# Patient Record
Sex: FEMALE | Race: WHITE | Employment: OTHER | URBAN - METROPOLITAN AREA
[De-identification: names, ages, dates, MRNs, and addresses within clinical notes are randomized per-mention and may not be internally consistent; named-entity substitution may affect disease eponyms.]

---

## 2019-01-04 ENCOUNTER — TELEPHONE ANTICOAG (OUTPATIENT)
Dept: INTERNAL MEDICINE CLINIC | Age: 79
End: 2019-01-04

## 2019-01-04 ENCOUNTER — OFFICE VISIT (OUTPATIENT)
Dept: INTERNAL MEDICINE CLINIC | Age: 79
End: 2019-01-04

## 2019-01-04 VITALS
RESPIRATION RATE: 18 BRPM | HEIGHT: 61 IN | WEIGHT: 145 LBS | SYSTOLIC BLOOD PRESSURE: 99 MMHG | HEART RATE: 88 BPM | TEMPERATURE: 99.2 F | BODY MASS INDEX: 27.38 KG/M2 | DIASTOLIC BLOOD PRESSURE: 65 MMHG | OXYGEN SATURATION: 94 %

## 2019-01-04 DIAGNOSIS — N32.81 OAB (OVERACTIVE BLADDER): ICD-10-CM

## 2019-01-04 DIAGNOSIS — M81.0 AGE-RELATED OSTEOPOROSIS WITHOUT CURRENT PATHOLOGICAL FRACTURE: ICD-10-CM

## 2019-01-04 DIAGNOSIS — J41.0 SIMPLE CHRONIC BRONCHITIS (HCC): ICD-10-CM

## 2019-01-04 DIAGNOSIS — I25.10 CORONARY ARTERY DISEASE INVOLVING NATIVE HEART WITHOUT ANGINA PECTORIS, UNSPECIFIED VESSEL OR LESION TYPE: ICD-10-CM

## 2019-01-04 DIAGNOSIS — Z00.00 ROUTINE ADULT HEALTH MAINTENANCE: Primary | ICD-10-CM

## 2019-01-04 DIAGNOSIS — Z13.5 SCREENING FOR GLAUCOMA: ICD-10-CM

## 2019-01-04 DIAGNOSIS — Z86.718 HISTORY OF DVT (DEEP VEIN THROMBOSIS): ICD-10-CM

## 2019-01-04 DIAGNOSIS — K58.0 IRRITABLE BOWEL SYNDROME WITH DIARRHEA: ICD-10-CM

## 2019-01-04 PROBLEM — I82.409 DVT (DEEP VENOUS THROMBOSIS) (HCC): Status: ACTIVE | Noted: 2019-01-04

## 2019-01-04 LAB
INR BLD: 1.3
PT POC: 15.9 SECONDS
VALID INTERNAL CONTROL?: YES

## 2019-01-04 RX ORDER — WARFARIN 2 MG/1
2 TABLET ORAL DAILY
COMMUNITY
End: 2019-01-17

## 2019-01-04 NOTE — PROGRESS NOTES
Sally Leiva is a [de-identified] y.o. female who presents for evaluation of new pt visit, cpe. Recently moved to [facility] from [place] to be closer to a son, as her  has worsening dementia. Overall she is doing quite well. ROS:  Constitutional: negative for fevers, chills, anorexia and weight loss  Eyes:   negative for visual disturbance and irritation  ENT:   negative for tinnitus,sore throat,nasal congestion,ear pain,hoarseness  Respiratory:  negative for cough, hemoptysis, dyspnea,wheezing  CV:   negative for chest pain, palpitations, lower extremity edema  GI:   negative for nausea, vomiting, diarrhea, abdominal pain,melena  Genitourinary: negative for frequency, dysuria and hematuria  Musculoskel: negative for myalgias, arthralgias, back pain, muscle weakness, joint pain  Neurological:  negative for headaches, dizziness, focal weakness, numbness  Psychiatric:     Negative for depression or anxiety      Past Medical History:   Diagnosis Date    Arthritis     History of recurrent UTIs     Irritable bowel syndrome (IBS)        Past Surgical History:   Procedure Laterality Date    HX HIP FRACTURE TX      HX OTHER SURGICAL      stint in heart       Family History   Problem Relation Age of Onset    Diabetes Mother     Stroke Mother        Social History     Socioeconomic History    Marital status:      Spouse name: Not on file    Number of children: Not on file    Years of education: Not on file    Highest education level: Not on file   Social Needs    Financial resource strain: Not on file    Food insecurity - worry: Not on file    Food insecurity - inability: Not on file   [employer] needs - medical: Not on file   [employer] needs - non-medical: Not on file   Occupational History    Not on file   Tobacco Use    Smoking status: Former Smoker    Smokeless tobacco: Never Used   Substance and Sexual Activity    Alcohol use: No     Frequency: Never    Drug use:  No  Sexual activity: No   Other Topics Concern    Not on file   Social History Narrative    Not on file            Visit Vitals  BP 99/65 (BP 1 Location: Right arm, BP Patient Position: Sitting)   Pulse 88   Temp 99.2 °F (37.3 °C) (Oral)   Resp 18   Ht 5' 1\" (1.549 m)   Wt 145 lb (65.8 kg)   SpO2 94%   BMI 27.40 kg/m²       Physical Examination:   General - Well appearing female  HEENT - PERRL, TM no erythema/opacification, normal nasal turbinates, no oropharyngeal erythema or exudate, MMM  Neck - supple, no bruits, no thyroidomegaly, no lymphadenopathy  Pulm - clear to auscultation bilaterally  Cardio - RRR, normal S1 S2, no murmur  Abd - soft, nontender, no masses, no HSM  Extrem - no edema, +2 distal pulses  Neuro-  No focal deficits, CN intact     Assessment/Plan:    1. Routine adult health maintenance--check cbc, cmp, flp, tsh, a1c  2. Copd--continue advair 250/50  3.  oab--on detrol  4. Hypothyroid--check tsh, on synthroid  5. Hx dvt--on coumadin, check INR. Takes 2 mg daily x 1 mg on wed  6. Hx cad with stent--on coumadin  7. Osteoporosis--on once yearly infusion    Get records from dr [employer] in [place].   Pt does not know her meds, has info on a disk that we can not access        Virtual [employer]-D [employer] for Smart[employer], DO

## 2019-01-04 NOTE — PROGRESS NOTES
Reviewed record in preparation for visit and have obtained necessary documentation. Identified pt with two pt identifiers(name and ). Chief Complaint   Patient presents with   [facility] Maintenance Due   Topic Date Due    DTaP/Tdap/Td series (1 - Tdap) 1959    Shingrix Vaccine Age 50> (1 of 2) 1988    GLAUCOMA SCREENING Q2Y  2003    Bone Densitometry (Dexa) Screening  2003    Pneumococcal 65+ Low/Medium Risk (1 of 2 - PCV13) 2003    Influenza Age 9 to Adult  2018       Ms. Tuyet Cortes has a reminder for a \"due or due soon\" health maintenance. I have asked that she discuss health maintenance topic(s) due with Her  primary care provider. Coordination of Care Questionnaire:  :     1) Have you been to an emergency room, urgent care clinic since your last visit? no   Hospitalized since your last visit? no             2) Have you seen or consulted any other health care providers outside of [address] [place] [address] since your last visit? no  (Include any pap smears or colon screenings in this section.)    3) Do you have an Advance Directive on file? no    4) Are you interested in receiving information on Advance Directives? NO    Patient is accompanied by self I have received verbal consent from [facility] to discuss any/all medical information while they are present in the room.

## 2019-01-04 NOTE — PATIENT INSTRUCTIONS

## 2019-01-04 NOTE — PROGRESS NOTES
Ms. Elvie Andrea is here today for anticoagulation monitoring for the diagnosis of DVT. Her INR goal is 2.0-3.0 and her current Coumadin dose is 14TWD. Today's findings include an INR of 1.3 (normal INR range 0.8-1.2) and 15.9. Considering Ms. Dunlap's past history, todays findings, and per the coumadin policy/protocol, Ms. Be Newton was instructed to take Coumadin as follows,    Description    RBVO per Dr. Kanu No go to 2mg daily and recheck in 1 week      . She was also instructed to schedule an appointment in 1 weeks prior to leaving for an INR check. A full discussion of the nature of anticoagulants has been carried out. A full discussion of the need for frequent and regular monitoring, precise dosage adjustment and compliance was stressed. Side effects of potential bleeding were discussed and Ms. Be Newton was instructed to call [phone] if there are any signs of abnormal bleeding. Ms. Be Newton was instructed to avoid any OTC items containing aspirin or ibuprofen and prior to starting any new OTC products to consult with her physician or pharmacist to ensure no drug interactions are present. Ms. Be Newton was instructed to avoid any major changes in her general diet and to avoid alcohol consumption. .    Ms. Be Newton was provided a literature booklet, \"Treatment with Warfarin (Coumadin)\", that includes topics on understanding coumadin therapy, drug interaction considerations, vitamin K and coumadin use, interactions with foods and supplements containing vitamin K, and the use of herbal products. Ms. Be Newton verbalized her understanding of all instructions and will call the office with any questions, concerns, or signs of abnormal bleeding or blood clot.

## 2019-01-07 ENCOUNTER — TELEPHONE (OUTPATIENT)
Dept: INTERNAL MEDICINE CLINIC | Age: 79
End: 2019-01-07

## 2019-01-07 NOTE — TELEPHONE ENCOUNTER
Pharmacy Progress Note - Telephone Encounter    S/O: Ms. Corey Sandhoff was contacted via an outbound telephone call to schedule an INR follow up visit today. Verified patients identifiers (name & ) per HIPAA policy. - Referred by Dr. Michael Denson for patient to be seen in the coumadin clinic and medication review. - Pt has all of her medication/medical information on a disc.    - Gets all of her medications filled at [facility] on [address]. Suggests we reached out to them for her medication names.  - Still has to get labs ordered from last PCP's visit collected. A/P:  - Pt's appointment will be on Thursday,  @ 2PM.    - Recommend patient bring in all home medications to visit. - She can get her labs drawn on the same day following INR visit. - Patient endorses understanding to the provided information. All questions were answered.      Thank you,  Verna Barba, PharmD

## 2019-01-09 ENCOUNTER — TELEPHONE (OUTPATIENT)
Dept: INTERNAL MEDICINE CLINIC | Age: 79
End: 2019-01-09

## 2019-01-09 NOTE — TELEPHONE ENCOUNTER
Patient states she needs a call back in reference to finding out why more labs needs to be done when she had INR done. Patient also wants to get her INR appt that she cancelled for 1/10/19 re-scheduled so she & her  can come together. Patient was offered Monday, 1/14/19 & she is concerned with snow that is forcasted for the weekend. Please call.  Thank you

## 2019-01-09 NOTE — TELEPHONE ENCOUNTER
I know early afternoon appointments are better for her. Can she come in next Thursday (01/17/19)? Appointment would be for INR check and medication review.      Thank you,  Zulema Jenkins, ShonD

## 2019-01-09 NOTE — TELEPHONE ENCOUNTER
Pharmacy Progress Note - Telephone Encounter    S/O: Ms. Cheri Prado was contacted via an outbound telephone call to follow up on her request to reschedule INR visit today. Verified patients identifiers (name & ) per HIPAA policy. Pt is new to the practice and area. - Pt was confused about lab work ordered at last PCP visit. She did not know to have blood work collected after visit. Her  also has pending labs. A/P:  - Clarified w/ patient she can get lab work done at [facility] in the same office. Pt and  can get this done after INR appointment.    - Rescheduled INR appt will be next Thursday,  @ 1:15 PM.   - Patient endorses understanding to the provided information. All questions were answered.      Thank you,  Blair Smith, ShonD

## 2019-01-16 ENCOUNTER — TELEPHONE (OUTPATIENT)
Dept: INTERNAL MEDICINE CLINIC | Age: 79
End: 2019-01-16

## 2019-01-16 NOTE — TELEPHONE ENCOUNTER
Unable to reach patient LVM to return call.  Want to discuss that labs that show not being drawn yet

## 2019-01-16 NOTE — TELEPHONE ENCOUNTER
#[phone] pt states she is upset no one has called her in 3 wks with lab results. Pt will try to call you later as she will be in a doctor's office now or she will be bringing  in tomorrow for labs and she could talk with you then.

## 2019-01-17 ENCOUNTER — OFFICE VISIT (OUTPATIENT)
Dept: INTERNAL MEDICINE CLINIC | Age: 79
End: 2019-01-17

## 2019-01-17 ENCOUNTER — APPOINTMENT (OUTPATIENT)
Dept: INTERNAL MEDICINE CLINIC | Age: 79
End: 2019-01-17

## 2019-01-17 VITALS — DIASTOLIC BLOOD PRESSURE: 68 MMHG | OXYGEN SATURATION: 95 % | HEART RATE: 78 BPM | SYSTOLIC BLOOD PRESSURE: 104 MMHG

## 2019-01-17 DIAGNOSIS — I82.5Y2 CHRONIC DEEP VEIN THROMBOSIS (DVT) OF PROXIMAL VEIN OF LEFT LOWER EXTREMITY (HCC): ICD-10-CM

## 2019-01-17 LAB
INR BLD: 2.8 (ref 1–1.5)
PT POC: 34.1 SECONDS (ref 9.1–12)
VALID INTERNAL CONTROL?: YES

## 2019-01-17 RX ORDER — CELECOXIB 100 MG/1
100 CAPSULE ORAL DAILY
COMMUNITY
End: 2021-06-25 | Stop reason: ALTCHOICE

## 2019-01-17 RX ORDER — NYSTATIN 100000 [USP'U]/ML
1 SUSPENSION ORAL 4 TIMES DAILY
COMMUNITY
End: 2019-11-27 | Stop reason: SDUPTHER

## 2019-01-17 RX ORDER — AMITRIPTYLINE HYDROCHLORIDE 10 MG/1
10 TABLET, FILM COATED ORAL
COMMUNITY
End: 2019-02-23 | Stop reason: SDUPTHER

## 2019-01-17 RX ORDER — GLUCOSAMINE/CHONDR SU A SOD 750-600 MG
1 TABLET ORAL DAILY
COMMUNITY
End: 2021-12-03

## 2019-01-17 RX ORDER — MONTELUKAST SODIUM 4 MG/1
1 TABLET, CHEWABLE ORAL DAILY
COMMUNITY
End: 2021-06-25 | Stop reason: ALTCHOICE

## 2019-01-17 RX ORDER — FLUTICASONE PROPIONATE AND SALMETEROL 250; 50 UG/1; UG/1
1 POWDER RESPIRATORY (INHALATION) 2 TIMES DAILY
COMMUNITY
End: 2019-10-22 | Stop reason: SDUPTHER

## 2019-01-17 RX ORDER — CLORAZEPATE DIPOTASSIUM 3.75 MG/1
3.75 TABLET ORAL
COMMUNITY
End: 2019-06-05 | Stop reason: SDUPTHER

## 2019-01-17 RX ORDER — LEVETIRACETAM 500 MG/1
TABLET ORAL SEE ADMIN INSTRUCTIONS
COMMUNITY
End: 2019-02-09 | Stop reason: SDUPTHER

## 2019-01-17 RX ORDER — MELATONIN
1000 DAILY
COMMUNITY
End: 2020-08-24

## 2019-01-17 RX ORDER — ESCITALOPRAM OXALATE 20 MG/1
20 TABLET ORAL DAILY
COMMUNITY
End: 2019-04-16 | Stop reason: SDUPTHER

## 2019-01-17 RX ORDER — DIPHENOXYLATE HYDROCHLORIDE AND ATROPINE SULFATE 2.5; .025 MG/1; MG/1
1 TABLET ORAL
COMMUNITY
End: 2019-05-24 | Stop reason: SDUPTHER

## 2019-01-17 RX ORDER — WARFARIN 2 MG/1
2 TABLET ORAL SEE ADMIN INSTRUCTIONS
Qty: 30 TAB | Refills: 0
Start: 2019-01-17 | End: 2019-03-20 | Stop reason: SDUPTHER

## 2019-01-17 RX ORDER — ATORVASTATIN CALCIUM 20 MG/1
20 TABLET, FILM COATED ORAL DAILY
COMMUNITY
End: 2019-11-27 | Stop reason: SDUPTHER

## 2019-01-17 RX ORDER — OXYBUTYNIN CHLORIDE 10 MG/1
10 TABLET, EXTENDED RELEASE ORAL DAILY
COMMUNITY
End: 2019-03-13 | Stop reason: SDUPTHER

## 2019-01-17 RX ORDER — WARFARIN 1 MG/1
1 TABLET ORAL SEE ADMIN INSTRUCTIONS
Qty: 30 TAB | Refills: 0
Start: 2019-01-17 | End: 2020-01-20 | Stop reason: SDUPTHER

## 2019-01-17 RX ORDER — FLUTICASONE PROPIONATE 50 MCG
2 SPRAY, SUSPENSION (ML) NASAL
COMMUNITY
End: 2020-02-10 | Stop reason: SDUPTHER

## 2019-01-17 RX ORDER — WARFARIN 1 MG/1
1 TABLET ORAL SEE ADMIN INSTRUCTIONS
COMMUNITY
End: 2019-01-17

## 2019-01-17 RX ORDER — EZETIMIBE 10 MG/1
10 TABLET ORAL DAILY
COMMUNITY
End: 2019-03-08 | Stop reason: SDUPTHER

## 2019-01-17 RX ORDER — CALCIUM CARBONATE 200(500)MG
1 TABLET,CHEWABLE ORAL
COMMUNITY
End: 2021-12-03

## 2019-01-17 RX ORDER — WARFARIN 2 MG/1
2 TABLET ORAL SEE ADMIN INSTRUCTIONS
COMMUNITY
End: 2019-01-17

## 2019-01-17 RX ORDER — TRAZODONE HYDROCHLORIDE 50 MG/1
50-100 TABLET ORAL
COMMUNITY
End: 2019-01-28 | Stop reason: SDUPTHER

## 2019-01-17 RX ORDER — METOPROLOL SUCCINATE 25 MG/1
12.5 TABLET, EXTENDED RELEASE ORAL DAILY
COMMUNITY
End: 2019-11-27 | Stop reason: SDUPTHER

## 2019-01-17 NOTE — PROGRESS NOTES
Pharmacy Progress Note:  Medication Review    S/O:    Tanvir Storm is a [de-identified] y.o. female , with a PMH of OAB, IBS, LE DVT, anxiety, insomonia, Renee's esophagus, Arthritis, Migraines, hyperlipidemia, who was seen today for a medication review and anticoagulation management for LLE DVT. Patient was accompanied by her  to today's visit. Labs ordered from last PCP visit were collected immediately prior to this visit. Pt ambulates w/ a cane. Medication Adherence/Access:  - Pt did not bring in home medications to visit. - Pt reports compliance to med regimen.   - Pt uses pill box/organizer at home: no   - Pt denies barriers to accessing/affording medications  - Pt does have prescription coverage/insurance - [address] [place] [address] [phone]    [address] [place] [address] to assist with patient's medication list.  Pharmacy confirmed patient recently filled the following medications on 11/7/18 for a 90 day supply. Patient confirmed that she is taking all of the reported medications.      SHx  Retired  as of 2015  Recently relocated from [facility] after living their for 64 yrs to be closer to her son  Primary caretaker for her      Anticoagulation:  - LLE DVT in 2008 - denies extensive travel or injury - started on warfarin ; INR goal: 2-3  - No family hx of blood clots   - Mother has cardiovascular hx of stroke and DM ; does not know father's med hx  - Has 3 children all alive    - Has warfarin 1 mg and 2 mg dosage strength at home ; could not recall what her previous warfarin regimen was  -  endorses that she primarily use 1 mg strength whenever her \"readings are low;\" denies any issues w/ anticoagulant   - Warfarin dose adjusted to 2 mg daily on 01/4/19 for INR = 1.3   - Avoid greens at a baseline   - Denies falls, s/sx bleeding, bruising, SOB, CP, or missed doses   - BP: 104/68 ; HR: 78 today   - no procedures coming up   - does not take OTC NSAIDs or APAP; uses celebrex 100 mg daily with food for arthritic pain; Understands increased bleeding risk w/ concomitant therapy    Results for orders placed or performed in visit on 01/17/19   AMB POC PT/INR   Result Value Ref Range    VALID INTERNAL CONTROL POC Yes     Prothrombin time (POC) 34.1 (A) 9.1 - 12 seconds    INR POC 2.8 (A) 1 - 1.5     Hyperlipidemia  Atorvastatin 20 mg daily and Zetia 10 mg daily     OAB/IBS  - on colestipol 1 gm daily; lomotil PRN; Align OTC daily PRN - states she responds better to align than lomotil  - oxybutynin ER 10 mg daily    Migraine:  - Keppra 500 mg PO - 1 tablet AM and 1.5 tab in the PM     Anxiety/Insomnia  - lexapro 20 mg daily  - clorazepate 3.75 gm Q6H PRN anxiety - only takes when her  is not doing well   - trazodone  mg at HS PRN insomnia     Renee's esophagus:  - Hx of protonix use - no longer takes   - uses Tums PRN for heartburn     Dry mouth/thrush:   - Nystatin suspension QID PRN for mouth pain   - also on anticholinergic medications  - Advair 250/50 - may worsen thrush; states she rinses mouth after use    Other:  - Biotin 2.5 mg PO daily for alopecia  - Vitamin D3 1000 units daily   - Flonase nasal spray PRN allergy      /68 (BP 1 Location: Right arm, BP Patient Position: Sitting)   Pulse 78   SpO2 95%   Wt Readings from Last 3 Encounters:   01/04/19 145 lb (65.8 kg)     BP Readings from Last 3 Encounters:   01/17/19 104/68   01/04/19 99/65     Pulse Readings from Last 3 Encounters:   01/17/19 78   01/04/19 88     Past Medical History:   Diagnosis Date    Arthritis     History of DVT (deep vein thrombosis)     History of recurrent UTIs     Irritable bowel syndrome (IBS)        Allergies   Allergen Reactions    Pcn [Penicillins] Shortness of Breath    Sulfa (Sulfonamide Antibiotics) Shortness of Breath       A/P:     Medication Adherence:  - Medication reconciliation was completed during the visit.   - Pt is compliant to current medication regimen. Addition(s):    Current Outpatient Medications   Medication Sig    amitriptyline (ELAVIL) 10 mg tablet Take 10 mg by mouth nightly.  ezetimibe (ZETIA) 10 mg tablet Take 10 mg by mouth daily.  traZODone (DESYREL) 50 mg tablet Take  mg by mouth nightly as needed for Sleep.  fluticasone-salmeterol (ADVAIR DISKUS) 250-50 mcg/dose diskus inhaler Take 1 Puff by inhalation two (2) times a day.  oxybutynin chloride XL (DITROPAN XL) 10 mg CR tablet Take 10 mg by mouth daily.  metoprolol succinate (TOPROL-XL) 25 mg XL tablet Take 12.5 mg by mouth daily.  levETIRAcetam (KEPPRA) 500 mg tablet Take  by mouth See Admin Instructions. Take 1 tablet in the morning and 1.5 tablets in the evening    atorvastatin (LIPITOR) 20 mg tablet Take 10 mg by mouth daily.  escitalopram oxalate (LEXAPRO) 20 mg tablet Take 20 mg by mouth daily.  colestipol (COLESTID) 1 gram tablet Take 1 g by mouth daily.  fluticasone (FLONASE) 50 mcg/actuation nasal spray 2 Sprays by Both Nostrils route daily as needed for Rhinitis.  cholecalciferol (VITAMIN D3) 1,000 unit tablet Take 1,000 Units by mouth daily.  nystatin (MYCOSTATIN) 100,000 unit/mL suspension Take 1 tsp by mouth four (4) times daily. swish and spit to help with mouth pain    Biotin 2,500 mcg cap Take 1 Cap by mouth daily.  clorazepate (TRANXENE) 3.75 mg tablet Take 3.75 mg by mouth every six (6) hours as needed for Anxiety.  celecoxib (CELEBREX) 100 mg capsule Take 100 mg by mouth daily. Take with food for arthritic pain    calcium carbonate (TUMS) 200 mg calcium (500 mg) chew Take 1 Tab by mouth daily as needed.  warfarin (COUMADIN) 1 mg tablet Take 1 Tab by mouth See Admin Instructions. Take 2 mg daily. Has both 1 mg and 2 mg dosage strength    warfarin (COUMADIN) 2 mg tablet Take 1 Tab by mouth See Admin Instructions. Take 2 mg daily.      Has both 1 mg and 2 mg dosage strength    diphenoxylate-atropine (LOMOTIL) 2.5-0.025 mg per tablet Take 1 Tab by mouth four (4) times daily as needed for Diarrhea.  Bifidobacterium Infantis (ALIGN) 4 mg cap Take 1 Cap by mouth daily as needed for Diarrhea. No current facility-administered medications for this visit. Deletion(s):  Medications Discontinued During This Encounter   Medication Reason    amitriptyline HCl (AMITRIPTYLINE PO) Other    OXYBUTYNIN CHLORIDE PO Other    warfarin (COUMADIN) 2 mg tablet Other       Anticoagulation:    1. INR (2.8) today is therapeutic for her goal of 2-3.  2. Continue warfarin 2 mg daily. 3. Pt will return in 2 weeks for POC INR check. Patient verbalized understanding of the information presented and all of the patients questions were answered. AVS was handed to the patient. Patient advised to call the office with any additional questions or concerns. Follow-up: Notifications of recommendations will be sent to Dr. Jacqueline Singleton DO for review.     Thank you for the consult,  Shon DixonD

## 2019-01-18 ENCOUNTER — PATIENT OUTREACH (OUTPATIENT)
Dept: INTERNAL MEDICINE CLINIC | Age: 79
End: 2019-01-18

## 2019-01-18 LAB
ALBUMIN SERPL-MCNC: 4.2 G/DL (ref 3.5–4.7)
ALBUMIN/GLOB SERPL: 1.4 {RATIO} (ref 1.2–2.2)
ALP SERPL-CCNC: 82 IU/L (ref 39–117)
ALT SERPL-CCNC: 9 IU/L (ref 0–32)
AST SERPL-CCNC: 15 IU/L (ref 0–40)
BASOPHILS # BLD AUTO: 0.1 X10E3/UL (ref 0–0.2)
BASOPHILS NFR BLD AUTO: 1 %
BILIRUB SERPL-MCNC: 0.3 MG/DL (ref 0–1.2)
BUN SERPL-MCNC: 12 MG/DL (ref 8–27)
BUN/CREAT SERPL: 16 (ref 12–28)
CALCIUM SERPL-MCNC: 9.3 MG/DL (ref 8.7–10.3)
CHLORIDE SERPL-SCNC: 102 MMOL/L (ref 96–106)
CHOLEST SERPL-MCNC: 158 MG/DL (ref 100–199)
CO2 SERPL-SCNC: 24 MMOL/L (ref 20–29)
CREAT SERPL-MCNC: 0.75 MG/DL (ref 0.57–1)
EOSINOPHIL # BLD AUTO: 0.5 X10E3/UL (ref 0–0.4)
EOSINOPHIL NFR BLD AUTO: 6 %
ERYTHROCYTE [DISTWIDTH] IN BLOOD BY AUTOMATED COUNT: 18.1 % (ref 12.3–15.4)
EST. AVERAGE GLUCOSE BLD GHB EST-MCNC: 157 MG/DL
GLOBULIN SER CALC-MCNC: 2.9 G/DL (ref 1.5–4.5)
GLUCOSE SERPL-MCNC: 105 MG/DL (ref 65–99)
HBA1C MFR BLD: 7.1 % (ref 4.8–5.6)
HCT VFR BLD AUTO: 38 % (ref 34–46.6)
HDLC SERPL-MCNC: 50 MG/DL
HGB BLD-MCNC: 11 G/DL (ref 11.1–15.9)
IMM GRANULOCYTES # BLD AUTO: 0 X10E3/UL (ref 0–0.1)
IMM GRANULOCYTES NFR BLD AUTO: 0 %
LDLC SERPL CALC-MCNC: 76 MG/DL (ref 0–99)
LYMPHOCYTES # BLD AUTO: 2.2 X10E3/UL (ref 0.7–3.1)
LYMPHOCYTES NFR BLD AUTO: 26 %
MCH RBC QN AUTO: 21.6 PG (ref 26.6–33)
MCHC RBC AUTO-ENTMCNC: 28.9 G/DL (ref 31.5–35.7)
MCV RBC AUTO: 75 FL (ref 79–97)
MONOCYTES # BLD AUTO: 0.7 X10E3/UL (ref 0.1–0.9)
MONOCYTES NFR BLD AUTO: 8 %
NEUTROPHILS # BLD AUTO: 5.1 X10E3/UL (ref 1.4–7)
NEUTROPHILS NFR BLD AUTO: 59 %
PLATELET # BLD AUTO: 268 X10E3/UL (ref 150–379)
POTASSIUM SERPL-SCNC: 4.1 MMOL/L (ref 3.5–5.2)
PROT SERPL-MCNC: 7.1 G/DL (ref 6–8.5)
RBC # BLD AUTO: 5.1 X10E6/UL (ref 3.77–5.28)
SODIUM SERPL-SCNC: 141 MMOL/L (ref 134–144)
TRIGL SERPL-MCNC: 161 MG/DL (ref 0–149)
TSH SERPL DL<=0.005 MIU/L-ACNC: 2.47 UIU/ML (ref 0.45–4.5)
VLDLC SERPL CALC-MCNC: 32 MG/DL (ref 5–40)
WBC # BLD AUTO: 8.6 X10E3/UL (ref 3.4–10.8)

## 2019-01-18 RX ORDER — METFORMIN HYDROCHLORIDE 500 MG/1
500 TABLET, EXTENDED RELEASE ORAL
Qty: 30 TAB | Refills: 11 | Status: SHIPPED | OUTPATIENT
Start: 2019-01-18 | End: 2019-04-30 | Stop reason: SDUPTHER

## 2019-01-18 NOTE — TELEPHONE ENCOUNTER
Spoke with patient after 2 patient identifiers being note and advised that she in actuality had forgotten to get labs drawn at her last visit and she got them drawn on yesterday. I advised per Dr. Jeff Morris that Labs overall look pretty good, but she does have diabetes.  Would benefit from meeting with chacorta for education.  Would also start Glucophage with dinner.  Rx sent in. Patient expressed understanding and has no further questions at this time.

## 2019-01-18 NOTE — PROGRESS NOTES
1/21/19-pt declined diabetes education at this time as she is dealing with a lot due to her 's dementia and she does not have time for an appt. Gave pt my contact information for her to call with any questions. She has started the metformin with dinner. Has nausea. Informed pt that zofran was sent to her pharmacy today per her request.     Was asked by Dr. Marsha Borja to see pt for diabetes education. See note below. Notes recorded by Carlos Gillespie DO on 1/18/2019 at 7:50 AM EST  Labs overall look pretty good, but she does have diabetes.  Would benefit from meeting with chacorta for education.  Would also start glucophage with dinner.  rx sent in. Left message at [phone] to call Ynes Posadas back at [phone].

## 2019-01-18 NOTE — PROGRESS NOTES
Labs overall look pretty good, but she does have diabetes. Would benefit from meeting with chacorta for education. Would also start glucophage with dinner. rx sent in.

## 2019-01-21 RX ORDER — ONDANSETRON 4 MG/1
4 TABLET, FILM COATED ORAL
Qty: 30 TAB | Refills: 0 | Status: SHIPPED | OUTPATIENT
Start: 2019-01-21 | End: 2019-02-04 | Stop reason: SDUPTHER

## 2019-01-28 ENCOUNTER — ANTI-COAG VISIT (OUTPATIENT)
Dept: INTERNAL MEDICINE CLINIC | Age: 79
End: 2019-01-28

## 2019-01-28 ENCOUNTER — TELEPHONE (OUTPATIENT)
Dept: INTERNAL MEDICINE CLINIC | Age: 79
End: 2019-01-28

## 2019-01-28 DIAGNOSIS — I82.5Y2 CHRONIC DEEP VEIN THROMBOSIS (DVT) OF PROXIMAL VEIN OF LEFT LOWER EXTREMITY (HCC): ICD-10-CM

## 2019-01-28 LAB
INR BLD: 2 (ref 1–1.5)
PT POC: 24.2 SECONDS (ref 9.1–12)
VALID INTERNAL CONTROL?: YES

## 2019-01-28 RX ORDER — DICYCLOMINE HYDROCHLORIDE 20 MG/1
20 TABLET ORAL 2 TIMES DAILY
COMMUNITY
End: 2019-02-05 | Stop reason: SDUPTHER

## 2019-01-28 RX ORDER — TRAMADOL HYDROCHLORIDE 50 MG/1
50-100 TABLET ORAL
COMMUNITY
End: 2019-05-14 | Stop reason: SDUPTHER

## 2019-01-28 RX ORDER — OXYCODONE HYDROCHLORIDE 10 MG/1
10 TABLET ORAL
COMMUNITY
End: 2019-02-27 | Stop reason: SDUPTHER

## 2019-01-28 RX ORDER — TRAZODONE HYDROCHLORIDE 50 MG/1
50 TABLET ORAL
Qty: 90 TAB | Refills: 3 | Status: SHIPPED | OUTPATIENT
Start: 2019-01-28 | End: 2020-02-10 | Stop reason: SDUPTHER

## 2019-01-28 NOTE — TELEPHONE ENCOUNTER
Refilled trazodone 50 mg daily PRN sleep - #90 with 3 refills per [facility] from Dr. Aspen Mancia, DO today.      Zulema Delcid, ShonD

## 2019-01-28 NOTE — PROGRESS NOTES
Pharmacy Progress Note  - Anticoagulation Management    S/O:  Ms. Evan Veras  is a [de-identified] y.o. female seen today for anticoagulation management for the diagnosis of Deep Vein Thrombosis. HPI:   - LLE DVT in 2008- denied extensive travel or injury at the time of DVT- started on warfarin  - No family hx of blood clots  - [address]: Mother has cardiovascular hx of stroke and DM; unsure of father's medical hx   - Has 3 children, all alive    · Warfarin start date: Around 2008   · INR Goal:  2.0-3.0    · Current warfarin regimen: 2 mg daily                      · Warfarin tablet strength:   1 mg, 2 mg   · Duration of therapy: Indefinite    Today's Patient Findings:  Radha Harley recently- reports fever of 80 F one day last week after last appt which resolved with Tylenol and rest. Reports nausea with new medication metformin, but denies vomiting/diarrhea. Received a prescription for ondansetron to help with symptoms. Pertinent positives includes:  Medication change: metformin, ondansetron    INR (POC) today (normal INR range 0.8-1.2) :    Recent Results (from the past 12 hour(s))   AMB POC PT/INR    Collection Time: 01/28/19  3:36 PM   Result Value Ref Range    VALID INTERNAL CONTROL POC Yes     Prothrombin time (POC) 24.2 (A) 9.1 - 12 seconds    INR POC 2.0 (A) 1 - 1.5       · Adherence:   · Able to recall regimen? YES  · Miss/extra dose? NO  · Need refill? NO    Upcoming procedure(s):  NO    Past Medical History:   Diagnosis Date    Arthritis     History of DVT (deep vein thrombosis)     History of recurrent UTIs     Irritable bowel syndrome (IBS)        Current Outpatient Medications   Medication Sig    oxyCODONE IR (ROXICODONE) 10 mg tab immediate release tablet Take 10 mg by mouth every six (6) hours as needed for Pain.  traMADol (ULTRAM) 50 mg tablet Take  mg by mouth every six (6) hours as needed for Pain.  dicyclomine (BENTYL) 20 mg tablet Take 20 mg by mouth two (2) times a day.     ondansetron hcl (ZOFRAN) 4 mg tablet Take 1 Tab by mouth every eight (8) hours as needed for Nausea.  metFORMIN ER (GLUCOPHAGE XR) 500 mg tablet Take 1 Tab by mouth daily (with dinner).  amitriptyline (ELAVIL) 10 mg tablet Take 10 mg by mouth nightly.  ezetimibe (ZETIA) 10 mg tablet Take 10 mg by mouth daily.  traZODone (DESYREL) 50 mg tablet Take  mg by mouth nightly as needed for Sleep.  fluticasone-salmeterol (ADVAIR DISKUS) 250-50 mcg/dose diskus inhaler Take 1 Puff by inhalation two (2) times a day.  oxybutynin chloride XL (DITROPAN XL) 10 mg CR tablet Take 10 mg by mouth daily.  metoprolol succinate (TOPROL-XL) 25 mg XL tablet Take 12.5 mg by mouth daily.  levETIRAcetam (KEPPRA) 500 mg tablet Take  by mouth See Admin Instructions. Take 1 tablet in the morning and 1.5 tablets in the evening    atorvastatin (LIPITOR) 20 mg tablet Take 10 mg by mouth daily.  escitalopram oxalate (LEXAPRO) 20 mg tablet Take 20 mg by mouth daily.  fluticasone (FLONASE) 50 mcg/actuation nasal spray 2 Sprays by Both Nostrils route daily as needed for Rhinitis.  cholecalciferol (VITAMIN D3) 1,000 unit tablet Take 1,000 Units by mouth daily.  nystatin (MYCOSTATIN) 100,000 unit/mL suspension Take 1 tsp by mouth four (4) times daily. swish and spit to help with mouth pain    Bifidobacterium Infantis (ALIGN) 4 mg cap Take 1 Cap by mouth daily as needed for Diarrhea.  Biotin 2,500 mcg cap Take 1 Cap by mouth daily.  clorazepate (TRANXENE) 3.75 mg tablet Take 3.75 mg by mouth every six (6) hours as needed for Anxiety.  celecoxib (CELEBREX) 100 mg capsule Take 100 mg by mouth daily as needed. Take with food for arthritic pain     calcium carbonate (TUMS) 200 mg calcium (500 mg) chew Take 1 Tab by mouth daily as needed.  warfarin (COUMADIN) 1 mg tablet Take 1 Tab by mouth See Admin Instructions. Take 2 mg daily.      Has both 1 mg and 2 mg dosage strength    warfarin (COUMADIN) 2 mg tablet Take 1 Tab by mouth See Admin Instructions. Take 2 mg daily. Has both 1 mg and 2 mg dosage strength    colestipol (COLESTID) 1 gram tablet Take 1 g by mouth daily.  diphenoxylate-atropine (LOMOTIL) 2.5-0.025 mg per tablet Take 1 Tab by mouth four (4) times daily as needed for Diarrhea. No current facility-administered medications for this visit. Wt Readings from Last 3 Encounters:   01/04/19 65.8 kg (145 lb)       BP Readings from Last 3 Encounters:   01/17/19 104/68   01/04/19 99/65       CBC:    Lab Results   Component Value Date/Time    WBC 8.6 01/17/2019 12:55 PM    HGB 11.0 (L) 01/17/2019 12:55 PM    HCT 38.0 01/17/2019 12:55 PM    PLATELET 614 10/16/0690 12:55 PM    MCV 75 (L) 01/17/2019 12:55 PM       Lab Results   Component Value Date/Time    Protein, total 7.1 01/17/2019 12:55 PM    Albumin 4.2 01/17/2019 12:55 PM         INR History:   (normal INR range 0.8-1.2)     Date   INR   PT   Dose/Comments  1/28/19 2.0  24.2 2 mg daily    1/17/19 2.8  34.1 2 mg daily      A/P:       Anticoagulation:  Considering Ms. Dunlap's past history, todays findings, and per Anticoagulation Collaborative Practice Agreement/Protocol:    1. POC INR (2.0) is Therapeutic for INR goal today. 2.  Continue warfarin 2 mg daily. Patient was instructed to schedule an appointment in 4 week(s) prior to leaving the clinic. Medication reconciliation was completed during the visit. There are no discontinued medications. A full discussion of the nature of anticoagulants has been carried out. A full discussion of the need for frequent and regular monitoring, precise dosage adjustment and compliance was stressed. Side effects of potential bleeding were discussed and Ms. Yana Arauz was instructed to call [phone] if there are any signs of abnormal bleeding.   Ms. Yana Arauz was instructed to avoid any OTC items containing aspirin or ibuprofen and prior to starting any new OTC products to consult with her physician or pharmacist to ensure no drug interactions are present. Ms. Estella Rey was instructed to avoid any major changes in her general diet and to avoid alcohol consumption. Ms. Estella Rey was provided information in the AVS that includes topics on understanding coumadin therapy, drug interaction considerations, vitamin K and coumadin use, interactions with foods and supplements containing vitamin K, and the use of herbal products. Ms. Estelal Rey verbalized her understanding of all instructions and will call the office with any questions, concerns, or signs of abnormal bleeding or blood clot.     Notifications of recommendations will be sent to Dr. Mina Bedolla DO for review    Thank you for the consult,  Sanya Richardson, [employer]

## 2019-01-28 NOTE — PATIENT INSTRUCTIONS
Today your INR was 2.0. Your goal INR is  2-3 . You have a   2 mg tablet of Coumadin (warfarin). Take Coumadin as follows: Take 2 mg tablet once daily. Come back in 4 week(s) for your next finger stick/INR blood test.        Avoid any over the counter items containing aspirin or ibuprofen, and avoid great swings in general diet. Avoid alcohol consumption. Please notify the INR nurse if you are started on any new medication including over the counter or herbal supplements. Also, please notify your INR nurse if any of your other prescription or over the counter medications have been discontinued. Call [facility] at [phone] if you have any signs of abnormal bleeding/blood clot.  ------------------------------------------------------------------------------------------------------------------  Taking Warfarin Safely: Care Instructions    Your Care Instructions  Warfarin is a medicine that you take to prevent blood clots. It is often called a blood thinner. Doctors give warfarin (such as Coumadin) to reduce the risk of blood clots. You may be at risk for blood clots if you have atrial fibrillation or deep vein thrombosis. Some other health problems may also put you at risk. Warfarin slows the amount of time it takes for your blood to clot. It can cause bleeding problems. Even if you've been taking warfarin for a while, it's important to know how to take it safely. Foods and other medicines can affect the way warfarin works. Some can make warfarin work too well. This can cause bleeding problems. And some can make it work poorly, so that it does not prevent blood clots very well. You will need regular blood tests to check how long it takes for your blood to form a clot. This test is called a PT or prothrombin time test. The result of the test is called an INR level. Depending on the test results, your doctor or anticoagulation clinic may adjust your dose of warfarin.     Follow-up care is a key part of your treatment and safety. Be sure to make and go to all appointments, and call your doctor if you are having problems. It's also a good idea to know your test results and keep a list of the medicines you take. How can you care for yourself at home? Take warfarin safely  · Take your warfarin at the same time each day. · If you miss a dose of warfarin, don't take an extra dose to make up for it. Your doctor can tell you exactly what to do so you don't take too much or too little. · Wear medical alert jewelry that lets others know that you take warfarin. You can buy this at most drugstores. · Don't take warfarin if you are pregnant or planning to get pregnant. Talk to your doctor about how you can prevent getting pregnant while you are taking it. · Don't change your dose or stop taking warfarin unless your doctor tells you to. Effects of medicines and food on warfarin  · Don't start or stop taking any medicines, vitamins, or natural remedies unless you first talk to your doctor. Many medicines can affect how warfarin works. These include aspirin and other pain relievers, over-the-counter medicines, multivitamins, dietary supplements, and herbal products. · Tell all of your doctors and pharmacists that you take warfarin. Some prescription medicines can affect how warfarin works. · Keep the amount of vitamin K in your diet about the same from day to day. Do not suddenly eat a lot more or a lot less food that is rich in vitamin K than you usually do. Vitamin K affects how warfarin works and how your blood clots. Talk with your doctor before making big changes in your diet. Vitamin K is in many foods, such as:  ¨ Leafy greens, such as kale, cabbage, spinach, Swiss chard, and lettuce. ¨ Canola and soybean oils. ¨ Green vegetables, such as asparagus, broccoli, and [place]ropic sprouts. ¨ Vegetable drinks, green tea leaves, and some dietary supplement drinks.   · Avoid cranberry juice and other cranberry products. They can increase the effects of warfarin. · Limit your use of alcohol. Avoid bleeding by preventing falls and injuries  · Wear slippers or shoes with nonskid soles. · Remove throw rugs and clutter. · Rearrange furniture and electrical cords to keep them out of walking paths. · Keep stairways, porches, and outside walkways well lit. Use night-lights in hallways and bathrooms. · Be extra careful when you work with sharp tools or knives. When should you call for help? Call 911 anytime you think you may need emergency care. For example, call if:  · You have a sudden, severe headache that is different from past headaches. Call your doctor now or seek immediate medical care if:  · You have any abnormal bleeding, such as:  ¨ Nosebleeds. ¨ Vaginal bleeding that is different (heavier, more frequent, at a different time of the month) than what you are used to. ¨ Bloody or black stools, or rectal bleeding. ¨ Bloody or pink urine. Watch closely for changes in your health, and be sure to contact your doctor if you have any problems. Where can you learn more? Go to http://lynn-xu.info/. Enter Z008 in the search box to learn more about \"Taking Warfarin Safely: Care Instructions. \"  Current as of: January 27, 2016  Content Version: 11.1  © 5000-5103 [employer]. Care instructions adapted under license by [employer] (which disclaims liability or warranty for this information). If you have questions about a medical condition or this instruction, always ask your healthcare professional. [place] [address] any warranty or liability for your use of this information.

## 2019-02-04 RX ORDER — ONDANSETRON 4 MG/1
TABLET, FILM COATED ORAL
Qty: 30 TAB | Refills: 0 | Status: SHIPPED | OUTPATIENT
Start: 2019-02-04 | End: 2020-01-20

## 2019-02-05 ENCOUNTER — TELEPHONE (OUTPATIENT)
Dept: INTERNAL MEDICINE CLINIC | Age: 79
End: 2019-02-05

## 2019-02-05 RX ORDER — DICYCLOMINE HYDROCHLORIDE 20 MG/1
20 TABLET ORAL 2 TIMES DAILY
Qty: 60 TAB | Refills: 1 | Status: SHIPPED | OUTPATIENT
Start: 2019-02-05 | End: 2019-03-13 | Stop reason: SDUPTHER

## 2019-02-05 NOTE — TELEPHONE ENCOUNTER
#[phone] pt needs a referral from Dr. Soham Schwartz to see a urologist at [facility].   Please call

## 2019-02-06 NOTE — TELEPHONE ENCOUNTER
Pt called in. Two pt identifiers confirmed. Pt states she use to see a urologist for frequent UTIs. Pt states she's having frequent urges to go the bathroom and wanted to see a urologist but not sure of one here. Gave information for [facility] (Dr. Milli Simpson) to pt. Pt verbalized understanding of information discussed w/ no further questions at this time.

## 2019-02-10 RX ORDER — LEVETIRACETAM 500 MG/1
TABLET ORAL
Qty: 225 TAB | Refills: 3 | Status: SHIPPED | OUTPATIENT
Start: 2019-02-10 | End: 2020-03-17 | Stop reason: SDUPTHER

## 2019-02-11 RX ORDER — LEVETIRACETAM 500 MG/1
TABLET ORAL
Qty: 225 TAB | Refills: 3 | Status: CANCELLED | OUTPATIENT
Start: 2019-02-11

## 2019-02-11 NOTE — TELEPHONE ENCOUNTER
PCP: Eren Kimball,     Last appt: 1/17/2019  Future Appointments   Date Time Provider Toro Valderrama   2/25/2019  1:30 PM Anna Hawley, [address] [facility] CARLA MITCHELL       Requested Prescriptions     Pending Prescriptions Disp Refills    levETIRAcetam (KEPPRA) 500 mg tablet 225 Tab 3

## 2019-02-26 ENCOUNTER — OFFICE VISIT (OUTPATIENT)
Dept: INTERNAL MEDICINE CLINIC | Age: 79
End: 2019-02-26

## 2019-02-26 DIAGNOSIS — I82.5Y2 CHRONIC DEEP VEIN THROMBOSIS (DVT) OF PROXIMAL VEIN OF LEFT LOWER EXTREMITY (HCC): Primary | ICD-10-CM

## 2019-02-26 LAB
INR BLD: 2.6 (ref 1–1.5)
PT POC: 31.5 SECONDS (ref 9.1–12)
VALID INTERNAL CONTROL?: YES

## 2019-02-26 RX ORDER — LEVOTHYROXINE SODIUM 25 UG/1
50 TABLET ORAL
COMMUNITY
End: 2019-02-26 | Stop reason: SDUPTHER

## 2019-02-26 RX ORDER — LEVOTHYROXINE SODIUM 25 UG/1
50 TABLET ORAL
Qty: 90 TAB | Refills: 1 | Status: SHIPPED | OUTPATIENT
Start: 2019-02-26 | End: 2019-11-07 | Stop reason: SDUPTHER

## 2019-02-26 NOTE — PATIENT INSTRUCTIONS
Today your INR was 2.6. Your goal INR is  2.0-3.0 . You have a  1 mg and 2 mg tablet of Coumadin (warfarin). Take Coumadin as follows:    Continue with warfarin 2 mg daily. Come back in 5 week(s) for your next finger stick/INR blood test.        Avoid any over the counter items containing aspirin or ibuprofen, and avoid great swings in general diet. Avoid alcohol consumption. Please notify the INR nurse if you are started on any new medication including over the counter or herbal supplements. Also, please notify your INR nurse if any of your other prescription or over the counter medications have been discontinued. Call [facility] at [phone] if you have any signs of abnormal bleeding/blood clot.  ------------------------------------------------------------------------------------------------------------------  Taking Warfarin Safely: Care Instructions    Your Care Instructions  Warfarin is a medicine that you take to prevent blood clots. It is often called a blood thinner. Doctors give warfarin (such as Coumadin) to reduce the risk of blood clots. You may be at risk for blood clots if you have atrial fibrillation or deep vein thrombosis. Some other health problems may also put you at risk. Warfarin slows the amount of time it takes for your blood to clot. It can cause bleeding problems. Even if you've been taking warfarin for a while, it's important to know how to take it safely. Foods and other medicines can affect the way warfarin works. Some can make warfarin work too well. This can cause bleeding problems. And some can make it work poorly, so that it does not prevent blood clots very well. You will need regular blood tests to check how long it takes for your blood to form a clot. This test is called a PT or prothrombin time test. The result of the test is called an INR level.  Depending on the test results, your doctor or anticoagulation clinic may adjust your dose of warfarin. Follow-up care is a key part of your treatment and safety. Be sure to make and go to all appointments, and call your doctor if you are having problems. It's also a good idea to know your test results and keep a list of the medicines you take. How can you care for yourself at home? Take warfarin safely  · Take your warfarin at the same time each day. · If you miss a dose of warfarin, don't take an extra dose to make up for it. Your doctor can tell you exactly what to do so you don't take too much or too little. · Wear medical alert jewelry that lets others know that you take warfarin. You can buy this at most drugstores. · Don't take warfarin if you are pregnant or planning to get pregnant. Talk to your doctor about how you can prevent getting pregnant while you are taking it. · Don't change your dose or stop taking warfarin unless your doctor tells you to. Effects of medicines and food on warfarin  · Don't start or stop taking any medicines, vitamins, or natural remedies unless you first talk to your doctor. Many medicines can affect how warfarin works. These include aspirin and other pain relievers, over-the-counter medicines, multivitamins, dietary supplements, and herbal products. · Tell all of your doctors and pharmacists that you take warfarin. Some prescription medicines can affect how warfarin works. · Keep the amount of vitamin K in your diet about the same from day to day. Do not suddenly eat a lot more or a lot less food that is rich in vitamin K than you usually do. Vitamin K affects how warfarin works and how your blood clots. Talk with your doctor before making big changes in your diet. Vitamin K is in many foods, such as:  ¨ Leafy greens, such as kale, cabbage, spinach, Swiss chard, and lettuce. ¨ Canola and soybean oils. ¨ Green vegetables, such as asparagus, broccoli, and [place] sprouts. ¨ Vegetable drinks, green tea leaves, and some dietary supplement drinks.   · Avoid cranberry juice and other cranberry products. They can increase the effects of warfarin. · Limit your use of alcohol. Avoid bleeding by preventing falls and injuries  · Wear slippers or shoes with nonskid soles. · Remove throw rugs and clutter. · Rearrange furniture and electrical cords to keep them out of walking paths. · Keep stairways, porches, and outside walkways well lit. Use night-lights in hallways and bathrooms. · Be extra careful when you work with sharp tools or knives. When should you call for help? Call 911 anytime you think you may need emergency care. For example, call if:  · You have a sudden, severe headache that is different from past headaches. Call your doctor now or seek immediate medical care if:  · You have any abnormal bleeding, such as:  ¨ Nosebleeds. ¨ Vaginal bleeding that is different (heavier, more frequent, at a different time of the month) than what you are used to. ¨ Bloody or black stools, or rectal bleeding. ¨ Bloody or pink urine. Watch closely for changes in your health, and be sure to contact your doctor if you have any problems. Where can you learn more? Go to http://lynn-xu.info/. Enter A809 in the search box to learn more about \"Taking Warfarin Safely: Care Instructions. \"  Current as of: January 27, 2016  Content Version: 11.1  © 6592-2112 [employer]. Care instructions adapted under license by [employer] (which disclaims liability or warranty for this information). If you have questions about a medical condition or this instruction, always ask your healthcare professional. [place] [address] any warranty or liability for your use of this information.

## 2019-02-26 NOTE — PROGRESS NOTES
Pharmacy Progress Note  - Anticoagulation Management    S/O: Ms. Kandy Lopez is a [de-identified] y.o. female , with a PMH of OAB, IBS, LE DVT, anxiety, insomonia, Renee's esophagus, Arthritis, Migraines, hyperlipidemia, who was seen today for anticoagulation management for the diagnosis of LLE Deep Vein Thrombosis. Pt ambulates w/ a cane. · Warfarin start date: Around 2008   · INR Goal:  2.0-3.0    · Current warfarin regimen: 2 mg daily                      · Warfarin tablet strength:   1 mg, 2 mg   · Duration of therapy: Indefinite    Today's Patient Findings:    Pertinent positives includes:  No significant changes since last visit     - Home pharmacy now changed to [facility]   - Requests refills on Levothyroxine      INR (POC) today (normal INR range 0.8-1.2) :    Recent Results (from the past 12 hour(s))   AMB POC PT/INR    Collection Time: 02/26/19 12:15 PM   Result Value Ref Range    VALID INTERNAL CONTROL POC Yes     Prothrombin time (POC) 31.5 (A) 9.1 - 12 seconds    INR POC 2.6 (A) 1 - 1.5       · Adherence:   · Able to recall regimen? YES  · Miss/extra dose? NO  · Need refill? NO    Upcoming procedure(s):  NO    Past Medical History:   Diagnosis Date    Arthritis     History of DVT (deep vein thrombosis)     History of recurrent UTIs     Irritable bowel syndrome (IBS)        Current Outpatient Medications   Medication Sig    levothyroxine (SYNTHROID) 25 mcg tablet Take 50 mcg by mouth Daily (before breakfast).  amitriptyline (ELAVIL) 10 mg tablet TAKE 1 TABLET BY MOUTH EVERY NIGHT AT BEDTIME    levETIRAcetam (KEPPRA) 500 mg tablet take 1 tablet every morning and 1.5 tablet every evening for migraines    dicyclomine (BENTYL) 20 mg tablet Take 1 Tab by mouth two (2) times a day.     ondansetron hcl (ZOFRAN) 4 mg tablet take 1 tablet by mouth every 8 hours if needed for nausea    oxyCODONE IR (ROXICODONE) 10 mg tab immediate release tablet Take 10 mg by mouth every six (6) hours as needed for Pain.  traMADol (ULTRAM) 50 mg tablet Take  mg by mouth every six (6) hours as needed for Pain.  traZODone (DESYREL) 50 mg tablet Take 1 Tab by mouth nightly as needed for Sleep.  metFORMIN ER (GLUCOPHAGE XR) 500 mg tablet Take 1 Tab by mouth daily (with dinner).  ezetimibe (ZETIA) 10 mg tablet Take 10 mg by mouth daily.  fluticasone-salmeterol (ADVAIR DISKUS) 250-50 mcg/dose diskus inhaler Take 1 Puff by inhalation two (2) times a day.  oxybutynin chloride XL (DITROPAN XL) 10 mg CR tablet Take 10 mg by mouth daily.  metoprolol succinate (TOPROL-XL) 25 mg XL tablet Take 12.5 mg by mouth daily.  atorvastatin (LIPITOR) 20 mg tablet Take 10 mg by mouth daily.  escitalopram oxalate (LEXAPRO) 20 mg tablet Take 20 mg by mouth daily.  colestipol (COLESTID) 1 gram tablet Take 1 g by mouth daily.  fluticasone (FLONASE) 50 mcg/actuation nasal spray 2 Sprays by Both Nostrils route daily as needed for Rhinitis.  cholecalciferol (VITAMIN D3) 1,000 unit tablet Take 1,000 Units by mouth daily.  nystatin (MYCOSTATIN) 100,000 unit/mL suspension Take 1 tsp by mouth four (4) times daily. swish and spit to help with mouth pain    diphenoxylate-atropine (LOMOTIL) 2.5-0.025 mg per tablet Take 1 Tab by mouth four (4) times daily as needed for Diarrhea.  Bifidobacterium Infantis (ALIGN) 4 mg cap Take 1 Cap by mouth daily as needed for Diarrhea.  Biotin 2,500 mcg cap Take 1 Cap by mouth daily.  clorazepate (TRANXENE) 3.75 mg tablet Take 3.75 mg by mouth every six (6) hours as needed for Anxiety.  celecoxib (CELEBREX) 100 mg capsule Take 100 mg by mouth daily as needed. Take with food for arthritic pain     calcium carbonate (TUMS) 200 mg calcium (500 mg) chew Take 1 Tab by mouth daily as needed.  warfarin (COUMADIN) 1 mg tablet Take 1 Tab by mouth See Admin Instructions. Take 2 mg daily.      Has both 1 mg and 2 mg dosage strength    warfarin (COUMADIN) 2 mg tablet Take 1 Tab by mouth See Admin Instructions. Take 2 mg daily. Has both 1 mg and 2 mg dosage strength     No current facility-administered medications for this visit. Wt Readings from Last 3 Encounters:   01/04/19 145 lb (65.8 kg)       BP Readings from Last 3 Encounters:   01/17/19 104/68   01/04/19 99/65       CBC:    Lab Results   Component Value Date/Time    WBC 8.6 01/17/2019 12:55 PM    HGB 11.0 (L) 01/17/2019 12:55 PM    HCT 38.0 01/17/2019 12:55 PM    PLATELET 583 75/60/3454 12:55 PM    MCV 75 (L) 01/17/2019 12:55 PM       Lab Results   Component Value Date/Time    Protein, total 7.1 01/17/2019 12:55 PM    Albumin 4.2 01/17/2019 12:55 PM         INR History:   (normal INR range 0.8-1.2)     Date   INR   PT   Dose/Comments  02/26/19 2.6  31.5 2 mg daily  1/28/19            2.0                   24.2     2 mg daily                    1/17/19            2.8                   34.1     2 mg daily    A/P:       Anticoagulation:  Considering Ms. Dunlap's past history, todays findings, and per Anticoagulation Collaborative Practice Agreement/Protocol:    1. POC INR (2.6) remains therapeutic for INR goal today. 2.  Continue warfarin  2 mg daily. 3. Will send in refills for levothyroxine per CRISTINE from Dr. Janeth Yap       Patient was instructed to schedule an appointment in 5 week(s) prior to leaving the clinic. Medication reconciliation was completed during the visit. There are no discontinued medications. A full discussion of the nature of anticoagulants has been carried out. A full discussion of the need for frequent and regular monitoring, precise dosage adjustment and compliance was stressed. Side effects of potential bleeding were discussed and Ms. Ele Denton was instructed to call [phone] if there are any signs of abnormal bleeding.   Ms. Ele Denton was instructed to avoid any OTC items containing aspirin or ibuprofen and prior to starting any new OTC products to consult with her physician or pharmacist to ensure no drug interactions are present. Ms. Brooks Garcia was instructed to avoid any major changes in her general diet and to avoid alcohol consumption. Ms. Brooks Garcia was provided information in the AVS that includes topics on understanding coumadin therapy, drug interaction considerations, vitamin K and coumadin use, interactions with foods and supplements containing vitamin K, and the use of herbal products. Ms. Brooks Garcia verbalized her understanding of all instructions and will call the office with any questions, concerns, or signs of abnormal bleeding or blood clot.     Notifications of recommendations will be sent to Dr. Rannie Boeck, DO for review    Thank you for the consult,  Zulema Parisi, ShonD

## 2019-02-27 DIAGNOSIS — G89.29 OTHER CHRONIC PAIN: Primary | ICD-10-CM

## 2019-02-27 RX ORDER — LEVOTHYROXINE SODIUM 25 UG/1
50 TABLET ORAL
Qty: 90 TAB | Refills: 1 | Status: CANCELLED | OUTPATIENT
Start: 2019-02-27

## 2019-02-27 RX ORDER — AMITRIPTYLINE HYDROCHLORIDE 10 MG/1
TABLET, FILM COATED ORAL
Qty: 90 TAB | Refills: 3 | Status: CANCELLED | OUTPATIENT
Start: 2019-02-27

## 2019-02-27 NOTE — TELEPHONE ENCOUNTER
Per patient, needs oxycodone for arthritis pain prn and takes tramadol for the pain but it is not effective at times.

## 2019-02-27 NOTE — TELEPHONE ENCOUNTER
Pt is asking if she can pick this up tomorrow, 2-28-19 as they will be downstairs at [address] [facility]? Can this be faxed in? Please call pt to let her know if she can get these tomorrow. Thanks.

## 2019-02-28 RX ORDER — OXYCODONE HYDROCHLORIDE 10 MG/1
10 TABLET ORAL
Qty: 30 TAB | Refills: 0 | Status: SHIPPED | OUTPATIENT
Start: 2019-02-28 | End: 2019-03-27 | Stop reason: ALTCHOICE

## 2019-02-28 NOTE — TELEPHONE ENCOUNTER
If this is 'as needed', it should last much longer than 30 days. I expect this rx to last at least 3 months.

## 2019-03-05 ENCOUNTER — TELEPHONE (OUTPATIENT)
Dept: INTERNAL MEDICINE CLINIC | Age: 79
End: 2019-03-05

## 2019-03-05 NOTE — TELEPHONE ENCOUNTER
#[phone] pt states she is in pain 24/7 all over. Please call to schedule an appt with Dr. Estefany Rand. Thanks.

## 2019-03-06 NOTE — TELEPHONE ENCOUNTER
Called, spoke to pt. Two identifiers confirmed. Notified pt soonest appointment is Friday. Pt upset that she can never get into to see her doctor, states she needs her pain medicine. Notified pt we would be happy to refer her to a pain specialist.   Pt declined. Appointment scheduled to see Dr. Caridad Alston Friday. Made pt aware she may not get any pain medications at her appointment. Pt verbalized understanding of information discussed w/ no further questions at this time.

## 2019-03-08 ENCOUNTER — OFFICE VISIT (OUTPATIENT)
Dept: INTERNAL MEDICINE CLINIC | Age: 79
End: 2019-03-08

## 2019-03-08 VITALS
OXYGEN SATURATION: 96 % | HEART RATE: 86 BPM | SYSTOLIC BLOOD PRESSURE: 100 MMHG | HEIGHT: 61 IN | BODY MASS INDEX: 26.43 KG/M2 | RESPIRATION RATE: 16 BRPM | TEMPERATURE: 98.1 F | WEIGHT: 140 LBS | DIASTOLIC BLOOD PRESSURE: 55 MMHG

## 2019-03-08 DIAGNOSIS — G89.4 CHRONIC PAIN SYNDROME: Primary | ICD-10-CM

## 2019-03-08 DIAGNOSIS — M17.0 PRIMARY OSTEOARTHRITIS OF BOTH KNEES: ICD-10-CM

## 2019-03-08 DIAGNOSIS — E11.9 TYPE 2 DIABETES MELLITUS WITHOUT COMPLICATION, WITHOUT LONG-TERM CURRENT USE OF INSULIN (HCC): ICD-10-CM

## 2019-03-08 RX ORDER — EZETIMIBE 10 MG/1
10 TABLET ORAL DAILY
Qty: 90 TAB | Refills: 0 | Status: SHIPPED | OUTPATIENT
Start: 2019-03-08 | End: 2020-05-07

## 2019-03-08 RX ORDER — GABAPENTIN 100 MG/1
100 CAPSULE ORAL
Qty: 30 CAP | Refills: 0 | Status: SHIPPED | OUTPATIENT
Start: 2019-03-08 | End: 2020-01-20 | Stop reason: SDUPTHER

## 2019-03-08 RX ORDER — LANCETS
EACH MISCELLANEOUS
Qty: 1 PACKAGE | Refills: 11 | Status: SHIPPED | OUTPATIENT
Start: 2019-03-08 | End: 2021-06-25 | Stop reason: ALTCHOICE

## 2019-03-08 NOTE — PROGRESS NOTES
HISTORY OF PRESENT ILLNESS  Maryanne Santillan is a [de-identified] y.o. female. HPI  Pt normally follows with Dr. Ventura Hanley (PCP). Pt is here for acute care. Pt states she has had pain 24/7 for the past 3 years  She has gone to pain management in the past for this and gets oxycodone 10mg  Pt does not want to go to pain management any further, and had difficulty getting a sooner appointment with Dr Ventura Hanley  Pt states that she has arthritis, a hip replacement, other orthopedic surgeries following a broken arm  Discussed that she would need to get her medicine through a pain clinic or through a regular PCP  She was given oxycodone last in 2/19 by Dr Ventura Hanley    Pt is new to this area  Will give gabapentin  Provided referral for pain clinic    Pt was dx'd with diabetes in 1/19  Reviewed labs 1/19: a1c 7.1  She states she is prediabetic  Discussed this with her  She was started on metformin one daily which she is taking but states gives her GI upset  Pt does not monitor her BS at home - ordered glucometer and supplies    Patient Active Problem List    Diagnosis Date Noted    OAB (overactive bladder) 01/04/2019    DVT (deep venous thrombosis) ([facility] [address] 01/04/2019     Current Outpatient Medications   Medication Sig Dispense Refill    oxyCODONE IR (ROXICODONE) 10 mg tab immediate release tablet Take 1 Tab by mouth every six (6) hours as needed for Pain for up to 30 days. Max Daily Amount: 40 mg. 30 Tab 0    levothyroxine (SYNTHROID) 25 mcg tablet Take 2 Tabs by mouth Daily (before breakfast). 90 Tab 1    amitriptyline (ELAVIL) 10 mg tablet TAKE 1 TABLET BY MOUTH EVERY NIGHT AT BEDTIME 90 Tab 3    levETIRAcetam (KEPPRA) 500 mg tablet take 1 tablet every morning and 1.5 tablet every evening for migraines 225 Tab 3    dicyclomine (BENTYL) 20 mg tablet Take 1 Tab by mouth two (2) times a day.  60 Tab 1    ondansetron hcl (ZOFRAN) 4 mg tablet take 1 tablet by mouth every 8 hours if needed for nausea 30 Tab 0    traMADol (ULTRAM) 50 mg tablet Take  mg by mouth every six (6) hours as needed for Pain.  traZODone (DESYREL) 50 mg tablet Take 1 Tab by mouth nightly as needed for Sleep. 90 Tab 3    metFORMIN ER (GLUCOPHAGE XR) 500 mg tablet Take 1 Tab by mouth daily (with dinner). 30 Tab 11    ezetimibe (ZETIA) 10 mg tablet Take 10 mg by mouth daily.  fluticasone-salmeterol (ADVAIR DISKUS) 250-50 mcg/dose diskus inhaler Take 1 Puff by inhalation two (2) times a day.  oxybutynin chloride XL (DITROPAN XL) 10 mg CR tablet Take 10 mg by mouth daily.  metoprolol succinate (TOPROL-XL) 25 mg XL tablet Take 12.5 mg by mouth daily.  atorvastatin (LIPITOR) 20 mg tablet Take 10 mg by mouth daily.  escitalopram oxalate (LEXAPRO) 20 mg tablet Take 20 mg by mouth daily.  colestipol (COLESTID) 1 gram tablet Take 1 g by mouth daily.  fluticasone (FLONASE) 50 mcg/actuation nasal spray 2 Sprays by Both Nostrils route daily as needed for Rhinitis.  cholecalciferol (VITAMIN D3) 1,000 unit tablet Take 1,000 Units by mouth daily.  nystatin (MYCOSTATIN) 100,000 unit/mL suspension Take 1 tsp by mouth four (4) times daily. swish and spit to help with mouth pain      diphenoxylate-atropine (LOMOTIL) 2.5-0.025 mg per tablet Take 1 Tab by mouth four (4) times daily as needed for Diarrhea.  Bifidobacterium Infantis (ALIGN) 4 mg cap Take 1 Cap by mouth daily as needed for Diarrhea.  Biotin 2,500 mcg cap Take 1 Cap by mouth daily.  clorazepate (TRANXENE) 3.75 mg tablet Take 3.75 mg by mouth every six (6) hours as needed for Anxiety.  celecoxib (CELEBREX) 100 mg capsule Take 100 mg by mouth daily as needed. Take with food for arthritic pain       calcium carbonate (TUMS) 200 mg calcium (500 mg) chew Take 1 Tab by mouth daily as needed.  warfarin (COUMADIN) 1 mg tablet Take 1 Tab by mouth See Admin Instructions. Take 2 mg daily.      Has both 1 mg and 2 mg dosage strength 30 Tab 0    warfarin (COUMADIN) 2 mg tablet Take 1 Tab by mouth See Admin Instructions. Take 2 mg daily. Has both 1 mg and 2 mg dosage strength 30 Tab 0     Past Surgical History:   Procedure Laterality Date    HX HIP FRACTURE TX      HX OTHER SURGICAL      stint in heart      Lab Results   Component Value Date/Time    WBC 8.6 01/17/2019 12:55 PM    HGB 11.0 (L) 01/17/2019 12:55 PM    HCT 38.0 01/17/2019 12:55 PM    PLATELET 069 60/70/7268 12:55 PM    MCV 75 (L) 01/17/2019 12:55 PM     Lab Results   Component Value Date/Time    Cholesterol, total 158 01/17/2019 12:55 PM    HDL Cholesterol 50 01/17/2019 12:55 PM    LDL, calculated 76 01/17/2019 12:55 PM    Triglyceride 161 (H) 01/17/2019 12:55 PM     Lab Results   Component Value Date/Time    GFR est non-AA 76 01/17/2019 12:55 PM    GFR est AA 87 01/17/2019 12:55 PM    Creatinine 0.75 01/17/2019 12:55 PM    BUN 12 01/17/2019 12:55 PM    Sodium 141 01/17/2019 12:55 PM    Potassium 4.1 01/17/2019 12:55 PM    Chloride 102 01/17/2019 12:55 PM    CO2 24 01/17/2019 12:55 PM        Review of Systems   Respiratory: Negative for shortness of breath. Cardiovascular: Negative for chest pain. Physical Exam   Constitutional: She is oriented to person, place, and time. She appears well-developed and well-nourished. No distress. HENT:   Head: Normocephalic and atraumatic. Mouth/Throat: Oropharynx is clear and moist. No oropharyngeal exudate. Eyes: Conjunctivae and EOM are normal. Right eye exhibits no discharge. Left eye exhibits no discharge. Neck: Normal range of motion. Neck supple. Cardiovascular: Normal rate, regular rhythm and normal heart sounds. Exam reveals no gallop and no friction rub. No murmur heard. Pulmonary/Chest: Effort normal and breath sounds normal. No respiratory distress. She has no wheezes. She has no rales. She exhibits no tenderness. Musculoskeletal: Normal range of motion. She exhibits no edema, tenderness or deformity.    Lymphadenopathy:     She has no cervical adenopathy. Neurological: She is alert and oriented to person, place, and time. Coordination normal.   Skin: Skin is warm and dry. No rash noted. She is not diaphoretic. No erythema. No pallor. Psychiatric: She has a normal mood and affect. Her behavior is normal.       ASSESSMENT and PLAN    ICD-10-CM ICD-9-CM    1. Chronic pain syndrome    Discussed that this was not a visit to establish chronic pain management, but to address any acute sx, there were no acute changes today, will not be able to prescribe narcotics today but have referred her to pain clinic, will give gabapentin 300mg qhs to see if this improves her sx, pt normally takes high dose narcotics   G89.4 338.4    2. Primary osteoarthritis of both knees    See above   M17.0 715.16    3. Type 2 diabetes mellitus without complication, without long-term current use of insulin ([facility]    This is a new dx, reviewed this with her, discussed she does have diabetes and a1c in uncontrolled range, she is now on metformin once daily, should start monitoring blood glucose, provided glucometer   E11.9 250.00         Scribed by Sandrine Ramos of [address] [place] [address] [address], as dictated by Dr. Katie Cuadra. Current diagnosis and concerns discussed with pt at length. Pt understands risks and benefits or current treatment plan and medications, and accepts the treatment and medication with any possible risks. Pt asks appropriate questions, which were answered. Pt was instructed to call with any concerns or problems. I have reviewed the note documented by the scribe. The services provided are my own.   The documentation is accurate

## 2019-03-13 RX ORDER — DICYCLOMINE HYDROCHLORIDE 20 MG/1
20 TABLET ORAL 2 TIMES DAILY
Qty: 180 TAB | Refills: 0 | Status: SHIPPED | OUTPATIENT
Start: 2019-03-13 | End: 2021-09-08 | Stop reason: SDUPTHER

## 2019-03-13 RX ORDER — OXYBUTYNIN CHLORIDE 10 MG/1
10 TABLET, EXTENDED RELEASE ORAL DAILY
Qty: 90 TAB | Refills: 0 | Status: SHIPPED | OUTPATIENT
Start: 2019-03-13 | End: 2020-07-07

## 2019-03-20 RX ORDER — WARFARIN 2 MG/1
2 TABLET ORAL SEE ADMIN INSTRUCTIONS
Qty: 30 TAB | Refills: 0 | Status: SHIPPED | OUTPATIENT
Start: 2019-03-20 | End: 2019-04-24 | Stop reason: SDUPTHER

## 2019-03-20 RX ORDER — HYDROGEN PEROXIDE 3 %
20 SOLUTION, NON-ORAL MISCELLANEOUS DAILY
Qty: 90 CAP | Refills: 0 | Status: SHIPPED | OUTPATIENT
Start: 2019-03-20 | End: 2019-04-27 | Stop reason: SDUPTHER

## 2019-03-20 NOTE — TELEPHONE ENCOUNTER
Pt is requesting an order for sever heart burn at [facility] on file) as well as her Rx on file for blood clots for 2mg. Pt is planning to go to pharmacy this afternoon. Best contact is [phone].        Message received & copied from [facility]

## 2019-03-25 ENCOUNTER — TELEPHONE (OUTPATIENT)
Dept: INTERNAL MEDICINE CLINIC | Age: 79
End: 2019-03-25

## 2019-03-25 NOTE — TELEPHONE ENCOUNTER
----- Message from [facility] sent at 3/25/2019  3:52 PM EDT -----  Regarding: Dr. Aguilera Born: [phone]  Pt stated the Rx for heartburn only last a half a day and wanted to know if she could take 2 per day?

## 2019-03-27 ENCOUNTER — OFFICE VISIT (OUTPATIENT)
Dept: INTERNAL MEDICINE CLINIC | Age: 79
End: 2019-03-27

## 2019-03-27 VITALS
HEIGHT: 61 IN | WEIGHT: 138 LBS | SYSTOLIC BLOOD PRESSURE: 106 MMHG | HEART RATE: 84 BPM | DIASTOLIC BLOOD PRESSURE: 65 MMHG | OXYGEN SATURATION: 94 % | BODY MASS INDEX: 26.06 KG/M2

## 2019-03-27 DIAGNOSIS — Z71.89 ENCOUNTER FOR MEDICATION REVIEW AND COUNSELING: Primary | ICD-10-CM

## 2019-03-27 NOTE — PROGRESS NOTES
Pharmacy Progress Note - Medication Management    S/O: Ms. Luciana Ng is a [de-identified] y.o. female , referred by Bandar Riddle DO , with a PMH of OAB, IBS, LE DVT, anxiety, insomonia, Silva's esophagus, Arthritis, Migraines, hyperlipidemia, was seen today for a medication management. Pt reports during her her 's neurologist appt yesterday, almost fainted, reports BP check in office was 90/60 mmHg. Note, pt's  was dx with Parkinson's on same visit. Pt was advised to follow up w/ PCP office for evaluation. Medication Adherence/Access:  - Pt did not bring in home medications to visit. - Pt reports fair compliance to med regimen.    - has yet to start gabapentin   - Pt uses pill box/organizer at home: no  - Pt denies barriers to accessing/affording medications  - Pt does have prescription coverage/insurance ; Uses [facility]    Hypotension:  - Held Toprol 12.5 mg today   - Had consumed caffeine w/in the hour   - BP check during visit (seated, rested for at least 15 mins):  · 120/78 - HR: 88  · 106/65 - HR: 84    - Pt endorses trouble remembering to eat/has trouble preparing meals  - Does stay hydrated w/ her hx of dry mouth     Diabetes:  - Saw Dr. Elizabeth Rainey in January 2019; A1c - 7.1%.  Was dx w/ T2DM that day   - Was prescribed Metformin 500 mg ER daily w/ dinner ; concerns about hypoglycemia  - Has not been checking SMBGs     Other:  - W/ hx of silva's and anxiety, reflux have been worst for her recently  - now on Nexium 20 mg BID - reports to taking this w/ or after eating  - Takes levothyroxine together w/ all other meds in the AM     Wt Readings from Last 3 Encounters:   03/27/19 138 lb (62.6 kg)   03/08/19 140 lb (63.5 kg)   01/04/19 145 lb (65.8 kg)     BP Readings from Last 3 Encounters:   03/27/19 106/65   03/08/19 100/55   01/17/19 104/68     Pulse Readings from Last 3 Encounters:   03/27/19 84   03/08/19 86   01/17/19 78       Past Medical History:   Diagnosis Date    Arthritis     History of DVT (deep vein thrombosis)     History of recurrent UTIs     Irritable bowel syndrome (IBS)        Allergies   Allergen Reactions    Pcn [Penicillins] Shortness of Breath    Sulfa (Sulfonamide Antibiotics) Shortness of Breath       Current Outpatient Medications   Medication Sig    warfarin (COUMADIN) 2 mg tablet Take 1 Tab by mouth See Admin Instructions. Take 2 mg daily. Has both 1 mg and 2 mg dosage strength    esomeprazole (NEXIUM) 20 mg capsule Take 1 Cap by mouth daily.  dicyclomine (BENTYL) 20 mg tablet Take 1 Tab by mouth two (2) times a day.  oxybutynin chloride XL (DITROPAN XL) 10 mg CR tablet Take 1 Tab by mouth daily.  ezetimibe (ZETIA) 10 mg tablet Take 1 Tab by mouth daily.  gabapentin (NEURONTIN) 100 mg capsule Take 1 Cap by mouth nightly.  glucose blood VI test strips (ONETOUCH VERIO) strip Use to check blood sugar twice weekly    lancets (ONETOUCH ULTRASOFT LANCETS) misc Use to check blood sugar twice weekly    oxyCODONE IR (ROXICODONE) 10 mg tab immediate release tablet Take 1 Tab by mouth every six (6) hours as needed for Pain for up to 30 days. Max Daily Amount: 40 mg.    levothyroxine (SYNTHROID) 25 mcg tablet Take 2 Tabs by mouth Daily (before breakfast).  amitriptyline (ELAVIL) 10 mg tablet TAKE 1 TABLET BY MOUTH EVERY NIGHT AT BEDTIME    levETIRAcetam (KEPPRA) 500 mg tablet take 1 tablet every morning and 1.5 tablet every evening for migraines    ondansetron hcl (ZOFRAN) 4 mg tablet take 1 tablet by mouth every 8 hours if needed for nausea    traMADol (ULTRAM) 50 mg tablet Take  mg by mouth every six (6) hours as needed for Pain.  traZODone (DESYREL) 50 mg tablet Take 1 Tab by mouth nightly as needed for Sleep.  metFORMIN ER (GLUCOPHAGE XR) 500 mg tablet Take 1 Tab by mouth daily (with dinner).  fluticasone-salmeterol (ADVAIR DISKUS) 250-50 mcg/dose diskus inhaler Take 1 Puff by inhalation two (2) times a day.     metoprolol succinate (TOPROL-XL) 25 mg XL tablet Take 12.5 mg by mouth daily.  atorvastatin (LIPITOR) 20 mg tablet Take 10 mg by mouth daily.  escitalopram oxalate (LEXAPRO) 20 mg tablet Take 20 mg by mouth daily.  colestipol (COLESTID) 1 gram tablet Take 1 g by mouth daily.  fluticasone (FLONASE) 50 mcg/actuation nasal spray 2 Sprays by Both Nostrils route daily as needed for Rhinitis.  cholecalciferol (VITAMIN D3) 1,000 unit tablet Take 1,000 Units by mouth daily.  nystatin (MYCOSTATIN) 100,000 unit/mL suspension Take 1 tsp by mouth four (4) times daily. swish and spit to help with mouth pain    diphenoxylate-atropine (LOMOTIL) 2.5-0.025 mg per tablet Take 1 Tab by mouth four (4) times daily as needed for Diarrhea.  Bifidobacterium Infantis (ALIGN) 4 mg cap Take 1 Cap by mouth daily as needed for Diarrhea.  Biotin 2,500 mcg cap Take 1 Cap by mouth daily.  clorazepate (TRANXENE) 3.75 mg tablet Take 3.75 mg by mouth every six (6) hours as needed for Anxiety.  celecoxib (CELEBREX) 100 mg capsule Take 100 mg by mouth daily as needed. Take with food for arthritic pain     calcium carbonate (TUMS) 200 mg calcium (500 mg) chew Take 1 Tab by mouth daily as needed.  warfarin (COUMADIN) 1 mg tablet Take 1 Tab by mouth See Admin Instructions. Take 2 mg daily. Has both 1 mg and 2 mg dosage strength     No current facility-administered medications for this visit.         Lab Results   Component Value Date/Time    Sodium 141 01/17/2019 12:55 PM    Potassium 4.1 01/17/2019 12:55 PM    Chloride 102 01/17/2019 12:55 PM    CO2 24 01/17/2019 12:55 PM    Glucose 105 (H) 01/17/2019 12:55 PM    BUN 12 01/17/2019 12:55 PM    Creatinine 0.75 01/17/2019 12:55 PM    BUN/Creatinine ratio 16 01/17/2019 12:55 PM    GFR est AA 87 01/17/2019 12:55 PM    GFR est non-AA 76 01/17/2019 12:55 PM    Calcium 9.3 01/17/2019 12:55 PM       Lab Results   Component Value Date/Time    WBC 8.6 01/17/2019 12:55 PM    HGB 11.0 (L) 01/17/2019 12:55 PM    HCT 38.0 01/17/2019 12:55 PM    PLATELET 613 73/77/2050 12:55 PM    MCV 75 (L) 01/17/2019 12:55 PM       Lab Results   Component Value Date/Time    Hemoglobin A1c 7.1 (H) 01/17/2019 12:55 PM       Estimated Creatinine Clearance: 50.7 mL/min (based on SCr of 0.75 mg/dL). A/P:     Medication Adherence/Access:  - Medication reconciliation was completed during the visit. - Pt is not compliant to current medication regimen. - Discussed extensively about taking her medications as directed     Hypotension:  - BP remains low despite holding Toprol. Questionable meal consistency. Recognize pt is also on psychotropics that may cause hypotension and dizziness   - Pt is under high stress right now as she is the primary caregiver for her . R/o Anxiety vs. Hypotension   - Hold Toprol for now. Check and document BP AM + PM.  If SBP > 150 mg, may take 1 dose of Toprol 12.5 mg.   - BP log provided   - Has follow up visit w/ me for INR check on 4/8/19. Bring in BP log to review    Diabetes:  - Pt very resistant to taking metformin for new dx of diabetes (A1c = 7.1%) in January.  - Discussed metformin's role in therapy. Emphasize that it does not cause hypoglycemia   - Recommend pt take w/ meal with alleviate GI SE potential   - Recommend Pt check SMBGs - fasting, pre-meal, and pre-bedtime     Other:  - Taking nexium after or with food.  that pt needs to take med at least 30 mins before med   - Provided application for meals on wheels to help w/ food access       Deletion(s):  Medications Discontinued During This Encounter   Medication Reason    oxyCODONE IR (ROXICODONE) 10 mg tab immediate release tablet Therapy Completed       Patient verbalized understanding of the information presented and all of the patients questions were answered. AVS was handed to the patient. Patient advised to call the office with any additional questions or concerns.     Follow-up: Notifications of recommendations will be sent to Dr. Isauro Perdomo DO for review.     Thank you for the consult,  Shon ManzoD alert

## 2019-03-27 NOTE — PATIENT INSTRUCTIONS
· You need to take Nexium at least 30 minutes before you eat. This is for your acid reflux. · Take levothyroxine first thing in the morning with plenty of fluid    · Continue to hold your Metoprolol (Toprol) for now. Check and document your blood pressure daily. If your top blood pressure reading is above 150, take 1 dose of your metoprolol. Bring log to next appointment.

## 2019-04-08 ENCOUNTER — OFFICE VISIT (OUTPATIENT)
Dept: INTERNAL MEDICINE CLINIC | Age: 79
End: 2019-04-08

## 2019-04-11 ENCOUNTER — OFFICE VISIT (OUTPATIENT)
Dept: INTERNAL MEDICINE CLINIC | Age: 79
End: 2019-04-11

## 2019-04-11 VITALS — HEART RATE: 83 BPM | DIASTOLIC BLOOD PRESSURE: 69 MMHG | SYSTOLIC BLOOD PRESSURE: 105 MMHG

## 2019-04-11 DIAGNOSIS — I82.421 DEEP VEIN THROMBOSIS (DVT) OF ILIAC VEIN OF RIGHT LOWER EXTREMITY, UNSPECIFIED CHRONICITY (HCC): Primary | ICD-10-CM

## 2019-04-11 LAB
INR BLD: 3.6 (ref 1–1.5)
PT POC: 42.8 SECONDS (ref 9.1–12)
VALID INTERNAL CONTROL?: YES

## 2019-04-11 NOTE — PROGRESS NOTES
Pharmacy Progress Note  - Anticoagulation Management    S/O: Ms. Steffanie Jhaveri a [de-identified] y.o. female , with a PMH of OAB, IBS, LE DVT, anxiety, insomonia, Renee's esophagus, Arthritis, Migraines, hyperlipidemia, who was seen today for anticoagulation management for the diagnosis of LLE Deep Vein Thrombosis. Pt ambulates w/ a cane. Interim History: Reports urology visit yesterday w/ Dr. Abril Feliz. Completed a bladder scan. Results pending. · Warfarin start date: Around 2008   · INR Goal:  2.0-3.0    · Current warfarin regimen: 2 mg daily                      · Warfarin tablet strength:   1 mg, 2 mg   · Duration of therapy: Indefinite    Today's pertinent positives includes:  Bruises    - Bruises on right arm  - Toprol remains on hold. Checking BP at home - reports range: 109 - 181/74-06  -  BP: 105/69 today in office. Results for orders placed or performed in visit on 04/11/19   AMB POC PT/INR   Result Value Ref Range    VALID INTERNAL CONTROL POC Yes     Prothrombin time (POC) 42.8 (A) 9.1 - 12 seconds    INR POC 3.6 (A) 1 - 1.5       · Adherence:   · Able to recall regimen? YES  · Miss/extra dose? NO  · Need refill? NO    Upcoming procedure(s):  NO      Past Medical History:   Diagnosis Date    Arthritis     History of DVT (deep vein thrombosis)     History of recurrent UTIs     Irritable bowel syndrome (IBS)        Allergies   Allergen Reactions    Pcn [Penicillins] Shortness of Breath    Sulfa (Sulfonamide Antibiotics) Shortness of Breath       Current Outpatient Medications   Medication Sig    warfarin (COUMADIN) 2 mg tablet Take 1 Tab by mouth See Admin Instructions. Take 2 mg daily. Has both 1 mg and 2 mg dosage strength    esomeprazole (NEXIUM) 20 mg capsule Take 1 Cap by mouth daily. (Patient taking differently: Take 20 mg by mouth two (2) times a day.)    dicyclomine (BENTYL) 20 mg tablet Take 1 Tab by mouth two (2) times a day.     oxybutynin chloride XL (DITROPAN XL) 10 mg CR tablet Take 1 Tab by mouth daily.  ezetimibe (ZETIA) 10 mg tablet Take 1 Tab by mouth daily.  gabapentin (NEURONTIN) 100 mg capsule Take 1 Cap by mouth nightly.  glucose blood VI test strips (ONETOUCH VERIO) strip Use to check blood sugar twice weekly    lancets (ONETOUCH ULTRASOFT LANCETS) misc Use to check blood sugar twice weekly    levothyroxine (SYNTHROID) 25 mcg tablet Take 2 Tabs by mouth Daily (before breakfast).  amitriptyline (ELAVIL) 10 mg tablet TAKE 1 TABLET BY MOUTH EVERY NIGHT AT BEDTIME    levETIRAcetam (KEPPRA) 500 mg tablet take 1 tablet every morning and 1.5 tablet every evening for migraines    ondansetron hcl (ZOFRAN) 4 mg tablet take 1 tablet by mouth every 8 hours if needed for nausea    traMADol (ULTRAM) 50 mg tablet Take  mg by mouth every six (6) hours as needed for Pain.  traZODone (DESYREL) 50 mg tablet Take 1 Tab by mouth nightly as needed for Sleep.  metFORMIN ER (GLUCOPHAGE XR) 500 mg tablet Take 1 Tab by mouth daily (with dinner).  fluticasone-salmeterol (ADVAIR DISKUS) 250-50 mcg/dose diskus inhaler Take 1 Puff by inhalation two (2) times a day.  metoprolol succinate (TOPROL-XL) 25 mg XL tablet Take 12.5 mg by mouth daily.  atorvastatin (LIPITOR) 20 mg tablet Take 10 mg by mouth daily.  escitalopram oxalate (LEXAPRO) 20 mg tablet Take 20 mg by mouth daily.  colestipol (COLESTID) 1 gram tablet Take 1 g by mouth daily.  fluticasone (FLONASE) 50 mcg/actuation nasal spray 2 Sprays by Both Nostrils route daily as needed for Rhinitis.  cholecalciferol (VITAMIN D3) 1,000 unit tablet Take 1,000 Units by mouth daily.  nystatin (MYCOSTATIN) 100,000 unit/mL suspension Take 1 tsp by mouth four (4) times daily. swish and spit to help with mouth pain    diphenoxylate-atropine (LOMOTIL) 2.5-0.025 mg per tablet Take 1 Tab by mouth four (4) times daily as needed for Diarrhea.     Bifidobacterium Infantis (ALIGN) 4 mg cap Take 1 Cap by mouth daily as needed for Diarrhea.  Biotin 2,500 mcg cap Take 1 Cap by mouth daily.  clorazepate (TRANXENE) 3.75 mg tablet Take 3.75 mg by mouth every six (6) hours as needed for Anxiety.  celecoxib (CELEBREX) 100 mg capsule Take 100 mg by mouth daily as needed. Take with food for arthritic pain     calcium carbonate (TUMS) 200 mg calcium (500 mg) chew Take 1 Tab by mouth daily as needed.  warfarin (COUMADIN) 1 mg tablet Take 1 Tab by mouth See Admin Instructions. Take 2 mg daily. Has both 1 mg and 2 mg dosage strength     No current facility-administered medications for this visit. Wt Readings from Last 3 Encounters:   03/27/19 138 lb (62.6 kg)   03/08/19 140 lb (63.5 kg)   01/04/19 145 lb (65.8 kg)       BP Readings from Last 3 Encounters:   04/11/19 105/69   03/27/19 106/65   03/08/19 100/55       Lab Results   Component Value Date/Time    WBC 8.6 01/17/2019 12:55 PM    HGB 11.0 (L) 01/17/2019 12:55 PM    HCT 38.0 01/17/2019 12:55 PM    PLATELET 566 84/77/4973 12:55 PM    MCV 75 (L) 01/17/2019 12:55 PM       Lab Results   Component Value Date/Time    Protein, total 7.1 01/17/2019 12:55 PM    Albumin 4.2 01/17/2019 12:55 PM       INR History:   (normal INR range 0.8-1.2)     Date   INR   PT   Dose/Comments  04/11/19 3.6  42.8 2 mg daily   02/26/19          2.6                   31.5     2 mg daily  1/28/19            2.0                   24.2     2 mg daily                    1/17/19            2.8                   34.1     2 mg daily      A/P:       Anticoagulation:  Considering Ms. Dunlap's past history, todays findings, and per Anticoagulation Collaborative Practice Agreement/Protocol:    1. POC INR (3.6) is Supratherapeutic for INR goal today. She has been very stable at current regimen. 2. Hold warfarin tonight. Continue warfarin 2 mg daily. Patient was instructed to schedule an appointment in 2 week(s) prior to leaving the clinic.     Medication reconciliation was completed during the visit.    There are no discontinued medications. A full discussion of the nature of anticoagulants has been carried out. A full discussion of the need for frequent and regular monitoring, precise dosage adjustment and compliance was stressed. Side effects of potential bleeding were discussed and Ms. Jorge Camejo was instructed to call [phone] if there are any signs of abnormal bleeding. Ms. Jorge Camejo was instructed to avoid any OTC items containing aspirin or ibuprofen and prior to starting any new OTC products to consult with her physician or pharmacist to ensure no drug interactions are present. Ms. Jorge Camejo was instructed to avoid any major changes in her general diet and to avoid alcohol consumption. Ms. Jorge Camejo was provided information in the AVS that includes topics on understanding coumadin therapy, drug interaction considerations, vitamin K and coumadin use, interactions with foods and supplements containing vitamin K, and the use of herbal products. Ms. Jorge Camejo verbalized her understanding of all instructions and will call the office with any questions, concerns, or signs of abnormal bleeding or blood clot. Notifications of recommendations will be sent to Dr. Maxi Baird DO for review.     Thank you,  Zulema Bermudez, PharmD

## 2019-04-11 NOTE — PATIENT INSTRUCTIONS
Today your INR was 3.6 . Your goal INR is  2.0-3.0 . You have a  2 mg and 1 mg tablets of Coumadin (warfarin). Take Coumadin as follows:    Hold warfarin tonight. Continue with warfarin 2 mg daily. Come back in  2   week(s) for your next finger stick/INR blood test.        Avoid any over the counter items containing aspirin or ibuprofen, and avoid great swings in general diet. Avoid alcohol consumption. Please notify the INR nurse if you are started on any new medication including over the counter or herbal supplements. Also, please notify your INR nurse if any of your other prescription or over the counter medications have been discontinued. Call [facility] at [phone] if you have any signs of abnormal bleeding/blood clot.  ------------------------------------------------------------------------------------------------------------------  Taking Warfarin Safely: Care Instructions    Your Care Instructions  Warfarin is a medicine that you take to prevent blood clots. It is often called a blood thinner. Doctors give warfarin (such as Coumadin) to reduce the risk of blood clots. You may be at risk for blood clots if you have atrial fibrillation or deep vein thrombosis. Some other health problems may also put you at risk. Warfarin slows the amount of time it takes for your blood to clot. It can cause bleeding problems. Even if you've been taking warfarin for a while, it's important to know how to take it safely. Foods and other medicines can affect the way warfarin works. Some can make warfarin work too well. This can cause bleeding problems. And some can make it work poorly, so that it does not prevent blood clots very well. You will need regular blood tests to check how long it takes for your blood to form a clot. This test is called a PT or prothrombin time test. The result of the test is called an INR level.  Depending on the test results, your doctor or anticoagulation clinic may adjust your dose of warfarin. Follow-up care is a key part of your treatment and safety. Be sure to make and go to all appointments, and call your doctor if you are having problems. It's also a good idea to know your test results and keep a list of the medicines you take. How can you care for yourself at home? Take warfarin safely  · Take your warfarin at the same time each day. · If you miss a dose of warfarin, don't take an extra dose to make up for it. Your doctor can tell you exactly what to do so you don't take too much or too little. · Wear medical alert jewelry that lets others know that you take warfarin. You can buy this at most drugstores. · Don't take warfarin if you are pregnant or planning to get pregnant. Talk to your doctor about how you can prevent getting pregnant while you are taking it. · Don't change your dose or stop taking warfarin unless your doctor tells you to. Effects of medicines and food on warfarin  · Don't start or stop taking any medicines, vitamins, or natural remedies unless you first talk to your doctor. Many medicines can affect how warfarin works. These include aspirin and other pain relievers, over-the-counter medicines, multivitamins, dietary supplements, and herbal products. · Tell all of your doctors and pharmacists that you take warfarin. Some prescription medicines can affect how warfarin works. · Keep the amount of vitamin K in your diet about the same from day to day. Do not suddenly eat a lot more or a lot less food that is rich in vitamin K than you usually do. Vitamin K affects how warfarin works and how your blood clots. Talk with your doctor before making big changes in your diet. Vitamin K is in many foods, such as:  ¨ Leafy greens, such as kale, cabbage, spinach, Swiss chard, and lettuce. ¨ Canola and soybean oils. ¨ Green vegetables, such as asparagus, broccoli, and [place] sprouts.   ¨ Vegetable drinks, green tea leaves, and some dietary supplement drinks. · Avoid cranberry juice and other cranberry products. They can increase the effects of warfarin. · Limit your use of alcohol. Avoid bleeding by preventing falls and injuries  · Wear slippers or shoes with nonskid soles. · Remove throw rugs and clutter. · Rearrange furniture and electrical cords to keep them out of walking paths. · Keep stairways, porches, and outside walkways well lit. Use night-lights in hallways and bathrooms. · Be extra careful when you work with sharp tools or knives. When should you call for help? Call 911 anytime you think you may need emergency care. For example, call if:  · You have a sudden, severe headache that is different from past headaches. Call your doctor now or seek immediate medical care if:  · You have any abnormal bleeding, such as:  ¨ Nosebleeds. ¨ Vaginal bleeding that is different (heavier, more frequent, at a different time of the month) than what you are used to. ¨ Bloody or black stools, or rectal bleeding. ¨ Bloody or pink urine. Watch closely for changes in your health, and be sure to contact your doctor if you have any problems. Where can you learn more? Go to http://lynn-xu.info/. Enter L271 in the search box to learn more about \"Taking Warfarin Safely: Care Instructions. \"  Current as of: January 27, 2016  Content Version: 11.1  © 3588-4836 [employer]. Care instructions adapted under license by [employer] (which disclaims liability or warranty for this information). If you have questions about a medical condition or this instruction, always ask your healthcare professional. [place] [address] any warranty or liability for your use of this information.

## 2019-04-16 ENCOUNTER — TELEPHONE (OUTPATIENT)
Dept: INTERNAL MEDICINE CLINIC | Age: 79
End: 2019-04-16

## 2019-04-16 RX ORDER — ESCITALOPRAM OXALATE 20 MG/1
20 TABLET ORAL DAILY
Qty: 90 TAB | Refills: 3 | Status: SHIPPED | OUTPATIENT
Start: 2019-04-16 | End: 2020-04-28

## 2019-04-16 NOTE — TELEPHONE ENCOUNTER
----- Message from Keith Kong sent at 4/16/2019  4:13 PM EDT -----  Regarding: Pharmacist Trant/Telephone  Pt requesting a call back in regards to rescheduling an appt. Best contact [phone].       Copy/paste envera

## 2019-04-16 NOTE — TELEPHONE ENCOUNTER
Pharmacy Progress Note - Telephone Encounter    S/O: Ms. Pamela Lott [de-identified] y.o. female ,was contacted via an outbound telephone call to discuss  Patient's request to r/s INR appointment on 19 today. Verified patients identifiers (name & ) per HIPAA policy. A/P:  - INR appointment will be rescheduled to 19 @ 2:30 PM.   - Patient endorses understanding to the provided information. All questions were answered at this time.        Thank you,  Zulema Jacob, PharmD

## 2019-04-16 NOTE — TELEPHONE ENCOUNTER
----- Message from Pedro Atkins sent at 4/16/2019  4:10 PM EDT -----  Regarding: Dr. Iman Peres requesting new refill for Rx \"Lexapro 20 mg\" sent to [address] at [facility] and [address]. Best contact [phone].       Copy/paste envera

## 2019-04-25 ENCOUNTER — OFFICE VISIT (OUTPATIENT)
Dept: INTERNAL MEDICINE CLINIC | Age: 79
End: 2019-04-25

## 2019-04-25 DIAGNOSIS — I82.A29 CHRONIC DEEP VEIN THROMBOSIS (DVT) OF AXILLARY VEIN, UNSPECIFIED LATERALITY (HCC): Primary | ICD-10-CM

## 2019-04-25 LAB
INR BLD: 2.2 (ref 1–1.5)
PT POC: 26.6 SECONDS (ref 9.1–12)
VALID INTERNAL CONTROL?: YES

## 2019-04-25 NOTE — PATIENT INSTRUCTIONS
Today your INR was 2.2 . Your goal INR is  2.0-3.0 . You have a  1 mg and 2 mg tablet of Coumadin (warfarin). Take Coumadin as follows:    Continue warfarin 2 mg daily. Come back in 2  week(s) for your next finger stick/INR blood test.      Avoid any over the counter items containing aspirin or ibuprofen, and avoid great swings in general diet. Avoid alcohol consumption. Please notify the INR nurse if you are started on any new medication including over the counter or herbal supplements. Also, please notify your INR nurse if any of your other prescription or over the counter medications have been discontinued. Call [facility] at [phone] if you have any signs of abnormal bleeding/blood clot.  ------------------------------------------------------------------------------------------------------------------  Taking Warfarin Safely: Care Instructions    Your Care Instructions  Warfarin is a medicine that you take to prevent blood clots. It is often called a blood thinner. Doctors give warfarin (such as Coumadin) to reduce the risk of blood clots. You may be at risk for blood clots if you have atrial fibrillation or deep vein thrombosis. Some other health problems may also put you at risk. Warfarin slows the amount of time it takes for your blood to clot. It can cause bleeding problems. Even if you've been taking warfarin for a while, it's important to know how to take it safely. Foods and other medicines can affect the way warfarin works. Some can make warfarin work too well. This can cause bleeding problems. And some can make it work poorly, so that it does not prevent blood clots very well. You will need regular blood tests to check how long it takes for your blood to form a clot. This test is called a PT or prothrombin time test. The result of the test is called an INR level.  Depending on the test results, your doctor or anticoagulation clinic may adjust your dose of warfarin. Follow-up care is a key part of your treatment and safety. Be sure to make and go to all appointments, and call your doctor if you are having problems. It's also a good idea to know your test results and keep a list of the medicines you take. How can you care for yourself at home? Take warfarin safely  · Take your warfarin at the same time each day. · If you miss a dose of warfarin, don't take an extra dose to make up for it. Your doctor can tell you exactly what to do so you don't take too much or too little. · Wear medical alert jewelry that lets others know that you take warfarin. You can buy this at most drugstores. · Don't take warfarin if you are pregnant or planning to get pregnant. Talk to your doctor about how you can prevent getting pregnant while you are taking it. · Don't change your dose or stop taking warfarin unless your doctor tells you to. Effects of medicines and food on warfarin  · Don't start or stop taking any medicines, vitamins, or natural remedies unless you first talk to your doctor. Many medicines can affect how warfarin works. These include aspirin and other pain relievers, over-the-counter medicines, multivitamins, dietary supplements, and herbal products. · Tell all of your doctors and pharmacists that you take warfarin. Some prescription medicines can affect how warfarin works. · Keep the amount of vitamin K in your diet about the same from day to day. Do not suddenly eat a lot more or a lot less food that is rich in vitamin K than you usually do. Vitamin K affects how warfarin works and how your blood clots. Talk with your doctor before making big changes in your diet. Vitamin K is in many foods, such as:  ¨ Leafy greens, such as kale, cabbage, spinach, Swiss chard, and lettuce. ¨ Canola and soybean oils. ¨ Green vegetables, such as asparagus, broccoli, and [place] sprouts. ¨ Vegetable drinks, green tea leaves, and some dietary supplement drinks.   · Avoid cranberry juice and other cranberry products. They can increase the effects of warfarin. · Limit your use of alcohol. Avoid bleeding by preventing falls and injuries  · Wear slippers or shoes with nonskid soles. · Remove throw rugs and clutter. · Rearrange furniture and electrical cords to keep them out of walking paths. · Keep stairways, porches, and outside walkways well lit. Use night-lights in hallways and bathrooms. · Be extra careful when you work with sharp tools or knives. When should you call for help? Call 911 anytime you think you may need emergency care. For example, call if:  · You have a sudden, severe headache that is different from past headaches. Call your doctor now or seek immediate medical care if:  · You have any abnormal bleeding, such as:  ¨ Nosebleeds. ¨ Vaginal bleeding that is different (heavier, more frequent, at a different time of the month) than what you are used to. ¨ Bloody or black stools, or rectal bleeding. ¨ Bloody or pink urine. Watch closely for changes in your health, and be sure to contact your doctor if you have any problems. Where can you learn more? Go to http://lynn-xu.info/. Enter U031 in the search box to learn more about \"Taking Warfarin Safely: Care Instructions. \"  Current as of: January 27, 2016  Content Version: 11.1  © 2596-4986 [employer]. Care instructions adapted under license by [employer] (which disclaims liability or warranty for this information). If you have questions about a medical condition or this instruction, always ask your healthcare professional. [place] [address] any warranty or liability for your use of this information.

## 2019-04-25 NOTE — PROGRESS NOTES
Pharmacy Progress Note  - Anticoagulation Management    S/O: Ms. Brenda Dunlap is a [de-identified] y.o. female , with a PMH of OAB, IBS, LE DVT, anxiety, insomonia, Renee's esophagus, Arthritis, Migraines, hyperlipidemia, who was seen today for anticoagulation management for the diagnosis of LLE Deep Vein Thrombosis.  Pt ambulates w/ a cane. · Warfarin start date: Around 2008   · INR Goal:  2.0-3.0    · Current warfarin regimen:  Hold x 1, then 2 mg daily                      · Warfarin tablet strength:   1 mg, 2 mg   · Duration of therapy: Indefinite    Today's pertinent positives includes:  Bruises    - notices new bruises on arms ; no other signs of bleeding     Results for orders placed or performed in visit on 04/25/19   AMB POC PT/INR   Result Value Ref Range    VALID INTERNAL CONTROL POC Yes     Prothrombin time (POC) 26.6 (A) 9.1 - 12 seconds    INR POC 2.2 (A) 1 - 1.5       · Adherence:   · Able to recall regimen? YES  · Miss/extra dose? NO  · Need refill? NO    Upcoming procedure(s):  NO      Past Medical History:   Diagnosis Date    Arthritis     History of DVT (deep vein thrombosis)     History of recurrent UTIs     Irritable bowel syndrome (IBS)        Allergies   Allergen Reactions    Pcn [Penicillins] Shortness of Breath    Sulfa (Sulfonamide Antibiotics) Shortness of Breath       Current Outpatient Medications   Medication Sig    warfarin (COUMADIN) 2 mg tablet TAKE 1 TABLET BY MOUTH DAILY    escitalopram oxalate (LEXAPRO) 20 mg tablet Take 1 Tab by mouth daily.  esomeprazole (NEXIUM) 20 mg capsule Take 1 Cap by mouth daily. (Patient taking differently: Take 20 mg by mouth two (2) times a day.)    dicyclomine (BENTYL) 20 mg tablet Take 1 Tab by mouth two (2) times a day.  oxybutynin chloride XL (DITROPAN XL) 10 mg CR tablet Take 1 Tab by mouth daily.  ezetimibe (ZETIA) 10 mg tablet Take 1 Tab by mouth daily.  gabapentin (NEURONTIN) 100 mg capsule Take 1 Cap by mouth nightly.     glucose blood VI test strips (ONETOUCH VERIO) strip Use to check blood sugar twice weekly    lancets (ONETOUCH ULTRASOFT LANCETS) misc Use to check blood sugar twice weekly    levothyroxine (SYNTHROID) 25 mcg tablet Take 2 Tabs by mouth Daily (before breakfast).  amitriptyline (ELAVIL) 10 mg tablet TAKE 1 TABLET BY MOUTH EVERY NIGHT AT BEDTIME    levETIRAcetam (KEPPRA) 500 mg tablet take 1 tablet every morning and 1.5 tablet every evening for migraines    ondansetron hcl (ZOFRAN) 4 mg tablet take 1 tablet by mouth every 8 hours if needed for nausea    traMADol (ULTRAM) 50 mg tablet Take  mg by mouth every six (6) hours as needed for Pain.  traZODone (DESYREL) 50 mg tablet Take 1 Tab by mouth nightly as needed for Sleep.  metFORMIN ER (GLUCOPHAGE XR) 500 mg tablet Take 1 Tab by mouth daily (with dinner).  fluticasone-salmeterol (ADVAIR DISKUS) 250-50 mcg/dose diskus inhaler Take 1 Puff by inhalation two (2) times a day.  metoprolol succinate (TOPROL-XL) 25 mg XL tablet Take 12.5 mg by mouth daily.  atorvastatin (LIPITOR) 20 mg tablet Take 10 mg by mouth daily.  colestipol (COLESTID) 1 gram tablet Take 1 g by mouth daily.  fluticasone (FLONASE) 50 mcg/actuation nasal spray 2 Sprays by Both Nostrils route daily as needed for Rhinitis.  cholecalciferol (VITAMIN D3) 1,000 unit tablet Take 1,000 Units by mouth daily.  nystatin (MYCOSTATIN) 100,000 unit/mL suspension Take 1 tsp by mouth four (4) times daily. swish and spit to help with mouth pain    diphenoxylate-atropine (LOMOTIL) 2.5-0.025 mg per tablet Take 1 Tab by mouth four (4) times daily as needed for Diarrhea.  Bifidobacterium Infantis (ALIGN) 4 mg cap Take 1 Cap by mouth daily as needed for Diarrhea.  Biotin 2,500 mcg cap Take 1 Cap by mouth daily.  clorazepate (TRANXENE) 3.75 mg tablet Take 3.75 mg by mouth every six (6) hours as needed for Anxiety.     celecoxib (CELEBREX) 100 mg capsule Take 100 mg by mouth daily as needed. Take with food for arthritic pain     calcium carbonate (TUMS) 200 mg calcium (500 mg) chew Take 1 Tab by mouth daily as needed.  warfarin (COUMADIN) 1 mg tablet Take 1 Tab by mouth See Admin Instructions. Take 2 mg daily. Has both 1 mg and 2 mg dosage strength     No current facility-administered medications for this visit. Wt Readings from Last 3 Encounters:   03/27/19 138 lb (62.6 kg)   03/08/19 140 lb (63.5 kg)   01/04/19 145 lb (65.8 kg)       BP Readings from Last 3 Encounters:   04/11/19 105/69   03/27/19 106/65   03/08/19 100/55       Lab Results   Component Value Date/Time    WBC 8.6 01/17/2019 12:55 PM    HGB 11.0 (L) 01/17/2019 12:55 PM    HCT 38.0 01/17/2019 12:55 PM    PLATELET 496 70/70/4466 12:55 PM    MCV 75 (L) 01/17/2019 12:55 PM       Lab Results   Component Value Date/Time    Protein, total 7.1 01/17/2019 12:55 PM    Albumin 4.2 01/17/2019 12:55 PM       INR History:   (normal INR range 0.8-1.2)     Date   INR   PT   Dose/Comments  04/25/19 2.2  26.6 Hold x1, then 2 mg daily  04/11/19          3.6                   42.8     2 mg daily   02/26/19          2.6                   31.5     2 mg daily  1/28/19            2.0                   24.2     2 mg daily                    1/17/19            2.8                   34.1     2 mg daily    A/P:       Anticoagulation:  Considering Ms. Dunlap's past history, todays findings, and per Anticoagulation Collaborative Practice Agreement/Protocol:    1. POC INR (2.2) is Therapeutic for INR goal today. 2.  Continue warfarin 2 mg daily. Patient was instructed to schedule an appointment in 2 week(s) prior to leaving the clinic. Medication reconciliation was completed during the visit. There are no discontinued medications. A full discussion of the nature of anticoagulants has been carried out.   A full discussion of the need for frequent and regular monitoring, precise dosage adjustment and compliance was stressed. Side effects of potential bleeding were discussed and Ms. Brian Lewis was instructed to call [phone] if there are any signs of abnormal bleeding. Ms. Brian Lewis was instructed to avoid any OTC items containing aspirin or ibuprofen and prior to starting any new OTC products to consult with her physician or pharmacist to ensure no drug interactions are present. Ms. Brian Lewis was instructed to avoid any major changes in her general diet and to avoid alcohol consumption. Ms. Brian Lewis was provided information in the AVS that includes topics on understanding coumadin therapy, drug interaction considerations, vitamin K and coumadin use, interactions with foods and supplements containing vitamin K, and the use of herbal products. Ms. Brian Lewis verbalized her understanding of all instructions and will call the office with any questions, concerns, or signs of abnormal bleeding or blood clot. Notifications of recommendations will be sent to Dr. Anamaria Wolfe DO for review.     Thank you,  Zulema Dumont, PharmD, CDE

## 2019-04-30 RX ORDER — METFORMIN HYDROCHLORIDE 500 MG/1
500 TABLET, EXTENDED RELEASE ORAL
Qty: 30 TAB | Refills: 11 | Status: SHIPPED | OUTPATIENT
Start: 2019-04-30 | End: 2019-04-30 | Stop reason: SDUPTHER

## 2019-04-30 RX ORDER — HYDROGEN PEROXIDE 3 %
SOLUTION, NON-ORAL MISCELLANEOUS
Qty: 90 CAP | Refills: 3 | Status: SHIPPED | OUTPATIENT
Start: 2019-04-30 | End: 2021-09-24

## 2019-04-30 NOTE — TELEPHONE ENCOUNTER
Pt is also requesting Hydrocodone Chorphen for her cough.   #[phone] when this one is ready for  or called in (she didn't know which one)

## 2019-05-07 NOTE — TELEPHONE ENCOUNTER
Pt requesting a call in regards to the medication \"Myrbetriq\" for urine urgency and its pricing.  Best contact [phone]    Copy/paste from [facility]

## 2019-05-08 ENCOUNTER — TELEPHONE (OUTPATIENT)
Dept: INTERNAL MEDICINE CLINIC | Age: 79
End: 2019-05-08

## 2019-05-08 NOTE — TELEPHONE ENCOUNTER
----- Message from [facility] sent at 5/7/2019  4:44 PM EDT -----  Regarding: Dr. Najma Emery, with Dr. Nessa Champion office, is requesting a medication list faxed to [phone]. [facility] best contact number [phone].

## 2019-05-09 ENCOUNTER — TELEPHONE (OUTPATIENT)
Dept: INTERNAL MEDICINE CLINIC | Age: 79
End: 2019-05-09

## 2019-05-09 ENCOUNTER — OFFICE VISIT (OUTPATIENT)
Dept: INTERNAL MEDICINE CLINIC | Age: 79
End: 2019-05-09

## 2019-05-09 DIAGNOSIS — I82.5Y2 CHRONIC DEEP VEIN THROMBOSIS (DVT) OF PROXIMAL VEIN OF LEFT LOWER EXTREMITY (HCC): Primary | ICD-10-CM

## 2019-05-09 LAB
INR BLD: 2.1 (ref 1–1.5)
PT POC: 25.5 SECONDS (ref 9.1–12)
VALID INTERNAL CONTROL?: YES

## 2019-05-09 NOTE — TELEPHONE ENCOUNTER
Called, spoke to pt. Two pt identifiers confirmed. Pt wanted Dr. Sarbjit Weinstein to know that she had a really bad experience with the optometrist she went to. Pt states that she has info for [facility] and will f/u with that office. Noted, will inform PCP. Pt verbalized understanding of information discussed w/ no further questions at this time.

## 2019-05-09 NOTE — PROGRESS NOTES
Pharmacy Progress Note  - Anticoagulation Management    S/O: Ms. Brenda Dunlap is a [de-identified] y.o. female , with a PMH of OAB, IBS, LE DVT, anxiety, insomonia, Renee's esophagus, Arthritis, Migraines, hyperlipidemia, who was seen today for anticoagulation management for the diagnosis of LLE Deep Vein Thrombosis.  Pt ambulates w/ a cane.     · Warfarin start date: Around 2008   · INR Goal:  2.0-3.0    · Current warfarin regimen:  2 mg daily                      · Warfarin tablet strength:   1 mg, 2 mg   · Duration of therapy: Indefinite    Today's pertinent positives includes:  No significant changes since last visit    Results for orders placed or performed in visit on 05/09/19   AMB POC PT/INR   Result Value Ref Range    VALID INTERNAL CONTROL POC Yes     Prothrombin time (POC) 25.5 (A) 9.1 - 12 seconds    INR POC 2.1 (A) 1 - 1.5     · Adherence:   · Able to recall regimen? YES  · Miss/extra dose? NO  · Need refill? NO    Upcoming procedure(s):  NO      Past Medical History:   Diagnosis Date    Arthritis     History of DVT (deep vein thrombosis)     History of recurrent UTIs     Irritable bowel syndrome (IBS)        Allergies   Allergen Reactions    Pcn [Penicillins] Shortness of Breath    Sulfa (Sulfonamide Antibiotics) Shortness of Breath       Current Outpatient Medications   Medication Sig    esomeprazole (NEXIUM) 20 mg capsule TAKE 1 CAPSULE BY MOUTH DAILY    Bifidobacterium Infantis (ALIGN) 4 mg cap Take 1 Cap by mouth daily as needed for Diarrhea. Indications: ALIGN OTC    metFORMIN ER (GLUCOPHAGE XR) 500 mg tablet TAKE 1 TABLET BY MOUTH DAILY WITH DINNER    warfarin (COUMADIN) 2 mg tablet TAKE 1 TABLET BY MOUTH DAILY    escitalopram oxalate (LEXAPRO) 20 mg tablet Take 1 Tab by mouth daily.  dicyclomine (BENTYL) 20 mg tablet Take 1 Tab by mouth two (2) times a day.  oxybutynin chloride XL (DITROPAN XL) 10 mg CR tablet Take 1 Tab by mouth daily.     ezetimibe (ZETIA) 10 mg tablet Take 1 Tab by mouth daily.  gabapentin (NEURONTIN) 100 mg capsule Take 1 Cap by mouth nightly.  glucose blood VI test strips (ONETOUCH VERIO) strip Use to check blood sugar twice weekly    lancets (ONETOUCH ULTRASOFT LANCETS) misc Use to check blood sugar twice weekly    levothyroxine (SYNTHROID) 25 mcg tablet Take 2 Tabs by mouth Daily (before breakfast).  amitriptyline (ELAVIL) 10 mg tablet TAKE 1 TABLET BY MOUTH EVERY NIGHT AT BEDTIME    levETIRAcetam (KEPPRA) 500 mg tablet take 1 tablet every morning and 1.5 tablet every evening for migraines    ondansetron hcl (ZOFRAN) 4 mg tablet take 1 tablet by mouth every 8 hours if needed for nausea    traMADol (ULTRAM) 50 mg tablet Take  mg by mouth every six (6) hours as needed for Pain.  traZODone (DESYREL) 50 mg tablet Take 1 Tab by mouth nightly as needed for Sleep.  fluticasone-salmeterol (ADVAIR DISKUS) 250-50 mcg/dose diskus inhaler Take 1 Puff by inhalation two (2) times a day.  metoprolol succinate (TOPROL-XL) 25 mg XL tablet Take 12.5 mg by mouth daily.  atorvastatin (LIPITOR) 20 mg tablet Take 10 mg by mouth daily.  colestipol (COLESTID) 1 gram tablet Take 1 g by mouth daily.  fluticasone (FLONASE) 50 mcg/actuation nasal spray 2 Sprays by Both Nostrils route daily as needed for Rhinitis.  cholecalciferol (VITAMIN D3) 1,000 unit tablet Take 1,000 Units by mouth daily.  nystatin (MYCOSTATIN) 100,000 unit/mL suspension Take 1 tsp by mouth four (4) times daily. swish and spit to help with mouth pain    diphenoxylate-atropine (LOMOTIL) 2.5-0.025 mg per tablet Take 1 Tab by mouth four (4) times daily as needed for Diarrhea.  Biotin 2,500 mcg cap Take 1 Cap by mouth daily.  clorazepate (TRANXENE) 3.75 mg tablet Take 3.75 mg by mouth every six (6) hours as needed for Anxiety.  celecoxib (CELEBREX) 100 mg capsule Take 100 mg by mouth daily as needed.  Take with food for arthritic pain     calcium carbonate (TUMS) 200 mg calcium (500 mg) chew Take 1 Tab by mouth daily as needed.  warfarin (COUMADIN) 1 mg tablet Take 1 Tab by mouth See Admin Instructions. Take 2 mg daily. Has both 1 mg and 2 mg dosage strength     No current facility-administered medications for this visit. Wt Readings from Last 3 Encounters:   03/27/19 138 lb (62.6 kg)   03/08/19 140 lb (63.5 kg)   01/04/19 145 lb (65.8 kg)       BP Readings from Last 3 Encounters:   04/11/19 105/69   03/27/19 106/65   03/08/19 100/55       Lab Results   Component Value Date/Time    WBC 8.6 01/17/2019 12:55 PM    HGB 11.0 (L) 01/17/2019 12:55 PM    HCT 38.0 01/17/2019 12:55 PM    PLATELET 491 00/89/7891 12:55 PM    MCV 75 (L) 01/17/2019 12:55 PM       Lab Results   Component Value Date/Time    Protein, total 7.1 01/17/2019 12:55 PM    Albumin 4.2 01/17/2019 12:55 PM       INR History:   (normal INR range 0.8-1.2)     Date   INR   PT   Dose/Comments  05/09/19 2.1  25.5 2 mg daily  04/25/19          2.2                   26.6     Hold x1, then 2 mg daily  04/11/19          3.6                   42. 8     2 mg daily   02/26/19          2.6                   31.5     2 mg daily  1/28/19            2.0                   24.2     2 mg daily                    1/17/19            2.8                   34.1     2 mg daily      A/P:       Anticoagulation:  Considering Ms. Dunlap's past history, todays findings, and per Anticoagulation Collaborative Practice Agreement/Protocol:    1. POC INR (2.1) remains therapeutic for INR goal today. 2.  Continue warfarin 2 mg daily. Patient was instructed to schedule an appointment in 5 week(s) prior to leaving the clinic. Medication reconciliation was completed during the visit. There are no discontinued medications. A full discussion of the nature of anticoagulants has been carried out. A full discussion of the need for frequent and regular monitoring, precise dosage adjustment and compliance was stressed.   Side effects of potential bleeding were discussed and Ms. Nirmala Orta was instructed to call [phone] if there are any signs of abnormal bleeding. Ms. Nirmala Orta was instructed to avoid any OTC items containing aspirin or ibuprofen and prior to starting any new OTC products to consult with her physician or pharmacist to ensure no drug interactions are present. Ms. Nirmala Orta was instructed to avoid any major changes in her general diet and to avoid alcohol consumption. Ms. Nirmala Orta was provided information in the AVS that includes topics on understanding coumadin therapy, drug interaction considerations, vitamin K and coumadin use, interactions with foods and supplements containing vitamin K, and the use of herbal products. Ms. Nirmala Orta verbalized her understanding of all instructions and will call the office with any questions, concerns, or signs of abnormal bleeding or blood clot. Notifications of recommendations will be sent to Dr. Agata Cohen DO for review.     Thank you,  Thu Thuy T Larene Kanner

## 2019-05-09 NOTE — TELEPHONE ENCOUNTER
Pt states that she josh like to speak to a nurse in regards to eye pain that she is having and states that she does not want to be referred to the optometrist that she was referred to before.

## 2019-05-10 NOTE — TELEPHONE ENCOUNTER
Pt (p) [phone], she is trying to get the medication Myrbetriq called into her [facility] [phone], (on file)  They need thr rx from Dr Marlyn Pillai so they can see how much the rx will cost her       Message received & copied from [facility] after closing on 5/9/19

## 2019-05-10 NOTE — TELEPHONE ENCOUNTER
Pt requesting call back from nurse. In regards to receiving a medication \"myrbetriq\". Pharmacy is [facility] ([place] on file. Call was disconnected in waiting for pt to return with [facility] contact. Pt stated she is also calling about her  in regards to receiving a new medication and would like a call back to discuss. PT stated she spoke to a nurse earlier.        Message received & copied from [facility] after closing on 5/9/19

## 2019-05-10 NOTE — TELEPHONE ENCOUNTER
Called, spoke to pt. Two identifiers confirmed. Pt requesting Dr. Josesito Pleitez fill her urinary frequency medication. Pt stated she went and saw the urologist Dr. Josesito Pleitez referred her to but did not like her. Notified pt I would send request to Dr. Josesito Pleitez. Pt verbalized understanding of information discussed w/ no further questions at this time.

## 2019-05-14 DIAGNOSIS — G89.4 CHRONIC PAIN SYNDROME: Primary | ICD-10-CM

## 2019-05-14 RX ORDER — TRAMADOL HYDROCHLORIDE 50 MG/1
50 TABLET ORAL
Qty: 30 TAB | Refills: 0 | Status: SHIPPED | OUTPATIENT
Start: 2019-05-14 | End: 2019-06-13

## 2019-05-14 RX ORDER — VERAPAMIL HYDROCHLORIDE 40 MG/1
40 TABLET ORAL 2 TIMES DAILY
Qty: 180 TAB | Refills: 3 | Status: SHIPPED | OUTPATIENT
Start: 2019-05-14 | End: 2020-06-12

## 2019-05-15 ENCOUNTER — TELEPHONE (OUTPATIENT)
Dept: INTERNAL MEDICINE CLINIC | Age: 79
End: 2019-05-15

## 2019-05-15 NOTE — TELEPHONE ENCOUNTER
Medication for Urine Urgency was sent to wrong pharmacy. [facility] will charge her less. [facility] has sent multiple requests to this office. Please call [facility] at [phone] to authorize the prescription to be filled with them.

## 2019-05-24 DIAGNOSIS — R19.7 DIARRHEA, UNSPECIFIED TYPE: Primary | ICD-10-CM

## 2019-05-24 RX ORDER — DIPHENOXYLATE HYDROCHLORIDE AND ATROPINE SULFATE 2.5; .025 MG/1; MG/1
1 TABLET ORAL
Qty: 120 TAB | Refills: 0 | Status: SHIPPED | OUTPATIENT
Start: 2019-05-24 | End: 2020-02-10

## 2019-05-24 NOTE — TELEPHONE ENCOUNTER
----- Message from [facility] sent at 5/23/2019  3:44 PM EDT -----  Regarding: DR Bridgette Bellamy / Johana Grier  Pt is requesting that \"Lomotil\" be called into the [facility]  Pharmacy on file.   Best contact: [phone]    Copy/paste amie

## 2019-06-05 DIAGNOSIS — F41.9 ANXIETY: Primary | ICD-10-CM

## 2019-06-05 RX ORDER — CLORAZEPATE DIPOTASSIUM 3.75 MG/1
3.75 TABLET ORAL
Qty: 90 TAB | Refills: 3 | Status: SHIPPED | OUTPATIENT
Start: 2019-06-05 | End: 2020-05-04

## 2019-06-13 ENCOUNTER — TELEPHONE (OUTPATIENT)
Dept: INTERNAL MEDICINE CLINIC | Age: 79
End: 2019-06-13

## 2019-06-13 NOTE — TELEPHONE ENCOUNTER
----- Message from [facility] sent at 6/13/2019  1:07 PM EDT -----  Regarding: Dr. Ambar Salmon  Pt stating medical record release was signed however her current doctor, Dr. Kimberly Park has not received any information. Pt currently waiting in doctor's office and would like information faxed to [phone]. Pt's best contact number [phone].       Message copied/pasted from [facility]

## 2019-10-08 ENCOUNTER — TELEPHONE (OUTPATIENT)
Dept: INTERNAL MEDICINE CLINIC | Age: 79
End: 2019-10-08

## 2019-10-08 NOTE — TELEPHONE ENCOUNTER
Patient states she needs a call back to discuss getting an appt same day as , 10/15/19 & getting an appt before her husbands appt at 2 pm or after his appt so they can come the same day. Please call to advise.  Thank you

## 2019-10-09 NOTE — TELEPHONE ENCOUNTER
Called, spoke to pt. Two identifiers confirmed. Appointment scheduled for 10/17 @ 130 with Dr. King York.   Pt verbalized understanding of information discussed w/ no further questions at this time.

## 2019-10-17 ENCOUNTER — OFFICE VISIT (OUTPATIENT)
Dept: INTERNAL MEDICINE CLINIC | Age: 79
End: 2019-10-17

## 2019-10-17 VITALS
RESPIRATION RATE: 18 BRPM | HEIGHT: 61 IN | DIASTOLIC BLOOD PRESSURE: 72 MMHG | BODY MASS INDEX: 27.68 KG/M2 | OXYGEN SATURATION: 93 % | TEMPERATURE: 98.2 F | SYSTOLIC BLOOD PRESSURE: 123 MMHG | HEART RATE: 99 BPM | WEIGHT: 146.6 LBS

## 2019-10-17 DIAGNOSIS — F41.9 ANXIETY: ICD-10-CM

## 2019-10-17 DIAGNOSIS — E11.9 TYPE 2 DIABETES MELLITUS WITHOUT COMPLICATION, WITHOUT LONG-TERM CURRENT USE OF INSULIN (HCC): Primary | ICD-10-CM

## 2019-10-17 DIAGNOSIS — E11.69 HYPERLIPIDEMIA ASSOCIATED WITH TYPE 2 DIABETES MELLITUS (HCC): ICD-10-CM

## 2019-10-17 DIAGNOSIS — M81.0 AGE-RELATED OSTEOPOROSIS WITHOUT CURRENT PATHOLOGICAL FRACTURE: ICD-10-CM

## 2019-10-17 DIAGNOSIS — I25.10 CORONARY ARTERY DISEASE INVOLVING NATIVE CORONARY ARTERY OF NATIVE HEART WITHOUT ANGINA PECTORIS: ICD-10-CM

## 2019-10-17 DIAGNOSIS — N32.81 OAB (OVERACTIVE BLADDER): ICD-10-CM

## 2019-10-17 DIAGNOSIS — E78.5 HYPERLIPIDEMIA ASSOCIATED WITH TYPE 2 DIABETES MELLITUS (HCC): ICD-10-CM

## 2019-10-17 DIAGNOSIS — Z23 ENCOUNTER FOR IMMUNIZATION: ICD-10-CM

## 2019-10-17 DIAGNOSIS — E03.9 ACQUIRED HYPOTHYROIDISM: ICD-10-CM

## 2019-10-17 DIAGNOSIS — Z86.718 HISTORY OF DVT OF LOWER EXTREMITY: ICD-10-CM

## 2019-10-17 PROBLEM — I82.409 DVT (DEEP VENOUS THROMBOSIS) (HCC): Status: RESOLVED | Noted: 2019-01-04 | Resolved: 2019-10-17

## 2019-10-17 LAB
HBA1C MFR BLD HPLC: 6.8 %
INR BLD: 2.7
PT POC: 32.4 SECONDS
VALID INTERNAL CONTROL?: YES

## 2019-10-17 NOTE — PATIENT INSTRUCTIONS
Diabetes Foot Health: Care Instructions  Your Care Instructions    When you have diabetes, your feet need extra care and attention. Diabetes can damage the nerve endings and blood vessels in your feet, making you less likely to notice when your feet are injured. Diabetes also limits your body's ability to fight infection and get blood to areas that need it. If you get a minor foot injury, it could become an ulcer or a serious infection. With good foot care, you can prevent most of these problems. Caring for your feet can be quick and easy. Most of the care can be done when you are bathing or getting ready for bed. Follow-up care is a key part of your treatment and safety. Be sure to make and go to all appointments, and call your doctor if you are having problems. It's also a good idea to know your test results and keep a list of the medicines you take. How can you care for yourself at home? · Keep your blood sugar close to normal by watching what and how much you eat, monitoring blood sugar, taking medicines if prescribed, and getting regular exercise. · Do not smoke. Smoking affects blood flow and can make foot problems worse. If you need help quitting, talk to your doctor about stop-smoking programs and medicines. These can increase your chances of quitting for good. · Eat a diet that is low in fats. High fat intake can cause fat to build up in your blood vessels and decrease blood flow. · Inspect your feet daily for blisters, cuts, cracks, or sores. If you cannot see well, use a mirror or have someone help you. · Take care of your feet:  ? Wash your feet every day. Use warm (not hot) water. Check the water temperature with your wrists or other part of your body, not your feet. ? Dry your feet well. Pat them dry. Do not rub the skin on your feet too hard. Dry well between your toes. If the skin on your feet stays moist, bacteria or a fungus can grow, which can lead to infection. ?  Keep your skin soft. Use moisturizing skin cream to keep the skin on your feet soft and prevent calluses and cracks. But do not put the cream between your toes, and stop using any cream that causes a rash. ? Clean underneath your toenails carefully. Do not use a sharp object to clean underneath your toenails. Use the blunt end of a nail file or other rounded tool. ? Trim and file your toenails straight across to prevent ingrown toenails. Use a nail clipper, not scissors. Use an emery board to smooth the edges. · Change socks daily. Socks without seams are best, because seams often rub the feet. You can find socks for people with diabetes from specialty catalogs. · Look inside your shoes every day for things like gravel or torn linings, which could cause blisters or sores. · Buy shoes that fit well:  ? Look for shoes that have plenty of space around the toes. This helps prevent bunions and blisters. ? Try on shoes while wearing the kind of socks you will usually wear with the shoes. ? Avoid plastic shoes. They may rub your feet and cause blisters. Good shoes should be made of materials that are flexible and breathable, such as leather or cloth. ? Break in new shoes slowly by wearing them for no more than an hour a day for several days. Take extra time to check your feet for red areas, blisters, or other problems after you wear new shoes. · Do not go barefoot. Do not wear sandals, and do not wear shoes with very thin soles. Thin soles are easy to puncture. They also do not protect your feet from hot pavement or cold weather. · Have your doctor check your feet during each visit. If you have a foot problem, see your doctor. Do not try to treat an early foot problem at home. Home remedies or treatments that you can buy without a prescription (such as corn removers) can be harmful. · Always get early treatment for foot problems. A minor irritation can lead to a major problem if not properly cared for early.   When should you call for help? Call your doctor now or seek immediate medical care if:    · You have a foot sore, an ulcer or break in the skin that is not healing after 4 days, bleeding corns or calluses, or an ingrown toenail.     · You have blue or black areas, which can mean bruising or blood flow problems.     · You have peeling skin or tiny blisters between your toes or cracking or oozing of the skin.     · You have a fever for more than 24 hours and a foot sore.     · You have new numbness or tingling in your feet that does not go away after you move your feet or change positions.     · You have unexplained or unusual swelling of the foot or ankle.    Watch closely for changes in your health, and be sure to contact your doctor if:    · You cannot do proper foot care. Where can you learn more? Go to http://lynn-xu.info/. Enter A739 in the search box to learn more about \"Diabetes Foot Health: Care Instructions. \"  Current as of: April 16, 2019  Content Version: 12.2  © 6668-5560 [employer]. Care instructions adapted under license by [employer] (which disclaims liability or warranty for this information). If you have questions about a medical condition or this instruction, always ask your healthcare professional. [address] any warranty or liability for your use of this information.

## 2019-10-17 NOTE — PROGRESS NOTES
Reviewed record in preparation for visit and have obtained necessary documentation. Identified pt with two pt identifiers(name and ). Chief Complaint   Patient presents with    Follow-up    Knee Pain     bilateral; due arthritis       Health Maintenance Due   Topic Date Due    FOOT EXAM Q1  1948    MICROALBUMIN Q1  1948    EYE EXAM RETINAL OR DILATED  1948    DTaP/Tdap/Td series (1 - Tdap) 1959    GLAUCOMA SCREENING Q2Y  2003    Bone Densitometry (Dexa) Screening  2003    Pneumococcal 65+ years (1 of 2 - PCV13) 2003    HEMOGLOBIN A1C Q6M  2019    Influenza Age 5 to Adult  2019       Ms. Ollie Ervin has a reminder for a \"due or due soon\" health maintenance. I have asked that she discuss health maintenance topic(s) due with Her  primary care provider. Coordination of Care Questionnaire:  :     1) Have you been to an emergency room, urgent care clinic since your last visit? no   Hospitalized since your last visit? no             2) Have you seen or consulted any other health care providers outside of [address] [place] [address] since your last visit? no  (Include any pap smears or colon screenings in this section.)    3) Do you have an Advance Directive on file? no    4) Are you interested in receiving information on Advance Directives? NO    Patient is accompanied by self I have received verbal consent from Susanne to discuss any/all medical information while they are present in the room.

## 2019-10-17 NOTE — PROGRESS NOTES
Octavio Amanda is a 78 y.o. female who presents for evaluation of routine follow up. Last seen by me jan 4, 2019 in [facility]. She and her  left the group briefly, were going to [facility], but found that practice less hospitable then ours, so they returned here. They had been in a  practice for 50 years while they lived in [place]. She is become more frustrated and anxious as her 's dementia continues to worsen. ROS:  Constitutional: negative for fevers, chills, anorexia and weight loss  Eyes:   negative for visual disturbance and irritation  ENT:   negative for tinnitus,sore throat,nasal congestion,ear pain,hoarseness  Respiratory:  negative for cough, hemoptysis, dyspnea,wheezing  CV:   negative for chest pain, palpitations, lower extremity edema  GI:   negative for nausea, vomiting, diarrhea, abdominal pain,melena  Genitourinary: negative for frequency, dysuria and hematuria  Musculoskel: negative for myalgias, arthralgias, back pain, muscle weakness. ++diffuse joint pains  Neurological:  negative for headaches, dizziness, focal weakness, numbness  Psychiatric:     Negative for depression.   ++ for anxiety      Past Medical History:   Diagnosis Date    Arthritis     History of DVT (deep vein thrombosis)     History of recurrent UTIs     Irritable bowel syndrome (IBS)        Past Surgical History:   Procedure Laterality Date    HX HIP FRACTURE [place]      HX OTHER SURGICAL      stint in heart       Family History   Problem Relation Age of Onset    Diabetes Mother     Stroke Mother        Social History     Socioeconomic History    Marital status:      Spouse name: Not on file    Number of children: Not on file    Years of education: Not on file    Highest education level: Not on file   Occupational History    Not on file   Social Needs    Financial resource strain: Not on file    Food insecurity:     Worry: Not on file     Inability: Not on file  Transportation needs:     Medical: Not on file     Non-medical: Not on file   Tobacco Use    Smoking status: Former Smoker    Smokeless tobacco: Never Used   Substance and Sexual Activity    Alcohol use: No     Frequency: Never    Drug use: No    Sexual activity: Not Currently   Lifestyle    Physical activity:     Days per week: Not on file     Minutes per session: Not on file    Stress: Not on file   Relationships    Social connections:     Talks on phone: Not on file     Gets together: Not on file     Attends Pentecostalism service: Not on file     Active member of club or organization: Not on file     Attends meetings of clubs or organizations: Not on file     Relationship status: Not on file    Intimate partner violence:     Fear of current or ex partner: Not on file     Emotionally abused: Not on file     Physically abused: Not on file     Forced sexual activity: Not on file   Other Topics Concern    Not on file   Social History Narrative    Not on file            Visit Vitals  /72 (BP 1 Location: Right arm, BP Patient Position: Sitting)   Pulse 99   Temp 98.2 °F (36.8 °C) (Oral)   Resp 18   Ht 5' 1\" (1.549 m)   Wt 146 lb 9.6 oz (66.5 kg)   SpO2 93%   BMI 27.70 kg/m²       Physical Examination:   General - Well appearing female  HEENT - PERRL, TM no erythema/opacification, normal nasal turbinates, no oropharyngeal erythema or exudate, MMM  Neck - supple, no bruits, no thyroidomegaly, no lymphadenopathy  Pulm - clear to auscultation bilaterally  Cardio - RRR, normal S1 S2, 3/6 murmur  Abd - soft, nontender, no masses, no HSM  Extrem - no edema, +2 distal pulses  Neuro-  No focal deficits, CN intact         Diabetic foot exam performed by Sunita Correa III, DO     Measurement  Response Nurse Comment Physician Comment   Monofilament  R - normal sensation with micro filament  L - normal sensation with micro filament     Pulse DP R - present  L - present     Pulse TP R - present  L - present Structural deformity R - None  L - None     Skin Integrity / Deformity R - None  L - None        Reviewed by:              Assessment/Plan:    1.  Dm, type 2--on glucophage. Check a1c, urine micro  2. Hx cad with stents--on asa  3. Osteoporosis--check DEXA scan, she had been on reclast in the past  4.  oab--on ditropan and myrbetriq  5. Hypothyroid--on synthroid  6. Hx dvt--on coumadin, check INR  7. Anxiety and depression--continue lexapro, trazodone, elavil, tranxene  8.  ibs-d--continue align, bentyl. 9.  Migraines--on keppra  10.  hyperlipids--on zetia, lipitor  11. Diffuse osteoarthritis--on celebrex, neurontin  12.   Copd--continue advair    rtc 3 months        Calleen Been III, DO

## 2019-10-17 NOTE — PROGRESS NOTES
Patient's INR completed today. 2.7 and 32.4 seconds. Called patient and left message on voicemail with results. Also, advised patient to schedule an appointment with Luisa x4 weeks.

## 2019-10-22 RX ORDER — FLUTICASONE PROPIONATE AND SALMETEROL 250; 50 UG/1; UG/1
1 POWDER RESPIRATORY (INHALATION) 2 TIMES DAILY
Qty: 3 INHALER | Refills: 3 | Status: SHIPPED | OUTPATIENT
Start: 2019-10-22 | End: 2020-06-10 | Stop reason: SDUPTHER

## 2019-10-22 NOTE — TELEPHONE ENCOUNTER
Saman, [address] [facility]   Phone Number: [phone]             Pt calling in because her Rx Inhaler was sent to the wrong pharmacy. Please resend to [address] [place] [address] at [phone]. Please save this for all future prescriptions.        Message received & copied from [facility] after closing on 10/21/19

## 2019-10-22 NOTE — TELEPHONE ENCOUNTER
----- Message from [facility] Sandeep sent at 10/21/2019  4:57 PM EDT -----  Regarding: Dr. Arlen Navarro (if not patient): Patricia Herman      Relationship of caller (if not patient): pt      Best contact number(s):[phone]      Name of medication and dosage if known: fluticasone-salmeterol (ADVAIR DISKUS) 250-50 mcg/dose       Is patient out of this medication (yes/no): yes      Pharmacy name: [facility] listed in chart? (yes/no): yes  Pharmacy phone number:      Details to clarify the request: need medication asap      [facility] Sandeep

## 2019-10-22 NOTE — TELEPHONE ENCOUNTER
Prescription approved by Dr. Rosalva Greenwood 10/22/19 for Advair to go to [facility]. Authorizing Provider: Evangelista Luna DO REGINA #:  [id number] NPI:  [id number]    Ordering User:  Evangelista Luna DO               Original Order:  fluticasone-salmeterol (ADVAIR DISKUS) 250-50 mcg/dose diskus inhaler [[id number]]      Pharmacy:  [facility] [address], [address] [place] [address] AT [address] [place] [address] REGINA #:  [id number]     Pharmacy Comments:  --          Fill quantity remaining:  -- Fill quantity used:  -- Next fill due: --       Outpatient Medication Detail      Disp Refills Start End    fluticasone propion-salmeterol (ADVAIR DISKUS) 250-50 mcg/dose diskus inhaler 3 Inhaler 3 10/22/2019     Sig - Route: Take 1 Puff by inhalation two (2) times a day.  - Inhalation    Sent to pharmacy as: fluticasone propion-salmeterol (ADVAIR DISKUS) 250-50 mcg/dose diskus inhaler    E-Prescribing Status: Receipt confirmed by pharmacy (10/22/2019 12:53 PM EDT)

## 2019-10-22 NOTE — TELEPHONE ENCOUNTER
PCP: Bonnie Wilson,     Last appt: 10/17/2019  Future Appointments   Date Time Provider Toro Jannie   1/20/2020  2:15 PM Brenton Wills       Requested Prescriptions     Pending Prescriptions Disp Refills    fluticasone propion-salmeterol (ADVAIR DISKUS) 250-50 mcg/dose diskus inhaler       Sig: Take 1 Puff by inhalation two (2) times a day.

## 2019-10-24 ENCOUNTER — TELEPHONE (OUTPATIENT)
Dept: INTERNAL MEDICINE CLINIC | Age: 79
End: 2019-10-24

## 2019-10-24 DIAGNOSIS — G43.009 MIGRAINE WITHOUT AURA AND WITHOUT STATUS MIGRAINOSUS, NOT INTRACTABLE: Primary | ICD-10-CM

## 2019-10-24 RX ORDER — BENZONATATE 200 MG/1
200 CAPSULE ORAL
Qty: 30 CAP | Refills: 0 | Status: SHIPPED | OUTPATIENT
Start: 2019-10-24 | End: 2019-11-03

## 2019-10-24 NOTE — TELEPHONE ENCOUNTER
Called, spoke to pt. Two identifiers confirmed. Contact information provided to pt for scheduling. Pt verbalized understanding of information discussed w/ no further questions at this time.

## 2019-10-24 NOTE — TELEPHONE ENCOUNTER
Lg, [address] [facility]             General Message/Vendor Calls     Caller's first and last name: Qasim Alex       Reason for call: set up a bone density test       Callback required yes/no and why: yes       Best contact number(s):[phone]       Details to clarify the request: Patient wants to know how to get a bone density test       Malina Lama     Message received & copied from [facility]

## 2019-10-24 NOTE — TELEPHONE ENCOUNTER
----- Message from Malina Lama sent at 10/24/2019  1:25 PM EDT -----  Regarding: Dr. Katia Aguero (if not patient):      Relationship of caller (if not patient):      Best contact number(s):[phone]      Name of medication and dosage if known: Prescription for coughing      Is patient out of this medication (yes/no): yes      Pharmacy name: [address] [place] [address] listed in chart? (yes/no): yes [phone]  Pharmacy phone number:      Details to clarify the request: Prescription for coughing       Malina Lama

## 2019-10-24 NOTE — TELEPHONE ENCOUNTER
----- Message from Thomes Halsted sent at 10/24/2019  1:27 PM EDT -----  Regarding: Dr. Allan Arm Message/Vendor Calls    Caller's first and last name: Gisell Snyder      Reason for call: set up a bone density test      Callback required yes/no and why: yes      Best contact number(s):[phone]      Details to clarify the request: Patient wants to know how to get a bone density test      Thomes Halsted

## 2019-10-29 ENCOUNTER — TELEPHONE (OUTPATIENT)
Dept: INTERNAL MEDICINE CLINIC | Age: 79
End: 2019-10-29

## 2019-10-29 NOTE — TELEPHONE ENCOUNTER
Patient states she needs a call this morning asa to get an Acute appt today about 2 pm when her spouse has Physical Therapy downstairs. Patient reports she has had a Fever all weekend of about 102.0 & also reports she's been sick ever since she got her Flu shot. Patient reports she is very sick & needs to be seen as Medication prescribed is not working. Patient was offered appt this morning for an Acute Visit with Dr. Alejandro Jurado declined as she states \"she does not like Dr. Saumya Castañeda does not want to be seen by her\". Patient also states the [facility] is not an option either     Patient states she can't leave her spouse due to he is Bed ridden & is Limited when she can come. Please nidhi to discuss.  Thank you

## 2019-10-29 NOTE — TELEPHONE ENCOUNTER
Called, spoke to pt. Two identifiers confirmed. Appointment scheduled for 10/31 @ 21 [phone] with Dr. Severiano Ness.   Pt verbalized understanding of information discussed w/ no further questions at this time.

## 2019-10-31 ENCOUNTER — OFFICE VISIT (OUTPATIENT)
Dept: INTERNAL MEDICINE CLINIC | Age: 79
End: 2019-10-31

## 2019-10-31 VITALS
TEMPERATURE: 98.1 F | OXYGEN SATURATION: 94 % | HEIGHT: 61 IN | BODY MASS INDEX: 27.68 KG/M2 | DIASTOLIC BLOOD PRESSURE: 56 MMHG | WEIGHT: 146.6 LBS | HEART RATE: 91 BPM | RESPIRATION RATE: 14 BRPM | SYSTOLIC BLOOD PRESSURE: 104 MMHG

## 2019-10-31 DIAGNOSIS — R35.0 URINE FREQUENCY: Primary | ICD-10-CM

## 2019-10-31 DIAGNOSIS — J01.10 ACUTE NON-RECURRENT FRONTAL SINUSITIS: ICD-10-CM

## 2019-10-31 DIAGNOSIS — R09.82 PND (POST-NASAL DRIP): ICD-10-CM

## 2019-10-31 DIAGNOSIS — R05.9 COUGH: ICD-10-CM

## 2019-10-31 DIAGNOSIS — K14.0 TONGUE ULCER: ICD-10-CM

## 2019-10-31 LAB
BILIRUB UR QL STRIP: NEGATIVE
GLUCOSE UR-MCNC: NEGATIVE MG/DL
KETONES P FAST UR STRIP-MCNC: NEGATIVE MG/DL
PH UR STRIP: 7 [PH] (ref 4.6–8)
PROT UR QL STRIP: NEGATIVE
SP GR UR STRIP: 1.01 (ref 1–1.03)
UA UROBILINOGEN AMB POC: NORMAL (ref 0.2–1)
URINALYSIS CLARITY POC: CLEAR
URINALYSIS COLOR POC: YELLOW
URINE BLOOD POC: NORMAL
URINE LEUKOCYTES POC: NORMAL
URINE NITRITES POC: NEGATIVE

## 2019-10-31 RX ORDER — CODEINE PHOSPHATE AND GUAIFENESIN 10; 100 MG/5ML; MG/5ML
5 SOLUTION ORAL
Qty: 75 ML | Refills: 0 | Status: SHIPPED | OUTPATIENT
Start: 2019-10-31 | End: 2019-11-10

## 2019-10-31 RX ORDER — CEFDINIR 300 MG/1
300 CAPSULE ORAL 2 TIMES DAILY
Qty: 14 CAP | Refills: 0 | Status: SHIPPED | OUTPATIENT
Start: 2019-10-31 | End: 2019-11-07

## 2019-10-31 NOTE — PATIENT INSTRUCTIONS
Office Policies    Phone calls/patient messages:            Please allow up to 24 hours for someone in the office to contact you about your call or message. Be mindful your provider may be out of the office or your message may require further review. We encourage you to use [employer] for your messages as this is a faster, more efficient way to communicate with our office                         Medication Refills:            Prescription medications require 48-72 business hours to process. We encourage you to use [employer] for your refills. For controlled medications: Please allow 72 business hours to process. Certain medications may require you to  a written prescription at our office. NO narcotic/controlled medications will be prescribed after 4pm Monday through Friday or on weekends              Form/Paperwork Completion:            Please note a $25 fee may incur for all paperwork for completed by our providers. We ask that you allow 7-10 business days. Pre-payment is due prior to picking up/faxing the completed form. You may also download your forms to [employer] to have your doctor print off. Sinusitis: Care Instructions  Your Care Instructions    Sinusitis is an infection of the lining of the sinus cavities in your head. Sinusitis often follows a cold. It causes pain and pressure in your head and face. In most cases, sinusitis gets better on its own in 1 to 2 weeks. But some mild symptoms may last for several weeks. Sometimes antibiotics are needed. Follow-up care is a key part of your treatment and safety. Be sure to make and go to all appointments, and call your doctor if you are having problems. It's also a good idea to know your test results and keep a list of the medicines you take. How can you care for yourself at home? · Take an over-the-counter pain medicine, such as acetaminophen (Tylenol), ibuprofen (Advil, Motrin), or naproxen (Aleve).  Read and follow all instructions on the label. · If the doctor prescribed antibiotics, take them as directed. Do not stop taking them just because you feel better. You need to take the full course of antibiotics. · Be careful when taking over-the-counter cold or flu medicines and Tylenol at the same time. Many of these medicines have acetaminophen, which is Tylenol. Read the labels to make sure that you are not taking more than the recommended dose. Too much acetaminophen (Tylenol) can be harmful. · Breathe warm, moist air from a steamy shower, a hot bath, or a sink filled with hot water. Avoid cold, dry air. Using a humidifier in your home may help. Follow the directions for cleaning the machine. · Use saline (saltwater) nasal washes to help keep your nasal passages open and wash out mucus and bacteria. You can buy saline nose drops at a grocery store or drugstore. Or you can make your own at home by adding 1 teaspoon of salt and 1 teaspoon of baking soda to 2 cups of distilled water. If you make your own, fill a bulb syringe with the solution, insert the tip into your nostril, and squeeze gently. Aleksandr Jordan your nose. · Put a hot, wet towel or a warm gel pack on your face 3 or 4 times a day for 5 to 10 minutes each time. · Try a decongestant nasal spray like oxymetazoline (Afrin). Do not use it for more than 3 days in a row. Using it for more than 3 days can make your congestion worse. When should you call for help? Call your doctor now or seek immediate medical care if:    · You have new or worse swelling or redness in your face or around your eyes.     · You have a new or higher fever.    Watch closely for changes in your health, and be sure to contact your doctor if:    · You have new or worse facial pain.     · The mucus from your nose becomes thicker (like pus) or has new blood in it.     · You are not getting better as expected. Where can you learn more? Go to http://lynn-xu.info/.   Enter U874 in the search box to learn more about \"Sinusitis: Care Instructions. \"  Current as of: October 21, 2018  Content Version: 12.2  © 4985-5264 [employer]. Care instructions adapted under license by [employer] (which disclaims liability or warranty for this information). If you have questions about a medical condition or this instruction, always ask your healthcare professional. [address] any warranty or liability for your use of this information. Continue with flonase.

## 2019-10-31 NOTE — PROGRESS NOTES
Reviewed record in preparation for visit and have obtained necessary documentation. Identified pt with two pt identifiers(name and ). No chief complaint on file. Health Maintenance Due   Topic Date Due    MICROALBUMIN Q1  1948    EYE EXAM RETINAL OR DILATED  1948    DTaP/Tdap/Td series (1 - Tdap) 1959    GLAUCOMA SCREENING Q2Y  2003    Bone Densitometry (Dexa) Screening  2003    Pneumococcal 65+ years (1 of 2 - PCV13) 2003       Ms. Honorio Chao has a reminder for a \"due or due soon\" health maintenance. I have asked that she discuss health maintenance topic(s) due with Her  primary care provider. Coordination of Care Questionnaire:  :     1) Have you been to an emergency room, urgent care clinic since your last visit? no   Hospitalized since your last visit? no             2) Have you seen or consulted any other health care providers outside of [address] [place] [address] since your last visit? no  (Include any pap smears or colon screenings in this section.)    3) Do you have an Advance Directive on file? no    4) Are you interested in receiving information on Advance Directives? NO    Patient is accompanied by self I have received verbal consent from [place] to discuss any/all medical information while they are present in the room.

## 2019-10-31 NOTE — PROGRESS NOTES
Bethany Rush is a 78 y.o. female who presents for evaluation of sick visit. Last seen by me oct 17, 2019. Got flu shot then. Been struggling with cough, pnd, sinusitis and new tongue ulcer. Had temps to 102, but that has resolved. Struggles with lots of allergies also. Excited Nats won last night. ROS:  Constitutional: negative for fevers, chills, anorexia and weight loss  Eyes:   negative for visual disturbance and irritation  ENT:   negative for tinnitus,ear pain,hoarseness. ++sore throat and nasal congestion.   Respiratory:  negative for  hemoptysis, dyspnea,wheezing.  ++cough from pnd  CV:   negative for chest pain, palpitations, lower extremity edema  GI:   negative for nausea, vomiting, diarrhea, abdominal pain,melena  Genitourinary: negative for frequency, dysuria and hematuria  Musculoskel: negative for myalgias, arthralgias, back pain, muscle weakness, joint pain  Neurological:  negative for headaches, dizziness, focal weakness, numbness  Psychiatric:     Negative for depression or anxiety      Past Medical History:   Diagnosis Date    Arthritis     Diabetes ([facility] [address]     History of DVT (deep vein thrombosis)     History of recurrent UTIs     Hypercholesterolemia     Irritable bowel syndrome (IBS)        Past Surgical History:   Procedure Laterality Date    HX HIP FRACTURE TX      HX OTHER SURGICAL      stint in heart       Family History   Problem Relation Age of Onset    Diabetes Mother     Stroke Mother        Social History     Socioeconomic History    Marital status:      Spouse name: Not on file    Number of children: Not on file    Years of education: Not on file    Highest education level: Not on file   Occupational History    Not on file   Social Needs    Financial resource strain: Not on file    Food insecurity:     Worry: Not on file     Inability: Not on file    Transportation needs:     Medical: Not on file     Non-medical: Not on file   Tobacco Use    Smoking status: Former Smoker    Smokeless tobacco: Never Used   Substance and Sexual Activity    Alcohol use: No     Frequency: Never    Drug use: No    Sexual activity: Not Currently   Lifestyle    Physical activity:     Days per week: Not on file     Minutes per session: Not on file    Stress: Not on file   Relationships    Social connections:     Talks on phone: Not on file     Gets together: Not on file     Attends Confucianist service: Not on file     Active member of club or organization: Not on file     Attends meetings of clubs or organizations: Not on file     Relationship status: Not on file    Intimate partner violence:     Fear of current or ex partner: Not on file     Emotionally abused: Not on file     Physically abused: Not on file     Forced sexual activity: Not on file   Other Topics Concern    Not on file   Social History Narrative    Not on file            Visit Vitals  /56 (BP 1 Location: Right arm, BP Patient Position: Sitting)   Pulse 91   Temp 98.1 °F (36.7 °C) (Oral)   Resp 14   Ht 5' 1\" (1.549 m)   Wt 146 lb 9.6 oz (66.5 kg)   SpO2 94%   BMI 27.70 kg/m²       Physical Examination:   General - Well appearing female  HEENT - PERRL, TM no erythema/opacification, normal nasal turbinates, no oropharyngeal erythema or exudate, MMM  Neck - supple, no bruits, no thyroidomegaly, no lymphadenopathy  Pulm - clear to auscultation bilaterally--good air flow  Cardio - RRR, normal S1 S2, no murmur  Abd - soft, nontender, no masses, no HSM  Extrem - no edema, +2 distal pulses  Neuro-  No focal deficits, CN intact     Assessment/Plan:    1. Sinusitis--rx for cefdinir, which she states she has tolerated before. Continue flonase  2. Cough from pnd--rx for eric a/c  3.   Tongue lesion/ulcer--rx for magic mouthwash    rtc for regular visit        Bernie Kaur III, DO

## 2019-11-01 NOTE — TELEPHONE ENCOUNTER
Pt states this is the name of the medication for the migraine headaches. Dilshad Novak Ichloralt Acetamine  She repeated this to me twice. Pt would like you to call this in to [facility] on file as soon as possible.

## 2019-11-04 ENCOUNTER — TELEPHONE (OUTPATIENT)
Dept: INTERNAL MEDICINE CLINIC | Age: 79
End: 2019-11-04

## 2019-11-04 NOTE — TELEPHONE ENCOUNTER
----- Message from Franklin Almonte sent at 11/1/2019  4:42 PM EDT -----  Regarding: Dr. Sofi Osborne with [id number], is stating that Rx \"Midrin\" is no longer being manufactured. Best contact number [phone].

## 2019-11-15 ENCOUNTER — TELEPHONE (OUTPATIENT)
Dept: INTERNAL MEDICINE CLINIC | Age: 79
End: 2019-11-15

## 2019-11-15 NOTE — TELEPHONE ENCOUNTER
Called, spoke to pt. Two identifiers confirmed. Pt requesting a refill on tylenol with codeine cough syrup. Notified pt after speaking with Dr. Hay Montez that he will not refill cough syrup. Pt verbalized understanding of information discussed w/ no further questions at this time.

## 2019-11-15 NOTE — TELEPHONE ENCOUNTER
Patient states she needs a call back today in reference to getting a refill on compounded cough medication she got on 10/31/19 & needs a refill. Please call to discuss.  Thank you

## 2019-11-21 ENCOUNTER — TELEPHONE (OUTPATIENT)
Dept: INTERNAL MEDICINE CLINIC | Age: 79
End: 2019-11-21

## 2019-11-21 NOTE — TELEPHONE ENCOUNTER
Called, spoke to pt. Two identifiers confirmed. Contact information provided to pt for Dr. Brown Masters. Pt verbalized understanding of information discussed w/ no further questions at this time.

## 2019-11-21 NOTE — TELEPHONE ENCOUNTER
Patient states she needs a call back to get an Acute appt asap for Painful Hemorrhoids that she is having some bleeding from & also her Sciatica pain that she needs to get a Cortisone shot for. Please call to discuss.  Thank you

## 2019-11-21 NOTE — TELEPHONE ENCOUNTER
Patient states she needs a nidhi back to get the name of another Orthopedic doctor as her previous referral has retired. Patient states a detailed message can be left on her voice mail & asked about [facility] [employer] having an Orthopedic doctor. Please call.  Thank you

## 2019-11-21 NOTE — TELEPHONE ENCOUNTER
Called, spoke to pt. Two identifiers confirmed. Contact information provided to pt for CRS. Pt verbalized understanding of information discussed w/ no further questions at this time.

## 2019-11-27 RX ORDER — ATORVASTATIN CALCIUM 20 MG/1
20 TABLET, FILM COATED ORAL DAILY
Qty: 90 TAB | Refills: 3 | Status: SHIPPED | OUTPATIENT
Start: 2019-11-27 | End: 2021-07-13

## 2019-11-27 RX ORDER — NYSTATIN 100000 [USP'U]/ML
SUSPENSION ORAL
Qty: 60 ML | Refills: 0 | Status: SHIPPED | OUTPATIENT
Start: 2019-11-27 | End: 2020-01-20

## 2019-11-27 RX ORDER — METOPROLOL SUCCINATE 25 MG/1
TABLET, EXTENDED RELEASE ORAL
Qty: 45 TAB | Refills: 3 | Status: SHIPPED | OUTPATIENT
Start: 2019-11-27 | End: 2020-01-02 | Stop reason: SDUPTHER

## 2019-11-27 NOTE — TELEPHONE ENCOUNTER
----- Message from Justin Ovalle sent at 11/27/2019 11:49 AM EST -----  Regarding: Souleymane/ refill  Contact: [phone]  Caller (if not patient): pt  Relationship of caller (if not patient): self  Best contact number(s): [phone]  Name of medication and dosage if known: Lipitor 20mg  Is patient out of this medication (yes/no): Yes  Pharmacy name: [facility] listed in chart? (yes/no):  yes  Pharmacy phone [phone]  Date of last visit: 10/31/2019  Details to clarify the request: Pt noticed she was out of her medicine when she went to take it.  Pt has other prescriptions being refilled and would like to pick all prescriptions up at the same time

## 2019-12-02 ENCOUNTER — OFFICE VISIT (OUTPATIENT)
Dept: INTERNAL MEDICINE CLINIC | Age: 79
End: 2019-12-02

## 2019-12-02 DIAGNOSIS — Z86.718 HISTORY OF DVT OF LOWER EXTREMITY: Primary | ICD-10-CM

## 2019-12-02 LAB
INR BLD: 2 (ref 1–1.5)
PT POC: 24.4 SECONDS (ref 9.1–12)
VALID INTERNAL CONTROL?: YES

## 2019-12-02 NOTE — PATIENT INSTRUCTIONS
Today your INR was 2.0. Your goal INR is  2.0-3.0 . You have a 1 mg and 2 mg tablet of Coumadin (warfarin). Take Coumadin (warfarin) as follows:    Continue with warfarin 2 mg daily. Come back in 6 week(s) for your next finger stick/INR blood test.      Avoid any over the counter items containing aspirin or ibuprofen, and avoid great swings in general diet. Avoid alcohol consumption. Please notify the INR nurse if you are started on any new medication including over the counter or herbal supplements. Also, please notify your INR nurse if any of your other prescription or over the counter medications have been discontinued. Call [facility] at [phone] if you have any signs of abnormal bleeding/blood clot.  ------------------------------------------------------------------------------------------------------------------  Taking Warfarin Safely: Care Instructions    Your Care Instructions  Warfarin is a medicine that you take to prevent blood clots. It is often called a blood thinner. Doctors give warfarin (such as Coumadin) to reduce the risk of blood clots. You may be at risk for blood clots if you have atrial fibrillation or deep vein thrombosis. Some other health problems may also put you at risk. Warfarin slows the amount of time it takes for your blood to clot. It can cause bleeding problems. Even if you've been taking warfarin for a while, it's important to know how to take it safely. Foods and other medicines can affect the way warfarin works. Some can make warfarin work too well. This can cause bleeding problems. And some can make it work poorly, so that it does not prevent blood clots very well. You will need regular blood tests to check how long it takes for your blood to form a clot. This test is called a PT or prothrombin time test. The result of the test is called an INR level.  Depending on the test results, your doctor or anticoagulation clinic may adjust your dose of warfarin. Follow-up care is a key part of your treatment and safety. Be sure to make and go to all appointments, and call your doctor if you are having problems. It's also a good idea to know your test results and keep a list of the medicines you take. How can you care for yourself at home? Take warfarin safely  · Take your warfarin at the same time each day. · If you miss a dose of warfarin, don't take an extra dose to make up for it. Your doctor can tell you exactly what to do so you don't take too much or too little. · Wear medical alert jewelry that lets others know that you take warfarin. You can buy this at most drugstores. · Don't take warfarin if you are pregnant or planning to get pregnant. Talk to your doctor about how you can prevent getting pregnant while you are taking it. · Don't change your dose or stop taking warfarin unless your doctor tells you to. Effects of medicines and food on warfarin  · Don't start or stop taking any medicines, vitamins, or natural remedies unless you first talk to your doctor. Many medicines can affect how warfarin works. These include aspirin and other pain relievers, over-the-counter medicines, multivitamins, dietary supplements, and herbal products. · Tell all of your doctors and pharmacists that you take warfarin. Some prescription medicines can affect how warfarin works. · Keep the amount of vitamin K in your diet about the same from day to day. Do not suddenly eat a lot more or a lot less food that is rich in vitamin K than you usually do. Vitamin K affects how warfarin works and how your blood clots. Talk with your doctor before making big changes in your diet. Vitamin K is in many foods, such as:  ¨ Leafy greens, such as kale, cabbage, spinach, Swiss chard, and lettuce. ¨ Canola and soybean oils. ¨ Green vegetables, such as asparagus, broccoli, and [place] sprouts. ¨ Vegetable drinks, green tea leaves, and some dietary supplement drinks.   · Avoid cranberry juice and other cranberry products. They can increase the effects of warfarin. · Limit your use of alcohol. Avoid bleeding by preventing falls and injuries  · Wear slippers or shoes with nonskid soles. · Remove throw rugs and clutter. · Rearrange furniture and electrical cords to keep them out of walking paths. · Keep stairways, porches, and outside walkways well lit. Use night-lights in hallways and bathrooms. · Be extra careful when you work with sharp tools or knives. When should you call for help? Call 911 anytime you think you may need emergency care. For example, call if:  · You have a sudden, severe headache that is different from past headaches. Call your doctor now or seek immediate medical care if:  · You have any abnormal bleeding, such as:  ¨ Nosebleeds. ¨ Vaginal bleeding that is different (heavier, more frequent, at a different time of the month) than what you are used to. ¨ Bloody or black stools, or rectal bleeding. ¨ Bloody or pink urine. Watch closely for changes in your health, and be sure to contact your doctor if you have any problems. Where can you learn more? Go to http://lynn-xu.info/. Enter F847 in the search box to learn more about \"Taking Warfarin Safely: Care Instructions. \"  Current as of: January 27, 2016  Content Version: 11.1  © 0486-2310 [employer]. Care instructions adapted under license by [employer] (which disclaims liability or warranty for this information). If you have questions about a medical condition or this instruction, always ask your healthcare professional. [place] [address] any warranty or liability for your use of this information.

## 2019-12-02 NOTE — PROGRESS NOTES
Pharmacy Progress Note  - Anticoagulation Management    S/O: Ms. Brenda Dunlap is a 78 y.o. female , with a PMH of OAB, IBS, LE DVT, anxiety, insomonia, Renee's esophagus, Arthritis, Migraines, hyperlipidemia, who was seen today for anticoagulation management for the diagnosis of LLE Deep Vein Thrombosis.  Pt ambulates w/ a cane. Last seen by me in May 2019.       · Warfarin start date: Around 2008   · INR Goal:  2.0-3.0    · Current warfarin regimen:  2 mg daily                      · Warfarin tablet strength:   1 mg, 2 mg   · Duration of therapy: Indefinite    Today's pertinent positives includes:  No significant changes since last visit    Results for orders placed or performed in visit on 12/02/19   AMB POC PT/INR   Result Value Ref Range    VALID INTERNAL CONTROL POC Yes     Prothrombin time (POC) 24.4 (A) 9.1 - 12 seconds    INR POC 2.0 (A) 1 - 1.5     · Adherence:   · Able to recall regimen? YES  · Miss/extra dose? NO  · Need refill? NO    Upcoming procedure(s):  NO      Past Medical History:   Diagnosis Date    Arthritis     Diabetes ([facility] [address]     History of DVT (deep vein thrombosis)     History of recurrent UTIs     Hypercholesterolemia     Irritable bowel syndrome (IBS)        Allergies   Allergen Reactions    Iodinated Contrast Media Other (comments)     Passes out    Pcn [Penicillins] Shortness of Breath    Sulfa (Sulfonamide Antibiotics) Shortness of Breath       Current Outpatient Medications   Medication Sig    nystatin (MYCOSTATIN) 100,000 unit/mL suspension SWISH AND SWALLOW 5 ML BY MOUTH FOR 30 SECONDS 4 TIMES A DAY AS NEEDED FOR MOUTH PAIN    metoprolol succinate (TOPROL-XL) 25 mg XL tablet TAKE 1/2 TABLET BY MOUTH ONCE DAILY    atorvastatin (LIPITOR) 20 mg tablet Take 1 Tab by mouth daily.  levothyroxine (SYNTHROID) 25 mcg tablet Take 2 Tabs by mouth Daily (before breakfast).     akizthl-pyoijodtb-yglxsvrmcqac (MIDRIN) -325 mg capsule Take 1 Cap by mouth four (4) times daily as needed for Migraine. Max Daily Amount: 4 Caps.  aluminum-magnesium hydroxide 200-200 mg/5 mL 60 mL, diphenhydrAMINE 12.5 mg/5 mL 25 mg, lidocaine 2 % 15 mL solution 5 ml swish and spit 4x daily.  fluticasone propion-salmeterol (ADVAIR DISKUS) 250-50 mcg/dose diskus inhaler Take 1 Puff by inhalation two (2) times a day.  clorazepate (TRANXENE) 3.75 mg tablet Take 1 Tab by mouth every six (6) hours as needed for Anxiety. Max Daily Amount: 15 mg.    diphenoxylate-atropine (LOMOTIL) 2.5-0.025 mg per tablet Take 1 Tab by mouth four (4) times daily as needed for Diarrhea. Max Daily Amount: 4 Tabs. Indications: diarrhea, IBS    verapamil (CALAN) 40 mg tablet Take 1 Tab by mouth two (2) times a day.  mirabegron ER (MYRBETRIQ) 25 mg ER tablet Take 1 Tab by mouth daily.  esomeprazole (NEXIUM) 20 mg capsule TAKE 1 CAPSULE BY MOUTH DAILY    Bifidobacterium Infantis (ALIGN) 4 mg cap Take 1 Cap by mouth daily as needed for Diarrhea. Indications: ALIGN OTC    metFORMIN ER (GLUCOPHAGE XR) 500 mg tablet TAKE 1 TABLET BY MOUTH DAILY WITH DINNER    warfarin (COUMADIN) 2 mg tablet TAKE 1 TABLET BY MOUTH DAILY    escitalopram oxalate (LEXAPRO) 20 mg tablet Take 1 Tab by mouth daily.  dicyclomine (BENTYL) 20 mg tablet Take 1 Tab by mouth two (2) times a day.  oxybutynin chloride XL (DITROPAN XL) 10 mg CR tablet Take 1 Tab by mouth daily.  ezetimibe (ZETIA) 10 mg tablet Take 1 Tab by mouth daily.  gabapentin (NEURONTIN) 100 mg capsule Take 1 Cap by mouth nightly.     glucose blood VI test strips (ONETOUCH VERIO) strip Use to check blood sugar twice weekly    lancets (ONETOUCH ULTRASOFT LANCETS) misc Use to check blood sugar twice weekly    amitriptyline (ELAVIL) 10 mg tablet TAKE 1 TABLET BY MOUTH EVERY NIGHT AT BEDTIME    levETIRAcetam (KEPPRA) 500 mg tablet take 1 tablet every morning and 1.5 tablet every evening for migraines    ondansetron hcl (ZOFRAN) 4 mg tablet take 1 tablet by mouth every 8 hours if needed for nausea    traZODone (DESYREL) 50 mg tablet Take 1 Tab by mouth nightly as needed for Sleep.  colestipol (COLESTID) 1 gram tablet Take 1 g by mouth daily.  fluticasone (FLONASE) 50 mcg/actuation nasal spray 2 Sprays by Both Nostrils route daily as needed for Rhinitis.  cholecalciferol (VITAMIN D3) 1,000 unit tablet Take 1,000 Units by mouth daily.  Biotin 2,500 mcg cap Take 1 Cap by mouth daily.  celecoxib (CELEBREX) 100 mg capsule Take 100 mg by mouth daily as needed. Take with food for arthritic pain     calcium carbonate (TUMS) 200 mg calcium (500 mg) chew Take 1 Tab by mouth daily as needed.  warfarin (COUMADIN) 1 mg tablet Take 1 Tab by mouth See Admin Instructions. Take 2 mg daily. Has both 1 mg and 2 mg dosage strength     No current facility-administered medications for this visit.         Wt Readings from Last 3 Encounters:   10/31/19 146 lb 9.6 oz (66.5 kg)   10/17/19 146 lb 9.6 oz (66.5 kg)   03/27/19 138 lb (62.6 kg)       BP Readings from Last 3 Encounters:   10/31/19 104/56   10/17/19 123/72   04/11/19 105/69       Pulse Readings from Last 3 Encounters:   10/31/19 91   10/17/19 99   04/11/19 83       Lab Results   Component Value Date/Time    Sodium 141 01/17/2019 12:55 PM    Potassium 4.1 01/17/2019 12:55 PM    Chloride 102 01/17/2019 12:55 PM    CO2 24 01/17/2019 12:55 PM    Glucose 105 (H) 01/17/2019 12:55 PM    BUN 12 01/17/2019 12:55 PM    Creatinine 0.75 01/17/2019 12:55 PM    BUN/Creatinine ratio 16 01/17/2019 12:55 PM    GFR est AA 87 01/17/2019 12:55 PM    GFR est non-AA 76 01/17/2019 12:55 PM    Calcium 9.3 01/17/2019 12:55 PM       Lab Results   Component Value Date/Time    WBC 8.6 01/17/2019 12:55 PM    HGB 11.0 (L) 01/17/2019 12:55 PM    HCT 38.0 01/17/2019 12:55 PM    PLATELET 273 09/70/3169 12:55 PM    MCV 75 (L) 01/17/2019 12:55 PM       Lab Results   Component Value Date/Time    Protein, total 7.1 01/17/2019 12:55 PM    Albumin 4.2 01/17/2019 12:55 PM       CrCl cannot be calculated (Patient's most recent lab result is older than the maximum 180 days allowed.). INR History:   (normal INR range 0.8-1.2)     Date   INR   PT   Dose/Comments  12/2/19 2.0  24.4 2 mg daily  10/17/19 2.7  32.4 2 mg daily - reestablished with this practice    05/09/19          2.1                   25.5     2 mg daily  04/25/19          2.2                   26.6     Hold x1, then 2 mg daily  04/11/19          3.6                   42. 8     2 mg daily   02/26/19          2.6                   31.5     2 mg daily  1/28/19            2.0                   24.2     2 mg daily                    1/17/19            2.8                   34.1     2 mg daily     A/P:       Anticoagulation:  Considering Ms. Dunlap's past history, todays findings, and per Anticoagulation Collaborative Practice Agreement/Protocol:    1. POC INR (2.0) is therapeutic for INR goal today. 2.  Continue warfarin 2 mg daily. Patient was instructed to schedule an appointment in 6 week(s) prior to leaving the clinic. Medication reconciliation was completed during the visit. There are no discontinued medications. A full discussion of the nature of anticoagulants has been carried out. A full discussion of the need for frequent and regular monitoring, precise dosage adjustment and compliance was stressed. Side effects of potential bleeding were discussed and Ms. Bryant Capellan was instructed to call [phone] if there are any signs of abnormal bleeding. Ms. Bryant Capellan was instructed to avoid any OTC items containing aspirin or ibuprofen and prior to starting any new OTC products to consult with her physician or pharmacist to ensure no drug interactions are present. Ms. Bryant Capellan was instructed to avoid any major changes in her general diet and to avoid alcohol consumption.     Ms. Bryant Capellan was provided information in the AVS that includes topics on understanding coumadin therapy, drug interaction considerations, vitamin K and coumadin use, interactions with foods and supplements containing vitamin K, and the use of herbal products. Ms. Yris Parikh verbalized her understanding of all instructions and will call the office with any questions, concerns, or signs of abnormal bleeding or blood clot. Notifications of recommendations will be sent to Dr. Juan Antonio Rodriguez DO for review.     Thank you,  Zulema Segundo, PharmD, BCACP, CDE

## 2019-12-04 ENCOUNTER — TELEPHONE (OUTPATIENT)
Dept: INTERNAL MEDICINE CLINIC | Age: 79
End: 2019-12-04

## 2019-12-04 NOTE — TELEPHONE ENCOUNTER
Caller's first and last name:     Kalyn Shock     Reason for call: Pt is returning a call from the practice; reason unknown.        Callback required yes/no and why: Yes       Best contact number(s):[phone]       Details to clarify the request: N/A   [place]

## 2019-12-11 NOTE — TELEPHONE ENCOUNTER
[facility]ita [facility] [employer] Office Pool   Phone Number: [phone]             Caller (if not patient): n/a   Relationship of caller (if not patient): n/a   Best contact number(s): [phone]   Name of medication and dosage if known: \"ondansetron\" 4mg   Is patient out of this medication (yes/no): yes   Pharmacy name: [address] [place] [address] listed in chart? (yes/no): yes   Pharmacy phone number: n/a   Date of last visit:   Details to clarify the request: Patient has a cough but thinks prescription \"atorvastatin\"  20 mg she started back taking medication and it may be  causing patient to throw up so she would like a nausea prescription, patient says she was given cough medication previously and would like the same medication.      Copy/Paste  ENVERA

## 2019-12-12 RX ORDER — BENZONATATE 100 MG/1
100 CAPSULE ORAL
Qty: 21 CAP | Refills: 0 | Status: SHIPPED | OUTPATIENT
Start: 2019-12-12 | End: 2019-12-19

## 2019-12-12 RX ORDER — ONDANSETRON 4 MG/1
4 TABLET, ORALLY DISINTEGRATING ORAL
Qty: 30 TAB | Refills: 2 | Status: SHIPPED | OUTPATIENT
Start: 2019-12-12 | End: 2020-01-16 | Stop reason: SDUPTHER

## 2019-12-12 NOTE — TELEPHONE ENCOUNTER
----- Message from Yolie Grimes sent at 12/12/2019 11:08 AM EST -----  Regarding: Dr. Beto Mendez Refill  Contact: [phone]  Pt calling to request a refill on Rx Benzonatate 200 mg for cough and Rx Ondansetron 4 mg for nausea. Pt states when she cough its causing her to have the nausea and phlegm. Pt is requesting this to be sent [facility] at phone [phone].

## 2019-12-18 ENCOUNTER — TELEPHONE (OUTPATIENT)
Dept: INTERNAL MEDICINE CLINIC | Age: 79
End: 2019-12-18

## 2019-12-18 NOTE — TELEPHONE ENCOUNTER
[facility], [address] [facility] Office [place]             General Message/Vendor Calls     Caller's first and last name:       Reason for call: Physician demographics verification       Callback required yes/no and why: Yes       Best contact number(s): [phone]       Details to clarify the request:       Catalino Dos Santos     Copy/Paste  ENVERA

## 2019-12-18 NOTE — TELEPHONE ENCOUNTER
Pt states that she was referred to GI by pcp, pt was given Dr. Slime Dominguez contact info. Pt also states that she would like an order to have labs done to see if she is anemic. Pt asked for a call when this is done.

## 2019-12-20 ENCOUNTER — TELEPHONE (OUTPATIENT)
Dept: INTERNAL MEDICINE CLINIC | Age: 79
End: 2019-12-20

## 2019-12-20 NOTE — TELEPHONE ENCOUNTER
[facility], [address] [facility] Office [place]             General Message/Vendor Calls     Caller's first and last name: [employer]       Reason for call: Medication not available/midrin       Callback required yes/no and why: Yes       Best contact number(s): [phone]       Details to clarify the request:       Erlinda Bonilla

## 2019-12-20 NOTE — TELEPHONE ENCOUNTER
Spoke with patient. Per patient, need labs done. Notified patient DO will most likely do labs next month. Patient has an appointment next month. Patient also requesting medication  (midrin) for h/a. Writer notified patient, Kely Delgado was sent to pharmacy last month which patient reports never picking up. Patient to call pharmacy to see if medication is available. Patient to call back with any questions or concerns.

## 2019-12-30 RX ORDER — BUTALBITAL, ACETAMINOPHEN AND CAFFEINE 50; 325; 40 MG/1; MG/1; MG/1
1 TABLET ORAL
Qty: 30 TAB | Refills: 1 | Status: SHIPPED | OUTPATIENT
Start: 2019-12-30 | End: 2021-12-03

## 2019-12-30 NOTE — TELEPHONE ENCOUNTER
[facility] states the midrin has been discontinued and there needs to be something else called in for pt.

## 2020-01-02 NOTE — TELEPHONE ENCOUNTER
[facility] [address] Office Pool             Caller (if not patient):   Relationship of caller (if not patient):   Best contact number(s): [phone]   Name of medication and dosage if known: metoporol 25 mg 1x day   Is patient out of this medication (yes/no): yes   Pharmacy name: [address] [place] [address] listed in chart? (yes/no): yes   Pharmacy phone number: [phone]   Date of last visit: 12/2/19   Details to clarify the request: does not have any refills left.      Copy/Paste  ENVERA

## 2020-01-03 RX ORDER — METOPROLOL SUCCINATE 25 MG/1
TABLET, EXTENDED RELEASE ORAL
Qty: 45 TAB | Refills: 3 | Status: SHIPPED | OUTPATIENT
Start: 2020-01-03 | End: 2021-07-30

## 2020-01-15 NOTE — PATIENT INSTRUCTIONS
Today your INR was 2.5. Your goal INR is  2.0-3.0 . You have a  2 mg and 1 mg tablet of Coumadin (warfarin). Take Coumadin (warfarin) as follows:    Continue with warfarin 2 mg daily. Come back in  5 week(s) for your next finger stick/INR blood test.        Avoid any over the counter items containing aspirin or ibuprofen, and avoid great swings in general diet. Avoid alcohol consumption. Please notify the INR nurse if you are started on any new medication including over the counter or herbal supplements. Also, please notify your INR nurse if any of your other prescription or over the counter medications have been discontinued. Call [facility] at [phone] if you have any signs of abnormal bleeding/blood clot.  ------------------------------------------------------------------------------------------------------------------  Taking Warfarin Safely: Care Instructions    Your Care Instructions  Warfarin is a medicine that you take to prevent blood clots. It is often called a blood thinner. Doctors give warfarin (such as Coumadin) to reduce the risk of blood clots. You may be at risk for blood clots if you have atrial fibrillation or deep vein thrombosis. Some other health problems may also put you at risk. Warfarin slows the amount of time it takes for your blood to clot. It can cause bleeding problems. Even if you've been taking warfarin for a while, it's important to know how to take it safely. Foods and other medicines can affect the way warfarin works. Some can make warfarin work too well. This can cause bleeding problems. And some can make it work poorly, so that it does not prevent blood clots very well. You will need regular blood tests to check how long it takes for your blood to form a clot. This test is called a PT or prothrombin time test. The result of the test is called an INR level.  Depending on the test results, your doctor or anticoagulation clinic may adjust your dose of warfarin. Follow-up care is a key part of your treatment and safety. Be sure to make and go to all appointments, and call your doctor if you are having problems. It's also a good idea to know your test results and keep a list of the medicines you take. How can you care for yourself at home? Take warfarin safely  · Take your warfarin at the same time each day. · If you miss a dose of warfarin, don't take an extra dose to make up for it. Your doctor can tell you exactly what to do so you don't take too much or too little. · Wear medical alert jewelry that lets others know that you take warfarin. You can buy this at most drugstores. · Don't take warfarin if you are pregnant or planning to get pregnant. Talk to your doctor about how you can prevent getting pregnant while you are taking it. · Don't change your dose or stop taking warfarin unless your doctor tells you to. Effects of medicines and food on warfarin  · Don't start or stop taking any medicines, vitamins, or natural remedies unless you first talk to your doctor. Many medicines can affect how warfarin works. These include aspirin and other pain relievers, over-the-counter medicines, multivitamins, dietary supplements, and herbal products. · Tell all of your doctors and pharmacists that you take warfarin. Some prescription medicines can affect how warfarin works. · Keep the amount of vitamin K in your diet about the same from day to day. Do not suddenly eat a lot more or a lot less food that is rich in vitamin K than you usually do. Vitamin K affects how warfarin works and how your blood clots. Talk with your doctor before making big changes in your diet. Vitamin K is in many foods, such as:  ¨ Leafy greens, such as kale, cabbage, spinach, Swiss chard, and lettuce. ¨ Canola and soybean oils. ¨ Green vegetables, such as asparagus, broccoli, and [place] sprouts.   ¨ Vegetable drinks, green tea leaves, and some dietary supplement drinks. · Avoid cranberry juice and other cranberry products. They can increase the effects of warfarin. · Limit your use of alcohol. Avoid bleeding by preventing falls and injuries  · Wear slippers or shoes with nonskid soles. · Remove throw rugs and clutter. · Rearrange furniture and electrical cords to keep them out of walking paths. · Keep stairways, porches, and outside walkways well lit. Use night-lights in hallways and bathrooms. · Be extra careful when you work with sharp tools or knives. When should you call for help? Call 911 anytime you think you may need emergency care. For example, call if:  · You have a sudden, severe headache that is different from past headaches. Call your doctor now or seek immediate medical care if:  · You have any abnormal bleeding, such as:  ¨ Nosebleeds. ¨ Vaginal bleeding that is different (heavier, more frequent, at a different time of the month) than what you are used to. ¨ Bloody or black stools, or rectal bleeding. ¨ Bloody or pink urine. Watch closely for changes in your health, and be sure to contact your doctor if you have any problems. Where can you learn more? Go to http://lynn-xu.info/. Enter X497 in the search box to learn more about \"Taking Warfarin Safely: Care Instructions. \"  Current as of: January 27, 2016  Content Version: 11.1  © 1919-5131 [employer]. Care instructions adapted under license by [employer] (which disclaims liability or warranty for this information). If you have questions about a medical condition or this instruction, always ask your healthcare professional. [place] [address] any warranty or liability for your use of this information.

## 2020-01-15 NOTE — PROGRESS NOTES
Pharmacy Progress Note  - Anticoagulation Management    S/O: Ms. Brenda Dunlap is a 78 y.o. female , with a PMH of OAB, IBS, LE DVT, anxiety, insomonia, Renee's esophagus, Arthritis, Migraines, hyperlipidemia, who was seen today for anticoagulation management for the diagnosis of LLE Deep Vein Thrombosis.     Pt ambulates w/ a cane.       · Warfarin start date:  Around 2008   · INR Goal:  2.0-3.0    · Current warfarin regimen:  2 mg daily                      · Warfarin tablet strength:   1 mg, 2 mg   · Duration of therapy: Indefinite      Today's pertinent positives includes:  Denies bleeding/bruising/falls/changes to nutrition  Requests for zofran ODT 4 mg formulation to be changed to regular PO version - \"ODT gets stuck in my dentures\" - does not help w/ nausea. Results for orders placed or performed in visit on 01/16/20   AMB POC PT/INR   Result Value Ref Range    VALID INTERNAL CONTROL POC Yes     Prothrombin time (POC) 29.9 (A) 9.1 - 12 seconds    INR POC 2.5 (A) 1 - 1.5       · Adherence:   · Able to recall regimen? YES  · Miss/extra dose? NO  · Need refill? NO    Upcoming procedure(s):  NO      Past Medical History:   Diagnosis Date    Arthritis     Diabetes ([address]     History of DVT (deep vein thrombosis)     History of recurrent UTIs     Hypercholesterolemia     Irritable bowel syndrome (IBS)        Allergies   Allergen Reactions    Iodinated Contrast Media Other (comments)     Passes out    Pcn [Penicillins] Shortness of Breath    Sulfa (Sulfonamide Antibiotics) Shortness of Breath       Current Outpatient Medications   Medication Sig    metoprolol succinate (TOPROL-XL) 25 mg XL tablet TAKE 1/2 TABLET BY MOUTH ONCE DAILY    butalbital-acetaminophen-caffeine (FIORICET, ESGIC) -40 mg per tablet Take 1 Tab by mouth every twelve (12) hours as needed for Headache.  ondansetron (ZOFRAN ODT) 4 mg disintegrating tablet Take 1 Tab by mouth every eight (8) hours as needed for Nausea.     nystatin (MYCOSTATIN) 100,000 unit/mL suspension SWISH AND SWALLOW 5 ML BY MOUTH FOR 30 SECONDS 4 TIMES A DAY AS NEEDED FOR MOUTH PAIN    atorvastatin (LIPITOR) 20 mg tablet Take 1 Tab by mouth daily.  levothyroxine (SYNTHROID) 25 mcg tablet Take 2 Tabs by mouth Daily (before breakfast).  hrgoaje-tkzahnqqw-ypvajmffstmu (MIDRIN) -325 mg capsule Take 1 Cap by mouth four (4) times daily as needed for Migraine. Max Daily Amount: 4 Caps.  aluminum-magnesium hydroxide 200-200 mg/5 mL 60 mL, diphenhydrAMINE 12.5 mg/5 mL 25 mg, lidocaine 2 % 15 mL solution 5 ml swish and spit 4x daily.  fluticasone propion-salmeterol (ADVAIR DISKUS) 250-50 mcg/dose diskus inhaler Take 1 Puff by inhalation two (2) times a day.  clorazepate (TRANXENE) 3.75 mg tablet Take 1 Tab by mouth every six (6) hours as needed for Anxiety. Max Daily Amount: 15 mg.    diphenoxylate-atropine (LOMOTIL) 2.5-0.025 mg per tablet Take 1 Tab by mouth four (4) times daily as needed for Diarrhea. Max Daily Amount: 4 Tabs. Indications: diarrhea, IBS    verapamil (CALAN) 40 mg tablet Take 1 Tab by mouth two (2) times a day.  mirabegron ER (MYRBETRIQ) 25 mg ER tablet Take 1 Tab by mouth daily.  esomeprazole (NEXIUM) 20 mg capsule TAKE 1 CAPSULE BY MOUTH DAILY    Bifidobacterium Infantis (ALIGN) 4 mg cap Take 1 Cap by mouth daily as needed for Diarrhea. Indications: ALIGN OTC    metFORMIN ER (GLUCOPHAGE XR) 500 mg tablet TAKE 1 TABLET BY MOUTH DAILY WITH DINNER    warfarin (COUMADIN) 2 mg tablet TAKE 1 TABLET BY MOUTH DAILY    escitalopram oxalate (LEXAPRO) 20 mg tablet Take 1 Tab by mouth daily.  dicyclomine (BENTYL) 20 mg tablet Take 1 Tab by mouth two (2) times a day.  oxybutynin chloride XL (DITROPAN XL) 10 mg CR tablet Take 1 Tab by mouth daily.  ezetimibe (ZETIA) 10 mg tablet Take 1 Tab by mouth daily.  gabapentin (NEURONTIN) 100 mg capsule Take 1 Cap by mouth nightly.     glucose blood VI test strips [facility] VERIO) strip Use to check blood sugar twice weekly    lancets (ONETOUCH ULTRASOFT LANCETS) misc Use to check blood sugar twice weekly    amitriptyline (ELAVIL) 10 mg tablet TAKE 1 TABLET BY MOUTH EVERY NIGHT AT BEDTIME    levETIRAcetam (KEPPRA) 500 mg tablet take 1 tablet every morning and 1.5 tablet every evening for migraines    ondansetron hcl (ZOFRAN) 4 mg tablet take 1 tablet by mouth every 8 hours if needed for nausea    traZODone (DESYREL) 50 mg tablet Take 1 Tab by mouth nightly as needed for Sleep.  colestipol (COLESTID) 1 gram tablet Take 1 g by mouth daily.  fluticasone (FLONASE) 50 mcg/actuation nasal spray 2 Sprays by Both Nostrils route daily as needed for Rhinitis.  cholecalciferol (VITAMIN D3) 1,000 unit tablet Take 1,000 Units by mouth daily.  Biotin 2,500 mcg cap Take 1 Cap by mouth daily.  celecoxib (CELEBREX) 100 mg capsule Take 100 mg by mouth daily as needed. Take with food for arthritic pain     calcium carbonate (TUMS) 200 mg calcium (500 mg) chew Take 1 Tab by mouth daily as needed.  warfarin (COUMADIN) 1 mg tablet Take 1 Tab by mouth See Admin Instructions. Take 2 mg daily. Has both 1 mg and 2 mg dosage strength     No current facility-administered medications for this visit.         Wt Readings from Last 3 Encounters:   01/16/20 150 lb (68 kg)   10/31/19 146 lb 9.6 oz (66.5 kg)   10/17/19 146 lb 9.6 oz (66.5 kg)       BP Readings from Last 3 Encounters:   01/16/20 118/74   10/31/19 104/56   10/17/19 123/72       Pulse Readings from Last 3 Encounters:   01/16/20 85   10/31/19 91   10/17/19 99       Lab Results   Component Value Date/Time    Sodium 141 01/17/2019 12:55 PM    Potassium 4.1 01/17/2019 12:55 PM    Chloride 102 01/17/2019 12:55 PM    CO2 24 01/17/2019 12:55 PM    Glucose 105 (H) 01/17/2019 12:55 PM    BUN 12 01/17/2019 12:55 PM    Creatinine 0.75 01/17/2019 12:55 PM    BUN/Creatinine ratio 16 01/17/2019 12:55 PM    GFR est AA 87 01/17/2019 12:55 PM GFR est non-AA 76 01/17/2019 12:55 PM    Calcium 9.3 01/17/2019 12:55 PM       Lab Results   Component Value Date/Time    WBC 8.6 01/17/2019 12:55 PM    HGB 11.0 (L) 01/17/2019 12:55 PM    HCT 38.0 01/17/2019 12:55 PM    PLATELET 482 85/17/9983 12:55 PM    MCV 75 (L) 01/17/2019 12:55 PM       Lab Results   Component Value Date/Time    Protein, total 7.1 01/17/2019 12:55 PM    Albumin 4.2 01/17/2019 12:55 PM       INR History:   (normal INR range 0.8-1.2)     Date   INR   PT   Dose/Comments  01/15/20 2.5  29.9 2 mg daily  12/02/19          2.0                   24.4     2 mg daily  10/17/19          2.7                   32.4     2 mg daily - reestablished with this practice                 05/09/19          2.1                   25.5     2 mg daily  04/25/19          2.2                   26.6     Hold x1, then 2 mg daily  04/11/19          3.6                   42. 8     2 mg daily   02/26/19          2.6                   31.5     2 mg daily  01/28/19          2.0                   24.2     2 mg daily                    01/17/19          2.8                   34.1     2 mg daily       A/P:       Anticoagulation:  Considering Ms. Dunlap's past history, todays findings, and per Anticoagulation Collaborative Practice Agreement/Protocol:    1. POC INR (2.5) remains therapeutic for INR goal today. 2.  Continue warfarin 2 mg daily. Other:  3. Will send in zofran 4 mg Q8H PRN nausea rx to pharmacy [facility] by PCP. Patient was instructed to schedule an appointment in 5 week(s) prior to leaving the clinic. Medication reconciliation was completed during the visit. Medications Discontinued During This Encounter   Medication Reason    ondansetron (ZOFRAN ODT) 4 mg disintegrating tablet Reorder       A full discussion of the nature of anticoagulants has been carried out. A full discussion of the need for frequent and regular monitoring, precise dosage adjustment and compliance was stressed.   Side effects of potential bleeding were discussed and Ms. Singleton was instructed to call [phone] if there are any signs of abnormal bleeding. Ms. Singleton was instructed to avoid any OTC items containing aspirin or ibuprofen and prior to starting any new OTC products to consult with her physician or pharmacist to ensure no drug interactions are present. Ms. Singleton was instructed to avoid any major changes in her general diet and to avoid alcohol consumption. Ms. Singleton was provided information in the AVS that includes topics on understanding coumadin therapy, drug interaction considerations, vitamin K and coumadin use, interactions with foods and supplements containing vitamin K, and the use of herbal products. Ms. Singleton verbalized her understanding of all instructions and will call the office with any questions, concerns, or signs of abnormal bleeding or blood clot. Notifications of recommendations will be sent to Dr. Alis Solano DO for review.     Thank you,  Zulema Schneider, PharmD, BCACP, CDE

## 2020-01-16 ENCOUNTER — ANTI-COAG VISIT (OUTPATIENT)
Dept: INTERNAL MEDICINE CLINIC | Age: 80
End: 2020-01-16

## 2020-01-16 VITALS
WEIGHT: 150 LBS | TEMPERATURE: 98.1 F | DIASTOLIC BLOOD PRESSURE: 74 MMHG | SYSTOLIC BLOOD PRESSURE: 118 MMHG | HEART RATE: 85 BPM | HEIGHT: 61 IN | OXYGEN SATURATION: 95 % | BODY MASS INDEX: 28.32 KG/M2

## 2020-01-16 DIAGNOSIS — Z86.718 HISTORY OF DVT OF LOWER EXTREMITY: Primary | ICD-10-CM

## 2020-01-16 LAB
INR BLD: 2.5 (ref 1–1.5)
PT POC: 29.9 SECONDS (ref 9.1–12)
VALID INTERNAL CONTROL?: YES

## 2020-01-16 RX ORDER — ONDANSETRON 4 MG/1
4 TABLET, FILM COATED ORAL
Qty: 30 TAB | Refills: 2 | Status: SHIPPED | OUTPATIENT
Start: 2020-01-16 | End: 2020-03-19 | Stop reason: SDUPTHER

## 2020-01-20 ENCOUNTER — OFFICE VISIT (OUTPATIENT)
Dept: INTERNAL MEDICINE CLINIC | Age: 80
End: 2020-01-20

## 2020-01-20 VITALS
HEART RATE: 81 BPM | SYSTOLIC BLOOD PRESSURE: 110 MMHG | DIASTOLIC BLOOD PRESSURE: 68 MMHG | TEMPERATURE: 98.3 F | BODY MASS INDEX: 27.98 KG/M2 | RESPIRATION RATE: 16 BRPM | WEIGHT: 148.2 LBS | HEIGHT: 61 IN | OXYGEN SATURATION: 93 %

## 2020-01-20 DIAGNOSIS — G89.29 CHRONIC BACK PAIN, UNSPECIFIED BACK LOCATION, UNSPECIFIED BACK PAIN LATERALITY: ICD-10-CM

## 2020-01-20 DIAGNOSIS — R05.9 COUGH: ICD-10-CM

## 2020-01-20 DIAGNOSIS — M54.9 CHRONIC BACK PAIN, UNSPECIFIED BACK LOCATION, UNSPECIFIED BACK PAIN LATERALITY: ICD-10-CM

## 2020-01-20 DIAGNOSIS — E11.69 HYPERLIPIDEMIA ASSOCIATED WITH TYPE 2 DIABETES MELLITUS (HCC): ICD-10-CM

## 2020-01-20 DIAGNOSIS — I25.10 CORONARY ARTERY DISEASE INVOLVING NATIVE CORONARY ARTERY OF NATIVE HEART WITHOUT ANGINA PECTORIS: Primary | ICD-10-CM

## 2020-01-20 DIAGNOSIS — E11.9 TYPE 2 DIABETES MELLITUS WITHOUT COMPLICATION, WITHOUT LONG-TERM CURRENT USE OF INSULIN (HCC): ICD-10-CM

## 2020-01-20 DIAGNOSIS — Z86.718 HISTORY OF DVT OF LOWER EXTREMITY: ICD-10-CM

## 2020-01-20 DIAGNOSIS — E78.5 HYPERLIPIDEMIA ASSOCIATED WITH TYPE 2 DIABETES MELLITUS (HCC): ICD-10-CM

## 2020-01-20 DIAGNOSIS — E03.9 ACQUIRED HYPOTHYROIDISM: ICD-10-CM

## 2020-01-20 DIAGNOSIS — M81.0 AGE-RELATED OSTEOPOROSIS WITHOUT CURRENT PATHOLOGICAL FRACTURE: ICD-10-CM

## 2020-01-20 DIAGNOSIS — N32.81 OAB (OVERACTIVE BLADDER): ICD-10-CM

## 2020-01-20 RX ORDER — DICYCLOMINE HYDROCHLORIDE 10 MG/1
CAPSULE ORAL
Refills: 6 | COMMUNITY
Start: 2019-12-04 | End: 2020-02-10 | Stop reason: SDUPTHER

## 2020-01-20 RX ORDER — TRAMADOL HYDROCHLORIDE 50 MG/1
50 TABLET ORAL
COMMUNITY
Start: 2020-01-11 | End: 2020-03-11 | Stop reason: SDUPTHER

## 2020-01-20 RX ORDER — BENZONATATE 100 MG/1
100 CAPSULE ORAL
Qty: 30 CAP | Refills: 0 | Status: SHIPPED | OUTPATIENT
Start: 2020-01-20 | End: 2020-01-30

## 2020-01-20 NOTE — PROGRESS NOTES
Reviewed record in preparation for visit and have obtained necessary documentation. Identified pt with two pt identifiers(name and ). Chief Complaint   Patient presents with    Diabetes       Health Maintenance Due   Topic Date Due    MICROALBUMIN Q1  1948    EYE EXAM RETINAL OR DILATED  1948    DTaP/Tdap/Td series (1 - Tdap) 1949    GLAUCOMA SCREENING Q2Y  2003    Pneumococcal 65+ years (1 of 1 - PPSV23) 2003    LIPID PANEL Q1  2020       Ms. Debbie Kenney has a reminder for a \"due or due soon\" health maintenance. I have asked that she discuss health maintenance topic(s) due with Her  primary care provider. Coordination of Care Questionnaire:  :     1) Have you been to an emergency room, urgent care clinic since your last visit? no   Hospitalized since your last visit? no             2) Have you seen or consulted any other health care providers outside of [address] [place] [address] since your last visit? no  (Include any pap smears or colon screenings in this section.)    3) Do you have an Advance Directive on file? no    4) Are you interested in receiving information on Advance Directives? NO    Patient is accompanied by self I have received verbal consent from [facility] to discuss any/all medical information while they are present in the room.

## 2020-01-20 NOTE — PATIENT INSTRUCTIONS
Cough: Care Instructions  Your Care Instructions    A cough is your body's response to something that bothers your throat or airways. Many things can cause a cough. You might cough because of a cold or the flu, bronchitis, or asthma. Smoking, postnasal drip, allergies, and stomach acid that backs up into your throat also can cause coughs. A cough is a symptom, not a disease. Most coughs stop when the cause, such as a cold, goes away. You can take a few steps at home to cough less and feel better. Follow-up care is a key part of your treatment and safety. Be sure to make and go to all appointments, and call your doctor if you are having problems. It's also a good idea to know your test results and keep a list of the medicines you take. How can you care for yourself at home? · Drink lots of water and other fluids. This helps thin the mucus and soothes a dry or sore throat. Honey or lemon juice in hot water or tea may ease a dry cough. · Take cough medicine as directed by your doctor. · Prop up your head on pillows to help you breathe and ease a dry cough. · Try cough drops to soothe a dry or sore throat. Cough drops don't stop a cough. Medicine-flavored cough drops are no better than candy-flavored drops or hard candy. · Do not smoke. Avoid secondhand smoke. If you need help quitting, talk to your doctor about stop-smoking programs and medicines. These can increase your chances of quitting for good. When should you call for help? Call 911 anytime you think you may need emergency care.  For example, call if:    · You have severe trouble breathing.    Call your doctor now or seek immediate medical care if:    · You cough up blood.     · You have new or worse trouble breathing.     · You have a new or higher fever.     · You have a new rash.    Watch closely for changes in your health, and be sure to contact your doctor if:    · You cough more deeply or more often, especially if you notice more mucus or a change in the color of your mucus.     · You have new symptoms, such as a sore throat, an earache, or sinus pain.     · You do not get better as expected. Where can you learn more? Go to http://lynn-xu.info/. Enter D279 in the search box to learn more about \"Cough: Care Instructions. \"  Current as of: June 9, 2019  Content Version: 12.2  © 3023-6929 [employer]. Care instructions adapted under license by [employer] (which disclaims liability or warranty for this information). If you have questions about a medical condition or this instruction, always ask your healthcare professional. [address] any warranty or liability for your use of this information.

## 2020-01-20 NOTE — PROGRESS NOTES
Marin Hayes is a 78 y.o. female who presents for evaluation of cpe. Last seen by me oct 31, 2019 in sick visit, sinusitis. Still complains of some cough, but no other symptoms. Overall doing ok, though she worries about her , as his dementia is worsening. She is looking for help at home, trying to get meals on wheels.       ROS:  Constitutional: negative for fevers, chills, anorexia and weight loss  Eyes:   negative for visual disturbance and irritation  ENT:   negative for tinnitus,sore throat,nasal congestion,ear pain,hoarseness  Respiratory:  negative for hemoptysis, dyspnea,wheezing.  ++cough  CV:   negative for chest pain, palpitations, lower extremity edema  GI:   negative for nausea, vomiting, diarrhea, abdominal pain,melena  Genitourinary: negative for frequency, dysuria and hematuria  Musculoskel: negative for myalgias, arthralgias, back pain, muscle weakness, joint pain  Neurological:  negative for headaches, dizziness, focal weakness, numbness  Psychiatric:     ++ for depression or anxiety      Past Medical History:   Diagnosis Date    Arthritis     Diabetes ([facility] [address]     History of DVT (deep vein thrombosis)     History of recurrent UTIs     Hypercholesterolemia     Irritable bowel syndrome (IBS)        Past Surgical History:   Procedure Laterality Date    HX HIP FRACTURE TX      HX OTHER SURGICAL      stint in heart       Family History   Problem Relation Age of Onset    Diabetes Mother     Stroke Mother        Social History     Socioeconomic History    Marital status:      Spouse name: Not on file    Number of children: Not on file    Years of education: Not on file    Highest education level: Not on file   Occupational History    Not on file   Social Needs    Financial resource strain: Not on file    Food insecurity:     Worry: Not on file     Inability: Not on file    Transportation needs:     Medical: Not on file     Non-medical: Not on file   Tobacco Use    Smoking status: Former Smoker    Smokeless tobacco: Never Used   Substance and Sexual Activity    Alcohol use: No     Frequency: Never    Drug use: No    Sexual activity: Not Currently   Lifestyle    Physical activity:     Days per week: Not on file     Minutes per session: Not on file    Stress: Not on file   Relationships    Social connections:     Talks on phone: Not on file     Gets together: Not on file     Attends Muslim service: Not on file     Active member of club or organization: Not on file     Attends meetings of clubs or organizations: Not on file     Relationship status: Not on file    Intimate partner violence:     Fear of current or ex partner: Not on file     Emotionally abused: Not on file     Physically abused: Not on file     Forced sexual activity: Not on file   Other Topics Concern    Not on file   Social History Narrative    Not on file            Visit Vitals  /68 (BP 1 Location: Left arm, BP Patient Position: Sitting)   Pulse 81   Temp 98.3 °F (36.8 °C) (Oral)   Resp 16   Ht 5' 1\" (1.549 m)   Wt 148 lb 3.2 oz (67.2 kg)   SpO2 93%   BMI 28.00 kg/m²       Physical Examination:   General - Well appearing female  HEENT - PERRL, TM no erythema/opacification, normal nasal turbinates, no oropharyngeal erythema or exudate, MMM  Neck - supple, no bruits, no thyroidomegaly, no lymphadenopathy  Pulm - clear to auscultation bilaterally--good air flow  Cardio - RRR, normal S1 S2, no murmur  Abd - soft, nontender, no masses, no HSM  Extrem - no edema, +2 distal pulses  Neuro-  No focal deficits, CN intact     Assessment/Plan:    1. Routine adult health maintenance--check cbc, cmp, flp, tsh, a1c  2. Dm, type 2--stopped glucophage due to gi distress. No longer on any meds. Check a1c  3. Cad with hx cabg--on coumadin  4. Hx dvt--on coumadin  5. Anxiety and depression--continue lexapro, elavil, tranxene  6. Hypothyroid--on synthroid, check tsh  7.  oab--on myrbetriq  8. Copd--continue advair  9. Cough--rx for tessalon pearles. Would also recommend flonase or nasocort  10.  hyperlipids--on lipitor and zetia, check flp  11. Seizure disorder--on keppra  12.   Chronic back pain--referral to dr Kenn Arthur    rtc 6 months        Pedrito Taylor III, DO

## 2020-01-21 LAB
ALBUMIN SERPL-MCNC: 3.7 G/DL (ref 3.6–4.6)
ALBUMIN/GLOB SERPL: 1.2 {RATIO} (ref 1.2–2.2)
ALP SERPL-CCNC: 75 IU/L (ref 39–117)
ALT SERPL-CCNC: 9 IU/L (ref 0–32)
AST SERPL-CCNC: 12 IU/L (ref 0–40)
BASOPHILS # BLD AUTO: 0.1 X10E3/UL (ref 0–0.2)
BASOPHILS NFR BLD AUTO: 1 %
BILIRUB SERPL-MCNC: 0.3 MG/DL (ref 0–1.2)
BUN SERPL-MCNC: 13 MG/DL (ref 8–27)
BUN/CREAT SERPL: 18 (ref 12–28)
CALCIUM SERPL-MCNC: 9.3 MG/DL (ref 8.7–10.3)
CHLORIDE SERPL-SCNC: 105 MMOL/L (ref 96–106)
CHOLEST SERPL-MCNC: 100 MG/DL (ref 100–199)
CO2 SERPL-SCNC: 21 MMOL/L (ref 20–29)
CREAT SERPL-MCNC: 0.73 MG/DL (ref 0.57–1)
EOSINOPHIL # BLD AUTO: 0.3 X10E3/UL (ref 0–0.4)
EOSINOPHIL NFR BLD AUTO: 3 %
ERYTHROCYTE [DISTWIDTH] IN BLOOD BY AUTOMATED COUNT: 16.7 % (ref 11.7–15.4)
EST. AVERAGE GLUCOSE BLD GHB EST-MCNC: 166 MG/DL
GLOBULIN SER CALC-MCNC: 3 G/DL (ref 1.5–4.5)
GLUCOSE SERPL-MCNC: 114 MG/DL (ref 65–99)
HBA1C MFR BLD: 7.4 % (ref 4.8–5.6)
HCT VFR BLD AUTO: 35.6 % (ref 34–46.6)
HDLC SERPL-MCNC: 51 MG/DL
HGB BLD-MCNC: 10.6 G/DL (ref 11.1–15.9)
IMM GRANULOCYTES # BLD AUTO: 0.1 X10E3/UL (ref 0–0.1)
IMM GRANULOCYTES NFR BLD AUTO: 1 %
LDLC SERPL CALC-MCNC: 28 MG/DL (ref 0–99)
LYMPHOCYTES # BLD AUTO: 1.2 X10E3/UL (ref 0.7–3.1)
LYMPHOCYTES NFR BLD AUTO: 13 %
MCH RBC QN AUTO: 21 PG (ref 26.6–33)
MCHC RBC AUTO-ENTMCNC: 29.8 G/DL (ref 31.5–35.7)
MCV RBC AUTO: 71 FL (ref 79–97)
MONOCYTES # BLD AUTO: 0.9 X10E3/UL (ref 0.1–0.9)
MONOCYTES NFR BLD AUTO: 10 %
NEUTROPHILS # BLD AUTO: 6.9 X10E3/UL (ref 1.4–7)
NEUTROPHILS NFR BLD AUTO: 72 %
PLATELET # BLD AUTO: 268 X10E3/UL (ref 150–450)
POTASSIUM SERPL-SCNC: 3.8 MMOL/L (ref 3.5–5.2)
PROT SERPL-MCNC: 6.7 G/DL (ref 6–8.5)
RBC # BLD AUTO: 5.04 X10E6/UL (ref 3.77–5.28)
SODIUM SERPL-SCNC: 142 MMOL/L (ref 134–144)
TRIGL SERPL-MCNC: 104 MG/DL (ref 0–149)
TSH SERPL DL<=0.005 MIU/L-ACNC: 1.15 UIU/ML (ref 0.45–4.5)
VLDLC SERPL CALC-MCNC: 21 MG/DL (ref 5–40)
WBC # BLD AUTO: 9.5 X10E3/UL (ref 3.4–10.8)

## 2020-01-21 NOTE — PROGRESS NOTES
Overall labs look pretty stable and good. Just slightly anemic, but not enough to need treatment. Diabetes is bit worse, a1c up to 7.3 for average sugar of 166. I think it would be a good idea to start Gearline Ani, once daily. rx sent in. Continue other meds as before.

## 2020-01-21 NOTE — PROGRESS NOTES
Called, spoke to pt. Two identifiers confirmed. Pt notified of results/recommendations per Dr. Johan Cordero. Pt verbalized understanding of information discussed w/ no further questions at this time.

## 2020-01-23 ENCOUNTER — TELEPHONE (OUTPATIENT)
Dept: INTERNAL MEDICINE CLINIC | Age: 80
End: 2020-01-23

## 2020-01-23 NOTE — TELEPHONE ENCOUNTER
----- Message from [facility] sent at 1/22/2020  4:35 PM EST -----  Regarding: Dr. Carolee Owens: [phone]  Caller's first and last name: n/a   Reason for call: Natacha Mccartney needs information  for the pt to get a wheel chair. Please call [phone].   Callback required yes/no and why: yes   Best contact number(s): [phone]  Details to clarify the request: n/a

## 2020-01-30 RX ORDER — AMITRIPTYLINE HYDROCHLORIDE 10 MG/1
TABLET, FILM COATED ORAL
Qty: 90 TAB | Refills: 3 | Status: SHIPPED | OUTPATIENT
Start: 2020-01-30 | End: 2021-09-12

## 2020-02-06 DIAGNOSIS — M54.9 CHRONIC BACK PAIN, UNSPECIFIED BACK LOCATION, UNSPECIFIED BACK PAIN LATERALITY: Primary | ICD-10-CM

## 2020-02-06 DIAGNOSIS — G89.29 CHRONIC BACK PAIN, UNSPECIFIED BACK LOCATION, UNSPECIFIED BACK PAIN LATERALITY: Primary | ICD-10-CM

## 2020-02-06 RX ORDER — TRAMADOL HYDROCHLORIDE 50 MG/1
50 TABLET ORAL
Qty: 30 TAB | Refills: 0 | Status: SHIPPED | OUTPATIENT
Start: 2020-02-06 | End: 2020-03-07

## 2020-02-06 NOTE — TELEPHONE ENCOUNTER
Patient states she needs refill for her Sciatica pain for her Tramadol. Please call if any questions. Pharmacy is [facility] on file.  Thank you

## 2020-02-07 ENCOUNTER — TELEPHONE (OUTPATIENT)
Dept: INTERNAL MEDICINE CLINIC | Age: 80
End: 2020-02-07

## 2020-02-07 NOTE — TELEPHONE ENCOUNTER
Called, spoke to pt. Two identifiers confirmed. Appointment scheduled for 2/10 @ 2 with Dr. Sabino Sandoval.   Pt verbalized understanding of information discussed w/ no further questions at this time.

## 2020-02-10 ENCOUNTER — OFFICE VISIT (OUTPATIENT)
Dept: INTERNAL MEDICINE CLINIC | Age: 80
End: 2020-02-10

## 2020-02-10 VITALS
OXYGEN SATURATION: 95 % | SYSTOLIC BLOOD PRESSURE: 95 MMHG | HEART RATE: 103 BPM | RESPIRATION RATE: 18 BRPM | BODY MASS INDEX: 28 KG/M2 | HEIGHT: 61 IN | TEMPERATURE: 98.2 F | DIASTOLIC BLOOD PRESSURE: 62 MMHG

## 2020-02-10 DIAGNOSIS — E03.9 ACQUIRED HYPOTHYROIDISM: ICD-10-CM

## 2020-02-10 DIAGNOSIS — I25.10 CORONARY ARTERY DISEASE INVOLVING NATIVE CORONARY ARTERY OF NATIVE HEART WITHOUT ANGINA PECTORIS: ICD-10-CM

## 2020-02-10 DIAGNOSIS — E11.9 TYPE 2 DIABETES MELLITUS WITHOUT COMPLICATION, WITHOUT LONG-TERM CURRENT USE OF INSULIN (HCC): ICD-10-CM

## 2020-02-10 DIAGNOSIS — K58.0 IRRITABLE BOWEL SYNDROME WITH DIARRHEA: Primary | ICD-10-CM

## 2020-02-10 DIAGNOSIS — E78.5 HYPERLIPIDEMIA ASSOCIATED WITH TYPE 2 DIABETES MELLITUS (HCC): ICD-10-CM

## 2020-02-10 DIAGNOSIS — M81.0 AGE-RELATED OSTEOPOROSIS WITHOUT CURRENT PATHOLOGICAL FRACTURE: ICD-10-CM

## 2020-02-10 DIAGNOSIS — E11.69 HYPERLIPIDEMIA ASSOCIATED WITH TYPE 2 DIABETES MELLITUS (HCC): ICD-10-CM

## 2020-02-10 RX ORDER — TRAZODONE HYDROCHLORIDE 50 MG/1
50 TABLET ORAL
Qty: 90 TAB | Refills: 3 | Status: SHIPPED | OUTPATIENT
Start: 2020-02-10 | End: 2020-04-08 | Stop reason: SDUPTHER

## 2020-02-10 RX ORDER — LOPERAMIDE HYDROCHLORIDE 2 MG/1
2 CAPSULE ORAL
Qty: 30 CAP | Refills: 1 | Status: SHIPPED | OUTPATIENT
Start: 2020-02-10 | End: 2020-02-26 | Stop reason: SDUPTHER

## 2020-02-10 RX ORDER — FLUTICASONE PROPIONATE 50 MCG
2 SPRAY, SUSPENSION (ML) NASAL
Qty: 3 BOTTLE | Refills: 3 | Status: SHIPPED | OUTPATIENT
Start: 2020-02-10 | End: 2021-06-25 | Stop reason: ALTCHOICE

## 2020-02-10 NOTE — PROGRESS NOTES
Reviewed record in preparation for visit and have obtained necessary documentation. Identified pt with two pt identifiers(name and ). Chief Complaint   Patient presents with    Nausea       Health Maintenance Due   Topic Date Due    MICROALBUMIN Q1  1948    Eye Exam Retinal or Dilated  1948    DTaP/Tdap/Td series (1 - Tdap) 1949    GLAUCOMA SCREENING Q2Y  2003    Pneumococcal 65+ years (1 of 1 - PPSV23) 2003       Ms. Luis Alberto Johnson has a reminder for a \"due or due soon\" health maintenance. I have asked that she discuss health maintenance topic(s) due with Her  primary care provider. Coordination of Care Questionnaire:  :     1) Have you been to an emergency room, urgent care clinic since your last visit? no   Hospitalized since your last visit? no             2) Have you seen or consulted any other health care providers outside of [address] [place] [address] since your last visit? no  (Include any pap smears or colon screenings in this section.)    3) Do you have an Advance Directive on file? no    4) Are you interested in receiving information on Advance Directives? NO    Patient is accompanied by self I have received verbal consent from [facility] to discuss any/all medical information while they are present in the room.

## 2020-02-10 NOTE — PROGRESS NOTES
Olivia Hernandez is a 78 y.o. female who presents for evaluation of nausea and loose stools. Last seen by me jan 20, 2020 in [facility]. Has struggled with loose stools and gi issues for years. No bleeding, melena, or mucus. Finally got meals on wheels, but she does not like their food.       ROS:  Constitutional: negative for fevers, chills, anorexia and weight loss  Eyes:   negative for visual disturbance and irritation  ENT:   negative for tinnitus,sore throat,nasal congestion,ear pain,hoarseness  Respiratory:  negative for cough, hemoptysis, dyspnea,wheezing  CV:   negative for chest pain, palpitations, lower extremity edema  GI:   negative for nausea, vomiting, diarrhea, abdominal pain,melena  Genitourinary: negative for frequency, dysuria and hematuria  Musculoskel: negative for myalgias, arthralgias, back pain, muscle weakness, joint pain  Neurological:  negative for headaches, dizziness, focal weakness, numbness  Psychiatric:     Negative for depression or anxiety      Past Medical History:   Diagnosis Date    Arthritis     Diabetes ([facility] [address]     History of DVT (deep vein thrombosis)     History of recurrent UTIs     Hypercholesterolemia     Irritable bowel syndrome (IBS)        Past Surgical History:   Procedure Laterality Date    HX HIP FRACTURE TX      HX OTHER SURGICAL      stint in heart       Family History   Problem Relation Age of Onset    Diabetes Mother     Stroke Mother        Social History     Socioeconomic History    Marital status:      Spouse name: Not on file    Number of children: Not on file    Years of education: Not on file    Highest education level: Not on file   Occupational History    Not on file   Social Needs    Financial resource strain: Not on file    Food insecurity:     Worry: Not on file     Inability: Not on file    Transportation needs:     Medical: Not on file     Non-medical: Not on file   Tobacco Use    Smoking status: Former Smoker    Smokeless tobacco: Never Used   Substance and Sexual Activity    Alcohol use: No     Frequency: Never    Drug use: No    Sexual activity: Not Currently   Lifestyle    Physical activity:     Days per week: Not on file     Minutes per session: Not on file    Stress: Not on file   Relationships    Social connections:     Talks on phone: Not on file     Gets together: Not on file     Attends Christianity service: Not on file     Active member of club or organization: Not on file     Attends meetings of clubs or organizations: Not on file     Relationship status: Not on file    Intimate partner violence:     Fear of current or ex partner: Not on file     Emotionally abused: Not on file     Physically abused: Not on file     Forced sexual activity: Not on file   Other Topics Concern    Not on file   Social History Narrative    Not on file            Visit Vitals  BP 95/62 (BP 1 Location: Right arm, BP Patient Position: Sitting)   Pulse (!) 103   Temp 98.2 °F (36.8 °C) (Oral)   Resp 18   Ht 5' 1\" (1.549 m)   SpO2 95%   BMI 28.00 kg/m²       Physical Examination:   General - Well appearing female  HEENT - PERRL, TM no erythema/opacification, normal nasal turbinates, no oropharyngeal erythema or exudate, MMM  Neck - supple, no bruits, no thyroidomegaly, no lymphadenopathy  Pulm - clear to auscultation bilaterally  Cardio - RRR, normal S1 S2, no murmur  Abd - soft, nontender, no masses, no HSM. Benign exam.  Extrem - no edema, +2 distal pulses  Neuro-  No focal deficits, CN intact     Assessment/Plan:    1.  ibs-diarrhea--stop lomotil. Try imodium. Unable to use vyberzi, as she is without her gallbladder. If still having symptoms, then she needs to return to GI. 2.  Dm, type 2--on [place] [facility] [place]. No longer on metformin  3.   Anxiety and depression--    rtc for regular visit        Tyson Dukes III, DO

## 2020-02-10 NOTE — PATIENT INSTRUCTIONS
Office Policies    Phone calls/patient messages:            Please allow up to 24 hours for someone in the office to contact you about your call or message. Be mindful your provider may be out of the office or your message may require further review. We encourage you to use [facility] for your messages as this is a faster, more efficient way to communicate with our office                         Medication Refills:            Prescription medications require 48-72 business hours to process. We encourage you to use [facility] for your refills. For controlled medications: Please allow 72 business hours to process. Certain medications may require you to  a written prescription at our office. NO narcotic/controlled medications will be prescribed after 4pm Monday through Friday or on weekends              Form/Paperwork Completion:            Please note a $25 fee may incur for all paperwork for completed by our providers. We ask that you allow 7-10 business days. Pre-payment is due prior to picking up/faxing the completed form. You may also download your forms to [facility] to have your doctor print off. Diet for Irritable Bowel Syndrome: Care Instructions  Your Care Instructions    Irritable bowel syndrome, or IBS, is a problem with the intestines. IBS can cause belly pain, bloating, gas, constipation, and diarrhea. Most people can control their symptoms by changing their diet and easing stress. No specific foods cause everyone with IBS to have symptoms. Many people find that they feel better by limiting or eliminating foods that may bring on symptoms. Make sure you don't stop eating all foods from any one food group without talking with a dietitian. You need to make sure you are still getting all the nutrients you need. Follow-up care is a key part of your treatment and safety. Be sure to make and go to all appointments, and call your doctor if you are having problems.  It's also a good idea to know your test results and keep a list of the medicines you take. How can you care for yourself at home? To reduce constipation  · Include fruits, vegetables, beans, and whole grains in your diet each day. These foods are high in fiber. Slowly increase the amount of fiber you eat. This helps you avoid a lot of gas. · Drink plenty of fluids. If you have kidney, heart, or liver disease and have to limit fluids, talk with your doctor before you increase the amount of fluids you drink. · Get some exercise every day. Build up slowly to 30 to 60 minutes a day on 5 or more days of the week. · Take a fiber supplement, such as Citrucel or Metamucil, every day if needed. Read and follow all instructions on the label. · Schedule time each day for a bowel movement. Having a daily routine may help. Take your time and do not strain when having a bowel movement. · Check with your doctor before you increase the amount of fiber in your diet. For some people who have IBS, eating more fiber may make some symptoms worse. This includes bloating. To reduce diarrhea  You may try giving up foods or drinks one at a time to see whether symptoms improve. Limit or avoid the following:  · Alcohol  · Caffeine, which is found in coffee, tea, cola drinks, and chocolate  · Nicotine, from smoking or chewing tobacco  · Gas-producing foods, such as beans, broccoli, cabbage, and apples  · Dairy products that contain lactose (milk sugar), such as ice cream and milk. · Foods and drinks high in sugar, especially fruit juice, soda, candy, and other packaged sweets (such as cookies)  · Foods high in fat, including velez, sausage, butter, oils, and anything deep-fried  · Sorbitol and xylitol, artificial sweeteners found in some sugarless candies and chewing gum  Keep track of foods  · Some people with IBS use a daily food diary to keep track of what they eat and whether they have any symptoms after eating certain foods.  The diary also can be a good way to record what is going on in your life. · Stress plays a role in IBS. So if you are aware that certain stresses bring on symptoms, you can try to reduce those stresses. Keep mealtimes pleasant  · Try to maintain a pleasant environment when you eat. This may reduce stress that can make symptoms likely to occur. · Give yourself plenty of time to eat, rather than eating on the go. Chew your food slowly. Try not to swallow air, which can cause bloating. Where can you learn more? Go to http://lynn-xu.info/. Enter S246 in the search box to learn more about \"Diet for Irritable Bowel Syndrome: Care Instructions. \"  Current as of: November 7, 2018  Content Version: 12.2  © 8344-4573 [employer]. Care instructions adapted under license by [employer] (which disclaims liability or warranty for this information). If you have questions about a medical condition or this instruction, always ask your healthcare professional. [place] [address] any warranty or liability for your use of this information. Stop lomotil. Try imodium instead. If symptoms not better in a week, then call GI to make an appt to see them.

## 2020-02-19 ENCOUNTER — TELEPHONE (OUTPATIENT)
Dept: INTERNAL MEDICINE CLINIC | Age: 80
End: 2020-02-19

## 2020-02-19 NOTE — TELEPHONE ENCOUNTER
----- Message from Low oSlis sent at 2/18/2020  6:56 PM EST -----  Regarding: Dr. Asia Ramon states that she would like to discuss another option for a medications \"Ondansutron\" 4mg. She states that the she does the not lie the way she is reacting to it. Best contact number is [phone].     Message copied/pasted from Amisha Odom

## 2020-02-19 NOTE — TELEPHONE ENCOUNTER
Called, spoke to pt. Two identifiers confirmed. Pt requesting an alternative medication to zofran for nausea. Pt stated the taste of the tablets make her sick. Notified pt I would send request to Dr. Shiva Miramontes.   Pt verbalized understanding of information discussed w/ no further questions at this time.

## 2020-02-20 RX ORDER — PROMETHAZINE HYDROCHLORIDE 12.5 MG/1
12.5 TABLET ORAL
Qty: 30 TAB | Refills: 1 | Status: SHIPPED | OUTPATIENT
Start: 2020-02-20 | End: 2021-06-25 | Stop reason: ALTCHOICE

## 2020-02-20 NOTE — TELEPHONE ENCOUNTER
Bill Wills III, DO  You 27 minutes ago (7:49 AM)     Try gingerale and chelle root.  Rx also sent in for phenergan.     Routing comment

## 2020-02-24 ENCOUNTER — TELEPHONE (OUTPATIENT)
Dept: INTERNAL MEDICINE CLINIC | Age: 80
End: 2020-02-24

## 2020-02-24 NOTE — TELEPHONE ENCOUNTER
----- Message from Derryl Goldberg sent at 2/24/2020  4:24 PM EST -----  Regarding: /Telephone  General Message/Vendor Calls    Caller's first and last name:      Reason for call:  States she does not need a pain specialist     Callback required yes/no and why:  Yes, to discuss the Pain doctor.      Best contact number(s):  [phone]    Details to clarify the request:      Ofelia Alvarez      Copy/paste envera

## 2020-02-25 NOTE — TELEPHONE ENCOUNTER
Caller (if not patient): N/A   Relationship of caller (if not patient): N/A   Best contact number(s): [phone]   Name of medication and dosage if known: \"Poperamide\" 2mg   Is patient out of this medication (yes/no):  No   Pharmacy name: [facility] listed in chart? (yes/no): Yes   Pharmacy phone number: N/A   Date of last visit: February 10, 2020   Details to clarify the request: N/A

## 2020-02-26 RX ORDER — LOPERAMIDE HYDROCHLORIDE 2 MG/1
2 CAPSULE ORAL
Qty: 30 CAP | Refills: 1 | Status: SHIPPED | OUTPATIENT
Start: 2020-02-26 | End: 2020-03-20

## 2020-03-04 ENCOUNTER — TELEPHONE (OUTPATIENT)
Dept: INTERNAL MEDICINE CLINIC | Age: 80
End: 2020-03-04

## 2020-03-04 NOTE — TELEPHONE ENCOUNTER
Reason for call: Pt is requesting a stronger medication for allergy and itching be sent to the [place] [address] on file       Callback required yes/no and why:yes       Best contact number(s): [phone]       Details to clarify the request:       Lauro Giraldo

## 2020-03-05 NOTE — TELEPHONE ENCOUNTER
Issac Witt, DO  You 58 minutes ago (9:00 AM)     There is only otc meds.  She can try claritin, zyrtec, allegra or xyzal.    Routing comment

## 2020-03-11 DIAGNOSIS — R52 PAIN: Primary | ICD-10-CM

## 2020-03-11 NOTE — TELEPHONE ENCOUNTER
Kim, [address] [facility] Office Pool             Medication Refill     Caller (if not patient):       Relationship of caller (if not patient):       Best contact number(s):   [phone]     Name of medication and dosage if known:   \"Tremedol\" 50mg     Is patient out of this medication (yes/no):    No     Pharmacy name: [address] [place] [address] listed in chart? (yes/no): Yes   Pharmacy phone number: [phone]       Details to clarify the request:       Melanie Leonard

## 2020-03-11 NOTE — TELEPHONE ENCOUNTER
PCP: Servando Dexter,     Last appt: 2/20/2020  Future Appointments   Date Time Provider Toro Valderrama   7/20/2020  2:00 PM Johann, Brenton Calhoun       Requested Prescriptions     Pending Prescriptions Disp Refills    traMADoL (ULTRAM) 50 mg tablet       Sig: Take 1 Tab by mouth every twelve (12) hours as needed. Max Daily Amount: 100 mg.

## 2020-03-12 RX ORDER — TRAMADOL HYDROCHLORIDE 50 MG/1
50 TABLET ORAL
Qty: 30 TAB | Refills: 1 | Status: SHIPPED | OUTPATIENT
Start: 2020-03-12 | End: 2020-04-12

## 2020-03-17 ENCOUNTER — TELEPHONE (OUTPATIENT)
Dept: INTERNAL MEDICINE CLINIC | Age: 80
End: 2020-03-17

## 2020-03-17 DIAGNOSIS — G43.009 MIGRAINE WITHOUT AURA AND WITHOUT STATUS MIGRAINOSUS, NOT INTRACTABLE: Primary | ICD-10-CM

## 2020-03-17 RX ORDER — LEVETIRACETAM 500 MG/1
TABLET ORAL
Qty: 225 TAB | Refills: 3 | Status: SHIPPED | OUTPATIENT
Start: 2020-03-17 | End: 2021-08-16

## 2020-03-17 NOTE — TELEPHONE ENCOUNTER
----- Message from Ava Garcia sent at 3/17/2020  3:51 PM EDT -----  Regarding: Dr. Tonya Rosales telephone  Patient return call    Caller's first and last name and relationship (if not the patient):      Best contact number(s): [phone]      Whose call is being returned:Murray Allan, LPN      Details to clarify the request:      Ava Garcia

## 2020-03-17 NOTE — TELEPHONE ENCOUNTER
Patient states she needs a call back in reference to her Sore Throat that patient reports she is having difficulty swallowing & needs to discuss getting something called in or advised what to take. Patient also states she needs to know when she should come in to see Harlan Corley as she missed her Feb. 20th appt & needs to come in. Please call to advise what to do with symptoms.  Thank you

## 2020-03-17 NOTE — TELEPHONE ENCOUNTER
Called, spoke to pt. Two identifiers confirmed. Recommended pt use OTC cough syrup and cough drops. Notified pt to call the office when she is feeling better for INR check. Pt verbalized understanding of information discussed w/ no further questions at this time.

## 2020-03-18 ENCOUNTER — TELEPHONE (OUTPATIENT)
Dept: INTERNAL MEDICINE CLINIC | Age: 80
End: 2020-03-18

## 2020-03-18 NOTE — TELEPHONE ENCOUNTER
Called, spoke to pt. Two identifiers confirmed. Pt stated she is 'ok'. Notified pt to call the office back if anything changes. Pt verbalized understanding of information discussed w/ no further questions at this time.

## 2020-03-18 NOTE — TELEPHONE ENCOUNTER
Patient is having a reaction to migraine medication prescribed. She has SOB and is sweating. Please call her as soon as possible.

## 2020-03-19 ENCOUNTER — TELEPHONE (OUTPATIENT)
Dept: INTERNAL MEDICINE CLINIC | Age: 80
End: 2020-03-19

## 2020-03-19 RX ORDER — ONDANSETRON 4 MG/1
4 TABLET, FILM COATED ORAL
Qty: 30 TAB | Refills: 2 | Status: SHIPPED | OUTPATIENT
Start: 2020-03-19 | End: 2021-06-25 | Stop reason: ALTCHOICE

## 2020-03-19 NOTE — TELEPHONE ENCOUNTER
----- Message from Anita Rincon sent at 3/18/2020  6:58 PM EDT -----  Regarding: Dr Jud Vergara first and last name:      Reason for call: Pt is following up on conversation with the doctor today regarding returning to her previous medication \"Zofran/Ondansetron 4 mg Tablet\" Pt is requesting this Rx to be called in urgently to [address] required yes/no and why:      Best contact number(s):[phone]      Details to clarify the request:      Anita Rincon

## 2020-03-20 RX ORDER — LOPERAMIDE HYDROCHLORIDE 2 MG/1
CAPSULE ORAL
Qty: 30 CAP | Refills: 1 | Status: SHIPPED | OUTPATIENT
Start: 2020-03-20 | End: 2021-06-25 | Stop reason: ALTCHOICE

## 2020-03-26 NOTE — TELEPHONE ENCOUNTER
----- Message from Vinh Hinton sent at 3/25/2020  6:46 PM EDT -----  Regarding: Dr. Brandon Rogers  Appointment not available    Caller's first and last name and relationship to patient (if not the patient):  Melanie Woody, Self      Best contact number:  [phone]      Preferred date and time:      Scheduled appointment date and time:      Reason for appointment:      Details to clarify the request:  Pt states she have left a message for the pharmacist Brett Briggs to return her call for an appointment to get her coumadin checked, but for some reason her called haven't been returned.     Thanks,  Vinh Hinton

## 2020-04-07 ENCOUNTER — TELEPHONE (OUTPATIENT)
Dept: INTERNAL MEDICINE CLINIC | Age: 80
End: 2020-04-07

## 2020-04-07 DIAGNOSIS — F51.01 PRIMARY INSOMNIA: Primary | ICD-10-CM

## 2020-04-07 NOTE — TELEPHONE ENCOUNTER
Patient is requesting that she gets a new RX to adjust the dosage to two tablets a day for the Trazodone. Please advise. Thanks.

## 2020-04-08 ENCOUNTER — TELEPHONE (OUTPATIENT)
Dept: INTERNAL MEDICINE CLINIC | Age: 80
End: 2020-04-08

## 2020-04-08 RX ORDER — TRAZODONE HYDROCHLORIDE 100 MG/1
100 TABLET ORAL
Qty: 90 TAB | Refills: 3 | Status: SHIPPED | OUTPATIENT
Start: 2020-04-08 | End: 2021-07-19 | Stop reason: SDUPTHER

## 2020-04-08 NOTE — TELEPHONE ENCOUNTER
Bill Wills III, DO  You 16 hours ago (4:54 PM)     She can increase dose of trazodone to 100 mg at bedtime. Routing comment      Pt notified.

## 2020-04-08 NOTE — TELEPHONE ENCOUNTER
Pharmacy Progress Note - Telephone Call    Ms. Jessica White 78 y.o. was contacted via an outbound telephone call regarding her INR management follow up today. A voicemail was left for patient to return my call.        Thank you,  Zulema Humphrey, PharmD, BCACP, CDE

## 2020-04-10 ENCOUNTER — TELEPHONE (OUTPATIENT)
Dept: INTERNAL MEDICINE CLINIC | Age: 80
End: 2020-04-10

## 2020-04-10 DIAGNOSIS — R52 PAIN: ICD-10-CM

## 2020-04-10 DIAGNOSIS — Z86.718 HISTORY OF DVT OF LOWER EXTREMITY: Primary | ICD-10-CM

## 2020-04-10 NOTE — TELEPHONE ENCOUNTER
Pharmacy Progress Note - Telephone Encounter    S/O: Ms. Jp Carey 78 y.o. female contacted office/me via an inbound telephone call to discuss her INR lab today. Verified patients identifiers (name & ) per HIPAA policy. A/P:  - Discuss INR will be checked at [facility]). Provide patient with office information. Will order standing lab order. Pt will check on Monday. - Will wait for lab result to determine and communicate plan. - Patient endorses understanding to the provided information. All questions answered at this time.      Thank you,  Zulema Sanon, PharmD, BCACP, CDE

## 2020-04-12 RX ORDER — TRAMADOL HYDROCHLORIDE 50 MG/1
TABLET ORAL
Qty: 30 TAB | Refills: 0 | Status: SHIPPED | OUTPATIENT
Start: 2020-04-12 | End: 2020-05-15

## 2020-04-13 ENCOUNTER — TELEPHONE (OUTPATIENT)
Dept: INTERNAL MEDICINE CLINIC | Age: 80
End: 2020-04-13

## 2020-04-13 DIAGNOSIS — R52 PAIN: ICD-10-CM

## 2020-04-13 NOTE — TELEPHONE ENCOUNTER
----- Message from Coni Friend sent at 4/13/2020  3:38 PM EDT -----  Regarding: /Telephone  General Message/Vendor Calls    Caller's first and last name:      Reason for call:  \"Tramadol\" medication     Callback required yes/no and why:  Yes, to let her know when it has been sent to [facility]     Best contact number(s):  [phone]    Details to clarify the request:      Coni Friend      Copy/paste envera

## 2020-04-14 ENCOUNTER — NURSE TRIAGE (OUTPATIENT)
Dept: OTHER | Facility: CLINIC | Age: 80
End: 2020-04-14

## 2020-04-14 NOTE — TELEPHONE ENCOUNTER
She has not left her home in months, her  is bedridden, has not had any known exposure. Her temperature is 100 and was elevated just today. She has had some nausea related to coughing and phlegm. She feels like she has sinusitis. She has had a sore throat for several days. Reason for Disposition   [1] Fever AND [2] no signs of serious infection or localizing symptoms (all other triage questions negative)    Answer Assessment - Initial Assessment Questions  1. TEMPERATURE: \"What is the most recent temperature? \"  \"How was it measured? \"       100, took it today orally  2. ONSET: \"When did the fever start? \"       today  3. SYMPTOMS: \"Do you have any other symptoms besides the fever? \"  (e.g., colds, headache, sore throat, earache, cough, rash, diarrhea, vomiting, abdominal pain)      Cough with phlegm  4. CAUSE: If there are no symptoms, ask: \"What do you think is causing the fever? \"       She says she has sinusitis because of the phlegm  5. CONTACTS: \"Does anyone else in the family have an infection? \"      Not that she knows of  6. TREATMENT: \"What have you done so far to treat this fever? \" (e.g., medications)      nothing  7. IMMUNOCOMPROMISE: \"Do you have of the following: diabetes, HIV positive, splenectomy, cancer chemotherapy, chronic steroid treatment, transplant patient, etc.\"      Elderly; sciatic nerve problem, supposed to have surgery but it has been rescheduled  8. PREGNANCY: \"Is there any chance you are pregnant? \" \"When was your last menstrual period? \"      no  9. TRAVEL: \"Have you traveled out of the country in the last month? \" (e.g., travel history, exposures)      no    Protocols used:  FEVER-ADULT-AH

## 2020-04-20 ENCOUNTER — TELEPHONE (OUTPATIENT)
Dept: INTERNAL MEDICINE CLINIC | Age: 80
End: 2020-04-20

## 2020-04-20 NOTE — TELEPHONE ENCOUNTER
Called, spoke to pt. Two identifiers confirmed. Clarified [facility] with pt. Pt verbalized understanding of information discussed w/ no further questions at this time.

## 2020-04-20 NOTE — TELEPHONE ENCOUNTER
Nicolasa, [address] [facility] Office Pool             Pt states she received a letter to have her INR checked and does not want to come into the facility to have it done she would like to know if someone would come to her vehicle to draw her labs if she came to the campus.      Copy/Paste  ENVERA

## 2020-04-28 RX ORDER — ESCITALOPRAM OXALATE 20 MG/1
TABLET ORAL
Qty: 90 TAB | Refills: 3 | Status: SHIPPED | OUTPATIENT
Start: 2020-04-28 | End: 2022-08-02 | Stop reason: SDUPTHER

## 2020-05-07 ENCOUNTER — TELEPHONE (OUTPATIENT)
Dept: INTERNAL MEDICINE CLINIC | Age: 80
End: 2020-05-07

## 2020-05-07 NOTE — TELEPHONE ENCOUNTER
Patient wants to make sure lab order has been faxed to [facility]. Please call patient to confirm.

## 2020-05-14 ENCOUNTER — TELEPHONE (OUTPATIENT)
Dept: INTERNAL MEDICINE CLINIC | Age: 80
End: 2020-05-14

## 2020-05-14 DIAGNOSIS — J41.0 SIMPLE CHRONIC BRONCHITIS (HCC): Primary | ICD-10-CM

## 2020-05-14 RX ORDER — FLUTICASONE PROPIONATE AND SALMETEROL 250; 50 UG/1; UG/1
1 POWDER RESPIRATORY (INHALATION) EVERY 12 HOURS
Qty: 1 INHALER | Refills: 3 | Status: SHIPPED | OUTPATIENT
Start: 2020-05-14 | End: 2020-07-29 | Stop reason: SDUPTHER

## 2020-05-14 NOTE — TELEPHONE ENCOUNTER
#[phone]  Pt states she is having SOB and has COPD. Pt states the inhaler she has is not working the best for her. She would like you to call in a script for Advair discus     Pt states the Trazodone is not helping her get to sleep and she would like to try something different.       Pt states she is getting her coumadin checked on 5-18-20

## 2020-05-15 NOTE — TELEPHONE ENCOUNTER
Called, spoke to pt  Received two pt identifiers  Pt informed per Dr. Meghan Jaime to try melatonin 5mg but no more than 10mg. Pt verbalizes understanding of the instructions and has no further questions at this time.

## 2020-05-15 NOTE — TELEPHONE ENCOUNTER
Patient states she needs a call back to discuss getting something else prescribed as her Trazadone is no longer working & has not slept in 2 days. Please call.  Thank you

## 2020-05-20 ENCOUNTER — TELEPHONE (OUTPATIENT)
Dept: INTERNAL MEDICINE CLINIC | Age: 80
End: 2020-05-20

## 2020-05-20 NOTE — TELEPHONE ENCOUNTER
Patient states she needs a call back in reference how she can get her Labs/Coumadin checked as she wants to know if [facility] can come out to her car because her  with Dementia can't be left alone & she needs to be advised how this can be done. Please call to advise if someone could come to house for labs or what to do.  Thank you

## 2020-05-21 ENCOUNTER — TELEPHONE (OUTPATIENT)
Dept: INTERNAL MEDICINE CLINIC | Age: 80
End: 2020-05-21

## 2020-05-21 NOTE — TELEPHONE ENCOUNTER
----- Message from Garrett Awad sent at 5/21/2020  1:04 PM EDT -----  Regarding: Dr. Jovany Montalvo Patient return call    Caller's first and last name and relationship (if not the patient):      Best contact number(s): [phone]      Whose call is being returned: Returning a missed call from the practice.        Details to clarify the request:      Garrett Awad      Copy/paste envera

## 2020-05-21 NOTE — TELEPHONE ENCOUNTER
----- Message from Matt Tamayo sent at 2020  8:36 AM EDT -----  Regarding: Dr. Aye Pedroza / Refill  Medication Refill    To: [facility]  Subject: Dr. Aye Pedroza / Bennie Silva  Patient's first and last name: Yamel Merchant  : 1940  ID numbers: #[id number]      Caller (if not patient): N/A  Relationship of caller (if not patient): N/A  Best contact number(s): [phone]  Name of medication and dosage if known: Tramadol 50mg  Is patient out of this medication (yes/no): No  Pharmacy name: [address] [place] [address] listed in chart? (yes/no):Yes  Pharmacy phone number: N/A  Date of last visit: 2/10/2020  Details to clarify the request: pt requesting a call asap.     Message copied/pasted from Carina Mensah

## 2020-05-21 NOTE — TELEPHONE ENCOUNTER
General Message/Vendor Calls     Caller's first and last name:       Reason for call:   New prescription for \"tramadol\" needs dosage increased.      Callback required yes/no and why:   Yes, to let her know when it has been done     Best contact number(s):   [phone]     Details to clarify the request:       Dulce Collazo

## 2020-05-21 NOTE — TELEPHONE ENCOUNTER
Called, spoke to pt  Received two pt identifiers  Pt informed still has refills on tramadol. Pt offered and accepted appt for 06/09/2020 @ 1315 w/ Pharm. Luisa. Pt verbalizes understanding of the instructions and has no further questions at this time.

## 2020-05-24 NOTE — TELEPHONE ENCOUNTER
[facility]Eika [facility] Office Pool             Patient's first and last name:Brenda VILLA   : [birth year]   ID numbers: #[id number]   Caller's first and last name:n/a   Reason for call:Pt stated that there was a misunderstanding with the medication \" Tramadol\"Pt would Bashirmaximo Mcintyre to keep medication as is. Pt would like a call from .Pt states\" that the person that called her this morning was very rude towards her\" . Callback required yes/no and why:yes   Best contact number(s): [phone]   Details to clarify the request:pt would like a call back as soon as possible.      Copy/Paste  ENVERA after closing on 20

## 2020-05-28 ENCOUNTER — NURSE TRIAGE (OUTPATIENT)
Dept: OTHER | Facility: CLINIC | Age: 80
End: 2020-05-28

## 2020-05-28 NOTE — TELEPHONE ENCOUNTER
Reason for Disposition   HIGH RISK patient (e.g., age > 59 years, diabetes, heart or lung disease, weak immune system)    Answer Assessment - Initial Assessment Questions  1. COVID-19 DIAGNOSIS: \"Who made your Coronavirus (COVID-19) diagnosis? \" \"Was it confirmed by a positive lab test?\" If not diagnosed by a HCP, ask \"Are there lots of cases (community spread) where you live? \" (See public health department website, if unsure)    * MAJOR community spread: high number of cases; numbers of cases are increasing; many people hospitalized. * MINOR community spread: low number of cases; not increasing; few or no people hospitalized      n/a  2. ONSET: \"When did the COVID-19 symptoms start? \"       2 weeks ago  3. WORST SYMPTOM: \"What is your worst symptom? \" (e.g., cough, fever, shortness of breath, muscle aches)      cough  4. COUGH: \"How bad is the cough? \"        Cough is constant and so bad she is choking and sometimes causes patient to throw up; keeps her awake at night  5. FEVER: \"Do you have a fever? \" If so, ask: \"What is your temperature, how was it measured, and when did it start? \"      no  6. RESPIRATORY STATUS: \"Describe your breathing? \" (e.g., shortness of breath, wheezing, unable to speak)       Mild shortness of air (no wheezing) patient believes this is related to no sleep at all and taking cr of   7. BETTER-SAME-WORSE: [place] you getting better, staying the same or getting worse compared to yesterday? \"  If getting worse, ask, \"In what way? \"      worsening  8. HIGH RISK DISEASE: \"Do you have any chronic medical problems? \" (e.g., asthma, heart or lung disease, weak immune system, etc.)      COPD  9. PREGNANCY: \"Is there any chance you are pregnant? \" \"When was your last menstrual period? \"      no  10. OTHER SYMPTOMS: \"Do you have any other symptoms? \"  (e.g., runny nose, headache, sore throat, loss of smell)        Fatigue (exhaustion), cough, sore throat    Protocols used: CORONAVIRUS (COVID-19) DIAGNOSED OR SUSPECTED-ADULT-OH      Patient c/o of sore throat and constant cough for the past two weeks. Cough is interfering with sleep and patient is sole caretaker of a spouse with profound dementia (completely dependent). As a result of her illness and her effort to take care of her  she is feeling \"completely exhausted\". Recommended patient talk with PCP now to see what is appropriate going forward. Car advice provided. Warm transfer to service center for initiation of provider contact.

## 2020-05-29 ENCOUNTER — VIRTUAL VISIT (OUTPATIENT)
Dept: INTERNAL MEDICINE CLINIC | Age: 80
End: 2020-05-29

## 2020-05-29 ENCOUNTER — TELEPHONE (OUTPATIENT)
Dept: INTERNAL MEDICINE CLINIC | Age: 80
End: 2020-05-29

## 2020-05-29 DIAGNOSIS — K58.0 IRRITABLE BOWEL SYNDROME WITH DIARRHEA: ICD-10-CM

## 2020-05-29 DIAGNOSIS — J01.10 ACUTE NON-RECURRENT FRONTAL SINUSITIS: Primary | ICD-10-CM

## 2020-05-29 DIAGNOSIS — F51.01 PRIMARY INSOMNIA: ICD-10-CM

## 2020-05-29 DIAGNOSIS — J20.9 ACUTE BRONCHITIS, UNSPECIFIED ORGANISM: ICD-10-CM

## 2020-05-29 DIAGNOSIS — M81.0 AGE-RELATED OSTEOPOROSIS WITHOUT CURRENT PATHOLOGICAL FRACTURE: ICD-10-CM

## 2020-05-29 RX ORDER — CODEINE PHOSPHATE AND GUAIFENESIN 10; 100 MG/5ML; MG/5ML
5 SOLUTION ORAL
Qty: 75 ML | Refills: 0 | Status: SHIPPED | OUTPATIENT
Start: 2020-05-29 | End: 2020-08-12 | Stop reason: SDUPTHER

## 2020-05-29 RX ORDER — CEFDINIR 300 MG/1
300 CAPSULE ORAL 2 TIMES DAILY
Qty: 14 CAP | Refills: 0 | Status: SHIPPED | OUTPATIENT
Start: 2020-05-29 | End: 2020-07-29 | Stop reason: SDUPTHER

## 2020-05-29 NOTE — PROGRESS NOTES
Sarah Sharif is a 78 y.o. female who was seen by synchronous (real-time) audio-video technology on 5/29/2020. Consent: Sarah Sharif, who was seen by synchronous (real-time) audio-video technology, and/or her healthcare decision maker, is aware that this patient-initiated, Telehealth encounter on 5/29/2020 is a billable service, with coverage as determined by her insurance carrier. She is aware that she may receive a bill and has provided verbal consent to proceed: Yes. Assessment & Plan:   Diagnoses and all orders for this visit:    1. Acute non-recurrent frontal sinusitis    2. Acute bronchitis, unspecified organism  -     guaiFENesin-codeine (ROBITUSSIN AC) 100-10 mg/5 mL solution; Take 5 mL by mouth every twelve (12) hours as needed for Cough for up to 7 days. Max Daily Amount: 10 mL. 3. Irritable bowel syndrome with diarrhea    4. Primary insomnia    5. Age-related osteoporosis without current pathological fracture    Other orders  -     cefdinir (OMNICEF) 300 mg capsule; Take 1 Cap by mouth two (2) times a day for 7 days. I spent at least 25 minutes on this visit with this established patient. 712  Subjective:   Sarah Sharif is a 78 y.o. female who was seen for Cough (mostly at night) and Sore Throat    Last seen by me feb 10, 2020. Complains of worsening sinus congestion, pnd with drainage, productive cough, no energy. In addition, her 's dementia is progressing, and he is unable to dress himself or feed himself, so she is always attending to him. Denies f/c. Taking otc robitussin with minimal relief. This is a virtual visit due to covid 19. Prior to Admission medications    Medication Sig Start Date End Date Taking? Authorizing Provider   guaiFENesin-codeine (ROBITUSSIN AC) 100-10 mg/5 mL solution Take 5 mL by mouth every twelve (12) hours as needed for Cough for up to 7 days.  Max Daily Amount: 10 mL. 5/29/20 6/5/20 Yes Bill Wills III, DO   cefdinir (OMNICEF) 300 mg capsule Take 1 Cap by mouth two (2) times a day for 7 days. 5/29/20 6/5/20 Yes Bill Wills III, DO   traMADoL (ULTRAM) 50 mg tablet TAKE 1 TABLET BY MOUTH EVERY 12 HOURS AS NEEDED FOR PAIN 5/15/20 6/14/20 Yes Bill Wills III, DO   ezetimibe (ZETIA) 10 mg tablet TAKE 1 TABLET BY MOUTH DAILY 5/7/20  Yes Bill Wills III, DO   clorazepate (TRANXENE) 3.75 mg tablet TAKE 1 TABLET BY MOUTH EVERY 6 HOURS AS NEEDED FOR ANXIETY. MAX DAILY AMOUNT 4 TABLETS 5/4/20  Yes Bill Wills III, DO   escitalopram oxalate (LEXAPRO) 20 mg tablet TAKE 1 TABLET BY MOUTH DAILY 4/28/20  Yes Bill Wills III, DO   traZODone (DESYREL) 100 mg tablet Take 1 Tab by mouth nightly as needed for Sleep. 4/8/20  Yes Bill Wills III, DO   loperamide (IMODIUM) 2 mg capsule TAKE 1 CAPSULE BY MOUTH FOUR TIMES DAILY AS NEEDED FOR DIARRHEA 3/20/20  Yes Bill Wills III, DO   ondansetron hcl (ZOFRAN) 4 mg tablet Take 1 Tab by mouth every eight (8) hours as needed for Nausea or Vomiting. 3/19/20  Yes Walter Wills III, DO   levETIRAcetam (KEPPRA) 500 mg tablet take 1 tablet every morning and 1.5 tablet every evening for migraines 3/17/20  Yes Bill Wills III, DO   nystatin (MYCOSTATIN) 100,000 unit/mL suspension SWISH AND SWALLOW 5 ML BY MOUTH FOR 30 SECONDS 4 TIMES A DAY AS NEEDED FOR MOUTH PAIN 3/2/20  Yes Bill Wills III, DO   promethazine (PHENERGAN) 12.5 mg tablet Take 1 Tab by mouth every six (6) hours as needed for Nausea. 2/20/20  Yes Bill Wills III, DO   fluticasone propionate (FLONASE) 50 mcg/actuation nasal spray 2 Sprays by Both Nostrils route daily as needed for Rhinitis. 2/10/20  Yes Bill Wills III, DO   amitriptyline (ELAVIL) 10 mg tablet TAKE 1 TABLET BY MOUTH EVERY NIGHT AT BEDTIME 1/30/20  Yes Bill Wills III, DO   SITagliptin (JANUVIA) 100 mg tablet Take 1 Tab by mouth daily.  1/21/20  Yes Yuko Conner, Bill ARMENDARIZ III, DO   metoprolol succinate (TOPROL-XL) 25 mg XL tablet TAKE 1/2 TABLET BY MOUTH ONCE DAILY 1/3/20  Yes Bill Wills III, DO   butalbital-acetaminophen-caffeine (FIORICET, ESGIC) -40 mg per tablet Take 1 Tab by mouth every twelve (12) hours as needed for Headache. 12/30/19  Yes Bill Wills III, DO   atorvastatin (LIPITOR) 20 mg tablet Take 1 Tab by mouth daily. 11/27/19  Yes Monserrat Wills III, DO   levothyroxine (SYNTHROID) 25 mcg tablet Take 2 Tabs by mouth Daily (before breakfast). 11/7/19  Yes Bill Wills III, DO   fluticasone propion-salmeterol (ADVAIR DISKUS) 250-50 mcg/dose diskus inhaler Take 1 Puff by inhalation two (2) times a day. 10/22/19  Yes Bill Wills III, DO   verapamil (CALAN) 40 mg tablet Take 1 Tab by mouth two (2) times a day. 5/14/19  Yes Bill Wills III, DO   mirabegron ER (MYRBETRIQ) 25 mg ER tablet Take 1 Tab by mouth daily. 5/10/19  Yes Bill Wills III, DO   esomeprazole (NEXIUM) 20 mg capsule TAKE 1 CAPSULE BY MOUTH DAILY 4/30/19  Yes Bill Wills III, DO   Bifidobacterium Infantis (ALIGN) 4 mg cap Take 1 Cap by mouth daily as needed for Diarrhea. Indications: ALIGN OTC 4/30/19  Yes Bill Wills III, DO   warfarin (COUMADIN) 2 mg tablet TAKE 1 TABLET BY MOUTH DAILY 4/24/19  Yes Bill Wills III, DO   dicyclomine (BENTYL) 20 mg tablet Take 1 Tab by mouth two (2) times a day. 3/13/19  Yes Melchor Burgess MD   oxybutynin chloride XL (DITROPAN XL) 10 mg CR tablet Take 1 Tab by mouth daily. 3/13/19  Yes Melchor Burgess MD   glucose blood VI test strips [facility] VERIO) strip Use to check blood sugar twice weekly 3/8/19  Yes Rosette Ocampo MD   lancets [facility] ULTRASOFT LANCETS) misc Use to check blood sugar twice weekly 3/8/19  Yes Rosette Ocampo MD   colestipol (COLESTID) 1 gram tablet Take 1 g by mouth daily.    Yes Provider, Historical   cholecalciferol (VITAMIN D3) 1,000 unit tablet Take 1,000 Units by mouth daily. Yes Provider, Historical   Biotin 2,500 mcg cap Take 1 Cap by mouth daily. Yes Provider, Historical   celecoxib (CELEBREX) 100 mg capsule Take 100 mg by mouth daily. Take with food for arthritic pain   Indications: rheumatoid arthritis   Yes Provider, Historical   calcium carbonate (TUMS) 200 mg calcium (500 mg) chew Take 1 Tab by mouth daily as needed. Yes Provider, Historical   fluticasone propion-salmeteroL (Advair Diskus) 250-50 mcg/dose diskus inhaler Take 1 Puff by inhalation every twelve (12) hours. 5/14/20   Jeannette  B III, DO     Allergies   Allergen Reactions    Iodinated Contrast Media Other (comments)     Passes out    Pcn [Penicillins] Shortness of Breath    Sulfa (Sulfonamide Antibiotics) Shortness of Breath           ROS      Objective: There were no vitals taken for this visit. General: alert, cooperative, no distress   Mental  status: normal mood, behavior, speech, dress, motor activity, and thought processes, able to follow commands   HENT: NCAT   Neck: no visualized mass   Resp: no respiratory distress   Neuro: no gross deficits   Skin: no discoloration or lesions of concern on visible areas   Psychiatric: normal affect, consistent with stated mood, no evidence of hallucinations     Additional exam findings:     1. Acute sinusitis and acute bronchitis--continue flonase. Add omnicef and eric a/c.  2.  Copd--continue advair  3. Hypothyroid--on synthroid  4. Cad with hx cabg--  5. Hx dvt--on coumadin, INR to be checked next week. 6.  Anxiety and depression--continue lexapro, elavil, tranxene    rtc for regular visit. Hospice eval ordered for her . We discussed the expected course, resolution and complications of the diagnosis(es) in detail. Medication risks, benefits, costs, interactions, and alternatives were discussed as indicated.   I advised her to contact the office if her condition worsens, changes or fails to improve as anticipated. She expressed understanding with the diagnosis(es) and plan. Kenia Ivory is a 78 y.o. female who was evaluated by a video visit encounter for concerns as above. Patient identification was verified prior to start of the visit. A caregiver was present when appropriate. Due to this being a TeleHealth encounter (During LIPRZ-50 public health emergency), evaluation of the following organ systems was limited: Vitals/Constitutional/EENT/Resp/CV/GI//MS/Neuro/Skin/Heme-Lymph-Imm. Pursuant to the emergency declaration under the [address] [place] [address], [facility] waiver authority and the [employer] Act, this Virtual  Visit was conducted, with patient's (and/or legal guardian's) consent, to reduce the patient's risk of exposure to COVID-19 and provide necessary medical care. Services were provided through a video synchronous discussion virtually to substitute for in-person clinic visit. Patient and provider were located at their individual homes.       Anel Ching III, DO

## 2020-05-29 NOTE — TELEPHONE ENCOUNTER
Pharmacy Progress Note - Telephone Encounter    S/O: Ms. Asael Neville 78 y.o. female, referred by Dr. Aixa Ugalde, was contacted via an outbound telephone call to discuss her upcoming INR check today. Verified patients identifiers (name & ) per HIPAA policy. Reports to taking warfarin 2 mg daily. Denies any bleeding/bruising. Increased stress level -  enrolled into hospice care today. A/P:  - Pt is overdue for INR check. Clarified format of upcoming INR f/u. - Patient endorses understanding to the provided information. All questions answered at this time.      Thank you,  Zulema Siddiqui, PharmD, BCACP, CDE           CLINICAL PHARMACY CONSULT: MED RECONCILIATION/REVIEW ADDENDUM    For Pharmacy Admin Tracking Only    PHSO: PHSO Patient?: No    Time Spent (min): 10

## 2020-06-08 ENCOUNTER — TELEPHONE (OUTPATIENT)
Dept: INTERNAL MEDICINE CLINIC | Age: 80
End: 2020-06-08

## 2020-06-08 NOTE — TELEPHONE ENCOUNTER
Reason for call:   Cannot go to the office tomorrow but can do any day next week. Callback required yes/no and why:   Yes, to schedule a day to go in next week.      Best contact number(s):   [phone]     Details to clarify the request:       Wing Sanchez

## 2020-06-09 ENCOUNTER — TELEPHONE (OUTPATIENT)
Dept: INTERNAL MEDICINE CLINIC | Age: 80
End: 2020-06-09

## 2020-06-09 NOTE — TELEPHONE ENCOUNTER
Bill Wlils III, DO  You 6 minutes ago (10:41 AM)     Resume nexium    Routing comment      Pt addressed.

## 2020-06-09 NOTE — TELEPHONE ENCOUNTER
Patient states she needs a call back to discuss getting something called in for her Heartburn that has kept her up all night. Please call to discuss.  Thank you

## 2020-06-09 NOTE — TELEPHONE ENCOUNTER
Patient states she needs a call back to discuss getting her appt today, 6/9/20 switched to tomorrow, 6/10/20 at 2 pm. Please call to advise.  Thank you

## 2020-06-10 ENCOUNTER — ANTI-COAG VISIT (OUTPATIENT)
Dept: INTERNAL MEDICINE CLINIC | Age: 80
End: 2020-06-10

## 2020-06-10 VITALS
OXYGEN SATURATION: 94 % | HEART RATE: 84 BPM | BODY MASS INDEX: 27.75 KG/M2 | TEMPERATURE: 98.2 F | DIASTOLIC BLOOD PRESSURE: 71 MMHG | WEIGHT: 147 LBS | HEIGHT: 61 IN | SYSTOLIC BLOOD PRESSURE: 116 MMHG

## 2020-06-10 DIAGNOSIS — I82.5Y2 CHRONIC DEEP VEIN THROMBOSIS (DVT) OF PROXIMAL VEIN OF LEFT LOWER EXTREMITY (HCC): ICD-10-CM

## 2020-06-10 DIAGNOSIS — Z86.718 HISTORY OF DVT OF LOWER EXTREMITY: Primary | ICD-10-CM

## 2020-06-10 LAB
INR BLD: 1.9 (ref 1–1.5)
PT POC: 23.2 SECONDS (ref 9.1–12)
VALID INTERNAL CONTROL?: YES

## 2020-06-10 NOTE — PROGRESS NOTES
Pharmacy Progress Note - Anticoagulation Management    S/O: Ms. Brenda Dunlap is a 79 y. o. female , with a PMH of OAB, IBS, LE DVT, anxiety, insomonia, Renee's esophagus, Arthritis, Migraines, hyperlipidemia, who was seen today for anticoagulation management for the diagnosis of LLE Deep Vein Thrombosis.     Pt ambulates w/ a cane.      · Warfarin start date:  Around 2008   · INR Goal:  2.0-3.0    · Current warfarin regimen:  2 mg daily                      · Warfarin tablet strength:   1 mg, 2 mg   · Duration of therapy: Indefinite    Today's pertinent positives includes:  No significant changes since last visit     Her  is currently in hospice care. Results for orders placed or performed in visit on 06/10/20   AMB POC PT/INR   Result Value Ref Range    VALID INTERNAL CONTROL POC Yes     Prothrombin time (POC) 23.2 (A) 9.1 - 12 seconds    INR POC 1.9 (A) 1 - 1.5     · Adherence:   · Able to recall regimen? YES  · Miss/extra dose? NO  · Need refill? NO    Upcoming procedure(s):  NO      Past Medical History:   Diagnosis Date    Arthritis     Diabetes ([facility] [address]     History of DVT (deep vein thrombosis)     History of recurrent UTIs     Hypercholesterolemia     Irritable bowel syndrome (IBS)      Allergies   Allergen Reactions    Iodinated Contrast Media Other (comments)     Passes out    Pcn [Penicillins] Shortness of Breath    Sulfa (Sulfonamide Antibiotics) Shortness of Breath     Current Outpatient Medications   Medication Sig    traMADoL (ULTRAM) 50 mg tablet TAKE 1 TABLET BY MOUTH EVERY 12 HOURS AS NEEDED FOR PAIN    fluticasone propion-salmeteroL (Advair Diskus) 250-50 mcg/dose diskus inhaler Take 1 Puff by inhalation every twelve (12) hours.  ezetimibe (ZETIA) 10 mg tablet TAKE 1 TABLET BY MOUTH DAILY    clorazepate (TRANXENE) 3.75 mg tablet TAKE 1 TABLET BY MOUTH EVERY 6 HOURS AS NEEDED FOR ANXIETY.  MAX DAILY AMOUNT 4 TABLETS    escitalopram oxalate (LEXAPRO) 20 mg tablet TAKE 1 TABLET BY MOUTH DAILY    traZODone (DESYREL) 100 mg tablet Take 1 Tab by mouth nightly as needed for Sleep.  loperamide (IMODIUM) 2 mg capsule TAKE 1 CAPSULE BY MOUTH FOUR TIMES DAILY AS NEEDED FOR DIARRHEA    ondansetron hcl (ZOFRAN) 4 mg tablet Take 1 Tab by mouth every eight (8) hours as needed for Nausea or Vomiting.  levETIRAcetam (KEPPRA) 500 mg tablet take 1 tablet every morning and 1.5 tablet every evening for migraines    nystatin (MYCOSTATIN) 100,000 unit/mL suspension SWISH AND SWALLOW 5 ML BY MOUTH FOR 30 SECONDS 4 TIMES A DAY AS NEEDED FOR MOUTH PAIN    promethazine (PHENERGAN) 12.5 mg tablet Take 1 Tab by mouth every six (6) hours as needed for Nausea.  fluticasone propionate (FLONASE) 50 mcg/actuation nasal spray 2 Sprays by Both Nostrils route daily as needed for Rhinitis.  amitriptyline (ELAVIL) 10 mg tablet TAKE 1 TABLET BY MOUTH EVERY NIGHT AT BEDTIME    SITagliptin (JANUVIA) 100 mg tablet Take 1 Tab by mouth daily.  metoprolol succinate (TOPROL-XL) 25 mg XL tablet TAKE 1/2 TABLET BY MOUTH ONCE DAILY    butalbital-acetaminophen-caffeine (FIORICET, ESGIC) -40 mg per tablet Take 1 Tab by mouth every twelve (12) hours as needed for Headache.  atorvastatin (LIPITOR) 20 mg tablet Take 1 Tab by mouth daily.  levothyroxine (SYNTHROID) 25 mcg tablet Take 2 Tabs by mouth Daily (before breakfast).  fluticasone propion-salmeterol (ADVAIR DISKUS) 250-50 mcg/dose diskus inhaler Take 1 Puff by inhalation two (2) times a day.  verapamil (CALAN) 40 mg tablet Take 1 Tab by mouth two (2) times a day.  mirabegron ER (MYRBETRIQ) 25 mg ER tablet Take 1 Tab by mouth daily.  esomeprazole (NEXIUM) 20 mg capsule TAKE 1 CAPSULE BY MOUTH DAILY    Bifidobacterium Infantis (ALIGN) 4 mg cap Take 1 Cap by mouth daily as needed for Diarrhea.  Indications: ALIGN OTC    warfarin (COUMADIN) 2 mg tablet TAKE 1 TABLET BY MOUTH DAILY    dicyclomine (BENTYL) 20 mg tablet Take 1 Tab by mouth two (2) times a day.  oxybutynin chloride XL (DITROPAN XL) 10 mg CR tablet Take 1 Tab by mouth daily.  glucose blood VI test strips (ONETOUCH VERIO) strip Use to check blood sugar twice weekly    lancets (ONETOUCH ULTRASOFT LANCETS) misc Use to check blood sugar twice weekly    colestipol (COLESTID) 1 gram tablet Take 1 g by mouth daily.  cholecalciferol (VITAMIN D3) 1,000 unit tablet Take 1,000 Units by mouth daily.  Biotin 2,500 mcg cap Take 1 Cap by mouth daily.  celecoxib (CELEBREX) 100 mg capsule Take 100 mg by mouth daily. Take with food for arthritic pain   Indications: rheumatoid arthritis    calcium carbonate (TUMS) 200 mg calcium (500 mg) chew Take 1 Tab by mouth daily as needed. No current facility-administered medications for this visit. Wt Readings from Last 3 Encounters:   06/10/20 147 lb (66.7 kg)   01/20/20 148 lb 3.2 oz (67.2 kg)   01/16/20 150 lb (68 kg)     BP Readings from Last 3 Encounters:   06/10/20 116/71   02/10/20 95/62   01/20/20 110/68     Pulse Readings from Last 3 Encounters:   06/10/20 84   02/10/20 (!) 103   01/20/20 81     Lab Results   Component Value Date/Time    WBC 9.5 01/20/2020 03:38 PM    HGB 10.6 (L) 01/20/2020 03:38 PM    HCT 35.6 01/20/2020 03:38 PM    PLATELET 545 45/19/7277 03:38 PM    MCV 71 (L) 01/20/2020 03:38 PM     Lab Results   Component Value Date/Time    Sodium 142 01/20/2020 03:38 PM    Potassium 3.8 01/20/2020 03:38 PM    Chloride 105 01/20/2020 03:38 PM    CO2 21 01/20/2020 03:38 PM    Glucose 114 (H) 01/20/2020 03:38 PM    BUN 13 01/20/2020 03:38 PM    Creatinine 0.73 01/20/2020 03:38 PM    BUN/Creatinine ratio 18 01/20/2020 03:38 PM    GFR est AA 89 01/20/2020 03:38 PM    GFR est non-AA 77 01/20/2020 03:38 PM    Calcium 9.3 01/20/2020 03:38 PM    Bilirubin, total 0.3 01/20/2020 03:38 PM    Alk.  phosphatase 75 01/20/2020 03:38 PM    Protein, total 6.7 01/20/2020 03:38 PM    Albumin 3.7 01/20/2020 03:38 PM    A-G Ratio 1.2 01/20/2020 03:38 PM    ALT (SGPT) 9 01/20/2020 03:38 PM     Estimated Creatinine Clearance: 52.9 mL/min (by C-G formula based on SCr of 0.73 mg/dL). INR History:   (normal INR range 0.8-1.2)     Date   INR   PT   Dose/Comments  06/10/20 1.9  23.2 2 mg daily  Lapse in monitoring d/t COVID 19  01/15/20          2.5                   29.9     2 mg daily  12/02/19          2.0                   24.4     2 mg daily  10/17/19          2.7                   32.4     2 mg daily - reestablished with this practice                 05/09/19          2.1                   25.5     2 mg daily  04/25/19          2.2                   26.6     Hold x1, then 2 mg daily  04/11/19          3.6                   42. 8     2 mg daily   02/26/19          2.6                   31.5     2 mg daily  01/28/19          2.0                   24.2     2 mg daily                    01/17/19          2.8                   34.1     2 mg daily    A/P:       Anticoagulation:  Considering Ms. Dunlap's past history, todays findings, and per Anticoagulation Collaborative Practice Agreement/Protocol:    1. POC INR (1.9) is subtherapeutic for INR goal today. 2. Take 4 mg tonight. Then Continue warfarin 2 mg daily. Patient was instructed to schedule an appointment in 2 week(s) prior to leaving the clinic. Medication reconciliation was completed during the visit. Medications Discontinued During This Encounter   Medication Reason    fluticasone propion-salmeterol (ADVAIR DISKUS) 250-50 mcg/dose diskus inhaler Duplicate Order       A full discussion of the nature of anticoagulants has been carried out. A full discussion of the need for frequent and regular monitoring, precise dosage adjustment and compliance was stressed. Side effects of potential bleeding were discussed and Ms. José Miguel Llanes was instructed to call [phone] if there are any signs of abnormal bleeding.   Ms. José Miguel Llanes was instructed to avoid any OTC items containing aspirin or ibuprofen and prior to starting any new OTC products to consult with her physician or pharmacist to ensure no drug interactions are present. Ms. Ho Becker was instructed to avoid any major changes in her general diet and to avoid alcohol consumption. Ms. Ho Becker was provided information in the AVS that includes topics on understanding coumadin therapy, drug interaction considerations, vitamin K and coumadin use, interactions with foods and supplements containing vitamin K, and the use of herbal products. Ms. Ho Becker verbalized her understanding of all instructions and will call the office with any questions, concerns, or signs of abnormal bleeding or blood clot. Notifications of recommendations will be sent to Dr. Jessica Miranda DO for review.     Thank you,  Zulema Carreno, PharmD, BCACP, CDE                                       CLINICAL PHARMACY CONSULT: MED RECONCILIATION/REVIEW ADDENDUM    For Pharmacy Admin Tracking Only    PHSO: PHSO Patient?: No  Total # of Interventions Recommended: Count: 3  - Increased Dose #: 1  - Discontinued Medication #: 1 Discontinue Reason(s): Duplicate  - Maintenance Safety Lab Monitoring #: 1    Time Spent (min): 20

## 2020-06-10 NOTE — PATIENT INSTRUCTIONS
Today your INR was 1.9. Your goal INR is  2.0-3.0 . You have a 2mg tablet of Coumadin (warfarin). Take Coumadin (warfarin) as follows: Take 4 mg tonight. Then continue with warfarin 2 mg daily. Come back in  2 week(s) for your next finger stick/INR blood test.   
 
 
Avoid any over the counter items containing aspirin or ibuprofen, and avoid great swings in general diet. Avoid alcohol consumption. Please notify the INR nurse if you are started on any new medication including over the counter or herbal supplements. Also, please notify your INR nurse if any of your other prescription or over the counter medications have been discontinued. Call [facility] at [phone] if you have any signs of abnormal bleeding/blood clot. 
------------------------------------------------------------------------------------------------------------------ Taking Warfarin Safely: Care Instructions Your Care Instructions Warfarin is a medicine that you take to prevent blood clots. It is often called a blood thinner. Doctors give warfarin (such as Coumadin) to reduce the risk of blood clots. You may be at risk for blood clots if you have atrial fibrillation or deep vein thrombosis. Some other health problems may also put you at risk. Warfarin slows the amount of time it takes for your blood to clot. It can cause bleeding problems. Even if you've been taking warfarin for a while, it's important to know how to take it safely. Foods and other medicines can affect the way warfarin works. Some can make warfarin work too well. This can cause bleeding problems. And some can make it work poorly, so that it does not prevent blood clots very well. You will need regular blood tests to check how long it takes for your blood to form a clot. This test is called a PT or prothrombin time test. The result of the test is called an INR level.  Depending on the test results, your doctor or anticoagulation clinic may adjust your dose of warfarin. Follow-up care is a key part of your treatment and safety. Be sure to make and go to all appointments, and call your doctor if you are having problems. It's also a good idea to know your test results and keep a list of the medicines you take. How can you care for yourself at home? Take warfarin safely · Take your warfarin at the same time each day. · If you miss a dose of warfarin, don't take an extra dose to make up for it. Your doctor can tell you exactly what to do so you don't take too much or too little. · Wear medical alert jewelry that lets others know that you take warfarin. You can buy this at most drugstores. · Don't take warfarin if you are pregnant or planning to get pregnant. Talk to your doctor about how you can prevent getting pregnant while you are taking it. · Don't change your dose or stop taking warfarin unless your doctor tells you to. Effects of medicines and food on warfarin · Don't start or stop taking any medicines, vitamins, or natural remedies unless you first talk to your doctor. Many medicines can affect how warfarin works. These include aspirin and other pain relievers, over-the-counter medicines, multivitamins, dietary supplements, and herbal products. · Tell all of your doctors and pharmacists that you take warfarin. Some prescription medicines can affect how warfarin works. · Keep the amount of vitamin K in your diet about the same from day to day. Do not suddenly eat a lot more or a lot less food that is rich in vitamin K than you usually do. Vitamin K affects how warfarin works and how your blood clots. Talk with your doctor before making big changes in your diet. Vitamin K is in many foods, such as: 
¨ Leafy greens, such as kale, cabbage, spinach, Swiss chard, and lettuce. ¨ Canola and soybean oils. ¨ Green vegetables, such as asparagus, broccoli, and [place] sprouts. ¨ Vegetable drinks, green tea leaves, and some dietary supplement drinks. · Avoid cranberry juice and other cranberry products. They can increase the effects of warfarin. · Limit your use of alcohol. Avoid bleeding by preventing falls and injuries · Wear slippers or shoes with nonskid soles. · Remove throw rugs and clutter. · Rearrange furniture and electrical cords to keep them out of walking paths. · Keep stairways, porches, and outside walkways well lit. Use night-lights in hallways and bathrooms. · Be extra careful when you work with sharp tools or knives. When should you call for help? Call 911 anytime you think you may need emergency care. For example, call if: 
· You have a sudden, severe headache that is different from past headaches. Call your doctor now or seek immediate medical care if: 
· You have any abnormal bleeding, such as: 
¨ Nosebleeds. ¨ Vaginal bleeding that is different (heavier, more frequent, at a different time of the month) than what you are used to. ¨ Bloody or black stools, or rectal bleeding. ¨ Bloody or pink urine. Watch closely for changes in your health, and be sure to contact your doctor if you have any problems. Where can you learn more? Go to http://lynn-xu.info/. Enter G334 in the search box to learn more about \"Taking Warfarin Safely: Care Instructions. \" Current as of: January 27, 2016 Content Version: 11.1 © 3971-6813 [employer]. Care instructions adapted under license by [employer] (which disclaims liability or warranty for this information). If you have questions about a medical condition or this instruction, always ask your healthcare professional. [place] [address] any warranty or liability for your use of this information.

## 2020-06-11 ENCOUNTER — TELEPHONE (OUTPATIENT)
Dept: INTERNAL MEDICINE CLINIC | Age: 80
End: 2020-06-11

## 2020-06-11 NOTE — TELEPHONE ENCOUNTER
Caller's first and last name: N/A   Reason for call: Pt stated she remembered the name of the drug she was trying to remember earlier today. Pt stated the drug is Lipitor.    Callback required yes/no and why: No   Best contact number(s): [phone]   Details to clarify the request: N/A

## 2020-06-12 RX ORDER — VERAPAMIL HYDROCHLORIDE 40 MG/1
TABLET ORAL
Qty: 180 TAB | Refills: 3 | Status: SHIPPED | OUTPATIENT
Start: 2020-06-12 | End: 2021-06-25 | Stop reason: ALTCHOICE

## 2020-06-19 RX ORDER — WARFARIN 2 MG/1
TABLET ORAL
Qty: 90 TAB | Refills: 3 | Status: SHIPPED | OUTPATIENT
Start: 2020-06-19 | End: 2021-07-20 | Stop reason: SDUPTHER

## 2020-06-21 RX ORDER — MIRABEGRON 25 MG/1
TABLET, FILM COATED, EXTENDED RELEASE ORAL
Qty: 90 TAB | Refills: 3 | Status: SHIPPED | OUTPATIENT
Start: 2020-06-21 | End: 2021-06-25 | Stop reason: ALTCHOICE

## 2020-07-05 ENCOUNTER — DOCUMENTATION ONLY (OUTPATIENT)
Dept: INTERNAL MEDICINE CLINIC | Age: 80
End: 2020-07-05

## 2020-07-10 ENCOUNTER — TELEPHONE (OUTPATIENT)
Dept: INTERNAL MEDICINE CLINIC | Age: 80
End: 2020-07-10

## 2020-07-10 NOTE — TELEPHONE ENCOUNTER
Jane Laureano               Patient return call     Caller's first and last name and relationship (if not the patient):   Dunia Poole       Best contact number(s):[phone]       Whose call is being returned:         Details to clarify the request: need to schedule appt for Dr. Tabitha Gonzalez on Mon or Wed of next week (only days have available.        Jane Laureano       Copy/Paste  [email or web address]

## 2020-07-14 NOTE — TELEPHONE ENCOUNTER
Called, spoke to pt. Two identifiers confirmed. Appointment scheduled for 7/23 @ 330 with Dr. Kassi Ferguson.   Pt verbalized understanding of information discussed w/ no further questions at this time.

## 2020-07-14 NOTE — TELEPHONE ENCOUNTER
#[phone]  Pt states she called last week without a call back at all. Why she ask? Pt has a sore throat and cough that brings up a lot of mucus. She states this seems like a sinus problem    Pt states her  went to the hospital to pass tonight or tomorrow. Pt would like to be seen by Dr. Melisa Kaiser at that time as she will be right here. Please call yet today.

## 2020-07-23 ENCOUNTER — OFFICE VISIT (OUTPATIENT)
Dept: INTERNAL MEDICINE CLINIC | Age: 80
End: 2020-07-23

## 2020-07-23 ENCOUNTER — TELEPHONE (OUTPATIENT)
Dept: INTERNAL MEDICINE CLINIC | Age: 80
End: 2020-07-23

## 2020-07-23 NOTE — PROGRESS NOTES
Pt apparently showed up for her 15:30 appt at 13:30. I told her I was unable to see her until her scheduled time. Apparently she left and never came back.

## 2020-07-29 ENCOUNTER — OFFICE VISIT (OUTPATIENT)
Dept: INTERNAL MEDICINE CLINIC | Age: 80
End: 2020-07-29

## 2020-07-29 VITALS
TEMPERATURE: 98.1 F | OXYGEN SATURATION: 98 % | BODY MASS INDEX: 28.4 KG/M2 | RESPIRATION RATE: 20 BRPM | WEIGHT: 150.4 LBS | DIASTOLIC BLOOD PRESSURE: 83 MMHG | SYSTOLIC BLOOD PRESSURE: 123 MMHG | HEIGHT: 61 IN | HEART RATE: 87 BPM

## 2020-07-29 DIAGNOSIS — F32.A ANXIETY AND DEPRESSION: ICD-10-CM

## 2020-07-29 DIAGNOSIS — J41.0 SIMPLE CHRONIC BRONCHITIS (HCC): ICD-10-CM

## 2020-07-29 DIAGNOSIS — E03.9 ACQUIRED HYPOTHYROIDISM: ICD-10-CM

## 2020-07-29 DIAGNOSIS — G89.29 CHRONIC BACK PAIN, UNSPECIFIED BACK LOCATION, UNSPECIFIED BACK PAIN LATERALITY: ICD-10-CM

## 2020-07-29 DIAGNOSIS — R05.9 COUGH: ICD-10-CM

## 2020-07-29 DIAGNOSIS — M54.9 CHRONIC BACK PAIN, UNSPECIFIED BACK LOCATION, UNSPECIFIED BACK PAIN LATERALITY: ICD-10-CM

## 2020-07-29 DIAGNOSIS — F51.01 PRIMARY INSOMNIA: ICD-10-CM

## 2020-07-29 DIAGNOSIS — M81.0 AGE-RELATED OSTEOPOROSIS WITHOUT CURRENT PATHOLOGICAL FRACTURE: ICD-10-CM

## 2020-07-29 DIAGNOSIS — E11.69 HYPERLIPIDEMIA ASSOCIATED WITH TYPE 2 DIABETES MELLITUS (HCC): ICD-10-CM

## 2020-07-29 DIAGNOSIS — E78.5 HYPERLIPIDEMIA ASSOCIATED WITH TYPE 2 DIABETES MELLITUS (HCC): ICD-10-CM

## 2020-07-29 DIAGNOSIS — K58.0 IRRITABLE BOWEL SYNDROME WITH DIARRHEA: ICD-10-CM

## 2020-07-29 DIAGNOSIS — J01.10 ACUTE NON-RECURRENT FRONTAL SINUSITIS: Primary | ICD-10-CM

## 2020-07-29 DIAGNOSIS — E11.9 TYPE 2 DIABETES MELLITUS WITHOUT COMPLICATION, WITHOUT LONG-TERM CURRENT USE OF INSULIN (HCC): ICD-10-CM

## 2020-07-29 DIAGNOSIS — F41.9 ANXIETY AND DEPRESSION: ICD-10-CM

## 2020-07-29 DIAGNOSIS — I25.10 CORONARY ARTERY DISEASE INVOLVING NATIVE CORONARY ARTERY OF NATIVE HEART WITHOUT ANGINA PECTORIS: ICD-10-CM

## 2020-07-29 DIAGNOSIS — I82.5Y2 CHRONIC DEEP VEIN THROMBOSIS (DVT) OF PROXIMAL VEIN OF LEFT LOWER EXTREMITY (HCC): ICD-10-CM

## 2020-07-29 LAB
INR BLD: 2
PT POC: 23.4 SECONDS
VALID INTERNAL CONTROL?: YES

## 2020-07-29 RX ORDER — CEFDINIR 300 MG/1
300 CAPSULE ORAL 2 TIMES DAILY
Qty: 14 CAP | Refills: 0 | Status: SHIPPED | OUTPATIENT
Start: 2020-07-29 | End: 2020-08-05

## 2020-07-29 RX ORDER — FLUTICASONE PROPIONATE AND SALMETEROL 250; 50 UG/1; UG/1
1 POWDER RESPIRATORY (INHALATION) EVERY 12 HOURS
Qty: 1 INHALER | Refills: 3 | Status: SHIPPED | OUTPATIENT
Start: 2020-07-29 | End: 2021-07-22 | Stop reason: SDUPTHER

## 2020-07-29 RX ORDER — ALBUTEROL SULFATE 90 UG/1
2 AEROSOL, METERED RESPIRATORY (INHALATION)
Qty: 1 INHALER | Refills: 3 | Status: SHIPPED | OUTPATIENT
Start: 2020-07-29 | End: 2022-01-12 | Stop reason: SDUPTHER

## 2020-07-29 RX ORDER — DICYCLOMINE HYDROCHLORIDE 10 MG/1
CAPSULE ORAL
COMMUNITY
Start: 2020-07-13 | End: 2020-08-24

## 2020-07-29 NOTE — PATIENT INSTRUCTIONS
Today your INR was 2.0. Your goal INR is  2.0-3.0 . You have a   2 mg tablet of Coumadin (warfarin). Take Coumadin (warfarin) as follows:    Continue warfarin 2 mg daily. Come back in 4 week(s) for your next finger stick/INR blood test.        Avoid any over the counter items containing aspirin or ibuprofen, and avoid great swings in general diet. Avoid alcohol consumption. Please notify the INR nurse if you are started on any new medication including over the counter or herbal supplements. Also, please notify your INR nurse if any of your other prescription or over the counter medications have been discontinued. Call [facility] at [phone] if you have any signs of abnormal bleeding/blood clot.  ------------------------------------------------------------------------------------------------------------------  Taking Warfarin Safely: Care Instructions    Your Care Instructions  Warfarin is a medicine that you take to prevent blood clots. It is often called a blood thinner. Doctors give warfarin (such as Coumadin) to reduce the risk of blood clots. You may be at risk for blood clots if you have atrial fibrillation or deep vein thrombosis. Some other health problems may also put you at risk. Warfarin slows the amount of time it takes for your blood to clot. It can cause bleeding problems. Even if you've been taking warfarin for a while, it's important to know how to take it safely. Foods and other medicines can affect the way warfarin works. Some can make warfarin work too well. This can cause bleeding problems. And some can make it work poorly, so that it does not prevent blood clots very well. You will need regular blood tests to check how long it takes for your blood to form a clot. This test is called a PT or prothrombin time test. The result of the test is called an INR level.  Depending on the test results, your doctor or anticoagulation clinic may adjust your dose of warfarin. Follow-up care is a key part of your treatment and safety. Be sure to make and go to all appointments, and call your doctor if you are having problems. It's also a good idea to know your test results and keep a list of the medicines you take. How can you care for yourself at home? Take warfarin safely  · Take your warfarin at the same time each day. · If you miss a dose of warfarin, don't take an extra dose to make up for it. Your doctor can tell you exactly what to do so you don't take too much or too little. · Wear medical alert jewelry that lets others know that you take warfarin. You can buy this at most drugstores. · Don't take warfarin if you are pregnant or planning to get pregnant. Talk to your doctor about how you can prevent getting pregnant while you are taking it. · Don't change your dose or stop taking warfarin unless your doctor tells you to. Effects of medicines and food on warfarin  · Don't start or stop taking any medicines, vitamins, or natural remedies unless you first talk to your doctor. Many medicines can affect how warfarin works. These include aspirin and other pain relievers, over-the-counter medicines, multivitamins, dietary supplements, and herbal products. · Tell all of your doctors and pharmacists that you take warfarin. Some prescription medicines can affect how warfarin works. · Keep the amount of vitamin K in your diet about the same from day to day. Do not suddenly eat a lot more or a lot less food that is rich in vitamin K than you usually do. Vitamin K affects how warfarin works and how your blood clots. Talk with your doctor before making big changes in your diet. Vitamin K is in many foods, such as:  ¨ Leafy greens, such as kale, cabbage, spinach, Swiss chard, and lettuce. ¨ Canola and soybean oils. ¨ Green vegetables, such as asparagus, broccoli, and [place] sprouts. ¨ Vegetable drinks, green tea leaves, and some dietary supplement drinks.   · Avoid cranberry juice and other cranberry products. They can increase the effects of warfarin. · Limit your use of alcohol. Avoid bleeding by preventing falls and injuries  · Wear slippers or shoes with nonskid soles. · Remove throw rugs and clutter. · Rearrange furniture and electrical cords to keep them out of walking paths. · Keep stairways, porches, and outside walkways well lit. Use night-lights in hallways and bathrooms. · Be extra careful when you work with sharp tools or knives. When should you call for help? Call 911 anytime you think you may need emergency care. For example, call if:  · You have a sudden, severe headache that is different from past headaches. Call your doctor now or seek immediate medical care if:  · You have any abnormal bleeding, such as:  ¨ Nosebleeds. ¨ Vaginal bleeding that is different (heavier, more frequent, at a different time of the month) than what you are used to. ¨ Bloody or black stools, or rectal bleeding. ¨ Bloody or pink urine. Watch closely for changes in your health, and be sure to contact your doctor if you have any problems. Where can you learn more? Go to http://lynn-xu.info/. Enter W673 in the search box to learn more about \"Taking Warfarin Safely: Care Instructions. \"  Current as of: January 27, 2016  Content Version: 11.1  © 9710-8869 [employer]. Care instructions adapted under license by [employer] (which disclaims liability or warranty for this information). If you have questions about a medical condition or this instruction, always ask your healthcare professional. [place] [address] any warranty or liability for your use of this information.

## 2020-07-29 NOTE — PROGRESS NOTES
Qasim Alex is a 78 y.o. female who presents for evaluation of cough. Last seen by me may 29, 2020 for similar. Got better with week's worth of omnicef. Her  passed away about 2 weeks ago from dementia. He had steady decline over past 6 months. She was his primary care giver, though hospice did help at the end. She is most grateful for their help. Her cough has been keeping her awake most nights of late. Denies f/c or n/v. Appetite ok. She has plans to move back to [facility] next month, and is excited about that. She never felt comfortable here in [place].       ROS:  Constitutional: negative for fevers, chills, anorexia and weight loss  Eyes:   negative for visual disturbance and irritation  ENT:   negative for tinnitus,sore throat,nasal congestion,ear pain,hoarseness  Respiratory:  negative for  hemoptysis, dyspnea,wheezing.  ++cough  CV:   negative for chest pain, palpitations, lower extremity edema  GI:   negative for nausea, vomiting, diarrhea, abdominal pain,melena  Genitourinary: negative for frequency, dysuria and hematuria  Musculoskel: negative for myalgias, arthralgias, back pain, muscle weakness, joint pain  Neurological:  negative for headaches, dizziness, focal weakness, numbness  Psychiatric:     ++ for depression or anxiety      Past Medical History:   Diagnosis Date    Arthritis     Diabetes ([facility] [address]     History of DVT (deep vein thrombosis)     History of recurrent UTIs     Hypercholesterolemia     Irritable bowel syndrome (IBS)        Past Surgical History:   Procedure Laterality Date    HX HIP FRACTURE TX      HX OTHER SURGICAL      stint in heart       Family History   Problem Relation Age of Onset    Diabetes Mother     Stroke Mother        Social History     Socioeconomic History    Marital status:      Spouse name: Not on file    Number of children: Not on file    Years of education: Not on file    Highest education level: Not on file   Occupational History    Not on file   Social Needs    Financial resource strain: Not on file    Food insecurity     Worry: Not on file     Inability: Not on file    Transportation needs     Medical: Not on file     Non-medical: Not on file   Tobacco Use    Smoking status: Former Smoker    Smokeless tobacco: Never Used   Substance and Sexual Activity    Alcohol use: No     Frequency: Never    Drug use: No    Sexual activity: Not Currently   Lifestyle    Physical activity     Days per week: Not on file     Minutes per session: Not on file    Stress: Not on file   Relationships    Social connections     Talks on phone: Not on file     Gets together: Not on file     Attends Jehovah's witness service: Not on file     Active member of club or organization: Not on file     Attends meetings of clubs or organizations: Not on file     Relationship status: Not on file    Intimate partner violence     Fear of current or ex partner: Not on file     Emotionally abused: Not on file     Physically abused: Not on file     Forced sexual activity: Not on file   Other Topics Concern    Not on file   Social History Narrative    Not on file            Visit Vitals  /83 (BP 1 Location: Right arm, BP Patient Position: Sitting)   Pulse 87   Temp 98.1 °F (36.7 °C) (Temporal)   Resp 20   Ht 5' 1\" (1.549 m)   Wt 150 lb 6.4 oz (68.2 kg)   SpO2 98%   BMI 28.42 kg/m²       Physical Examination:   General - Well appearing female  HEENT - PERRL, TM no erythema/opacification, normal nasal turbinates, no oropharyngeal erythema or exudate, MMM  Neck - supple, no bruits, no thyroidomegaly, no lymphadenopathy  Pulm - clear to auscultation bilaterally--good air flow  Cardio - RRR, normal S1 S2, no murmur  Abd - soft, nontender, no masses, no HSM  Extrem - no edema, +2 distal pulses  Neuro-  No focal deficits, CN intact     Assessment/Plan:    1. Acute but recurrent sinusitis--rx for omnicef  2.   Chronic bronchitis--refills given for advair and albuterol  3. Hx dvt--on coumadin. INR 2.0 today  4. Anxiety and depression, with some bereavement--overall doing ok, continue with elavil and lexapro, and prn tranxene. I think the move back to [place] will be good for her. 5.  Cad with hx cabg--on toprol xl, coumadin  6.  oab--on myrbetriq  7.  ibs-diarrhea--prn bentyl    rtc prn.         Apollo Smiley III, DO

## 2020-08-08 DIAGNOSIS — J20.9 ACUTE BRONCHITIS, UNSPECIFIED ORGANISM: ICD-10-CM

## 2020-08-09 RX ORDER — TIZANIDINE 4 MG/1
TABLET ORAL
Qty: 75 ML | OUTPATIENT
Start: 2020-08-09

## 2020-08-10 NOTE — TELEPHONE ENCOUNTER
#[phone]  Pt states that she needs a call as the pharmacy states that a medication sent does not pertain to the pt. She does not know what this means.      Please call pt

## 2020-08-12 ENCOUNTER — TELEPHONE (OUTPATIENT)
Dept: INTERNAL MEDICINE CLINIC | Age: 80
End: 2020-08-12

## 2020-08-12 DIAGNOSIS — J20.9 ACUTE BRONCHITIS, UNSPECIFIED ORGANISM: ICD-10-CM

## 2020-08-12 RX ORDER — CODEINE PHOSPHATE AND GUAIFENESIN 10; 100 MG/5ML; MG/5ML
5 SOLUTION ORAL
Qty: 75 ML | Refills: 0 | Status: SHIPPED | OUTPATIENT
Start: 2020-08-12 | End: 2020-08-19

## 2020-08-12 NOTE — TELEPHONE ENCOUNTER
Patient states she needs a call back right away Please to discuss persistent symptoms from her office visit on 7/29/20. Patient states she is up all night coughing & feels horrible & needs to get medication prescribed for her cough that was prescribed back the end of May this years because that medication worked. Patient reports she is supposed to move from the [place] the End of August & needs to get better before her move. Please call to discuss.  Thank you

## 2020-08-18 NOTE — PERIOP NOTES
Pt called back, she is requesting to speak with a doctor regarding this procedure before continuing. She is understanding this to be a surgical procedure. I attempted to explain x 3 this is a steroid injection. Pt continued to state she is in the middle of moving and cannot have surgery right now. I told her I would reach out to Dr. Marv Tafoya office and have them reach out to her and if she wanted to proceed, she could call us. Pt verbalized understanding.

## 2020-08-24 NOTE — PERIOP NOTES
[facility]  Ambulatory Surgery Unit  Pre-operative Instructions    Procedure Date  8/25            Tentative Arrival Time 3:00pm      1. On the day of your procedure, please report to the Ambulatory Surgery Unit Registration Desk and sign in at your designated time. The Ambulatory Surgery Unit is located in [facility] on the [facility] side of the [facility] across from the [address] [place] [address]. Please have all of your health insurance cards and a photo ID. 2. You must have someone with you to drive you home as directed by your surgeon. 3. You may have a light breakfast and take normal morning medications. 4. We recommend you do not drink any alcoholic beverages for 24 hours before and after your procedure. 5. Contact your surgeons office for instructions on the following medications: non-steroidal anti-inflammatory drugs (i.e. Advil, Aleve), vitamins, and supplements. (Some surgeons will want you to stop these medications prior to surgery and others may allow you to take them)   **If you are currently taking Plavix, Coumadin, Aspirin and/or other blood-thinning agents, contact your surgeon for instructions. ** Your surgeon will partner with the physician prescribing these medications to determine if it is safe to stop or if you need to continue taking. Please do not stop taking these medications without instructions from your surgeon. 6. In an effort to help prevent surgical site infection, we ask that you shower with an anti-bacterial soap (i.e. Dial or Safeguard) on the morning of your procedure. Do not apply any lotions, powders, or deodorants after showering. 7. Wear comfortable clothes. Wear glasses instead of contacts. Do not bring any jewelry or money (other than copays or fees as instructed). Do not wear make-up, particularly mascara, the morning of your procedure. Wear your hair loose or down, no ponytails, buns, jose pins or clips.  All body piercings must be removed. 8. You should understand that if you do not follow these instructions your procedure may be cancelled. If your physical condition changes (i.e. fever, cold or flu) please contact your surgeon as soon as possible. 9. It is important that you be on time. If a situation occurs where you may be late, or if you have any questions or problems, please call [phone].    10. Your procedure time may be subject to change. You will receive a phone call the day prior to confirm your arrival time. I understand a pre-operative phone call will be made to verify my procedure time. In the event that I am not available, I give permission for a message to be left on my answering service and/or with another person?       yes    Reviewed by phone with pt, verbalized understanding.     ___________________      ___________________      ___________________  (Signature of Patient)          (Witness)                   (Date and Time)

## 2020-08-25 ENCOUNTER — APPOINTMENT (OUTPATIENT)
Dept: GENERAL RADIOLOGY | Age: 80
End: 2020-08-25
Attending: PHYSICAL MEDICINE & REHABILITATION
Payer: COMMERCIAL

## 2020-08-25 ENCOUNTER — HOSPITAL ENCOUNTER (OUTPATIENT)
Age: 80
Setting detail: OUTPATIENT SURGERY
Discharge: HOME | End: 2020-08-25
Attending: PHYSICAL MEDICINE & REHABILITATION | Admitting: PHYSICAL MEDICINE & REHABILITATION
Payer: COMMERCIAL

## 2020-08-25 VITALS
WEIGHT: 152 LBS | RESPIRATION RATE: 18 BRPM | DIASTOLIC BLOOD PRESSURE: 66 MMHG | HEIGHT: 61 IN | HEART RATE: 77 BPM | SYSTOLIC BLOOD PRESSURE: 125 MMHG | OXYGEN SATURATION: 91 % | BODY MASS INDEX: 28.7 KG/M2 | TEMPERATURE: 97.6 F

## 2020-08-25 LAB
GLUCOSE BLD STRIP.AUTO-MCNC: 136 MG/DL (ref 65–100)
INR BLD: 2 (ref 0.9–1.2)
SERVICE CMNT-IMP: ABNORMAL

## 2020-08-25 PROCEDURE — 72100 X-RAY EXAM L-S SPINE 2/3 VWS: CPT

## 2020-08-25 PROCEDURE — 82962 GLUCOSE BLOOD TEST: CPT

## 2020-08-25 PROCEDURE — 76210000046 HC AMBSU PH II REC FIRST 0.5 HR: Performed by: PHYSICAL MEDICINE & REHABILITATION

## 2020-08-25 PROCEDURE — 74011250636 HC RX REV CODE- 250/636: Performed by: PHYSICAL MEDICINE & REHABILITATION

## 2020-08-25 PROCEDURE — 74011000250 HC RX REV CODE- 250: Performed by: PHYSICAL MEDICINE & REHABILITATION

## 2020-08-25 PROCEDURE — 76000 FLUOROSCOPY <1 HR PHYS/QHP: CPT

## 2020-08-25 PROCEDURE — 85610 PROTHROMBIN TIME: CPT

## 2020-08-25 PROCEDURE — A9577 INJ MULTIHANCE: HCPCS | Performed by: PHYSICAL MEDICINE & REHABILITATION

## 2020-08-25 PROCEDURE — 76030000002 HC AMB SURG OR TIME FIRST 0.: Performed by: PHYSICAL MEDICINE & REHABILITATION

## 2020-08-25 PROCEDURE — 77030003665 HC NDL SPN BBMI -A: Performed by: PHYSICAL MEDICINE & REHABILITATION

## 2020-08-25 RX ORDER — METHYLPREDNISOLONE ACETATE 40 MG/ML
40 INJECTION, SUSPENSION INTRA-ARTICULAR; INTRALESIONAL; INTRAMUSCULAR; SOFT TISSUE ONCE
Status: DISCONTINUED | OUTPATIENT
Start: 2020-08-25 | End: 2020-08-25 | Stop reason: HOSPADM

## 2020-08-25 RX ORDER — LIDOCAINE HYDROCHLORIDE 20 MG/ML
INJECTION, SOLUTION INFILTRATION; PERINEURAL AS NEEDED
Status: DISCONTINUED | OUTPATIENT
Start: 2020-08-25 | End: 2020-08-25 | Stop reason: HOSPADM

## 2020-08-25 RX ORDER — METHYLPREDNISOLONE ACETATE 40 MG/ML
INJECTION, SUSPENSION INTRA-ARTICULAR; INTRALESIONAL; INTRAMUSCULAR; SOFT TISSUE AS NEEDED
Status: DISCONTINUED | OUTPATIENT
Start: 2020-08-25 | End: 2020-08-25 | Stop reason: HOSPADM

## 2020-08-25 RX ORDER — LIDOCAINE HYDROCHLORIDE 20 MG/ML
7 INJECTION, SOLUTION INFILTRATION; PERINEURAL ONCE
Status: DISCONTINUED | OUTPATIENT
Start: 2020-08-25 | End: 2020-08-25 | Stop reason: HOSPADM

## 2020-08-25 RX ORDER — BUPIVACAINE HYDROCHLORIDE 5 MG/ML
5 INJECTION, SOLUTION EPIDURAL; INTRACAUDAL ONCE
Status: DISCONTINUED | OUTPATIENT
Start: 2020-08-25 | End: 2020-08-25 | Stop reason: HOSPADM

## 2020-08-25 RX ORDER — BUPIVACAINE HYDROCHLORIDE 5 MG/ML
INJECTION, SOLUTION EPIDURAL; INTRACAUDAL AS NEEDED
Status: DISCONTINUED | OUTPATIENT
Start: 2020-08-25 | End: 2020-08-25 | Stop reason: HOSPADM

## 2020-08-25 NOTE — OP NOTES
Epidural Steroid Injection Operative Report    Indications: This is a 78 y.o. female who presents with low back pain. She was positive for LS DDD. The patient was admitted for surgery as conservative measures have failed. Date of Surgery: 8/25/2020    Preoperative Diagnosis: LS DDD    Postoperative Diagnosis: LS DDD    Surgeon(s) and Role:     * Inessa Trujillo MD - Primary     Procedure:  Bilateral Lumbar 4-5 Transforaminal Epidural Steroid injection    Procedure in Detail:  After appropriate informed consent was obtained, the patient was taken to the operating suite and placed in the prone position on the operating table on appropriate padding. The LS region was prepped and draped in the usual sterile fashion. Intraoperative fluoroscopy was used to localize the LS spine. The skin was infiltrated with 2% lidocaine. An 22-g needle was advanced into the Cy L4 neuroforamen under fluoroscopic guidance. A small amount of contrast was injected into the epidural space, confirming appropriate needle placement on fluoroscopy. Next, 2ml of 2% lidocaine and 80mg of Depo-Medrol were injected. The needle was removed from the patient. The patient was then turned back into the supine position on the stretcher and was taken to the Recovery Room in stable condition.     Estimated Blood Loss:  none     Specimens: None       Drains: None          Complications:  None    Signed By: Reynaldo Kincaid MD                        August 25, 2020

## 2020-08-25 NOTE — PERIOP NOTES
Mihaela Arthur  1940  [id number]    Situation:  Verbal report given from: MERLY Quintero RN  Procedure: Procedure(s):  BILATERAL L3-4, L4-5 TRANSFORAMINAL EPIDURAL STEROID INJECTION    Background:    Preoperative diagnosis: DEGENERATIVE DISC DISEASE), lumbar [M51.36]    Postoperative diagnosis: DEGENERATIVE DISC DISEASE), lumbar [M51.36]    :  Dr. Shital Borrero:  Intra-procedure medications, procedure, and allergies reviewed        Recommendation:    Discharge patient home after discharge instructions reviewed with patient. Rest until local has worn off.

## 2020-08-25 NOTE — PERIOP NOTES
Skin assessment:   WNL     Neuro:  Push/Pull assessment:     LUE Response: equal  LLE Response: equal    RUE Response: equal   RLE Response: equal

## 2020-08-25 NOTE — PERIOP NOTES
Pt has weak and unequal plantar and dorsi flexion. Pt is weaker on pulling toes to her nose    Pt able to stand and hold weight. No swelling or bleeding at injection sites. 1707-Transported via w/c to awaiting transportation.   No complaints noted at this time

## 2020-08-25 NOTE — H&P
Procedural Case Note    8/25/2020    (4:09 PM)    Millie Emerson    1940   (78 y.o.)    [id number]    CC:  pain    ROS:   Complete ROS obtained, no CP, no SOB, no N or V    PMH:     Past Medical History:   Diagnosis Date    Anxiety     Arthritis     Asthma     CAD (coronary artery disease)     stent    Diabetes ([facility] [address]     GERD (gastroesophageal reflux disease)     History of DVT (deep vein thrombosis)     History of recurrent UTIs     Hx of seasonal allergies     Hypercholesterolemia     Irritable bowel syndrome (IBS)     Migraine        ALLERGIES:     Allergies   Allergen Reactions    Iodinated Contrast Media Other (comments)     Passes out    Pcn [Penicillins] Shortness of Breath    Sulfa (Sulfonamide Antibiotics) Shortness of Breath       MEDS:     No current facility-administered medications for this encounter. Visit Vitals  /68 (BP 1 Location: Right arm, BP Patient Position: At rest)   Pulse 75   Temp 98.7 °F (37.1 °C)   Resp 18   Ht 5' 1\" (1.549 m)   Wt 68.9 kg (152 lb)   SpO2 94%   BMI 28.72 kg/m²     PE:  Gen: NAD  Head: normocephalic  Heart: RRR  Lungs: CTA junito  Abd: NT, ND, soft  Neuro: awake and alert  Skin: intact    IMPRESSION:   LS DDD    Note:  The clinical status was discussed in detail with the patient. The procedure was again discussed and described in detail. All understand and accept the planned procedure and risks; reject other forms of treatment. All questions are answered.     Vasu Goldstein MD

## 2020-08-25 NOTE — DISCHARGE INSTRUCTIONS
Dr. Jesus Alberto Gutierrez Discharge Instructions  Transforaminal Epidural Steroid Injection/ Selective Nerve Block    You had a transforaminal epidural steroid injection/ selective nerve block today. You will probably have some numbness, and possibly weakness, in your leg for the next 6 to 8 hours. The steroids will slowly become effective, reducing your pain, over the next 2 weeks. You should begin feeling better after a few days, but it may take up to 2 weeks to notice the difference. The benefit you get from your injection will last a variable amount of time, depending on the severity of your lumbar spine problem.  Pain: Most people do not have any increase in pain after this injection. However, you might experience some soreness in your low back. If this happens, putting an ice pack over the sore area will help.  Bandage: You will have a small bandage covering the site of the injection. You may remove it once you get home.  Restrictions: Someone should drive you home after the injection. After that, you have no restrictions. You need to be careful while walking, as you may still have some numbness or weakness in your leg. You may resume your normal level of activity. You may take a shower or bath, and you may eat normally. You should continue your current exercises and/or therapy routine.   Medications: Continue your current medications as prescribed. If your pain decreases, you may reduce the amount of your pain medicines. If you stopped taking anticoagulants or blood-thinners before the injection, start them tomorrow. If you have diabetes, your blood sugar may be elevated for a few days. Call your primary doctor with any questions.   Call Dr. Jesus Alberto Gutierrez at [phone] if you experience:   Fever (101 degrees Fahrenheit or greater)   Nausea or vomiting   Headache unrelieved by your normal pain medicine   Redness or swelling at the injection site that lasts more than 1 day   New numbness, tingling, weakness, or pain that you didnt have before the injection    Follow-up appointment:   If still having pain in 1-2 weeks, call office at [phone] for a follow up appointment. DISCHARGE SUMMARY from Nurse    The following personal items collected during your admission are returned to you:   Dental Appliance:    Vision:    Hearing Aid:    Jewelry:    Clothing:    Other Valuables:    Valuables sent to safe: If you were given prescriptions, please review the written information on prescribed medications. · You will receive a Post Operative Call from one of the Recovery Room Nurses on the day after your surgery to check on you. It is very important for us to know how you are recovering after your surgery. · You may receive an e-mail or letter in the mail from [place] regarding your experience with us in the Ambulatory Surgery Unit. Your feedback is valuable to us and we appreciate your participation in the survey. If you have not had your influenza or pneumococcal vaccines, please follow up with your primary care physician. The discharge information has been reviewed with the patient. The patient verbalized understanding.

## 2020-10-01 RX ORDER — NYSTATIN 100000 [USP'U]/ML
SUSPENSION ORAL
Qty: 60 ML | Refills: 2 | Status: SHIPPED | OUTPATIENT
Start: 2020-10-01 | End: 2021-11-03 | Stop reason: SDUPTHER

## 2021-01-12 DIAGNOSIS — F51.01 PRIMARY INSOMNIA: ICD-10-CM

## 2021-01-12 RX ORDER — TRAZODONE HYDROCHLORIDE 100 MG/1
100 TABLET ORAL
Qty: 90 TAB | Refills: 3 | OUTPATIENT
Start: 2021-01-12

## 2021-01-12 NOTE — TELEPHONE ENCOUNTER
Future Appointments:  No future appointments.      Last Appointment With Me:  7/29/2020     Requested Prescriptions      No prescriptions requested or ordered in this encounter

## 2021-02-04 RX ORDER — LEVOTHYROXINE SODIUM 25 UG/1
50 TABLET ORAL
Qty: 180 TAB | Refills: 3 | OUTPATIENT
Start: 2021-02-04

## 2021-02-04 NOTE — TELEPHONE ENCOUNTER
PCP: Ondina Hart DO    Last appt: Visit date not found  No future appointments. Requested Prescriptions     Pending Prescriptions Disp Refills    levothyroxine (SYNTHROID) 25 mcg tablet 180 Tab 3     Sig: Take 2 Tabs by mouth Daily (before breakfast).        Prior labs and Blood pressures:  BP Readings from Last 3 Encounters:   08/25/20 125/66   07/29/20 123/83   06/10/20 116/71     Lab Results   Component Value Date/Time    Sodium 142 01/20/2020 03:38 PM    Potassium 3.8 01/20/2020 03:38 PM    Chloride 105 01/20/2020 03:38 PM    CO2 21 01/20/2020 03:38 PM    Glucose 114 (H) 01/20/2020 03:38 PM    BUN 13 01/20/2020 03:38 PM    Creatinine 0.73 01/20/2020 03:38 PM    BUN/Creatinine ratio 18 01/20/2020 03:38 PM    GFR est AA 89 01/20/2020 03:38 PM    GFR est non-AA 77 01/20/2020 03:38 PM    Calcium 9.3 01/20/2020 03:38 PM     Lab Results   Component Value Date/Time    Hemoglobin A1c 7.4 (H) 01/20/2020 03:38 PM    Hemoglobin A1c (POC) 6.8 10/17/2019 03:51 PM     Lab Results   Component Value Date/Time    Cholesterol, total 100 01/20/2020 03:38 PM    HDL Cholesterol 51 01/20/2020 03:38 PM    LDL, calculated 28 01/20/2020 03:38 PM    VLDL, calculated 21 01/20/2020 03:38 PM    Triglyceride 104 01/20/2020 03:38 PM     No results found for: ELISSA Tenorio    Lab Results   Component Value Date/Time    TSH 1.150 01/20/2020 03:38 PM

## 2021-02-24 NOTE — TELEPHONE ENCOUNTER
Future Appointments:  No future appointments. Last Appointment With Me:  Visit date not found     Requested Prescriptions     Pending Prescriptions Disp Refills    dicyclomine (BENTYL) 20 mg tablet 180 Tab 0     Sig: Take 1 Tab by mouth two (2) times a day.

## 2021-02-25 RX ORDER — DICYCLOMINE HYDROCHLORIDE 20 MG/1
20 TABLET ORAL 2 TIMES DAILY
Qty: 180 TAB | Refills: 0 | OUTPATIENT
Start: 2021-02-25

## 2021-05-10 RX ORDER — AMITRIPTYLINE HYDROCHLORIDE 10 MG/1
TABLET, FILM COATED ORAL
Qty: 90 TAB | Refills: 3 | OUTPATIENT
Start: 2021-05-10

## 2021-05-10 NOTE — TELEPHONE ENCOUNTER
Future Appointments:  No future appointments.      Last Appointment With Me:  Visit date not found     Requested Prescriptions     Pending Prescriptions Disp Refills    amitriptyline (ELAVIL) 10 mg tablet 90 Tab 3     Sig: TAKE 1 TABLET BY MOUTH EVERY NIGHT AT BEDTIME

## 2021-05-17 NOTE — TELEPHONE ENCOUNTER
[phone]    Pt states that's she moved temp to [place] (to do with burying her  there) and is moving back here to [place] in June. States she still needs her medications,brayden for the urinary control. States she does not have another primary care and a [facility] doctor in [place] would not refill any as he states she was never on them. Pt states she still considers herself a patient. Pt does not want to leave practice. She asks for a call back to advise and requests this time that the meds be refilled to(as she wont be back in town until June):    [address] - [place], [facility] [address]      [phone]

## 2021-05-19 RX ORDER — OXYBUTYNIN CHLORIDE 10 MG/1
TABLET, EXTENDED RELEASE ORAL
Qty: 90 TABLET | Refills: 3 | OUTPATIENT
Start: 2021-05-19

## 2021-05-19 NOTE — TELEPHONE ENCOUNTER
Patient states that she will be returning to [place] on June 20th and would like to resume care with provider at this time. Patient wanted to tell provider \"Hello, I can't wait to see you again\". Patient states that she is almost out of medications at this time and is requesting a refill for medications. At this time, patient could not remember all the medications she needs a refill for but states she will call office back once she figures it out. One medication she did list was Ditropan. Will pend medication to provider at this time.

## 2021-05-24 NOTE — TELEPHONE ENCOUNTER
Informed patient that appt was required for medication refill at this time. Patient states that she understands the information provided to her at this time.

## 2021-06-25 ENCOUNTER — OFFICE VISIT (OUTPATIENT)
Dept: INTERNAL MEDICINE CLINIC | Age: 81
End: 2021-06-25
Payer: COMMERCIAL

## 2021-06-25 ENCOUNTER — TELEPHONE (OUTPATIENT)
Dept: INTERNAL MEDICINE CLINIC | Age: 81
End: 2021-06-25

## 2021-06-25 VITALS
SYSTOLIC BLOOD PRESSURE: 105 MMHG | OXYGEN SATURATION: 93 % | DIASTOLIC BLOOD PRESSURE: 66 MMHG | BODY MASS INDEX: 28.51 KG/M2 | RESPIRATION RATE: 16 BRPM | HEIGHT: 61 IN | WEIGHT: 151 LBS | HEART RATE: 80 BPM

## 2021-06-25 DIAGNOSIS — R01.1 HEART MURMUR: ICD-10-CM

## 2021-06-25 DIAGNOSIS — R05.9 COUGH: ICD-10-CM

## 2021-06-25 DIAGNOSIS — J41.0 SIMPLE CHRONIC BRONCHITIS (HCC): ICD-10-CM

## 2021-06-25 DIAGNOSIS — E11.9 TYPE 2 DIABETES MELLITUS WITHOUT COMPLICATION, WITHOUT LONG-TERM CURRENT USE OF INSULIN (HCC): ICD-10-CM

## 2021-06-25 DIAGNOSIS — F32.A ANXIETY AND DEPRESSION: ICD-10-CM

## 2021-06-25 DIAGNOSIS — E03.9 ACQUIRED HYPOTHYROIDISM: ICD-10-CM

## 2021-06-25 DIAGNOSIS — K58.0 IRRITABLE BOWEL SYNDROME WITH DIARRHEA: ICD-10-CM

## 2021-06-25 DIAGNOSIS — I82.5Y2 CHRONIC DEEP VEIN THROMBOSIS (DVT) OF PROXIMAL VEIN OF LEFT LOWER EXTREMITY (HCC): ICD-10-CM

## 2021-06-25 DIAGNOSIS — F41.9 ANXIETY AND DEPRESSION: ICD-10-CM

## 2021-06-25 DIAGNOSIS — E78.5 HYPERLIPIDEMIA ASSOCIATED WITH TYPE 2 DIABETES MELLITUS (HCC): ICD-10-CM

## 2021-06-25 DIAGNOSIS — E11.69 HYPERLIPIDEMIA ASSOCIATED WITH TYPE 2 DIABETES MELLITUS (HCC): ICD-10-CM

## 2021-06-25 DIAGNOSIS — L98.9 SKIN LESIONS: ICD-10-CM

## 2021-06-25 DIAGNOSIS — Z00.00 ANNUAL PHYSICAL EXAM: Primary | ICD-10-CM

## 2021-06-25 DIAGNOSIS — M81.0 AGE-RELATED OSTEOPOROSIS WITHOUT CURRENT PATHOLOGICAL FRACTURE: ICD-10-CM

## 2021-06-25 PROCEDURE — 99397 PER PM REEVAL EST PAT 65+ YR: CPT | Performed by: INTERNAL MEDICINE

## 2021-06-25 PROCEDURE — 1090F PRES/ABSN URINE INCON ASSESS: CPT | Performed by: INTERNAL MEDICINE

## 2021-06-25 PROCEDURE — G8510 SCR DEP NEG, NO PLAN REQD: HCPCS | Performed by: INTERNAL MEDICINE

## 2021-06-25 PROCEDURE — 1101F PT FALLS ASSESS-DOCD LE1/YR: CPT | Performed by: INTERNAL MEDICINE

## 2021-06-25 PROCEDURE — G8419 CALC BMI OUT NRM PARAM NOF/U: HCPCS | Performed by: INTERNAL MEDICINE

## 2021-06-25 RX ORDER — AZELASTINE 1 MG/ML
1 SPRAY, METERED NASAL 2 TIMES DAILY
Qty: 1 BOTTLE | Refills: 2 | Status: SHIPPED | OUTPATIENT
Start: 2021-06-25 | End: 2021-12-03

## 2021-06-25 RX ORDER — DICLOFENAC SODIUM 10 MG/G
GEL TOPICAL
Qty: 100 G | Refills: 2 | Status: SHIPPED | OUTPATIENT
Start: 2021-06-25

## 2021-06-25 NOTE — TELEPHONE ENCOUNTER
Pt called and stated that she received a referral for cardiology and Dermatology    States that \"all the information in the referral is wrong\"    States she gave all information to the  to update when she came in today, including insurance cards. States that the address should be:  [place] [address] [place] [address]    Verified that that was the correct address on the chart. Patient states insurance was wrong. Pt states her  is  and should not be listed, she is the only subscriber. At time of call, I told her we had showing :  VA Medicare A  And blue cross    Pt confirmed va medicare. Pt states that primary is [facility] and secondary is [facility]. Patient states the dermatologist address is wrong as it should be a [place] address too. Pt requests the problems be fixed please and correct referrals provided to the doctors.

## 2021-06-25 NOTE — PATIENT INSTRUCTIONS
Cough: Care Instructions  Your Care Instructions     A cough is your body's response to something that bothers your throat or airways. Many things can cause a cough. You might cough because of a cold or the flu, bronchitis, or asthma. Smoking, postnasal drip, allergies, and stomach acid that backs up into your throat also can cause coughs. A cough is a symptom, not a disease. Most coughs stop when the cause, such as a cold, goes away. You can take a few steps at home to cough less and feel better. Follow-up care is a key part of your treatment and safety. Be sure to make and go to all appointments, and call your doctor if you are having problems. It's also a good idea to know your test results and keep a list of the medicines you take. How can you care for yourself at home? · Drink lots of water and other fluids. This helps thin the mucus and soothes a dry or sore throat. Honey or lemon juice in hot water or tea may ease a dry cough. · Take cough medicine as directed by your doctor. · Prop up your head on pillows to help you breathe and ease a dry cough. · Try cough drops to soothe a dry or sore throat. Cough drops don't stop a cough. Medicine-flavored cough drops are no better than candy-flavored drops or hard candy. · Do not smoke. Avoid secondhand smoke. If you need help quitting, talk to your doctor about stop-smoking programs and medicines. These can increase your chances of quitting for good. When should you call for help? Call 911 anytime you think you may need emergency care. For example, call if:    · You have severe trouble breathing. Call your doctor now or seek immediate medical care if:    · You cough up blood.     · You have new or worse trouble breathing.     · You have a new or higher fever.     · You have a new rash.    Watch closely for changes in your health, and be sure to contact your doctor if:    · You cough more deeply or more often, especially if you notice more mucus or a change in the color of your mucus.     · You have new symptoms, such as a sore throat, an earache, or sinus pain.     · You do not get better as expected. Where can you learn more? Go to http://www.gray.com/  Enter D279 in the search box to learn more about \"Cough: Care Instructions. \"  Current as of: October 26, 2020               Content Version: 12.8  © 2006-2021 [employer]. Care instructions adapted under license by [employer] (which disclaims liability or warranty for this information). If you have questions about a medical condition or this instruction, always ask your healthcare professional. [address] any warranty or liability for your use of this information.

## 2021-06-25 NOTE — PROGRESS NOTES
Zak Perez is a 78 y.o. female who presents for evaluation of annual cpe. Last seen by me 2020 for cough. Shortly after that appt, she moved to [place] and followed with doctors there. She has since moved back to [place], just last week. She wanted to reestablish with me. Overall doing ok, though misses her . Recall he was also my patient, but  last year with dementia. Biggest complaint now is osteoarthritis--had steroid injection to back recently. celebrex is no longer helping.       ROS:  Constitutional: negative for fevers, chills, anorexia and weight loss  Eyes:   negative for visual disturbance and irritation  ENT:   negative for tinnitus,sore throat,nasal congestion,ear pain,hoarseness  Respiratory:  negative for cough, hemoptysis, dyspnea,wheezing  CV:   negative for chest pain, palpitations, lower extremity edema  GI:   negative for nausea, vomiting, diarrhea, abdominal pain,melena  Genitourinary: negative for frequency, dysuria and hematuria  Musculoskel: negative for myalgias, arthralgias, back pain, muscle weakness, joint pain  Neurological:  negative for headaches, dizziness, focal weakness, numbness  Psychiatric:     Negative for depression or anxiety      Past Medical History:   Diagnosis Date    Anxiety     Arthritis     Asthma     CAD (coronary artery disease)     stent    Diabetes ([facility] [address]     GERD (gastroesophageal reflux disease)     History of DVT (deep vein thrombosis)     History of recurrent UTIs     Hx of seasonal allergies     Hypercholesterolemia     Irritable bowel syndrome (IBS)     Migraine        Past Surgical History:   Procedure Laterality Date    HX HEART CATHETERIZATION      stent    HX HIP FRACTURE TX Right     HX OPEN REDUCTION INTERNAL FIXATION Left     humerus        Family History   Problem Relation Age of Onset    Diabetes Mother     Stroke Mother        Social History     Socioeconomic History    Marital status:  Spouse name: Not on file    Number of children: Not on file    Years of education: Not on file    Highest education level: Not on file   Occupational History    Not on file   Tobacco Use    Smoking status: Former Smoker    Smokeless tobacco: Never Used   Substance and Sexual Activity    Alcohol use: No    Drug use: No    Sexual activity: Not Currently   Other Topics Concern    Not on file   Social History Narrative    Not on file     Social Determinants of Health     Financial Resource Strain:     Difficulty of Paying Living Expenses:    Food Insecurity:     Worried About Running Out of Food in the Last Year:     [address] in the Last Year:    Transportation Needs:     Lack of Transportation (Medical):  Lack of Transportation (Non-Medical):    Physical Activity:     Days of Exercise per Week:     Minutes of Exercise per Session:    Stress:     Feeling of Stress :    Social Connections:     Frequency of Communication with Friends and Family:     Frequency of Social Gatherings with Friends and Family:     Attends Orthodox Services:     Active Member of Clubs or Organizations:     Attends Club or Organization Meetings:     Marital Status:    Intimate Partner Violence:     Fear of Current or Ex-Partner:     Emotionally Abused:     Physically Abused:     Sexually Abused: There were no vitals taken for this visit. 105/66, weight 151, 80, 93% RA    Physical Examination:   General - Well appearing female  HEENT - PERRL, TM no erythema/opacification, normal nasal turbinates, no oropharyngeal erythema or exudate, MMM  Neck - supple, no bruits, no thyroidomegaly, no lymphadenopathy  Pulm - clear to auscultation bilaterally  Cardio - RRR, normal S1 S2, 3/6 murmur  Abd - soft, nontender, no masses, no HSM  Extrem - no edema, +2 distal pulses  Neuro-  No focal deficits, CN intact     Assessment/Plan:    1. Annual cpe--check cbc, cmp, flp, tsh,   2.   Diffuse osteoarthritis--rx for voltaren gel  3. Skin lesions--referral to derm, dr irving  4.  ibs-diarrhea--continue prn bentyl  5. Heart murmur--she used to follow with dr Ivory Hinson, and would like to see him again  6. Copd--uses advair  7. Chronic dvt--check INR. On coumadin  8. Cad with hx cabg--on toprol xl, coumadin  9.  anx and depression--on elavil, trazodone  10. Hypothyroid--check tsh, on synthroid  11.  hyperlipids--on lipitor, check flp  12.   Nasal sinus congestion--rx to try astelin nasal spray    rtc 6 months        Joel Corrigan III, DO

## 2021-06-26 LAB
ALBUMIN SERPL-MCNC: 3.6 G/DL (ref 3.5–5)
ALBUMIN/GLOB SERPL: 1.1 {RATIO} (ref 1.1–2.2)
ALP SERPL-CCNC: 103 U/L (ref 45–117)
ALT SERPL-CCNC: 21 U/L (ref 12–78)
ANION GAP SERPL CALC-SCNC: 8 MMOL/L (ref 5–15)
AST SERPL-CCNC: 16 U/L (ref 15–37)
BASOPHILS # BLD: 0 K/UL (ref 0–0.1)
BASOPHILS NFR BLD: 1 % (ref 0–1)
BILIRUB SERPL-MCNC: 0.3 MG/DL (ref 0.2–1)
BUN SERPL-MCNC: 17 MG/DL (ref 6–20)
BUN/CREAT SERPL: 23 (ref 12–20)
CALCIUM SERPL-MCNC: 8.8 MG/DL (ref 8.5–10.1)
CHLORIDE SERPL-SCNC: 111 MMOL/L (ref 97–108)
CHOLEST SERPL-MCNC: 107 MG/DL
CO2 SERPL-SCNC: 23 MMOL/L (ref 21–32)
CREAT SERPL-MCNC: 0.73 MG/DL (ref 0.55–1.02)
CREAT UR-MCNC: 74.3 MG/DL
DIFFERENTIAL METHOD BLD: ABNORMAL
EOSINOPHIL # BLD: 0.5 K/UL (ref 0–0.4)
EOSINOPHIL NFR BLD: 7 % (ref 0–7)
ERYTHROCYTE [DISTWIDTH] IN BLOOD BY AUTOMATED COUNT: 16.6 % (ref 11.5–14.5)
EST. AVERAGE GLUCOSE BLD GHB EST-MCNC: 163 MG/DL
GLOBULIN SER CALC-MCNC: 3.3 G/DL (ref 2–4)
GLUCOSE SERPL-MCNC: 128 MG/DL (ref 65–100)
HBA1C MFR BLD: 7.3 % (ref 4–5.6)
HCT VFR BLD AUTO: 41.3 % (ref 35–47)
HDLC SERPL-MCNC: 52 MG/DL
HDLC SERPL: 2.1 {RATIO} (ref 0–5)
HGB BLD-MCNC: 12.9 G/DL (ref 11.5–16)
IMM GRANULOCYTES # BLD AUTO: 0 K/UL (ref 0–0.04)
IMM GRANULOCYTES NFR BLD AUTO: 1 % (ref 0–0.5)
INR PPP: 3.1 (ref 0.9–1.1)
LDLC SERPL CALC-MCNC: 39 MG/DL (ref 0–100)
LYMPHOCYTES # BLD: 1.2 K/UL (ref 0.8–3.5)
LYMPHOCYTES NFR BLD: 18 % (ref 12–49)
MCH RBC QN AUTO: 27 PG (ref 26–34)
MCHC RBC AUTO-ENTMCNC: 31.2 G/DL (ref 30–36.5)
MCV RBC AUTO: 86.4 FL (ref 80–99)
MICROALBUMIN UR-MCNC: 1.61 MG/DL
MICROALBUMIN/CREAT UR-RTO: 22 MG/G (ref 0–30)
MONOCYTES # BLD: 0.6 K/UL (ref 0–1)
MONOCYTES NFR BLD: 9 % (ref 5–13)
NEUTS SEG # BLD: 4.3 K/UL (ref 1.8–8)
NEUTS SEG NFR BLD: 64 % (ref 32–75)
NRBC # BLD: 0 K/UL (ref 0–0.01)
NRBC BLD-RTO: 0 PER 100 WBC
PLATELET # BLD AUTO: 189 K/UL (ref 150–400)
PMV BLD AUTO: 11.6 FL (ref 8.9–12.9)
POTASSIUM SERPL-SCNC: 4 MMOL/L (ref 3.5–5.1)
PROT SERPL-MCNC: 6.9 G/DL (ref 6.4–8.2)
PROTHROMBIN TIME: 30.8 SEC (ref 9–11.1)
RBC # BLD AUTO: 4.78 M/UL (ref 3.8–5.2)
SODIUM SERPL-SCNC: 142 MMOL/L (ref 136–145)
TRIGL SERPL-MCNC: 80 MG/DL (ref ?–150)
TSH SERPL DL<=0.05 MIU/L-ACNC: 3.1 UIU/ML (ref 0.36–3.74)
VLDLC SERPL CALC-MCNC: 16 MG/DL
WBC # BLD AUTO: 6.6 K/UL (ref 3.6–11)

## 2021-06-27 NOTE — PROGRESS NOTES
Labs all pretty good and stable. She would like you to manager her coumadin. INR at goal now, continue same dosing schedule. Recheck in 4 weeks.

## 2021-06-28 ENCOUNTER — TELEPHONE (OUTPATIENT)
Dept: INTERNAL MEDICINE CLINIC | Age: 81
End: 2021-06-28

## 2021-06-28 NOTE — TELEPHONE ENCOUNTER
----- Message from Mariana Jessica sent at 6/28/2021 11:42 AM EDT -----  Regarding: Dr. Reyna Augustin Message/Vendor Calls    Caller's first and last name: pt       Reason for call: Referred her to an orthopedic doctor about a year ago and she needs to get the information again so she can follow up with this physician. Callback required yes/no and why: yes, to discuss       Best contact number(s): [phone]      Details to clarify the request: She thinks the orthopedic doctor is Dr. Celestina Nunez

## 2021-06-30 ENCOUNTER — DOCUMENTATION ONLY (OUTPATIENT)
Dept: INTERNAL MEDICINE CLINIC | Age: 81
End: 2021-06-30

## 2021-06-30 NOTE — PROGRESS NOTES
Patient signed a release of medical record form while in the office for her appt on 6/25/21.     Release of record form was faxed to Dr. Omid Huber ([phone] / [phone]) previous PCP and Dr. China Mckeon ([facility]- [phone] / [place]- [phone]) Cardiologist.

## 2021-07-05 DIAGNOSIS — R52 PAIN: ICD-10-CM

## 2021-07-06 RX ORDER — TRAMADOL HYDROCHLORIDE 50 MG/1
TABLET ORAL
Qty: 30 TABLET | Refills: 4 | Status: SHIPPED | OUTPATIENT
Start: 2021-07-06 | End: 2021-08-05

## 2021-07-13 RX ORDER — OXYBUTYNIN CHLORIDE 10 MG/1
TABLET, EXTENDED RELEASE ORAL
Qty: 90 TABLET | Refills: 3 | Status: SHIPPED | OUTPATIENT
Start: 2021-07-13 | End: 2021-07-19

## 2021-07-13 RX ORDER — ATORVASTATIN CALCIUM 20 MG/1
TABLET, FILM COATED ORAL
Qty: 90 TABLET | Refills: 3 | Status: SHIPPED | OUTPATIENT
Start: 2021-07-13 | End: 2021-07-19 | Stop reason: SDUPTHER

## 2021-07-19 DIAGNOSIS — F51.01 PRIMARY INSOMNIA: ICD-10-CM

## 2021-07-19 RX ORDER — OXYBUTYNIN CHLORIDE 10 MG/1
TABLET, EXTENDED RELEASE ORAL
Qty: 90 TABLET | Refills: 3 | Status: SHIPPED | OUTPATIENT
Start: 2021-07-19 | End: 2021-07-20 | Stop reason: SDUPTHER

## 2021-07-19 RX ORDER — ATORVASTATIN CALCIUM 20 MG/1
TABLET, FILM COATED ORAL
Qty: 90 TABLET | Refills: 3 | Status: SHIPPED | OUTPATIENT
Start: 2021-07-19 | End: 2022-07-25

## 2021-07-19 RX ORDER — LOPERAMIDE HYDROCHLORIDE 2 MG/1
CAPSULE ORAL
Qty: 30 CAPSULE | Refills: 1 | Status: SHIPPED | OUTPATIENT
Start: 2021-07-19 | End: 2021-08-06 | Stop reason: SDUPTHER

## 2021-07-19 RX ORDER — OXYBUTYNIN CHLORIDE 10 MG/1
TABLET, EXTENDED RELEASE ORAL
Qty: 90 TABLET | Refills: 3 | Status: SHIPPED | OUTPATIENT
Start: 2021-07-19 | End: 2022-02-09 | Stop reason: ALTCHOICE

## 2021-07-19 RX ORDER — TRAZODONE HYDROCHLORIDE 100 MG/1
100 TABLET ORAL
Qty: 90 TABLET | Refills: 3 | Status: SHIPPED | OUTPATIENT
Start: 2021-07-19 | End: 2022-02-09 | Stop reason: DRUGHIGH

## 2021-07-19 RX ORDER — LOPERAMIDE HYDROCHLORIDE 2 MG/1
CAPSULE ORAL
Qty: 30 CAPSULE | Refills: 1 | Status: SHIPPED | OUTPATIENT
Start: 2021-07-19 | End: 2021-07-20 | Stop reason: SDUPTHER

## 2021-07-19 RX ORDER — TRAZODONE HYDROCHLORIDE 50 MG/1
TABLET ORAL
Qty: 90 TABLET | Refills: 3 | Status: SHIPPED | OUTPATIENT
Start: 2021-07-19 | End: 2021-09-08 | Stop reason: DRUGHIGH

## 2021-07-19 NOTE — TELEPHONE ENCOUNTER
Patient states she No Longer deals with [facility] Patient now uses [facility] [address] [place] [address] Tnpk. Thank you    Please see New refill request for Correct Pharmacy.

## 2021-07-19 NOTE — TELEPHONE ENCOUNTER
Patient states she needs to get refills done thru [facility]. Patient No Longer deals with [facility] Please call if any questions.  Thank you

## 2021-07-19 NOTE — TELEPHONE ENCOUNTER
----- Message from [facility] sent at 7/16/2021  7:16 PM EDT -----  Regarding: Dr. Ifeanyi Prescott / Dania Franz first and last name: Nicko Magallon      Reason for call: Change in Pharmacy       Callback required yes/no and why: Y       Best contact number(s): [phone]      Details to clarify the request: Pt would like to let the practice know that her pharmacy has changed to [facility] on [address] [place] [address]     copy/paste [facility]

## 2021-07-19 NOTE — TELEPHONE ENCOUNTER
PCP: Vidal Melo,     Last appt: 6/25/2021  Future Appointments   Date Time Provider Toro Valderrama   7/20/2021  3:00 PM Camryn Feliz [facility] BS AMB   1/7/2022  2:00 PM Alejandro Wills,  [facility] BS AMB       Requested Prescriptions     Pending Prescriptions Disp Refills    oxybutynin chloride XL (DITROPAN XL) 10 mg CR tablet 90 Tablet 3    atorvastatin (LIPITOR) 20 mg tablet 90 Tablet 3    traZODone (DESYREL) 100 mg tablet 90 Tablet 3     Sig: Take 1 Tablet by mouth nightly as needed for Sleep.     loperamide (IMODIUM) 2 mg capsule 30 Capsule 1       Prior labs and Blood pressures:  BP Readings from Last 3 Encounters:   06/25/21 105/66   08/25/20 125/66   07/29/20 123/83     Lab Results   Component Value Date/Time    Sodium 142 06/25/2021 11:52 AM    Potassium 4.0 06/25/2021 11:52 AM    Chloride 111 (H) 06/25/2021 11:52 AM    CO2 23 06/25/2021 11:52 AM    Anion gap 8 06/25/2021 11:52 AM    Glucose 128 (H) 06/25/2021 11:52 AM    BUN 17 06/25/2021 11:52 AM    Creatinine 0.73 06/25/2021 11:52 AM    BUN/Creatinine ratio 23 (H) 06/25/2021 11:52 AM    GFR est AA >60 06/25/2021 11:52 AM    GFR est non-AA >60 06/25/2021 11:52 AM    Calcium 8.8 06/25/2021 11:52 AM     Lab Results   Component Value Date/Time    Hemoglobin A1c 7.3 (H) 06/25/2021 11:52 AM    Hemoglobin A1c (POC) 6.8 10/17/2019 03:51 PM     Lab Results   Component Value Date/Time    Cholesterol, total 107 06/25/2021 11:52 AM    HDL Cholesterol 52 06/25/2021 11:52 AM    LDL, calculated 39 06/25/2021 11:52 AM    VLDL, calculated 16 06/25/2021 11:52 AM    Triglyceride 80 06/25/2021 11:52 AM    CHOL/HDL Ratio 2.1 06/25/2021 11:52 AM        Lab Results   Component Value Date/Time    TSH 3.10 06/25/2021 11:52 AM

## 2021-07-19 NOTE — PROGRESS NOTES
Pharmacy Progress Note - Anticoagulation Management    S/O: Ms. Brenda Dunlap is a 80 y. o. female , with a PMH of OAB, IBS, LE DVT, anxiety, insomonia, Renee's esophagus, Arthritis, Migraines, hyperlipidemia, who was seen today for anticoagulation management for the diagnosis of LLE Deep Vein Thrombosis.  Pt ambulates w/ a cane.      Pt was last seen by me in June 2020. Recently reestablished care with our practice. Previous INRs were managed at [facility]    · Warfarin start date:  Around 2008   · INR Goal:  2.0-3.0    · Current warfarin regimen:  2 mg daily                      · Warfarin tablet strength: 2 mg   · Duration of therapy: Indefinite      Today's pertinent positives includes:  No significant changes since last visit    Has appt Dr Lizabeth [place] (cardio) in August   Endorses dry mouth with Astelin nasal spray. Has flonase. Results for orders placed or performed in visit on 07/20/21   AMB POC PT/INR   Result Value Ref Range    VALID INTERNAL CONTROL POC Yes     Prothrombin time (POC) 34.2 (A) 9.1 - 12.0 seconds    INR POC 2.9 (A) 1.0 - 1.5     · Adherence:   · Able to recall regimen? YES  · Miss/extra dose? NO  · Need refill?  YES    Upcoming procedure(s):  NO      Past Medical History:   Diagnosis Date    Anxiety     Arthritis     Asthma     CAD (coronary artery disease)     stent    Diabetes (HCC)     GERD (gastroesophageal reflux disease)     History of DVT (deep vein thrombosis)     History of recurrent UTIs     Hx of seasonal allergies     Hypercholesterolemia     Irritable bowel syndrome (IBS)     Migraine      Allergies   Allergen Reactions    Iodinated Contrast Media Other (comments)     Passes out    Pcn [Penicillins] Shortness of Breath    Sulfa (Sulfonamide Antibiotics) Shortness of Breath     Current Outpatient Medications   Medication Sig    atorvastatin (LIPITOR) 20 mg tablet TAKE 1 TABLET BY MOUTH EVERY DAY    oxybutynin chloride XL (DITROPAN XL) 10 mg CR tablet TAKE 1 TABLET BY MOUTH EVERY DAY    traMADoL (ULTRAM) 50 mg tablet TAKE 1 TABLET BY MOUTH EVERY 12 HOURS AS NEEDED FOR PAIN    diclofenac (VOLTAREN) 1 % gel Apply  to affected area every twelve (12) hours as needed for Pain.  azelastine (ASTELIN) 137 mcg (0.1 %) nasal spray 1 [place] by Both Nostrils route two (2) times a day. Use in each nostril as directed    nystatin (MYCOSTATIN) 100,000 unit/mL suspension SWISH AND SWALLOW 5 ML BY MOUTH FOR 30 SECONDS 4 TIMES A DAY AS NEEDED FOR MOUTH PAIN    fluticasone propion-salmeteroL (Advair Diskus) 250-50 mcg/dose diskus inhaler Take 1 Puff by inhalation every twelve (12) hours.  albuterol (PROVENTIL HFA, VENTOLIN HFA, PROAIR HFA) 90 mcg/actuation inhaler Take 2 Puffs by inhalation every six (6) hours as needed for Wheezing.  warfarin (COUMADIN) 2 mg tablet TAKE 1 TABLET BY MOUTH DAILY    clorazepate (TRANXENE) 3.75 mg tablet TAKE 1 TABLET BY MOUTH EVERY 6 HOURS AS NEEDED FOR ANXIETY. MAX DAILY AMOUNT 4 TABLETS    escitalopram oxalate (LEXAPRO) 20 mg tablet TAKE 1 TABLET BY MOUTH DAILY    traZODone (DESYREL) 100 mg tablet Take 1 Tab by mouth nightly as needed for Sleep.  levETIRAcetam (KEPPRA) 500 mg tablet take 1 tablet every morning and 1.5 tablet every evening for migraines    amitriptyline (ELAVIL) 10 mg tablet TAKE 1 TABLET BY MOUTH EVERY NIGHT AT BEDTIME    SITagliptin (JANUVIA) 100 mg tablet Take 1 Tab by mouth daily.  metoprolol succinate (TOPROL-XL) 25 mg XL tablet TAKE 1/2 TABLET BY MOUTH ONCE DAILY    butalbital-acetaminophen-caffeine (FIORICET, ESGIC) -40 mg per tablet Take 1 Tab by mouth every twelve (12) hours as needed for Headache.  levothyroxine (SYNTHROID) 25 mcg tablet Take 2 Tabs by mouth Daily (before breakfast).  esomeprazole (NEXIUM) 20 mg capsule TAKE 1 CAPSULE BY MOUTH DAILY    Bifidobacterium Infantis (ALIGN) 4 mg cap Take 1 Cap by mouth daily as needed for Diarrhea.  Indications: ALIGN OTC    dicyclomine (BENTYL) 20 mg tablet Take 1 Tab by mouth two (2) times a day.  Biotin 2,500 mcg cap Take 1 Cap by mouth daily.  calcium carbonate (TUMS) 200 mg calcium (500 mg) chew Take 1 Tab by mouth daily as needed. No current facility-administered medications for this visit. Wt Readings from Last 3 Encounters:   06/25/21 151 lb (68.5 kg)   08/25/20 152 lb (68.9 kg)   07/29/20 150 lb 6.4 oz (68.2 kg)     BP Readings from Last 3 Encounters:   07/20/21 (!) 104/56   06/25/21 105/66   08/25/20 125/66     Pulse Readings from Last 3 Encounters:   07/20/21 78   06/25/21 80   08/25/20 77     Lab Results   Component Value Date/Time    WBC 6.6 06/25/2021 11:52 AM    HGB 12.9 06/25/2021 11:52 AM    HCT 41.3 06/25/2021 11:52 AM    PLATELET 009 68/15/1804 11:52 AM    MCV 86.4 06/25/2021 11:52 AM     Lab Results   Component Value Date/Time    Sodium 142 06/25/2021 11:52 AM    Potassium 4.0 06/25/2021 11:52 AM    Chloride 111 (H) 06/25/2021 11:52 AM    CO2 23 06/25/2021 11:52 AM    Anion gap 8 06/25/2021 11:52 AM    Glucose 128 (H) 06/25/2021 11:52 AM    BUN 17 06/25/2021 11:52 AM    Creatinine 0.73 06/25/2021 11:52 AM    BUN/Creatinine ratio 23 (H) 06/25/2021 11:52 AM    GFR est AA >60 06/25/2021 11:52 AM    GFR est non-AA >60 06/25/2021 11:52 AM    Calcium 8.8 06/25/2021 11:52 AM    Bilirubin, total 0.3 06/25/2021 11:52 AM    Alk. phosphatase 103 06/25/2021 11:52 AM    Protein, total 6.9 06/25/2021 11:52 AM    Albumin 3.6 06/25/2021 11:52 AM    Globulin 3.3 06/25/2021 11:52 AM    A-G Ratio 1.1 06/25/2021 11:52 AM    ALT (SGPT) 21 06/25/2021 11:52 AM     CrCl cannot be calculated (Unknown ideal weight.).       INR History:   (normal INR range 0.8-1.2)     Date   INR   PT   Dose/Comments  07/20/21 2.9  34.2 2 mg daily  Lapse in INR clinic  06/25/21 3.1  30.8 2 mg daily  06/10/20          1.9                   23.2     2 mg daily  Lapse in monitoring d/t COVID 19  01/15/20          2.5                   29.9     2 mg daily  12/02/19          2.0                   24.4     2 mg daily  10/17/19          2.7                   32.4     2 mg daily - reestablished with this practice                 05/09/19          2.1                   25.5     2 mg daily  04/25/19          2.2                   26.6     Hold x1, then 2 mg daily  04/11/19          3.6                   42. 8     2 mg daily   02/26/19          2.6                   31.5     2 mg daily  01/28/19          2.0                   24.2     2 mg daily                    01/17/19          2.8                   34.1     2 mg daily    A/P:       Anticoagulation:  Considering Ms. Dunlap's past history, todays findings, and per Anticoagulation Collaborative Practice Agreement/Protocol:    1. Fingerstick POC INR (2.9) is therapeutic for INR goal today. 2.  Continue warfarin 2 mg daily    Other:  - Pt to confirm trazodone dose - is it 50 mg or 100 mg.    - Consider trial off of astelin. Resume flonase nasal spray to see if dry mouth sx improves. Patient was instructed to schedule an appointment in 4 week(s) prior to leaving the clinic. Medication reconciliation was completed during the visit. Medications Discontinued During This Encounter   Medication Reason    warfarin (COUMADIN) 2 mg tablet REORDER    loperamide (IMODIUM) 2 mg capsule DUPLICATE ORDER    oxybutynin chloride XL (DITROPAN XL) 10 mg CR tablet DUPLICATE ORDER     Orders Placed This Encounter    COLLECTION CAPILLARY BLOOD SPECIMEN    AMB POC PT/INR    warfarin (COUMADIN) 2 mg tablet     Sig: Take 1 Tablet by mouth daily. Dispense:  90 Tablet     Refill:  1     A full discussion of the nature of anticoagulants has been carried out. A full discussion of the need for frequent and regular monitoring, precise dosage adjustment and compliance was stressed. Side effects of potential bleeding were discussed and Ms. Demetri Ferreira was instructed to call [phone] if there are any signs of abnormal bleeding. Ms. Felicity Severance was instructed to avoid any OTC items containing aspirin or ibuprofen and prior to starting any new OTC products to consult with her physician or pharmacist to ensure no drug interactions are present. Ms. Felicity Severance was instructed to avoid any major changes in her general diet and to avoid alcohol consumption. Ms. Felicity Severance was provided information in the AVS that includes topics on understanding coumadin therapy, drug interaction considerations, vitamin K and coumadin use, interactions with foods and supplements containing vitamin K, and the use of herbal products. Ms. Felicity Severance verbalized her understanding of all instructions and will call the office with any questions, concerns, or signs of abnormal bleeding or blood clot. Notifications of recommendations will be sent to Dr. Clarence Root DO for review. Thank you,  Zulema Guzman, PharmD, BCACP, [facility] [address] in place:  Yes   Recommendation Provided To: Patient/Caregiver: 1 via In person   Intervention Detail: Adherence Monitorin, Discontinued Rx: 3, reason: Duplicate Therapy and Therapy Complete, Lab(s) Ordered and Refill(s) Provided   Time Spent (min): 25

## 2021-07-19 NOTE — TELEPHONE ENCOUNTER
Patient states she No Longer Deals with [facility]. Patient states she now goes to [facility]. Please see New Refill encounter.  Thank you

## 2021-07-20 ENCOUNTER — ANTI-COAG VISIT (OUTPATIENT)
Dept: INTERNAL MEDICINE CLINIC | Age: 81
End: 2021-07-20
Payer: COMMERCIAL

## 2021-07-20 VITALS — DIASTOLIC BLOOD PRESSURE: 56 MMHG | OXYGEN SATURATION: 96 % | SYSTOLIC BLOOD PRESSURE: 104 MMHG | HEART RATE: 78 BPM

## 2021-07-20 DIAGNOSIS — Z86.718 HISTORY OF DVT OF LOWER EXTREMITY: Primary | ICD-10-CM

## 2021-07-20 LAB
INR BLD: 2.9 (ref 1–1.5)
PT POC: 34.2 SECONDS (ref 9.1–12)
VALID INTERNAL CONTROL?: YES

## 2021-07-20 PROCEDURE — 99212 OFFICE O/P EST SF 10 MIN: CPT | Performed by: PHARMACIST

## 2021-07-20 PROCEDURE — 85610 PROTHROMBIN TIME: CPT | Performed by: PHARMACIST

## 2021-07-20 RX ORDER — WARFARIN 2 MG/1
2 TABLET ORAL DAILY
Qty: 90 TABLET | Refills: 1 | Status: SHIPPED | OUTPATIENT
Start: 2021-07-20 | End: 2022-01-28 | Stop reason: SDUPTHER

## 2021-07-22 DIAGNOSIS — J41.0 SIMPLE CHRONIC BRONCHITIS (HCC): ICD-10-CM

## 2021-07-22 RX ORDER — FLUTICASONE PROPIONATE AND SALMETEROL 250; 50 UG/1; UG/1
1 POWDER RESPIRATORY (INHALATION) EVERY 12 HOURS
Qty: 1 INHALER | Refills: 3 | Status: SHIPPED | OUTPATIENT
Start: 2021-07-22 | End: 2021-12-05

## 2021-07-23 RX ORDER — MIRABEGRON 25 MG/1
TABLET, FILM COATED, EXTENDED RELEASE ORAL
Qty: 90 TABLET | Refills: 3 | Status: SHIPPED | OUTPATIENT
Start: 2021-07-23 | End: 2022-02-09 | Stop reason: ALTCHOICE

## 2021-07-23 NOTE — TELEPHONE ENCOUNTER
Patient states she needs to get refill done thru  Dr. Bridger Huang patient states this is The Only medication that she uses this pharmacy. Please call if any questions.  Thank you

## 2021-07-30 RX ORDER — METOPROLOL SUCCINATE 25 MG/1
TABLET, EXTENDED RELEASE ORAL
Qty: 45 TABLET | Refills: 3 | Status: SHIPPED | OUTPATIENT
Start: 2021-07-30 | End: 2022-02-09 | Stop reason: DRUGHIGH

## 2021-07-30 NOTE — TELEPHONE ENCOUNTER
Patient states she needs to get refill approval on medication that patient states she takes to help her Blood Pressure & patient reports her BP is Elevated today. Please call to discuss or to advise when approved.  Thank you

## 2021-08-05 RX ORDER — CELECOXIB 200 MG/1
200 CAPSULE ORAL
Qty: 60 CAPSULE | Refills: 5 | Status: SHIPPED | OUTPATIENT
Start: 2021-08-05 | End: 2021-08-06 | Stop reason: SDUPTHER

## 2021-08-06 RX ORDER — LOPERAMIDE HYDROCHLORIDE 2 MG/1
CAPSULE ORAL
Qty: 30 CAPSULE | Refills: 1 | Status: SHIPPED | OUTPATIENT
Start: 2021-08-06 | End: 2021-10-03

## 2021-08-06 RX ORDER — EZETIMIBE 10 MG/1
TABLET ORAL
Qty: 90 TABLET | Refills: 3 | Status: SHIPPED | OUTPATIENT
Start: 2021-08-06 | End: 2022-08-22

## 2021-08-06 RX ORDER — CELECOXIB 200 MG/1
200 CAPSULE ORAL
Qty: 60 CAPSULE | Refills: 5 | Status: SHIPPED | OUTPATIENT
Start: 2021-08-06 | End: 2022-05-17

## 2021-08-06 NOTE — TELEPHONE ENCOUNTER
Pt states new scripts need to be written and these need to go to [facility] on [address]. Pt does not live in [place] any longer nor does she want these going to mail order     Pt states she is out of these medications due to our mix up. Can this be done yet today?

## 2021-08-10 ENCOUNTER — TELEPHONE (OUTPATIENT)
Dept: INTERNAL MEDICINE CLINIC | Age: 81
End: 2021-08-10

## 2021-08-10 NOTE — TELEPHONE ENCOUNTER
----- Message from Adelaida Jacob sent at 8/10/2021  2:57 PM EDT -----  Regarding: Dr. Gonzalez Brothers: [phone]  General Message/Vendor Calls    Caller's first and last name: Hollis Echeevrria, [address] [facility]. Reason for call: Rx for Celebrex came in, however was flagged for allergy to NSAID, wants to make sure its ok to still fill. Rx needs to be placed on hold until pharmacy gets response from pcp. Callback required yes/no and why: Yes, confirm Rx. Best contact number(s): [phone] option 2      Details to clarify the request: Reference number: [id number].       Message from [facility]

## 2021-08-11 NOTE — TELEPHONE ENCOUNTER
Received call from [address] [place] [address] stating that order for Celebrex was flagged for patient's allergy to NSAIDs. Pharmacy would like to know if provider wants to proceed with filling medication at this time.

## 2021-08-12 ENCOUNTER — TELEPHONE (OUTPATIENT)
Dept: INTERNAL MEDICINE CLINIC | Age: 81
End: 2021-08-12

## 2021-08-12 NOTE — TELEPHONE ENCOUNTER
Pt states that nothing at this time should go thru [facility] for prescriptions. Pt states it should go thru the local [facility]. She states she got 2 prescriptions for mybetriq (mirabegron)--one from [facility] and one from [facility]. States she cannot afford duplicate med and that all these [facility]. Please call pt to discuss medications and which pharmacy.

## 2021-08-12 NOTE — TELEPHONE ENCOUNTER
Writer called [facility]. Informed pharmacist that provider would like to proceed with filling medication at this time as patient requested medication and has had it before. Pharmacist states he would make note of information received and will proceed with filling patient's medication at this time. No further actions required at this time.

## 2021-08-13 NOTE — TELEPHONE ENCOUNTER
Called patient. ID verified with Name and . Spoke with patient in regards to message received. Patient states that she would like to remove [facility] as her pharmacy at this time. Patient states that she keeps receiving medications from both pharmacies. [facility] removed from pharmacies at this time. No further actions required at this time.

## 2021-08-13 NOTE — TELEPHONE ENCOUNTER
----- Message from Mohan Jon sent at 8/13/2021 12:19 PM EDT -----  Regarding: /Telephone  Patient return call    Caller's first and last name and relationship (if not the patient):      Best contact number(s): [phone]       Whose call is being returned: nurse       Message from [facility]

## 2021-08-13 NOTE — TELEPHONE ENCOUNTER
I have attempted without success to contact this patient by phone. Unable to reach patient at this time. No personal VM, left generic message to return call to office at earliest convenience. Awaiting call back at this time. face

## 2021-08-13 NOTE — TELEPHONE ENCOUNTER
Pt returned call and hung up on psr while psr had her on hold to contact nurse.     Please call back

## 2021-08-16 DIAGNOSIS — G43.009 MIGRAINE WITHOUT AURA AND WITHOUT STATUS MIGRAINOSUS, NOT INTRACTABLE: ICD-10-CM

## 2021-08-16 RX ORDER — DICYCLOMINE HYDROCHLORIDE 10 MG/1
CAPSULE ORAL
Qty: 120 CAPSULE | Refills: 3 | Status: SHIPPED | OUTPATIENT
Start: 2021-08-16

## 2021-08-16 RX ORDER — LEVETIRACETAM 500 MG/1
TABLET ORAL
Qty: 225 TABLET | Refills: 3 | Status: SHIPPED | OUTPATIENT
Start: 2021-08-16 | End: 2022-07-05 | Stop reason: SDUPTHER

## 2021-09-07 NOTE — PATIENT INSTRUCTIONS
Today your INR was 3.6 . Your goal INR is  2.0-3.0 . You have a 2 mg tablet of Coumadin (warfarin). Take Coumadin (warfarin) as follows:    Hold warfarin tonight. Then continue warfarin 2 mg daily. Come back in  2 week(s) for your next finger stick/INR blood test.        Avoid any over the counter items containing aspirin or ibuprofen, and avoid great swings in general diet. Avoid alcohol consumption. Please notify the INR nurse if you are started on any new medication including over the counter or herbal supplements. Also, please notify your INR nurse if any of your other prescription or over the counter medications have been discontinued. Call [facility] at [phone] if you have any signs of abnormal bleeding/blood clot.  ------------------------------------------------------------------------------------------------------------------  Taking Warfarin Safely: Care Instructions    Your Care Instructions  Warfarin is a medicine that you take to prevent blood clots. It is often called a blood thinner. Doctors give warfarin (such as Coumadin) to reduce the risk of blood clots. You may be at risk for blood clots if you have atrial fibrillation or deep vein thrombosis. Some other health problems may also put you at risk. Warfarin slows the amount of time it takes for your blood to clot. It can cause bleeding problems. Even if you've been taking warfarin for a while, it's important to know how to take it safely. Foods and other medicines can affect the way warfarin works. Some can make warfarin work too well. This can cause bleeding problems. And some can make it work poorly, so that it does not prevent blood clots very well. You will need regular blood tests to check how long it takes for your blood to form a clot. This test is called a PT or prothrombin time test. The result of the test is called an INR level.  Depending on the test results, your doctor or anticoagulation clinic may adjust your dose of warfarin. Follow-up care is a key part of your treatment and safety. Be sure to make and go to all appointments, and call your doctor if you are having problems. It's also a good idea to know your test results and keep a list of the medicines you take. How can you care for yourself at home? Take warfarin safely  · Take your warfarin at the same time each day. · If you miss a dose of warfarin, don't take an extra dose to make up for it. Your doctor can tell you exactly what to do so you don't take too much or too little. · Wear medical alert jewelry that lets others know that you take warfarin. You can buy this at most drugstores. · Don't take warfarin if you are pregnant or planning to get pregnant. Talk to your doctor about how you can prevent getting pregnant while you are taking it. · Don't change your dose or stop taking warfarin unless your doctor tells you to. Effects of medicines and food on warfarin  · Don't start or stop taking any medicines, vitamins, or natural remedies unless you first talk to your doctor. Many medicines can affect how warfarin works. These include aspirin and other pain relievers, over-the-counter medicines, multivitamins, dietary supplements, and herbal products. · Tell all of your doctors and pharmacists that you take warfarin. Some prescription medicines can affect how warfarin works. · Keep the amount of vitamin K in your diet about the same from day to day. Do not suddenly eat a lot more or a lot less food that is rich in vitamin K than you usually do. Vitamin K affects how warfarin works and how your blood clots. Talk with your doctor before making big changes in your diet. Vitamin K is in many foods, such as:  ¨ Leafy greens, such as kale, cabbage, spinach, Swiss chard, and lettuce. ¨ Canola and soybean oils. ¨ Green vegetables, such as asparagus, broccoli, and [place] sprouts.   ¨ Vegetable drinks, green tea leaves, and some dietary supplement drinks. · Avoid cranberry juice and other cranberry products. They can increase the effects of warfarin. · Limit your use of alcohol. Avoid bleeding by preventing falls and injuries  · Wear slippers or shoes with nonskid soles. · Remove throw rugs and clutter. · Rearrange furniture and electrical cords to keep them out of walking paths. · Keep stairways, porches, and outside walkways well lit. Use night-lights in hallways and bathrooms. · Be extra careful when you work with sharp tools or knives. When should you call for help? Call 911 anytime you think you may need emergency care. For example, call if:  · You have a sudden, severe headache that is different from past headaches. Call your doctor now or seek immediate medical care if:  · You have any abnormal bleeding, such as:  ¨ Nosebleeds. ¨ Vaginal bleeding that is different (heavier, more frequent, at a different time of the month) than what you are used to. ¨ Bloody or black stools, or rectal bleeding. ¨ Bloody or pink urine. Watch closely for changes in your health, and be sure to contact your doctor if you have any problems. Where can you learn more? Go to http://www.gray.com/. Enter X991 in the search box to learn more about \"Taking Warfarin Safely: Care Instructions. \"  Current as of: January 27, 2016  Content Version: 11.1  © 0434-1222 [employer]. Care instructions adapted under license by [employer] (which disclaims liability or warranty for this information). If you have questions about a medical condition or this instruction, always ask your healthcare professional. [place] [address] any warranty or liability for your use of this information.

## 2021-09-07 NOTE — PROGRESS NOTES
Pharmacy Progress Note - Anticoagulation Management    Ms. Canales Ny 78 y.o. was seen today together with Henok Ochoa, PharmD. I have read and agree with Kristi's documentation. Patient missed her last follow up appt with me. · Warfarin start date:  Around 2008   · INR Goal:  2.0-3.0    · Current warfarin regimen:  2 mg daily                      · Warfarin tablet strength: 2 mg   · Duration of therapy: Indefinite       Denies s/sx of bleeding/bruises. No missed/extra doses. Meals erratic. Wt Readings from Last 3 Encounters:   09/08/21 154 lb (69.9 kg)   06/25/21 151 lb (68.5 kg)   08/25/20 152 lb (68.9 kg)     Visit Vitals  Ht 5' 1\" (1.549 m)   Wt 154 lb (69.9 kg)   BMI 29.10 kg/m²     Results for orders placed or performed in visit on 09/08/21   AMB POC PT/INR   Result Value Ref Range    VALID INTERNAL CONTROL POC Yes     Prothrombin time (POC) 43.7 (A) 9.1 - 12.0 seconds    INR POC 3.6 (A) 1.0 - 1.5         INR History:   (normal INR range 0.8-1.2)      Date                INR                  PT        Dose/Comments  09/08/21 3.6  43.7 2 mg daily  07/20/21          2.9                   34.2     2 mg daily  Lapse in INR clinic  06/25/21          3.1                   30.8     2 mg daily  06/10/20          1.9                   23.2     2 mg daily  Lapse in monitoring d/t COVID 19  01/15/20          2.5                   29.9     2 mg daily  12/02/19          2.0                   24.4     2 mg daily  10/17/19          2.7                   32.4     2 mg daily - reestablished with this practice                 05/09/19          2.1                   25.5     2 mg daily  04/25/19          2.2                   26.6     Hold x1, then 2 mg daily  04/11/19          3.6                   42. 8     2 mg daily   02/26/19          2.6                   31.5     2 mg daily    A/P:  - Fingerstick INR today is supratherapeutic for goal  - Hold warfarin tonight. Continue 2 mg daily.    Emphasize importance of routine INR monitoring.   - Patient will RTC in 2 weeks for follow up. Thank you for the consult,  Zulema Alberts, ShonD, BCACP, [place] [address] in place:  Yes   Recommendation Provided To: Patient/Caregiver: 1 via In person   Intervention Detail: Dose Adjustment: 1, reason: Therapy De-escalation and Lab(s) Ordered   Time Spent (min): 30

## 2021-09-08 ENCOUNTER — ANTI-COAG VISIT (OUTPATIENT)
Dept: INTERNAL MEDICINE CLINIC | Age: 81
End: 2021-09-08
Payer: COMMERCIAL

## 2021-09-08 DIAGNOSIS — Z86.718 HISTORY OF DVT OF LOWER EXTREMITY: Primary | ICD-10-CM

## 2021-09-08 LAB
INR BLD: 3.6 (ref 1–1.5)
PT POC: 43.7 SECONDS (ref 9.1–12)
VALID INTERNAL CONTROL?: YES

## 2021-09-08 PROCEDURE — 99212 OFFICE O/P EST SF 10 MIN: CPT | Performed by: PHARMACIST

## 2021-09-08 PROCEDURE — 85610 PROTHROMBIN TIME: CPT | Performed by: PHARMACIST

## 2021-09-08 NOTE — PROGRESS NOTES
Pharmacy Progress Note - Anticoagulation Management    S/O:  Ms. Julianne Wharton  is a 78 y.o. female seen today for anticoagulation management for the diagnosis of LLL Deep Vein Thrombosis. · Warfarin start date: Around 2008  · INR Goal:  2.0-3.0    · Current warfarin regimen: 2 mg daily                      · Warfarin tablet strength:   2 mg   · Duration of therapy: Indefinite    Today's pertinent positives includes:  No significant changes since last visit    Has appointment with [address]. [address] (cardio) tomorrow. Has appointment with dr. Connor Ulrich (orthopedic surgery) scheduled for 10/6/21. Results for orders placed or performed in visit on 09/08/21   AMB POC PT/INR   Result Value Ref Range    VALID INTERNAL CONTROL POC Yes     Prothrombin time (POC) 43.7 (A) 9.1 - 12.0 seconds    INR POC 3.6 (A) 1.0 - 1.5     · Adherence:   · Able to recall regimen? YES  · Miss/extra dose? NO  · Need refill?  NO     Upcoming procedure(s):  NO    Past Medical History:   Diagnosis Date    Anxiety     Arthritis     Asthma     CAD (coronary artery disease)     stent    Diabetes ([address]     GERD (gastroesophageal reflux disease)     History of DVT (deep vein thrombosis)     History of recurrent UTIs     Hx of seasonal allergies     Hypercholesterolemia     Irritable bowel syndrome (IBS)     Migraine      Allergies   Allergen Reactions    Iodinated Contrast Media Other (comments)     Passes out    Pcn [Penicillins] Shortness of Breath    Sulfa (Sulfonamide Antibiotics) Shortness of Breath     Current Outpatient Medications   Medication Sig    levETIRAcetam (KEPPRA) 500 mg tablet TAKE 1 TABLET BY MOUTH EVERY MORNING AND 1 AND 1/2 TABLET BY MOUTH EVERY EVENING FOR MIGRAINES    dicyclomine (BENTYL) 10 mg capsule TAKE ONE CAPSULE BY MOUTH FOUR TIMES DAILY AS NEEDED    ezetimibe (ZETIA) 10 mg tablet TAKE 1 TABLET BY MOUTH DAILY    celecoxib (CELEBREX) 200 mg capsule Take 1 Capsule by mouth every twelve (12) hours as needed for Pain.  loperamide (IMODIUM) 2 mg capsule TAKE 1 CAPSULE BY MOUTH FOUR TIMES DAILY AS NEEDED FOR DIARRHEA    metoprolol succinate (TOPROL-XL) 25 mg XL tablet TAKE 1/2 TABLET BY MOUTH ONCE DAILY    Myrbetriq 25 mg ER tablet TAKE 1 TABLET DAILY    fluticasone propion-salmeteroL (Advair Diskus) 250-50 mcg/dose diskus inhaler Take 1 Puff by inhalation every twelve (12) hours.  warfarin (COUMADIN) 2 mg tablet Take 1 Tablet by mouth daily.  oxybutynin chloride XL (DITROPAN XL) 10 mg CR tablet TAKE 1 TABLET BY MOUTH EVERY DAY    atorvastatin (LIPITOR) 20 mg tablet TAKE 1 TABLET BY MOUTH EVERY DAY    traZODone (DESYREL) 100 mg tablet Take 1 Tablet by mouth nightly as needed for Sleep.  diclofenac (VOLTAREN) 1 % gel Apply  to affected area every twelve (12) hours as needed for Pain.  azelastine (ASTELIN) 137 mcg (0.1 %) nasal spray 1 [place] by Both Nostrils route two (2) times a day. Use in each nostril as directed    nystatin (MYCOSTATIN) 100,000 unit/mL suspension SWISH AND SWALLOW 5 ML BY MOUTH FOR 30 SECONDS 4 TIMES A DAY AS NEEDED FOR MOUTH PAIN    albuterol (PROVENTIL HFA, VENTOLIN HFA, PROAIR HFA) 90 mcg/actuation inhaler Take 2 Puffs by inhalation every six (6) hours as needed for Wheezing.  clorazepate (TRANXENE) 3.75 mg tablet TAKE 1 TABLET BY MOUTH EVERY 6 HOURS AS NEEDED FOR ANXIETY. MAX DAILY AMOUNT 4 TABLETS    escitalopram oxalate (LEXAPRO) 20 mg tablet TAKE 1 TABLET BY MOUTH DAILY    amitriptyline (ELAVIL) 10 mg tablet TAKE 1 TABLET BY MOUTH EVERY NIGHT AT BEDTIME    SITagliptin (JANUVIA) 100 mg tablet Take 1 Tab by mouth daily.  butalbital-acetaminophen-caffeine (FIORICET, ESGIC) -40 mg per tablet Take 1 Tab by mouth every twelve (12) hours as needed for Headache.  levothyroxine (SYNTHROID) 25 mcg tablet Take 2 Tabs by mouth Daily (before breakfast).     esomeprazole (NEXIUM) 20 mg capsule TAKE 1 CAPSULE BY MOUTH DAILY    Bifidobacterium Infantis (ALIGN) 4 mg cap Take 1 Cap by mouth daily as needed for Diarrhea. Indications: ALIGN OTC    Biotin 2,500 mcg cap Take 1 Cap by mouth daily.  calcium carbonate (TUMS) 200 mg calcium (500 mg) chew Take 1 Tab by mouth daily as needed. No current facility-administered medications for this visit. Wt Readings from Last 3 Encounters:   09/08/21 154 lb (69.9 kg)   06/25/21 151 lb (68.5 kg)   08/25/20 152 lb (68.9 kg)     BP Readings from Last 3 Encounters:   07/20/21 (!) 104/56   06/25/21 105/66   08/25/20 125/66     Pulse Readings from Last 3 Encounters:   07/20/21 78   06/25/21 80   08/25/20 77     Lab Results   Component Value Date/Time    WBC 6.6 06/25/2021 11:52 AM    HGB 12.9 06/25/2021 11:52 AM    HCT 41.3 06/25/2021 11:52 AM    PLATELET 576 54/55/3325 11:52 AM    MCV 86.4 06/25/2021 11:52 AM     Lab Results   Component Value Date/Time    Sodium 142 06/25/2021 11:52 AM    Potassium 4.0 06/25/2021 11:52 AM    Chloride 111 (H) 06/25/2021 11:52 AM    CO2 23 06/25/2021 11:52 AM    Anion gap 8 06/25/2021 11:52 AM    Glucose 128 (H) 06/25/2021 11:52 AM    BUN 17 06/25/2021 11:52 AM    Creatinine 0.73 06/25/2021 11:52 AM    BUN/Creatinine ratio 23 (H) 06/25/2021 11:52 AM    GFR est AA >60 06/25/2021 11:52 AM    GFR est non-AA >60 06/25/2021 11:52 AM    Calcium 8.8 06/25/2021 11:52 AM    Bilirubin, total 0.3 06/25/2021 11:52 AM    Alk. phosphatase 103 06/25/2021 11:52 AM    Protein, total 6.9 06/25/2021 11:52 AM    Albumin 3.6 06/25/2021 11:52 AM    Globulin 3.3 06/25/2021 11:52 AM    A-G Ratio 1.1 06/25/2021 11:52 AM    ALT (SGPT) 21 06/25/2021 11:52 AM     Estimated Creatinine Clearance: 53.1 mL/min (by C-G formula based on SCr of 0.73 mg/dL).       INR History:   (normal INR range 0.8-1.2)     Date   INR   PT   Dose/Comments  09/08/21 3.6  43.7 2 mg daily  07/20/21          2.9                   34.2     2 mg daily  Lapse in INR clinic  06/25/21          3.1                   30.8     2 mg daily  06/10/20          1.9                   23.2     2 mg daily  Lapse in monitoring d/t COVID 19  01/15/20          2.5                   29.9     2 mg daily  12/02/19          2.0                   24.4     2 mg daily  10/17/19          2.7                   32.4     2 mg daily - reestablished with this practice                 05/09/19          2.1                   25.5     2 mg daily  04/25/19          2.2                   26.6     Hold x1, then 2 mg daily  04/11/19          3.6                   42. 8     2 mg daily   02/26/19          2.6                   31.5     2 mg daily  01/28/19          2.0                   24.2     2 mg daily                    01/17/19          2.8                   34.1     2 mg daily      A/P:       Anticoagulation:  Considering Ms. Dunlap's past history, todays findings, and per Anticoagulation Collaborative Practice Agreement/Protocol:    1. Fingerstick POC INR (3.6) is Supratherapeutic for INR goal today. 2. Hold 1x dose of warfarin tonight. Then continue warfarin 2 mg daily. Check: Vitals and Weight at the next visit. Patient was instructed to schedule an appointment in 2 week(s) prior to leaving the clinic. Medication reconciliation was completed during the visit. Medications Discontinued During This Encounter   Medication Reason    dicyclomine (BENTYL) 20 mg tablet DUPLICATE ORDER    traZODone (DESYREL) 50 mg tablet DOSE ADJUSTMENT       A full discussion of the nature of anticoagulants has been carried out. A full discussion of the need for frequent and regular monitoring, precise dosage adjustment and compliance was stressed. Side effects of potential bleeding were discussed and Ms. Iva Velazquez was instructed to call [phone] if there are any signs of abnormal bleeding.   Ms. Iva Velazquez was instructed to avoid any OTC items containing aspirin or ibuprofen and prior to starting any new OTC products to consult with her physician or pharmacist to ensure no drug interactions are present. Ms. Onofre Bautista was instructed to avoid any major changes in her general diet and to avoid alcohol consumption. Ms. Onofre Bautista was provided information in the AVS that includes topics on understanding coumadin therapy, drug interaction considerations, vitamin K and coumadin use, interactions with foods and supplements containing vitamin K, and the use of herbal products. Ms. Onofre Bautista verbalized her understanding of all instructions and will call the office with any questions, concerns, or signs of abnormal bleeding or blood clot. Notifications of recommendations will be sent to Dr. Hector Springer DO for review.     Thank you,  Ashley Elizondo, PHARMD

## 2021-09-09 VITALS — BODY MASS INDEX: 29.07 KG/M2 | TEMPERATURE: 98.3 F | HEIGHT: 61 IN | WEIGHT: 154 LBS

## 2021-09-09 RX ORDER — ONDANSETRON 4 MG/1
TABLET, FILM COATED ORAL
Qty: 30 TABLET | Refills: 2 | Status: SHIPPED | OUTPATIENT
Start: 2021-09-09

## 2021-09-09 NOTE — PERIOP NOTES
[facility]  Ambulatory Surgery Unit  Pre-operative Instructions    Procedure Date  Tuesday, September 14, 2021            Tentative Arrival Time 245      1. On the day of your procedure, please report to the Ambulatory Surgery Unit Registration Desk and sign in at your designated time. The Ambulatory Surgery Unit is located in [facility] on the [facility] side of the [facility] across from the [address] [place] [address]. Please have all of your health insurance cards and a photo ID. 2. You must have someone with you to drive you home as directed by your surgeon. 3. You may have a light breakfast and take normal morning medications. 4. We recommend you do not drink any alcoholic beverages for 24 hours before and after your procedure. 5. Contact your surgeons office for instructions on the following medications: non-steroidal anti-inflammatory drugs (i.e. Advil, Aleve), vitamins, and supplements. (Some surgeons will want you to stop these medications prior to surgery and others may allow you to take them)   **If you are currently taking Plavix, Coumadin, Aspirin and/or other blood-thinning agents, contact your surgeon for instructions. ** Your surgeon will partner with the physician prescribing these medications to determine if it is safe to stop or if you need to continue taking. Please do not stop taking these medications without instructions from your surgeon. 6. In an effort to help prevent surgical site infection, we ask that you shower with an anti-bacterial soap (i.e. Dial or Safeguard) on the morning of your procedure. Do not apply any lotions, powders, or deodorants after showering. 7. Wear comfortable clothes. Wear glasses instead of contacts. Do not bring any jewelry or money (other than copays or fees as instructed). Do not wear make-up, particularly mascara, the morning of your procedure. Wear your hair loose or down, no ponytails, buns, jose pins or clips.  All body piercings must be removed. 8. You should understand that if you do not follow these instructions your procedure may be cancelled. If your physical condition changes (i.e. fever, cold or flu) please contact your surgeon as soon as possible. 9. It is important that you be on time. If a situation occurs where you may be late, or if you have any questions or problems, please call [phone].    10. Your procedure time may be subject to change. You will receive a phone call the day prior to confirm your arrival time. I understand a pre-operative phone call will be made to verify my procedure time. In the event that I am not available, I give permission for a message to be left on my answering service and/or with another person?       yes    Preop instructions reviewed  Pt verbalized understanding.      ___________________      ___________________      ___________________  (Signature of Patient)          (Witness)                   (Date and Time)

## 2021-09-12 RX ORDER — AMITRIPTYLINE HYDROCHLORIDE 10 MG/1
TABLET, FILM COATED ORAL
Qty: 90 TABLET | Refills: 3 | Status: SHIPPED | OUTPATIENT
Start: 2021-09-12 | End: 2021-12-22 | Stop reason: SDUPTHER

## 2021-09-14 ENCOUNTER — APPOINTMENT (OUTPATIENT)
Dept: GENERAL RADIOLOGY | Age: 81
End: 2021-09-14
Attending: PHYSICAL MEDICINE & REHABILITATION
Payer: COMMERCIAL

## 2021-09-14 ENCOUNTER — TELEPHONE (OUTPATIENT)
Dept: INTERNAL MEDICINE CLINIC | Age: 81
End: 2021-09-14

## 2021-09-14 ENCOUNTER — HOSPITAL ENCOUNTER (OUTPATIENT)
Age: 81
Setting detail: OUTPATIENT SURGERY
Discharge: HOME | End: 2021-09-14
Attending: PHYSICAL MEDICINE & REHABILITATION | Admitting: PHYSICAL MEDICINE & REHABILITATION
Payer: COMMERCIAL

## 2021-09-14 VITALS
TEMPERATURE: 98.6 F | OXYGEN SATURATION: 94 % | DIASTOLIC BLOOD PRESSURE: 67 MMHG | HEIGHT: 61 IN | WEIGHT: 154 LBS | HEART RATE: 79 BPM | SYSTOLIC BLOOD PRESSURE: 139 MMHG | BODY MASS INDEX: 29.07 KG/M2 | RESPIRATION RATE: 14 BRPM

## 2021-09-14 LAB — INR BLD: 4.1 (ref 0.9–1.2)

## 2021-09-14 PROCEDURE — 72020 X-RAY EXAM OF SPINE 1 VIEW: CPT

## 2021-09-14 PROCEDURE — 85610 PROTHROMBIN TIME: CPT

## 2021-09-14 RX ORDER — BUPIVACAINE HYDROCHLORIDE 5 MG/ML
10 INJECTION, SOLUTION EPIDURAL; INTRACAUDAL ONCE
Status: DISCONTINUED | OUTPATIENT
Start: 2021-09-14 | End: 2021-09-14 | Stop reason: HOSPADM

## 2021-09-14 RX ORDER — LIDOCAINE HYDROCHLORIDE 20 MG/ML
10 INJECTION, SOLUTION INFILTRATION; PERINEURAL ONCE
Status: DISCONTINUED | OUTPATIENT
Start: 2021-09-14 | End: 2021-09-14 | Stop reason: HOSPADM

## 2021-09-14 RX ORDER — METHYLPREDNISOLONE ACETATE 40 MG/ML
80 INJECTION, SUSPENSION INTRA-ARTICULAR; INTRALESIONAL; INTRAMUSCULAR; SOFT TISSUE ONCE
Status: DISCONTINUED | OUTPATIENT
Start: 2021-09-14 | End: 2021-09-14 | Stop reason: HOSPADM

## 2021-09-14 NOTE — TELEPHONE ENCOUNTER
Edison Banerjee from Ambulatory Surgery  [phone]    States that patient has INR of 4.1    States please in form provider. Also states that she recommends In-person visit with patient soon as pt is confused about meds and has not been taking diabetes medications.     Please be advised

## 2021-09-14 NOTE — DISCHARGE INSTRUCTIONS
Your procedure today was canceled due to INR of 4.1. Canceled by Dr. Asia Sweet. Please follow up with your PCP as soon as possible. We have left a voicemail with their office regarding this issue.

## 2021-09-14 NOTE — TELEPHONE ENCOUNTER
Called patient. ID verified with Name and . Spoke with patient in regards to inr reported by ambulatory surgery. Informed patient that writer did speak with PharmD at this time and she would like for patient to hold warfarin 2mg for 2 days. Informed patient to resume 1/2 tablet on Thursday and patient is to continue 2 mg dose on Friday. Scheduled patient for follow up with PharmD on Monday at 12pm. Patient states that she understands the information that received at this time and has no further questions or concerns at this time.

## 2021-09-14 NOTE — H&P
Procedural Case Note    9/14/2021    (3:06 PM)    Leda Ramirez    1940   (78 y.o.)    [id number]    CC:  pain    ROS:   Complete ROS obtained, no CP, no SOB, no N or V    PMH:     Past Medical History:   Diagnosis Date    Anxiety     Arthritis     Asthma     CAD (coronary artery disease)     stent    Diabetes ([address]     GERD (gastroesophageal reflux disease)     History of DVT (deep vein thrombosis)     History of recurrent UTIs     Hx of seasonal allergies     Hypercholesterolemia     Irritable bowel syndrome (IBS)     Migraine     Urinary urgency        ALLERGIES:     Allergies   Allergen Reactions    Iodinated Contrast Media Other (comments)     Passes out    Pcn [Penicillins] Shortness of Breath    Sulfa (Sulfonamide Antibiotics) Shortness of Breath       MEDS:     Current Facility-Administered Medications   Medication Dose Route Frequency    methylPREDNISolone acetate (DEPO-MEDROL) 40 mg/mL injection 80 mg  80 mg Intra artICUlar ONCE    bupivacaine (PF) (MARCAINE) 0.5 % (5 mg/mL) injection 50 mg  10 mL Intra artICUlar ONCE    lidocaine (XYLOCAINE) 20 mg/mL (2 %) injection 200 mg  10 mL Other ONCE    gadobenate dimeglumine (MULTIHANCE) 529 mg/mL (0.1mmol/0.2mL) contrast injection 10 mL  10 mL Intra artICUlar RAD ONCE          Visit Vitals  Ht 5' 1\" (1.549 m)   Wt 69.9 kg (154 lb)   BMI 29.10 kg/m²     PE:  Gen: NAD  Head: normocephalic  Heart: RRR  Lungs: CTA junito  Abd: NT, ND, soft  Neuro: awake and alert  Skin: intact    IMPRESSION:   LS DDD    INR 4.1- will re-schedule TFESI when INR below 3    Note:  The clinical status was discussed in detail with the patient. The procedure was again discussed and described in detail. All understand and accept the planned procedure and risks; reject other forms of treatment. All questions are answered.     Cha Harris MD

## 2021-09-14 NOTE — PERIOP NOTES
1525: Dr. Abigail Edwards at bedside. He is aware of her INR 4.1 and states that her procedure cannot be done today. He is canceling her and she is to follow up with her PCP. 1535: DC instructions reviewed with patient and her son. Both voiced understanding. Pt states that she is going to call her PCP as soon as she leaves. Neither have questions. 1540: Patient has all of her belongings she came with. Placed in wheelchair and brought to son's vehicle by staff.

## 2021-09-18 NOTE — PROGRESS NOTES
Pharmacy Progress Note - Anticoagulation Management    S/O: Ms. Brenda Dunlap is a 80 y. o. female , with a PMH of OAB, IBS, LE DVT, anxiety, insomonia, Renee's esophagus, Arthritis, Migraines, hyperlipidemia, who was seen today for anticoagulation management for the diagnosis of LLE Deep Vein Thrombosis.  Pt ambulates w/ a cane.      Pt was seen with Sugar Gamez PharmD. I have reviewed and agree with Kristi's documentation. Interim history:  Saw Dr Maine Pascual - 9/14 for TFESI. INR was 4.1. Procedure postponed for now until INR is < 3.0  Pt reports she held previous dose as planned. Reports she wants to schedule procedure with a different provider. Still needs to schedule appt. · Warfarin start date:  Around 2008   · INR Goal:  2.0-3.0    · Current warfarin regimen:  Hold x 2 days, then 1/2 tab on Thursday, then 2 mg daily until INR check                     · Warfarin tablet strength: 2 mg   · Duration of therapy: Indefinite       Today's pertinent positives includes:  Hospitalization / ED visits    Denies CP/SOB/LE & UE pain/HA  No changes to vitamin K intake/meds or bm    Dr. Onel Galaviz ([facility])  Scheduled for Ankle brachial index, 2D echo,  and carotid duplex - 10/20/21, 11/16/21,12/15/21 with Dr Marcel Salazar    Results for orders placed or performed in visit on 09/20/21   AMB POC PT/INR   Result Value Ref Range    VALID INTERNAL CONTROL POC Yes     Prothrombin time (POC) 14.0 (A) 9.1 - 12.0 seconds    INR POC 1.2 1.0 - 1.5     · Adherence:   · Able to recall regimen? NO  · Miss/extra dose? YES  · Need refill?  NO    Upcoming procedure(s):  NO    Reports she has never taking januva 100 mg daily     Past Medical History:   Diagnosis Date    Anxiety     Arthritis     Asthma     CAD (coronary artery disease)     stent    Diabetes (HCC)     GERD (gastroesophageal reflux disease)     History of DVT (deep vein thrombosis)     History of recurrent UTIs     Hx of seasonal allergies     Hypercholesterolemia     Irritable bowel syndrome (IBS)     Migraine     Urinary urgency      Allergies   Allergen Reactions    Iodinated Contrast Media Other (comments)     Passes out    Pcn [Penicillins] Shortness of Breath    Sulfa (Sulfonamide Antibiotics) Shortness of Breath     Current Outpatient Medications   Medication Sig    amitriptyline (ELAVIL) 10 mg tablet TAKE 1 TABLET BY MOUTH EVERY NIGHT AT BEDTIME    ondansetron hcl (ZOFRAN) 4 mg tablet TAKE 1 TABLET BY MOUTH EVERY 8 HOURS AS NEEDED FOR NAUSEA OR VOMITING    levETIRAcetam (KEPPRA) 500 mg tablet TAKE 1 TABLET BY MOUTH EVERY MORNING AND 1 AND 1/2 TABLET BY MOUTH EVERY EVENING FOR MIGRAINES    dicyclomine (BENTYL) 10 mg capsule TAKE ONE CAPSULE BY MOUTH FOUR TIMES DAILY AS NEEDED    ezetimibe (ZETIA) 10 mg tablet TAKE 1 TABLET BY MOUTH DAILY    celecoxib (CELEBREX) 200 mg capsule Take 1 Capsule by mouth every twelve (12) hours as needed for Pain.  loperamide (IMODIUM) 2 mg capsule TAKE 1 CAPSULE BY MOUTH FOUR TIMES DAILY AS NEEDED FOR DIARRHEA    metoprolol succinate (TOPROL-XL) 25 mg XL tablet TAKE 1/2 TABLET BY MOUTH ONCE DAILY    Myrbetriq 25 mg ER tablet TAKE 1 TABLET DAILY    fluticasone propion-salmeteroL (Advair Diskus) 250-50 mcg/dose diskus inhaler Take 1 Puff by inhalation every twelve (12) hours.  warfarin (COUMADIN) 2 mg tablet Take 1 Tablet by mouth daily.  oxybutynin chloride XL (DITROPAN XL) 10 mg CR tablet TAKE 1 TABLET BY MOUTH EVERY DAY    atorvastatin (LIPITOR) 20 mg tablet TAKE 1 TABLET BY MOUTH EVERY DAY    traZODone (DESYREL) 100 mg tablet Take 1 Tablet by mouth nightly as needed for Sleep.  diclofenac (VOLTAREN) 1 % gel Apply  to affected area every twelve (12) hours as needed for Pain.  azelastine (ASTELIN) 137 mcg (0.1 %) nasal spray 1 [place] by Both Nostrils route two (2) times a day.  Use in each nostril as directed    nystatin (MYCOSTATIN) 100,000 unit/mL suspension SWISH AND SWALLOW 5 ML BY MOUTH FOR 30 SECONDS 4 TIMES A DAY AS NEEDED FOR MOUTH PAIN    albuterol (PROVENTIL HFA, VENTOLIN HFA, PROAIR HFA) 90 mcg/actuation inhaler Take 2 Puffs by inhalation every six (6) hours as needed for Wheezing.  clorazepate (TRANXENE) 3.75 mg tablet TAKE 1 TABLET BY MOUTH EVERY 6 HOURS AS NEEDED FOR ANXIETY. MAX DAILY AMOUNT 4 TABLETS    escitalopram oxalate (LEXAPRO) 20 mg tablet TAKE 1 TABLET BY MOUTH DAILY    SITagliptin (JANUVIA) 100 mg tablet Take 1 Tab by mouth daily.  butalbital-acetaminophen-caffeine (FIORICET, ESGIC) -40 mg per tablet Take 1 Tab by mouth every twelve (12) hours as needed for Headache.  levothyroxine (SYNTHROID) 25 mcg tablet Take 2 Tabs by mouth Daily (before breakfast).  esomeprazole (NEXIUM) 20 mg capsule TAKE 1 CAPSULE BY MOUTH DAILY    Bifidobacterium Infantis (ALIGN) 4 mg cap Take 1 Cap by mouth daily as needed for Diarrhea. Indications: ALIGN OTC    Biotin 2,500 mcg cap Take 1 Cap by mouth daily.  calcium carbonate (TUMS) 200 mg calcium (500 mg) chew Take 1 Tab by mouth daily as needed. No current facility-administered medications for this visit.      Wt Readings from Last 3 Encounters:   09/20/21 152 lb (68.9 kg)   09/14/21 154 lb (69.9 kg)   09/08/21 154 lb (69.9 kg)     BP Readings from Last 3 Encounters:   09/14/21 139/67   07/20/21 (!) 104/56   06/25/21 105/66     Pulse Readings from Last 3 Encounters:   09/14/21 79   07/20/21 78   06/25/21 80     Lab Results   Component Value Date/Time    WBC 6.6 06/25/2021 11:52 AM    HGB 12.9 06/25/2021 11:52 AM    HCT 41.3 06/25/2021 11:52 AM    PLATELET 537 23/95/5352 11:52 AM    MCV 86.4 06/25/2021 11:52 AM     Lab Results   Component Value Date/Time    Sodium 142 06/25/2021 11:52 AM    Potassium 4.0 06/25/2021 11:52 AM    Chloride 111 (H) 06/25/2021 11:52 AM    CO2 23 06/25/2021 11:52 AM    Anion gap 8 06/25/2021 11:52 AM    Glucose 128 (H) 06/25/2021 11:52 AM    BUN 17 06/25/2021 11:52 AM    Creatinine 0.73 06/25/2021 11:52 AM    BUN/Creatinine ratio 23 (H) 06/25/2021 11:52 AM    GFR est AA >60 06/25/2021 11:52 AM    GFR est non-AA >60 06/25/2021 11:52 AM    Calcium 8.8 06/25/2021 11:52 AM    Bilirubin, total 0.3 06/25/2021 11:52 AM    Alk. phosphatase 103 06/25/2021 11:52 AM    Protein, total 6.9 06/25/2021 11:52 AM    Albumin 3.6 06/25/2021 11:52 AM    Globulin 3.3 06/25/2021 11:52 AM    A-G Ratio 1.1 06/25/2021 11:52 AM    ALT (SGPT) 21 06/25/2021 11:52 AM     CrCl cannot be calculated (Unknown ideal weight.). INR History:   (normal INR range 0.8-1.2)     Date   INR   PT   Dose/Comments  09/20/21 1.2  14.0 Hold x 2, 1/2 tab on Thurs, and 2 mg daily ROW; Pt held x 2, then 2 mg daily until INR check   09/08/21          3.6                   43.7     2 mg daily  07/20/21          2.9                   34.2     2 mg daily  Lapse in INR clinic  06/25/21          3.1                   30.8     2 mg daily  06/10/20          1.9                   23.2     2 mg daily  Lapse in monitoring d/t COVID 19  01/15/20          2.5                   29.9     2 mg daily  12/02/19          2.0                   24.4     2 mg daily  10/17/19          2.7                   32.4     2 mg daily - reestablished with this practice                 05/09/19          2.1                   25.5     2 mg daily  04/25/19          2.2                   26.6     Hold x1, then 2 mg daily  04/11/19          3.6                   42. 8     2 mg daily   02/26/19          2.6                   31.5     2 mg daily       A/P:       Anticoagulation:  Considering Ms. Dunlap's past history, todays findings, and per Anticoagulation Collaborative Practice Agreement/Protocol:    1. Fingerstick POC INR (1.2) is subtherapeutic for INR goal today. 2. Take 4 mg tonight. Then Continue warfarin 2 mg daily. 3. Recommend for patient to schedule TFESI    Check: Vitals and Medication Adherence at the next visit.      Patient was instructed to schedule an appointment in 1 week(s) prior to leaving the clinic. Medication reconciliation was completed during the visit. Medications Discontinued During This Encounter   Medication Reason    SITagliptin (JANUVIA) 100 mg tablet Not A Current Medication       A full discussion of the nature of anticoagulants has been carried out. A full discussion of the need for frequent and regular monitoring, precise dosage adjustment and compliance was stressed. Side effects of potential bleeding were discussed and Ms. Teresa Augustin was instructed to call [phone] if there are any signs of abnormal bleeding. Ms. Teresa Augustin was instructed to avoid any OTC items containing aspirin or ibuprofen and prior to starting any new OTC products to consult with her physician or pharmacist to ensure no drug interactions are present. Ms. Teresa Augustin was instructed to avoid any major changes in her general diet and to avoid alcohol consumption. Ms. Teresa Augustin was provided information in the AVS that includes topics on understanding coumadin therapy, drug interaction considerations, vitamin K and coumadin use, interactions with foods and supplements containing vitamin K, and the use of herbal products. Ms. Teresa Augustin verbalized her understanding of all instructions and will call the office with any questions, concerns, or signs of abnormal bleeding or blood clot. Notifications of recommendations will be sent to Dr. Misael Alston DO for review. Thank you,  Zulema De Paz, PharmD, BCACP, [address] [place] [address] in place:  Yes   Recommendation Provided To: Patient/Caregiver: 1 via In person   Intervention Detail: Adherence Monitorin, Discontinued Rx: 1, reason: Patient Preference, Dose Adjustment: 1, reason: Therapy Optimization and Lab(s) Ordered   Time Spent (min): 45

## 2021-09-18 NOTE — PATIENT INSTRUCTIONS
Today your INR was 1.2. Your goal INR is  2.0-3.0 . You have a 2  mg tablet of Coumadin (warfarin). Take Coumadin (warfarin) as follows: Take 4 mg (2 tablets) of warfarin tonight. Then continue taking 2 mg daily. Come back in 1 week(s) for your next finger stick/INR blood test.        Avoid any over the counter items containing aspirin or ibuprofen, and avoid great swings in general diet. Avoid alcohol consumption. Please notify the INR nurse if you are started on any new medication including over the counter or herbal supplements. Also, please notify your INR nurse if any of your other prescription or over the counter medications have been discontinued. Call [facility] at [phone] if you have any signs of abnormal bleeding/blood clot.  ------------------------------------------------------------------------------------------------------------------  Taking Warfarin Safely: Care Instructions    Your Care Instructions  Warfarin is a medicine that you take to prevent blood clots. It is often called a blood thinner. Doctors give warfarin (such as Coumadin) to reduce the risk of blood clots. You may be at risk for blood clots if you have atrial fibrillation or deep vein thrombosis. Some other health problems may also put you at risk. Warfarin slows the amount of time it takes for your blood to clot. It can cause bleeding problems. Even if you've been taking warfarin for a while, it's important to know how to take it safely. Foods and other medicines can affect the way warfarin works. Some can make warfarin work too well. This can cause bleeding problems. And some can make it work poorly, so that it does not prevent blood clots very well. You will need regular blood tests to check how long it takes for your blood to form a clot. This test is called a PT or prothrombin time test. The result of the test is called an INR level.  Depending on the test results, your doctor or anticoagulation clinic may adjust your dose of warfarin. Follow-up care is a key part of your treatment and safety. Be sure to make and go to all appointments, and call your doctor if you are having problems. It's also a good idea to know your test results and keep a list of the medicines you take. How can you care for yourself at home? Take warfarin safely  · Take your warfarin at the same time each day. · If you miss a dose of warfarin, don't take an extra dose to make up for it. Your doctor can tell you exactly what to do so you don't take too much or too little. · Wear medical alert jewelry that lets others know that you take warfarin. You can buy this at most drugstores. · Don't take warfarin if you are pregnant or planning to get pregnant. Talk to your doctor about how you can prevent getting pregnant while you are taking it. · Don't change your dose or stop taking warfarin unless your doctor tells you to. Effects of medicines and food on warfarin  · Don't start or stop taking any medicines, vitamins, or natural remedies unless you first talk to your doctor. Many medicines can affect how warfarin works. These include aspirin and other pain relievers, over-the-counter medicines, multivitamins, dietary supplements, and herbal products. · Tell all of your doctors and pharmacists that you take warfarin. Some prescription medicines can affect how warfarin works. · Keep the amount of vitamin K in your diet about the same from day to day. Do not suddenly eat a lot more or a lot less food that is rich in vitamin K than you usually do. Vitamin K affects how warfarin works and how your blood clots. Talk with your doctor before making big changes in your diet. Vitamin K is in many foods, such as:  ¨ Leafy greens, such as kale, cabbage, spinach, Swiss chard, and lettuce. ¨ Canola and soybean oils. ¨ Green vegetables, such as asparagus, broccoli, and [place] sprouts.   ¨ Vegetable drinks, green tea leaves, and some dietary supplement drinks. · Avoid cranberry juice and other cranberry products. They can increase the effects of warfarin. · Limit your use of alcohol. Avoid bleeding by preventing falls and injuries  · Wear slippers or shoes with nonskid soles. · Remove throw rugs and clutter. · Rearrange furniture and electrical cords to keep them out of walking paths. · Keep stairways, porches, and outside walkways well lit. Use night-lights in hallways and bathrooms. · Be extra careful when you work with sharp tools or knives. When should you call for help? Call 911 anytime you think you may need emergency care. For example, call if:  · You have a sudden, severe headache that is different from past headaches. Call your doctor now or seek immediate medical care if:  · You have any abnormal bleeding, such as:  ¨ Nosebleeds. ¨ Vaginal bleeding that is different (heavier, more frequent, at a different time of the month) than what you are used to. ¨ Bloody or black stools, or rectal bleeding. ¨ Bloody or pink urine. Watch closely for changes in your health, and be sure to contact your doctor if you have any problems. Where can you learn more? Go to http://www.gray.com/. Enter E922 in the search box to learn more about \"Taking Warfarin Safely: Care Instructions. \"  Current as of: January 27, 2016  Content Version: 11.1  © 2609-8144 [employer]. Care instructions adapted under license by [employer] (which disclaims liability or warranty for this information). If you have questions about a medical condition or this instruction, always ask your healthcare professional. [place] [address] any warranty or liability for your use of this information.

## 2021-09-20 ENCOUNTER — ANTI-COAG VISIT (OUTPATIENT)
Dept: INTERNAL MEDICINE CLINIC | Age: 81
End: 2021-09-20
Payer: COMMERCIAL

## 2021-09-20 VITALS — BODY MASS INDEX: 28.72 KG/M2 | WEIGHT: 152 LBS

## 2021-09-20 DIAGNOSIS — Z86.718 HISTORY OF DVT OF LOWER EXTREMITY: Primary | ICD-10-CM

## 2021-09-20 LAB
INR BLD: 1.2 (ref 1–1.5)
PT POC: 14 SECONDS (ref 9.1–12)
VALID INTERNAL CONTROL?: YES

## 2021-09-20 PROCEDURE — 85610 PROTHROMBIN TIME: CPT | Performed by: PHARMACIST

## 2021-09-20 PROCEDURE — 99212 OFFICE O/P EST SF 10 MIN: CPT | Performed by: PHARMACIST

## 2021-09-20 NOTE — PROGRESS NOTES
Pharmacy Progress Note - Anticoagulation Management    S/O:  Ms. German Rogers  is a 78 y.o. female seen today for anticoagulation management for the diagnosis of LLE Deep Vein Thrombosis. Patient ambulates w/ a cane. Interim History:  - Saw Dr. Blanca Bazzi 9/14/21 for TFESI. INR was 4.1. Procedure postponed until INR is < 3.0.   - On 9/14/21, patient instructed to hold 2x doses, then take 1/2 tablet, then resume 2 mg daily  - Appointments with cardiology (Dr. Madonna Montero) scheduled: Carotid duplex scan 10/20/21, ECHO 11/16/21, Ankle Brachial Index 12/15/21,     · Warfarin start date: Around 2008  · INR Goal:  2.0-3.0    · Current warfarin regimen: Hold x2, then 1 mg x1, then 2 mg daily  · Warfarin tablet strength:   2 mg   · Duration of therapy: Indefinite    Today's pertinent positives includes:  No significant changes since last visit    Results for orders placed or performed in visit on 09/20/21   AMB POC PT/INR   Result Value Ref Range    VALID INTERNAL CONTROL POC Yes     Prothrombin time (POC) 14.0 (A) 9.1 - 12.0 seconds    INR POC 1.2 1.0 - 1.5     · Adherence:   · Able to recall regimen? YES  · Miss/extra dose? YES. Held x2 doses and resumed 2 mg daily. Took 2 mg instead of 1 mg (1/2 tablet) on 9/17/21. · Need refill? NO    Upcoming procedure(s):  YES  See above, patient to reschedule TFESI.      Past Medical History:   Diagnosis Date    Anxiety     Arthritis     Asthma     CAD (coronary artery disease)     stent    Diabetes (HCC)     GERD (gastroesophageal reflux disease)     History of DVT (deep vein thrombosis)     History of recurrent UTIs     Hx of seasonal allergies     Hypercholesterolemia     Irritable bowel syndrome (IBS)     Migraine     Urinary urgency      Allergies   Allergen Reactions    Iodinated Contrast Media Other (comments)     Passes out    Pcn [Penicillins] Shortness of Breath    Sulfa (Sulfonamide Antibiotics) Shortness of Breath     Current Outpatient Medications Medication Sig    amitriptyline (ELAVIL) 10 mg tablet TAKE 1 TABLET BY MOUTH EVERY NIGHT AT BEDTIME    ondansetron hcl (ZOFRAN) 4 mg tablet TAKE 1 TABLET BY MOUTH EVERY 8 HOURS AS NEEDED FOR NAUSEA OR VOMITING    levETIRAcetam (KEPPRA) 500 mg tablet TAKE 1 TABLET BY MOUTH EVERY MORNING AND 1 AND 1/2 TABLET BY MOUTH EVERY EVENING FOR MIGRAINES    dicyclomine (BENTYL) 10 mg capsule TAKE ONE CAPSULE BY MOUTH FOUR TIMES DAILY AS NEEDED    ezetimibe (ZETIA) 10 mg tablet TAKE 1 TABLET BY MOUTH DAILY    celecoxib (CELEBREX) 200 mg capsule Take 1 Capsule by mouth every twelve (12) hours as needed for Pain.  loperamide (IMODIUM) 2 mg capsule TAKE 1 CAPSULE BY MOUTH FOUR TIMES DAILY AS NEEDED FOR DIARRHEA    metoprolol succinate (TOPROL-XL) 25 mg XL tablet TAKE 1/2 TABLET BY MOUTH ONCE DAILY    Myrbetriq 25 mg ER tablet TAKE 1 TABLET DAILY    fluticasone propion-salmeteroL (Advair Diskus) 250-50 mcg/dose diskus inhaler Take 1 Puff by inhalation every twelve (12) hours.  warfarin (COUMADIN) 2 mg tablet Take 1 Tablet by mouth daily.  oxybutynin chloride XL (DITROPAN XL) 10 mg CR tablet TAKE 1 TABLET BY MOUTH EVERY DAY    atorvastatin (LIPITOR) 20 mg tablet TAKE 1 TABLET BY MOUTH EVERY DAY    traZODone (DESYREL) 100 mg tablet Take 1 Tablet by mouth nightly as needed for Sleep.  diclofenac (VOLTAREN) 1 % gel Apply  to affected area every twelve (12) hours as needed for Pain.  azelastine (ASTELIN) 137 mcg (0.1 %) nasal spray 1 [place] by Both Nostrils route two (2) times a day. Use in each nostril as directed    nystatin (MYCOSTATIN) 100,000 unit/mL suspension SWISH AND SWALLOW 5 ML BY MOUTH FOR 30 SECONDS 4 TIMES A DAY AS NEEDED FOR MOUTH PAIN    albuterol (PROVENTIL HFA, VENTOLIN HFA, PROAIR HFA) 90 mcg/actuation inhaler Take 2 Puffs by inhalation every six (6) hours as needed for Wheezing.  clorazepate (TRANXENE) 3.75 mg tablet TAKE 1 TABLET BY MOUTH EVERY 6 HOURS AS NEEDED FOR ANXIETY.  MAX DAILY AMOUNT 4 TABLETS    escitalopram oxalate (LEXAPRO) 20 mg tablet TAKE 1 TABLET BY MOUTH DAILY    SITagliptin (JANUVIA) 100 mg tablet Take 1 Tab by mouth daily.  butalbital-acetaminophen-caffeine (FIORICET, ESGIC) -40 mg per tablet Take 1 Tab by mouth every twelve (12) hours as needed for Headache.  levothyroxine (SYNTHROID) 25 mcg tablet Take 2 Tabs by mouth Daily (before breakfast).  esomeprazole (NEXIUM) 20 mg capsule TAKE 1 CAPSULE BY MOUTH DAILY    Bifidobacterium Infantis (ALIGN) 4 mg cap Take 1 Cap by mouth daily as needed for Diarrhea. Indications: ALIGN OTC    Biotin 2,500 mcg cap Take 1 Cap by mouth daily.  calcium carbonate (TUMS) 200 mg calcium (500 mg) chew Take 1 Tab by mouth daily as needed. No current facility-administered medications for this visit. Wt Readings from Last 3 Encounters:   09/20/21 68.9 kg (152 lb)   09/14/21 69.9 kg (154 lb)   09/08/21 69.9 kg (154 lb)     BP Readings from Last 3 Encounters:   09/14/21 139/67   07/20/21 (!) 104/56   06/25/21 105/66     Pulse Readings from Last 3 Encounters:   09/14/21 79   07/20/21 78   06/25/21 80     Lab Results   Component Value Date/Time    WBC 6.6 06/25/2021 11:52 AM    HGB 12.9 06/25/2021 11:52 AM    HCT 41.3 06/25/2021 11:52 AM    PLATELET 136 52/53/4186 11:52 AM    MCV 86.4 06/25/2021 11:52 AM     Lab Results   Component Value Date/Time    Sodium 142 06/25/2021 11:52 AM    Potassium 4.0 06/25/2021 11:52 AM    Chloride 111 (H) 06/25/2021 11:52 AM    CO2 23 06/25/2021 11:52 AM    Anion gap 8 06/25/2021 11:52 AM    Glucose 128 (H) 06/25/2021 11:52 AM    BUN 17 06/25/2021 11:52 AM    Creatinine 0.73 06/25/2021 11:52 AM    BUN/Creatinine ratio 23 (H) 06/25/2021 11:52 AM    GFR est AA >60 06/25/2021 11:52 AM    GFR est non-AA >60 06/25/2021 11:52 AM    Calcium 8.8 06/25/2021 11:52 AM    Bilirubin, total 0.3 06/25/2021 11:52 AM    Alk.  phosphatase 103 06/25/2021 11:52 AM    Protein, total 6.9 06/25/2021 11:52 AM    Albumin 3.6 06/25/2021 11:52 AM    Globulin 3.3 06/25/2021 11:52 AM    A-G Ratio 1.1 06/25/2021 11:52 AM    ALT (SGPT) 21 06/25/2021 11:52 AM     CrCl cannot be calculated (Unknown ideal weight.). INR History:   (normal INR range 0.8-1.2)     Date   INR   PT   Dose/Comments  09/20/21 1.2  14 Hold x2, then 1 mg x1, then 2 mg daily; Pt instead hold x2, then 2 mg daily  09/14/21 4.1  -- 2 mg daily  09/08/21          3.6                   43. 7     2 mg daily  07/20/21          2.9                   34.2     2 mg daily  Lapse in INR clinic  06/25/21          3.1                   30.8     2 mg daily  06/10/20          1.9                   23.2     2 mg daily  Lapse in monitoring d/t COVID 19  01/15/20          2.5                   29.9     2 mg daily  12/02/19          2.0                   24.4     2 mg daily  10/17/19          2.7                   32.4     2 mg daily - reestablished with this practice                 05/09/19          2.1                   25.5     2 mg daily  04/25/19          2.2                   26.6     Hold x1, then 2 mg daily  04/11/19          3.6                   42. 8     2 mg daily   02/26/19          2.6                   31.5     2 mg daily      A/P:       Anticoagulation:  Considering Ms. Dunlap's past history, todays findings, and per Anticoagulation Collaborative Practice Agreement/Protocol:    1. Fingerstick POC INR (1.2) is Subtherapeutic for INR goal today. 2. Take 4 mg x1 then continue 2 mg daily. Check: Vitals and Weight at the next visit. Patient was instructed to schedule an appointment in 1 week(s) prior to leaving the clinic. Medication reconciliation was completed during the visit. There are no discontinued medications. A full discussion of the nature of anticoagulants has been carried out. A full discussion of the need for frequent and regular monitoring, precise dosage adjustment and compliance was stressed.   Side effects of potential bleeding were discussed and Ms. Yasir Sanchez was instructed to call [phone] if there are any signs of abnormal bleeding. Ms. Yasir Sanchez was instructed to avoid any OTC items containing aspirin or ibuprofen and prior to starting any new OTC products to consult with her physician or pharmacist to ensure no drug interactions are present. Ms. Yasir Sanchez was instructed to avoid any major changes in her general diet and to avoid alcohol consumption. Ms. Yasir Sanchez was provided information in the AVS that includes topics on understanding coumadin therapy, drug interaction considerations, vitamin K and coumadin use, interactions with foods and supplements containing vitamin K, and the use of herbal products. Ms. Yasir Sanchez verbalized her understanding of all instructions and will call the office with any questions, concerns, or signs of abnormal bleeding or blood clot. Notifications of recommendations will be sent to Dr. Benjamin Mcduffie DO for review.     Thank you,  Alverto Crews, PHARMD

## 2021-09-24 RX ORDER — TRAMADOL HYDROCHLORIDE 50 MG/1
50 TABLET ORAL
COMMUNITY
End: 2021-11-01 | Stop reason: SDUPTHER

## 2021-09-24 RX ORDER — HYDROGEN PEROXIDE 3 %
SOLUTION, NON-ORAL MISCELLANEOUS
Qty: 90 CAPSULE | Refills: 3 | Status: SHIPPED | OUTPATIENT
Start: 2021-09-24 | End: 2022-07-11 | Stop reason: SDUPTHER

## 2021-09-24 NOTE — PERIOP NOTES
[facility]  Ambulatory Surgery Unit  Pre-operative Instructions    Procedure Date  Tuesday, September 28, 2021            Tentative Arrival Time 230      1. On the day of your procedure, please report to the Ambulatory Surgery Unit Registration Desk and sign in at your designated time. The Ambulatory Surgery Unit is located in [facility] on the [facility] side of the [facility] across from the [address] [place] [address]. Please have all of your health insurance cards and a photo ID. 2. You must have someone with you to drive you home as directed by your surgeon. 3. You may have a light breakfast and take normal morning medications. 4. We recommend you do not drink any alcoholic beverages for 24 hours before and after your procedure. 5. Contact your surgeons office for instructions on the following medications: non-steroidal anti-inflammatory drugs (i.e. Advil, Aleve), vitamins, and supplements. (Some surgeons will want you to stop these medications prior to surgery and others may allow you to take them)   **If you are currently taking Plavix, Coumadin, Aspirin and/or other blood-thinning agents, contact your surgeon for instructions. ** Your surgeon will partner with the physician prescribing these medications to determine if it is safe to stop or if you need to continue taking. Please do not stop taking these medications without instructions from your surgeon. 6. In an effort to help prevent surgical site infection, we ask that you shower with an anti-bacterial soap (i.e. Dial or Safeguard) on the morning of your procedure. Do not apply any lotions, powders, or deodorants after showering. 7. Wear comfortable clothes. Wear glasses instead of contacts. Do not bring any jewelry or money (other than copays or fees as instructed). Do not wear make-up, particularly mascara, the morning of your procedure. Wear your hair loose or down, no ponytails, buns, jose pins or clips.  All body piercings must be removed. 8. You should understand that if you do not follow these instructions your procedure may be cancelled. If your physical condition changes (i.e. fever, cold or flu) please contact your surgeon as soon as possible. 9. It is important that you be on time. If a situation occurs where you may be late, or if you have any questions or problems, please call [phone].    10. Your procedure time may be subject to change. You will receive a phone call the day prior to confirm your arrival time. I understand a pre-operative phone call will be made to verify my procedure time. In the event that I am not available, I give permission for a message to be left on my answering service and/or with another person?       yes    Preop instructions reviewed  Pt verbalized understanding.      ___________________      ___________________      ___________________  (Signature of Patient)          (Witness)                   (Date and Time)

## 2021-09-24 NOTE — PERIOP NOTES
Pt asked if she could take one of her Tramadol pills on Tuesday because she is in so much pain. This medication was not Pt asked me why she is on verapamil. I told her it is not on her med list.  She ran out of it two days ago and needs it. I reached out to [facility] and they do not have her on this medication. They will have her bring in medications next time in office. I reached out to PCP and spoke with his assistant. She will have INR person reach out to pt to bring in meds on Monday for INR check.

## 2021-09-26 NOTE — PROGRESS NOTES
Pharmacy Progress Note - Anticoagulation Management    Ms. Pepito Marte 78 y.o. was seen today together with Deepti MenendezD. I have read and agree with Kristi's documentation. · Warfarin start date:  Around 2008   · INR Goal:  2.0-3.0    · Current warfarin regimen: 4 mg x 1, then 2 mg daily   ---> pt held x 2 days and then 2 mg daily instead        · Warfarin tablet strength: 2 mg   · Duration of therapy: Indefinite    She has an appt for epidural injection tomorrow with Dr Beatriz Sheth. Recall last TFESI with Dr Beatriz Sheth was postponed d/t elevated INR. Upcoming:  Scheduled for Ankle brachial index, 2D echo,  and carotid duplex - 10/20/21, 11/16/21,12/15/21 with Dr Calton Opitz from Last 3 Encounters:   09/27/21 154 lb (69.9 kg)   09/20/21 152 lb (68.9 kg)   09/14/21 154 lb (69.9 kg)     Visit Vitals  /75 (BP 1 Location: Left upper arm, BP Patient Position: Sitting)   Pulse 74   Temp 97.2 °F (36.2 °C) (Temporal)   Wt 154 lb (69.9 kg)   SpO2 93%   BMI 29.10 kg/m²       Results for orders placed or performed in visit on 09/27/21   AMB POC PT/INR   Result Value Ref Range    VALID INTERNAL CONTROL POC Yes     Prothrombin time (POC) 22.8 (A) 9.1 - 12.0 seconds    INR POC 1.9 (A) 1.0 - 1.5         INR History:   (normal INR range 0.8-1.2)      Date                INR                  PT        Dose/Comments  09/27/21 1.9  22.8 4 mg x 1, then 2 mg daily -- pt held x 2 days and 2 mg daily  09/20/21          1.2                   14.0     Hold x 2, 1/2 tab on Thurs, and 2 mg daily ROW; Pt held x 2, then 2 mg daily until INR check   09/08/21          3.6                   43. 7     2 mg daily  07/20/21          2.9                   34.2     2 mg daily  Lapse in INR clinic  06/25/21          3.1                   30.8     2 mg daily  06/10/20          1.9                   23.2     2 mg daily  Lapse in monitoring d/t COVID 19  01/15/20          2.5                   29.9     2 mg daily  12/02/19          2.0                   24.4     2 mg daily  10/17/19          2.7                   32.4     2 mg daily - reestablished with this practice                 05/09/19          2.1                   25.5     2 mg daily  04/25/19          2.2                   26.6     Hold x1, then 2 mg daily  04/11/19          3.6                   42. 8     2 mg daily   02/26/19          2.6                   31.5     2 mg daily      A/P:  - Fingerstick POC INR today is subtherapeutic for goal.  - Continue warfarin 2 mg daily.  - Patient will RTC in 2 weeks for INR follow up. Thank you for the consult,  Zulema Alberts, ShonD, BCACP, [address] [facility] in place:  Yes   Recommendation Provided To: Patient/Caregiver: 1 via In person   Intervention Detail: Lab(s) Ordered and Scheduled Appointment   Time Spent (min): 30

## 2021-09-26 NOTE — PATIENT INSTRUCTIONS
Today your INR was 1.9. Your goal INR is  2.0-3.0 . You have a 2 mg tablet of Coumadin (warfarin). Take Coumadin (warfarin) as follows:    Continue warfarin 2 mg daily. Come back in  2 week(s) for your next finger stick/INR blood test.        Avoid any over the counter items containing aspirin or ibuprofen, and avoid great swings in general diet. Avoid alcohol consumption. Please notify the INR nurse if you are started on any new medication including over the counter or herbal supplements. Also, please notify your INR nurse if any of your other prescription or over the counter medications have been discontinued. Call [facility] at [phone] if you have any signs of abnormal bleeding/blood clot.  ------------------------------------------------------------------------------------------------------------------  Taking Warfarin Safely: Care Instructions    Your Care Instructions  Warfarin is a medicine that you take to prevent blood clots. It is often called a blood thinner. Doctors give warfarin (such as Coumadin) to reduce the risk of blood clots. You may be at risk for blood clots if you have atrial fibrillation or deep vein thrombosis. Some other health problems may also put you at risk. Warfarin slows the amount of time it takes for your blood to clot. It can cause bleeding problems. Even if you've been taking warfarin for a while, it's important to know how to take it safely. Foods and other medicines can affect the way warfarin works. Some can make warfarin work too well. This can cause bleeding problems. And some can make it work poorly, so that it does not prevent blood clots very well. You will need regular blood tests to check how long it takes for your blood to form a clot. This test is called a PT or prothrombin time test. The result of the test is called an INR level.  Depending on the test results, your doctor or anticoagulation clinic may adjust your dose of warfarin. Follow-up care is a key part of your treatment and safety. Be sure to make and go to all appointments, and call your doctor if you are having problems. It's also a good idea to know your test results and keep a list of the medicines you take. How can you care for yourself at home? Take warfarin safely  · Take your warfarin at the same time each day. · If you miss a dose of warfarin, don't take an extra dose to make up for it. Your doctor can tell you exactly what to do so you don't take too much or too little. · Wear medical alert jewelry that lets others know that you take warfarin. You can buy this at most drugstores. · Don't take warfarin if you are pregnant or planning to get pregnant. Talk to your doctor about how you can prevent getting pregnant while you are taking it. · Don't change your dose or stop taking warfarin unless your doctor tells you to. Effects of medicines and food on warfarin  · Don't start or stop taking any medicines, vitamins, or natural remedies unless you first talk to your doctor. Many medicines can affect how warfarin works. These include aspirin and other pain relievers, over-the-counter medicines, multivitamins, dietary supplements, and herbal products. · Tell all of your doctors and pharmacists that you take warfarin. Some prescription medicines can affect how warfarin works. · Keep the amount of vitamin K in your diet about the same from day to day. Do not suddenly eat a lot more or a lot less food that is rich in vitamin K than you usually do. Vitamin K affects how warfarin works and how your blood clots. Talk with your doctor before making big changes in your diet. Vitamin K is in many foods, such as:  ¨ Leafy greens, such as kale, cabbage, spinach, Swiss chard, and lettuce. ¨ Canola and soybean oils. ¨ Green vegetables, such as asparagus, broccoli, and [place] sprouts. ¨ Vegetable drinks, green tea leaves, and some dietary supplement drinks.   · Avoid cranberry juice and other cranberry products. They can increase the effects of warfarin. · Limit your use of alcohol. Avoid bleeding by preventing falls and injuries  · Wear slippers or shoes with nonskid soles. · Remove throw rugs and clutter. · Rearrange furniture and electrical cords to keep them out of walking paths. · Keep stairways, porches, and outside walkways well lit. Use night-lights in hallways and bathrooms. · Be extra careful when you work with sharp tools or knives. When should you call for help? Call 911 anytime you think you may need emergency care. For example, call if:  · You have a sudden, severe headache that is different from past headaches. Call your doctor now or seek immediate medical care if:  · You have any abnormal bleeding, such as:  ¨ Nosebleeds. ¨ Vaginal bleeding that is different (heavier, more frequent, at a different time of the month) than what you are used to. ¨ Bloody or black stools, or rectal bleeding. ¨ Bloody or pink urine. Watch closely for changes in your health, and be sure to contact your doctor if you have any problems. Where can you learn more? Go to http://www.gray.com/. Enter F834 in the search box to learn more about \"Taking Warfarin Safely: Care Instructions. \"  Current as of: January 27, 2016  Content Version: 11.1  © 7143-2382 [employer]. Care instructions adapted under license by [employer] (which disclaims liability or warranty for this information). If you have questions about a medical condition or this instruction, always ask your healthcare professional. [place] [address] any warranty or liability for your use of this information.

## 2021-09-27 ENCOUNTER — ANTI-COAG VISIT (OUTPATIENT)
Dept: INTERNAL MEDICINE CLINIC | Age: 81
End: 2021-09-27
Payer: COMMERCIAL

## 2021-09-27 VITALS
WEIGHT: 154 LBS | DIASTOLIC BLOOD PRESSURE: 75 MMHG | BODY MASS INDEX: 29.1 KG/M2 | TEMPERATURE: 97.2 F | OXYGEN SATURATION: 93 % | SYSTOLIC BLOOD PRESSURE: 118 MMHG | HEART RATE: 74 BPM

## 2021-09-27 DIAGNOSIS — Z86.718 HISTORY OF DVT OF LOWER EXTREMITY: Primary | ICD-10-CM

## 2021-09-27 LAB
INR BLD: 1.9 (ref 1–1.5)
PT POC: 22.8 SECONDS (ref 9.1–12)
VALID INTERNAL CONTROL?: YES

## 2021-09-27 PROCEDURE — 85610 PROTHROMBIN TIME: CPT | Performed by: PHARMACIST

## 2021-09-27 PROCEDURE — 99212 OFFICE O/P EST SF 10 MIN: CPT | Performed by: PHARMACIST

## 2021-09-27 NOTE — PROGRESS NOTES
Pharmacy Progress Note - Anticoagulation Management    S/O:  Ms. Tammy Shah  is a 78 y.o. female seen today for anticoagulation management for the diagnosis of LLE Deep Vein Thrombosis. Patient ambulates w/ a cane. Interim History:  - Saw Dr. Satish Alberto 9/14/21 for TFESI. INR was 4.1. Procedure postponed until INR is < 3.0.   - Appointment scheduled for TFESI tomorrow with Dr. Satish Alberto. - Appointments with cardiology (Dr. Rikki Saleh) scheduled: Carotid duplex scan 10/20/21, ECHO 11/16/21, Ankle Brachial Index 12/15/21,     · Warfarin start date: Around 2008  · INR Goal:  2.0-3.0    · Current warfarin regimen: 4 mg x1, then 2 mg daily ROW  · Warfarin tablet strength:   2 mg   · Duration of therapy: Indefinite    Today's pertinent positives includes:  No significant changes since last visit    Results for orders placed or performed in visit on 09/27/21   AMB POC PT/INR   Result Value Ref Range    VALID INTERNAL CONTROL POC Yes     Prothrombin time (POC) 22.8 (A) 9.1 - 12.0 seconds    INR POC 1.9 (A) 1.0 - 1.5     · Adherence:   · Able to recall regimen? YES  · Miss/extra dose? YES. Patient held 2x doses instead of taking 4 mg x1 before resuming 2 mg daily  · Need refill? NO    Upcoming procedure(s):  YES  TFESI scheduled for tomorrow.     Past Medical History:   Diagnosis Date    Anxiety     Arthritis     Asthma     CAD (coronary artery disease)     stent    Diabetes (HCC)     GERD (gastroesophageal reflux disease)     History of DVT (deep vein thrombosis)     History of recurrent UTIs     Hx of seasonal allergies     Hypercholesterolemia     Irritable bowel syndrome (IBS)     Migraine     Urinary urgency      Allergies   Allergen Reactions    Iodinated Contrast Media Other (comments)     Passes out    Pcn [Penicillins] Shortness of Breath    Sulfa (Sulfonamide Antibiotics) Shortness of Breath     Current Outpatient Medications   Medication Sig    traMADoL (ULTRAM) 50 mg tablet Take 50 mg by mouth every six (6) hours as needed for Pain.  esomeprazole (NEXIUM) 20 mg capsule TAKE 1 CAPSULE BY MOUTH DAILY    amitriptyline (ELAVIL) 10 mg tablet TAKE 1 TABLET BY MOUTH EVERY NIGHT AT BEDTIME    ondansetron hcl (ZOFRAN) 4 mg tablet TAKE 1 TABLET BY MOUTH EVERY 8 HOURS AS NEEDED FOR NAUSEA OR VOMITING    levETIRAcetam (KEPPRA) 500 mg tablet TAKE 1 TABLET BY MOUTH EVERY MORNING AND 1 AND 1/2 TABLET BY MOUTH EVERY EVENING FOR MIGRAINES    dicyclomine (BENTYL) 10 mg capsule TAKE ONE CAPSULE BY MOUTH FOUR TIMES DAILY AS NEEDED    ezetimibe (ZETIA) 10 mg tablet TAKE 1 TABLET BY MOUTH DAILY    celecoxib (CELEBREX) 200 mg capsule Take 1 Capsule by mouth every twelve (12) hours as needed for Pain.  loperamide (IMODIUM) 2 mg capsule TAKE 1 CAPSULE BY MOUTH FOUR TIMES DAILY AS NEEDED FOR DIARRHEA    metoprolol succinate (TOPROL-XL) 25 mg XL tablet TAKE 1/2 TABLET BY MOUTH ONCE DAILY    Myrbetriq 25 mg ER tablet TAKE 1 TABLET DAILY    fluticasone propion-salmeteroL (Advair Diskus) 250-50 mcg/dose diskus inhaler Take 1 Puff by inhalation every twelve (12) hours.  warfarin (COUMADIN) 2 mg tablet Take 1 Tablet by mouth daily.  oxybutynin chloride XL (DITROPAN XL) 10 mg CR tablet TAKE 1 TABLET BY MOUTH EVERY DAY    atorvastatin (LIPITOR) 20 mg tablet TAKE 1 TABLET BY MOUTH EVERY DAY    traZODone (DESYREL) 100 mg tablet Take 1 Tablet by mouth nightly as needed for Sleep.  diclofenac (VOLTAREN) 1 % gel Apply  to affected area every twelve (12) hours as needed for Pain.  azelastine (ASTELIN) 137 mcg (0.1 %) nasal spray 1 Hanna by Both Nostrils route two (2) times a day. Use in each nostril as directed    nystatin (MYCOSTATIN) 100,000 unit/mL suspension SWISH AND SWALLOW 5 ML BY MOUTH FOR 30 SECONDS 4 TIMES A DAY AS NEEDED FOR MOUTH PAIN    albuterol (PROVENTIL HFA, VENTOLIN HFA, PROAIR HFA) 90 mcg/actuation inhaler Take 2 Puffs by inhalation every six (6) hours as needed for Wheezing.     clorazepate (TRANXENE) 3.75 mg tablet TAKE 1 TABLET BY MOUTH EVERY 6 HOURS AS NEEDED FOR ANXIETY. MAX DAILY AMOUNT 4 TABLETS    escitalopram oxalate (LEXAPRO) 20 mg tablet TAKE 1 TABLET BY MOUTH DAILY    butalbital-acetaminophen-caffeine (FIORICET, ESGIC) -40 mg per tablet Take 1 Tab by mouth every twelve (12) hours as needed for Headache.  levothyroxine (SYNTHROID) 25 mcg tablet Take 2 Tabs by mouth Daily (before breakfast).  Bifidobacterium Infantis (ALIGN) 4 mg cap Take 1 Cap by mouth daily as needed for Diarrhea. Indications: ALIGN OTC    Biotin 2,500 mcg cap Take 1 Cap by mouth daily.  calcium carbonate (TUMS) 200 mg calcium (500 mg) chew Take 1 Tab by mouth daily as needed. No current facility-administered medications for this visit. BP Readings from Last 3 Encounters:   09/27/21 118/75   09/14/21 139/67   07/20/21 (!) 104/56     Pulse Readings from Last 3 Encounters:   09/27/21 74   09/14/21 79   07/20/21 78     Lab Results   Component Value Date/Time    WBC 6.6 06/25/2021 11:52 AM    HGB 12.9 06/25/2021 11:52 AM    HCT 41.3 06/25/2021 11:52 AM    PLATELET 255 17/66/4298 11:52 AM    MCV 86.4 06/25/2021 11:52 AM     Lab Results   Component Value Date/Time    Sodium 142 06/25/2021 11:52 AM    Potassium 4.0 06/25/2021 11:52 AM    Chloride 111 (H) 06/25/2021 11:52 AM    CO2 23 06/25/2021 11:52 AM    Anion gap 8 06/25/2021 11:52 AM    Glucose 128 (H) 06/25/2021 11:52 AM    BUN 17 06/25/2021 11:52 AM    Creatinine 0.73 06/25/2021 11:52 AM    BUN/Creatinine ratio 23 (H) 06/25/2021 11:52 AM    GFR est AA >60 06/25/2021 11:52 AM    GFR est non-AA >60 06/25/2021 11:52 AM    Calcium 8.8 06/25/2021 11:52 AM    Bilirubin, total 0.3 06/25/2021 11:52 AM    Alk.  phosphatase 103 06/25/2021 11:52 AM    Protein, total 6.9 06/25/2021 11:52 AM    Albumin 3.6 06/25/2021 11:52 AM    Globulin 3.3 06/25/2021 11:52 AM    A-G Ratio 1.1 06/25/2021 11:52 AM    ALT (SGPT) 21 06/25/2021 11:52 AM     CrCl cannot be calculated (Unknown ideal weight.). INR History:   (normal INR range 0.8-1.2)     Date   INR   PT   Dose/Comments  09/27/21 1.9  22.8 4 mg x 1, then 2 mg daily; instead pt held x2, then 2 mg daily  09/20/21          1.2                   14.0     Hold x 2, 1/2 tab on Thurs, and 2 mg daily ROW; Pt held x 2, then 2 mg daily until INR check   09/08/21          3.6                   43. 7     2 mg daily  07/20/21          2.9                   34.2     2 mg daily  Lapse in INR clinic  06/25/21          3.1                   30.8     2 mg daily  06/10/20          1.9                   23.2     2 mg daily  Lapse in monitoring d/t COVID 19  01/15/20          2.5                   29.9     2 mg daily  12/02/19          2.0                   24.4     2 mg daily  10/17/19          2.7                   32.4     2 mg daily - reestablished with this practice                 05/09/19          2.1                   25.5     2 mg daily  04/25/19          2.2                   26.6     Hold x1, then 2 mg daily  04/11/19          3.6                   42. 8     2 mg daily   02/26/19          2.6                   31.5     2 mg daily    A/P:       Anticoagulation:  Considering Ms. Dunlap's past history, todays findings, and per Anticoagulation Collaborative Practice Agreement/Protocol:    1. Fingerstick POC INR (1.9) is Subtherapeutic for INR goal today. INR is < 3.0 for TFESI procedure tomorrow. 2.  Continue warfarin 2 mg daily. Check: Vitals and Weight at the next visit. Patient was instructed to schedule an appointment in 2 week(s) prior to leaving the clinic. Medication reconciliation was completed during the visit. There are no discontinued medications. A full discussion of the nature of anticoagulants has been carried out. A full discussion of the need for frequent and regular monitoring, precise dosage adjustment and compliance was stressed.   Side effects of potential bleeding were discussed and Ms. Vicente Bain was instructed to call [phone] if there are any signs of abnormal bleeding. Ms. Vicente Bain was instructed to avoid any OTC items containing aspirin or ibuprofen and prior to starting any new OTC products to consult with her physician or pharmacist to ensure no drug interactions are present. Ms. Vicente Bain was instructed to avoid any major changes in her general diet and to avoid alcohol consumption. Ms. Vicente Bain was provided information in the AVS that includes topics on understanding coumadin therapy, drug interaction considerations, vitamin K and coumadin use, interactions with foods and supplements containing vitamin K, and the use of herbal products. Ms. Vicente Bain verbalized her understanding of all instructions and will call the office with any questions, concerns, or signs of abnormal bleeding or blood clot. Notifications of recommendations will be sent to Dr. King Swartz DO for review.     Thank you,  Kiet Ayon, MARIID

## 2021-09-28 ENCOUNTER — APPOINTMENT (OUTPATIENT)
Dept: GENERAL RADIOLOGY | Age: 81
End: 2021-09-28
Attending: PHYSICAL MEDICINE & REHABILITATION
Payer: COMMERCIAL

## 2021-09-28 ENCOUNTER — HOSPITAL ENCOUNTER (OUTPATIENT)
Age: 81
Setting detail: OUTPATIENT SURGERY
Discharge: HOME | End: 2021-09-28
Attending: PHYSICAL MEDICINE & REHABILITATION | Admitting: PHYSICAL MEDICINE & REHABILITATION
Payer: COMMERCIAL

## 2021-09-28 VITALS
BODY MASS INDEX: 29.07 KG/M2 | RESPIRATION RATE: 16 BRPM | WEIGHT: 154 LBS | OXYGEN SATURATION: 95 % | HEART RATE: 76 BPM | DIASTOLIC BLOOD PRESSURE: 85 MMHG | HEIGHT: 61 IN | TEMPERATURE: 97.8 F | SYSTOLIC BLOOD PRESSURE: 108 MMHG

## 2021-09-28 PROCEDURE — 76000 FLUOROSCOPY <1 HR PHYS/QHP: CPT

## 2021-09-28 PROCEDURE — 74011250636 HC RX REV CODE- 250/636: Performed by: PHYSICAL MEDICINE & REHABILITATION

## 2021-09-28 PROCEDURE — 72020 X-RAY EXAM OF SPINE 1 VIEW: CPT

## 2021-09-28 PROCEDURE — 74011000250 HC RX REV CODE- 250: Performed by: PHYSICAL MEDICINE & REHABILITATION

## 2021-09-28 PROCEDURE — 2709999900 HC NON-CHARGEABLE SUPPLY: Performed by: PHYSICAL MEDICINE & REHABILITATION

## 2021-09-28 PROCEDURE — 77030003665 HC NDL SPN BBMI -A: Performed by: PHYSICAL MEDICINE & REHABILITATION

## 2021-09-28 PROCEDURE — A9577 INJ MULTIHANCE: HCPCS | Performed by: PHYSICAL MEDICINE & REHABILITATION

## 2021-09-28 PROCEDURE — 76030000002 HC AMB SURG OR TIME FIRST 0.: Performed by: PHYSICAL MEDICINE & REHABILITATION

## 2021-09-28 PROCEDURE — 76210000046 HC AMBSU PH II REC FIRST 0.5 HR: Performed by: PHYSICAL MEDICINE & REHABILITATION

## 2021-09-28 RX ORDER — BUPIVACAINE HYDROCHLORIDE 5 MG/ML
10 INJECTION, SOLUTION EPIDURAL; INTRACAUDAL ONCE
Status: DISCONTINUED | OUTPATIENT
Start: 2021-09-28 | End: 2021-09-28 | Stop reason: HOSPADM

## 2021-09-28 RX ORDER — METHYLPREDNISOLONE ACETATE 40 MG/ML
80 INJECTION, SUSPENSION INTRA-ARTICULAR; INTRALESIONAL; INTRAMUSCULAR; SOFT TISSUE ONCE
Status: COMPLETED | OUTPATIENT
Start: 2021-09-28 | End: 2021-09-28

## 2021-09-28 RX ORDER — LIDOCAINE HYDROCHLORIDE 20 MG/ML
10 INJECTION, SOLUTION INFILTRATION; PERINEURAL ONCE
Status: COMPLETED | OUTPATIENT
Start: 2021-09-28 | End: 2021-09-28

## 2021-09-28 NOTE — DISCHARGE INSTRUCTIONS
Dr. Marilee Barreto Discharge Instructions  Transforaminal Epidural Steroid Injection/ Selective Nerve Block    You had a transforaminal epidural steroid injection/ selective nerve block today. You will probably have some numbness, and possibly weakness, in your leg for the next 6 to 8 hours. The steroids will slowly become effective, reducing your pain, over the next 2 weeks. You should begin feeling better after a few days, but it may take up to 2 weeks to notice the difference. The benefit you get from your injection will last a variable amount of time, depending on the severity of your lumbar spine problem.  Pain: Most people do not have any increase in pain after this injection. However, you might experience some soreness in your low back. If this happens, putting an ice pack over the sore area will help.  Bandage: You will have a small bandage covering the site of the injection. You may remove it once you get home.  Restrictions: Someone should drive you home after the injection. After that, you have no restrictions. You need to be careful while walking, as you may still have some numbness or weakness in your leg. You may resume your normal level of activity. You may take a shower or bath, and you may eat normally. You should continue your current exercises and/or therapy routine.   Medications: Continue your current medications as prescribed. If your pain decreases, you may reduce the amount of your pain medicines. If you stopped taking anticoagulants or blood-thinners before the injection, start them tomorrow. If you have diabetes, your blood sugar may be elevated for a few days. Call your primary doctor with any questions.   Call Dr. Marilee Barreto at [phone] if you experience:   Fever (101 degrees Fahrenheit or greater)   Nausea or vomiting   Headache unrelieved by your normal pain medicine   Redness or swelling at the injection site that lasts more than 1 day   New numbness, tingling, weakness, or pain that you didnt have before the injection    Follow-up appointment:   If still having pain in 1-2 weeks, call office at [phone] for a follow up appointment. DISCHARGE SUMMARY from Nurse    The following personal items collected during your admission are returned to you:   Dental Appliance: Dental Appliances: Uppers, Lowers  Vision: Visual Aid: Glasses  Hearing Aid:    Jewelry:    Clothing:    Other Valuables:    Valuables sent to safe: If you were given prescriptions, please review the written information on prescribed medications. · You will receive a Post Operative Call from one of the Recovery Room Nurses on the day after your surgery to check on you. It is very important for us to know how you are recovering after your surgery. · You may receive an e-mail or letter in the mail from [employer] regarding your experience with us in the Ambulatory Surgery Unit. Your feedback is valuable to us and we appreciate your participation in the survey. If you have not had your influenza or pneumococcal vaccines, please follow up with your primary care physician. The discharge information has been reviewed with the patient. The patient verbalized understanding.

## 2021-09-28 NOTE — PERIOP NOTES
Pt has strong and equal plantar and dorsi flexion. Pt able to stand and hold weight. No swelling or bleeding at injection sites. D/c instructions reviewed with pt, all questions answered. 7205-Transported via w/c to awaiting transportation.

## 2021-09-28 NOTE — H&P
Procedural Case Note    9/28/2021    (2:30 PM)    Oscar San    1940   (78 y.o.)    [id number]    CC:  pain    ROS:   Complete ROS obtained, no CP, no SOB, no N or V    PMH:     Past Medical History:   Diagnosis Date    Anxiety     Arthritis     Asthma     CAD (coronary artery disease)     stent    Diabetes ([address]     GERD (gastroesophageal reflux disease)     History of DVT (deep vein thrombosis)     History of recurrent UTIs     Hx of seasonal allergies     Hypercholesterolemia     Irritable bowel syndrome (IBS)     Migraine     Urinary urgency        ALLERGIES:     Allergies   Allergen Reactions    Iodinated Contrast Media Other (comments)     Passes out    Pcn [Penicillins] Shortness of Breath    Sulfa (Sulfonamide Antibiotics) Shortness of Breath       MEDS:     No current facility-administered medications for this encounter. Visit Vitals  Ht 5' 1\" (1.549 m)   Wt 69.9 kg (154 lb)   BMI 29.10 kg/m²     PE:  Gen: NAD  Head: normocephalic  Heart: RRR  Lungs: CTA junito  Abd: NT, ND, soft  Neuro: awake and alert  Skin: intact    IMPRESSION:   LS DDD    Note:  The clinical status was discussed in detail with the patient. The procedure was again discussed and described in detail. All understand and accept the planned procedure and risks; reject other forms of treatment. All questions are answered.     Raine James MD

## 2021-09-28 NOTE — OP NOTES
Epidural Steroid Injection Operative Report    Indications: This is a 78 y.o. female who presents with low back pain. She was positive for LS DDD. The patient was admitted for surgery as conservative measures have failed. Date of Surgery: 9/28/2021    Preoperative Diagnosis: LS DDD    Postoperative Diagnosis: LS DDD    Surgeon(s) and Role:     * Ifeanyi Rizvi MD - Primary     Procedure:  Procedure(s):  BILATERAL L4-5 TRANFORAMINAL EPIDURAL STEROID INJECTION (URGENT)    Procedure in Detail:  After appropriate informed consent was obtained, the patient was taken to the operating suite and placed in the prone position on the operating table on appropriate padding. The LS region was prepped and draped in the usual sterile fashion. Intraoperative fluoroscopy was used to localize the LS spine. The skin was infiltrated with 2% lidocaine. An 22-g needle was advanced into the L4 neuroforamen under fluoroscopic guidance. A small amount of contrast was injected into the epidural space, confirming appropriate needle placement on fluoroscopy. Next, 2ml of 2% lidocaine and 80mg of Depo-Medrol were injected. The needle was removed from the patient. The patient was then turned back into the supine position on the stretcher and was taken to the Recovery Room in stable condition.     Estimated Blood Loss:  none     Specimens: None       Drains: None          Complications:  None    Signed By: Hilary Law MD                        September 28, 2021

## 2021-09-28 NOTE — PERIOP NOTES
An [facility]  1940  [id number]    Situation:  Verbal report given from: Gopal Vargas RN  Procedure: Procedure(s):  BILATERAL L4-5 TRANFORAMINAL EPIDURAL STEROID INJECTION (URGENT)    Background:    Preoperative diagnosis: DDD    Postoperative diagnosis: DDD    :  Dr. Stacie Fernandez    Assistant(s): Circ-1: Jm Gandhi RN  Local Nurse Monitor: Gilmar Ford RN; Ildefonso Childers RN  Surg Asst-1: Danisha Huddleston    Specimens: * No specimens in log *    Assessment:  Intra-procedure medications         Anesthesia gave intra-procedure sedation and medications, see anesthesia flow sheet     Intravenous fluids: LR@ KVO     Vital signs stable       Recommendation:

## 2021-10-29 ENCOUNTER — NURSE TRIAGE (OUTPATIENT)
Dept: OTHER | Facility: CLINIC | Age: 81
End: 2021-10-29

## 2021-10-29 NOTE — TELEPHONE ENCOUNTER
Received call from Elma Espinal at [facility] with Red Flag Complaint. Brief description of triage: Humberto Gotti has longstanding back pain and right hip pain. Caller states it has started getting worse in the last two weeks and does not hurt much right now, but does hurt when she walks. Caller denies fever. Triage indicates for patient to See PCP within 2 weeks. Care advice provided, patient verbalizes understanding; denies any other questions or concerns; instructed to call back for any new or worsening symptoms. Writer provided warm transfer to [facility] at [facility] for appointment scheduling. Attention Provider: Thank you for allowing me to participate in the care of your patient. The patient was connected to triage in response to information provided to the [facility]. Please do not respond through this encounter as the response is not directed to a shared pool. Reason for Disposition   Back pain present > 2 weeks    Answer Assessment - Initial Assessment Questions  1. ONSET: \"When did the pain begin? \"       2 weeks ago    2. LOCATION: \"Where does it hurt? \" (upper, mid or lower back)      Lower back pain    3. SEVERITY: \"How bad is the pain? \"  (e.g., Scale 1-10; mild, moderate, or severe)    - MILD (1-3): doesn't interfere with normal activities     - MODERATE (4-7): interferes with normal activities or awakens from sleep     - SEVERE (8-10): excruciating pain, unable to do any normal activities       Not bothering her to much right now, just hurts more when she walks    4. PATTERN: \"Is the pain constant? \" (e.g., yes, no; constant, intermittent)       Hurts when she walks    5. RADIATION: \"Does the pain shoot into your legs or elsewhere? \"      No    6. CAUSE:  \"What do you think is causing the back pain? \"       Same back issues she has had    7. BACK OVERUSE:  Omayra Mukherjee recent lifting of heavy objects, strenuous work or exercise? \"      No    8. MEDICATIONS: \"What have you taken so far for the pain? \" (e.g., nothing, acetaminophen, NSAIDS)      Tramadol    9. NEUROLOGIC SYMPTOMS: \"Do you have any weakness, numbness, or problems with bowel/bladder control? \"      No    10. OTHER SYMPTOMS: \"Do you have any other symptoms? \" (e.g., fever, abdominal pain, burning with urination, blood in urine)        Has IBS    11. PREGNANCY: \"Is there any chance you are pregnant? \" (e.g., yes, no; LMP)        n/a    Protocols used: BACK PAIN-ADULT-AH

## 2021-10-30 NOTE — PROGRESS NOTES
Pharmacy Progress Note - Anticoagulation Management    S/O: Ms. Brenda Dunlap is a 81 y. o. female , with a PMH of OAB, IBS, LE DVT, anxiety, insomonia, Renee's esophagus, Arthritis, Migraines, hyperlipidemia, who was seen today for anticoagulation management for the diagnosis of Protein C deficiency, LLE Deep Vein Thrombosis.  Pt ambulates w/ a cane.      Interim history:  S/p TFESI with Dr Danisha Marshall on 9/28/21   Resumed warfarin that evening. 2 mg daily   Had flu shot at [place] last week     · Warfarin start date: ~ 2008   · INR Goal:  2.0-3.0    · Current warfarin regimen:  2 mg daily  · Warfarin tablet strength: 2 mg   · Duration of therapy: Indefinite    Today's pertinent positives includes:  Hospitalization / ED visits    Saw Dr Griselda Coughlin 9/9/21. Note also alludes to protein C deficiency. Results for orders placed or performed in visit on 11/01/21   AMB POC PT/INR   Result Value Ref Range    VALID INTERNAL CONTROL POC Yes     Prothrombin time (POC) 34.0 (A) 9.1 - 12.0 seconds    INR POC 2.8 (A) 1.0 - 1.5     · Adherence:   · Able to recall regimen? YES  · Miss/extra dose? NO  · Need refill? NO    Upcoming procedure(s):  YES  Dental - possible dental implants   Does not recall provider's info.  Still needs to schedule appt     Past Medical History:   Diagnosis Date    Anxiety     Arthritis     Asthma     CAD (coronary artery disease)     stent    Diabetes (HCC)     GERD (gastroesophageal reflux disease)     History of DVT (deep vein thrombosis)     History of recurrent UTIs     Hx of seasonal allergies     Hypercholesterolemia     Irritable bowel syndrome (IBS)     Migraine     Urinary urgency      Allergies   Allergen Reactions    Iodinated Contrast Media Other (comments)     Passes out    Pcn [Penicillins] Shortness of Breath    Sulfa (Sulfonamide Antibiotics) Shortness of Breath     Current Outpatient Medications   Medication Sig    loperamide (IMODIUM) 2 mg capsule TAKE 1 CAPSULE BY MOUTH FOUR TIMES DAILY AS NEEDED FOR DIARRHEA    traMADoL (ULTRAM) 50 mg tablet Take 50 mg by mouth every six (6) hours as needed for Pain.  esomeprazole (NEXIUM) 20 mg capsule TAKE 1 CAPSULE BY MOUTH DAILY    amitriptyline (ELAVIL) 10 mg tablet TAKE 1 TABLET BY MOUTH EVERY NIGHT AT BEDTIME    ondansetron hcl (ZOFRAN) 4 mg tablet TAKE 1 TABLET BY MOUTH EVERY 8 HOURS AS NEEDED FOR NAUSEA OR VOMITING    levETIRAcetam (KEPPRA) 500 mg tablet TAKE 1 TABLET BY MOUTH EVERY MORNING AND 1 AND 1/2 TABLET BY MOUTH EVERY EVENING FOR MIGRAINES    dicyclomine (BENTYL) 10 mg capsule TAKE ONE CAPSULE BY MOUTH FOUR TIMES DAILY AS NEEDED    ezetimibe (ZETIA) 10 mg tablet TAKE 1 TABLET BY MOUTH DAILY    celecoxib (CELEBREX) 200 mg capsule Take 1 Capsule by mouth every twelve (12) hours as needed for Pain.  metoprolol succinate (TOPROL-XL) 25 mg XL tablet TAKE 1/2 TABLET BY MOUTH ONCE DAILY    Myrbetriq 25 mg ER tablet TAKE 1 TABLET DAILY    fluticasone propion-salmeteroL (Advair Diskus) 250-50 mcg/dose diskus inhaler Take 1 Puff by inhalation every twelve (12) hours.  warfarin (COUMADIN) 2 mg tablet Take 1 Tablet by mouth daily.  oxybutynin chloride XL (DITROPAN XL) 10 mg CR tablet TAKE 1 TABLET BY MOUTH EVERY DAY    atorvastatin (LIPITOR) 20 mg tablet TAKE 1 TABLET BY MOUTH EVERY DAY    traZODone (DESYREL) 100 mg tablet Take 1 Tablet by mouth nightly as needed for Sleep.  diclofenac (VOLTAREN) 1 % gel Apply  to affected area every twelve (12) hours as needed for Pain.  azelastine (ASTELIN) 137 mcg (0.1 %) nasal spray 1 [place] by Both Nostrils route two (2) times a day. Use in each nostril as directed    nystatin (MYCOSTATIN) 100,000 unit/mL suspension SWISH AND SWALLOW 5 ML BY MOUTH FOR 30 SECONDS 4 TIMES A DAY AS NEEDED FOR MOUTH PAIN    albuterol (PROVENTIL HFA, VENTOLIN HFA, PROAIR HFA) 90 mcg/actuation inhaler Take 2 Puffs by inhalation every six (6) hours as needed for Wheezing.     clorazepate (TRANXENE) 3.75 mg tablet TAKE 1 TABLET BY MOUTH EVERY 6 HOURS AS NEEDED FOR ANXIETY. MAX DAILY AMOUNT 4 TABLETS    escitalopram oxalate (LEXAPRO) 20 mg tablet TAKE 1 TABLET BY MOUTH DAILY    butalbital-acetaminophen-caffeine (FIORICET, ESGIC) -40 mg per tablet Take 1 Tab by mouth every twelve (12) hours as needed for Headache.  levothyroxine (SYNTHROID) 25 mcg tablet Take 2 Tabs by mouth Daily (before breakfast).  Bifidobacterium Infantis (ALIGN) 4 mg cap Take 1 Cap by mouth daily as needed for Diarrhea. Indications: ALIGN OTC    Biotin 2,500 mcg cap Take 1 Cap by mouth daily.  calcium carbonate (TUMS) 200 mg calcium (500 mg) chew Take 1 Tab by mouth daily as needed. No current facility-administered medications for this visit. Wt Readings from Last 3 Encounters:   11/01/21 155 lb (70.3 kg)   09/28/21 154 lb (69.9 kg)   09/27/21 154 lb (69.9 kg)     BP Readings from Last 3 Encounters:   11/01/21 115/71   09/28/21 108/85   09/27/21 118/75     Pulse Readings from Last 3 Encounters:   11/01/21 71   09/28/21 76   09/27/21 74     Lab Results   Component Value Date/Time    WBC 6.6 06/25/2021 11:52 AM    HGB 12.9 06/25/2021 11:52 AM    HCT 41.3 06/25/2021 11:52 AM    PLATELET 820 98/87/8080 11:52 AM    MCV 86.4 06/25/2021 11:52 AM     Lab Results   Component Value Date/Time    Sodium 142 06/25/2021 11:52 AM    Potassium 4.0 06/25/2021 11:52 AM    Chloride 111 (H) 06/25/2021 11:52 AM    CO2 23 06/25/2021 11:52 AM    Anion gap 8 06/25/2021 11:52 AM    Glucose 128 (H) 06/25/2021 11:52 AM    BUN 17 06/25/2021 11:52 AM    Creatinine 0.73 06/25/2021 11:52 AM    BUN/Creatinine ratio 23 (H) 06/25/2021 11:52 AM    GFR est AA >60 06/25/2021 11:52 AM    GFR est non-AA >60 06/25/2021 11:52 AM    Calcium 8.8 06/25/2021 11:52 AM    Bilirubin, total 0.3 06/25/2021 11:52 AM    Alk.  phosphatase 103 06/25/2021 11:52 AM    Protein, total 6.9 06/25/2021 11:52 AM    Albumin 3.6 06/25/2021 11:52 AM    Globulin 3.3 06/25/2021 11:52 AM    A-G Ratio 1.1 06/25/2021 11:52 AM    ALT (SGPT) 21 06/25/2021 11:52 AM     Estimated Creatinine Clearance: 52.4 mL/min (by C-G formula based on SCr of 0.73 mg/dL). INR History:   (normal INR range 0.8-1.2)     Date   INR   PT   Dose/Comments  11/01/21 2.8  34.0 2 mg daily ; s/p TFESI  09/27/21          1.9                   22.8     4 mg x 1, then 2 mg daily -- pt held x 2 days and 2 mg daily  09/20/21          1.2                   14.0     Hold x 2, 1/2 tab on Thurs, and 2 mg daily ROW; Pt held x 2, then 2 mg daily until INR check   09/08/21          3.6                   43. 7     2 mg daily  07/20/21          2.9                   34.2     2 mg daily  Lapse in INR clinic  06/25/21          3.1                   30.8     2 mg daily  06/10/20          1.9                   23.2     2 mg daily  Lapse in monitoring d/t COVID 19  01/15/20          2.5                   29.9     2 mg daily  12/02/19          2.0                   24.4     2 mg daily  10/17/19          2.7                   32.4     2 mg daily - reestablished with this practice                 05/09/19          2.1                   25.5     2 mg daily  04/25/19          2.2                   26.6     Hold x1, then 2 mg daily  04/11/19          3.6                   42. 8     2 mg daily   02/26/19          2.6                   31.5     2 mg daily      A/P:       Anticoagulation:  Considering Ms. Dunlap's past history, todays findings, and per Anticoagulation Collaborative Practice Agreement/Protocol:    1. Fingerstick POC INR (2.8) is Therapeutic for INR goal today. 2.  Continue warfarin 2 mg daily. 3. With upcoming dental procedure, recommend for patient to schedule this appointment. Will develop perioperative planning at that time. Patient was instructed to schedule an appointment in 4 week(s) prior to leaving the clinic. Medication reconciliation was completed during the visit.     There are no discontinued medications. A full discussion of the nature of anticoagulants has been carried out. A full discussion of the need for frequent and regular monitoring, precise dosage adjustment and compliance was stressed. Side effects of potential bleeding were discussed and Ms. Reina Samano was instructed to call [phone] if there are any signs of abnormal bleeding. Ms. Reina Samano was instructed to avoid any OTC items containing aspirin or ibuprofen and prior to starting any new OTC products to consult with her physician or pharmacist to ensure no drug interactions are present. Ms. Reina Samano was instructed to avoid any major changes in her general diet and to avoid alcohol consumption. Ms. Reina Samano was provided information in the AVS that includes topics on understanding coumadin therapy, drug interaction considerations, vitamin K and coumadin use, interactions with foods and supplements containing vitamin K, and the use of herbal products. Ms. Reina Samano verbalized her understanding of all instructions and will call the office with any questions, concerns, or signs of abnormal bleeding or blood clot. Notifications of recommendations will be sent to Dr. Ceferino Chase DO for review. Thank you,  Zulema Arnold, PharmD, BCACP, [address] [place] [address] in place:  Yes   Recommendation Provided To: Patient/Caregiver: 3 via In person   Intervention Detail: Adherence Monitorin, Lab(s) Ordered and Scheduled Appointment   Gap Closed?:    Intervention Accepted By: Patient/Caregiver: 3   Time Spent (min): 20

## 2021-11-01 ENCOUNTER — TELEPHONE (OUTPATIENT)
Dept: INTERNAL MEDICINE CLINIC | Age: 81
End: 2021-11-01

## 2021-11-01 ENCOUNTER — ANTI-COAG VISIT (OUTPATIENT)
Dept: INTERNAL MEDICINE CLINIC | Age: 81
End: 2021-11-01
Payer: COMMERCIAL

## 2021-11-01 VITALS
OXYGEN SATURATION: 94 % | WEIGHT: 155 LBS | HEART RATE: 71 BPM | DIASTOLIC BLOOD PRESSURE: 71 MMHG | SYSTOLIC BLOOD PRESSURE: 115 MMHG | HEIGHT: 61 IN | BODY MASS INDEX: 29.27 KG/M2

## 2021-11-01 DIAGNOSIS — D68.59 PROTEIN C DEFICIENCY (HCC): ICD-10-CM

## 2021-11-01 DIAGNOSIS — M54.9 CHRONIC BACK PAIN GREATER THAN 3 MONTHS DURATION: Primary | ICD-10-CM

## 2021-11-01 DIAGNOSIS — G89.29 CHRONIC BACK PAIN GREATER THAN 3 MONTHS DURATION: Primary | ICD-10-CM

## 2021-11-01 DIAGNOSIS — Z86.718 HISTORY OF DVT OF LOWER EXTREMITY: Primary | ICD-10-CM

## 2021-11-01 DIAGNOSIS — I82.5Y2 CHRONIC DEEP VEIN THROMBOSIS (DVT) OF PROXIMAL VEIN OF LEFT LOWER EXTREMITY (HCC): ICD-10-CM

## 2021-11-01 LAB
INR BLD: 2.8 (ref 1–1.5)
PT POC: 34 SECONDS (ref 9.1–12)
VALID INTERNAL CONTROL?: YES

## 2021-11-01 PROCEDURE — 85610 PROTHROMBIN TIME: CPT | Performed by: PHARMACIST

## 2021-11-01 PROCEDURE — 99212 OFFICE O/P EST SF 10 MIN: CPT | Performed by: PHARMACIST

## 2021-11-01 RX ORDER — TRAMADOL HYDROCHLORIDE 50 MG/1
50 TABLET ORAL
Qty: 30 TABLET | Refills: 2 | Status: SHIPPED | OUTPATIENT
Start: 2021-11-01 | End: 2021-12-01

## 2021-11-01 NOTE — PATIENT INSTRUCTIONS
Today your INR was 2.8. Your goal INR is  2.0-3.0 . You have a  2 mg tablet of Coumadin (warfarin). Take Coumadin (warfarin) as follows:    Continue warfarin 2 mg daily  Schedule your dental procedure. Let me know the appointment date/information. We will plan your warfarin management according to that. Come back in 4 week(s) for your next finger stick/INR blood test.        Avoid any over the counter items containing aspirin or ibuprofen, and avoid great swings in general diet. Avoid alcohol consumption. Please notify the INR nurse if you are started on any new medication including over the counter or herbal supplements. Also, please notify your INR nurse if any of your other prescription or over the counter medications have been discontinued. Call [facility] at [phone] if you have any signs of abnormal bleeding/blood clot.  ------------------------------------------------------------------------------------------------------------------  Taking Warfarin Safely: Care Instructions    Your Care Instructions  Warfarin is a medicine that you take to prevent blood clots. It is often called a blood thinner. Doctors give warfarin (such as Coumadin) to reduce the risk of blood clots. You may be at risk for blood clots if you have atrial fibrillation or deep vein thrombosis. Some other health problems may also put you at risk. Warfarin slows the amount of time it takes for your blood to clot. It can cause bleeding problems. Even if you've been taking warfarin for a while, it's important to know how to take it safely. Foods and other medicines can affect the way warfarin works. Some can make warfarin work too well. This can cause bleeding problems. And some can make it work poorly, so that it does not prevent blood clots very well. You will need regular blood tests to check how long it takes for your blood to form a clot.  This test is called a PT or prothrombin time test. The result of the test is called an INR level. Depending on the test results, your doctor or anticoagulation clinic may adjust your dose of warfarin. Follow-up care is a key part of your treatment and safety. Be sure to make and go to all appointments, and call your doctor if you are having problems. It's also a good idea to know your test results and keep a list of the medicines you take. How can you care for yourself at home? Take warfarin safely  · Take your warfarin at the same time each day. · If you miss a dose of warfarin, don't take an extra dose to make up for it. Your doctor can tell you exactly what to do so you don't take too much or too little. · Wear medical alert jewelry that lets others know that you take warfarin. You can buy this at most drugstores. · Don't take warfarin if you are pregnant or planning to get pregnant. Talk to your doctor about how you can prevent getting pregnant while you are taking it. · Don't change your dose or stop taking warfarin unless your doctor tells you to. Effects of medicines and food on warfarin  · Don't start or stop taking any medicines, vitamins, or natural remedies unless you first talk to your doctor. Many medicines can affect how warfarin works. These include aspirin and other pain relievers, over-the-counter medicines, multivitamins, dietary supplements, and herbal products. · Tell all of your doctors and pharmacists that you take warfarin. Some prescription medicines can affect how warfarin works. · Keep the amount of vitamin K in your diet about the same from day to day. Do not suddenly eat a lot more or a lot less food that is rich in vitamin K than you usually do. Vitamin K affects how warfarin works and how your blood clots. Talk with your doctor before making big changes in your diet. Vitamin K is in many foods, such as:  ¨ Leafy greens, such as kale, cabbage, spinach, Swiss chard, and lettuce. ¨ Canola and soybean oils.   ¨ Green vegetables, such as asparagus, broccoli, and [place] sprouts. ¨ Vegetable drinks, green tea leaves, and some dietary supplement drinks. · Avoid cranberry juice and other cranberry products. They can increase the effects of warfarin. · Limit your use of alcohol. Avoid bleeding by preventing falls and injuries  · Wear slippers or shoes with nonskid soles. · Remove throw rugs and clutter. · Rearrange furniture and electrical cords to keep them out of walking paths. · Keep stairways, porches, and outside walkways well lit. Use night-lights in hallways and bathrooms. · Be extra careful when you work with sharp tools or knives. When should you call for help? Call 911 anytime you think you may need emergency care. For example, call if:  · You have a sudden, severe headache that is different from past headaches. Call your doctor now or seek immediate medical care if:  · You have any abnormal bleeding, such as:  ¨ Nosebleeds. ¨ Vaginal bleeding that is different (heavier, more frequent, at a different time of the month) than what you are used to. ¨ Bloody or black stools, or rectal bleeding. ¨ Bloody or pink urine. Watch closely for changes in your health, and be sure to contact your doctor if you have any problems. Where can you learn more? Go to http://www.gray.com/. Enter X915 in the search box to learn more about \"Taking Warfarin Safely: Care Instructions. \"  Current as of: January 27, 2016  Content Version: 11.1  © 4758-6566 [employer]. Care instructions adapted under license by [employer] (which disclaims liability or warranty for this information). If you have questions about a medical condition or this instruction, always ask your healthcare professional. [place] [address] any warranty or liability for your use of this information.

## 2021-11-01 NOTE — TELEPHONE ENCOUNTER
Future Appointments:  Future Appointments   Date Time Provider Toro Jannie   11/1/2021  2:15 PM Brandi Cohen [facility] BS AMB   1/7/2022  2:00 PM Arlis Opitz, [facility] BS AMB        Last Appointment With Me:  6/25/2021     Requested Prescriptions     Pending Prescriptions Disp Refills    traMADoL (ULTRAM) 50 mg tablet 30 Tablet 0     Sig: Take 1 Tablet by mouth every six (6) hours as needed for Pain for up to 30 days. Max Daily Amount: 200 mg.

## 2021-11-01 NOTE — TELEPHONE ENCOUNTER
----- Message from Hilary Cota sent at 10/29/2021  5:29 PM EDT -----  Subject: Appointment Request    Reason for Call: Routine Return from RN Triage    QUESTIONS  Type of Appointment? Established Patient  Reason for appointment request? Available appointments did not meet   patient need  Additional Information for Provider? Aisha Soriano from [facility] transferred pt to be   scheduled within the next 2 weeks for back and hip pain. Next available   appt is 12/13/21. Pt only wants to see PCP Candace Manzano III.   ---------------------------------------------------------------------------  --------------  Tra ROBLES  What is the best way for the office to contact you? OK to leave message on   voicemail  Preferred Call Back Phone Number? [phone]  ---------------------------------------------------------------------------  --------------  SCRIPT ANSWERS  Patient can be seen for a routine visit? Yes   Nurse Name? Rebekah  Have you been diagnosed with, awaiting test results for, or told that you   are suspected of having COVID-19 (Coronavirus)? (If patient has tested   negative or was tested as a requirement for work, school, or travel and   not based on symptoms, answer no)? No  Within the past two weeks have you developed any of the following symptoms   (answer no if symptoms have been present longer than 2 weeks or began   more than 2 weeks ago)? Fever or Chills, Cough, Shortness of breath or   difficulty breathing, Loss of taste or smell, Sore throat, Nasal   congestion, Sneezing or runny nose, Fatigue or generalized body aches   (answer no if pain is specific to a body part e.g. back pain), Diarrhea,   Headache? No  Have you had close contact with someone with COVID-19 in the last 14 days? No  (Service Expert  click yes below to proceed with [employer] As Usual   Scheduling)?  Yes

## 2021-11-01 NOTE — TELEPHONE ENCOUNTER
----- Message from Beba Michaud sent at 10/29/2021  4:54 PM EDT -----  Subject: Refill Request    QUESTIONS  Name of Medication? traMADoL (ULTRAM) 50 mg tablet  Patient-reported dosage and instructions? 50 mg tablet 1 in morning 1 at   night  How many days do you have left? 4  Preferred Pharmacy? [facility] #[id number]  Pharmacy phone number (if available)? [phone]  ---------------------------------------------------------------------------  --------------  Julio ROBLES  What is the best way for the office to contact you? OK to leave message on   voicemail  Preferred Call Back Phone Number?  [phone]

## 2021-11-03 NOTE — TELEPHONE ENCOUNTER
Future Appointments:  Future Appointments   Date Time Provider Toro Valderrama   12/1/2021  2:45 PM Sree Huston [facility] BS AMB   1/7/2022  2:00 PM Tessa Sanchez DO [facility] BS AMB        Last Appointment With Me:  6/25/2021     Requested Prescriptions     Pending Prescriptions Disp Refills    nystatin (MYCOSTATIN) 100,000 unit/mL suspension 60 mL 2     Sig: SWISH AND SWALLOW 5 ML BY MOUTH FOR 30 SECONDS 4 TIMES A DAY AS NEEDED FOR MOUTH PAIN

## 2021-11-03 NOTE — TELEPHONE ENCOUNTER
----- Message from Jada Riuz sent at 11/3/2021  2:28 PM EDT -----  Subject: Refill Request    QUESTIONS  Name of Medication? nystatin (MYCOSTATIN) 100,000 unit/mL suspension  Patient-reported dosage and instructions? as needed  How many days do you have left? 0  Preferred [facility]acy? [id number]  Pharmacy phone number (if available)? [phone]  Additional Information for Provider? pt states she needs a new   prescription, pharmacy will not fill the refill request   ---------------------------------------------------------------------------  --------------  CALL BACK INFO  What is the best way for the office to contact you? Do not leave any   message, patient will call back for answer  Preferred Call Back Phone Number?  [phone]

## 2021-11-04 RX ORDER — NYSTATIN 100000 [USP'U]/ML
SUSPENSION ORAL
Qty: 60 ML | Refills: 2 | Status: SHIPPED | OUTPATIENT
Start: 2021-11-04 | End: 2022-09-05

## 2021-12-03 ENCOUNTER — OFFICE VISIT (OUTPATIENT)
Dept: INTERNAL MEDICINE CLINIC | Age: 81
End: 2021-12-03

## 2021-12-03 VITALS
HEIGHT: 61 IN | DIASTOLIC BLOOD PRESSURE: 63 MMHG | SYSTOLIC BLOOD PRESSURE: 102 MMHG | WEIGHT: 153 LBS | BODY MASS INDEX: 28.89 KG/M2 | HEART RATE: 78 BPM | RESPIRATION RATE: 20 BRPM | TEMPERATURE: 97.2 F | OXYGEN SATURATION: 92 %

## 2021-12-03 DIAGNOSIS — J41.0 SIMPLE CHRONIC BRONCHITIS (HCC): ICD-10-CM

## 2021-12-03 DIAGNOSIS — R09.82 PND (POST-NASAL DRIP): ICD-10-CM

## 2021-12-03 DIAGNOSIS — K58.0 IRRITABLE BOWEL SYNDROME WITH DIARRHEA: ICD-10-CM

## 2021-12-03 DIAGNOSIS — F32.A ANXIETY AND DEPRESSION: ICD-10-CM

## 2021-12-03 DIAGNOSIS — G43.009 MIGRAINE WITHOUT AURA AND WITHOUT STATUS MIGRAINOSUS, NOT INTRACTABLE: ICD-10-CM

## 2021-12-03 DIAGNOSIS — Z63.4 BEREAVEMENT: ICD-10-CM

## 2021-12-03 DIAGNOSIS — F41.9 ANXIETY AND DEPRESSION: ICD-10-CM

## 2021-12-03 DIAGNOSIS — F11.99 OPIOID USE, UNSPECIFIED WITH UNSPECIFIED OPIOID-INDUCED DISORDER (HCC): ICD-10-CM

## 2021-12-03 DIAGNOSIS — Z86.718 HISTORY OF DVT OF LOWER EXTREMITY: Primary | ICD-10-CM

## 2021-12-03 DIAGNOSIS — M54.9 CHRONIC BACK PAIN GREATER THAN 3 MONTHS DURATION: ICD-10-CM

## 2021-12-03 DIAGNOSIS — G89.29 CHRONIC BACK PAIN GREATER THAN 3 MONTHS DURATION: ICD-10-CM

## 2021-12-03 LAB
INR BLD: 4.2
PT POC: 50.9 SECONDS
VALID INTERNAL CONTROL?: YES

## 2021-12-03 PROCEDURE — 85610 PROTHROMBIN TIME: CPT | Performed by: INTERNAL MEDICINE

## 2021-12-03 PROCEDURE — 99214 OFFICE O/P EST MOD 30 MIN: CPT | Performed by: INTERNAL MEDICINE

## 2021-12-03 RX ORDER — TRAMADOL HYDROCHLORIDE 50 MG/1
50 TABLET ORAL AS NEEDED
COMMUNITY
Start: 2021-01-12 | End: 2021-12-03 | Stop reason: SDUPTHER

## 2021-12-03 RX ORDER — BUPROPION HYDROCHLORIDE 150 MG/1
150 TABLET ORAL DAILY
COMMUNITY
Start: 2021-02-09 | End: 2022-02-23 | Stop reason: SDUPTHER

## 2021-12-03 RX ORDER — LOPERAMIDE HYDROCHLORIDE 2 MG/1
2 CAPSULE ORAL
Qty: 30 CAPSULE | Refills: 4 | Status: SHIPPED | OUTPATIENT
Start: 2021-12-03 | End: 2022-02-15 | Stop reason: SDUPTHER

## 2021-12-03 RX ORDER — TRAMADOL HYDROCHLORIDE 50 MG/1
50 TABLET ORAL
Qty: 60 TABLET | Refills: 2 | Status: SHIPPED | OUTPATIENT
Start: 2021-12-03 | End: 2022-01-02

## 2021-12-03 RX ORDER — IPRATROPIUM BROMIDE 42 UG/1
2 SPRAY, METERED NASAL 4 TIMES DAILY
Qty: 15 ML | Refills: 5 | Status: SHIPPED | OUTPATIENT
Start: 2021-12-03 | End: 2022-10-17

## 2021-12-03 RX ORDER — LEVOCETIRIZINE DIHYDROCHLORIDE 5 MG/1
5 TABLET, FILM COATED ORAL DAILY
Qty: 30 TABLET | Refills: 5 | Status: SHIPPED | OUTPATIENT
Start: 2021-12-03 | End: 2022-06-26

## 2021-12-03 SDOH — SOCIAL STABILITY - SOCIAL INSECURITY: DISSAPEARANCE AND DEATH OF FAMILY MEMBER: Z63.4

## 2021-12-03 NOTE — PROGRESS NOTES
Brigette Royal is a 78 y.o. female who presents for evaluation of routine follow up. Last seen by me June 25, 2021 in [facility].     Lots of complaints today:  Sinus congestion with pnd and chronic cough, chronic back pain, diarrhea from ibs, bereavement from her 's passing in past year, not being able to get an appt to see me \"though i've been trying for past 4 months\"      ROS:  Constitutional: negative for fevers, chills, anorexia and weight loss  Eyes:   negative for visual disturbance and irritation  ENT:   negative for tinnitus,sore throat,nasal congestion,ear pain,hoarseness  Respiratory:  negative for  hemoptysis, dyspnea,wheezing.  ++cough  CV:   negative for chest pain, palpitations, lower extremity edema  GI:   negative for nausea, vomiting, diarrhea, abdominal pain,melena  Genitourinary: negative for frequency, dysuria and hematuria  Musculoskel: negative for myalgias, arthralgias, back pain, muscle weakness, joint pain  Neurological:  negative for headaches, dizziness, focal weakness, numbness  Psychiatric:     Negative for depression or anxiety      Past Medical History:   Diagnosis Date    Anxiety     Arthritis     Asthma     CAD (coronary artery disease)     stent    Diabetes ([facility] [address]     GERD (gastroesophageal reflux disease)     History of DVT (deep vein thrombosis)     History of recurrent UTIs     Hx of seasonal allergies     Hypercholesterolemia     Irritable bowel syndrome (IBS)     Migraine     Urinary urgency        Past Surgical History:   Procedure Laterality Date    HX CATARACT REMOVAL Bilateral     HX HEART CATHETERIZATION      stent    HX OPEN REDUCTION INTERNAL FIXATION Left     humerus     HX OPEN REDUCTION INTERNAL FIXATION Right     hip       Family History   Problem Relation Age of Onset    Diabetes Mother     Stroke Mother        Social History     Socioeconomic History    Marital status:      Spouse name: Not on file    Number of children: Not on file    Years of education: Not on file    Highest education level: Not on file   Occupational History    Not on file   Tobacco Use    Smoking status: Former Smoker     Packs/day: 0.25     Quit date: 36     Years since quittin.9    Smokeless tobacco: Never Used   Substance and Sexual Activity    Alcohol use: No    Drug use: No    Sexual activity: Not Currently   Other Topics Concern    Not on file   Social History Narrative    Not on file     Social Determinants of Health     Financial Resource Strain:     Difficulty of Paying Living Expenses: Not on file   Food Insecurity:     Worried About Running Out of Food in the Last Year: Not on file    [place] of Food in the Last Year: Not on file   Transportation Needs:     Lack of Transportation (Medical): Not on file    Lack of Transportation (Non-Medical):  Not on file   Physical Activity:     Days of Exercise per Week: Not on file    Minutes of Exercise per Session: Not on file   Stress:     Feeling of Stress : Not on file   Social Connections:     Frequency of Communication with Friends and Family: Not on file    Frequency of Social Gatherings with Friends and Family: Not on file    Attends Latter day Services: Not on file    Active Member of [address] [place] [address] [employer] or Organizations: Not on file    Attends Club or Organization Meetings: Not on file    Marital Status: Not on file   Intimate Partner Violence:     Fear of Current or Ex-Partner: Not on file    Emotionally Abused: Not on file    Physically Abused: Not on file    Sexually Abused: Not on file   Housing Stability:     Unable to Pay for Housing in the Last Year: Not on file    Number of [place] in the Last Year: Not on file    Unstable Housing in the Last Year: Not on file            Visit Vitals  /63 (BP 1 Location: Left upper arm, BP Patient Position: Sitting)   Pulse 78   Temp 97.2 °F (36.2 °C) (Temporal)   Resp 20   Ht 5' 1\" (1.549 m)   Wt 153 lb (69.4 kg)   SpO2 92%   BMI 28.91 kg/m²       Physical Examination:   General - Well appearing female  HEENT - PERRL, TM no erythema/opacification, normal nasal turbinates, no oropharyngeal erythema or exudate, MMM  Neck - supple, no bruits, no thyroidomegaly, no lymphadenopathy  Pulm - clear to auscultation bilaterally--good air flow  Cardio - RRR, normal S1 S2, no murmur  Abd - soft, nontender, no masses, no HSM  Extrem - no edema, +2 distal pulses  Neuro-  No focal deficits, CN intact     Assessment/Plan:    1. Cough--pulm exam is normal.  Check cxr. Think due to sinus and PND. rx sent in for atrovent nasal spray and xyzal.  2.  Chronic DVT--INR today is 4.2. Normal coumadin dose is 2 mg daily. Take 1 mg daily for fr, sat, sun, then resume 2 mg daily dosing. Will have coumadin clinic reach her early next week  3. Bereavement--continue wellbutrin, lexapro. Referral to counselor, Yisel Contreras  4. Copd--continue advair  5. Chronic back pain--refills given for ultram  6. Chronic diarrhea, IBS--refills for imodium. Uses bentyl rx daily as well  7.   Cad with hx cabg--on toprol xl  8.  oab--on myrbetriq, oxybutynin    rtc 3months        Bill Wills III, DO

## 2021-12-03 NOTE — PATIENT INSTRUCTIONS
Learning About Making a Plan for Managing Chronic Pain  How can you plan for managing your pain? You and your doctor will work to make your plan. Your plan can include more than one type of pain control. You may take prescription or over-the-counter drugs. You can also use physical treatments, like massage and acupuncture. Other things can help too, such as meditation or a type of counseling to change how you think about your pain. It's important to let your doctor know how your pain is affecting your life. The goal of a pain management plan isn't to totally get rid of pain. Instead, the goal is to control the pain enough so that daily activities are easier. If your pain isn't controlled well enough, talk with your doctor. You may need to make a new plan. Or your doctor may refer you to a specialist.  Sample pain management plan  Here is an example of a pain management plan. You can work with your doctor to complete it. My Goals   Example: \"Control pain long enough to walk the dog each day\" or \"Reduce or stop taking pain medicines by the end of summer. \"   1. ________________________________________________  2. ________________________________________________  3. ________________________________________________  4. ________________________________________________  My Non-Medicine Strategies   Example: \"Complete my physical therapy exercises each day\" or \"Do yoga twice a week. \"  1. ________________________________________________  2. ________________________________________________  3. ________________________________________________  My Medicines That Help With Pain   Include prescription and over-the-counter medicines. 1. Medicine: ______________________  ? How much to take: ______________________________  ? How often to take it: ______________________________  ? Other instructions: ______________________________  2. Medicine: ______________________  ?  How much to take: ______________________________  ? How often to take it: ______________________________  ? Other instructions: ______________________________  My Next Steps   If I'm not meeting my goals, my doctor recommends:  · ________________________________________________  · ________________________________________________  · ________________________________________________  Where can you learn more? Go to http://www.gray.com/  Enter P490 in the search box to learn more about \"Learning About Making a Plan for Managing Chronic Pain. \"  Current as of: April 8, 2021               Content Version: 13.0  © 1486-8580 [employer]. Care instructions adapted under license by [employer] (which disclaims liability or warranty for this information). If you have questions about a medical condition or this instruction, always ask your healthcare professional. [address] any warranty or liability for your use of this information.

## 2021-12-06 ENCOUNTER — TELEPHONE (OUTPATIENT)
Dept: INTERNAL MEDICINE CLINIC | Age: 81
End: 2021-12-06

## 2021-12-06 NOTE — TELEPHONE ENCOUNTER
Pt called in and said that she had an appt   yesterday and she was told that she should take the Advair and she hasn't has it since 2019 so she will need a new prescription, she also stated that she forgot to tell PCP that she has leg cramps and she is not sure what she should do, please follow up

## 2021-12-07 ENCOUNTER — ANTI-COAG VISIT (OUTPATIENT)
Dept: INTERNAL MEDICINE CLINIC | Age: 81
End: 2021-12-07
Payer: COMMERCIAL

## 2021-12-07 DIAGNOSIS — Z86.718 HISTORY OF DVT OF LOWER EXTREMITY: Primary | ICD-10-CM

## 2021-12-07 LAB
INR BLD: 2.3 (ref 1–1.5)
PT POC: 27.4 SECONDS (ref 9.1–12)
VALID INTERNAL CONTROL?: YES

## 2021-12-07 PROCEDURE — 85610 PROTHROMBIN TIME: CPT | Performed by: PHARMACIST

## 2021-12-07 RX ORDER — FLUCONAZOLE 150 MG/1
150 TABLET ORAL DAILY
Qty: 1 TABLET | Refills: 0 | Status: SHIPPED | OUTPATIENT
Start: 2021-12-07 | End: 2021-12-08

## 2021-12-07 NOTE — PATIENT INSTRUCTIONS
Today your INR was 2.3. Your goal INR is  2.0-3.0 . You have a 2 mg tablet of Coumadin (warfarin). Take Coumadin (warfarin) as follows:    Continue warfarin 2 mg daily    Come back in  4   week(s) for your next finger stick/INR blood test.      Avoid any over the counter items containing aspirin or ibuprofen, and avoid great swings in general diet. Avoid alcohol consumption. Please notify the INR nurse if you are started on any new medication including over the counter or herbal supplements. Also, please notify your INR nurse if any of your other prescription or over the counter medications have been discontinued. Call [facility] at [phone] if you have any signs of abnormal bleeding/blood clot.  ------------------------------------------------------------------------------------------------------------------  Taking Warfarin Safely: Care Instructions    Your Care Instructions  Warfarin is a medicine that you take to prevent blood clots. It is often called a blood thinner. Doctors give warfarin (such as Coumadin) to reduce the risk of blood clots. You may be at risk for blood clots if you have atrial fibrillation or deep vein thrombosis. Some other health problems may also put you at risk. Warfarin slows the amount of time it takes for your blood to clot. It can cause bleeding problems. Even if you've been taking warfarin for a while, it's important to know how to take it safely. Foods and other medicines can affect the way warfarin works. Some can make warfarin work too well. This can cause bleeding problems. And some can make it work poorly, so that it does not prevent blood clots very well. You will need regular blood tests to check how long it takes for your blood to form a clot. This test is called a PT or prothrombin time test. The result of the test is called an INR level.  Depending on the test results, your doctor or anticoagulation clinic may adjust your dose of warfarin. Follow-up care is a key part of your treatment and safety. Be sure to make and go to all appointments, and call your doctor if you are having problems. It's also a good idea to know your test results and keep a list of the medicines you take. How can you care for yourself at home? Take warfarin safely  · Take your warfarin at the same time each day. · If you miss a dose of warfarin, don't take an extra dose to make up for it. Your doctor can tell you exactly what to do so you don't take too much or too little. · Wear medical alert jewelry that lets others know that you take warfarin. You can buy this at most drugstores. · Don't take warfarin if you are pregnant or planning to get pregnant. Talk to your doctor about how you can prevent getting pregnant while you are taking it. · Don't change your dose or stop taking warfarin unless your doctor tells you to. Effects of medicines and food on warfarin  · Don't start or stop taking any medicines, vitamins, or natural remedies unless you first talk to your doctor. Many medicines can affect how warfarin works. These include aspirin and other pain relievers, over-the-counter medicines, multivitamins, dietary supplements, and herbal products. · Tell all of your doctors and pharmacists that you take warfarin. Some prescription medicines can affect how warfarin works. · Keep the amount of vitamin K in your diet about the same from day to day. Do not suddenly eat a lot more or a lot less food that is rich in vitamin K than you usually do. Vitamin K affects how warfarin works and how your blood clots. Talk with your doctor before making big changes in your diet. Vitamin K is in many foods, such as:  ¨ Leafy greens, such as kale, cabbage, spinach, Swiss chard, and lettuce. ¨ Canola and soybean oils. ¨ Green vegetables, such as asparagus, broccoli, and [place] sprouts. ¨ Vegetable drinks, green tea leaves, and some dietary supplement drinks.   · Avoid cranberry juice and other cranberry products. They can increase the effects of warfarin. · Limit your use of alcohol. Avoid bleeding by preventing falls and injuries  · Wear slippers or shoes with nonskid soles. · Remove throw rugs and clutter. · Rearrange furniture and electrical cords to keep them out of walking paths. · Keep stairways, porches, and outside walkways well lit. Use night-lights in hallways and bathrooms. · Be extra careful when you work with sharp tools or knives. When should you call for help? Call 911 anytime you think you may need emergency care. For example, call if:  · You have a sudden, severe headache that is different from past headaches. Call your doctor now or seek immediate medical care if:  · You have any abnormal bleeding, such as:  ¨ Nosebleeds. ¨ Vaginal bleeding that is different (heavier, more frequent, at a different time of the month) than what you are used to. ¨ Bloody or black stools, or rectal bleeding. ¨ Bloody or pink urine. Watch closely for changes in your health, and be sure to contact your doctor if you have any problems. Where can you learn more? Go to http://lynn-xu.info/. Enter B810 in the search box to learn more about \"Taking Warfarin Safely: Care Instructions. \"  Current as of: January 27, 2016  Content Version: 11.1  © 3027-2337 [employer]. Care instructions adapted under license by [employer] (which disclaims liability or warranty for this information). If you have questions about a medical condition or this instruction, always ask your healthcare professional. [place] [address] any warranty or liability for your use of this information.

## 2021-12-07 NOTE — PROGRESS NOTES
Pharmacy Progress Note - Anticoagulation Management    S/O: Ms. Brenda Dunlap is a 81 y. o. female , with a PMH of OAB, IBS, LE DVT, anxiety, insomonia, Renee's esophagus, Arthritis, Migraines, hyperlipidemia, who was seen today for anticoagulation management for the diagnosis of Protein C deficiency, LLE Deep Vein Thrombosis.  Pt ambulates w/ a cane.      Interim History:  INR was 4.2 on 12/3/21 during her PCP appt. Warfarin dose adjusted to 1 mg on Fri-Sun and resume 2 mg dose on Monday  Reports she was taking APAP - 2 gm daily for pain up until recent INR check. Reports to limiting need for Tramadol. · Warfarin start date: ~ 2008   · INR Goal:  2.0-3.0    · Current warfarin regimen:  1 mg x 3 days, then 2 mg daily ----> Reports to taking 2 mg daily   · Warfarin tablet strength: 2 mg   · Duration of therapy: Indefinite    Today's pertinent positives includes:  Medication change    She did not take 1 mg x 3 days. Continued with 2 mg daily. Reports she was taking APAP - 2 gm daily for pain up until recent INR check. Trying not to take as much Tramadol. Knows when she takes APAP - this increases INR. She has since reduced APAP intake. Reports some diarrhea - reports IBS flare. Denies any bleeding    Thinks a yeast infection in recurring. Tried OTC monistat w/o much relief. On and off sx for the past two weeks     Results for orders placed or performed in visit on 12/07/21   AMB POC PT/INR   Result Value Ref Range    VALID INTERNAL CONTROL POC Yes     Prothrombin time (POC) 27.4 (A) 9.1 - 12 seconds    INR POC 2.3 (A) 1 - 1.5     · Adherence:   · Able to recall regimen? YES  · Miss/extra dose? NO  · Need refill?  NO    Upcoming procedure(s):  NO    Past Medical History:   Diagnosis Date    Anxiety     Arthritis     Asthma     CAD (coronary artery disease)     stent    Diabetes (HCC)     GERD (gastroesophageal reflux disease)     History of DVT (deep vein thrombosis)     History of recurrent UTIs     Hx of seasonal allergies     Hypercholesterolemia     Irritable bowel syndrome (IBS)     Migraine     Urinary urgency      Allergies   Allergen Reactions    Iodinated Contrast Media Other (comments)     Passes out    Pcn [Penicillins] Shortness of Breath    Sulfa (Sulfonamide Antibiotics) Shortness of Breath     Current Outpatient Medications   Medication Sig    Wixela Inhub 250-50 mcg/dose diskus inhaler INHALE 1 PUFF AND INTO THE LUNGS EVERY 12 HOURS. RINSE MOUTH AFTER USE    buPROPion XL (WELLBUTRIN XL) 150 mg tablet Take 150 mg by mouth daily.  traMADoL (ULTRAM) 50 mg tablet Take 1 Tablet by mouth every twelve (12) hours as needed for Pain for up to 30 days. Max Daily Amount: 100 mg.  loperamide (IMODIUM) 2 mg capsule Take 1 Capsule by mouth four (4) times daily as needed for Diarrhea.  levocetirizine (XYZAL) 5 mg tablet Take 1 Tablet by mouth daily.  ipratropium (ATROVENT) 42 mcg (0.06 %) nasal spray 2 Sprays by Both Nostrils route four (4) times daily.  nystatin (MYCOSTATIN) 100,000 unit/mL suspension SWISH AND SWALLOW 5 ML BY MOUTH FOR 30 SECONDS 4 TIMES A DAY AS NEEDED FOR MOUTH PAIN    esomeprazole (NEXIUM) 20 mg capsule TAKE 1 CAPSULE BY MOUTH DAILY    amitriptyline (ELAVIL) 10 mg tablet TAKE 1 TABLET BY MOUTH EVERY NIGHT AT BEDTIME    ondansetron hcl (ZOFRAN) 4 mg tablet TAKE 1 TABLET BY MOUTH EVERY 8 HOURS AS NEEDED FOR NAUSEA OR VOMITING    levETIRAcetam (KEPPRA) 500 mg tablet TAKE 1 TABLET BY MOUTH EVERY MORNING AND 1 AND 1/2 TABLET BY MOUTH EVERY EVENING FOR MIGRAINES    dicyclomine (BENTYL) 10 mg capsule TAKE ONE CAPSULE BY MOUTH FOUR TIMES DAILY AS NEEDED    ezetimibe (ZETIA) 10 mg tablet TAKE 1 TABLET BY MOUTH DAILY    celecoxib (CELEBREX) 200 mg capsule Take 1 Capsule by mouth every twelve (12) hours as needed for Pain.     metoprolol succinate (TOPROL-XL) 25 mg XL tablet TAKE 1/2 TABLET BY MOUTH ONCE DAILY    Myrbetriq 25 mg ER tablet TAKE 1 TABLET DAILY  warfarin (COUMADIN) 2 mg tablet Take 1 Tablet by mouth daily.  oxybutynin chloride XL (DITROPAN XL) 10 mg CR tablet TAKE 1 TABLET BY MOUTH EVERY DAY    atorvastatin (LIPITOR) 20 mg tablet TAKE 1 TABLET BY MOUTH EVERY DAY    traZODone (DESYREL) 100 mg tablet Take 1 Tablet by mouth nightly as needed for Sleep.  diclofenac (VOLTAREN) 1 % gel Apply  to affected area every twelve (12) hours as needed for Pain.  albuterol (PROVENTIL HFA, VENTOLIN HFA, PROAIR HFA) 90 mcg/actuation inhaler Take 2 Puffs by inhalation every six (6) hours as needed for Wheezing.  clorazepate (TRANXENE) 3.75 mg tablet TAKE 1 TABLET BY MOUTH EVERY 6 HOURS AS NEEDED FOR ANXIETY. MAX DAILY AMOUNT 4 TABLETS    escitalopram oxalate (LEXAPRO) 20 mg tablet TAKE 1 TABLET BY MOUTH DAILY    levothyroxine (SYNTHROID) 25 mcg tablet Take 2 Tabs by mouth Daily (before breakfast).  Bifidobacterium Infantis (ALIGN) 4 mg cap Take 1 Cap by mouth daily as needed for Diarrhea. Indications: ALIGN OTC     No current facility-administered medications for this visit.      Wt Readings from Last 3 Encounters:   12/03/21 153 lb (69.4 kg)   11/01/21 155 lb (70.3 kg)   09/28/21 154 lb (69.9 kg)     BP Readings from Last 3 Encounters:   12/03/21 102/63   11/01/21 115/71   09/28/21 108/85     Pulse Readings from Last 3 Encounters:   12/03/21 78   11/01/21 71   09/28/21 76     Lab Results   Component Value Date/Time    WBC 6.6 06/25/2021 11:52 AM    HGB 12.9 06/25/2021 11:52 AM    HCT 41.3 06/25/2021 11:52 AM    PLATELET 808 72/92/3332 11:52 AM    MCV 86.4 06/25/2021 11:52 AM     Lab Results   Component Value Date/Time    Sodium 142 06/25/2021 11:52 AM    Potassium 4.0 06/25/2021 11:52 AM    Chloride 111 (H) 06/25/2021 11:52 AM    CO2 23 06/25/2021 11:52 AM    Anion gap 8 06/25/2021 11:52 AM    Glucose 128 (H) 06/25/2021 11:52 AM    BUN 17 06/25/2021 11:52 AM    Creatinine 0.73 06/25/2021 11:52 AM    BUN/Creatinine ratio 23 (H) 06/25/2021 11:52 AM    GFR est AA >60 06/25/2021 11:52 AM    GFR est non-AA >60 06/25/2021 11:52 AM    Calcium 8.8 06/25/2021 11:52 AM    Bilirubin, total 0.3 06/25/2021 11:52 AM    Alk. phosphatase 103 06/25/2021 11:52 AM    Protein, total 6.9 06/25/2021 11:52 AM    Albumin 3.6 06/25/2021 11:52 AM    Globulin 3.3 06/25/2021 11:52 AM    A-G Ratio 1.1 06/25/2021 11:52 AM    ALT (SGPT) 21 06/25/2021 11:52 AM     Estimated Creatinine Clearance: 52 mL/min (by C-G formula based on SCr of 0.73 mg/dL). INR History:   (normal INR range 0.8-1.2)     Date   INR   PT   Dose/Comments  12/7/21 2.3  27.4 1 mg x 3 days, then 2 mg daily  --> Pt took 2 mg daily   12/04/21 4.2   2 mg daily ; (+) APAP   11/01/21          2.8                   34.0     2 mg daily ; s/p TFESI  09/27/21          1.9                   22.8     4 mg x 1, then 2 mg daily -- pt held x 2 days and 2 mg daily  09/20/21          1.2                   14.0     Hold x 2, 1/2 tab on Thurs, and 2 mg daily ROW; Pt held x 2, then 2 mg daily until INR check   09/08/21          3.6                   43. 7     2 mg daily  07/20/21          2.9                   34.2     2 mg daily  Lapse in INR clinic  06/25/21          3.1                   30.8     2 mg daily  06/10/20          1.9                   23.2     2 mg daily  Lapse in monitoring d/t COVID 19  01/15/20          2.5                   29.9     2 mg daily  12/02/19          2.0                   24.4     2 mg daily    A/P:       Anticoagulation:  Considering Ms. Dunlap's past history, todays findings, and per Anticoagulation Collaborative Practice Agreement/Protocol:    1. Fingerstick POC INR (2.3) is therapeutic for INR goal today. 2.  Continue warfarin 2 mg daily. Patient was instructed to schedule an appointment in 4 week(s) prior to leaving the clinic. Medication reconciliation was completed during the visit. There are no discontinued medications. A full discussion of the nature of anticoagulants has been carried out. A full discussion of the need for frequent and regular monitoring, precise dosage adjustment and compliance was stressed. Side effects of potential bleeding were discussed and Ms. Ho Becker was instructed to call [phone] if there are any signs of abnormal bleeding. Ms. Ho Becker was instructed to avoid any OTC items containing aspirin or ibuprofen and prior to starting any new OTC products to consult with her physician or pharmacist to ensure no drug interactions are present. Ms. Ho Becker was instructed to avoid any major changes in her general diet and to avoid alcohol consumption. Ms. Ho Becker was provided information in the AVS that includes topics on understanding coumadin therapy, drug interaction considerations, vitamin K and coumadin use, interactions with foods and supplements containing vitamin K, and the use of herbal products. Ms. Ho Becker verbalized her understanding of all instructions and will call the office with any questions, concerns, or signs of abnormal bleeding or blood clot. Notifications of recommendations will be sent to Dr. Jessica Miranda DO for review. Thank you,  Zulema Carreno, PharmD, BCACP, [address] [place] [address] in place:  Yes   Recommendation Provided To: Patient/Caregiver: 3 via In person   Intervention Detail: Adherence Monitorin, Lab(s) Ordered and Scheduled Appointment   Gap Closed?:    Intervention Accepted By: Patient/Caregiver: 3   Time Spent (min): 20

## 2021-12-07 NOTE — PROGRESS NOTES
Pharmacy Progress Note     Discussed patient's yeast infection concern with Dr. Nik Johnson. Sending in diflucan 150 mg x 1. VORB per provider. Allergies   Allergen Reactions    Iodinated Contrast Media Other (comments)     Passes out    Pcn [Penicillins] Shortness of Breath    Sulfa (Sulfonamide Antibiotics) Shortness of Breath     Orders Placed This Encounter    COLLECTION CAPILLARY BLOOD SPECIMEN    AMB POC PT/INR    fluconazole (DIFLUCAN) 150 mg tablet     Sig: Take 1 Tablet by mouth daily for 1 day. FDA advises cautious prescribing of oral fluconazole in pregnancy. Dispense:  1 Tablet     Refill:  0       Thank you for the consult,  Zulema Rincon, ShonD, BCACP, [facility] in place:  Yes   Recommendation Provided To: Provider: 1 via Verbally to provider  and Patient/Caregiver: 1 via Telephone   Intervention Detail: New Rx: 1, reason: Needs Additional Therapy   Gap Closed?:    Intervention Accepted By: Provider: 1 and Patient/Caregiver: 1   Time Spent (min): 10

## 2021-12-21 DIAGNOSIS — F41.9 ANXIETY AND DEPRESSION: Primary | ICD-10-CM

## 2021-12-21 DIAGNOSIS — F32.A ANXIETY AND DEPRESSION: Primary | ICD-10-CM

## 2021-12-21 NOTE — TELEPHONE ENCOUNTER
Patient states she requested thru Pharmacy & it was never received. Please approve Asap or call if any questions.  Thank you    Pharmacy is [facility] [facility] on file./Indicated

## 2021-12-22 RX ORDER — AMITRIPTYLINE HYDROCHLORIDE 10 MG/1
10 TABLET, FILM COATED ORAL
Qty: 90 TABLET | Refills: 3 | Status: SHIPPED | OUTPATIENT
Start: 2021-12-22 | End: 2022-02-09 | Stop reason: ALTCHOICE

## 2021-12-22 RX ORDER — AMITRIPTYLINE HYDROCHLORIDE 10 MG/1
10 TABLET, FILM COATED ORAL
Qty: 90 TABLET | Refills: 3 | OUTPATIENT
Start: 2021-12-22

## 2021-12-22 NOTE — TELEPHONE ENCOUNTER
Writer spoke with pharmacy, pharmacy did not receive order for medication and is requesting a new script at this time to fill for patient. saline lock

## 2021-12-22 NOTE — TELEPHONE ENCOUNTER
I have attempted without success to contact this patient by phone. Unable to reach patient at this time. No personal VM, left generic message for patient to return call to office at earliest convenience. Awaiting call back at this time. Called patient to inform that script had 3 refills attached to it so pharmacy should be able to fill medication at this time.

## 2021-12-30 ENCOUNTER — TELEPHONE (OUTPATIENT)
Dept: INTERNAL MEDICINE CLINIC | Age: 81
End: 2021-12-30

## 2021-12-30 NOTE — TELEPHONE ENCOUNTER
Patient is concerned with heart rate  Notes heart rate in the 90s  Blood pressure 157/80  Denies chest pain and palpitations and dyspnea  Both legs are hurting no swelling  Both knees hurting  Advised patient that heart rate in the 90s is acceptable and since not having chest symptoms can wait for next available appointment with PCP to discuss leg pain that is Bilateral  Discussed if develops chest symptoms to go to ER

## 2022-01-04 ENCOUNTER — TELEPHONE (OUTPATIENT)
Dept: INTERNAL MEDICINE CLINIC | Age: 82
End: 2022-01-04

## 2022-01-04 NOTE — TELEPHONE ENCOUNTER
----- Message from Domenica Makeda sent at 1/4/2022  4:16 PM EST -----  Subject: Message to Provider    QUESTIONS  Information for Provider? pt is wanting to reschedule her INR with the   pharmacist on 1/12 at 1:15. please call her to confirm its rescheduled for   that date and time. ---------------------------------------------------------------------------  --------------  [employer] INFO  What is the best way for the office to contact you? OK to leave message on   voicemail  Preferred Call Back Phone Number? [phone]  ---------------------------------------------------------------------------  --------------  SCRIPT ANSWERS  Relationship to Patient?  Self no fever

## 2022-01-05 NOTE — TELEPHONE ENCOUNTER
Pharmacy Progress Note - Telephone Encounter    S/O: Ms. Alcala Plan 78 y.o. female, referred by Dr. Garima Geronimo, was contacted via an outbound telephone call to discuss her INR follow up appt today. Verified patients identifiers (name & ) per HIPAA policy. Worried about the winter roads. Hoping to r/s her appt. A/P:  - INR now scheduled 22 at 1:15.   - Patient endorses understanding to the provided information. All questions answered at this time. Thank you,  Zulema Matamoros, PharmD, BCACP, [address] [place] [address] in place:  Yes   Recommendation Provided To: Patient/Caregiver: 1 via Telephone   Intervention Detail: Scheduled Appointment   Intervention Accepted By: Patient/Caregiver: 1   Time Spent (min): 5

## 2022-01-08 NOTE — PROGRESS NOTES
Pharmacy Progress Note - Anticoagulation Management    S/O: Ms. Brenda Dunlap is a 81 y. o. female , with a PMH of OAB, IBS, LE DVT, anxiety, insomonia, Renee's esophagus, Arthritis, Migraines, hyperlipidemia, who was seen today for anticoagulation management for the diagnosis of Protein C deficiency, LLE Deep Vein Thrombosis.  Pt ambulates w/ a cane.      · Warfarin start date: ~ 2008   · INR Goal:  2.0-3.0    · Current warfarin regimen:  2 mg daily   · Warfarin tablet strength: 2 mg   · Duration of therapy: Indefinite    Today's pertinent positives includes:  No significant changes since last visit    Results for orders placed or performed in visit on 01/12/22   AMB POC PT/INR   Result Value Ref Range    VALID INTERNAL CONTROL POC Yes     Prothrombin time (POC) 29.6 (A) 9.1 - 12 seconds    INR POC 2.5 (A) 1 - 1.5     · Adherence:   · Able to recall regimen? YES  · Miss/extra dose? NO  · Need refill? YES    Upcoming procedure(s):  NO      Past Medical History:   Diagnosis Date    Anxiety     Arthritis     Asthma     CAD (coronary artery disease)     stent    Diabetes (HCC)     GERD (gastroesophageal reflux disease)     History of DVT (deep vein thrombosis)     History of recurrent UTIs     Hx of seasonal allergies     Hypercholesterolemia     Irritable bowel syndrome (IBS)     Migraine     Urinary urgency      Allergies   Allergen Reactions    Iodinated Contrast Media Other (comments)     Passes out    Pcn [Penicillins] Shortness of Breath    Sulfa (Sulfonamide Antibiotics) Shortness of Breath     Current Outpatient Medications   Medication Sig    amitriptyline (ELAVIL) 10 mg tablet Take 1 Tablet by mouth nightly.  Wixela Inhub 250-50 mcg/dose diskus inhaler INHALE 1 PUFF AND INTO THE LUNGS EVERY 12 HOURS. RINSE MOUTH AFTER USE    buPROPion XL (WELLBUTRIN XL) 150 mg tablet Take 150 mg by mouth daily.     loperamide (IMODIUM) 2 mg capsule Take 1 Capsule by mouth four (4) times daily as needed for Diarrhea.  levocetirizine (XYZAL) 5 mg tablet Take 1 Tablet by mouth daily.  ipratropium (ATROVENT) 42 mcg (0.06 %) nasal spray 2 Sprays by Both Nostrils route four (4) times daily.  nystatin (MYCOSTATIN) 100,000 unit/mL suspension SWISH AND SWALLOW 5 ML BY MOUTH FOR 30 SECONDS 4 TIMES A DAY AS NEEDED FOR MOUTH PAIN    esomeprazole (NEXIUM) 20 mg capsule TAKE 1 CAPSULE BY MOUTH DAILY    ondansetron hcl (ZOFRAN) 4 mg tablet TAKE 1 TABLET BY MOUTH EVERY 8 HOURS AS NEEDED FOR NAUSEA OR VOMITING    levETIRAcetam (KEPPRA) 500 mg tablet TAKE 1 TABLET BY MOUTH EVERY MORNING AND 1 AND 1/2 TABLET BY MOUTH EVERY EVENING FOR MIGRAINES    dicyclomine (BENTYL) 10 mg capsule TAKE ONE CAPSULE BY MOUTH FOUR TIMES DAILY AS NEEDED    ezetimibe (ZETIA) 10 mg tablet TAKE 1 TABLET BY MOUTH DAILY    celecoxib (CELEBREX) 200 mg capsule Take 1 Capsule by mouth every twelve (12) hours as needed for Pain.  metoprolol succinate (TOPROL-XL) 25 mg XL tablet TAKE 1/2 TABLET BY MOUTH ONCE DAILY    Myrbetriq 25 mg ER tablet TAKE 1 TABLET DAILY    warfarin (COUMADIN) 2 mg tablet Take 1 Tablet by mouth daily.  oxybutynin chloride XL (DITROPAN XL) 10 mg CR tablet TAKE 1 TABLET BY MOUTH EVERY DAY    atorvastatin (LIPITOR) 20 mg tablet TAKE 1 TABLET BY MOUTH EVERY DAY    traZODone (DESYREL) 100 mg tablet Take 1 Tablet by mouth nightly as needed for Sleep.  diclofenac (VOLTAREN) 1 % gel Apply  to affected area every twelve (12) hours as needed for Pain.  albuterol (PROVENTIL HFA, VENTOLIN HFA, PROAIR HFA) 90 mcg/actuation inhaler Take 2 Puffs by inhalation every six (6) hours as needed for Wheezing.  clorazepate (TRANXENE) 3.75 mg tablet TAKE 1 TABLET BY MOUTH EVERY 6 HOURS AS NEEDED FOR ANXIETY. MAX DAILY AMOUNT 4 TABLETS    escitalopram oxalate (LEXAPRO) 20 mg tablet TAKE 1 TABLET BY MOUTH DAILY    levothyroxine (SYNTHROID) 25 mcg tablet Take 2 Tabs by mouth Daily (before breakfast).     Bifidobacterium Infantis (ALIGN) 4 mg cap Take 1 Cap by mouth daily as needed for Diarrhea. Indications: ALIGN OTC     No current facility-administered medications for this visit. Wt Readings from Last 3 Encounters:   12/03/21 153 lb (69.4 kg)   11/01/21 155 lb (70.3 kg)   09/28/21 154 lb (69.9 kg)     BP Readings from Last 3 Encounters:   01/12/22 132/65   12/03/21 102/63   11/01/21 115/71     Pulse Readings from Last 3 Encounters:   01/12/22 72   12/03/21 78   11/01/21 71     Lab Results   Component Value Date/Time    WBC 6.6 06/25/2021 11:52 AM    HGB 12.9 06/25/2021 11:52 AM    HCT 41.3 06/25/2021 11:52 AM    PLATELET 100 65/22/2648 11:52 AM    MCV 86.4 06/25/2021 11:52 AM     Lab Results   Component Value Date/Time    Sodium 142 06/25/2021 11:52 AM    Potassium 4.0 06/25/2021 11:52 AM    Chloride 111 (H) 06/25/2021 11:52 AM    CO2 23 06/25/2021 11:52 AM    Anion gap 8 06/25/2021 11:52 AM    Glucose 128 (H) 06/25/2021 11:52 AM    BUN 17 06/25/2021 11:52 AM    Creatinine 0.73 06/25/2021 11:52 AM    BUN/Creatinine ratio 23 (H) 06/25/2021 11:52 AM    GFR est AA >60 06/25/2021 11:52 AM    GFR est non-AA >60 06/25/2021 11:52 AM    Calcium 8.8 06/25/2021 11:52 AM    Bilirubin, total 0.3 06/25/2021 11:52 AM    Alk. phosphatase 103 06/25/2021 11:52 AM    Protein, total 6.9 06/25/2021 11:52 AM    Albumin 3.6 06/25/2021 11:52 AM    Globulin 3.3 06/25/2021 11:52 AM    A-G Ratio 1.1 06/25/2021 11:52 AM    ALT (SGPT) 21 06/25/2021 11:52 AM     CrCl cannot be calculated (Patient's most recent lab result is older than the maximum 180 days allowed.).       INR History:   (normal INR range 0.8-1.2)     Date   INR   PT   Dose/Comments  01/12/22 2.5  29.6 2 mg daily  12/07/21          2.3                   27.4     1 mg x 3 days, then 2 mg daily  --> Pt took 2 mg daily   12/04/21          4.2                               2 mg daily ; (+) APAP   11/01/21          2.8                   34.0     2 mg daily ; s/p TFESI  09/27/21          1.9                   22.8     4 mg x 1, then 2 mg daily -- pt held x 2 days and 2 mg daily  09/20/21          1.2                   14.0     Hold x 2, 1/2 tab on Thurs, and 2 mg daily ROW; Pt held x 2, then 2 mg daily until INR check   09/08/21          3.6                   43. 7     2 mg daily  07/20/21          2.9                   34.2     2 mg daily  Lapse in INR clinic    A/P:       Anticoagulation:  Considering Ms. Dunlap's past history, todays findings, and per Anticoagulation Collaborative Practice Agreement/Protocol:    1. Fingerstick POC INR (2.5) is therapeutic for INR goal today. 2.  Continue warfarin 2 mg daily. Will send refill requests to Dr Kierra Brantley for his review. Patient was instructed to schedule an appointment in 4 week(s) prior to leaving the clinic. Medication reconciliation was completed during the visit. There are no discontinued medications. A full discussion of the nature of anticoagulants has been carried out. A full discussion of the need for frequent and regular monitoring, precise dosage adjustment and compliance was stressed. Side effects of potential bleeding were discussed and Ms. Kamar Parisi was instructed to call [phone] if there are any signs of abnormal bleeding. Ms. Kamar Parisi was instructed to avoid any OTC items containing aspirin or ibuprofen and prior to starting any new OTC products to consult with her physician or pharmacist to ensure no drug interactions are present. Ms. Kamar Parisi was instructed to avoid any major changes in her general diet and to avoid alcohol consumption. Ms. Kamar Parisi was provided information in the AVS that includes topics on understanding coumadin therapy, drug interaction considerations, vitamin K and coumadin use, interactions with foods and supplements containing vitamin K, and the use of herbal products.     Ms. Kamar Parisi verbalized her understanding of all instructions and will call the office with any questions, concerns, or signs of abnormal bleeding or blood clot. Notifications of recommendations will be sent to Dr. Nohemy Rivers DO for review. Thank you,  Zulema Perkins, PharmD, BCACP, [address] in place:  Yes   Recommendation Provided To: Provider: 1 via Note to Provider  and Patient/Caregiver: 4 via In person   Intervention Detail: Lab(s) Ordered, Refill(s) Provided and Scheduled Appointment   Intervention Accepted By: Provider: 1 and Patient/Caregiver: 3   Time Spent (min): 20

## 2022-01-08 NOTE — PATIENT INSTRUCTIONS
Today your INR was 2.5. Your goal INR is  2.0-3.0 . You have a 2 mg tablet of Coumadin (warfarin). Take Coumadin (warfarin) as follows:    Continue warfarin 2 mg daily. Come back in  4 week(s) for your next finger stick/INR blood test.      Avoid any over the counter items containing aspirin or ibuprofen, and avoid great swings in general diet. Avoid alcohol consumption. Please notify the INR nurse if you are started on any new medication including over the counter or herbal supplements. Also, please notify your INR nurse if any of your other prescription or over the counter medications have been discontinued. Call [facility] at [phone] if you have any signs of abnormal bleeding/blood clot.  ------------------------------------------------------------------------------------------------------------------  Taking Warfarin Safely: Care Instructions    Your Care Instructions  Warfarin is a medicine that you take to prevent blood clots. It is often called a blood thinner. Doctors give warfarin (such as Coumadin) to reduce the risk of blood clots. You may be at risk for blood clots if you have atrial fibrillation or deep vein thrombosis. Some other health problems may also put you at risk. Warfarin slows the amount of time it takes for your blood to clot. It can cause bleeding problems. Even if you've been taking warfarin for a while, it's important to know how to take it safely. Foods and other medicines can affect the way warfarin works. Some can make warfarin work too well. This can cause bleeding problems. And some can make it work poorly, so that it does not prevent blood clots very well. You will need regular blood tests to check how long it takes for your blood to form a clot. This test is called a PT or prothrombin time test. The result of the test is called an INR level.  Depending on the test results, your doctor or anticoagulation clinic may adjust your dose of warfarin. Follow-up care is a key part of your treatment and safety. Be sure to make and go to all appointments, and call your doctor if you are having problems. It's also a good idea to know your test results and keep a list of the medicines you take. How can you care for yourself at home? Take warfarin safely  · Take your warfarin at the same time each day. · If you miss a dose of warfarin, don't take an extra dose to make up for it. Your doctor can tell you exactly what to do so you don't take too much or too little. · Wear medical alert jewelry that lets others know that you take warfarin. You can buy this at most drugstores. · Don't take warfarin if you are pregnant or planning to get pregnant. Talk to your doctor about how you can prevent getting pregnant while you are taking it. · Don't change your dose or stop taking warfarin unless your doctor tells you to. Effects of medicines and food on warfarin  · Don't start or stop taking any medicines, vitamins, or natural remedies unless you first talk to your doctor. Many medicines can affect how warfarin works. These include aspirin and other pain relievers, over-the-counter medicines, multivitamins, dietary supplements, and herbal products. · Tell all of your doctors and pharmacists that you take warfarin. Some prescription medicines can affect how warfarin works. · Keep the amount of vitamin K in your diet about the same from day to day. Do not suddenly eat a lot more or a lot less food that is rich in vitamin K than you usually do. Vitamin K affects how warfarin works and how your blood clots. Talk with your doctor before making big changes in your diet. Vitamin K is in many foods, such as:  ¨ Leafy greens, such as kale, cabbage, spinach, Swiss chard, and lettuce. ¨ Canola and soybean oils. ¨ Green vegetables, such as asparagus, broccoli, and [place] sprouts. ¨ Vegetable drinks, green tea leaves, and some dietary supplement drinks.   · Avoid cranberry juice and other cranberry products. They can increase the effects of warfarin. · Limit your use of alcohol. Avoid bleeding by preventing falls and injuries  · Wear slippers or shoes with nonskid soles. · Remove throw rugs and clutter. · Rearrange furniture and electrical cords to keep them out of walking paths. · Keep stairways, porches, and outside walkways well lit. Use night-lights in hallways and bathrooms. · Be extra careful when you work with sharp tools or knives. When should you call for help? Call 911 anytime you think you may need emergency care. For example, call if:  · You have a sudden, severe headache that is different from past headaches. Call your doctor now or seek immediate medical care if:  · You have any abnormal bleeding, such as:  ¨ Nosebleeds. ¨ Vaginal bleeding that is different (heavier, more frequent, at a different time of the month) than what you are used to. ¨ Bloody or black stools, or rectal bleeding. ¨ Bloody or pink urine. Watch closely for changes in your health, and be sure to contact your doctor if you have any problems. Where can you learn more? Go to http://www.gray.com/. Enter D733 in the search box to learn more about \"Taking Warfarin Safely: Care Instructions. \"  Current as of: January 27, 2016  Content Version: 11.1  © 6090-7171 [employer]. Care instructions adapted under license by [employer] (which disclaims liability or warranty for this information). If you have questions about a medical condition or this instruction, always ask your healthcare professional. [place] [address] any warranty or liability for your use of this information.

## 2022-01-12 ENCOUNTER — ANTI-COAG VISIT (OUTPATIENT)
Dept: INTERNAL MEDICINE CLINIC | Age: 82
End: 2022-01-12
Payer: COMMERCIAL

## 2022-01-12 VITALS
SYSTOLIC BLOOD PRESSURE: 132 MMHG | HEART RATE: 72 BPM | OXYGEN SATURATION: 93 % | DIASTOLIC BLOOD PRESSURE: 65 MMHG | TEMPERATURE: 96.6 F

## 2022-01-12 DIAGNOSIS — D68.59 PROTEIN C DEFICIENCY (HCC): ICD-10-CM

## 2022-01-12 DIAGNOSIS — G89.29 CHRONIC BACK PAIN GREATER THAN 3 MONTHS DURATION: Primary | ICD-10-CM

## 2022-01-12 DIAGNOSIS — M54.9 CHRONIC BACK PAIN GREATER THAN 3 MONTHS DURATION: Primary | ICD-10-CM

## 2022-01-12 DIAGNOSIS — Z86.718 HISTORY OF DVT OF LOWER EXTREMITY: Primary | ICD-10-CM

## 2022-01-12 DIAGNOSIS — I82.5Y2 CHRONIC DEEP VEIN THROMBOSIS (DVT) OF PROXIMAL VEIN OF LEFT LOWER EXTREMITY (HCC): ICD-10-CM

## 2022-01-12 LAB
INR BLD: 2.5 (ref 1–1.5)
PT POC: 29.6 SECONDS (ref 9.1–12)
VALID INTERNAL CONTROL?: YES

## 2022-01-12 PROCEDURE — 85610 PROTHROMBIN TIME: CPT | Performed by: PHARMACIST

## 2022-01-12 RX ORDER — ALBUTEROL SULFATE 90 UG/1
2 AEROSOL, METERED RESPIRATORY (INHALATION)
Qty: 8 G | Refills: 2 | Status: SHIPPED | OUTPATIENT
Start: 2022-01-12

## 2022-01-12 RX ORDER — TRAMADOL HYDROCHLORIDE 50 MG/1
50 TABLET ORAL
Qty: 30 TABLET | Refills: 0 | Status: SHIPPED | OUTPATIENT
Start: 2022-01-12 | End: 2022-02-07 | Stop reason: SDUPTHER

## 2022-01-12 NOTE — TELEPHONE ENCOUNTER
----- Message from Brown 64 sent at 1/12/2022  1:07 PM EST -----  Regarding: Refill Request  Hello! Please pend a rx refill for albuterol to Dr. Shemar Morel for his review. [address]     Thank you!   Andrews Saravia

## 2022-01-25 RX ORDER — LEVOTHYROXINE SODIUM 25 UG/1
50 TABLET ORAL
Qty: 180 TABLET | Refills: 3 | Status: SHIPPED | OUTPATIENT
Start: 2022-01-25

## 2022-01-25 NOTE — TELEPHONE ENCOUNTER
PCP: Annalise Begum DO    Last appt: 12/3/2021  Future Appointments   Date Time Provider Toro Jannie   2/9/2022  1:45 PM Isatu Banerjee [facility] BS AMB   3/29/2022  1:45 PM Cristino Wills DO [facility] BS AMB       Requested Prescriptions     Pending Prescriptions Disp Refills    levothyroxine (SYNTHROID) 25 mcg tablet 180 Tablet 3     Sig: Take 2 Tablets by mouth Daily (before breakfast).        Prior labs and Blood pressures:  BP Readings from Last 3 Encounters:   01/12/22 132/65   12/03/21 102/63   11/01/21 115/71     Lab Results   Component Value Date/Time    Sodium 142 06/25/2021 11:52 AM    Potassium 4.0 06/25/2021 11:52 AM    Chloride 111 (H) 06/25/2021 11:52 AM    CO2 23 06/25/2021 11:52 AM    Anion gap 8 06/25/2021 11:52 AM    Glucose 128 (H) 06/25/2021 11:52 AM    BUN 17 06/25/2021 11:52 AM    Creatinine 0.73 06/25/2021 11:52 AM    BUN/Creatinine ratio 23 (H) 06/25/2021 11:52 AM    GFR est AA >60 06/25/2021 11:52 AM    GFR est non-AA >60 06/25/2021 11:52 AM    Calcium 8.8 06/25/2021 11:52 AM     Lab Results   Component Value Date/Time    Hemoglobin A1c 7.3 (H) 06/25/2021 11:52 AM    Hemoglobin A1c (POC) 6.8 10/17/2019 03:51 PM     Lab Results   Component Value Date/Time    Cholesterol, total 107 06/25/2021 11:52 AM    HDL Cholesterol 52 06/25/2021 11:52 AM    LDL, calculated 39 06/25/2021 11:52 AM    VLDL, calculated 16 06/25/2021 11:52 AM    Triglyceride 80 06/25/2021 11:52 AM    CHOL/HDL Ratio 2.1 06/25/2021 11:52 AM     No results found for: Juaquin Dodge [id number]    Lab Results   Component Value Date/Time    TSH 3.10 06/25/2021 11:52 AM

## 2022-02-06 NOTE — PATIENT INSTRUCTIONS
Today your INR was 3.2. Your goal INR is  2.0-3.0 . You have a 2 mg tablet of Coumadin (warfarin). Take Coumadin (warfarin) as follows:    Hold warfarin tonight. Then 1 mg tomorrow. Then continue warfarin 2 mg daily. - Consider a dose of tylenol rather than taking extra doses your aspirin 81 mg     Come back in  2  week(s) for your next finger stick/INR blood test.        Avoid any over the counter items containing aspirin or ibuprofen, and avoid great swings in general diet. Avoid alcohol consumption. Please notify the INR nurse if you are started on any new medication including over the counter or herbal supplements. Also, please notify your INR nurse if any of your other prescription or over the counter medications have been discontinued. Call [facility] at [phone] if you have any signs of abnormal bleeding/blood clot.  ------------------------------------------------------------------------------------------------------------------  Taking Warfarin Safely: Care Instructions    Your Care Instructions  Warfarin is a medicine that you take to prevent blood clots. It is often called a blood thinner. Doctors give warfarin (such as Coumadin) to reduce the risk of blood clots. You may be at risk for blood clots if you have atrial fibrillation or deep vein thrombosis. Some other health problems may also put you at risk. Warfarin slows the amount of time it takes for your blood to clot. It can cause bleeding problems. Even if you've been taking warfarin for a while, it's important to know how to take it safely. Foods and other medicines can affect the way warfarin works. Some can make warfarin work too well. This can cause bleeding problems. And some can make it work poorly, so that it does not prevent blood clots very well. You will need regular blood tests to check how long it takes for your blood to form a clot.  This test is called a PT or prothrombin time test. The result of the test is called an INR level. Depending on the test results, your doctor or anticoagulation clinic may adjust your dose of warfarin. Follow-up care is a key part of your treatment and safety. Be sure to make and go to all appointments, and call your doctor if you are having problems. It's also a good idea to know your test results and keep a list of the medicines you take. How can you care for yourself at home? Take warfarin safely  · Take your warfarin at the same time each day. · If you miss a dose of warfarin, don't take an extra dose to make up for it. Your doctor can tell you exactly what to do so you don't take too much or too little. · Wear medical alert jewelry that lets others know that you take warfarin. You can buy this at most drugstores. · Don't take warfarin if you are pregnant or planning to get pregnant. Talk to your doctor about how you can prevent getting pregnant while you are taking it. · Don't change your dose or stop taking warfarin unless your doctor tells you to. Effects of medicines and food on warfarin  · Don't start or stop taking any medicines, vitamins, or natural remedies unless you first talk to your doctor. Many medicines can affect how warfarin works. These include aspirin and other pain relievers, over-the-counter medicines, multivitamins, dietary supplements, and herbal products. · Tell all of your doctors and pharmacists that you take warfarin. Some prescription medicines can affect how warfarin works. · Keep the amount of vitamin K in your diet about the same from day to day. Do not suddenly eat a lot more or a lot less food that is rich in vitamin K than you usually do. Vitamin K affects how warfarin works and how your blood clots. Talk with your doctor before making big changes in your diet. Vitamin K is in many foods, such as:  ¨ Leafy greens, such as kale, cabbage, spinach, Swiss chard, and lettuce. ¨ Canola and soybean oils.   ¨ Green vegetables, such as asparagus, broccoli, and [place] sprouts. ¨ Vegetable drinks, green tea leaves, and some dietary supplement drinks. · Avoid cranberry juice and other cranberry products. They can increase the effects of warfarin. · Limit your use of alcohol. Avoid bleeding by preventing falls and injuries  · Wear slippers or shoes with nonskid soles. · Remove throw rugs and clutter. · Rearrange furniture and electrical cords to keep them out of walking paths. · Keep stairways, porches, and outside walkways well lit. Use night-lights in hallways and bathrooms. · Be extra careful when you work with sharp tools or knives. When should you call for help? Call 911 anytime you think you may need emergency care. For example, call if:  · You have a sudden, severe headache that is different from past headaches. Call your doctor now or seek immediate medical care if:  · You have any abnormal bleeding, such as:  ¨ Nosebleeds. ¨ Vaginal bleeding that is different (heavier, more frequent, at a different time of the month) than what you are used to. ¨ Bloody or black stools, or rectal bleeding. ¨ Bloody or pink urine. Watch closely for changes in your health, and be sure to contact your doctor if you have any problems. Where can you learn more? Go to http://www.gray.com/. Enter N255 in the search box to learn more about \"Taking Warfarin Safely: Care Instructions. \"  Current as of: January 27, 2016  Content Version: 11.1  © 7354-3864 [employer]. Care instructions adapted under license by [employer] (which disclaims liability or warranty for this information). If you have questions about a medical condition or this instruction, always ask your healthcare professional. [place] [address] any warranty or liability for your use of this information.

## 2022-02-06 NOTE — PROGRESS NOTES
Pharmacy Progress Note - Anticoagulation Management    S/O: Ms. Brenda Dunlap is a 81 y. o. female , with a PMH of OAB, IBS, LE DVT, anxiety, insomonia, Renee's esophagus, Arthritis, Migraines, hyperlipidemia, who was seen today for anticoagulation management for the diagnosis of Protein C deficiency, LLE Deep Vein Thrombosis.  Pt ambulates w/ a cane.       Interim history:  Saw Dr Radha Reynolds (cardio). Reports Toprol increased to 50 mg daily    Reports her urologist discontinued amitriptyline, myrbetriq, oxybutynin  Reports to taking Trazodone 200 mg HS now. Request for a refill. · Warfarin start date: ~ 2008   · INR Goal:  2.0-3.0    · Current warfarin regimen:  2 mg daily   · Warfarin tablet strength: 2 mg   · Duration of therapy: Indefinite    Today's pertinent positives includes:  Medication change    Now using [facility]  Taking ASA 81 mg - two tabs PRN for headache -- may have taken it yesterday  Denies s/sx of bleeding/bruises     Results for orders placed or performed in visit on 02/09/22   AMB POC PT/INR   Result Value Ref Range    VALID INTERNAL CONTROL POC Yes     Prothrombin time (POC) 37.5 (A) 9.1 - 12 seconds    INR POC 3.2 (A) 1 - 1.5     · Adherence:   · Able to recall regimen? YES  · Miss/extra dose? NO  · Need refill?  NO    Upcoming procedure(s):  NO      Past Medical History:   Diagnosis Date    Anxiety     Arthritis     Asthma     CAD (coronary artery disease)     stent    Diabetes (HCC)     GERD (gastroesophageal reflux disease)     History of DVT (deep vein thrombosis)     History of recurrent UTIs     Hx of seasonal allergies     Hypercholesterolemia     Irritable bowel syndrome (IBS)     Migraine     Urinary urgency      Allergies   Allergen Reactions    Iodinated Contrast Media Other (comments)     Passes out    Pcn [Penicillins] Shortness of Breath    Sulfa (Sulfonamide Antibiotics) Shortness of Breath     Current Outpatient Medications   Medication Sig    benzonatate (TESSALON) 200 mg capsule Take 1 Capsule by mouth three (3) times daily as needed for Cough for up to 10 days.  warfarin (COUMADIN) 2 mg tablet Take 1 Tablet by mouth daily.  levothyroxine (SYNTHROID) 25 mcg tablet Take 2 Tablets by mouth Daily (before breakfast).  albuterol (PROVENTIL HFA, VENTOLIN HFA, PROAIR HFA) 90 mcg/actuation inhaler Take 2 Puffs by inhalation every six (6) hours as needed for Wheezing.  traMADoL (ULTRAM) 50 mg tablet Take 1 Tablet by mouth every eight (8) hours as needed for Pain for up to 30 days. Max Daily Amount: 150 mg.    amitriptyline (ELAVIL) 10 mg tablet Take 1 Tablet by mouth nightly.  Wixela Inhub 250-50 mcg/dose diskus inhaler INHALE 1 PUFF AND INTO THE LUNGS EVERY 12 HOURS. RINSE MOUTH AFTER USE    buPROPion XL (WELLBUTRIN XL) 150 mg tablet Take 150 mg by mouth daily.  loperamide (IMODIUM) 2 mg capsule Take 1 Capsule by mouth four (4) times daily as needed for Diarrhea.  levocetirizine (XYZAL) 5 mg tablet Take 1 Tablet by mouth daily.  ipratropium (ATROVENT) 42 mcg (0.06 %) nasal spray 2 Sprays by Both Nostrils route four (4) times daily.  nystatin (MYCOSTATIN) 100,000 unit/mL suspension SWISH AND SWALLOW 5 ML BY MOUTH FOR 30 SECONDS 4 TIMES A DAY AS NEEDED FOR MOUTH PAIN    esomeprazole (NEXIUM) 20 mg capsule TAKE 1 CAPSULE BY MOUTH DAILY    ondansetron hcl (ZOFRAN) 4 mg tablet TAKE 1 TABLET BY MOUTH EVERY 8 HOURS AS NEEDED FOR NAUSEA OR VOMITING    levETIRAcetam (KEPPRA) 500 mg tablet TAKE 1 TABLET BY MOUTH EVERY MORNING AND 1 AND 1/2 TABLET BY MOUTH EVERY EVENING FOR MIGRAINES    dicyclomine (BENTYL) 10 mg capsule TAKE ONE CAPSULE BY MOUTH FOUR TIMES DAILY AS NEEDED    ezetimibe (ZETIA) 10 mg tablet TAKE 1 TABLET BY MOUTH DAILY    celecoxib (CELEBREX) 200 mg capsule Take 1 Capsule by mouth every twelve (12) hours as needed for Pain.     metoprolol succinate (TOPROL-XL) 25 mg XL tablet TAKE 1/2 TABLET BY MOUTH ONCE DAILY    Myrbetriq 25 mg ER tablet TAKE 1 TABLET DAILY    oxybutynin chloride XL (DITROPAN XL) 10 mg CR tablet TAKE 1 TABLET BY MOUTH EVERY DAY    atorvastatin (LIPITOR) 20 mg tablet TAKE 1 TABLET BY MOUTH EVERY DAY    traZODone (DESYREL) 100 mg tablet Take 1 Tablet by mouth nightly as needed for Sleep.  diclofenac (VOLTAREN) 1 % gel Apply  to affected area every twelve (12) hours as needed for Pain.  clorazepate (TRANXENE) 3.75 mg tablet TAKE 1 TABLET BY MOUTH EVERY 6 HOURS AS NEEDED FOR ANXIETY. MAX DAILY AMOUNT 4 TABLETS    escitalopram oxalate (LEXAPRO) 20 mg tablet TAKE 1 TABLET BY MOUTH DAILY    Bifidobacterium Infantis (ALIGN) 4 mg cap Take 1 Cap by mouth daily as needed for Diarrhea. Indications: ALIGN OTC     No current facility-administered medications for this visit. Wt Readings from Last 3 Encounters:   02/09/22 155 lb (70.3 kg)   12/03/21 153 lb (69.4 kg)   11/01/21 155 lb (70.3 kg)     BP Readings from Last 3 Encounters:   02/09/22 127/79   01/12/22 132/65   12/03/21 102/63     Pulse Readings from Last 3 Encounters:   02/09/22 80   01/12/22 72   12/03/21 78     Lab Results   Component Value Date/Time    WBC 6.6 06/25/2021 11:52 AM    HGB 12.9 06/25/2021 11:52 AM    HCT 41.3 06/25/2021 11:52 AM    PLATELET 249 59/24/9378 11:52 AM    MCV 86.4 06/25/2021 11:52 AM     Lab Results   Component Value Date/Time    Sodium 142 06/25/2021 11:52 AM    Potassium 4.0 06/25/2021 11:52 AM    Chloride 111 (H) 06/25/2021 11:52 AM    CO2 23 06/25/2021 11:52 AM    Anion gap 8 06/25/2021 11:52 AM    Glucose 128 (H) 06/25/2021 11:52 AM    BUN 17 06/25/2021 11:52 AM    Creatinine 0.73 06/25/2021 11:52 AM    BUN/Creatinine ratio 23 (H) 06/25/2021 11:52 AM    GFR est AA >60 06/25/2021 11:52 AM    GFR est non-AA >60 06/25/2021 11:52 AM    Calcium 8.8 06/25/2021 11:52 AM    Bilirubin, total 0.3 06/25/2021 11:52 AM    Alk.  phosphatase 103 06/25/2021 11:52 AM    Protein, total 6.9 06/25/2021 11:52 AM    Albumin 3.6 06/25/2021 11:52 AM    Globulin 3.3 06/25/2021 11:52 AM    A-G Ratio 1.1 06/25/2021 11:52 AM    ALT (SGPT) 21 06/25/2021 11:52 AM     CrCl cannot be calculated (Patient's most recent lab result is older than the maximum 180 days allowed.). INR History:   (normal INR range 0.8-1.2)     Date   INR   PT   Dose/Comments  02/09/22 3.2  37.5 2 mg daily ; (+) ASA  01/12/22          2.5                   29.6     2 mg daily  12/07/21          2.3                   27.4     1 mg x 3 days, then 2 mg daily  --> Pt took 2 mg daily   12/04/21          4.2                               2 mg daily ; (+) APAP   11/01/21          2.8                   34.0     2 mg daily ; s/p TFESI  09/27/21          1.9                   22.8     4 mg x 1, then 2 mg daily -- pt held x 2 days and 2 mg daily  09/20/21          1.2                   14.0     Hold x 2, 1/2 tab on Thurs, and 2 mg daily ROW; Pt held x 2, then 2 mg daily until INR check   09/08/21          3.6                   43. 7     2 mg daily  07/20/21          2.9                   34.2     2 mg daily       A/P:       Anticoagulation:  Considering Ms. Dunlap's past history, todays findings, and per Anticoagulation Collaborative Practice Agreement/Protocol:    1. Fingerstick POC INR (3.2) is supratherapeutic for INR goal today. 2. Given recent ASA intake and level, hold warfarin tonight. Take 1 mg tomorrow. Then Continue warfarin 2 mg daily  3. Discuss at length regarding ASA therapy and increased risk for bleeding. Recommend for patient to take APAP to manage migraine HA. Patient was instructed to schedule an appointment in 2 week(s) prior to leaving the clinic. Medication reconciliation was completed during the visit.     Medications Discontinued During This Encounter   Medication Reason    metoprolol succinate (TOPROL-XL) 25 mg XL tablet DOSE ADJUSTMENT    Myrbetriq 25 mg ER tablet Therapy Completed    amitriptyline (ELAVIL) 10 mg tablet Therapy Completed    oxybutynin chloride XL (DITROPAN XL) 10 mg CR tablet Therapy Completed    traZODone (DESYREL) 100 mg tablet DOSE ADJUSTMENT     Orders Placed This Encounter    COLLECTION CAPILLARY BLOOD SPECIMEN    AMB POC PT/INR    metoprolol succinate (TOPROL-XL) 50 mg XL tablet     Sig: Take 50 mg by mouth daily.  traZODone (DESYREL) 100 mg tablet     Sig: Take 2 Tablets by mouth nightly. A full discussion of the nature of anticoagulants has been carried out. A full discussion of the need for frequent and regular monitoring, precise dosage adjustment and compliance was stressed. Side effects of potential bleeding were discussed and Ms. Karson Vergara was instructed to call [phone] if there are any signs of abnormal bleeding. Ms. Karson Vergara was instructed to avoid any OTC items containing aspirin or ibuprofen and prior to starting any new OTC products to consult with her physician or pharmacist to ensure no drug interactions are present. Ms. Karson Vergara was instructed to avoid any major changes in her general diet and to avoid alcohol consumption. Ms. Karson Vergara was provided information in the AVS that includes topics on understanding coumadin therapy, drug interaction considerations, vitamin K and coumadin use, interactions with foods and supplements containing vitamin K, and the use of herbal products. Ms. Karson Vergara verbalized her understanding of all instructions and will call the office with any questions, concerns, or signs of abnormal bleeding or blood clot. Notifications of recommendations will be sent to Dr. Cristal Vivas DO for review. Thank you,  Zulema Menendez, PharmD, BCACP, [address] [place] [address] in place:  Yes   Recommendation Provided To: Patient/Caregiver: 10 via In person   Intervention Detail: Discontinued Rx: 5, reason: Therapy Complete, Dose Adjustment: 1, reason: Therapy De-escalation, Lab(s) Ordered, New Rx: 2, reason: Patient Preference and Scheduled Appointment   Intervention Accepted By: Patient/Caregiver: 10   Time Spent (min): 20

## 2022-02-07 DIAGNOSIS — G89.29 CHRONIC BACK PAIN GREATER THAN 3 MONTHS DURATION: ICD-10-CM

## 2022-02-07 DIAGNOSIS — M54.9 CHRONIC BACK PAIN GREATER THAN 3 MONTHS DURATION: ICD-10-CM

## 2022-02-07 RX ORDER — TRAMADOL HYDROCHLORIDE 50 MG/1
50 TABLET ORAL
Qty: 60 TABLET | Refills: 2 | Status: SHIPPED | OUTPATIENT
Start: 2022-02-07 | End: 2022-05-13 | Stop reason: SDUPTHER

## 2022-02-07 NOTE — TELEPHONE ENCOUNTER
----- Message from Hola Dickson sent at 2/5/2022  2:28 PM EST -----  Subject: Message to Provider    QUESTIONS  Information for Provider? Patient called and states a new script for   Tramadol 50mg 1 pill 3 times a day needs to be called into her Pharmacy   [facility] [address]. Patient states pharmacy doesn't give her enough   to take it as prescribed she wants to know can you increase the dosage?  ---------------------------------------------------------------------------  --------------  CALL BACK INFO  What is the best way for the office to contact you? OK to leave message on   voicemail  Preferred Call Back Phone Number? [phone]  ---------------------------------------------------------------------------  --------------  SCRIPT ANSWERS  Relationship to Patient?  Self

## 2022-02-09 ENCOUNTER — ANTI-COAG VISIT (OUTPATIENT)
Dept: INTERNAL MEDICINE CLINIC | Age: 82
End: 2022-02-09
Payer: COMMERCIAL

## 2022-02-09 VITALS
HEART RATE: 80 BPM | SYSTOLIC BLOOD PRESSURE: 127 MMHG | WEIGHT: 155 LBS | HEIGHT: 61 IN | BODY MASS INDEX: 29.27 KG/M2 | DIASTOLIC BLOOD PRESSURE: 79 MMHG

## 2022-02-09 DIAGNOSIS — D68.59 PROTEIN C DEFICIENCY (HCC): ICD-10-CM

## 2022-02-09 DIAGNOSIS — Z86.718 HISTORY OF DVT OF LOWER EXTREMITY: Primary | ICD-10-CM

## 2022-02-09 LAB
INR BLD: 3.2 (ref 1–1.5)
PT POC: 37.5 SECONDS (ref 9.1–12)
VALID INTERNAL CONTROL?: YES

## 2022-02-09 PROCEDURE — 85610 PROTHROMBIN TIME: CPT | Performed by: PHARMACIST

## 2022-02-09 PROCEDURE — 99213 OFFICE O/P EST LOW 20 MIN: CPT | Performed by: PHARMACIST

## 2022-02-09 RX ORDER — METOPROLOL SUCCINATE 50 MG/1
50 TABLET, EXTENDED RELEASE ORAL DAILY
COMMUNITY
Start: 2022-01-25 | End: 2022-05-04 | Stop reason: SDUPTHER

## 2022-02-09 RX ORDER — TRAZODONE HYDROCHLORIDE 100 MG/1
100 TABLET ORAL
COMMUNITY

## 2022-02-10 ENCOUNTER — TELEPHONE (OUTPATIENT)
Dept: INTERNAL MEDICINE CLINIC | Age: 82
End: 2022-02-10

## 2022-02-10 NOTE — TELEPHONE ENCOUNTER
Pharmacy Progress Note - Telephone Encounter    S/O: Ms. Amos Samano 78 y.o. female, referred by Dr. Griselda De Anda, was contacted via an outbound telephone call to discuss trazodone today. Verified patients identifiers (name & ) per HIPAA policy. A/P:  - Recall patient has hx of both trazodone 50 mg and 100 mg tab strengths. - Per Dr Tin Rodriguez, max trazodone dose per day should be 100 mg/day  - Patient endorses understanding to the provided information. All questions answered at this time. Thank you,  Zulema Monsalve, PharmD, BCACP, [address] [place] [address] in place:  Yes   Recommendation Provided To: Patient/Caregiver: 1 via Telephone   Intervention Detail: Adherence Monitorin   Intervention Accepted By: Patient/Caregiver: 1   Time Spent (min): 5

## 2022-02-14 DIAGNOSIS — F32.A ANXIETY AND DEPRESSION: ICD-10-CM

## 2022-02-14 DIAGNOSIS — F41.9 ANXIETY AND DEPRESSION: ICD-10-CM

## 2022-02-14 RX ORDER — AMITRIPTYLINE HYDROCHLORIDE 10 MG/1
TABLET, FILM COATED ORAL
Qty: 90 TABLET | Refills: 3 | Status: SHIPPED | OUTPATIENT
Start: 2022-02-14

## 2022-02-15 DIAGNOSIS — F41.9 ANXIETY AND DEPRESSION: ICD-10-CM

## 2022-02-15 DIAGNOSIS — F32.A ANXIETY AND DEPRESSION: ICD-10-CM

## 2022-02-15 RX ORDER — LOPERAMIDE HYDROCHLORIDE 2 MG/1
2 CAPSULE ORAL
Qty: 30 CAPSULE | Refills: 4 | Status: SHIPPED | OUTPATIENT
Start: 2022-02-15 | End: 2022-05-13 | Stop reason: SDUPTHER

## 2022-02-15 RX ORDER — OXYBUTYNIN CHLORIDE 10 MG/1
TABLET, EXTENDED RELEASE ORAL
Qty: 90 TABLET | Refills: 3 | Status: SHIPPED | OUTPATIENT
Start: 2022-02-15

## 2022-02-15 RX ORDER — AMITRIPTYLINE HYDROCHLORIDE 10 MG/1
10 TABLET, FILM COATED ORAL
Qty: 90 TABLET | Refills: 3 | Status: CANCELLED | OUTPATIENT
Start: 2022-02-15

## 2022-02-15 NOTE — TELEPHONE ENCOUNTER
Future Appointments:  Future Appointments   Date Time Provider Toro Simmonsi   3[facility]2/2022  [facility]   3/29/2022  1:45 PM Mikal Hernandez DO [facility] BS AMB        Last Appointment With Me:  12/3/2021     Requested Prescriptions     Pending Prescriptions Disp Refills    loperamide (IMODIUM) 2 mg capsule 30 Capsule 4     Sig: Take 1 Capsule by mouth four (4) times daily as needed for Diarrhea.     oxybutynin chloride XL (DITROPAN XL) 10 mg CR tablet 90 Tablet 3     Sig: TAKE 1 TABLET BY MOUTH EVERY DAY

## 2022-02-15 NOTE — TELEPHONE ENCOUNTER
Patient states she is requesting Immediate refill approval on medication as her son has to  for her as she no longer drives & needs to  all at the same time. Please call when approved.  Thank you

## 2022-02-15 NOTE — TELEPHONE ENCOUNTER
Pt states [facility] tried to get these all last week and pt was told we denied these. Pt really needs the one medication today for the constant diarrhea. Please call pt if this is a problem getting yet today. Thanks.

## 2022-02-16 ENCOUNTER — TELEPHONE (OUTPATIENT)
Dept: INTERNAL MEDICINE CLINIC | Age: 82
End: 2022-02-16

## 2022-02-16 NOTE — TELEPHONE ENCOUNTER
Patient called about not being able to  medications at [place], called the pharmacy and then patient back to let her know that she can not fill her amitriptyline until 2/28/22 and her oxybutynin until 4/1/22, pt verbalized understanding.

## 2022-02-23 RX ORDER — BUPROPION HYDROCHLORIDE 150 MG/1
150 TABLET ORAL DAILY
Qty: 90 TABLET | Refills: 3 | Status: SHIPPED | OUTPATIENT
Start: 2022-02-23

## 2022-02-23 NOTE — TELEPHONE ENCOUNTER
Caller requests Refill of:  buPROPion XL (WELLBUTRIN XL) 150 mg tablet      Please send to:  [facility] [address], [address] AT [facility]      Visit Appointment History:   Future:   3/29/22 Dr Giovanna Hinojosa  3/2/22 [facility]  Previous: 12/3/21          Caller was advised that Meds will be refilled as soon as possible, however there can be a 48-72 business hour turn around on refill requests.

## 2022-02-23 NOTE — TELEPHONE ENCOUNTER
PCP: Dariana Gould DO    Last appt: 12/3/2021  Future Appointments   Date Time Provider Toro Valderrama   3/2/2022  1:45 PM [facility]   3/29/2022  1:45 PM Amauri Wills DO [facility] BS AMB       Requested Prescriptions     Pending Prescriptions Disp Refills    buPROPion XL (WELLBUTRIN XL) 150 mg tablet 30 Tablet      Sig: Take 1 Tablet by mouth daily.        Prior labs and Blood pressures:  BP Readings from Last 3 Encounters:   02/09/22 127/79   01/12/22 132/65   12/03/21 102/63     Lab Results   Component Value Date/Time    Sodium 142 06/25/2021 11:52 AM    Potassium 4.0 06/25/2021 11:52 AM    Chloride 111 (H) 06/25/2021 11:52 AM    CO2 23 06/25/2021 11:52 AM    Anion gap 8 06/25/2021 11:52 AM    Glucose 128 (H) 06/25/2021 11:52 AM    BUN 17 06/25/2021 11:52 AM    Creatinine 0.73 06/25/2021 11:52 AM    BUN/Creatinine ratio 23 (H) 06/25/2021 11:52 AM    GFR est AA >60 06/25/2021 11:52 AM    GFR est non-AA >60 06/25/2021 11:52 AM    Calcium 8.8 06/25/2021 11:52 AM     Lab Results   Component Value Date/Time    Hemoglobin A1c 7.3 (H) 06/25/2021 11:52 AM    Hemoglobin A1c (POC) 6.8 10/17/2019 03:51 PM     Lab Results   Component Value Date/Time    Cholesterol, total 107 06/25/2021 11:52 AM    HDL Cholesterol 52 06/25/2021 11:52 AM    LDL, calculated 39 06/25/2021 11:52 AM    VLDL, calculated 16 06/25/2021 11:52 AM    Triglyceride 80 06/25/2021 11:52 AM    CHOL/HDL Ratio 2.1 06/25/2021 11:52 AM     No results found for: ELISSA Sharma    Lab Results   Component Value Date/Time    TSH 3.10 06/25/2021 11:52 AM

## 2022-02-27 NOTE — PATIENT INSTRUCTIONS
Today your INR was 4.2. Your goal INR is  2.0-3.0 . You have a 2 mg tablet of Coumadin (warfarin). Take Coumadin (warfarin) as follows:    Hold warfarin tonight and tomorrow. Then change warfarin to 1 mg on Monday, Friday and 2 mg daily the rest of the week. Come back in 1  week(s) for your next finger stick/INR blood test.        Avoid any over the counter items containing aspirin or ibuprofen, and avoid great swings in general diet. Avoid alcohol consumption. Please notify the INR nurse if you are started on any new medication including over the counter or herbal supplements. Also, please notify your INR nurse if any of your other prescription or over the counter medications have been discontinued. Call [facility] at [phone] if you have any signs of abnormal bleeding/blood clot.  ------------------------------------------------------------------------------------------------------------------  Taking Warfarin Safely: Care Instructions    Your Care Instructions  Warfarin is a medicine that you take to prevent blood clots. It is often called a blood thinner. Doctors give warfarin (such as Coumadin) to reduce the risk of blood clots. You may be at risk for blood clots if you have atrial fibrillation or deep vein thrombosis. Some other health problems may also put you at risk. Warfarin slows the amount of time it takes for your blood to clot. It can cause bleeding problems. Even if you've been taking warfarin for a while, it's important to know how to take it safely. Foods and other medicines can affect the way warfarin works. Some can make warfarin work too well. This can cause bleeding problems. And some can make it work poorly, so that it does not prevent blood clots very well. You will need regular blood tests to check how long it takes for your blood to form a clot. This test is called a PT or prothrombin time test. The result of the test is called an INR level.  Depending on the test results, your doctor or anticoagulation clinic may adjust your dose of warfarin. Follow-up care is a key part of your treatment and safety. Be sure to make and go to all appointments, and call your doctor if you are having problems. It's also a good idea to know your test results and keep a list of the medicines you take. How can you care for yourself at home? Take warfarin safely  · Take your warfarin at the same time each day. · If you miss a dose of warfarin, don't take an extra dose to make up for it. Your doctor can tell you exactly what to do so you don't take too much or too little. · Wear medical alert jewelry that lets others know that you take warfarin. You can buy this at most drugstores. · Don't take warfarin if you are pregnant or planning to get pregnant. Talk to your doctor about how you can prevent getting pregnant while you are taking it. · Don't change your dose or stop taking warfarin unless your doctor tells you to. Effects of medicines and food on warfarin  · Don't start or stop taking any medicines, vitamins, or natural remedies unless you first talk to your doctor. Many medicines can affect how warfarin works. These include aspirin and other pain relievers, over-the-counter medicines, multivitamins, dietary supplements, and herbal products. · Tell all of your doctors and pharmacists that you take warfarin. Some prescription medicines can affect how warfarin works. · Keep the amount of vitamin K in your diet about the same from day to day. Do not suddenly eat a lot more or a lot less food that is rich in vitamin K than you usually do. Vitamin K affects how warfarin works and how your blood clots. Talk with your doctor before making big changes in your diet. Vitamin K is in many foods, such as:  ¨ Leafy greens, such as kale, cabbage, spinach, Swiss chard, and lettuce. ¨ Canola and soybean oils.   ¨ Green vegetables, such as asparagus, broccoli, and [address] sprouts. ¨ Vegetable drinks, green tea leaves, and some dietary supplement drinks. · Avoid cranberry juice and other cranberry products. They can increase the effects of warfarin. · Limit your use of alcohol. Avoid bleeding by preventing falls and injuries  · Wear slippers or shoes with nonskid soles. · Remove throw rugs and clutter. · Rearrange furniture and electrical cords to keep them out of walking paths. · Keep stairways, porches, and outside walkways well lit. Use night-lights in hallways and bathrooms. · Be extra careful when you work with sharp tools or knives. When should you call for help? Call 911 anytime you think you may need emergency care. For example, call if:  · You have a sudden, severe headache that is different from past headaches. Call your doctor now or seek immediate medical care if:  · You have any abnormal bleeding, such as:  ¨ Nosebleeds. ¨ Vaginal bleeding that is different (heavier, more frequent, at a different time of the month) than what you are used to. ¨ Bloody or black stools, or rectal bleeding. ¨ Bloody or pink urine. Watch closely for changes in your health, and be sure to contact your doctor if you have any problems. Where can you learn more? Go to http://www.gray.com/. Enter J548 in the search box to learn more about \"Taking Warfarin Safely: Care Instructions. \"  Current as of: January 27, 2016  Content Version: 11.1  © 6202-6581 [employer]. Care instructions adapted under license by [employer] (which disclaims liability or warranty for this information). If you have questions about a medical condition or this instruction, always ask your healthcare professional. [place] [address] any warranty or liability for your use of this information.

## 2022-02-27 NOTE — PROGRESS NOTES
Pharmacy Progress Note - Anticoagulation Management    S/O: Ms. Brenda Dunlap is a 81 y. o. female , with a PMH of OAB, IBS, LE DVT, anxiety, insomonia, Renee's esophagus, Arthritis, Migraines, hyperlipidemia, who was seen today for anticoagulation management for the diagnosis of Protein C deficiency, LLE Deep Vein Thrombosis.      · Warfarin start date: ~ 2008   · INR Goal:  2.0-3.0    · Current warfarin regimen:  Hold x 1, then 1 mg x 1, then 2 mg daily   · Warfarin tablet strength: 2 mg   · Duration of therapy: Indefinite    Today's pertinent positives includes:  Change in diet/appetite     Reports to taking only ASA 81 mg daily  APAP 1-2 tabs/day PRN HA    Reports to eating more soup/liquid based food in the past few weeks  Denies s/sx of bleeding / bruises     Results for orders placed or performed in visit on 03/02/22   AMB POC PT/INR   Result Value Ref Range    VALID INTERNAL CONTROL POC Yes     Prothrombin time (POC) 50.9 (A) 9.1 - 12 seconds    INR POC 4.2 (A) 1 - 1.5     · Adherence:   · Able to recall regimen? YES  · Miss/extra dose? NO  · Need refill? NO    Upcoming procedure(s):  NO      Past Medical History:   Diagnosis Date    Anxiety     Arthritis     Asthma     CAD (coronary artery disease)     stent    Diabetes (HCC)     GERD (gastroesophageal reflux disease)     History of DVT (deep vein thrombosis)     History of recurrent UTIs     Hx of seasonal allergies     Hypercholesterolemia     Irritable bowel syndrome (IBS)     Migraine     Urinary urgency      Allergies   Allergen Reactions    Iodinated Contrast Media Other (comments)     Passes out    Pcn [Penicillins] Shortness of Breath    Sulfa (Sulfonamide Antibiotics) Shortness of Breath     Current Outpatient Medications   Medication Sig    buPROPion XL (WELLBUTRIN XL) 150 mg tablet Take 1 Tablet by mouth daily.  loperamide (IMODIUM) 2 mg capsule Take 1 Capsule by mouth four (4) times daily as needed for Diarrhea.     oxybutynin chloride XL (DITROPAN XL) 10 mg CR tablet TAKE 1 TABLET BY MOUTH EVERY DAY    amitriptyline (ELAVIL) 10 mg tablet TAKE 1 TABLET BY MOUTH EVERY NIGHT    metoprolol succinate (TOPROL-XL) 50 mg XL tablet Take 50 mg by mouth daily.  traZODone (DESYREL) 100 mg tablet Take 100 mg by mouth nightly.  traMADoL (ULTRAM) 50 mg tablet Take 1 Tablet by mouth every twelve (12) hours as needed for Pain for up to 30 days. Max Daily Amount: 100 mg.  warfarin (COUMADIN) 2 mg tablet Take 1 Tablet by mouth daily.  levothyroxine (SYNTHROID) 25 mcg tablet Take 2 Tablets by mouth Daily (before breakfast).  albuterol (PROVENTIL HFA, VENTOLIN HFA, PROAIR HFA) 90 mcg/actuation inhaler Take 2 Puffs by inhalation every six (6) hours as needed for Wheezing.  Wixela Inhub 250-50 mcg/dose diskus inhaler INHALE 1 PUFF AND INTO THE LUNGS EVERY 12 HOURS. RINSE MOUTH AFTER USE    levocetirizine (XYZAL) 5 mg tablet Take 1 Tablet by mouth daily.  ipratropium (ATROVENT) 42 mcg (0.06 %) nasal spray 2 Sprays by Both Nostrils route four (4) times daily.  nystatin (MYCOSTATIN) 100,000 unit/mL suspension SWISH AND SWALLOW 5 ML BY MOUTH FOR 30 SECONDS 4 TIMES A DAY AS NEEDED FOR MOUTH PAIN    esomeprazole (NEXIUM) 20 mg capsule TAKE 1 CAPSULE BY MOUTH DAILY    ondansetron hcl (ZOFRAN) 4 mg tablet TAKE 1 TABLET BY MOUTH EVERY 8 HOURS AS NEEDED FOR NAUSEA OR VOMITING    levETIRAcetam (KEPPRA) 500 mg tablet TAKE 1 TABLET BY MOUTH EVERY MORNING AND 1 AND 1/2 TABLET BY MOUTH EVERY EVENING FOR MIGRAINES    dicyclomine (BENTYL) 10 mg capsule TAKE ONE CAPSULE BY MOUTH FOUR TIMES DAILY AS NEEDED    ezetimibe (ZETIA) 10 mg tablet TAKE 1 TABLET BY MOUTH DAILY    celecoxib (CELEBREX) 200 mg capsule Take 1 Capsule by mouth every twelve (12) hours as needed for Pain.     atorvastatin (LIPITOR) 20 mg tablet TAKE 1 TABLET BY MOUTH EVERY DAY    diclofenac (VOLTAREN) 1 % gel Apply  to affected area every twelve (12) hours as needed for Pain.  clorazepate (TRANXENE) 3.75 mg tablet TAKE 1 TABLET BY MOUTH EVERY 6 HOURS AS NEEDED FOR ANXIETY. MAX DAILY AMOUNT 4 TABLETS    escitalopram oxalate (LEXAPRO) 20 mg tablet TAKE 1 TABLET BY MOUTH DAILY    Bifidobacterium Infantis (ALIGN) 4 mg cap Take 1 Cap by mouth daily as needed for Diarrhea. Indications: ALIGN OTC     No current facility-administered medications for this visit. Wt Readings from Last 3 Encounters:   02/09/22 155 lb (70.3 kg)   12/03/21 153 lb (69.4 kg)   11/01/21 155 lb (70.3 kg)     BP Readings from Last 3 Encounters:   03/02/22 139/84   02/09/22 127/79   01/12/22 132/65     Pulse Readings from Last 3 Encounters:   03/02/22 65   02/09/22 80   01/12/22 72     Lab Results   Component Value Date/Time    WBC 6.6 06/25/2021 11:52 AM    HGB 12.9 06/25/2021 11:52 AM    HCT 41.3 06/25/2021 11:52 AM    PLATELET 050 17/18/1875 11:52 AM    MCV 86.4 06/25/2021 11:52 AM     Lab Results   Component Value Date/Time    Sodium 142 06/25/2021 11:52 AM    Potassium 4.0 06/25/2021 11:52 AM    Chloride 111 (H) 06/25/2021 11:52 AM    CO2 23 06/25/2021 11:52 AM    Anion gap 8 06/25/2021 11:52 AM    Glucose 128 (H) 06/25/2021 11:52 AM    BUN 17 06/25/2021 11:52 AM    Creatinine 0.73 06/25/2021 11:52 AM    BUN/Creatinine ratio 23 (H) 06/25/2021 11:52 AM    GFR est AA >60 06/25/2021 11:52 AM    GFR est non-AA >60 06/25/2021 11:52 AM    Calcium 8.8 06/25/2021 11:52 AM    Bilirubin, total 0.3 06/25/2021 11:52 AM    Alk. phosphatase 103 06/25/2021 11:52 AM    Protein, total 6.9 06/25/2021 11:52 AM    Albumin 3.6 06/25/2021 11:52 AM    Globulin 3.3 06/25/2021 11:52 AM    A-G Ratio 1.1 06/25/2021 11:52 AM    ALT (SGPT) 21 06/25/2021 11:52 AM     CrCl cannot be calculated (Patient's most recent lab result is older than the maximum 180 days allowed.).       INR History:   (normal INR range 0.8-1.2)     Date   INR   PT   Dose/Comments  03/02/22 4.2  50.9 Hold x 1, then 1 mg x 1, then 2 mg daily  02/09/22 3.2                   37.5     2 mg daily ; (+) ASA  01/12/22          2.5                   29.6     2 mg daily  12/07/21          2.3                   27.4     1 mg x 3 days, then 2 mg daily  --> Pt took 2 mg daily   12/04/21          4.2                               2 mg daily ; (+) APAP   11/01/21          2.8                   34.0     2 mg daily ; s/p TFESI  09/27/21          1.9                   22.8     4 mg x 1, then 2 mg daily -- pt held x 2 days and 2 mg daily  09/20/21          1.2                   14.0     Hold x 2, 1/2 tab on Thurs, and 2 mg daily ROW; Pt held x 2, then 2 mg daily until INR check   09/08/21          3.6                   43. 7     2 mg daily    A/P:       Anticoagulation:  Considering Ms. Dunlap's past history, todays findings, and per Anticoagulation Collaborative Practice Agreement/Protocol:    1. Fingerstick POC INR (4.2) is supratherapeutic for INR goal today despite recent dosage reduction and dietary changes. 2. Hold warfarin x 2 days. Then Change warfarin to 1 mg Monday, Friday and 2 mg daily ROW. Patient was instructed to schedule an appointment in 1 week(s) prior to leaving the clinic. Patient's Immunization History:    Immunizations by Immunization family     COVID-19, [place] Vaccine, Vaccine Unspecified 4/3/2021 5/4/2021 11/17/2021     Influenza Vaccine 10/14/2016 11/8/2018 10/17/2019 10/24/2021    Pneumococcal Polysaccharide (PPSV-23) 10/7/2019       Zoster Vaccine, Unspecified Formulation 3/22/2018 10/30/2018            Medication reconciliation was completed during the visit. There are no discontinued medications. A full discussion of the nature of anticoagulants has been carried out. A full discussion of the need for frequent and regular monitoring, precise dosage adjustment and compliance was stressed. Side effects of potential bleeding were discussed and Ms. Curry Dalal was instructed to call [phone] if there are any signs of abnormal bleeding.   Ms. Radha Chicas was instructed to avoid any OTC items containing aspirin or ibuprofen and prior to starting any new OTC products to consult with her physician or pharmacist to ensure no drug interactions are present. Ms. Radha Chicas was instructed to avoid any major changes in her general diet and to avoid alcohol consumption. Ms. Radha Chicas was provided information in the AVS that includes topics on understanding coumadin therapy, drug interaction considerations, vitamin K and coumadin use, interactions with foods and supplements containing vitamin K, and the use of herbal products. Ms. Radha Chicas verbalized her understanding of all instructions and will call the office with any questions, concerns, or signs of abnormal bleeding or blood clot. Notifications of recommendations will be sent to Dr. Joseline Wiseman DO for review. Thank you,  Zulema Georges, PharmD, BCACP, [address] [place] [address] in place:  Yes   Recommendation Provided To: Patient/Caregiver: 4 via In person   Intervention Detail: Dose Adjustment: 1, reason: Therapy De-escalation, Lab(s) Ordered, Scheduled Appointment and Vaccine Recommended/Administered   Intervention Accepted By: Patient/Caregiver: 4   Time Spent (min): 30

## 2022-03-02 ENCOUNTER — ANTI-COAG VISIT (OUTPATIENT)
Dept: INTERNAL MEDICINE CLINIC | Age: 82
End: 2022-03-02
Payer: COMMERCIAL

## 2022-03-02 VITALS
SYSTOLIC BLOOD PRESSURE: 139 MMHG | OXYGEN SATURATION: 95 % | DIASTOLIC BLOOD PRESSURE: 84 MMHG | HEART RATE: 65 BPM | TEMPERATURE: 97.2 F

## 2022-03-02 DIAGNOSIS — D68.59 PROTEIN C DEFICIENCY (HCC): ICD-10-CM

## 2022-03-02 DIAGNOSIS — Z86.718 HISTORY OF DVT OF LOWER EXTREMITY: Primary | ICD-10-CM

## 2022-03-02 LAB
INR BLD: 4.2 (ref 1–1.5)
PT POC: 50.9 SECONDS (ref 9.1–12)
VALID INTERNAL CONTROL?: YES

## 2022-03-02 PROCEDURE — 85610 PROTHROMBIN TIME: CPT | Performed by: PHARMACIST

## 2022-03-02 PROCEDURE — 99212 OFFICE O/P EST SF 10 MIN: CPT | Performed by: PHARMACIST

## 2022-03-04 ENCOUNTER — TELEPHONE (OUTPATIENT)
Dept: INTERNAL MEDICINE CLINIC | Age: 82
End: 2022-03-04

## 2022-03-04 NOTE — TELEPHONE ENCOUNTER
----- Message from Tin Jaswant sent at 3/3/2022  5:23 PM EST -----  Subject: Refill Request    QUESTIONS  Name of Medication? Other - mytriq 25mg   Patient-reported dosage and instructions? 1 at night   How many days do you have left? 0  [id number]  Pharmacy phone number (if available)? [phone]  ---------------------------------------------------------------------------  --------------  Laura ROBLES  What is the best way for the office to contact you? OK to leave message on   voicemail, OK to respond with electronic message via [facility] portal (only   for patients who have registered [facility] account)  Preferred Call Back Phone Number?  [phone]

## 2022-03-07 NOTE — TELEPHONE ENCOUNTER
Pt called to follow up    Pt confirmed the med requested is: myrbetriq     Per concern mentioned by dr MAKI in note, this psr asked patient, and patient states that she was wrong in the past and the medication did help, she states she just didn't realize it. States now is up all night going to restroom with frequent urination and states has urinated on herself as well. States she cannot have it go to [facility] as too expensive ($200+)    States will ask her insurance which pharmacy to send it to for coverage, then call office to update. Please be advised.

## 2022-03-07 NOTE — TELEPHONE ENCOUNTER
Pt states that this medication was working much better than what she thought it was after stopping it for a bit. Pt has called mail order and they had refills on file and are sending that to her.   This is for the [facility]

## 2022-03-07 NOTE — TELEPHONE ENCOUNTER
Information noted by clinical staff at this time. Awaiting patient to call back with pharmacy information at this time.

## 2022-03-09 ENCOUNTER — ANTI-COAG VISIT (OUTPATIENT)
Dept: INTERNAL MEDICINE CLINIC | Age: 82
End: 2022-03-09
Payer: COMMERCIAL

## 2022-03-09 DIAGNOSIS — D68.59 PROTEIN C DEFICIENCY (HCC): ICD-10-CM

## 2022-03-09 DIAGNOSIS — Z86.718 HISTORY OF DVT OF LOWER EXTREMITY: Primary | ICD-10-CM

## 2022-03-09 LAB
INR BLD: 2.3 (ref 1–1.5)
PT POC: 27.7 SECONDS (ref 9.1–12)
VALID INTERNAL CONTROL?: YES

## 2022-03-09 PROCEDURE — 85610 PROTHROMBIN TIME: CPT | Performed by: PHARMACIST

## 2022-03-09 NOTE — PROGRESS NOTES
Pharmacy Progress Note - Anticoagulation Management    S/O: Ms. Brenda Dunlap is a 81 y. o. female , with a PMH of OAB, IBS, LE DVT, anxiety, insomonia, Renee's esophagus, Arthritis, Migraines, hyperlipidemia, who was seen today for anticoagulation management for the diagnosis of Protein C deficiency, LLE Deep Vein Thrombosis.      · Warfarin start date: ~ 2008   · INR Goal:  2.0-3.0    · Current warfarin regimen:  Hold x 2, then 1 mg Monday, Wednesday, Friday and 2 mg daily ROW  · Warfarin tablet strength: 2 mg   · Duration of therapy: Indefinite    Today's pertinent positives includes:  No significant changes since last visit    Results for orders placed or performed in visit on 03/09/22   AMB POC PT/INR   Result Value Ref Range    VALID INTERNAL CONTROL POC Yes     Prothrombin time (POC) 27.7 (A) 9.1 - 12 seconds    INR POC 2.3 (A) 1 - 1.5     · Adherence:   · Able to recall regimen? YES  · Miss/extra dose? NO  · Need refill? NO    Upcoming procedure(s):  YES  Dental implants procedure evaluation 3/28/22. Past Medical History:   Diagnosis Date    Anxiety     Arthritis     Asthma     CAD (coronary artery disease)     stent    Diabetes (HCC)     GERD (gastroesophageal reflux disease)     History of DVT (deep vein thrombosis)     History of recurrent UTIs     Hx of seasonal allergies     Hypercholesterolemia     Irritable bowel syndrome (IBS)     Migraine     Urinary urgency      Allergies   Allergen Reactions    Iodinated Contrast Media Other (comments)     Passes out    Pcn [Penicillins] Shortness of Breath    Sulfa (Sulfonamide Antibiotics) Shortness of Breath     Current Outpatient Medications   Medication Sig    buPROPion XL (WELLBUTRIN XL) 150 mg tablet Take 1 Tablet by mouth daily.  loperamide (IMODIUM) 2 mg capsule Take 1 Capsule by mouth four (4) times daily as needed for Diarrhea.     oxybutynin chloride XL (DITROPAN XL) 10 mg CR tablet TAKE 1 TABLET BY MOUTH EVERY DAY    amitriptyline (ELAVIL) 10 mg tablet TAKE 1 TABLET BY MOUTH EVERY NIGHT    metoprolol succinate (TOPROL-XL) 50 mg XL tablet Take 50 mg by mouth daily.  traZODone (DESYREL) 100 mg tablet Take 100 mg by mouth nightly.  traMADoL (ULTRAM) 50 mg tablet Take 1 Tablet by mouth every twelve (12) hours as needed for Pain for up to 30 days. Max Daily Amount: 100 mg.  warfarin (COUMADIN) 2 mg tablet Take 1 Tablet by mouth daily.  levothyroxine (SYNTHROID) 25 mcg tablet Take 2 Tablets by mouth Daily (before breakfast).  albuterol (PROVENTIL HFA, VENTOLIN HFA, PROAIR HFA) 90 mcg/actuation inhaler Take 2 Puffs by inhalation every six (6) hours as needed for Wheezing.  Wixela Inhub 250-50 mcg/dose diskus inhaler INHALE 1 PUFF AND INTO THE LUNGS EVERY 12 HOURS. RINSE MOUTH AFTER USE    levocetirizine (XYZAL) 5 mg tablet Take 1 Tablet by mouth daily.  ipratropium (ATROVENT) 42 mcg (0.06 %) nasal spray 2 Sprays by Both Nostrils route four (4) times daily.  nystatin (MYCOSTATIN) 100,000 unit/mL suspension SWISH AND SWALLOW 5 ML BY MOUTH FOR 30 SECONDS 4 TIMES A DAY AS NEEDED FOR MOUTH PAIN    esomeprazole (NEXIUM) 20 mg capsule TAKE 1 CAPSULE BY MOUTH DAILY    ondansetron hcl (ZOFRAN) 4 mg tablet TAKE 1 TABLET BY MOUTH EVERY 8 HOURS AS NEEDED FOR NAUSEA OR VOMITING    levETIRAcetam (KEPPRA) 500 mg tablet TAKE 1 TABLET BY MOUTH EVERY MORNING AND 1 AND 1/2 TABLET BY MOUTH EVERY EVENING FOR MIGRAINES    dicyclomine (BENTYL) 10 mg capsule TAKE ONE CAPSULE BY MOUTH FOUR TIMES DAILY AS NEEDED    ezetimibe (ZETIA) 10 mg tablet TAKE 1 TABLET BY MOUTH DAILY    celecoxib (CELEBREX) 200 mg capsule Take 1 Capsule by mouth every twelve (12) hours as needed for Pain.  atorvastatin (LIPITOR) 20 mg tablet TAKE 1 TABLET BY MOUTH EVERY DAY    diclofenac (VOLTAREN) 1 % gel Apply  to affected area every twelve (12) hours as needed for Pain.     clorazepate (TRANXENE) 3.75 mg tablet TAKE 1 TABLET BY MOUTH EVERY 6 HOURS AS NEEDED FOR ANXIETY. MAX DAILY AMOUNT 4 TABLETS    escitalopram oxalate (LEXAPRO) 20 mg tablet TAKE 1 TABLET BY MOUTH DAILY    Bifidobacterium Infantis (ALIGN) 4 mg cap Take 1 Cap by mouth daily as needed for Diarrhea. Indications: ALIGN OTC     No current facility-administered medications for this visit. Wt Readings from Last 3 Encounters:   02/09/22 155 lb (70.3 kg)   12/03/21 153 lb (69.4 kg)   11/01/21 155 lb (70.3 kg)     BP Readings from Last 3 Encounters:   03/02/22 139/84   02/09/22 127/79   01/12/22 132/65     Pulse Readings from Last 3 Encounters:   03/02/22 65   02/09/22 80   01/12/22 72     Lab Results   Component Value Date/Time    WBC 6.6 06/25/2021 11:52 AM    HGB 12.9 06/25/2021 11:52 AM    HCT 41.3 06/25/2021 11:52 AM    PLATELET 914 48/61/3539 11:52 AM    MCV 86.4 06/25/2021 11:52 AM     Lab Results   Component Value Date/Time    Sodium 142 06/25/2021 11:52 AM    Potassium 4.0 06/25/2021 11:52 AM    Chloride 111 (H) 06/25/2021 11:52 AM    CO2 23 06/25/2021 11:52 AM    Anion gap 8 06/25/2021 11:52 AM    Glucose 128 (H) 06/25/2021 11:52 AM    BUN 17 06/25/2021 11:52 AM    Creatinine 0.73 06/25/2021 11:52 AM    BUN/Creatinine ratio 23 (H) 06/25/2021 11:52 AM    GFR est AA >60 06/25/2021 11:52 AM    GFR est non-AA >60 06/25/2021 11:52 AM    Calcium 8.8 06/25/2021 11:52 AM    Bilirubin, total 0.3 06/25/2021 11:52 AM    Alk. phosphatase 103 06/25/2021 11:52 AM    Protein, total 6.9 06/25/2021 11:52 AM    Albumin 3.6 06/25/2021 11:52 AM    Globulin 3.3 06/25/2021 11:52 AM    A-G Ratio 1.1 06/25/2021 11:52 AM    ALT (SGPT) 21 06/25/2021 11:52 AM     CrCl cannot be calculated (Patient's most recent lab result is older than the maximum 180 days allowed.).       INR History:   (normal INR range 0.8-1.2)     Date   INR   PT   Dose/Comments  03/09/22 2.3  27.7 Hold x 2, then 1 mg MWF, 2 mg daily ROW  03/02/22          4.2                   50.9     Hold x 1, then 1 mg x 1, then 2 mg daily  02/09/22          3.2                   37.5     2 mg daily ; (+) ASA  01/12/22          2.5                   29.6     2 mg daily  12/07/21          2.3                   27.4     1 mg x 3 days, then 2 mg daily  --> Pt took 2 mg daily   12/04/21          4.2                               2 mg daily ; (+) APAP   11/01/21          2.8                   34.0     2 mg daily ; s/p TFESI  09/27/21          1.9                   22.8     4 mg x 1, then 2 mg daily -- pt held x 2 days and 2 mg daily  09/20/21          1.2                   14.0     Hold x 2, 1/2 tab on Thurs, and 2 mg daily ROW; Pt held x 2, then 2 mg daily until INR check   09/08/21          3.6                   43. 7     2 mg daily      A/P:       Anticoagulation:  Considering Ms. Dunlap's past history, todays findings, and per Anticoagulation Collaborative Practice Agreement/Protocol:    1. Fingerstick POC INR (2.3) is Therapeutic for INR goal today. 2.  Continue warfarin 1 mg on Monday, Wednesday, Friday and 2 mg daily ROW     Patient was instructed to schedule an appointment in 2 week(s) prior to leaving the clinic. Medication reconciliation was completed during the visit. There are no discontinued medications. A full discussion of the nature of anticoagulants has been carried out. A full discussion of the need for frequent and regular monitoring, precise dosage adjustment and compliance was stressed. Side effects of potential bleeding were discussed and Ms. Scooter Molina was instructed to call [phone] if there are any signs of abnormal bleeding. Ms. Scooter Molina was instructed to avoid any OTC items containing aspirin or ibuprofen and prior to starting any new OTC products to consult with her physician or pharmacist to ensure no drug interactions are present. Ms. Scooter Molina was instructed to avoid any major changes in her general diet and to avoid alcohol consumption.     Ms. Scooter Molina was provided information in the AVS that includes topics on understanding coumadin therapy, drug interaction considerations, vitamin K and coumadin use, interactions with foods and supplements containing vitamin K, and the use of herbal products. Ms. Dwain Suarez verbalized her understanding of all instructions and will call the office with any questions, concerns, or signs of abnormal bleeding or blood clot. Notifications of recommendations will be sent to Dr. Samara Panda DO for review. Thank you,  Zulema Ham, PharmD, BCACP, [address] [place] [address] in place:  Yes   Recommendation Provided To: Patient/Caregiver: 3 via In person   Intervention Detail: Adherence Monitorin, Lab(s) Ordered and Scheduled Appointment   Intervention Accepted By: Patient/Caregiver: 3   Time Spent (min): 15

## 2022-03-09 NOTE — PATIENT INSTRUCTIONS
Today your INR was 2.3. Your goal INR is  2.0-3.0 . You have a 2 mg tablet of Coumadin (warfarin). Take Coumadin (warfarin) as follows:    Continue warfarin 1 mg on Monday, Friday and 2 mg daily the rest of the week. Come back in  2 week(s) for your next finger stick/INR blood test.        Avoid any over the counter items containing aspirin or ibuprofen, and avoid great swings in general diet. Avoid alcohol consumption. Please notify the INR nurse if you are started on any new medication including over the counter or herbal supplements. Also, please notify your INR nurse if any of your other prescription or over the counter medications have been discontinued. Call [facility] at [phone] if you have any signs of abnormal bleeding/blood clot.  ------------------------------------------------------------------------------------------------------------------  Taking Warfarin Safely: Care Instructions    Your Care Instructions  Warfarin is a medicine that you take to prevent blood clots. It is often called a blood thinner. Doctors give warfarin (such as Coumadin) to reduce the risk of blood clots. You may be at risk for blood clots if you have atrial fibrillation or deep vein thrombosis. Some other health problems may also put you at risk. Warfarin slows the amount of time it takes for your blood to clot. It can cause bleeding problems. Even if you've been taking warfarin for a while, it's important to know how to take it safely. Foods and other medicines can affect the way warfarin works. Some can make warfarin work too well. This can cause bleeding problems. And some can make it work poorly, so that it does not prevent blood clots very well. You will need regular blood tests to check how long it takes for your blood to form a clot. This test is called a PT or prothrombin time test. The result of the test is called an INR level.  Depending on the test results, your doctor or anticoagulation clinic may adjust your dose of warfarin. Follow-up care is a key part of your treatment and safety. Be sure to make and go to all appointments, and call your doctor if you are having problems. It's also a good idea to know your test results and keep a list of the medicines you take. How can you care for yourself at home? Take warfarin safely  · Take your warfarin at the same time each day. · If you miss a dose of warfarin, don't take an extra dose to make up for it. Your doctor can tell you exactly what to do so you don't take too much or too little. · Wear medical alert jewelry that lets others know that you take warfarin. You can buy this at most drugstores. · Don't take warfarin if you are pregnant or planning to get pregnant. Talk to your doctor about how you can prevent getting pregnant while you are taking it. · Don't change your dose or stop taking warfarin unless your doctor tells you to. Effects of medicines and food on warfarin  · Don't start or stop taking any medicines, vitamins, or natural remedies unless you first talk to your doctor. Many medicines can affect how warfarin works. These include aspirin and other pain relievers, over-the-counter medicines, multivitamins, dietary supplements, and herbal products. · Tell all of your doctors and pharmacists that you take warfarin. Some prescription medicines can affect how warfarin works. · Keep the amount of vitamin K in your diet about the same from day to day. Do not suddenly eat a lot more or a lot less food that is rich in vitamin K than you usually do. Vitamin K affects how warfarin works and how your blood clots. Talk with your doctor before making big changes in your diet. Vitamin K is in many foods, such as:  ¨ Leafy greens, such as kale, cabbage, spinach, Swiss chard, and lettuce. ¨ Canola and soybean oils. ¨ Green vegetables, such as asparagus, broccoli, and [place] sprouts.   ¨ Vegetable drinks, green tea leaves, and some dietary supplement drinks. · Avoid cranberry juice and other cranberry products. They can increase the effects of warfarin. · Limit your use of alcohol. Avoid bleeding by preventing falls and injuries  · Wear slippers or shoes with nonskid soles. · Remove throw rugs and clutter. · Rearrange furniture and electrical cords to keep them out of walking paths. · Keep stairways, porches, and outside walkways well lit. Use night-lights in hallways and bathrooms. · Be extra careful when you work with sharp tools or knives. When should you call for help? Call 911 anytime you think you may need emergency care. For example, call if:  · You have a sudden, severe headache that is different from past headaches. Call your doctor now or seek immediate medical care if:  · You have any abnormal bleeding, such as:  ¨ Nosebleeds. ¨ Vaginal bleeding that is different (heavier, more frequent, at a different time of the month) than what you are used to. ¨ Bloody or black stools, or rectal bleeding. ¨ Bloody or pink urine. Watch closely for changes in your health, and be sure to contact your doctor if you have any problems. Where can you learn more? Go to http://www.gray.com/. Enter B190 in the search box to learn more about \"Taking Warfarin Safely: Care Instructions. \"  Current as of: January 27, 2016  Content Version: 11.1  © 0355-0349 [employer], [employer]. Care instructions adapted under license by [employer] (which disclaims liability or warranty for this information). If you have questions about a medical condition or this instruction, always ask your healthcare professional. [place] [address] any warranty or liability for your use of this information. CARDIOLOGY

## 2022-03-11 ENCOUNTER — TELEPHONE (OUTPATIENT)
Dept: INTERNAL MEDICINE CLINIC | Age: 82
End: 2022-03-11

## 2022-03-11 NOTE — TELEPHONE ENCOUNTER
Patient states she is having Dental Implant surgery on 4/4/22 & needs to discuss her medications Prior & be advised if appt needed also. Please call.  Thank you

## 2022-03-14 NOTE — TELEPHONE ENCOUNTER
Pharmacy Progress Note - Telephone Encounter    S/O: Ms. Suarez Crew 78 y.o. female, referred by Dr. Russel Bonilla, was contacted via an outbound telephone call to discuss her previous call today. Verified patients identifiers (name & ) per HIPAA policy. Pt reports dental implant procedure now planned for 22. Hoping to r/s INR follow up appt next week. A/P:   - Appreciate patient's update.   - Will r/s INR follow up  - same day as upcoming PCP appt on 3/29/22  - Patient endorses understanding to the provided information. All questions answered at this time. Thank you,  Zulema Del Real, PharmD, BCACP, [address] in place:  Yes   Recommendation Provided To: Patient/Caregiver: 1 via Telephone   Intervention Detail: Scheduled Appointment   Intervention Accepted By: Patient/Caregiver: 1   Time Spent (min): 10

## 2022-03-18 PROBLEM — E78.5 HYPERLIPIDEMIA ASSOCIATED WITH TYPE 2 DIABETES MELLITUS (HCC): Status: ACTIVE | Noted: 2019-10-17

## 2022-03-18 PROBLEM — E11.69 HYPERLIPIDEMIA ASSOCIATED WITH TYPE 2 DIABETES MELLITUS (HCC): Status: ACTIVE | Noted: 2019-10-17

## 2022-03-18 PROBLEM — M81.0 AGE-RELATED OSTEOPOROSIS WITHOUT CURRENT PATHOLOGICAL FRACTURE: Status: ACTIVE | Noted: 2019-10-17

## 2022-03-19 PROBLEM — E11.9 TYPE 2 DIABETES MELLITUS WITHOUT COMPLICATION, WITHOUT LONG-TERM CURRENT USE OF INSULIN (HCC): Status: ACTIVE | Noted: 2019-10-17

## 2022-03-19 PROBLEM — E03.9 ACQUIRED HYPOTHYROIDISM: Status: ACTIVE | Noted: 2019-10-17

## 2022-03-19 PROBLEM — I25.10 CORONARY ARTERY DISEASE INVOLVING NATIVE CORONARY ARTERY OF NATIVE HEART WITHOUT ANGINA PECTORIS: Status: ACTIVE | Noted: 2019-10-17

## 2022-03-19 PROBLEM — N32.81 OAB (OVERACTIVE BLADDER): Status: ACTIVE | Noted: 2019-01-04

## 2022-03-19 PROBLEM — F11.99 OPIOID USE, UNSPECIFIED WITH UNSPECIFIED OPIOID-INDUCED DISORDER (HCC): Status: ACTIVE | Noted: 2021-12-03

## 2022-03-21 DIAGNOSIS — F41.9 ANXIETY: ICD-10-CM

## 2022-03-21 RX ORDER — CLORAZEPATE DIPOTASSIUM 3.75 MG/1
3.75 TABLET ORAL
Qty: 90 TABLET | Refills: 3 | Status: SHIPPED | OUTPATIENT
Start: 2022-03-21 | End: 2022-06-16

## 2022-03-21 NOTE — TELEPHONE ENCOUNTER
PCP: Constantin Saavedra DO    Last appt: 12/3/2021  Future Appointments   Date Time Provider Toro Jannie   3/29/2022  1:00 PM CLAIRE [facility]ordBANKS [facility]   3/29/2022  1:45 PM Constantin Saavedra DO [facility] AM[facility]       Requested Prescriptions     Pending Prescriptions Disp Refills    clorazepate (TRANXENE) 3.75 mg tablet 90 Tablet 3       Prior labs and Blood pressures:  BP Readings from Last 3 Encounters:   03/02/22 139/84   02/09/22 127/79   01/12/22 132/65     Lab Results   Component Value Date/Time    Sodium 142 06/25/2021 11:52 AM    Potassium 4.0 06/25/2021 11:52 AM    Chloride 111 (H) 06/25/2021 11:52 AM    CO2 23 06/25/2021 11:52 AM    Anion gap 8 06/25/2021 11:52 AM    Glucose 128 (H) 06/25/2021 11:52 AM    BUN 17 06/25/2021 11:52 AM    Creatinine 0.73 06/25/2021 11:52 AM    BUN/Creatinine ratio 23 (H) 06/25/2021 11:52 AM    GFR est AA >60 06/25/2021 11:52 AM    GFR est non-AA >60 06/25/2021 11:52 AM    Calcium 8.8 06/25/2021 11:52 AM     Lab Results   Component Value Date/Time    Hemoglobin A1c 7.3 (H) 06/25/2021 11:52 AM    Hemoglobin A1c (POC) 6.8 10/17/2019 03:51 PM     Lab Results   Component Value Date/Time    Cholesterol, total 107 06/25/2021 11:52 AM    HDL Cholesterol 52 06/25/2021 11:52 AM    LDL, calculated 39 06/25/2021 11:52 AM    VLDL, calculated 16 06/25/2021 11:52 AM    Triglyceride 80 06/25/2021 11:52 AM    CHOL/HDL Ratio 2.1 06/25/2021 11:52 AM     No results found for: Azeem Lean, [id number]    Lab Results   Component Value Date/Time    TSH 3.10 06/25/2021 11:52 AM

## 2022-03-21 NOTE — TELEPHONE ENCOUNTER
Caller requests Refill of:  clorazepate (TRANXENE) 3.75 mg tablet      Please send to:  [address], [address] AT [address] having a lot of anxiety recently and needs it.

## 2022-03-29 ENCOUNTER — OFFICE VISIT (OUTPATIENT)
Dept: INTERNAL MEDICINE CLINIC | Age: 82
End: 2022-03-29

## 2022-03-29 ENCOUNTER — OFFICE VISIT (OUTPATIENT)
Dept: INTERNAL MEDICINE CLINIC | Age: 82
End: 2022-03-29
Payer: COMMERCIAL

## 2022-03-29 VITALS
HEIGHT: 61 IN | HEART RATE: 73 BPM | BODY MASS INDEX: 29.83 KG/M2 | SYSTOLIC BLOOD PRESSURE: 131 MMHG | RESPIRATION RATE: 20 BRPM | OXYGEN SATURATION: 94 % | WEIGHT: 158 LBS | TEMPERATURE: 97.3 F | DIASTOLIC BLOOD PRESSURE: 73 MMHG

## 2022-03-29 VITALS
WEIGHT: 158 LBS | TEMPERATURE: 97.3 F | SYSTOLIC BLOOD PRESSURE: 131 MMHG | HEART RATE: 73 BPM | DIASTOLIC BLOOD PRESSURE: 73 MMHG | OXYGEN SATURATION: 94 % | HEIGHT: 61 IN | BODY MASS INDEX: 29.83 KG/M2

## 2022-03-29 DIAGNOSIS — F32.A ANXIETY AND DEPRESSION: Primary | ICD-10-CM

## 2022-03-29 DIAGNOSIS — M54.9 CHRONIC BACK PAIN GREATER THAN 3 MONTHS DURATION: ICD-10-CM

## 2022-03-29 DIAGNOSIS — F41.9 ANXIETY AND DEPRESSION: Primary | ICD-10-CM

## 2022-03-29 DIAGNOSIS — H61.22 IMPACTED CERUMEN OF LEFT EAR: ICD-10-CM

## 2022-03-29 DIAGNOSIS — J41.0 SIMPLE CHRONIC BRONCHITIS (HCC): ICD-10-CM

## 2022-03-29 DIAGNOSIS — E11.9 TYPE 2 DIABETES MELLITUS WITHOUT COMPLICATION, WITHOUT LONG-TERM CURRENT USE OF INSULIN (HCC): ICD-10-CM

## 2022-03-29 DIAGNOSIS — G89.29 CHRONIC BACK PAIN GREATER THAN 3 MONTHS DURATION: ICD-10-CM

## 2022-03-29 DIAGNOSIS — M81.0 AGE-RELATED OSTEOPOROSIS WITHOUT CURRENT PATHOLOGICAL FRACTURE: ICD-10-CM

## 2022-03-29 DIAGNOSIS — D68.59 PROTEIN C DEFICIENCY (HCC): ICD-10-CM

## 2022-03-29 DIAGNOSIS — E78.5 HYPERLIPIDEMIA ASSOCIATED WITH TYPE 2 DIABETES MELLITUS (HCC): ICD-10-CM

## 2022-03-29 DIAGNOSIS — K58.0 IRRITABLE BOWEL SYNDROME WITH DIARRHEA: ICD-10-CM

## 2022-03-29 DIAGNOSIS — I82.5Y2 CHRONIC DEEP VEIN THROMBOSIS (DVT) OF PROXIMAL VEIN OF LEFT LOWER EXTREMITY (HCC): ICD-10-CM

## 2022-03-29 DIAGNOSIS — E11.69 HYPERLIPIDEMIA ASSOCIATED WITH TYPE 2 DIABETES MELLITUS (HCC): ICD-10-CM

## 2022-03-29 DIAGNOSIS — Z86.718 HISTORY OF DVT OF LOWER EXTREMITY: Primary | ICD-10-CM

## 2022-03-29 DIAGNOSIS — F11.99 OPIOID USE, UNSPECIFIED WITH UNSPECIFIED OPIOID-INDUCED DISORDER (HCC): ICD-10-CM

## 2022-03-29 LAB
HBA1C MFR BLD HPLC: 7.6 % (ref 4.8–5.6)
INR BLD: 4.8 (ref 1–1.5)
PT POC: 58 SECONDS (ref 9.1–12)
VALID INTERNAL CONTROL?: YES

## 2022-03-29 PROCEDURE — 85610 PROTHROMBIN TIME: CPT | Performed by: PHARMACIST

## 2022-03-29 PROCEDURE — 99212 OFFICE O/P EST SF 10 MIN: CPT | Performed by: PHARMACIST

## 2022-03-29 PROCEDURE — 99214 OFFICE O/P EST MOD 30 MIN: CPT | Performed by: INTERNAL MEDICINE

## 2022-03-29 PROCEDURE — 83036 HEMOGLOBIN GLYCOSYLATED A1C: CPT | Performed by: PHARMACIST

## 2022-03-29 RX ORDER — BENZONATATE 100 MG/1
100 CAPSULE ORAL
Qty: 30 CAPSULE | Refills: 2 | Status: SHIPPED | OUTPATIENT
Start: 2022-03-29 | End: 2022-10-17

## 2022-03-29 NOTE — PROGRESS NOTES
Pastor Garcia is a 78 y.o. female who presents for evaluation of routine follow up. Last seen by me dec 3, 2021. Has done relatively stably since then. Complains today of sinus drainage, excess ear wax, pnd with cough, and chronic back pain. Misses her .       ROS:  Constitutional: negative for fevers, chills, anorexia and weight loss  Eyes:   negative for visual disturbance and irritation  ENT:   negative for tinnitus,sore throat,nasal congestion,ear pain,hoarseness  Respiratory:  negative for cough, hemoptysis, dyspnea,wheezing  CV:   negative for chest pain, palpitations, lower extremity edema  GI:   negative for nausea, vomiting, diarrhea, abdominal pain,melena  Genitourinary: negative for frequency, dysuria and hematuria  Musculoskel: negative for myalgias, arthralgias, back pain, muscle weakness, joint pain  Neurological:  negative for headaches, dizziness, focal weakness, numbness  Psychiatric:     Negative for depression or anxiety      Past Medical History:   Diagnosis Date    Anxiety     Arthritis     Asthma     CAD (coronary artery disease)     stent    Diabetes ([facility] [address]     GERD (gastroesophageal reflux disease)     History of DVT (deep vein thrombosis)     History of recurrent UTIs     Hx of seasonal allergies     Hypercholesterolemia     Irritable bowel syndrome (IBS)     Migraine     Urinary urgency        Past Surgical History:   Procedure Laterality Date    HX CATARACT REMOVAL Bilateral     HX HEART CATHETERIZATION      stent    HX OPEN REDUCTION INTERNAL FIXATION Left     humerus     HX OPEN REDUCTION INTERNAL FIXATION Right     hip       Family History   Problem Relation Age of Onset    Diabetes Mother     Stroke Mother        Social History     Socioeconomic History    Marital status:      Spouse name: Not on file    Number of children: Not on file    Years of education: Not on file    Highest education level: Not on file   Occupational History    Not on file   Tobacco Use    Smoking status: Former Smoker     Packs/day: 0.25     Quit date:      Years since quittin.2    Smokeless tobacco: Never Used   Substance and Sexual Activity    Alcohol use: No    Drug use: No    Sexual activity: Not Currently   Other Topics Concern    Not on file   Social History Narrative    Not on file     Social Determinants of Health     Financial Resource Strain:     Difficulty of Paying Living Expenses: Not on file   Food Insecurity:     Worried About Running Out of Food in the Last Year: Not on file    [place] of Food in the Last Year: Not on file   Transportation Needs:     Lack of Transportation (Medical): Not on file    Lack of Transportation (Non-Medical):  Not on file   Physical Activity:     Days of Exercise per Week: Not on file    Minutes of Exercise per Session: Not on file   Stress:     Feeling of Stress : Not on file   Social Connections:     Frequency of Communication with Friends and Family: Not on file    Frequency of Social Gatherings with Friends and Family: Not on file    Attends Church Services: Not on file    Active Member of [address] [place] [address] or Organizations: Not on file    Attends Club or Organization Meetings: Not on file    Marital Status: Not on file   Intimate Partner Violence:     Fear of Current or Ex-Partner: Not on file    Emotionally Abused: Not on file    Physically Abused: Not on file    Sexually Abused: Not on file   Housing Stability:     Unable to Pay for Housing in the Last Year: Not on file    Number of [place] in the Last Year: Not on file    Unstable Housing in the Last Year: Not on file            Visit Vitals  /73 (BP 1 Location: Left upper arm, BP Patient Position: Sitting)   Pulse 73   Temp 97.3 °F (36.3 °C) (Temporal)   Resp 20   Ht 5' 1\" (1.549 m)   Wt 158 lb (71.7 kg)   SpO2 94%   BMI 29.85 kg/m²       Physical Examination:   General - Well appearing female  HEENT - PERRL, TM no erythema/opacification, normal nasal turbinates, no oropharyngeal erythema or exudate, MMM  Neck - supple, no bruits, no thyroidomegaly, no lymphadenopathy  Pulm - clear to auscultation bilaterally--good air flow  Cardio - RRR, normal S1 S2, no murmur  Abd - soft, nontender, no masses, no HSM  Extrem - no edema, +2 distal pulses  Neuro-  No focal deficits, CN intact     Assessment/Plan:    1. Chronic cough--rx for tessalon pearles  2. Chronic low back pain--referral to dr Jaja Feliciano. She has seen dr Hilario Macias and dr [place] Granger. Has prn ultram  3. Cad with hx cabg--on coumadin, toprol xl  4. Chronic dvt--on coumadin. Follows with coumadin clinic  5.  ibs-diarrhea--continue imodium prn  6.  oab--on ditropan  7.  anx and depression--continue wellbutrin, elavil, trazodone,  lexapro  8. Copd--continue advair  9.   Excess left ear wax--flushed out today  10.  hyperlipids--on zetia, lipoitor    rtc 4 months for cpe        Melisa Callahan III, DO

## 2022-03-29 NOTE — PROGRESS NOTES
1. \"Have you been to the ER, urgent care clinic since your last visit? Hospitalized since your last visit? \" no    2. \"Have you seen or consulted any other health care providers outside of the [address] [place] [address] since your last visit? \"  no

## 2022-03-29 NOTE — PATIENT INSTRUCTIONS
Earwax Blockage: Care Instructions  Your Care Instructions     Earwax is a natural substance that protects the ear canal. Normally, earwax drains from the ears and does not cause problems. Sometimes earwax builds up and hardens. Earwax blockage (also called cerumen impaction) can cause some loss of hearing and pain. When wax is tightly packed, you will need to have your doctor remove it. Follow-up care is a key part of your treatment and safety. Be sure to make and go to all appointments, and call your doctor if you are having problems. It's also a good idea to know your test results and keep a list of the medicines you take. How can you care for yourself at home? · Do not try to remove earwax with cotton swabs, fingers, or other objects. This can make the blockage worse and damage the eardrum. · If your doctor recommends that you try to remove earwax at home:  ? Soften and loosen the earwax with warm mineral oil. You also can try hydrogen peroxide mixed with an equal amount of room temperature water. Place 2 drops of the fluid, warmed to body temperature, in the ear two times a day for up to 5 days. ? Once the wax is loose and soft, all that is usually needed to remove it from the ear canal is a gentle, warm shower. Direct the water into the ear, then tip your head to let the earwax drain out. Dry your ear thoroughly with a hair dryer set on low. Hold the dryer several inches from your ear. ? If the warm mineral oil and shower do not work, use an over-the-counter wax softener. Read and follow all instructions on the label. After using the wax softener, use an ear syringe to gently flush the ear. Make sure the flushing solution is body temperature. Cool or hot fluids in the ear can cause dizziness. When should you call for help? Call your doctor now or seek immediate medical care if:    · Pus or blood drains from your ear.     · Your ears are ringing or feel full.     · You have a loss of hearing. Watch closely for changes in your health, and be sure to contact your doctor if:    · You have pain or reduced hearing after 1 week of home treatment.     · You have any new symptoms, such as nausea or balance problems. Where can you learn more? Go to http://www.gray.com/  Enter Q495 in the search box to learn more about \"Earwax Blockage: Care Instructions. \"  Current as of: July 1, 2021               Content Version: 13.2  © 2006-2022 [employer]. Care instructions adapted under license by [employer] (which disclaims liability or warranty for this information). If you have questions about a medical condition or this instruction, always ask your healthcare professional. [address] any warranty or liability for your use of this information. Can try debrox drops into ears to help break up and prevent wax build up.

## 2022-03-29 NOTE — PROGRESS NOTES
Pharmacy Progress Note - Anticoagulation Management    S/O: Ms. Brenda Dunlap is a 81 y. o. female , with a PMH of OAB, IBS, LE DVT, anxiety, insomonia, Renee's esophagus, Arthritis, Migraines, hyperlipidemia, who was seen today for anticoagulation management for the diagnosis of Protein C deficiency, LLE Deep Vein Thrombosis.      · Warfarin start date: ~ 2008   · INR Goal:  2.0-3.0    · Current warfarin regimen:  1 mg Monday, Wednesday, Friday and 2 mg daily ROW  · Warfarin tablet strength: 2 mg   · Duration of therapy: Indefinite    Today's pertinent positives includes:  No significant changes since last visit    Seeing Dr Samanta Jacques (cardio) next week  Dental implants on 4/4/22  Denies bleeding/bruises    Results for orders placed or performed in visit on 03/29/22   AMB POC PT/INR   Result Value Ref Range    VALID INTERNAL CONTROL POC Yes     Prothrombin time (POC) 58.0 (A) 9.1 - 12 seconds    INR POC 4.8 (A) 1 - 1.5   AMB POC HEMOGLOBIN A1C   Result Value Ref Range    Hemoglobin A1c (POC) 7.6 (A) 4.8 - 5.6 %       · Adherence:   · Able to recall regimen? YES  · Miss/extra dose? YES -- reports she went back to taking 2 mg daily in the past few days. Could not explain reasoning. Denies bleeding/bruises. · Need refill?  NO    Upcoming procedure(s):  YES  Dental implants 4/4/22    Past Medical History:   Diagnosis Date    Anxiety     Arthritis     Asthma     CAD (coronary artery disease)     stent    Diabetes (HCC)     GERD (gastroesophageal reflux disease)     History of DVT (deep vein thrombosis)     History of recurrent UTIs     Hx of seasonal allergies     Hypercholesterolemia     Irritable bowel syndrome (IBS)     Migraine     Urinary urgency      Allergies   Allergen Reactions    Iodinated Contrast Media Other (comments)     Passes out    Pcn [Penicillins] Shortness of Breath    Sulfa (Sulfonamide Antibiotics) Shortness of Breath     Current Outpatient Medications   Medication Sig    clorazepate (TRANXENE) 3.75 mg tablet Take 1 Tablet by mouth every eight (8) hours as needed for Anxiety. Max Daily Amount: 11.25 mg.    buPROPion XL (WELLBUTRIN XL) 150 mg tablet Take 1 Tablet by mouth daily.  loperamide (IMODIUM) 2 mg capsule Take 1 Capsule by mouth four (4) times daily as needed for Diarrhea.  oxybutynin chloride XL (DITROPAN XL) 10 mg CR tablet TAKE 1 TABLET BY MOUTH EVERY DAY    amitriptyline (ELAVIL) 10 mg tablet TAKE 1 TABLET BY MOUTH EVERY NIGHT    metoprolol succinate (TOPROL-XL) 50 mg XL tablet Take 50 mg by mouth daily.  traZODone (DESYREL) 100 mg tablet Take 100 mg by mouth nightly.  warfarin (COUMADIN) 2 mg tablet Take 1 Tablet by mouth daily.  levothyroxine (SYNTHROID) 25 mcg tablet Take 2 Tablets by mouth Daily (before breakfast).  albuterol (PROVENTIL HFA, VENTOLIN HFA, PROAIR HFA) 90 mcg/actuation inhaler Take 2 Puffs by inhalation every six (6) hours as needed for Wheezing.  Wixela Inhub 250-50 mcg/dose diskus inhaler INHALE 1 PUFF AND INTO THE LUNGS EVERY 12 HOURS. RINSE MOUTH AFTER USE    levocetirizine (XYZAL) 5 mg tablet Take 1 Tablet by mouth daily.  ipratropium (ATROVENT) 42 mcg (0.06 %) nasal spray 2 Sprays by Both Nostrils route four (4) times daily.     nystatin (MYCOSTATIN) 100,000 unit/mL suspension SWISH AND SWALLOW 5 ML BY MOUTH FOR 30 SECONDS 4 TIMES A DAY AS NEEDED FOR MOUTH PAIN    esomeprazole (NEXIUM) 20 mg capsule TAKE 1 CAPSULE BY MOUTH DAILY    ondansetron hcl (ZOFRAN) 4 mg tablet TAKE 1 TABLET BY MOUTH EVERY 8 HOURS AS NEEDED FOR NAUSEA OR VOMITING    levETIRAcetam (KEPPRA) 500 mg tablet TAKE 1 TABLET BY MOUTH EVERY MORNING AND 1 AND 1/2 TABLET BY MOUTH EVERY EVENING FOR MIGRAINES    dicyclomine (BENTYL) 10 mg capsule TAKE ONE CAPSULE BY MOUTH FOUR TIMES DAILY AS NEEDED    ezetimibe (ZETIA) 10 mg tablet TAKE 1 TABLET BY MOUTH DAILY    celecoxib (CELEBREX) 200 mg capsule Take 1 Capsule by mouth every twelve (12) hours as needed for Pain.  atorvastatin (LIPITOR) 20 mg tablet TAKE 1 TABLET BY MOUTH EVERY DAY    diclofenac (VOLTAREN) 1 % gel Apply  to affected area every twelve (12) hours as needed for Pain.  escitalopram oxalate (LEXAPRO) 20 mg tablet TAKE 1 TABLET BY MOUTH DAILY    Bifidobacterium Infantis (ALIGN) 4 mg cap Take 1 Cap by mouth daily as needed for Diarrhea. Indications: ALIGN OTC     No current facility-administered medications for this visit. Wt Readings from Last 3 Encounters:   03/29/22 158 lb (71.7 kg)   03/29/22 158 lb (71.7 kg)   02/09/22 155 lb (70.3 kg)     BP Readings from Last 3 Encounters:   03/29/22 131/73   03/29/22 131/73   03/02/22 139/84     Pulse Readings from Last 3 Encounters:   03/29/22 73   03/29/22 73   03/02/22 65     Lab Results   Component Value Date/Time    WBC 6.6 06/25/2021 11:52 AM    HGB 12.9 06/25/2021 11:52 AM    HCT 41.3 06/25/2021 11:52 AM    PLATELET 044 08/71/9493 11:52 AM    MCV 86.4 06/25/2021 11:52 AM     Lab Results   Component Value Date/Time    Sodium 142 06/25/2021 11:52 AM    Potassium 4.0 06/25/2021 11:52 AM    Chloride 111 (H) 06/25/2021 11:52 AM    CO2 23 06/25/2021 11:52 AM    Anion gap 8 06/25/2021 11:52 AM    Glucose 128 (H) 06/25/2021 11:52 AM    BUN 17 06/25/2021 11:52 AM    Creatinine 0.73 06/25/2021 11:52 AM    BUN/Creatinine ratio 23 (H) 06/25/2021 11:52 AM    GFR est AA >60 06/25/2021 11:52 AM    GFR est non-AA >60 06/25/2021 11:52 AM    Calcium 8.8 06/25/2021 11:52 AM    Bilirubin, total 0.3 06/25/2021 11:52 AM    Alk. phosphatase 103 06/25/2021 11:52 AM    Protein, total 6.9 06/25/2021 11:52 AM    Albumin 3.6 06/25/2021 11:52 AM    Globulin 3.3 06/25/2021 11:52 AM    A-G Ratio 1.1 06/25/2021 11:52 AM    ALT (SGPT) 21 06/25/2021 11:52 AM     CrCl cannot be calculated (Patient's most recent lab result is older than the maximum 180 days allowed.).       INR History:   (normal INR range 0.8-1.2)     Date   INR   PT   Dose/Comments  03/29/22 4.8  58.0 1 mg MWF, 2 mg daily ROW; (+) increased dose  03/09/22          2.3                   27.7     Hold x 2, then 1 mg MWF, 2 mg daily ROW  03/02/22          4.2                   50. 9     Hold x 1, then 1 mg x 1, then 2 mg daily  02/09/22          3.2                   37.5     2 mg daily ; (+) ASA  01/12/22          2.5                   29.6     2 mg daily  12/07/21          2.3                   27.4     1 mg x 3 days, then 2 mg daily  --> Pt took 2 mg daily   12/04/21          4.2                               2 mg daily ; (+) APAP   11/01/21          2.8                   34.0     2 mg daily ; s/p TFESI  09/27/21          1.9                   22.8     4 mg x 1, then 2 mg daily -- pt held x 2 days and 2 mg daily  09/20/21          1.2                   14.0     Hold x 2, 1/2 tab on Thurs, and 2 mg daily ROW; Pt held x 2, then 2 mg daily until INR check   09/08/21          3.6                   43. 7     2 mg daily    A/P:       Anticoagulation:  Considering Ms. Dunlap's past history, todays findings, and per Anticoagulation Collaborative Practice Agreement/Protocol:    1. Fingerstick POC INR (4.8) is supratherapeutic for INR goal today. 2. Hold warfarin x 2 days. Take 1 mg instead of 2 mg on Thursday. Then Change warfarin to 2 mg on Tues, Thursday, Sunday and 1 mg daily ROW. 3. Recommend for patient to contact dental office regarding upcoming dental procedure and need to hold warfarin. Patient was instructed to schedule an appointment in 2 week(s) prior to leaving the clinic. Medication reconciliation was completed during the visit. There are no discontinued medications. A full discussion of the nature of anticoagulants has been carried out. A full discussion of the need for frequent and regular monitoring, precise dosage adjustment and compliance was stressed. Side effects of potential bleeding were discussed and Ms. Nakia Alicea was instructed to call [phone] if there are any signs of abnormal bleeding.   Ms. Beth Gill was instructed to avoid any OTC items containing aspirin or ibuprofen and prior to starting any new OTC products to consult with her physician or pharmacist to ensure no drug interactions are present. Ms. Beth Gill was instructed to avoid any major changes in her general diet and to avoid alcohol consumption. Ms. Beth Gill was provided information in the AVS that includes topics on understanding coumadin therapy, drug interaction considerations, vitamin K and coumadin use, interactions with foods and supplements containing vitamin K, and the use of herbal products. Ms. Beth Gill verbalized her understanding of all instructions and will call the office with any questions, concerns, or signs of abnormal bleeding or blood clot. Notifications of recommendations will be sent to Dr. Doc Gama DO for review. Thank you,  Zulema Verduzco, PharmD, BCACP, [facility] [address] in place:  Yes   Recommendation Provided To: Patient/Caregiver: 4 via In person   Intervention Detail: Adherence Monitorin, Dose Adjustment: 1, reason: Therapy De-escalation, Lab(s) Ordered and Scheduled Appointment   Intervention Accepted By: Patient/Caregiver: 4   Time Spent (min): 20

## 2022-03-29 NOTE — PATIENT INSTRUCTIONS
Today your INR was 4.8. Your goal INR is 2.0-3.0 . You have a 2 mg tablet of Coumadin (warfarin). Take Coumadin (warfarin) as follows:    Hold warfarin for 2 days. Take 1 mg on Thursday instead of 2 mg. Then change warfarin to 2 mg on Tuesday, Thursday, Sunday and 1 mg daily the rest of the week. Call the dentist's office about your procedure. Come back in 2 week(s) for your next finger stick/INR blood test.        Avoid any over the counter items containing aspirin or ibuprofen, and avoid great swings in general diet. Avoid alcohol consumption. Please notify the INR nurse if you are started on any new medication including over the counter or herbal supplements. Also, please notify your INR nurse if any of your other prescription or over the counter medications have been discontinued. Call [facility] at [phone] if you have any signs of abnormal bleeding/blood clot.  ------------------------------------------------------------------------------------------------------------------  Taking Warfarin Safely: Care Instructions    Your Care Instructions  Warfarin is a medicine that you take to prevent blood clots. It is often called a blood thinner. Doctors give warfarin (such as Coumadin) to reduce the risk of blood clots. You may be at risk for blood clots if you have atrial fibrillation or deep vein thrombosis. Some other health problems may also put you at risk. Warfarin slows the amount of time it takes for your blood to clot. It can cause bleeding problems. Even if you've been taking warfarin for a while, it's important to know how to take it safely. Foods and other medicines can affect the way warfarin works. Some can make warfarin work too well. This can cause bleeding problems. And some can make it work poorly, so that it does not prevent blood clots very well. You will need regular blood tests to check how long it takes for your blood to form a clot.  This test is called a PT or prothrombin time test. The result of the test is called an INR level. Depending on the test results, your doctor or anticoagulation clinic may adjust your dose of warfarin. Follow-up care is a key part of your treatment and safety. Be sure to make and go to all appointments, and call your doctor if you are having problems. It's also a good idea to know your test results and keep a list of the medicines you take. How can you care for yourself at home? Take warfarin safely  · Take your warfarin at the same time each day. · If you miss a dose of warfarin, don't take an extra dose to make up for it. Your doctor can tell you exactly what to do so you don't take too much or too little. · Wear medical alert jewelry that lets others know that you take warfarin. You can buy this at most drugs[facility] Systemses. · Don't take warfarin if you are pregnant or planning to get pregnant. Talk to your doctor about how you can prevent getting pregnant while you are taking it. · Don't change your dose or stop taking warfarin unless your doctor tells you to. Effects of medicines and food on warfarin  · Don't start or stop taking any medicines, vitamins, or natural remedies unless you first talk to your doctor. Many medicines can affect how warfarin works. These include aspirin and other pain relievers, over-the-counter medicines, multivitamins, dietary supplements, and herbal products. · Tell all of your doctors and pharmacists that you take warfarin. Some prescription medicines can affect how warfarin works. · Keep the amount of vitamin K in your diet about the same from day to day. Do not suddenly eat a lot more or a lot less food that is rich in vitamin K than you usually do. Vitamin K affects how warfarin works and how your blood clots. Talk with your doctor before making big changes in your diet.  Vitamin K is in many foods, such as:  ¨ Leafy greens, such as kale, cabbage, spinach, Swiss chard, and lettuce. ¨ Canola and soybean oils. ¨ Green vegetables, such as asparagus, broccoli, and [place] sprouts. ¨ Vegetable drinks, green tea leaves, and some dietary supplement drinks. · Avoid cranberry juice and other cranberry products. They can increase the effects of warfarin. · Limit your use of alcohol. Avoid bleeding by preventing falls and injuries  · Wear slippers or shoes with nonskid soles. · Remove throw rugs and clutter. · Rearrange furniture and electrical cords to keep them out of walking paths. · Keep stairways, porches, and outside walkways well lit. Use night-lights in hallways and bathrooms. · Be extra careful when you work with sharp tools or knives. When should you call for help? Call 911 anytime you think you may need emergency care. For example, call if:  · You have a sudden, severe headache that is different from past headaches. Call your doctor now or seek immediate medical care if:  · You have any abnormal bleeding, such as:  ¨ Nosebleeds. ¨ Vaginal bleeding that is different (heavier, more frequent, at a different time of the month) than what you are used to. ¨ Bloody or black stools, or rectal bleeding. ¨ Bloody or pink urine. Watch closely for changes in your health, and be sure to contact your doctor if you have any problems. Where can you learn more? Go to http://www.gray.com/. Enter I852 in the search box to learn more about \"Taking Warfarin Safely: Care Instructions. \"  Current as of: January 27, 2016  Content Version: 11.1  © 8266-8029 [employer]. Care instructions adapted under license by [employer] (which disclaims liability or warranty for this information). If you have questions about a medical condition or this instruction, always ask your healthcare professional. [place] [address] any warranty or liability for your use of this information.

## 2022-04-10 NOTE — PATIENT INSTRUCTIONS
Today your INR was 1.4. Your goal INR is  2.0-3.0 . You have a  2 mg tablet of Coumadin (warfarin). Take Coumadin (warfarin) as follows: Take 3 mg tonight (4/12/22). Then 2 mg Wednesday to Saturday (4/13-4/16/22). Then continue warfarin 2 mg on Tuesday, Thursday, Sunday and 1 mg daily the rest of the week. Come back in 1  week(s) for your next finger stick/INR blood test.        Avoid any over the counter items containing aspirin or ibuprofen, and avoid great swings in general diet. Avoid alcohol consumption. Please notify the INR nurse if you are started on any new medication including over the counter or herbal supplements. Also, please notify your INR nurse if any of your other prescription or over the counter medications have been discontinued. Call [facility] at [phone] if you have any signs of abnormal bleeding/blood clot.  ------------------------------------------------------------------------------------------------------------------  Taking Warfarin Safely: Care Instructions    Your Care Instructions  Warfarin is a medicine that you take to prevent blood clots. It is often called a blood thinner. Doctors give warfarin (such as Coumadin) to reduce the risk of blood clots. You may be at risk for blood clots if you have atrial fibrillation or deep vein thrombosis. Some other health problems may also put you at risk. Warfarin slows the amount of time it takes for your blood to clot. It can cause bleeding problems. Even if you've been taking warfarin for a while, it's important to know how to take it safely. Foods and other medicines can affect the way warfarin works. Some can make warfarin work too well. This can cause bleeding problems. And some can make it work poorly, so that it does not prevent blood clots very well. You will need regular blood tests to check how long it takes for your blood to form a clot.  This test is called a PT or prothrombin time test. The result of the test is called an INR level. Depending on the test results, your doctor or anticoagulation clinic may adjust your dose of warfarin. Follow-up care is a key part of your treatment and safety. Be sure to make and go to all appointments, and call your doctor if you are having problems. It's also a good idea to know your test results and keep a list of the medicines you take. How can you care for yourself at home? Take warfarin safely  · Take your warfarin at the same time each day. · If you miss a dose of warfarin, don't take an extra dose to make up for it. Your doctor can tell you exactly what to do so you don't take too much or too little. · Wear medical alert jewelry that lets others know that you take warfarin. You can buy this at most drugstores. · Don't take warfarin if you are pregnant or planning to get pregnant. Talk to your doctor about how you can prevent getting pregnant while you are taking it. · Don't change your dose or stop taking warfarin unless your doctor tells you to. Effects of medicines and food on warfarin  · Don't start or stop taking any medicines, vitamins, or natural remedies unless you first talk to your doctor. Many medicines can affect how warfarin works. These include aspirin and other pain relievers, over-the-counter medicines, multivitamins, dietary supplements, and herbal products. · Tell all of your doctors and pharmacists that you take warfarin. Some prescription medicines can affect how warfarin works. · Keep the amount of vitamin K in your diet about the same from day to day. Do not suddenly eat a lot more or a lot less food that is rich in vitamin K than you usually do. Vitamin K affects how warfarin works and how your blood clots. Talk with your doctor before making big changes in your diet. Vitamin K is in many foods, such as:  ¨ Leafy greens, such as kale, cabbage, spinach, Swiss chard, and lettuce. ¨ Canola and soybean oils.   ¨ Green vegetables, such as asparagus, broccoli, and [place] sprouts. ¨ Vegetable drinks, green tea leaves, and some dietary supplement drinks. · Avoid cranberry juice and other cranberry products. They can increase the effects of warfarin. · Limit your use of alcohol. Avoid bleeding by preventing falls and injuries  · Wear slippers or shoes with nonskid soles. · Remove throw rugs and clutter. · Rearrange furniture and electrical cords to keep them out of walking paths. · Keep stairways, porches, and outside walkways well lit. Use night-lights in hallways and bathrooms. · Be extra careful when you work with sharp tools or knives. When should you call for help? Call 911 anytime you think you may need emergency care. For example, call if:  · You have a sudden, severe headache that is different from past headaches. Call your doctor now or seek immediate medical care if:  · You have any abnormal bleeding, such as:  ¨ Nosebleeds. ¨ Vaginal bleeding that is different (heavier, more frequent, at a different time of the month) than what you are used to. ¨ Bloody or black stools, or rectal bleeding. ¨ Bloody or pink urine. Watch closely for changes in your health, and be sure to contact your doctor if you have any problems. Where can you learn more? Go to http://www.gray.com/. Enter T262 in the search box to learn more about \"Taking Warfarin Safely: Care Instructions. \"  Current as of: January 27, 2016  Content Version: 11.1  © 6430-6949 [employer]. Care instructions adapted under license by [employer] (which disclaims liability or warranty for this information). If you have questions about a medical condition or this instruction, always ask your healthcare professional. [place] [address] any warranty or liability for your use of this information.

## 2022-04-10 NOTE — PROGRESS NOTES
Pharmacy Progress Note - Anticoagulation Management    S/O: Ms. Brenda Dunlap is a 81 y. o. female , with a PMH of OAB, IBS, LE DVT, anxiety, insomonia, Renee's esophagus, Arthritis, Migraines, hyperlipidemia, who was seen today for anticoagulation management for the diagnosis of Protein C deficiency, LLE Deep Vein Thrombosis.      · Warfarin start date: ~ 2008   · INR Goal:  2.0-3.0    · Current warfarin regimen:  Hold x 2, 1 mg x 1, then 2 mg Tues, Thurs, Sun, 1 mg daily ROW  · Warfarin tablet strength: 2 mg   · Duration of therapy: Indefinite    Today's pertinent positives includes:  No significant changes since last visit     Denies CP/SOB/LE & UE pain  Meals on wheels start this week  Echo scheduled 4/19/22     Results for orders placed or performed in visit on 04/12/22   AMB POC PT/INR   Result Value Ref Range    VALID INTERNAL CONTROL POC Yes     Prothrombin time (POC) 17.4 (A) 9.1 - 12 seconds    INR POC 1.4 1 - 1.5     · Adherence:   · Able to recall regimen? YES  · Miss/extra dose? NO  · Need refill? NO    Upcoming procedure(s):  YES    Dental implant 5/2/22  [address] [facility]  [phone] [address]     Past Medical History:   Diagnosis Date    Anxiety     Arthritis     Asthma     CAD (coronary artery disease)     stent    Diabetes ([facility] [address]     GERD (gastroesophageal reflux disease)     History of DVT (deep vein thrombosis)     History of recurrent UTIs     Hx of seasonal allergies     Hypercholesterolemia     Irritable bowel syndrome (IBS)     Migraine     Urinary urgency      Allergies   Allergen Reactions    Iodinated Contrast Media Other (comments)     Passes out    Pcn [Penicillins] Shortness of Breath    Sulfa (Sulfonamide Antibiotics) Shortness of Breath     Current Outpatient Medications   Medication Sig    benzonatate (TESSALON) 100 mg capsule Take 1 Capsule by mouth three (3) times daily as needed for Cough.     clorazepate (TRANXENE) 3.75 mg tablet Take 1 Tablet by mouth every eight (8) hours as needed for Anxiety. Max Daily Amount: 11.25 mg.    buPROPion XL (WELLBUTRIN XL) 150 mg tablet Take 1 Tablet by mouth daily.  loperamide (IMODIUM) 2 mg capsule Take 1 Capsule by mouth four (4) times daily as needed for Diarrhea.  oxybutynin chloride XL (DITROPAN XL) 10 mg CR tablet TAKE 1 TABLET BY MOUTH EVERY DAY    amitriptyline (ELAVIL) 10 mg tablet TAKE 1 TABLET BY MOUTH EVERY NIGHT    metoprolol succinate (TOPROL-XL) 50 mg XL tablet Take 50 mg by mouth daily.  traZODone (DESYREL) 100 mg tablet Take 100 mg by mouth nightly.  warfarin (COUMADIN) 2 mg tablet Take 1 Tablet by mouth daily.  levothyroxine (SYNTHROID) 25 mcg tablet Take 2 Tablets by mouth Daily (before breakfast).  albuterol (PROVENTIL HFA, VENTOLIN HFA, PROAIR HFA) 90 mcg/actuation inhaler Take 2 Puffs by inhalation every six (6) hours as needed for Wheezing.  Wixela Inhub 250-50 mcg/dose diskus inhaler INHALE 1 PUFF AND INTO THE LUNGS EVERY 12 HOURS. RINSE MOUTH AFTER USE    levocetirizine (XYZAL) 5 mg tablet Take 1 Tablet by mouth daily.  ipratropium (ATROVENT) 42 mcg (0.06 %) nasal spray 2 Sprays by Both Nostrils route four (4) times daily.  nystatin (MYCOSTATIN) 100,000 unit/mL suspension SWISH AND SWALLOW 5 ML BY MOUTH FOR 30 SECONDS 4 TIMES A DAY AS NEEDED FOR MOUTH PAIN    esomeprazole (NEXIUM) 20 mg capsule TAKE 1 CAPSULE BY MOUTH DAILY    ondansetron hcl (ZOFRAN) 4 mg tablet TAKE 1 TABLET BY MOUTH EVERY 8 HOURS AS NEEDED FOR NAUSEA OR VOMITING    levETIRAcetam (KEPPRA) 500 mg tablet TAKE 1 TABLET BY MOUTH EVERY MORNING AND 1 AND 1/2 TABLET BY MOUTH EVERY EVENING FOR MIGRAINES    dicyclomine (BENTYL) 10 mg capsule TAKE ONE CAPSULE BY MOUTH FOUR TIMES DAILY AS NEEDED    ezetimibe (ZETIA) 10 mg tablet TAKE 1 TABLET BY MOUTH DAILY    celecoxib (CELEBREX) 200 mg capsule Take 1 Capsule by mouth every twelve (12) hours as needed for Pain.     atorvastatin (LIPITOR) 20 mg tablet TAKE 1 TABLET BY MOUTH EVERY DAY    diclofenac (VOLTAREN) 1 % gel Apply  to affected area every twelve (12) hours as needed for Pain.  escitalopram oxalate (LEXAPRO) 20 mg tablet TAKE 1 TABLET BY MOUTH DAILY    Bifidobacterium Infantis (ALIGN) 4 mg cap Take 1 Cap by mouth daily as needed for Diarrhea. Indications: ALIGN OTC     No current facility-administered medications for this visit. Wt Readings from Last 3 Encounters:   04/12/22 158 lb (71.7 kg)   03/29/22 158 lb (71.7 kg)   03/29/22 158 lb (71.7 kg)     BP Readings from Last 3 Encounters:   03/29/22 131/73   03/29/22 131/73   03/02/22 139/84     Pulse Readings from Last 3 Encounters:   03/29/22 73   03/29/22 73   03/02/22 65     Lab Results   Component Value Date/Time    WBC 6.6 06/25/2021 11:52 AM    HGB 12.9 06/25/2021 11:52 AM    HCT 41.3 06/25/2021 11:52 AM    PLATELET 073 87/82/9349 11:52 AM    MCV 86.4 06/25/2021 11:52 AM     Lab Results   Component Value Date/Time    Sodium 142 06/25/2021 11:52 AM    Potassium 4.0 06/25/2021 11:52 AM    Chloride 111 (H) 06/25/2021 11:52 AM    CO2 23 06/25/2021 11:52 AM    Anion gap 8 06/25/2021 11:52 AM    Glucose 128 (H) 06/25/2021 11:52 AM    BUN 17 06/25/2021 11:52 AM    Creatinine 0.73 06/25/2021 11:52 AM    BUN/Creatinine ratio 23 (H) 06/25/2021 11:52 AM    GFR est AA >60 06/25/2021 11:52 AM    GFR est non-AA >60 06/25/2021 11:52 AM    Calcium 8.8 06/25/2021 11:52 AM    Bilirubin, total 0.3 06/25/2021 11:52 AM    Alk. phosphatase 103 06/25/2021 11:52 AM    Protein, total 6.9 06/25/2021 11:52 AM    Albumin 3.6 06/25/2021 11:52 AM    Globulin 3.3 06/25/2021 11:52 AM    A-G Ratio 1.1 06/25/2021 11:52 AM    ALT (SGPT) 21 06/25/2021 11:52 AM     CrCl cannot be calculated (Patient's most recent lab result is older than the maximum 180 days allowed.).     INR History:   (normal INR range 0.8-1.2)     Date   INR   PT   Dose/Comments  04/12/22 1.4  17.4 Hold x 2, 1 mg x 1, then 2 mg Tues, Thurs, Sun, 1 mg daily ROW  03/29/22          4.8                   58.0     1 mg MWF, 2 mg daily ROW; (+) increased dose  03/09/22          2.3                   27.7     Hold x 2, then 1 mg MWF, 2 mg daily ROW  03/02/22          4.2                   50. 9     Hold x 1, then 1 mg x 1, then 2 mg daily  02/09/22          3.2                   37.5     2 mg daily ; (+) ASA  01/12/22          2.5                   29.6     2 mg daily  12/07/21          2.3                   27.4     1 mg x 3 days, then 2 mg daily  --> Pt took 2 mg daily   12/04/21          4.2                               2 mg daily ; (+) APAP   11/01/21          2.8                   34.0     2 mg daily ; s/p TFESI  09/27/21          1.9                   22.8     4 mg x 1, then 2 mg daily -- pt held x 2 days and 2 mg daily  09/20/21          1.2                   14.0     Hold x 2, 1/2 tab on Thurs, and 2 mg daily ROW; Pt held x 2, then 2 mg daily until INR check   09/08/21          3.6                   43. 7     2 mg daily       A/P:       Anticoagulation:  Considering Ms. Dunlap's past history, todays findings, and per Anticoagulation Collaborative Practice Agreement/Protocol:    1. Fingerstick POC INR (1.4) is subtherapeutic for INR goal today. 2. Take 3 mg tonight. Then 2 mg daily x 4 days this week. Then  Continue warfarin 2 mg Tues, Thurs, Sunday and 1 mg daily ROW. 3. Recall patient did not have information regarding her dentist or dental procedure at the last visit. Will contact [facility] for clarification. Patient was instructed to schedule an appointment in 1 week(s) prior to leaving the clinic. Medication reconciliation was completed during the visit. There are no discontinued medications. A full discussion of the nature of anticoagulants has been carried out. A full discussion of the need for frequent and regular monitoring, precise dosage adjustment and compliance was stressed.   Side effects of potential bleeding were discussed and Ms. More Hernández was instructed to call [phone] if there are any signs of abnormal bleeding. Ms. More Hernández was instructed to avoid any OTC items containing aspirin or ibuprofen and prior to starting any new OTC products to consult with her physician or pharmacist to ensure no drug interactions are present. Ms. More Hernández was instructed to avoid any major changes in her general diet and to avoid alcohol consumption. Ms. More Hernández was provided information in the AVS that includes topics on understanding coumadin therapy, drug interaction considerations, vitamin K and coumadin use, interactions with foods and supplements containing vitamin K, and the use of herbal products. Ms. More Hernández verbalized her understanding of all instructions and will call the office with any questions, concerns, or signs of abnormal bleeding or blood clot. Notifications of recommendations will be sent to Dr. Bee Reynoso DO for review. Thank you,  Zulema Tiwari, PharmD, BCACP, [address] [facility] in place:  Yes   Recommendation Provided To: Patient/Caregiver: 4 via In person   Intervention Detail: Adherence Monitorin, Dose Adjustment: 1, reason: Therapy Optimization, Lab(s) Ordered and Scheduled Appointment   Intervention Accepted By: Patient/Caregiver: 4   Time Spent (min): 20

## 2022-04-12 ENCOUNTER — ANTI-COAG VISIT (OUTPATIENT)
Dept: INTERNAL MEDICINE CLINIC | Age: 82
End: 2022-04-12
Payer: COMMERCIAL

## 2022-04-12 ENCOUNTER — TELEPHONE (OUTPATIENT)
Dept: INTERNAL MEDICINE CLINIC | Age: 82
End: 2022-04-12

## 2022-04-12 VITALS — BODY MASS INDEX: 29.83 KG/M2 | TEMPERATURE: 96.9 F | WEIGHT: 158 LBS | HEIGHT: 61 IN

## 2022-04-12 DIAGNOSIS — Z86.718 HISTORY OF DVT OF LOWER EXTREMITY: Primary | ICD-10-CM

## 2022-04-12 DIAGNOSIS — D68.59 PROTEIN C DEFICIENCY (HCC): ICD-10-CM

## 2022-04-12 LAB
INR BLD: 1.4 (ref 1–1.5)
PT POC: 17.4 SECONDS (ref 9.1–12)
VALID INTERNAL CONTROL?: YES

## 2022-04-12 PROCEDURE — 85610 PROTHROMBIN TIME: CPT | Performed by: PHARMACIST

## 2022-04-12 PROCEDURE — 99212 OFFICE O/P EST SF 10 MIN: CPT | Performed by: PHARMACIST

## 2022-04-12 RX ORDER — ALUMINUM ZIRCONIUM OCTACHLOROHYDREX GLY 16 G/100G
1 GEL TOPICAL
Qty: 90 CAPSULE | Refills: 3 | Status: SHIPPED | OUTPATIENT
Start: 2022-04-12 | End: 2022-04-15 | Stop reason: SDUPTHER

## 2022-04-12 RX ORDER — ALUMINUM ZIRCONIUM OCTACHLOROHYDREX GLY 16 G/100G
1 GEL TOPICAL
Qty: 90 CAPSULE | Refills: 3 | Status: CANCELLED | OUTPATIENT
Start: 2022-04-12

## 2022-04-12 NOTE — TELEPHONE ENCOUNTER
PCP: Jared Mcdaniel DO    Last appt: 3/29/2022  Future Appointments   Date Time Provider Toro Valderrama   4/26/2022 12:00 PM DONNA Loaiza [facility] BS AMB   5/2/2022  1:15 PM Mami Galan PHARMD [facility] BS AMB   8/4/2022  1:30 PM Jared Mcdaniel DO [facility] BS AMB       Requested Prescriptions     Pending Prescriptions Disp Refills    Bifidobacterium Infantis (Align) 4 mg cap 90 Capsule 3     Sig: Take 1 Capsule by mouth daily as needed for Diarrhea.  Indications: ALIGN OTC       Prior labs and Blood pressures:  BP Readings from Last 3 Encounters:   03/29/22 131/73   03/29/22 131/73   03/02/22 139/84     Lab Results   Component Value Date/Time    Sodium 142 06/25/2021 11:52 AM    Potassium 4.0 06/25/2021 11:52 AM    Chloride 111 (H) 06/25/2021 11:52 AM    CO2 23 06/25/2021 11:52 AM    Anion gap 8 06/25/2021 11:52 AM    Glucose 128 (H) 06/25/2021 11:52 AM    BUN 17 06/25/2021 11:52 AM    Creatinine 0.73 06/25/2021 11:52 AM    BUN/Creatinine ratio 23 (H) 06/25/2021 11:52 AM    GFR est AA >60 06/25/2021 11:52 AM    GFR est non-AA >60 06/25/2021 11:52 AM    Calcium 8.8 06/25/2021 11:52 AM     Lab Results   Component Value Date/Time    Hemoglobin A1c 7.3 (H) 06/25/2021 11:52 AM    Hemoglobin A1c (POC) 7.6 (A) 03/29/2022 01:24 PM     Lab Results   Component Value Date/Time    Cholesterol, total 107 06/25/2021 11:52 AM    HDL Cholesterol 52 06/25/2021 11:52 AM    LDL, calculated 39 06/25/2021 11:52 AM    VLDL, calculated 16 06/25/2021 11:52 AM    Triglyceride 80 06/25/2021 11:52 AM    CHOL/HDL Ratio 2.1 06/25/2021 11:52 AM     No results found for: ELISSA Zelaya    Lab Results   Component Value Date/Time    TSH 3.10 06/25/2021 11:52 AM

## 2022-04-12 NOTE — TELEPHONE ENCOUNTER
Patient is having migraines and wants to check to see if you could place her back on Bifidobacterium Infantis. She would like to pick it up after she sees [place] today and [facility] . Thanks.

## 2022-04-12 NOTE — PROGRESS NOTES
Pharmacy Progress Note     Contacted [facility] regarding Ms. Josetta Skiff 's upcoming dental procedure. Confirmed she has a dental implant procedure scheduled for 5/2/22. Per Dr Ayad Goss, he can complete procedure if patient's INR is between 2-3. Minimal bleeding is expected for this procedure. [phone]      Thank you for the consult,  Zulema Abbott Cea, PharmD, BCACP, [address] in [address]:  Yes   Recommendation Provided To: Provider: 1 via Called provider office    Time Spent (min): 15

## 2022-04-15 RX ORDER — ALUMINUM ZIRCONIUM OCTACHLOROHYDREX GLY 16 G/100G
1 GEL TOPICAL
Qty: 90 CAPSULE | Refills: 3 | Status: SHIPPED | OUTPATIENT
Start: 2022-04-15

## 2022-04-15 RX ORDER — BUTALBITAL, ACETAMINOPHEN AND CAFFEINE 50; 325; 40 MG/1; MG/1; MG/1
1 TABLET ORAL
Qty: 30 TABLET | Refills: 1 | Status: SHIPPED | OUTPATIENT
Start: 2022-04-15 | End: 2022-07-15

## 2022-04-15 NOTE — TELEPHONE ENCOUNTER
Called patient. ID verified with Name and . Spoke with patient in regards to information received. Informed patient, per provider, \"RX sent in for fioricet. Probably should not be driving any longer\". Patient states that she understands the information received and has no further questions or concerns at this time.
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
## 2022-04-15 NOTE — TELEPHONE ENCOUNTER
----- Message from Lilliam Michelle sent at 4/14/2022  6:27 PM EDT -----  Subject: Refill Request    QUESTIONS  Name of Medication? Bifidobacterium Infantis (Align) 4 mg cap  Patient-reported dosage and instructions? 4mg as needed  How many days do you have left? 0  [facility]? [id number]  Pharmacy phone number (if available)? [phone]  Additional Information for Provider? pt having severe migraines and other   meds wont work, pt asking for this med to be refilled, needs new   prescription for it  ---------------------------------------------------------------------------  --------------  CALL BACK INFO  What is the best way for the office to contact you? OK to leave message on   voicemail  Preferred Call Back Phone Number? [phone]  ---------------------------------------------------------------------------  --------------  SCRIPT ANSWERS  Relationship to Patient?  Self
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
## 2022-04-15 NOTE — TELEPHONE ENCOUNTER
PCP: Kevin Hameed DO    Last appt: 3/29/2022  Future Appointments   Date Time Provider Toro Valderrama   4/26/2022 12:00 PM DONNA Green [facility] BS AMB   5/2/2022  1:15 PM Anu Valdez PHARMD [facility] BS AMB   8/4/2022  1:30 PM Kevin Hameed DO [facility] BS AMB       Requested Prescriptions     Pending Prescriptions Disp Refills    butalbital-acetaminophen-caffeine (FIORICET, ESGIC) -40 mg per tablet 30 Tablet 1     Sig: Take 1 Tablet by mouth every twelve (12) hours as needed for Headache.        Prior labs and Blood pressures:  BP Readings from Last 3 Encounters:   03/29/22 131/73   03/29/22 131/73   03/02/22 139/84     Lab Results   Component Value Date/Time    Sodium 142 06/25/2021 11:52 AM    Potassium 4.0 06/25/2021 11:52 AM    Chloride 111 (H) 06/25/2021 11:52 AM    CO2 23 06/25/2021 11:52 AM    Anion gap 8 06/25/2021 11:52 AM    Glucose 128 (H) 06/25/2021 11:52 AM    BUN 17 06/25/2021 11:52 AM    Creatinine 0.73 06/25/2021 11:52 AM    BUN/Creatinine ratio 23 (H) 06/25/2021 11:52 AM    GFR est AA >60 06/25/2021 11:52 AM    GFR est non-AA >60 06/25/2021 11:52 AM    Calcium 8.8 06/25/2021 11:52 AM     Lab Results   Component Value Date/Time    Hemoglobin A1c 7.3 (H) 06/25/2021 11:52 AM    Hemoglobin A1c (POC) 7.6 (A) 03/29/2022 01:24 PM     Lab Results   Component Value Date/Time    Cholesterol, total 107 06/25/2021 11:52 AM    HDL Cholesterol 52 06/25/2021 11:52 AM    LDL, calculated 39 06/25/2021 11:52 AM    VLDL, calculated 16 06/25/2021 11:52 AM    Triglyceride 80 06/25/2021 11:52 AM    CHOL/HDL Ratio 2.1 06/25/2021 11:52 AM     No results found for: ELISSA Durán    Lab Results   Component Value Date/Time    TSH 3.10 06/25/2021 11:52 AM

## 2022-04-21 ENCOUNTER — TELEPHONE (OUTPATIENT)
Dept: INTERNAL MEDICINE CLINIC | Age: 82
End: 2022-04-21

## 2022-04-21 NOTE — TELEPHONE ENCOUNTER
Called patient. ID verified with Name and . Spoke with patient in regards to information received. Informed patient, per provider, \"She can discuss with ENT. Refer her to dr Pinedo Patient. \" Patient states that she already has a lot of providers that she has to see, especially with having heart surgery soon. Patient states that, once she gets other issues situated, she would call [facility] for scheduling. Writer confirmed understanding. Patient had no further questions or concerns at this time.

## 2022-04-21 NOTE — TELEPHONE ENCOUNTER
----- Message from [employer] sent at 4/20/2022  6:23 PM EDT -----  Subject: Message to Provider    QUESTIONS  Information for Provider? Needs Artur Weathers to know that she will be   having heart surgery in May. Also, has chronic bronchitis and would like a   antibiotic to be called in. Pt think she currently has a infection. Would   like a call back. ---------------------------------------------------------------------------  --------------  Alessandraalma rosa [place] INFO  What is the best way for the office to contact you? OK to leave message on   voicemail  Preferred Call Back Phone Number? [phone]  ---------------------------------------------------------------------------  --------------  SCRIPT ANSWERS  Relationship to Patient?  Self
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
## 2022-04-22 NOTE — TELEPHONE ENCOUNTER
Pt states she is returning your call. She knows you spoke yesterday, but did you call her back? Pt states call only if needed. If you didn't call her, don't worry about it she states. Thanks.

## 2022-04-22 NOTE — TELEPHONE ENCOUNTER
Information noted by clinical staff. Writer did not contact patient after last encounter. No further actions required at this time.

## 2022-04-24 NOTE — PATIENT INSTRUCTIONS
Today your INR was 2.9. Your goal INR is  2.0-3.0 . You have a 2 mg tablet of Coumadin (warfarin). Take Coumadin (warfarin) as follows: Take 1 mg tonight. Then continue warfarin 2 mg on Tuesday, Thursday, Sunday and 1 mg daily the rest of the week. Come back in 2 week(s) for your next finger stick/INR blood test.        Avoid any over the counter items containing aspirin or ibuprofen, and avoid great swings in general diet. Avoid alcohol consumption. Please notify the INR nurse if you are started on any new medication including over the counter or herbal supplements. Also, please notify your INR nurse if any of your other prescription or over the counter medications have been discontinued. Call [facility] at [phone] if you have any signs of abnormal bleeding/blood clot.  ------------------------------------------------------------------------------------------------------------------  Taking Warfarin Safely: Care Instructions    Your Care Instructions  Warfarin is a medicine that you take to prevent blood clots. It is often called a blood thinner. Doctors give warfarin (such as Coumadin) to reduce the risk of blood clots. You may be at risk for blood clots if you have atrial fibrillation or deep vein thrombosis. Some other health problems may also put you at risk. Warfarin slows the amount of time it takes for your blood to clot. It can cause bleeding problems. Even if you've been taking warfarin for a while, it's important to know how to take it safely. Foods and other medicines can affect the way warfarin works. Some can make warfarin work too well. This can cause bleeding problems. And some can make it work poorly, so that it does not prevent blood clots very well. You will need regular blood tests to check how long it takes for your blood to form a clot. This test is called a PT or prothrombin time test. The result of the test is called an INR level.  Depending on the test results, your doctor or anticoagulation clinic may adjust your dose of warfarin. Follow-up care is a key part of your treatment and safety. Be sure to make and go to all appointments, and call your doctor if you are having problems. It's also a good idea to know your test results and keep a list of the medicines you take. How can you care for yourself at home? Take warfarin safely  · Take your warfarin at the same time each day. · If you miss a dose of warfarin, don't take an extra dose to make up for it. Your doctor can tell you exactly what to do so you don't take too much or too little. · Wear medical alert jewelry that lets others know that you take warfarin. You can buy this at most drugstores. · Don't take warfarin if you are pregnant or planning to get pregnant. Talk to your doctor about how you can prevent getting pregnant while you are taking it. · Don't change your dose or stop taking warfarin unless your doctor tells you to. Effects of medicines and food on warfarin  · Don't start or stop taking any medicines, vitamins, or natural remedies unless you first talk to your doctor. Many medicines can affect how warfarin works. These include aspirin and other pain relievers, over-the-counter medicines, multivitamins, dietary supplements, and herbal products. · Tell all of your doctors and pharmacists that you take warfarin. Some prescription medicines can affect how warfarin works. · Keep the amount of vitamin K in your diet about the same from day to day. Do not suddenly eat a lot more or a lot less food that is rich in vitamin K than you usually do. Vitamin K affects how warfarin works and how your blood clots. Talk with your doctor before making big changes in your diet. Vitamin K is in many foods, such as:  ¨ Leafy greens, such as kale, cabbage, spinach, Swiss chard, and lettuce. ¨ Canola and soybean oils.   ¨ Green vegetables, such as asparagus, broccoli, and [address] sprouts. ¨ Vegetable drinks, green tea leaves, and some dietary supplement drinks. · Avoid cranberry juice and other cranberry products. They can increase the effects of warfarin. · Limit your use of alcohol. Avoid bleeding by preventing falls and injuries  · Wear slippers or shoes with nonskid soles. · Remove throw rugs and clutter. · Rearrange furniture and electrical cords to keep them out of walking paths. · Keep stairways, porches, and outside walkways well lit. Use night-lights in hallways and bathrooms. · Be extra careful when you work with sharp tools or knives. When should you call for help? Call 911 anytime you think you may need emergency care. For example, call if:  · You have a sudden, severe headache that is different from past headaches. Call your doctor now or seek immediate medical care if:  · You have any abnormal bleeding, such as:  ¨ Nosebleeds. ¨ Vaginal bleeding that is different (heavier, more frequent, at a different time of the month) than what you are used to. ¨ Bloody or black stools, or rectal bleeding. ¨ Bloody or pink urine. Watch closely for changes in your health, and be sure to contact your doctor if you have any problems. Where can you learn more? Go to http://www.gray.com/. Enter P191 in the search box to learn more about \"Taking Warfarin Safely: Care Instructions. \"  Current as of: January 27, 2016  Content Version: 11.1  © 5303-1353 [employer]. Care instructions adapted under license by [employer] (which disclaims liability or warranty for this information). If you have questions about a medical condition or this instruction, always ask your healthcare professional. [place] [address] any warranty or liability for your use of this information.
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
## 2022-04-25 ENCOUNTER — TELEPHONE (OUTPATIENT)
Dept: INTERNAL MEDICINE CLINIC | Age: 82
End: 2022-04-25

## 2022-04-25 NOTE — TELEPHONE ENCOUNTER
----- Message from Tessa Short sent at 4/23/2022 12:00 PM EDT -----  Subject: Message to Provider    QUESTIONS  Information for Provider? has called to confirm she will be in on the 26th     ---------------------------------------------------------------------------  --------------  [address]  What is the best way for the office to contact you? OK to leave message on   voicemail  Preferred Call Back Phone Number? [phone]  ---------------------------------------------------------------------------  --------------  SCRIPT ANSWERS  Relationship to Patient?  Self

## 2022-04-25 NOTE — TELEPHONE ENCOUNTER
----- Message from Marlene Rodríguez sent at 4/25/2022 11:50 AM EDT -----  Subject: Message to Provider    QUESTIONS  Information for Provider? PT wants to give the provider a message, PT is   in a ton of pain and cannot get an ortho physician to do a back non   surgical procedure, PT would like to know if Dr MARQUITA MUNOZ would like his   opinion.   ---------------------------------------------------------------------------  --------------  CALL BACK INFO  What is the best way for the office to contact you? Do not leave any   message, patient will call back for answer  Preferred Call Back Phone Number? [phone]  ---------------------------------------------------------------------------  --------------  SCRIPT ANSWERS  Relationship to Patient?  Self

## 2022-04-25 NOTE — TELEPHONE ENCOUNTER
Called pt to confirm apt on 4/26. Pt stated she is experiencing extreme pain and want to know if we can refer her to an ortho provider she cannot find anyone with appointment availability.

## 2022-04-25 NOTE — TELEPHONE ENCOUNTER
Called pt, 2 identifiers, pt reports she has arthritis and has pain, has been unable to find an orthopedic doctor, reports she has called more than 15 times and unable to get an appointment, pt reports she is going to have her son take her to the emergency room \"one night\" and have the procedure done. Explained to patient that unfortunately that she would need to find the provider that does the procedure she needs and she reports she is unable to wait.

## 2022-04-26 ENCOUNTER — ANTI-COAG VISIT (OUTPATIENT)
Dept: INTERNAL MEDICINE CLINIC | Age: 82
End: 2022-04-26
Payer: COMMERCIAL

## 2022-04-26 VITALS — OXYGEN SATURATION: 95 % | HEART RATE: 90 BPM | SYSTOLIC BLOOD PRESSURE: 121 MMHG | DIASTOLIC BLOOD PRESSURE: 63 MMHG

## 2022-04-26 DIAGNOSIS — D68.59 PROTEIN C DEFICIENCY (HCC): Primary | ICD-10-CM

## 2022-04-26 DIAGNOSIS — N39.0 URINARY TRACT INFECTION WITHOUT HEMATURIA, SITE UNSPECIFIED: ICD-10-CM

## 2022-04-26 DIAGNOSIS — Z86.718 HISTORY OF DVT OF LOWER EXTREMITY: ICD-10-CM

## 2022-04-26 LAB
INR BLD: 2.9 (ref 1–1.5)
PT POC: 34.5 SECONDS (ref 9.1–12)
VALID INTERNAL CONTROL?: YES

## 2022-04-26 PROCEDURE — 85610 PROTHROMBIN TIME: CPT | Performed by: PHARMACIST

## 2022-04-26 PROCEDURE — 99212 OFFICE O/P EST SF 10 MIN: CPT | Performed by: PHARMACIST

## 2022-04-29 ENCOUNTER — TELEPHONE (OUTPATIENT)
Dept: INTERNAL MEDICINE CLINIC | Age: 82
End: 2022-04-29

## 2022-04-29 NOTE — TELEPHONE ENCOUNTER
Called, spoke with Pt. Two pt identifiers confirmed. Pt informed provider would like a Urine sample before prescribing medication. Pt stated she will try to come next week.

## 2022-04-29 NOTE — TELEPHONE ENCOUNTER
Pt states has UTI    States constantly having to urinate    States burning in vaginal area    States \"had many in her life and she knows that it is a uti\"    States please do medication as done before.     States use [facility]:   [id number] - [address], [facility] AT [facility] [address]            Call pt if needed [phone]

## 2022-05-04 DIAGNOSIS — N39.0 URINARY TRACT INFECTION WITHOUT HEMATURIA, SITE UNSPECIFIED: Primary | ICD-10-CM

## 2022-05-04 RX ORDER — WARFARIN 1 MG/1
1 TABLET ORAL DAILY
Qty: 90 TABLET | Refills: 3 | Status: SHIPPED | OUTPATIENT
Start: 2022-05-04

## 2022-05-04 RX ORDER — METOPROLOL SUCCINATE 50 MG/1
50 TABLET, EXTENDED RELEASE ORAL DAILY
Qty: 90 TABLET | Refills: 3 | Status: SHIPPED | OUTPATIENT
Start: 2022-05-04 | End: 2022-08-22

## 2022-05-04 NOTE — TELEPHONE ENCOUNTER
Future Appointments:  Future Appointments   Date Time Provider Toro Valderrama   5/9/2022 12:30 PM DONNA Multani [facility] BS AMB   8/4/2022  1:30 PM Whitney Esparza DO [facility] BS AMB        Last Appointment With Me:  3/29/2022     Requested Prescriptions     Pending Prescriptions Disp Refills    warfarin (COUMADIN) 1 mg tablet 30 Tablet 0     Sig: Take 1 Tablet by mouth daily. Tetracycline Counseling: Patient counseled regarding possible photosensitivity and increased risk for sunburn.  Patient instructed to avoid sunlight, if possible.  When exposed to sunlight, patients should wear protective clothing, sunglasses, and sunscreen.  The patient was instructed to call the office immediately if the following severe adverse effects occur:  hearing changes, easy bruising/bleeding, severe headache, or vision changes.  The patient verbalized understanding of the proper use and possible adverse effects of tetracycline.  All of the patient's questions and concerns were addressed. Patient understands to avoid pregnancy while on therapy due to potential birth defects.

## 2022-05-04 NOTE — TELEPHONE ENCOUNTER
Patient states that she collected a urine sample today. States labeled container with name and collection date    States sending fam member over to deliver it to .  notified by this psr to expect sample and to notify  upon arrival.    Pt states for UTI.

## 2022-05-04 NOTE — TELEPHONE ENCOUNTER
----- Message from Neha Monaco sent at 5/3/2022  6:17 PM EDT -----  Subject: Refill Request    QUESTIONS  Name of Medication? warfarin (COUMADIN) 2 mg tablet  Patient-reported dosage and instructions? as directed She need the 1mg   instead of 2mg  How many days do you have left? 0  Preferred Pharmacy? [id number]  Pharmacy phone number (if available)? [phone]  Additional Information for Provider? Pt need 1mg only  ---------------------------------------------------------------------------  --------------  CALL BACK INFO  What is the best way for the office to contact you? OK to leave message on   voicemail  Preferred Call Back Phone Number? [phone]  ---------------------------------------------------------------------------  --------------  SCRIPT ANSWERS  Relationship to Patient?  Self

## 2022-05-04 NOTE — TELEPHONE ENCOUNTER
----- Message from Natalie Corona sent at 5/3/2022  6:19 PM EDT -----  Subject: Refill Request    QUESTIONS  Name of Medication? metoprolol succinate (TOPROL-XL) 50 mg XL tablet  Patient-reported dosage and instructions? 1 at night  How many days do you have left? 0  Preferred Pharmacy? [facility] #[id number]  Pharmacy phone number (if available)? [phone]  ---------------------------------------------------------------------------  --------------  Abril ROBLES  What is the best way for the office to contact you? OK to leave message on   voicemail  Preferred Call Back Phone Number? [phone]  ---------------------------------------------------------------------------  --------------  SCRIPT ANSWERS  Relationship to Patient?  Self

## 2022-05-05 ENCOUNTER — TELEPHONE (OUTPATIENT)
Dept: INTERNAL MEDICINE CLINIC | Age: 82
End: 2022-05-05

## 2022-05-05 LAB
APPEARANCE UR: CLEAR
BACTERIA URNS QL MICRO: NEGATIVE /HPF
BILIRUB UR QL: NEGATIVE
COLOR UR: ABNORMAL
EPITH CASTS URNS QL MICRO: ABNORMAL /LPF
GLUCOSE UR STRIP.AUTO-MCNC: NEGATIVE MG/DL
HGB UR QL STRIP: NEGATIVE
KETONES UR QL STRIP.AUTO: NEGATIVE MG/DL
LEUKOCYTE ESTERASE UR QL STRIP.AUTO: ABNORMAL
NITRITE UR QL STRIP.AUTO: NEGATIVE
PH UR STRIP: 6 [PH] (ref 5–8)
PROT UR STRIP-MCNC: NEGATIVE MG/DL
RBC #/AREA URNS HPF: ABNORMAL /HPF (ref 0–5)
SP GR UR REFRACTOMETRY: 1.01 (ref 1–1.03)
UROBILINOGEN UR QL STRIP.AUTO: 0.2 EU/DL (ref 0.2–1)
WBC URNS QL MICRO: ABNORMAL /HPF (ref 0–4)

## 2022-05-05 RX ORDER — NITROFURANTOIN 25; 75 MG/1; MG/1
100 CAPSULE ORAL 2 TIMES DAILY
Qty: 10 CAPSULE | Refills: 0 | Status: SHIPPED | OUTPATIENT
Start: 2022-05-05 | End: 2022-05-10

## 2022-05-05 NOTE — PROGRESS NOTES
Call Patient to let her know her urinalysis test result. She had a feeling of the result, I let her know the medication is at the pharmacy to be .

## 2022-05-05 NOTE — TELEPHONE ENCOUNTER
Pt state that she wasn't feeling good. I told her medication was ready at Pharmacy and she was pleased.
[facility] for Tobacco Control email [email or web address]

## 2022-05-06 ENCOUNTER — TELEPHONE (OUTPATIENT)
Dept: INTERNAL MEDICINE CLINIC | Age: 82
End: 2022-05-06

## 2022-05-06 LAB
BACTERIA SPEC CULT: NORMAL
CC UR VC: NORMAL
SERVICE CMNT-IMP: NORMAL

## 2022-05-06 NOTE — TELEPHONE ENCOUNTER
Called patient. ID verified with Name and . Spoke with patient in regards to message received. Patient states that the pharmacy inquired if patient was allergic to medication that was prescribed for UTI. Patient states that she has never taken this particular medication and felt ensured that provider knew what to prescribe as we have a list of patient's allergies. Writer informed patient that provider is made aware while entering medications if patient could possibly be allergic to something and provider would not move forward with medication if he felt patient would have a reaction to it. Patient states that she understands the information received and has no further questions or concerns at this time.

## 2022-05-06 NOTE — TELEPHONE ENCOUNTER
----- Message from Earmon Scheuermann sent at 5/5/2022  6:46 PM EDT -----  Subject: Message to Provider    QUESTIONS  Information for Provider? Pt got told by the pharmacy that she might be   allergic to her antibiotic the doctor sent in. Please giver her a call   first thing in morning. She said that the pharmacy named off everything   and she is not allergic to anything the pharmacists said.  ---------------------------------------------------------------------------  --------------  [employer]  What is the best way for the office to contact you? OK to leave message on   voicemail  Preferred Call Back Phone Number? [phone]  ---------------------------------------------------------------------------  --------------  SCRIPT ANSWERS  Relationship to Patient?  Self

## 2022-05-06 NOTE — PROGRESS NOTES
Called patient. ID verified with Name and . Spoke with patient in regards to recent lab results. Informed patient, per provider, \"Urine cx with no bacterial growth, but would still take and finish the 5 days of macrobid. \"    Patient states that she understands the information received and has no further actions required at this time.

## 2022-05-09 ENCOUNTER — TELEPHONE (OUTPATIENT)
Dept: INTERNAL MEDICINE CLINIC | Age: 82
End: 2022-05-09

## 2022-05-09 DIAGNOSIS — D68.59 PROTEIN C DEFICIENCY (HCC): ICD-10-CM

## 2022-05-09 DIAGNOSIS — Z86.718 HISTORY OF DVT OF LOWER EXTREMITY: Primary | ICD-10-CM

## 2022-05-09 NOTE — TELEPHONE ENCOUNTER
Pharmacy Progress Note - Telephone Encounter    S/O: Ms. Laureen Chand 78 y.o. female, referred by Dr. Lev Boyce, was contacted via an outbound telephone call to discuss her INR today. Verified patients identifiers (name & ) per HIPAA policy. - Taking Macrobid - will finish abx tomorrow for UTI  - Reports severe back pain. \"Has been in bed all weekend. \"     A/P:  - Offer to reschedule patient for INR tomorrow or Wednesday. - Will place a PT/INR order for lab collection this Thursday per patient's request   - Patient endorses understanding to the provided information. All questions answered at this time. Thank you,  Zulema Buchanan, PharmD, BCACP, [address] in place:  Yes   Recommendation Provided To: Patient/Caregiver: 1 via Telephone   Intervention Detail: Lab(s) Ordered   Gap Closed?:    Intervention Accepted By: Patient/Caregiver: 1   Time Spent (min): 10

## 2022-05-09 NOTE — TELEPHONE ENCOUNTER
Patient called in office to cancel her INR appt today 5/9/22, I offered to reschedule to next available on 5/23 and patient declined to wait that long. She states she would like to speak with Perry Butler today about her INR.   Patient stated she is having back pain and that is why she has to cancel todays appt

## 2022-05-13 ENCOUNTER — APPOINTMENT (OUTPATIENT)
Dept: INTERNAL MEDICINE CLINIC | Age: 82
End: 2022-05-13

## 2022-05-13 DIAGNOSIS — M54.9 CHRONIC BACK PAIN GREATER THAN 3 MONTHS DURATION: ICD-10-CM

## 2022-05-13 DIAGNOSIS — G89.29 CHRONIC BACK PAIN GREATER THAN 3 MONTHS DURATION: ICD-10-CM

## 2022-05-13 LAB
INR PPP: 2.6 (ref 0.9–1.1)
PROTHROMBIN TIME: 25.2 SEC (ref 9–11.1)

## 2022-05-13 RX ORDER — LOPERAMIDE HYDROCHLORIDE 2 MG/1
2 CAPSULE ORAL
Qty: 30 CAPSULE | Refills: 4 | Status: SHIPPED | OUTPATIENT
Start: 2022-05-13 | End: 2022-10-17

## 2022-05-13 RX ORDER — TRAMADOL HYDROCHLORIDE 50 MG/1
50 TABLET ORAL
Qty: 60 TABLET | Refills: 2 | Status: SHIPPED | OUTPATIENT
Start: 2022-05-13 | End: 2022-06-12

## 2022-05-13 NOTE — TELEPHONE ENCOUNTER
Future Appointments:  Future Appointments   Date Time Provider Toro Jannie   8/4/2022  1:30 PM [facility] AMB        Last Appointment With Me:  3/29/2022     Requested Prescriptions     Pending Prescriptions Disp Refills    traMADoL (ULTRAM) 50 mg tablet 60 Tablet 2     Sig: Take 1 Tablet by mouth every twelve (12) hours as needed for Pain for up to 30 days. Max Daily Amount: 100 mg.  loperamide (IMODIUM) 2 mg capsule 30 Capsule 4     Sig: Take 1 Capsule by mouth four (4) times daily as needed for Diarrhea.

## 2022-05-13 NOTE — TELEPHONE ENCOUNTER
Pt states that she has to have these medications today. Pt states she has tried to get through to our office for two days? Pt advised of 48/72 hour turn around on refills, but would do our best.  Pt states she needs the one for pain and the other because of her IBS which is really bad right now.

## 2022-05-21 ENCOUNTER — TELEPHONE (OUTPATIENT)
Dept: INTERNAL MEDICINE CLINIC | Age: 82
End: 2022-05-21

## 2022-05-21 NOTE — TELEPHONE ENCOUNTER
Patient called complaining of pain all over and pain in her back   she takes tramadol for pain   Requesting stronger pain medicine - discussed cannot prescribe stronger pain medicine and should continue with tramadol as needed  Also wants to notify PCP  She has a heart issue and may need heart surgery  Message sent to Dr Aixa Briseno

## 2022-05-23 ENCOUNTER — TELEPHONE (OUTPATIENT)
Dept: INTERNAL MEDICINE CLINIC | Age: 82
End: 2022-05-23

## 2022-05-23 NOTE — TELEPHONE ENCOUNTER
Patient called requesting something stronger than tramadol for back pain at this time. Patient spoke with on call this weekend in regards to her back pain and on call was not okay with prescribing anything else.

## 2022-05-23 NOTE — TELEPHONE ENCOUNTER
#[phone] pt states she got the on call doctor on Saturday. Pt states she has severe back pain that is caused by a heart problem they found out through and echo. Pt will be having heart surgery soon she states. Pt is asking for something stronger than she takes now for pain. Please call pt to advise.

## 2022-05-23 NOTE — TELEPHONE ENCOUNTER
Patient states that Dr. Radha Gillespie isn't going to prescribe anything and informed patient to contact provider.

## 2022-05-24 NOTE — TELEPHONE ENCOUNTER
Patient returned call to office in regards to message received. Writer informed patient, per provider, \"I gave her ultram.  No plans to go any stronger than that. Zari Mo can see pain management if she wants, dr gunn.\". Patient states that she feels she is not in a position to see another doctor as she has a hard time getting around. Patient inquired if she could increase the dosage of the Ultram at this time. Informed patient, per provider, he would not be okay with patient increasing dosage at this time. Patient states that she is \"upset and disappointed\" by providers response and would be looking for a new provider at this time. Writer confirmed understanding of information received. Patient had no further questions or concerns at this time.

## 2022-06-07 ENCOUNTER — HOSPITAL ENCOUNTER (INPATIENT)
Age: 82
LOS: 9 days | Discharge: NURSING, REHAB OR LONG-TERM CARE | DRG: 246 | End: 2022-06-16
Attending: INTERNAL MEDICINE | Admitting: INTERNAL MEDICINE
Payer: MEDICARE

## 2022-06-07 DIAGNOSIS — Z51.5 PALLIATIVE CARE ENCOUNTER: ICD-10-CM

## 2022-06-07 DIAGNOSIS — Z71.89 GOALS OF CARE, COUNSELING/DISCUSSION: ICD-10-CM

## 2022-06-07 DIAGNOSIS — R07.9 CHEST PAIN, UNSPECIFIED TYPE: ICD-10-CM

## 2022-06-07 DIAGNOSIS — R06.09 DYSPNEA ON EXERTION: ICD-10-CM

## 2022-06-07 PROBLEM — I25.10 CORONARY ARTERY DISEASE: Status: ACTIVE | Noted: 2022-06-07

## 2022-06-07 LAB
ANION GAP SERPL CALC-SCNC: 6 MMOL/L (ref 5–15)
BASOPHILS # BLD: 0 K/UL (ref 0–0.1)
BASOPHILS NFR BLD: 0 % (ref 0–1)
BUN SERPL-MCNC: 24 MG/DL (ref 6–20)
BUN/CREAT SERPL: 24 (ref 12–20)
CALCIUM SERPL-MCNC: 8.8 MG/DL (ref 8.5–10.1)
CHLORIDE SERPL-SCNC: 104 MMOL/L (ref 97–108)
CO2 SERPL-SCNC: 24 MMOL/L (ref 21–32)
CREAT SERPL-MCNC: 1.02 MG/DL (ref 0.55–1.02)
DIFFERENTIAL METHOD BLD: ABNORMAL
EOSINOPHIL # BLD: 0 K/UL (ref 0–0.4)
EOSINOPHIL NFR BLD: 0 % (ref 0–7)
ERYTHROCYTE [DISTWIDTH] IN BLOOD BY AUTOMATED COUNT: 19.9 % (ref 11.5–14.5)
GLUCOSE SERPL-MCNC: 237 MG/DL (ref 65–100)
HCT VFR BLD AUTO: 30.8 % (ref 35–47)
HGB BLD-MCNC: 8.4 G/DL (ref 11.5–16)
IMM GRANULOCYTES # BLD AUTO: 0.1 K/UL (ref 0–0.04)
IMM GRANULOCYTES NFR BLD AUTO: 1 % (ref 0–0.5)
INR PPP: 1.3 (ref 0.9–1.1)
LYMPHOCYTES # BLD: 1 K/UL (ref 0.8–3.5)
LYMPHOCYTES NFR BLD: 7 % (ref 12–49)
MCH RBC QN AUTO: 17.5 PG (ref 26–34)
MCHC RBC AUTO-ENTMCNC: 27.3 G/DL (ref 30–36.5)
MCV RBC AUTO: 64 FL (ref 80–99)
MONOCYTES # BLD: 0.1 K/UL (ref 0–1)
MONOCYTES NFR BLD: 1 % (ref 5–13)
NEUTS SEG # BLD: 12.4 K/UL (ref 1.8–8)
NEUTS SEG NFR BLD: 91 % (ref 32–75)
NRBC # BLD: 0.02 K/UL (ref 0–0.01)
NRBC BLD-RTO: 0.1 PER 100 WBC
PLATELET # BLD AUTO: 213 K/UL (ref 150–400)
PLATELET COMMENTS,PCOM: ABNORMAL
PMV BLD AUTO: ABNORMAL FL (ref 8.9–12.9)
POTASSIUM SERPL-SCNC: 4.6 MMOL/L (ref 3.5–5.1)
PROTHROMBIN TIME: 13.4 SEC (ref 9–11.1)
RBC # BLD AUTO: 4.81 M/UL (ref 3.8–5.2)
RBC MORPH BLD: ABNORMAL
SODIUM SERPL-SCNC: 134 MMOL/L (ref 136–145)
WBC # BLD AUTO: 13.6 K/UL (ref 3.6–11)

## 2022-06-07 PROCEDURE — 77030042317 HC BND COMPR HEMSTAT -B: Performed by: INTERNAL MEDICINE

## 2022-06-07 PROCEDURE — 74011250636 HC RX REV CODE- 250/636: Performed by: INTERNAL MEDICINE

## 2022-06-07 PROCEDURE — 80048 BASIC METABOLIC PNL TOTAL CA: CPT

## 2022-06-07 PROCEDURE — C1769 GUIDE WIRE: HCPCS | Performed by: INTERNAL MEDICINE

## 2022-06-07 PROCEDURE — 4A023N7 MEASUREMENT OF CARDIAC SAMPLING AND PRESSURE, LEFT HEART, PERCUTANEOUS APPROACH: ICD-10-PCS | Performed by: INTERNAL MEDICINE

## 2022-06-07 PROCEDURE — 93454 CORONARY ARTERY ANGIO S&I: CPT | Performed by: INTERNAL MEDICINE

## 2022-06-07 PROCEDURE — 65270000046 HC RM TELEMETRY

## 2022-06-07 PROCEDURE — 74011250637 HC RX REV CODE- 250/637: Performed by: INTERNAL MEDICINE

## 2022-06-07 PROCEDURE — 2709999900 HC NON-CHARGEABLE SUPPLY: Performed by: INTERNAL MEDICINE

## 2022-06-07 PROCEDURE — 85025 COMPLETE CBC W/AUTO DIFF WBC: CPT

## 2022-06-07 PROCEDURE — C1894 INTRO/SHEATH, NON-LASER: HCPCS | Performed by: INTERNAL MEDICINE

## 2022-06-07 PROCEDURE — 77030010221 HC SPLNT WR POS TELE -B: Performed by: INTERNAL MEDICINE

## 2022-06-07 PROCEDURE — 74011000636 HC RX REV CODE- 636: Performed by: INTERNAL MEDICINE

## 2022-06-07 PROCEDURE — 77030040934 HC CATH DIAG DXTERITY MEDT -A: Performed by: INTERNAL MEDICINE

## 2022-06-07 PROCEDURE — 77030019698 HC SYR ANGI MDLON MRTM -A: Performed by: INTERNAL MEDICINE

## 2022-06-07 PROCEDURE — 36415 COLL VENOUS BLD VENIPUNCTURE: CPT

## 2022-06-07 PROCEDURE — 36200 PLACE CATHETER IN AORTA: CPT | Performed by: INTERNAL MEDICINE

## 2022-06-07 PROCEDURE — 74011000250 HC RX REV CODE- 250: Performed by: INTERNAL MEDICINE

## 2022-06-07 PROCEDURE — 99152 MOD SED SAME PHYS/QHP 5/>YRS: CPT | Performed by: INTERNAL MEDICINE

## 2022-06-07 PROCEDURE — 77030008543 HC TBNG MON PRSS MRTM -A: Performed by: INTERNAL MEDICINE

## 2022-06-07 PROCEDURE — 85610 PROTHROMBIN TIME: CPT

## 2022-06-07 PROCEDURE — 99153 MOD SED SAME PHYS/QHP EA: CPT | Performed by: INTERNAL MEDICINE

## 2022-06-07 PROCEDURE — 77030004549 HC CATH ANGI DX PRF MRTM -A: Performed by: INTERNAL MEDICINE

## 2022-06-07 PROCEDURE — 77030015766: Performed by: INTERNAL MEDICINE

## 2022-06-07 PROCEDURE — B2111ZZ FLUOROSCOPY OF MULTIPLE CORONARY ARTERIES USING LOW OSMOLAR CONTRAST: ICD-10-PCS | Performed by: INTERNAL MEDICINE

## 2022-06-07 RX ORDER — ESCITALOPRAM OXALATE 10 MG/1
20 TABLET ORAL DAILY
Status: DISCONTINUED | OUTPATIENT
Start: 2022-06-08 | End: 2022-06-17 | Stop reason: HOSPADM

## 2022-06-07 RX ORDER — ATORVASTATIN CALCIUM 20 MG/1
20 TABLET, FILM COATED ORAL DAILY
Status: DISCONTINUED | OUTPATIENT
Start: 2022-06-08 | End: 2022-06-07

## 2022-06-07 RX ORDER — BUPROPION HYDROCHLORIDE 150 MG/1
150 TABLET ORAL DAILY
Status: DISCONTINUED | OUTPATIENT
Start: 2022-06-08 | End: 2022-06-07

## 2022-06-07 RX ORDER — HEPARIN SODIUM 1000 [USP'U]/ML
INJECTION, SOLUTION INTRAVENOUS; SUBCUTANEOUS AS NEEDED
Status: DISCONTINUED | OUTPATIENT
Start: 2022-06-07 | End: 2022-06-07 | Stop reason: HOSPADM

## 2022-06-07 RX ORDER — EZETIMIBE 10 MG/1
10 TABLET ORAL DAILY
Status: DISCONTINUED | OUTPATIENT
Start: 2022-06-08 | End: 2022-06-17 | Stop reason: HOSPADM

## 2022-06-07 RX ORDER — SODIUM CHLORIDE 9 MG/ML
100 INJECTION, SOLUTION INTRAVENOUS CONTINUOUS
Status: DISPENSED | OUTPATIENT
Start: 2022-06-07 | End: 2022-06-08

## 2022-06-07 RX ORDER — DICYCLOMINE HYDROCHLORIDE 20 MG/1
20 TABLET ORAL
Status: DISPENSED | OUTPATIENT
Start: 2022-06-07 | End: 2022-06-09

## 2022-06-07 RX ORDER — LEVETIRACETAM 500 MG/1
500 TABLET ORAL 2 TIMES DAILY
Status: DISCONTINUED | OUTPATIENT
Start: 2022-06-08 | End: 2022-06-07

## 2022-06-07 RX ORDER — DICYCLOMINE HYDROCHLORIDE 20 MG/1
20 TABLET ORAL 4 TIMES DAILY
Status: DISCONTINUED | OUTPATIENT
Start: 2022-06-07 | End: 2022-06-07

## 2022-06-07 RX ORDER — SODIUM CHLORIDE 0.9 % (FLUSH) 0.9 %
5-40 SYRINGE (ML) INJECTION AS NEEDED
Status: DISCONTINUED | OUTPATIENT
Start: 2022-06-07 | End: 2022-06-17 | Stop reason: HOSPADM

## 2022-06-07 RX ORDER — AMITRIPTYLINE HYDROCHLORIDE 10 MG/1
10 TABLET, FILM COATED ORAL
Status: DISCONTINUED | OUTPATIENT
Start: 2022-06-07 | End: 2022-06-17 | Stop reason: HOSPADM

## 2022-06-07 RX ORDER — LOPERAMIDE HYDROCHLORIDE 2 MG/1
2 CAPSULE ORAL
Status: DISPENSED | OUTPATIENT
Start: 2022-06-07 | End: 2022-06-09

## 2022-06-07 RX ORDER — TRAMADOL HYDROCHLORIDE 50 MG/1
50 TABLET ORAL
Status: DISCONTINUED | OUTPATIENT
Start: 2022-06-07 | End: 2022-06-14

## 2022-06-07 RX ORDER — FENTANYL CITRATE 50 UG/ML
INJECTION, SOLUTION INTRAMUSCULAR; INTRAVENOUS AS NEEDED
Status: DISCONTINUED | OUTPATIENT
Start: 2022-06-07 | End: 2022-06-07 | Stop reason: HOSPADM

## 2022-06-07 RX ORDER — PREDNISONE 20 MG/1
40 TABLET ORAL EVERY 12 HOURS
Status: DISPENSED | OUTPATIENT
Start: 2022-06-08 | End: 2022-06-09

## 2022-06-07 RX ORDER — HEPARIN SODIUM 200 [USP'U]/100ML
INJECTION, SOLUTION INTRAVENOUS
Status: COMPLETED | OUTPATIENT
Start: 2022-06-07 | End: 2022-06-07

## 2022-06-07 RX ORDER — SODIUM CHLORIDE 0.9 % (FLUSH) 0.9 %
5-40 SYRINGE (ML) INJECTION EVERY 8 HOURS
Status: DISCONTINUED | OUTPATIENT
Start: 2022-06-07 | End: 2022-06-17 | Stop reason: HOSPADM

## 2022-06-07 RX ORDER — NALOXONE HYDROCHLORIDE 0.4 MG/ML
0.4 INJECTION, SOLUTION INTRAMUSCULAR; INTRAVENOUS; SUBCUTANEOUS AS NEEDED
Status: DISCONTINUED | OUTPATIENT
Start: 2022-06-07 | End: 2022-06-17 | Stop reason: HOSPADM

## 2022-06-07 RX ORDER — MIDAZOLAM HYDROCHLORIDE 1 MG/ML
INJECTION, SOLUTION INTRAMUSCULAR; INTRAVENOUS AS NEEDED
Status: DISCONTINUED | OUTPATIENT
Start: 2022-06-07 | End: 2022-06-07 | Stop reason: HOSPADM

## 2022-06-07 RX ORDER — LEVETIRACETAM 500 MG/1
500 TABLET ORAL DAILY
Status: COMPLETED | OUTPATIENT
Start: 2022-06-08 | End: 2022-06-09

## 2022-06-07 RX ORDER — BENZONATATE 100 MG/1
100 CAPSULE ORAL
Status: DISCONTINUED | OUTPATIENT
Start: 2022-06-07 | End: 2022-06-17 | Stop reason: HOSPADM

## 2022-06-07 RX ORDER — PANTOPRAZOLE SODIUM 40 MG/1
40 TABLET, DELAYED RELEASE ORAL
Status: DISCONTINUED | OUTPATIENT
Start: 2022-06-08 | End: 2022-06-08

## 2022-06-07 RX ORDER — ACETAMINOPHEN 325 MG/1
650 TABLET ORAL
Status: DISCONTINUED | OUTPATIENT
Start: 2022-06-07 | End: 2022-06-17 | Stop reason: HOSPADM

## 2022-06-07 RX ORDER — OXYBUTYNIN CHLORIDE 5 MG/1
10 TABLET, EXTENDED RELEASE ORAL DAILY
Status: DISCONTINUED | OUTPATIENT
Start: 2022-06-08 | End: 2022-06-07

## 2022-06-07 RX ORDER — CLOPIDOGREL 300 MG/1
300 TABLET, FILM COATED ORAL
Status: COMPLETED | OUTPATIENT
Start: 2022-06-07 | End: 2022-06-07

## 2022-06-07 RX ORDER — GUAIFENESIN 100 MG/5ML
81 LIQUID (ML) ORAL DAILY
Status: DISCONTINUED | OUTPATIENT
Start: 2022-06-07 | End: 2022-06-17 | Stop reason: HOSPADM

## 2022-06-07 RX ORDER — LIDOCAINE HYDROCHLORIDE 10 MG/ML
INJECTION, SOLUTION EPIDURAL; INFILTRATION; INTRACAUDAL; PERINEURAL AS NEEDED
Status: DISCONTINUED | OUTPATIENT
Start: 2022-06-07 | End: 2022-06-07 | Stop reason: HOSPADM

## 2022-06-07 RX ORDER — LEVOTHYROXINE SODIUM 25 UG/1
50 TABLET ORAL
Status: DISCONTINUED | OUTPATIENT
Start: 2022-06-08 | End: 2022-06-07

## 2022-06-07 RX ORDER — METOPROLOL SUCCINATE 50 MG/1
50 TABLET, EXTENDED RELEASE ORAL DAILY
Status: DISCONTINUED | OUTPATIENT
Start: 2022-06-08 | End: 2022-06-07

## 2022-06-07 RX ORDER — BUPROPION HYDROCHLORIDE 150 MG/1
150 TABLET ORAL DAILY
Status: DISCONTINUED | OUTPATIENT
Start: 2022-06-08 | End: 2022-06-17 | Stop reason: HOSPADM

## 2022-06-07 RX ORDER — LEVOTHYROXINE SODIUM 25 UG/1
50 TABLET ORAL
Status: DISCONTINUED | OUTPATIENT
Start: 2022-06-08 | End: 2022-06-17 | Stop reason: HOSPADM

## 2022-06-07 RX ORDER — CLOPIDOGREL BISULFATE 75 MG/1
75 TABLET ORAL DAILY
Status: DISCONTINUED | OUTPATIENT
Start: 2022-06-08 | End: 2022-06-09

## 2022-06-07 RX ORDER — ONDANSETRON 2 MG/ML
4 INJECTION INTRAMUSCULAR; INTRAVENOUS
Status: COMPLETED | OUTPATIENT
Start: 2022-06-07 | End: 2022-06-08

## 2022-06-07 RX ORDER — ATORVASTATIN CALCIUM 40 MG/1
20 TABLET, FILM COATED ORAL DAILY
Status: DISCONTINUED | OUTPATIENT
Start: 2022-06-08 | End: 2022-06-08

## 2022-06-07 RX ORDER — METOPROLOL SUCCINATE 50 MG/1
50 TABLET, EXTENDED RELEASE ORAL DAILY
Status: DISCONTINUED | OUTPATIENT
Start: 2022-06-08 | End: 2022-06-08

## 2022-06-07 RX ORDER — VERAPAMIL HYDROCHLORIDE 2.5 MG/ML
INJECTION, SOLUTION INTRAVENOUS AS NEEDED
Status: DISCONTINUED | OUTPATIENT
Start: 2022-06-07 | End: 2022-06-07 | Stop reason: HOSPADM

## 2022-06-07 RX ADMIN — LEVETIRACETAM 750 MG: 500 TABLET, FILM COATED ORAL at 22:53

## 2022-06-07 RX ADMIN — ACETAMINOPHEN 650 MG: 325 TABLET ORAL at 20:55

## 2022-06-07 RX ADMIN — ASPIRIN 81 MG: 81 TABLET, CHEWABLE ORAL at 14:48

## 2022-06-07 RX ADMIN — SODIUM CHLORIDE 100 ML/HR: 9 INJECTION, SOLUTION INTRAVENOUS at 14:00

## 2022-06-07 RX ADMIN — CLOPIDOGREL BISULFATE 300 MG: 300 TABLET, FILM COATED ORAL at 14:47

## 2022-06-07 RX ADMIN — SODIUM CHLORIDE, PRESERVATIVE FREE 10 ML: 5 INJECTION INTRAVENOUS at 14:50

## 2022-06-07 RX ADMIN — SODIUM CHLORIDE, PRESERVATIVE FREE 10 ML: 5 INJECTION INTRAVENOUS at 22:00

## 2022-06-07 RX ADMIN — AMITRIPTYLINE HYDROCHLORIDE 10 MG: 10 TABLET, FILM COATED ORAL at 20:55

## 2022-06-07 NOTE — Clinical Note
Right radial artery. Accessed successfully. Micropuncture needle used. Using ultrasound guidance.  Number of attempts =  1.

## 2022-06-07 NOTE — H&P
Admission    NAME: Skyla Hall   :  1940   MRN:  [id number]     Date/Time:  2022 2:20 PM    Patient PCP: Jared Mcdaniel DO  ________________________________________________________________________     Assessment:     - Cath in [facility]  unclear details, PCI, left with  of RCA. Cath with 0ccluded RCA. 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 Yanez Agent  - IBS follows with GI  - CONTRAST ALLERGY  -  2020, lives alone in an apartment, 3 kids, one son lives in [facility], retired , limited functional capacity uses a cane limited by back pain        Plan:     Patient with anxiety  , and has a hard time with this. Son helps her, has grandkids. Main complaint is back pain, uses a cane. No chest pain  +dyspnea  No syncope. Cath with high grade LCx. Patient with baseline anxiety, exacerbated by prednisone for dye allergy. PCI deferred until Thursday AM when anesthesia will be available. - Hold warfarin  - Add ASA  - Add clopidogrel  - Cont metoprolol  - Cont atorvastatin    Dr. Fatemeh Koroma to also see to help facilitate TAVR. [x]        High complexity decision making was performed        Subjective:   CHIEF COMPLAINT: dyspnea    HISTORY OF PRESENT ILLNESS:        No chest discomfort suggestive of ischemia. Denies orthopnea, PND, or edema. No palpitations, syncope, near syncope. No other complaints. We were asked to admit for work up and evaluation of the above problems.      Past Medical History:   Diagnosis Date    Anxiety     Arthritis     Asthma     CAD (coronary artery disease)     stent    Diabetes ([facility] [address]     GERD (gastroesophageal reflux disease)     History of DVT (deep vein thrombosis)     History of recurrent UTIs     Hx of seasonal allergies     Hypercholesterolemia     Irritable bowel syndrome (IBS)     Migraine     Urinary urgency       Past Surgical History:   Procedure Laterality Date    HX CATARACT REMOVAL Bilateral     HX HEART CATHETERIZATION      stent    HX OPEN REDUCTION INTERNAL FIXATION Left     humerus     HX OPEN REDUCTION INTERNAL FIXATION Right     hip     Allergies   Allergen Reactions    Iodinated Contrast Media Other (comments)     Passes out    Pcn [Penicillins] Shortness of Breath    Sulfa (Sulfonamide Antibiotics) Shortness of Breath      Meds:  See below  Social History     Tobacco Use    Smoking status: Former Smoker     Packs/day: 0.25     Quit date:      Years since quittin.4    Smokeless tobacco: Never Used   Substance Use Topics    Alcohol use: No      Family History   Problem Relation Age of Onset    Diabetes Mother     Stroke Mother        REVIEW OF SYSTEMS:     []         Unable to obtain  ROS due to ---   [x]         Total of 12 systems reviewed as follows:                             Total of 12 systems reviewed as follows:       POSITIVE= Bold text  Negative = normal text  General:  fever, chills, sweats, generalized weakness, weight loss/gain,      loss of appetite   Eyes:    blurred vision, eye pain, loss of vision, double vision  ENT:    rhinorrhea, pharyngitis   Respiratory:   cough, sputum production, SOB, VILA, wheezing, pleuritic pain   Cardiology:   chest pain, palpitations, orthopnea, PND, edema, syncope   Gastrointestinal:  abdominal pain , N/V, diarrhea, dysphagia, constipation, bleeding   Genitourinary:  frequency, urgency, dysuria, hematuria, incontinence   Muskuloskeletal :  arthralgia, myalgia, back pain  Hematology:  easy bruising, nose or gum bleeding, lymphadenopathy   Dermatological: rash, ulceration, pruritis, color change / jaundice  Endocrine:   hot flashes or polydipsia   Neurological:  headache, dizziness, confusion, focal weakness, paresthesia,     Speech difficulties, memory loss, gait difficulty  Psychological: Feelings of anxiety, depression, agitation    Objective:      Physical Exam:    Last 24hrs VS reviewed since prior progress note. Most recent are:    Visit Vitals  /86   Pulse 72   Temp 98 °F (36.7 °C)   Resp 21   Ht 5' 1\" (1.549 m)   Wt 65.3 kg (144 lb)   SpO2 (!) 89%   Breastfeeding No   BMI 27.21 kg/m²     No intake or output data in the 24 hours ending 06/07/22 1420     General Appearance: Well developed, elderly, alert & oriented x 3,    no acute distress. Ears/Nose/Mouth/Throat: Pupils equal and round, Hearing grossly normal.  Neck: Supple. JVP within normal limits. Carotids good upstrokes, with no bruit. Chest: Lungs clear to auscultation bilaterally. Cardiovascular: Regular rate and rhythm, S1S2 normal, III/VI systolic murmur, rubs, gallops. Abdomen: Soft, non-tender, bowel sounds are active. No organomegaly. Extremities: No edema bilaterally. Femoral pulses +2, Distal Pulses +1. Skin: Warm and dry. Neuro: CN II-XII grossly intact, Strength and sensation grossly intact. Data:      Prior to Admission medications    Medication Sig Start Date End Date Taking? Authorizing Provider   celecoxib (CELEBREX) 200 mg capsule TAKE 1 CAPSULE BY MOUTH EVERY 12 HOURS AS NEEDED FOR PAIN 5/17/22  Yes Bill Wills III, DO   traMADoL (ULTRAM) 50 mg tablet Take 1 Tablet by mouth every twelve (12) hours as needed for Pain for up to 30 days. Max Daily Amount: 100 mg. 5/13/22 6/12/22 Yes Bill Wills III, DO   loperamide (IMODIUM) 2 mg capsule Take 1 Capsule by mouth four (4) times daily as needed for Diarrhea. 5/13/22  Yes Bill Wills III, DO   metoprolol succinate (TOPROL-XL) 50 mg XL tablet Take 1 Tablet by mouth daily.  5/4/22  Yes Ivan Dobbs III, DO butalbital-acetaminophen-caffeine (FIORICET, ESGIC) -40 mg per tablet Take 1 Tablet by mouth every twelve (12) hours as needed for Headache. 4/15/22  Yes Bill Wills III, DO   Bifidobacterium Infantis (Align) 4 mg cap Take 1 Capsule by mouth daily as needed for Diarrhea. Indications: ALIGN OTC 4/15/22  Yes Bill Wills III,    benzonatate (TESSALON) 100 mg capsule Take 1 Capsule by mouth three (3) times daily as needed for Cough. 3/29/22  Yes Bill Wills III, DO   clorazepate (TRANXENE) 3.75 mg tablet Take 1 Tablet by mouth every eight (8) hours as needed for Anxiety. Max Daily Amount: 11.25 mg. 3/21/22  Yes Bill Wills III, DO   buPROPion XL (WELLBUTRIN XL) 150 mg tablet Take 1 Tablet by mouth daily. 2/23/22  Yes Bill Wills III, DO   oxybutynin chloride XL (DITROPAN XL) 10 mg CR tablet TAKE 1 TABLET BY MOUTH EVERY DAY 2/15/22  Yes Bill Wills III, DO   amitriptyline (ELAVIL) 10 mg tablet TAKE 1 TABLET BY MOUTH EVERY NIGHT 2/14/22  Yes Bill Wills III, DO   traZODone (DESYREL) 100 mg tablet Take 100 mg by mouth nightly. Yes Provider, Historical   levothyroxine (SYNTHROID) 25 mcg tablet Take 2 Tablets by mouth Daily (before breakfast). 1/25/22  Yes Bill Wills III, DO   albuterol (PROVENTIL HFA, VENTOLIN HFA, PROAIR HFA) 90 mcg/actuation inhaler Take 2 Puffs by inhalation every six (6) hours as needed for Wheezing. 1/12/22  Yes Bill Wills III, DO   Wixela Inhub 250-50 mcg/dose diskus inhaler INHALE 1 PUFF AND INTO THE LUNGS EVERY 12 HOURS. RINSE MOUTH AFTER USE 12/5/21  Yes Bill Wills III, DO   levocetirizine (XYZAL) 5 mg tablet Take 1 Tablet by mouth daily. 12/3/21  Yes Bill Wills III, DO   ipratropium (ATROVENT) 42 mcg (0.06 %) nasal spray 2 Sprays by Both Nostrils route four (4) times daily.  12/3/21  Yes Bill Wills III, DO   nystatin (MYCOSTATIN) 100,000 unit/mL suspension SWISH AND SWALLOW 5 ML BY MOUTH FOR 30 SECONDS 4 TIMES A DAY AS NEEDED FOR MOUTH PAIN 11/4/21  Yes Bill Wills III, DO   esomeprazole (NEXIUM) 20 mg capsule TAKE 1 CAPSULE BY MOUTH DAILY 9/24/21  Yes Bill Wills III, DO   ondansetron hcl (ZOFRAN) 4 mg tablet TAKE 1 TABLET BY MOUTH EVERY 8 HOURS AS NEEDED FOR NAUSEA OR VOMITING 9/9/21  Yes Bill Wills III, DO   levETIRAcetam (KEPPRA) 500 mg tablet TAKE 1 TABLET BY MOUTH EVERY MORNING AND 1 AND 1/2 TABLET BY MOUTH EVERY EVENING FOR MIGRAINES 8/16/21  Yes Bill Wills III, DO   dicyclomine (BENTYL) 10 mg capsule TAKE ONE CAPSULE BY MOUTH FOUR TIMES DAILY AS NEEDED 8/16/21  Yes Bill Wills III, DO   ezetimibe (ZETIA) 10 mg tablet TAKE 1 TABLET BY MOUTH DAILY 8/6/21  Yes Bill Wills III, DO   atorvastatin (LIPITOR) 20 mg tablet TAKE 1 TABLET BY MOUTH EVERY DAY 7/19/21  Yes Bill Wills III, DO   diclofenac (VOLTAREN) 1 % gel Apply  to affected area every twelve (12) hours as needed for Pain. 6/25/21  Yes Bill Wills III, DO   escitalopram oxalate (LEXAPRO) 20 mg tablet TAKE 1 TABLET BY MOUTH DAILY 4/28/20  Yes Bill Wills III, DO   warfarin (COUMADIN) 1 mg tablet Take 1 Tablet by mouth daily. 5/4/22   Simba ARMENDARIZ III, DO   warfarin (COUMADIN) 2 mg tablet Take 1 Tablet by mouth daily.  1/28/22   Simba Higginbotham III, DO       Recent Results (from the past 24 hour(s))   CBC WITH AUTOMATED DIFF    Collection Time: 06/07/22 11:26 AM   Result Value Ref Range    WBC 13.6 (H) 3.6 - 11.0 K/uL    RBC 4.81 3.80 - 5.20 M/uL    HGB 8.4 (L) 11.5 - 16.0 g/dL    HCT 30.8 (L) 35.0 - 47.0 %    MCV 64.0 (L) 80.0 - 99.0 FL    MCH 17.5 (L) 26.0 - 34.0 PG    MCHC 27.3 (L) 30.0 - 36.5 g/dL    RDW 19.9 (H) 11.5 - 14.5 %    PLATELET 001 840 - 903 K/uL    MPV ABNORMAL 8.9 - 12.9 FL    NRBC 0.1 (H) 0  WBC    ABSOLUTE NRBC 0.02 (H) 0.00 - 0.01 K/uL    NEUTROPHILS 91 (H) 32 - 75 %    LYMPHOCYTES 7 (L) 12 - 49 %    MONOCYTES 1 (L) 5 - 13 %    EOSINOPHILS 0 0 - 7 %    BASOPHILS 0 0 - 1 %    IMMATURE GRANULOCYTES 1 (H) 0.0 - 0.5 %    ABS. NEUTROPHILS 12.4 (H) 1.8 - 8.0 K/UL    ABS. LYMPHOCYTES 1.0 0.8 - 3.5 K/UL    ABS. MONOCYTES 0.1 0.0 - 1.0 K/UL    ABS. EOSINOPHILS 0.0 0.0 - 0.4 K/UL    ABS. BASOPHILS 0.0 0.0 - 0.1 K/UL    ABS. IMM.  GRANS. 0.1 (H) 0.00 - 0.04 K/UL    DF SMEAR SCANNED      PLATELET COMMENTS Large Platelets      RBC COMMENTS ANISOCYTOSIS  1+        RBC COMMENTS MICROCYTOSIS  1+        RBC COMMENTS HYPOCHROMIA  3+       METABOLIC PANEL, BASIC    Collection Time: 06/07/22 11:26 AM   Result Value Ref Range    Sodium 134 (L) 136 - 145 mmol/L    Potassium 4.6 3.5 - 5.1 mmol/L    Chloride 104 97 - 108 mmol/L    CO2 24 21 - 32 mmol/L    Anion gap 6 5 - 15 mmol/L    Glucose 237 (H) 65 - 100 mg/dL    BUN 24 (H) 6 - 20 MG/DL    Creatinine 1.02 0.55 - 1.02 MG/DL    BUN/Creatinine ratio 24 (H) 12 - 20      GFR est AA >60 >60 ml/min/1.73m2    GFR est non-AA 52 (L) >60 ml/min/1.73m2    Calcium 8.8 8.5 - 10.1 MG/DL   PROTHROMBIN TIME + INR    Collection Time: 06/07/22 11:26 AM   Result Value Ref Range    INR 1.3 (H) 0.9 - 1.1      Prothrombin time 13.4 (H) 9.0 - 11.1 sec

## 2022-06-07 NOTE — CARDIO/PULMONARY
Cardiopulmonary Rehab:    Chart reviewed. Pt is a 78 y.o.  F admitted with Chest pain, unspecified type [R07.9]  Coronary artery disease [I25.10]. LVEF 65%  Former smoker, quit in [address]. Pt pending cardiac cath. Pending TAVR. Cardiac Rehab follow post procedure.

## 2022-06-07 NOTE — PROGRESS NOTES
TRANSFER - IN REPORT:    Verbal report received from teresa verma on [facility]  being received from cath lab for routine progression of care. Report consisted of patients Situation, Background, Assessment and Recommendations(SBAR). Information from the following report(s) SBAR was reviewed with the receiving clinician. Opportunity for questions and clarification was provided. Assessment completed upon patients arrival to [address] [place] [address] care assumed. Cardiac Cath Lab Recovery Arrival Note:    [facility] Patient procedure= heart cath. Patient on cardiac monitor, non-invasive blood pressure, SPO2 monitor. On O2 @ 2 lpm via NC.  IV  of nacl on pump at 50  ml/hr. Patient status doing well without problems. Patient is A&Ox 4. Patient reports no complaints. PROCEDURE SITE CHECK:    Procedure site:without any bleeding and no hematoma, no pain/discomfort reported at procedure site. No change in patient status. Continue to monitor patient and status.

## 2022-06-07 NOTE — PROGRESS NOTES
Cardiac Cath Lab Recovery Arrival Note:      Cassandra Haile arrived to Cardiac Cath Lab, Recovery Area. Staff introduced to patient. Patient identifiers verified with NAME and DATE OF BIRTH. Procedure verified with patient. Consent forms reviewed and signed by patient or authorized representative and verified. Allergies verified. Patient and family oriented to department. Patient and family informed of procedure and plan of care. Questions answered with review. Patient prepped for procedure, per orders from physician, prior to arrival.    Patient on cardiac monitor, non-invasive blood pressure, SPO2 monitor. On RA sats 86%; no complaints of SOB; placed on 2 liters NC; sats at 95% . Patient is A&Ox 4. Patient reports chronic back pain. Patient in stretcher, in low position, with side rails up, call bell within reach, patient instructed to call if assistance as needed. Patient prep in: [address], [place]. Patient family has pager # none  Family in: sharon hector in [facility].    Prep by: David Rincon

## 2022-06-07 NOTE — PROGRESS NOTES
TRANSFER - OUT REPORT:    Verbal report given to reagan rn(name) on [facility]  being transferred to IVCU(unit) for routine progression of care       Report consisted of patients Situation, Background, Assessment and   Recommendations(SBAR). Information from the following report(s) SBAR was reviewed with the receiving nurse. Lines:   Peripheral IV 06/07/22 Right Antecubital (Active)        Opportunity for questions and clarification was provided.       Patient transported with:   Registered Nurse

## 2022-06-07 NOTE — PROGRESS NOTES
Primary Nurse Ubaldo Lee and Jim Cunha, ALCIRA performed a dual skin assessment on this patient No impairment noted  Norbert score is 20; meplix on sacrum

## 2022-06-07 NOTE — PROGRESS NOTES
End of Shift Note    Bedside shift change report given to Sonia Rico RN (oncoming nurse) by Melissa Fields RN (offgoing nurse). Report included the following information SBAR, Kardex, MAR and Cardiac Rhythm NSR    Shift worked:  7a-7p     Shift summary and any significant changes:     Pt has home medications at bedside. Pt was instructed not to take home medications while in the hospital. Home medications were placed in patient's bag with belongings. Concerns for physician to address:       Zone phone for oncoming shift:          Activity:  Activity Level: Up with Assistance  Number times ambulated in hallways past shift: 0  Number of times OOB to chair past shift: 0    Cardiac:   Cardiac Monitoring: Yes      Cardiac Rhythm: Sinus Rhythm    Access:   Current line(s): PICC     Genitourinary:   Urinary status: voiding    Respiratory:   O2 Device: Nasal cannula  Chronic home O2 use?: YES  Incentive spirometer at bedside: NO       GI:     Current diet:  DIET NPO  ADULT DIET Regular  Passing flatus: YES  Tolerating current diet: YES       Pain Management:   Patient states pain is manageable on current regimen: YES    Skin:  Norbert Score: 17  Interventions: increase time out of bed    Patient Safety:  Fall Score:  Total Score: 4  Interventions: bed/chair alarm, assistive device (walker, cane, etc), gripper socks and pt to call before getting OOB  High Fall Risk: Yes    Length of Stay:  Expected LOS: - - -  Actual LOS: 0      Melissa Fields RN

## 2022-06-07 NOTE — Clinical Note
TRANSFER - OUT REPORT:     Verbal report given to: Cordelia Kurtz. Report consisted of patient's Situation, Background, Assessment and   Recommendations(SBAR). Opportunity for questions and clarification was provided. Patient transported to: IVCU.

## 2022-06-07 NOTE — Clinical Note
Single view of the aortic root obtained using power injection. Total volume = 15 mL. Rate = 15 mL/sec. Pressure = 900 PSI. Rate of rise = 0 sec.

## 2022-06-08 ENCOUNTER — DOCUMENTATION ONLY (OUTPATIENT)
Dept: SURGERY | Age: 82
End: 2022-06-08

## 2022-06-08 ENCOUNTER — ANESTHESIA EVENT (OUTPATIENT)
Dept: CARDIAC CATH/INVASIVE PROCEDURES | Age: 82
DRG: 246 | End: 2022-06-08
Payer: MEDICARE

## 2022-06-08 PROCEDURE — 99223 1ST HOSP IP/OBS HIGH 75: CPT | Performed by: THORACIC SURGERY (CARDIOTHORACIC VASCULAR SURGERY)

## 2022-06-08 PROCEDURE — 94640 AIRWAY INHALATION TREATMENT: CPT

## 2022-06-08 PROCEDURE — 93005 ELECTROCARDIOGRAM TRACING: CPT

## 2022-06-08 PROCEDURE — 74011250636 HC RX REV CODE- 250/636: Performed by: INTERNAL MEDICINE

## 2022-06-08 PROCEDURE — 74011250637 HC RX REV CODE- 250/637: Performed by: INTERNAL MEDICINE

## 2022-06-08 PROCEDURE — 74011000250 HC RX REV CODE- 250: Performed by: INTERNAL MEDICINE

## 2022-06-08 PROCEDURE — 65270000046 HC RM TELEMETRY

## 2022-06-08 PROCEDURE — 74011636637 HC RX REV CODE- 636/637: Performed by: INTERNAL MEDICINE

## 2022-06-08 RX ORDER — ALBUTEROL SULFATE 0.83 MG/ML
2.5 SOLUTION RESPIRATORY (INHALATION)
Status: DISCONTINUED | OUTPATIENT
Start: 2022-06-08 | End: 2022-06-17 | Stop reason: HOSPADM

## 2022-06-08 RX ORDER — MORPHINE SULFATE 2 MG/ML
2 INJECTION, SOLUTION INTRAMUSCULAR; INTRAVENOUS
Status: DISCONTINUED | OUTPATIENT
Start: 2022-06-08 | End: 2022-06-14

## 2022-06-08 RX ORDER — NYSTATIN 100000 [USP'U]/ML
500000 SUSPENSION ORAL
Status: DISPENSED | OUTPATIENT
Start: 2022-06-08 | End: 2022-06-10

## 2022-06-08 RX ORDER — SODIUM CHLORIDE 9 MG/ML
75 INJECTION, SOLUTION INTRAVENOUS CONTINUOUS
Status: DISCONTINUED | OUTPATIENT
Start: 2022-06-09 | End: 2022-06-09

## 2022-06-08 RX ORDER — PANTOPRAZOLE SODIUM 40 MG/1
40 TABLET, DELAYED RELEASE ORAL DAILY
Status: DISCONTINUED | OUTPATIENT
Start: 2022-06-08 | End: 2022-06-17 | Stop reason: HOSPADM

## 2022-06-08 RX ORDER — ATORVASTATIN CALCIUM 20 MG/1
20 TABLET, FILM COATED ORAL DAILY
Status: DISCONTINUED | OUTPATIENT
Start: 2022-06-08 | End: 2022-06-17 | Stop reason: HOSPADM

## 2022-06-08 RX ORDER — DICLOFENAC SODIUM 10 MG/G
2 GEL TOPICAL
Status: DISPENSED | OUTPATIENT
Start: 2022-06-08 | End: 2022-06-10

## 2022-06-08 RX ORDER — ENOXAPARIN SODIUM 100 MG/ML
40 INJECTION SUBCUTANEOUS ONCE
Status: COMPLETED | OUTPATIENT
Start: 2022-06-08 | End: 2022-06-08

## 2022-06-08 RX ORDER — ALBUTEROL SULFATE 90 UG/1
2 AEROSOL, METERED RESPIRATORY (INHALATION)
Status: DISCONTINUED | OUTPATIENT
Start: 2022-06-08 | End: 2022-06-08

## 2022-06-08 RX ORDER — METOPROLOL SUCCINATE 50 MG/1
50 TABLET, EXTENDED RELEASE ORAL DAILY
Status: DISCONTINUED | OUTPATIENT
Start: 2022-06-08 | End: 2022-06-17 | Stop reason: HOSPADM

## 2022-06-08 RX ADMIN — NITROGLYCERIN 0.5 INCH: 20 OINTMENT TOPICAL at 18:23

## 2022-06-08 RX ADMIN — CLOPIDOGREL BISULFATE 75 MG: 75 TABLET ORAL at 09:15

## 2022-06-08 RX ADMIN — MORPHINE SULFATE 2 MG: 2 INJECTION, SOLUTION INTRAMUSCULAR; INTRAVENOUS at 19:56

## 2022-06-08 RX ADMIN — AMITRIPTYLINE HYDROCHLORIDE 10 MG: 10 TABLET, FILM COATED ORAL at 20:30

## 2022-06-08 RX ADMIN — ASPIRIN 81 MG: 81 TABLET, CHEWABLE ORAL at 09:15

## 2022-06-08 RX ADMIN — PANTOPRAZOLE SODIUM 40 MG: 40 TABLET, DELAYED RELEASE ORAL at 09:15

## 2022-06-08 RX ADMIN — DICYCLOMINE HYDROCHLORIDE 20 MG: 20 TABLET ORAL at 12:06

## 2022-06-08 RX ADMIN — SODIUM CHLORIDE, PRESERVATIVE FREE 10 ML: 5 INJECTION INTRAVENOUS at 15:52

## 2022-06-08 RX ADMIN — LEVETIRACETAM 750 MG: 500 TABLET, FILM COATED ORAL at 19:55

## 2022-06-08 RX ADMIN — ALBUTEROL SULFATE 2.5 MG: 2.5 SOLUTION RESPIRATORY (INHALATION) at 02:34

## 2022-06-08 RX ADMIN — ESCITALOPRAM OXALATE 20 MG: 10 TABLET ORAL at 09:15

## 2022-06-08 RX ADMIN — SODIUM CHLORIDE, PRESERVATIVE FREE 10 ML: 5 INJECTION INTRAVENOUS at 22:59

## 2022-06-08 RX ADMIN — PREDNISONE 40 MG: 20 TABLET ORAL at 18:24

## 2022-06-08 RX ADMIN — SODIUM CHLORIDE, PRESERVATIVE FREE 10 ML: 5 INJECTION INTRAVENOUS at 06:26

## 2022-06-08 RX ADMIN — EZETIMIBE 10 MG: 10 TABLET ORAL at 09:15

## 2022-06-08 RX ADMIN — NYSTATIN 500000 UNITS: 100000 SUSPENSION ORAL at 12:06

## 2022-06-08 RX ADMIN — LEVETIRACETAM 500 MG: 500 TABLET, FILM COATED ORAL at 09:14

## 2022-06-08 RX ADMIN — MORPHINE SULFATE 2 MG: 2 INJECTION, SOLUTION INTRAMUSCULAR; INTRAVENOUS at 15:47

## 2022-06-08 RX ADMIN — ATORVASTATIN CALCIUM 20 MG: 40 TABLET, FILM COATED ORAL at 19:55

## 2022-06-08 RX ADMIN — NITROGLYCERIN 0.5 INCH: 20 OINTMENT TOPICAL at 22:59

## 2022-06-08 RX ADMIN — METOPROLOL SUCCINATE 50 MG: 50 TABLET, EXTENDED RELEASE ORAL at 19:55

## 2022-06-08 RX ADMIN — ACETAMINOPHEN 650 MG: 325 TABLET ORAL at 11:31

## 2022-06-08 RX ADMIN — BUPROPION HYDROCHLORIDE 150 MG: 150 TABLET, EXTENDED RELEASE ORAL at 09:15

## 2022-06-08 RX ADMIN — ONDANSETRON 4 MG: 2 INJECTION INTRAMUSCULAR; INTRAVENOUS at 22:45

## 2022-06-08 RX ADMIN — ENOXAPARIN SODIUM 40 MG: 100 INJECTION SUBCUTANEOUS at 09:14

## 2022-06-08 RX ADMIN — TRAMADOL HYDROCHLORIDE 50 MG: 50 TABLET, COATED ORAL at 09:14

## 2022-06-08 RX ADMIN — TRAMADOL HYDROCHLORIDE 50 MG: 50 TABLET, COATED ORAL at 22:37

## 2022-06-08 NOTE — PROGRESS NOTES
Physician Progress Note      Franklyn Claros  [facility] #:                  [id number]  :                       1940  ADMIT DATE:       2022 10:53 AM  DISCH DATE:  RESPONDING  PROVIDER #:        Marlen CHACON NP        QUERY TEXT:    Type of Anemia: Please provide further specificity, if known. Clinical indicators include: bleeding, anemia, hgb, iron, tarry stools  Options provided:  -- Anemia due to acute blood loss  -- Anemia due to chronic blood loss  -- Anemia due to iron deficiency  -- Anemia due to postoperative blood loss  -- Anemia due to chronic disease  -- Other - I will add my own diagnosis  -- Disagree - Not applicable / Not valid  -- Disagree - Clinically Unable to determine / Unknown        PROVIDER RESPONSE TEXT:    Anemia, iron profile pending, likely related to chronic disease vs. Iron deficiency.       Electronically signed by:  Marlen Ruby NP 2022 3:33 PM

## 2022-06-08 NOTE — PROGRESS NOTES
Progress Note      6/8/2022 8:04 AM  NAME: Jessica Worthy   MRN:  [id number]   Admit Diagnosis: Chest pain, unspecified type [R07.9]  Coronary artery disease [I25.10]                Assessment:       - Cath in [facility] 2001 unclear details, PCI, left with  of RCA. Cath with 0ccluded RCA. 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 Poncho Perez  - IBS follows with GI  - CONTRAST ALLERGY  -  7/2020, lives alone in an apartment, 3 kids, one son lives in [facility], retired , limited functional capacity uses a cane limited by back pain                     Plan:        Patient with anxiety  , and has a hard time with this. Son helps her, has grandkids. Main complaint is back pain, uses a cane.     No chest pain  +dyspnea  No syncope.     Cath with high grade LCx. Patient with baseline anxiety, exacerbated by prednisone for dye allergy. PCI deferred until Thursday AM when anesthesia will be available.     - Hold warfarin  - Add ASA  - Add clopidogrel  - Cont metoprolol  - Cont atorvastatin     Dr. Erik Rodriguez has seen patient to help schedule TAVR          [x]? High complexity decision making was performed         We discussed the expected course, resolution and complications of the diagnoses in detail. Medication risk, benefits, costs, interactions, and alternatives were discussed as indicated. I advised him to contact the office if his condition worsens, changes or fails to improve as anticipated. Patient expressed understanding with the diagnoses  and plan. Subjective:     Jessica Worthy denies chest pain, dyspnea. Discussed with RN events overnight.      Review of Systems:    Symptom Y/N Comments  Symptom Y/N Comments   Fever/Chills N   Chest Pain N    Poor Appetite N   Edema N    Cough N   Abdominal Pain N    Sputum N   Joint Pain N    SOB/VILA N   Pruritis/Rash N    Nausea/vomit N   Tolerating PT/OT Y    Diarrhea N   Tolerating Diet Y    Constipation N   Other       Could NOT obtain due to:      Objective:      Physical Exam:    Last 24hrs VS reviewed since prior progress note. Most recent are:    Visit Vitals  BP (!) 148/75 (BP 1 Location: Left lower arm, BP Patient Position: At rest)   Pulse 90   Temp 98.2 °F (36.8 °C)   Resp 24   Ht 5' 1\" (1.549 m)   Wt 65.3 kg (144 lb)   SpO2 90%   Breastfeeding No   BMI 27.21 kg/m²     No intake or output data in the 24 hours ending 06/08/22 0804     General Appearance: Well developed, well nourished, alert & oriented x 3,    no acute distress. Ears/Nose/Mouth/Throat: Hearing grossly normal.  Neck: Supple. Chest: Lungs clear to auscultation bilaterally. Cardiovascular: Regular rate and rhythm, S1S2 normal, III/VI systolic murmur. Abdomen: Soft, non-tender, bowel sounds are active. Extremities: No edema bilaterally. Skin: Warm and dry. PMH/SH reviewed - no change compared to H&P    Data Review    Telemetry: normal sinus rhythm     Lab Data Personally Reviewed:    Recent Labs     06/07/22  1126   WBC 13.6*   HGB 8.4*   HCT 30.8*        Recent Labs     06/07/22  1126   INR 1.3*   PTP 13.4*      Recent Labs     06/07/22  1126   *   K 4.6      CO2 24   BUN 24*   CREA 1.02   *   CA 8.8     No results for input(s): CPK, CKNDX, TROIQ in the last 72 hours.     No lab exists for component: CPKMB  Lab Results   Component Value Date/Time    Cholesterol, total 107 06/25/2021 11:52 AM    HDL Cholesterol 52 06/25/2021 11:52 AM    LDL, calculated 39 06/25/2021 11:52 AM    Triglyceride 80 06/25/2021 11:52 AM    CHOL/HDL Ratio 2.1 06/25/2021 11:52 AM       No results for input(s): AP, TBIL, TP, ALB, GLOB, GGT, AML, LPSE in the last 72 hours. No lab exists for component: SGOT, GPT, AMYP, HLPSE  No results for input(s): PH, PCO2, PO2 in the last 72 hours.     Medications Personally Reviewed:    Current Facility-Administered Medications   Medication Dose Route Frequency    nystatin (MYCOSTATIN) 100,000 unit/mL oral suspension 500,000 Units  500,000 Units Oral QID PRN    pantoprazole (PROTONIX) tablet 40 mg  40 mg Oral DAILY    diclofenac (VOLTAREN) 1 % topical gel 2 g  2 g Topical Q12H PRN    albuterol (PROVENTIL VENTOLIN) nebulizer solution 2.5 mg  2.5 mg Nebulization Q6H PRN    enoxaparin (LOVENOX) injection 40 mg  40 mg SubCUTAneous ONCE    [START ON 6/9/2022] 0.9% sodium chloride infusion  75 mL/hr IntraVENous CONTINUOUS    amitriptyline (ELAVIL) tablet 10 mg  10 mg Oral QHS    atorvastatin (LIPITOR) tablet 20 mg  20 mg Oral DAILY    buPROPion XL (WELLBUTRIN XL) tablet 150 mg  150 mg Oral DAILY    escitalopram oxalate (LEXAPRO) tablet 20 mg  20 mg Oral DAILY    levothyroxine (SYNTHROID) tablet 50 mcg  50 mcg Oral ACB    metoprolol succinate (TOPROL-XL) XL tablet 50 mg  50 mg Oral DAILY    sodium chloride (NS) flush 5-40 mL  5-40 mL IntraVENous Q8H    sodium chloride (NS) flush 5-40 mL  5-40 mL IntraVENous PRN    acetaminophen (TYLENOL) tablet 650 mg  650 mg Oral Q4H PRN    naloxone (NARCAN) injection 0.4 mg  0.4 mg IntraVENous PRN    ondansetron (ZOFRAN) injection 4 mg  4 mg IntraVENous Q4H PRN    aspirin chewable tablet 81 mg  81 mg Oral DAILY    clopidogreL (PLAVIX) tablet 75 mg  75 mg Oral DAILY    predniSONE (DELTASONE) tablet 40 mg  40 mg Oral Q12H    loperamide (IMODIUM) capsule 2 mg  2 mg Oral QID PRN    benzonatate (TESSALON) capsule 100 mg  100 mg Oral TID PRN    ezetimibe (ZETIA) tablet 10 mg  10 mg Oral DAILY    traMADoL (ULTRAM) tablet 50 mg  50 mg Oral Q12H PRN    levETIRAcetam (KEPPRA) tablet 500 mg  500 mg Oral DAILY    levETIRAcetam (KEPPRA) tablet 750 mg  750 mg Oral QHS    dicyclomine (BENTYL) tablet 20 mg  20 mg Oral QID PRN         Caden Gutierrez MD

## 2022-06-08 NOTE — PROGRESS NOTES
[facility]-12 Questionnaires completed and scanned    5 meter walk completed in 19.35 seconds with a walker    TVT Registry Documentation    AV Annulus Assessment Method : CTA  Min. Ø 20.3 mm  Max.  Ø 25.2 mm  Avg. Ø 22.7 mm  Area 389.8 mm²  Perimeter 72.3 mm

## 2022-06-08 NOTE — PROGRESS NOTES
End of Shift Note    Bedside shift change report given to Alessandra Muniz (oncoming nurse) by Matthieu Bell (offgoing nurse). Report included the following information SBAR, Kardex, Procedure Summary, Recent Results and Cardiac Rhythm NSR    Shift worked:  7p-7a     Shift summary and any significant changes:     YES     Concerns for physician to address:  NA       Zone phone for oncoming shift:   NA       Activity:  Activity Level: Bath Room Privileges,Up with Assistance  Number times ambulated in hallways past shift: 1  Number of times OOB to chair past shift: 0    Cardiac:   Cardiac Monitoring: Yes      Cardiac Rhythm: Sinus Rhythm    Access:   Current line(s): PIV     Genitourinary:   Urinary status: voiding    Respiratory:   O2 Device: Nasal cannula  Chronic home O2 use?: YES  Incentive spirometer at bedside: NO       GI:     Current diet:  DIET NPO  Passing flatus: YES  Tolerating current diet: YES       Pain Management:   Patient states pain is manageable on current regimen: YES    Skin:  Norbert Score: 17  Interventions: increase time out of bed    Patient Safety:  Fall Score:  Total Score: 4  Interventions: bed/chair alarm  High Fall Risk: Yes    Length of Stay:  Expected LOS: - - -  Actual LOS: [address] [facility]

## 2022-06-08 NOTE — RT PROTOCOL NOTE
ADULT PROTOCOL: JET AEROSOL ASSESSMENT    Patient  Dina Travis     78 y.o.   female     6/8/2022  2:43 AM    Breath Sounds Pre Procedure: Right Breath Sounds: Diminished                               Left Breath Sounds: Diminished    Breath Sounds Post Procedure: Right Breath Sounds: Diminished                                 Left Breath Sounds: Diminished    Breathing pattern: Pre procedure Breathing Pattern: Regular          Post procedure Breathing Pattern: Regular    Heart Rate: Pre procedure Pulse: 85           Post procedure Pulse: 94    Resp Rate: Pre procedure Respirations: 20           Post procedure Respirations: 20          Cough: Pre procedure Cough: Non-productive               Post procedure Cough: Non-productive               Oxygen: O2 Device: Nasal cannula   Flow rate (L/min) 3     Changed: NO    SpO2: Pre procedure SpO2: 92 %   with oxygen              Post procedure SpO2: 92 %  with oxygen    Nebulizer Therapy: Current medications Aerosolized Medications: Albuterol      Changed: NO      Problem List:   Patient Active Problem List   Diagnosis Code    OAB (overactive bladder) N32.81    Coronary artery disease involving native coronary artery of native heart without angina pectoris I25.10    Acquired hypothyroidism E03.9    Type 2 diabetes mellitus without complication, without long-term current use of insulin (HCC) E11.9    Hyperlipidemia associated with type 2 diabetes mellitus (HCC) E11.69, E78.5    Age-related osteoporosis without current pathological fracture M81.0    Opioid use, 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

## 2022-06-08 NOTE — CONSULTS
Patient: Asberry Mortimer   Age: 78 y.o. Patient Care Team:  Karey Diaz DO as PCP - General (Internal Medicine Physician)  Karey Diaz DO as PCP - [facility] Provider  Ariel Serra MD ([address] [place] [address] Vascular Surgery)  Sofia Coombs MD (Cardiothoracic Surgery)    PCP: Karey Diaz DO    Cardiologist: Suzie Aguilar    Diagnosis/Reason for Consultation: The encounter diagnosis was Chest pain, unspecified type. Problem List:   Patient Active Problem List   Diagnosis Code    OAB (overactive bladder) N32.81    Coronary artery disease involving native coronary artery of native heart without angina pectoris I25.10    Acquired hypothyroidism E03.9    Type 2 diabetes mellitus without complication, without long-term current use of insulin ([facility]) E11.9    Hyperlipidemia associated with type 2 diabetes mellitus ([facility] [address] E11.69, E78.5    Age-related osteoporosis without current pathological fracture M81.0    Opioid use, unspecified with unspecified opioid-induced disorder F11.99    Coronary artery disease I25.10         HPI: 78 y.o. female with PMHx of AS, CAD, HTN, HLD, protein C deficiency and previous DVT on chronic coumadin, carotid stenosis, PAD, varicose veins, DJD, chronic tremor, hypothyroidism, asthma, IBS, DM, contrast allergy that is referred to the [address] [place] [address] by Dr. Thang Rodriguez for interventional evaluation of  Severe AS. She has SOB with exertion. She denies SOB at rest. She has to rest during activities such as cooking in her kitchen. Her activity level is limited by back pain. Her SOB has been getting progressively worse over the past couple of months. She is a former smoker, denies alcohol. She is . She lives alone in an appartment. She is a retired . She has a family history of stroke. She has received 2 COVID vaccines and 1 booster.       SOB/VILA: yes  Fatigue: yes  Palpitations: no  Chest pain: no  Chest tightness with activity: no  Lightheadedness: no   Syncope: no  Falls: yes  Orthopnea: no  PND: no  LE edema: No   Medication changes in past 3 months: yes  Blood/Blackness/Tarriness of stools: no  Heart Failure Admission w/in past year:  no  Heart Failure Admission w/in past 2 weeks: no    NYHA Classification: Class III   Class I (Mild): No limitation of physical activity. Ordinary physical activity does not cause undue fatigue, palpitation, or dyspnea. Class II (Mild): Slight limitation of physical activity. Comfortable at rest, but ordinary physical activity results in fatigue, palpitation, or dyspnea. Class III (Moderate): Marked limitation of physical activity. Comfortable at rest, but less than ordinary activity causes fatigue, palpitation, or dyspnea   Class IV (Severe): Unable to carry out any physical activity without discomfort. Symptoms  of cardiac insufficiency at rest.  If any physical activity is undertaken, discomfort is increased.     Angina Classification: Class 0   Class 0: No symptoms   Class 1: Angina with strenuous exercise   Class 2: Angina with moderate exercise   Class 3: Angina with mild exertion   Walking 1-2 level blocks at normal pace; Climbing 1 flight of stairs at normal pace  Class 4: Angina at any level of physical exertion       Past Medical History:   Diagnosis Date    Anxiety     Arthritis     Asthma     CAD (coronary artery disease)     stent    Diabetes (HCC)     GERD (gastroesophageal reflux disease)     History of DVT (deep vein thrombosis)     History of recurrent UTIs     Hx of seasonal allergies     Hypercholesterolemia     Irritable bowel syndrome (IBS)     Migraine     Urinary urgency      Endocarditis: no  Pulmonary HTN: no Greater than 2/3 systemic: no  Immunocompromised/Steroids: no  Liver Dz/Cirrhosis: no   If yes, MELD score:  n/a  Last Dental Visit:  None recent   Any dental pain/concerns: none, has dentures    Past Surgical History:   Procedure Laterality Date    HX CATARACT REMOVAL Bilateral     HX HEART CATHETERIZATION      stent    HX OPEN REDUCTION INTERNAL FIXATION Left     humerus     HX OPEN REDUCTION INTERNAL FIXATION Right     hip      Social History     Tobacco Use    Smoking status: Former Smoker     Packs/day: 0.25     Quit date:      Years since quittin.4    Smokeless tobacco: Never Used   Substance Use Topics    Alcohol use: No      Family History   Problem Relation Age of Onset    Diabetes Mother     Stroke Mother      Prior to Admission medications    Medication Sig Start Date End Date Taking? Authorizing Provider   celecoxib (CELEBREX) 200 mg capsule TAKE 1 CAPSULE BY MOUTH EVERY 12 HOURS AS NEEDED FOR PAIN 22  Yes Bill Wills III, DO   traMADoL (ULTRAM) 50 mg tablet Take 1 Tablet by mouth every twelve (12) hours as needed for Pain for up to 30 days. Max Daily Amount: 100 mg. 22 Yes Bill Wills III, DO   loperamide (IMODIUM) 2 mg capsule Take 1 Capsule by mouth four (4) times daily as needed for Diarrhea. 22  Yes Bill Wills III, DO   metoprolol succinate (TOPROL-XL) 50 mg XL tablet Take 1 Tablet by mouth daily. 22  Yes Alexus Wills III, DO   butalbital-acetaminophen-caffeine (FIORICET, ESGIC) -40 mg per tablet Take 1 Tablet by mouth every twelve (12) hours as needed for Headache. 4/15/22  Yes Bill Wills III, DO   Bifidobacterium Infantis (Align) 4 mg cap Take 1 Capsule by mouth daily as needed for Diarrhea. Indications: ALIGN OTC 4/15/22  Yes Bill Wills III, DO   benzonatate (TESSALON) 100 mg capsule Take 1 Capsule by mouth three (3) times daily as needed for Cough. 3/29/22  Yes Bill Wills III, DO   clorazepate (TRANXENE) 3.75 mg tablet Take 1 Tablet by mouth every eight (8) hours as needed for Anxiety.  Max Daily Amount: 11.25 mg. 3/21/22  Yes Bill Wills III, DO   buPROPion XL (WELLBUTRIN XL) 150 mg tablet Take 1 Tablet by mouth daily. 2/23/22  Yes Bill Wills III, DO   oxybutynin chloride XL (DITROPAN XL) 10 mg CR tablet TAKE 1 TABLET BY MOUTH EVERY DAY 2/15/22  Yes Bill Wills III, DO   amitriptyline (ELAVIL) 10 mg tablet TAKE 1 TABLET BY MOUTH EVERY NIGHT 2/14/22  Yes Bill Wills III, DO   traZODone (DESYREL) 100 mg tablet Take 100 mg by mouth nightly. Yes Provider, Historical   levothyroxine (SYNTHROID) 25 mcg tablet Take 2 Tablets by mouth Daily (before breakfast). 1/25/22  Yes Bill Wills III, DO   albuterol (PROVENTIL HFA, VENTOLIN HFA, PROAIR HFA) 90 mcg/actuation inhaler Take 2 Puffs by inhalation every six (6) hours as needed for Wheezing. 1/12/22  Yes Bill Wills III, DO   Wixela Inhub 250-50 mcg/dose diskus inhaler INHALE 1 PUFF AND INTO THE LUNGS EVERY 12 HOURS. RINSE MOUTH AFTER USE 12/5/21  Yes Bill Wills III, DO   levocetirizine (XYZAL) 5 mg tablet Take 1 Tablet by mouth daily. 12/3/21  Yes Bill Wills III, DO   ipratropium (ATROVENT) 42 mcg (0.06 %) nasal spray 2 Sprays by Both Nostrils route four (4) times daily.  12/3/21  Yes Christopher Rodriguez, DO   nystatin (MYCOSTATIN) 100,000 unit/mL suspension SWISH AND SWALLOW 5 ML BY MOUTH FOR 30 SECONDS 4 TIMES A DAY AS NEEDED FOR MOUTH PAIN 11/4/21  Yes Bill Wills III, DO   esomeprazole (NEXIUM) 20 mg capsule TAKE 1 CAPSULE BY MOUTH DAILY 9/24/21  Yes Bill Wills III, DO   ondansetron hcl (ZOFRAN) 4 mg tablet TAKE 1 TABLET BY MOUTH EVERY 8 HOURS AS NEEDED FOR NAUSEA OR VOMITING 9/9/21  Yes Bill Wills III, DO   levETIRAcetam (KEPPRA) 500 mg tablet TAKE 1 TABLET BY MOUTH EVERY MORNING AND 1 AND 1/2 TABLET BY MOUTH EVERY EVENING FOR MIGRAINES 8/16/21  Yes Bill Wills III, DO   dicyclomine (BENTYL) 10 mg capsule TAKE ONE CAPSULE BY MOUTH FOUR TIMES DAILY AS NEEDED 8/16/21  Yes Bill Wills III, DO   ezetimibe (ZETIA) 10 mg tablet TAKE 1 TABLET BY MOUTH DAILY 8/6/21  Yes Marcella ARMENDARIZ III, DO   atorvastatin (LIPITOR) 20 mg tablet TAKE 1 TABLET BY MOUTH EVERY DAY 7/19/21  Yes Bill Wills III, DO   diclofenac (VOLTAREN) 1 % gel Apply  to affected area every twelve (12) hours as needed for Pain. 6/25/21  Yes Bill Wills III, DO   escitalopram oxalate (LEXAPRO) 20 mg tablet TAKE 1 TABLET BY MOUTH DAILY 4/28/20  Yes Bill Wills III, DO   warfarin (COUMADIN) 1 mg tablet Take 1 Tablet by mouth daily. 5/4/22   Marcella ARMENDARIZ III, DO   warfarin (COUMADIN) 2 mg tablet Take 1 Tablet by mouth daily.  1/28/22   Marcella Robin III, DO       Allergies   Allergen Reactions    Iodinated Contrast Media Other (comments)     Passes out    Pcn [Penicillins] Shortness of Breath    Sulfa (Sulfonamide Antibiotics) Shortness of Breath       Current Medications:   Current Facility-Administered Medications   Medication Dose Route Frequency Provider Last Rate Last Admin    nystatin (MYCOSTATIN) 100,000 unit/mL oral suspension 500,000 Units  500,000 Units Oral QID PRN Oneta Spar III, DO        pantoprazole (PROTONIX) tablet 40 mg  40 mg Oral DAILY Caleen Arriaga H III, DO   40 mg at 06/08/22 0915    diclofenac (VOLTAREN) 1 % topical gel 2 g  2 g Topical Q12H PRN Oneta Spar III, DO        albuterol (PROVENTIL VENTOLIN) nebulizer solution 2.5 mg  2.5 mg Nebulization Q6H PRN [facility] Arriaga H III, DO   2.5 mg at 06/08/22 0234    [START ON 6/9/2022] 0.9% sodium chloride infusion  75 mL/hr IntraVENous CONTINUOUS Abram Gleason MD        atorvastatin (LIPITOR) tablet 20 mg  20 mg Oral DAILY Abram Gleason MD        metoprolol succinate (TOPROL-XL) XL tablet 50 mg  50 mg Oral DAILY Abram Gleason MD        amitriptyline (ELAVIL) tablet 10 mg  10 mg Oral QHS Abram Gleason MD   10 mg at 06/07/22 2055    buPROPion XL (WELLBUTRIN XL) tablet 150 mg  150 mg Oral DAILY Abram Gleason MD   150 mg at 06/08/22 0982  escitalopram oxalate (LEXAPRO) tablet 20 mg  20 mg Oral DAILY Ariel Serra MD   20 mg at 06/08/22 0915    levothyroxine (SYNTHROID) tablet 50 mcg  50 mcg Oral ACB Abram Gleason MD        sodium chloride (NS) flush 5-40 mL  5-40 mL IntraVENous Q8H Ariel Serra MD   10 mL at 06/08/22 0626    sodium chloride (NS) flush 5-40 mL  5-40 mL IntraVENous PRN Abram Gleason MD        acetaminophen (TYLENOL) tablet 650 mg  650 mg Oral Q4H PRN Ariel Serra MD   650 mg at 06/07/22 2055    naloxone (NARCAN) injection 0.4 mg  0.4 mg IntraVENous PRN Ariel Serra MD        ondansetron (ZOFRAN) injection 4 mg  4 mg IntraVENous Q4H PRN Ariel Serra MD        aspirin chewable tablet 81 mg  81 mg Oral DAILY Abram Gleason MD   81 mg at 06/08/22 0915    clopidogreL (PLAVIX) tablet 75 mg  75 mg Oral DAILY Abram Gleason MD   75 mg at 06/08/22 0915    predniSONE (DELTASONE) tablet 40 mg  40 mg Oral Q12H Abram Gleason MD        loperamide (IMODIUM) capsule 2 mg  2 mg Oral QID PRN Teresa Samir III, DO        benzonatate (TESSALON) capsule 100 mg  100 mg Oral TID PRN Teresa Samir III, DO        ezetimibe (ZETIA) tablet 10 mg  10 mg Oral DAILY Gordan Hilding H III, DO   10 mg at 06/08/22 0915    traMADoL (ULTRAM) tablet 50 mg  50 mg Oral Q12H PRN Gordan Hilding H III, DO   50 mg at 06/08/22 0914    levETIRAcetam (KEPPRA) tablet 500 mg  500 mg Oral DAILY Gordan Hilding H III, DO   500 mg at 06/08/22 0914    levETIRAcetam (KEPPRA) tablet 750 mg  750 mg Oral QHS Gordan Hilding H III, DO   750 mg at 06/07/22 2253    dicyclomine (BENTYL) tablet 20 mg  20 mg Oral QID PRN Kenyetta Vera III, DO           Vitals: Blood pressure (!) 145/87, pulse 87, temperature 97.9 °F (36.6 °C), resp. rate (!) 36, height 5' 1\" (1.549 m), weight 144 lb (65.3 kg), SpO2 93 %, not currently breastfeeding.        Allergies: is allergic to iodinated contrast media, pcn [penicillins], and sulfa (sulfonamide antibiotics). Review of Systems: Pertinent Positives per HPI   [] Unable to obtain  ROS due to  []mental status change  []sedated   []intubated   [x]Total of 13 systems reviewed as follows:  Constitutional: Negative fever, negative chills  Eyes:   Negative for amauroses fugax  ENT:   Negative sore throat,oral abscess   Endocrine Negative for thyroid replacement Rx; goiter; DM  Respiratory:  Negative chronic cough,sputum production  Cards:   Negative for palpitations, varicosities, claudication, lower extremity edema  GI:   Negative for dysphagia, bleeding, nausea, vomiting, diarrhea, and abdominal pain  Genitourinary: Negative for frequency, dysuria, BPH   Integument:  Negative for rash and pruritus  Hematologic:  Negative for easy bruising; bleeding dyscrasia   Musculoskel: Negative for muscle weakness inhibiting ambulation  Neurological:  Negative for stroke, TIA, syncope, dizziness  Behavl/Psych: Negative for feelings of anxiety, depression     Cardiovascular Testing:     EKG: NSR rate 92 , QRS 94    TTE:  Completed at [facility], to be scanned into media   Aortic stenosis with echo 4/2022 EF 65% peak of 4, mean of 41, PHILIPPE of 0.7, mild AI, trace MR    Cardiac catheterization:   · Known severe AS. Found to have severe proximal LCx disease, and  of RCA. · Known severe AS, valve not crossed. · Right dominant system. Mild LAD disease. 99% proximal LCx prior to trifurcation into 3 OMs. Occluded mid RCA. Distal vessel collatorllized from left. · Right innominate tortuosity. Makes cath difficult. · Patient with severe baseline anxiety likely exacerbated by steroid prep for contrast allergy. Unable to cooperate with cath. · PCI deferred until anesthesia can be arranged. · 5Fr right radial sheath removed by patent hemostasis. Coronary Findings      Diagnostic  Dominance: Right    Left Main   Moderate with 30% disease.    Left Anterior Descending   Moderate vessel with moderate diagonal with mild luminal irregularities. Left Circumflex   Moderate vessel with 99% proximal stenosis prior to trifurcation of three OM's   Right Coronary Artery   Moderate vessel occluded in mid portion, distal well collateralized from left. Intervention      No interventions have been documented. Left Heart    Aorta The aortic root is normal. There was mild (2+) aortic regurgitation. Carotid Dopplers: awaiting records from [facility]    PFTs: FEV1/Predicted:  n/a  Normal FEV1 > 1 Liter, Predicted = 100%, DLCO > 80%    Mild Lung Disease (60-70% Predicted)    Moderate Lung Disease (50-55% Predicted)    Severe Lung Disease (< 50% Predicted or PO2 < 60 or pCO2>50 on RA)    Gated C/A/P CTA: images from [facility] to be scanned    Physical Exam:  General: Well nourished well groomed female appearing stated age   Neuro: A&OX3. GRAHAM. PERRL. UnSteady  assisted gait  Head:Normocephalic. Atraumatic. Symmetrical  Neck: Trachea Midline  Resp: CTA B. No Adv BS/cough/sputum/tachypnea with seated conversation  CV: S1S2 RRR. MERLY III/VI. No JVD/carotid bruits. Pink/warm/dry extremities. no LE peripheral edema  GI:Benign ab. Soft. NT/ND. Active BS  : Voids  Integ: No obvious s/s of infection or breakdown  Musculo/Skeletal: FROM in all major joints. normal muscle tone    Clinic Evaluation:   KCCQ-12: scanned into EMR    5 meter gait: 19.35 seconds    Frality Survey:  Janine Current Index ADL - 5/6  scanned into EMR     STS 4.2 Risk Score / Predicted 30 day mortality: - calculations scanned into EMR  Procedure: Isolated AVR  Risk of Mortality:  5.287%  Renal Failure:  3.700%  Permanent Stroke:  1.968%  Prolonged Ventilation:  14.893%  DSW Infection:  0.106%  Reoperation:  3.855%  Morbidity or Mortality:  19.412%  Short Length of Stay:  22.881%  Long Length of Stay:  10.632%      Assessment/Plan:     1. Severe AS: PHILIPPE 0.7 cm2, mean gradient 41 mmHg, peak velocity 4 m/s. CTA completed. Will plan for RTF 23 S3 Ultra TAVR.   Will use ASA while holding coumadin for TAVR, will need contrast allergy pretreatment. 2. CAD: plan for PCI tomorrow, on statin, BB, started plavix and ASA. 3. Protein C deficiency/previous DVT: on coumadin, will hold coumadin prior to TAVR and place on ASA while holding coumadin. 4. HTN: on metoprolol XL 25 daily  5. HLD: on statin, zetia  6. PAD: Pt reports claudication, ABIs 12/21 were normal  7. Carotid stenosis: carotids 10/21 mild stenosis bilaterally  8. Asthma/chronic cough: On advair, PRN albuterol, tessalon pearls PRN  9. DM Type II: Pt had been on metformin but stopped taking, A1C 7.6 in march 2022. 10. Anemia: Hgb 8.4 on recent labs, check iron profile, pt denies dark/tarry stools. 11. Overactive bladder: on ditropan  12. Migraines: firoicet PRN, keppra BID   13. Chronic back pain: tramadol PRN, celebrex,   14. Anxiety and depression: On wellbutrin, elavil, trazadone, lexapro  15. IBS: takes prn immodium, bentyl PRN  16. Hypothyroid: cont synthroid  17. GERD: nexium    The patient was evaluated by Jefe Paulson and Ivette Odom who discussed conventional aortic valve replacement and TAVR, as well as the risks and benefits of both versus continuing medical therapy with the patient. All questions were answered. Jefe Paulson and Ivette Odom felt that TAVR is a better option for this patient, given age, frailty, and co-morbidities. With all the options available, the patient has agreed to proceed with TAVR for the treatment of her aortic stenosis and will be scheduled for 7/13/22. Addendum: Pt seen and examined. Images reviewed. Agree with EMILY Hernandez note. She is a good candidate for TF TAVR. I went thru the risks and benefits with her and she agreed to proceed. We will plan for TAVR 1 month following her PCI, which is planned for this admission. I discussed with her referring physician, Dr. Ivette Odom, who agreed. We have tentatively posted her for Wed July 13.

## 2022-06-08 NOTE — PROGRESS NOTES
Spoke to the patient and she is the hospital and wanted the appointment canceled and no reschedule. She asked that [facility] not call her anymore.

## 2022-06-08 NOTE — PROGRESS NOTES
1500: Patient c/o back pain 10/10. Pain radiating to the chest. Patient stated \"I am having a heart attack\" EKG ordered per protocol. Dr. Anthony Walter notified. New orders received. Staff expressed concern over patient home medications at bedside. Patient stated she was not taking any of them, but medications were not there at prior assessment. Patient became defensive about medications and stated it was illegal for medications to be removed from room and that she was being \"treated like a second rate citizen. \"  Reassured patient she would get the medication back, but that we could not have her taking home medications as there could be interactions, or she could be double dosing herself. Patient remains very perseverative on home medications not being in room. Unit manager, Sara Hopper, notified and is going to follow-up with patient.

## 2022-06-08 NOTE — PROGRESS NOTES
End of Shift Note    Bedside shift change report given to ALCIRA Lynn (oncoming nurse) by Lashell Gracia RN (offgoing nurse). Report included the following information SBAR, Kardex and Recent Results    Shift worked:  7a-7p     Shift summary and any significant changes:     Patient c/o chest pain and anxiety throughout shift. MD aware. Patient on schedule for cath in AM     Concerns for physician to address:  continues to c/o back pain. Having surgery next week? Impaired comprehension, STM, and problem solving. Zone phone for oncoming shift:   [phone]       Activity:  Activity Level: Bath Room Privileges  Number times ambulated in hallways past shift: 0  Number of times OOB to chair past shift: 3    Cardiac:   Cardiac Monitoring: Yes      Cardiac Rhythm: Sinus Rhythm    Access:   Current line(s): PIV     Genitourinary:   Urinary status: voiding    Respiratory:   O2 Device: Nasal cannula  Chronic home O2 use?: NO  Incentive spirometer at bedside: NO       GI:  Last Bowel Movement Date: 06/08/22  Current diet:  ADULT DIET Regular  DIET NPO  DIET ONE TIME MESSAGE  Passing flatus: YES  Tolerating current diet: YES       Pain Management:   Patient states pain is manageable on current regimen: NO    Skin:  Norbert Score: 20  Interventions: float heels    Patient Safety:  Fall Score:  Total Score: 5  Interventions: gripper socks  High Fall Risk: Yes    Length of Stay:  Expected LOS: 2d 4h  Actual LOS: 1      Lashell Gracia RN

## 2022-06-09 ENCOUNTER — APPOINTMENT (OUTPATIENT)
Dept: CT IMAGING | Age: 82
DRG: 246 | End: 2022-06-09
Attending: INTERNAL MEDICINE
Payer: MEDICARE

## 2022-06-09 ENCOUNTER — APPOINTMENT (OUTPATIENT)
Dept: GENERAL RADIOLOGY | Age: 82
DRG: 246 | End: 2022-06-09
Attending: INTERNAL MEDICINE
Payer: MEDICARE

## 2022-06-09 ENCOUNTER — ANESTHESIA (OUTPATIENT)
Dept: CARDIAC CATH/INVASIVE PROCEDURES | Age: 82
DRG: 246 | End: 2022-06-09
Payer: MEDICARE

## 2022-06-09 LAB
ALBUMIN SERPL-MCNC: 3.1 G/DL (ref 3.5–5)
ALBUMIN/GLOB SERPL: 0.9 {RATIO} (ref 1.1–2.2)
ALP SERPL-CCNC: 78 U/L (ref 45–117)
ALT SERPL-CCNC: 17 U/L (ref 12–78)
AMMONIA PLAS-SCNC: 22 UMOL/L
ANION GAP SERPL CALC-SCNC: 7 MMOL/L (ref 5–15)
ARTERIAL PATENCY WRIST A: YES
AST SERPL-CCNC: 25 U/L (ref 15–37)
ATRIAL RATE: 93 BPM
ATRIAL RATE: 93 BPM
BASE EXCESS BLDA CALC-SCNC: 4.1 MMOL/L
BASOPHILS # BLD: 0.1 K/UL (ref 0–0.1)
BASOPHILS NFR BLD: 1 % (ref 0–1)
BDY SITE: ABNORMAL
BILIRUB SERPL-MCNC: 0.8 MG/DL (ref 0.2–1)
BUN SERPL-MCNC: 15 MG/DL (ref 6–20)
BUN/CREAT SERPL: 19 (ref 12–20)
CALCIUM SERPL-MCNC: 8.1 MG/DL (ref 8.5–10.1)
CALCULATED P AXIS, ECG09: 41 DEGREES
CALCULATED P AXIS, ECG09: 65 DEGREES
CALCULATED R AXIS, ECG10: 39 DEGREES
CALCULATED R AXIS, ECG10: 50 DEGREES
CALCULATED T AXIS, ECG11: 27 DEGREES
CALCULATED T AXIS, ECG11: 62 DEGREES
CHLORIDE SERPL-SCNC: 100 MMOL/L (ref 97–108)
CO2 SERPL-SCNC: 29 MMOL/L (ref 21–32)
CREAT SERPL-MCNC: 0.77 MG/DL (ref 0.55–1.02)
D DIMER PPP FEU-MCNC: 3.4 MG/L FEU (ref 0–0.65)
DIAGNOSIS, 93000: NORMAL
DIAGNOSIS, 93000: NORMAL
DIFFERENTIAL METHOD BLD: ABNORMAL
EOSINOPHIL # BLD: 0.1 K/UL (ref 0–0.4)
EOSINOPHIL NFR BLD: 1 % (ref 0–7)
ERYTHROCYTE [DISTWIDTH] IN BLOOD BY AUTOMATED COUNT: 20.1 % (ref 11.5–14.5)
GAS FLOW.O2 O2 DELIVERY SYS: 5 L/MIN
GLOBULIN SER CALC-MCNC: 3.4 G/DL (ref 2–4)
GLUCOSE BLD STRIP.AUTO-MCNC: 167 MG/DL (ref 65–117)
GLUCOSE BLD STRIP.AUTO-MCNC: 209 MG/DL (ref 65–117)
GLUCOSE BLD STRIP.AUTO-MCNC: 220 MG/DL (ref 65–117)
GLUCOSE SERPL-MCNC: 231 MG/DL (ref 65–100)
HCO3 BLDA-SCNC: 29 MMOL/L (ref 22–26)
HCT VFR BLD AUTO: 29 % (ref 35–47)
HGB BLD-MCNC: 7.7 G/DL (ref 11.5–16)
IMM GRANULOCYTES # BLD AUTO: 0.1 K/UL (ref 0–0.04)
IMM GRANULOCYTES NFR BLD AUTO: 1 % (ref 0–0.5)
INR PPP: 1.3 (ref 0.9–1.1)
IRON SATN MFR SERPL: 4 % (ref 20–50)
IRON SERPL-MCNC: 13 UG/DL (ref 35–150)
LYMPHOCYTES # BLD: 1 K/UL (ref 0.8–3.5)
LYMPHOCYTES NFR BLD: 8 % (ref 12–49)
MAGNESIUM SERPL-MCNC: 2.1 MG/DL (ref 1.6–2.4)
MCH RBC QN AUTO: 17.5 PG (ref 26–34)
MCHC RBC AUTO-ENTMCNC: 26.6 G/DL (ref 30–36.5)
MCV RBC AUTO: 65.8 FL (ref 80–99)
MONOCYTES # BLD: 1 K/UL (ref 0–1)
MONOCYTES NFR BLD: 8 % (ref 5–13)
NEUTS SEG # BLD: 10.4 K/UL (ref 1.8–8)
NEUTS SEG NFR BLD: 81 % (ref 32–75)
NRBC # BLD: 0.03 K/UL (ref 0–0.01)
NRBC BLD-RTO: 0.2 PER 100 WBC
P-R INTERVAL, ECG05: 162 MS
P-R INTERVAL, ECG05: 166 MS
PCO2 BLDA: 43 MMHG (ref 35–45)
PH BLDA: 7.44 [PH] (ref 7.35–7.45)
PLATELET # BLD AUTO: 222 K/UL (ref 150–400)
PMV BLD AUTO: 9.8 FL (ref 8.9–12.9)
PO2 BLDA: 51 MMHG (ref 80–100)
POTASSIUM SERPL-SCNC: 4 MMOL/L (ref 3.5–5.1)
PROT SERPL-MCNC: 6.5 G/DL (ref 6.4–8.2)
PROTHROMBIN TIME: 12.9 SEC (ref 9–11.1)
Q-T INTERVAL, ECG07: 368 MS
Q-T INTERVAL, ECG07: 370 MS
QRS DURATION, ECG06: 90 MS
QRS DURATION, ECG06: 98 MS
QTC CALCULATION (BEZET), ECG08: 457 MS
QTC CALCULATION (BEZET), ECG08: 460 MS
RBC # BLD AUTO: 4.41 M/UL (ref 3.8–5.2)
RBC MORPH BLD: ABNORMAL
SAO2 % BLD: 88 % (ref 92–97)
SAO2% DEVICE SAO2% SENSOR NAME: ABNORMAL
SERVICE CMNT-IMP: ABNORMAL
SODIUM SERPL-SCNC: 136 MMOL/L (ref 136–145)
SPECIMEN SITE: ABNORMAL
TIBC SERPL-MCNC: 341 UG/DL (ref 250–450)
TSH SERPL DL<=0.05 MIU/L-ACNC: 1.04 UIU/ML (ref 0.36–3.74)
VENTRICULAR RATE, ECG03: 93 BPM
VENTRICULAR RATE, ECG03: 93 BPM
VIT B12 SERPL-MCNC: 449 PG/ML (ref 193–986)
WBC # BLD AUTO: 12.7 K/UL (ref 3.6–11)

## 2022-06-09 PROCEDURE — 80053 COMPREHEN METABOLIC PANEL: CPT

## 2022-06-09 PROCEDURE — 82607 VITAMIN B-12: CPT

## 2022-06-09 PROCEDURE — 82962 GLUCOSE BLOOD TEST: CPT

## 2022-06-09 PROCEDURE — 74011250637 HC RX REV CODE- 250/637: Performed by: INTERNAL MEDICINE

## 2022-06-09 PROCEDURE — 83540 ASSAY OF IRON: CPT

## 2022-06-09 PROCEDURE — 85610 PROTHROMBIN TIME: CPT

## 2022-06-09 PROCEDURE — 82140 ASSAY OF AMMONIA: CPT

## 2022-06-09 PROCEDURE — 82803 BLOOD GASES ANY COMBINATION: CPT

## 2022-06-09 PROCEDURE — 84443 ASSAY THYROID STIM HORMONE: CPT

## 2022-06-09 PROCEDURE — 71045 X-RAY EXAM CHEST 1 VIEW: CPT

## 2022-06-09 PROCEDURE — 74011250636 HC RX REV CODE- 250/636: Performed by: NURSE PRACTITIONER

## 2022-06-09 PROCEDURE — 85025 COMPLETE CBC W/AUTO DIFF WBC: CPT

## 2022-06-09 PROCEDURE — 82746 ASSAY OF FOLIC ACID SERUM: CPT

## 2022-06-09 PROCEDURE — 83735 ASSAY OF MAGNESIUM: CPT

## 2022-06-09 PROCEDURE — 74011636637 HC RX REV CODE- 636/637: Performed by: NURSE PRACTITIONER

## 2022-06-09 PROCEDURE — 74011250636 HC RX REV CODE- 250/636: Performed by: INTERNAL MEDICINE

## 2022-06-09 PROCEDURE — 65270000046 HC RM TELEMETRY

## 2022-06-09 PROCEDURE — 36600 WITHDRAWAL OF ARTERIAL BLOOD: CPT

## 2022-06-09 PROCEDURE — 70450 CT HEAD/BRAIN W/O DYE: CPT

## 2022-06-09 PROCEDURE — 36415 COLL VENOUS BLD VENIPUNCTURE: CPT

## 2022-06-09 PROCEDURE — 85379 FIBRIN DEGRADATION QUANT: CPT

## 2022-06-09 PROCEDURE — 74011000250 HC RX REV CODE- 250: Performed by: INTERNAL MEDICINE

## 2022-06-09 PROCEDURE — 93005 ELECTROCARDIOGRAM TRACING: CPT

## 2022-06-09 PROCEDURE — 77010033711 HC HIGH FLOW OXYGEN

## 2022-06-09 PROCEDURE — 77010033678 HC OXYGEN DAILY

## 2022-06-09 RX ORDER — MAGNESIUM SULFATE 100 %
4 CRYSTALS MISCELLANEOUS AS NEEDED
Status: DISCONTINUED | OUTPATIENT
Start: 2022-06-09 | End: 2022-06-17 | Stop reason: HOSPADM

## 2022-06-09 RX ORDER — LANOLIN ALCOHOL/MO/W.PET/CERES
3 CREAM (GRAM) TOPICAL
Status: DISCONTINUED | OUTPATIENT
Start: 2022-06-09 | End: 2022-06-17 | Stop reason: HOSPADM

## 2022-06-09 RX ORDER — FUROSEMIDE 10 MG/ML
20 INJECTION INTRAMUSCULAR; INTRAVENOUS ONCE
Status: COMPLETED | OUTPATIENT
Start: 2022-06-09 | End: 2022-06-09

## 2022-06-09 RX ORDER — LORAZEPAM 2 MG/ML
1 INJECTION INTRAMUSCULAR ONCE
Status: COMPLETED | OUTPATIENT
Start: 2022-06-09 | End: 2022-06-09

## 2022-06-09 RX ORDER — HYDROXYZINE 25 MG/1
25 TABLET, FILM COATED ORAL
Status: DISCONTINUED | OUTPATIENT
Start: 2022-06-09 | End: 2022-06-15

## 2022-06-09 RX ORDER — ONDANSETRON 2 MG/ML
4 INJECTION INTRAMUSCULAR; INTRAVENOUS
Status: DISCONTINUED | OUTPATIENT
Start: 2022-06-09 | End: 2022-06-17 | Stop reason: HOSPADM

## 2022-06-09 RX ORDER — WARFARIN 1 MG/1
1 TABLET ORAL ONCE
Status: COMPLETED | OUTPATIENT
Start: 2022-06-09 | End: 2022-06-09

## 2022-06-09 RX ORDER — DEXTROSE MONOHYDRATE 100 MG/ML
0-250 INJECTION, SOLUTION INTRAVENOUS AS NEEDED
Status: DISCONTINUED | OUTPATIENT
Start: 2022-06-09 | End: 2022-06-17 | Stop reason: HOSPADM

## 2022-06-09 RX ORDER — QUETIAPINE FUMARATE 25 MG/1
12.5 TABLET, FILM COATED ORAL
Status: DISCONTINUED | OUTPATIENT
Start: 2022-06-09 | End: 2022-06-09

## 2022-06-09 RX ORDER — QUETIAPINE FUMARATE 25 MG/1
25 TABLET, FILM COATED ORAL
Status: DISCONTINUED | OUTPATIENT
Start: 2022-06-09 | End: 2022-06-17 | Stop reason: HOSPADM

## 2022-06-09 RX ORDER — INSULIN LISPRO 100 [IU]/ML
INJECTION, SOLUTION INTRAVENOUS; SUBCUTANEOUS EVERY 6 HOURS
Status: DISCONTINUED | OUTPATIENT
Start: 2022-06-09 | End: 2022-06-10

## 2022-06-09 RX ORDER — ENOXAPARIN SODIUM 100 MG/ML
1 INJECTION SUBCUTANEOUS EVERY 12 HOURS
Status: DISCONTINUED | OUTPATIENT
Start: 2022-06-09 | End: 2022-06-10

## 2022-06-09 RX ADMIN — FUROSEMIDE 20 MG: 10 INJECTION, SOLUTION INTRAMUSCULAR; INTRAVENOUS at 16:31

## 2022-06-09 RX ADMIN — ONDANSETRON 4 MG: 2 INJECTION INTRAMUSCULAR; INTRAVENOUS at 10:18

## 2022-06-09 RX ADMIN — AMITRIPTYLINE HYDROCHLORIDE 10 MG: 10 TABLET, FILM COATED ORAL at 22:36

## 2022-06-09 RX ADMIN — FUROSEMIDE 20 MG: 10 INJECTION, SOLUTION INTRAMUSCULAR; INTRAVENOUS at 13:49

## 2022-06-09 RX ADMIN — WARFARIN SODIUM 1 MG: 1 TABLET ORAL at 18:24

## 2022-06-09 RX ADMIN — QUETIAPINE FUMARATE 25 MG: 25 TABLET ORAL at 22:36

## 2022-06-09 RX ADMIN — ESCITALOPRAM OXALATE 20 MG: 10 TABLET ORAL at 10:36

## 2022-06-09 RX ADMIN — EZETIMIBE 10 MG: 10 TABLET ORAL at 10:37

## 2022-06-09 RX ADMIN — ENOXAPARIN SODIUM 70 MG: 100 INJECTION SUBCUTANEOUS at 22:37

## 2022-06-09 RX ADMIN — BUPROPION HYDROCHLORIDE 150 MG: 150 TABLET, EXTENDED RELEASE ORAL at 10:36

## 2022-06-09 RX ADMIN — TRAMADOL HYDROCHLORIDE 50 MG: 50 TABLET, COATED ORAL at 14:17

## 2022-06-09 RX ADMIN — ASPIRIN 81 MG: 81 TABLET, CHEWABLE ORAL at 10:36

## 2022-06-09 RX ADMIN — PANTOPRAZOLE SODIUM 40 MG: 40 TABLET, DELAYED RELEASE ORAL at 10:36

## 2022-06-09 RX ADMIN — ENOXAPARIN SODIUM 70 MG: 100 INJECTION SUBCUTANEOUS at 11:57

## 2022-06-09 RX ADMIN — LEVETIRACETAM 500 MG: 500 TABLET, FILM COATED ORAL at 10:36

## 2022-06-09 RX ADMIN — SODIUM CHLORIDE, PRESERVATIVE FREE 10 ML: 5 INJECTION INTRAVENOUS at 22:37

## 2022-06-09 RX ADMIN — Medication 3 UNITS: at 18:23

## 2022-06-09 RX ADMIN — Medication 3 UNITS: at 13:50

## 2022-06-09 RX ADMIN — LORAZEPAM 1 MG: 2 INJECTION INTRAMUSCULAR; INTRAVENOUS at 02:15

## 2022-06-09 RX ADMIN — SODIUM CHLORIDE, PRESERVATIVE FREE 10 ML: 5 INJECTION INTRAVENOUS at 14:00

## 2022-06-09 RX ADMIN — MELATONIN 3 MG: at 22:36

## 2022-06-09 NOTE — PROGRESS NOTES
Palliative Medicine  [place]: [phone] ([phone])  [facility]: [phone] ([phone])    Asberry Mortimer is an 77 yo patient who was admitted to the hospital after she began experiencing AMS prior to undergoing a Cardiac procedure. HPI: 78 y.o. female with PMHx of AS, CAD, HTN, HLD, protein C deficiency and previous DVT on chronic coumadin, carotid stenosis, PAD, varicose veins, DJD, chronic tremor, hypothyroidism, asthma, IBS, DM, contrast allergy that is referred to the [address] [place] [address] by Dr. Thang Rodriguez for interventional evaluation of  Severe AS.      She has SOB with exertion. She denies SOB at rest. She has to rest during activities such as cooking in her kitchen. Her activity level is limited by back pain. Her SOB has been getting progressively worse over the past couple of months.      She is a former smoker, denies alcohol. She is . She lives alone in an appartment. She is a retired . She has a family history of stroke. She has received 2 COVID vaccines and 1 booster. (From Dr Eula Desai notes). Palliative team asked to see patient and family to provide some emotional support and resource information for care to family. Psychiatry has also been consulted to assist with patient's AMS. LCSW met with patient briefly. She is in bed, was somewhat confused, but was observed to be co-operative and answered some questions appropriately. She noted that she was tired because she had a \"rough night\". Chart indicates that she had agitation and combative behavior prior to her procedure and she was unable to have the procedure as a result. Met with her son, Zoya Tse briefly with patient and then in another room, to get information and to provide support and give resources to son.  Meghan Driverr is one of three children but the only one involved with patient and he is her mPOA (she has a Durable POA document which has a medical clause that would appoint Meghan Solis as her guardian if needed, for medical issues as well. Patient has a daughter who lives in [place] and another son who lives in [place], [place]. Patient and her  moved to this area in 2020 to be closer to Isabela Dacosta and so he could assist them if needed. Patient's   in ; he had Alzheimer's Dementia and hospice was involved with him at end of life. Caregiver [place]: High. Isabela Olesya is not sure what type of support patient needs at this time and how to access help. He shared that patient has had some memory issues and what may be the beginning stages of dementia, but that he has never seen his mother agitated and confused like she was prior to admission. He knows that patient can't live alone and that she will need some supports at home or she will have to live in an [place]ET[place]NA type of environment. He also noted that he has to be out of state for about 10 days, next week and is concerned where patient could go at that time. Son is wanting patient to have a psychiatric evaluation and is hoping that her symptoms can be better managed. We talked about delirium in the hospital for many elderly patients and how patient may do better once she returns home to her familiar surroundings. LCSW provided emotional support, explained resource limits as patient does not have Medicaid and likely would not qualify. 1. We discussed an [facility]PC or a group home type of environment for patient. Keo given some resources to look into. 2. Resource information to hire help at home was also given to Isabela Dacosta. 3. Discussed that if patient qualifies for rehab then that may be a short term option. 4. Also talked about a focus on comfort at some point if patient is unable to get the cardiac procedure completed. Isabela Dacosta is familiar with hospice services as his father had hospice. We also signed a DDNR today with Isabela Dacosta, he had this conversation earlier with Dr Isabela Beatty. Our team is available to Isabela Dacosta and patient as needed.

## 2022-06-09 NOTE — ANESTHESIA PREPROCEDURE EVALUATION
Relevant Problems   CARDIOVASCULAR   (+) Coronary artery disease   (+) Coronary artery disease involving native coronary artery of native heart without angina pectoris      ENDOCRINE   (+) Acquired hypothyroidism   (+) Type 2 diabetes mellitus without complication, without long-term current use of insulin (HCC)       Anesthetic History   No history of anesthetic complications            Review of Systems / Medical History  Patient summary reviewed, nursing notes reviewed and pertinent labs reviewed    Pulmonary          Smoker  Asthma     Comments: Former Smoker   Neuro/Psych         Psychiatric history    Comments: Anxiety   Altered mental status  Uncooperative, combative Cardiovascular              CAD and hyperlipidemia    Exercise tolerance: <4 METS  Comments: Coronary stent     GI/Hepatic/Renal     GERD          Comments: Irritable bowel syndrome  Endo/Other    Diabetes  Hypothyroidism  Arthritis     Other Findings   Comments: Hx DVT  Urinary urgency  Hx opioid abuse  Patient was self medicating last pm which may account for the altered mental status         Physical Exam    Airway  Mallampati: II    Neck ROM: normal range of motion   Mouth opening: Normal     Cardiovascular  Regular rate and rhythm,  S1 and S2 normal,  no murmur, click, rub, or gallop             Dental    Dentition: Full lower dentures     Pulmonary  Breath sounds clear to auscultation               Abdominal  GI exam deferred       Other Findings            Anesthetic Plan    ASA: 3  Anesthesia type: MAC          Induction: Intravenous  Anesthetic plan and risks discussed with: Patient

## 2022-06-09 NOTE — CONSULTS
Hospitalist Consultation Note    NAME:  Jaziel Frank   :   1940   MRN:   [id number]     ATTENDING: Erika Arreola MD  PCP:  Sarah Mcclellan DO    Date/Time:  2022 2:12 AM      Recommendations/Plan:       CAD, POA (s/p cath in , now with occluded RCA)   - planned PCI under anesthesia this AM  - management per primary cardiology team    Anxiety, POA  Agitation, POA  Depression, POA  - recently , was overmedicating with anxiety meds  - resume home meds  - symptom management    Asthma, POA  - resume PTA inhaler    DM, POA  - BG checks with SSI coverage    Hypertension, POA  - resume home meds    Hyperlipidemia, POA  - resume Lipitor, Zetia    History of  left DVT  - resume Plavix    IBS, POA  - resume home meds    Code Status: Full  DVT Prophylaxis: per cardiology team        Subjective:   REQUESTING PHYSICIAN:  REASON FOR CONSULT:  Increasing agitation, restlessness    Samantha is a 78 y.o.  female who I was asked to see for increasing and uncontrolled agitation. Patient is scheduled for PCI under anesthesia this AM. Patient was demonstrating increasing restlessness, agitation and was getting more loud and physically aggressive towards RN, refusing care and keeps attempting to get out of bed overnight. Past medical history is significant for CAD s/p stent placement, DM, DVT, asthma, hyperlipidemia, IBS, anxiety and depression.        Past Medical History:   Diagnosis Date    Anxiety     Arthritis     Asthma     CAD (coronary artery disease)     stent    Diabetes (HCC)     GERD (gastroesophageal reflux disease)     History of DVT (deep vein thrombosis)     History of recurrent UTIs     Hx of seasonal allergies     Hypercholesterolemia     Irritable bowel syndrome (IBS)     Migraine     Urinary urgency       Past Surgical History:   Procedure Laterality Date    HX CATARACT REMOVAL Bilateral     HX HEART CATHETERIZATION      stent    HX OPEN REDUCTION INTERNAL FIXATION Left     humerus     HX OPEN REDUCTION INTERNAL FIXATION Right     hip     Social History     Tobacco Use    Smoking status: Former Smoker     Packs/day: 0.25     Quit date:      Years since quittin.4    Smokeless tobacco: Never Used   Substance Use Topics    Alcohol use: No      Family History   Problem Relation Age of Onset    Diabetes Mother     Stroke Mother        Allergies   Allergen Reactions    Iodinated Contrast Media Other (comments)     Passes out    Pcn [Penicillins] Shortness of Breath    Sulfa (Sulfonamide Antibiotics) Shortness of Breath      Prior to Admission medications    Medication Sig Start Date End Date Taking? Authorizing Provider   celecoxib (CELEBREX) 200 mg capsule TAKE 1 CAPSULE BY MOUTH EVERY 12 HOURS AS NEEDED FOR PAIN 22  Yes Bill Wills III, DO   traMADoL (ULTRAM) 50 mg tablet Take 1 Tablet by mouth every twelve (12) hours as needed for Pain for up to 30 days. Max Daily Amount: 100 mg. 22 Yes Bill Wills III, DO   loperamide (IMODIUM) 2 mg capsule Take 1 Capsule by mouth four (4) times daily as needed for Diarrhea. 22  Yes Bill Wills III, DO   metoprolol succinate (TOPROL-XL) 50 mg XL tablet Take 1 Tablet by mouth daily. 22  Yes Cale Wills III, DO   butalbital-acetaminophen-caffeine (FIORICET, ESGIC) -40 mg per tablet Take 1 Tablet by mouth every twelve (12) hours as needed for Headache. 4/15/22  Yes Bill Wills III, DO   Bifidobacterium Infantis (Align) 4 mg cap Take 1 Capsule by mouth daily as needed for Diarrhea. Indications: ALIGN OTC 4/15/22  Yes Bill Wills III, DO   benzonatate (TESSALON) 100 mg capsule Take 1 Capsule by mouth three (3) times daily as needed for Cough. 3/29/22  Yes Bill Wills III, DO   clorazepate (TRANXENE) 3.75 mg tablet Take 1 Tablet by mouth every eight (8) hours as needed for Anxiety. Max Daily Amount: 11.25 mg. 3/21/22  Yes Bill Wills III, DO   buPROPion XL (WELLBUTRIN XL) 150 mg tablet Take 1 Tablet by mouth daily. 2/23/22  Yes Bill Wills III, DO   oxybutynin chloride XL (DITROPAN XL) 10 mg CR tablet TAKE 1 TABLET BY MOUTH EVERY DAY 2/15/22  Yes Bill Wills III, DO   amitriptyline (ELAVIL) 10 mg tablet TAKE 1 TABLET BY MOUTH EVERY NIGHT 2/14/22  Yes Bill Wills III, DO   traZODone (DESYREL) 100 mg tablet Take 100 mg by mouth nightly. Yes Provider, Historical   levothyroxine (SYNTHROID) 25 mcg tablet Take 2 Tablets by mouth Daily (before breakfast). 1/25/22  Yes Bill Wills III, DO   albuterol (PROVENTIL HFA, VENTOLIN HFA, PROAIR HFA) 90 mcg/actuation inhaler Take 2 Puffs by inhalation every six (6) hours as needed for Wheezing. 1/12/22  Yes Bill Wills III, DO   Wixela Inhub 250-50 mcg/dose diskus inhaler INHALE 1 PUFF AND INTO THE LUNGS EVERY 12 HOURS. RINSE MOUTH AFTER USE 12/5/21  Yes Bill Wills III, DO   levocetirizine (XYZAL) 5 mg tablet Take 1 Tablet by mouth daily. 12/3/21  Yes Bill Wills III, DO   ipratropium (ATROVENT) 42 mcg (0.06 %) nasal spray 2 Sprays by Both Nostrils route four (4) times daily.  12/3/21  Yes Carline Birch, DO   nystatin (MYCOSTATIN) 100,000 unit/mL suspension SWISH AND SWALLOW 5 ML BY MOUTH FOR 30 SECONDS 4 TIMES A DAY AS NEEDED FOR MOUTH PAIN 11/4/21  Yes Bill Wills III, DO   esomeprazole (NEXIUM) 20 mg capsule TAKE 1 CAPSULE BY MOUTH DAILY 9/24/21  Yes Bill Wills III, DO   ondansetron hcl (ZOFRAN) 4 mg tablet TAKE 1 TABLET BY MOUTH EVERY 8 HOURS AS NEEDED FOR NAUSEA OR VOMITING 9/9/21  Yes Bill Wills III, DO   levETIRAcetam (KEPPRA) 500 mg tablet TAKE 1 TABLET BY MOUTH EVERY MORNING AND 1 AND 1/2 TABLET BY MOUTH EVERY EVENING FOR MIGRAINES 8/16/21  Yes Bill Wills III, DO   dicyclomine (BENTYL) 10 mg capsule TAKE ONE CAPSULE BY MOUTH FOUR TIMES DAILY AS NEEDED 8/16/21  Yes Bill Wills III, DO   ezetimibe (ZETIA) 10 mg tablet TAKE 1 TABLET BY MOUTH DAILY 8/6/21  Yes Bill Wills III, DO   atorvastatin (LIPITOR) 20 mg tablet TAKE 1 TABLET BY MOUTH EVERY DAY 7/19/21  Yes Bill Wills III, DO   diclofenac (VOLTAREN) 1 % gel Apply  to affected area every twelve (12) hours as needed for Pain. 6/25/21  Yes Bill Wills III, DO   escitalopram oxalate (LEXAPRO) 20 mg tablet TAKE 1 TABLET BY MOUTH DAILY 4/28/20  Yes Bill Wills III, DO   warfarin (COUMADIN) 1 mg tablet Take 1 Tablet by mouth daily. 5/4/22   Earlene ARMENDARIZ III, DO   warfarin (COUMADIN) 2 mg tablet Take 1 Tablet by mouth daily. 1/28/22   Earlene Ellis III, DO       REVIEW OF SYSTEMS:     Total of 12 systems reviewed as follows:   I am not able to complete the review of systems because:    The patient is intubated and sedated    The patient has altered mental status due to his acute medical problems    The patient has baseline aphasia from prior stroke(s)    The patient has baseline dementia and is not reliable historian                 POSITIVE= bolded text  Negative = text not bolded  General:  fever, chills, sweats, generalized weakness, weight loss/gain,      loss of appetite   Eyes:    blurred vision, eye pain, loss of vision, double vision  ENT:    rhinorrhea, pharyngitis   Respiratory:   cough, sputum production, SOB, wheezing, VILA, pleuritic pain   Cardiology:   chest pain, palpitations, orthopnea, PND, edema, syncope   Gastrointestinal:  abdominal pain , N/V, dysphagia, diarrhea, constipation, bleeding   Genitourinary:  frequency, urgency, dysuria, hematuria, incontinence   Muskuloskeletal :  arthralgia, myalgia   Hematology:  easy bruising, nose or gum bleeding, lymphadenopathy   Dermatological: rash, ulceration, pruritis   Endocrine:   hot flashes or polydipsia   Neurological:  headache, dizziness, confusion, focal weakness, paresthesia,     Speech difficulties, memory loss, gait disturbance  Psychological: Feelings of anxiety, depression, agitation    Objective:   VITALS:    Visit Vitals  BP (!) 155/81 (BP 1 Location: Left upper arm, BP Patient Position: Lying)   Pulse 98   Temp 98.6 °F (37 °C)   Resp 28   Ht 5' 1\" (1.549 m)   Wt (P) 69.3 kg (152 lb 12.5 oz)   SpO2 90%   Breastfeeding No   BMI (P) 28.87 kg/m²     Temp (24hrs), Av.3 °F (36.8 °C), Min:97.9 °F (36.6 °C), Max:99.1 °F (37.3 °C)      PHYSICAL EXAM:   General:    Alert, uncooperative, no distress, appears stated age. HEENT: Atraumatic, anicteric sclerae, pink conjunctivae     No oral ulcers, mucosa moist, throat clear  Neck:  Supple, symmetrical,  thyroid: non tender  Lungs:   Clear to auscultation bilaterally. No Wheezing or Rhonchi. No rales. Chest wall:  No tenderness  No Accessory muscle use. Heart:   Regular  rhythm,  No  murmur   No edema  Abdomen:   Soft, non-tender. Not distended. Bowel sounds normal  Extremities: No cyanosis. No clubbing  Skin:     Not pale. Not Jaundiced  No rashes   Psych:  Limited insight. Not depressed. Restless,  agitated. Neurologic: EOMs intact. No facial asymmetry. No aphasia or slurred speech.  Symmetrical strength, Alert and oriented X 3.     _______________________________________________________________________  Care Plan discussed with:    Comments   Patient y    Family      RN y    Care Manager                    Consultant:      ____________________________________________________________________  TOTAL TIME:     50 mins    Comments    y Reviewed previous records   >50% of visit spent in counseling and coordination of care y Discussion with patient and/or family and questions answered       Critical Care Provided     Minutes non procedure based  ________________________________________________________________________  Signed: Tianna Humphrey NP      Procedures: see electronic medical records for all procedures/Xrays and details which were not copied into this note but were reviewed prior to creation of Plan.     LAB DATA REVIEWED:    Recent Results (from the past 24 hour(s))   EKG, 12 LEAD, INITIAL    Collection Time: 06/08/22  3:10 PM   Result Value Ref Range    Ventricular Rate 99 BPM    Atrial Rate 99 BPM    P-R Interval 158 ms    QRS Duration 98 ms    Q-T Interval 352 ms    QTC Calculation (Bezet) 451 ms    Calculated P Axis 36 degrees    Calculated R Axis 42 degrees    Calculated T Axis 25 degrees    Diagnosis       Normal sinus rhythm  Nonspecific ST abnormality  Abnormal ECG  No previous ECGs available         _____________________________  Hospitalist: Chaitanya Atkins NP

## 2022-06-09 NOTE — CONSULTS
PSYCHIATRY CONSULT NOTE    REASON FOR CONSULT: anxiety, delirium medication      HISTORY OF PRESENTING COMPLAINT:  Purvis Blizzard is a 78 y.o. WHITE/NON- female who is currently admitted to the medical floor at [facility].  Patient reports she has not slept and was trying to get some sleep. She reports mild depression and anxiety stating that she just moved here from [facility] last year and she lost her . She reports that her  had bipolar and she took care of him. Even though it wore her out, she took care of him everyday for so many years and now when she wakes up, she is expecting to see him. She shared that she did grief therapy for sometime. She denies suicidal/homicidal ideation, AV hallucinations at this time. She reports not eating well due to the food in the hospital. Patient is calm and cooperative, alert and oriented in all sphere. Speech is clear and coherent, her thoughts are logical and organized. No perceptual disturbance observed. PAST PSYCHIATRIC HISTORY: patient reports she was in therapy years ago, not currently in therapy and does not see a mental health provider. SUBSTANCE ABUSE HISTORY: patient denies        PAST MEDICAL HISTORY:    Please see H&P for details. Past Medical History:   Diagnosis Date    Anxiety     Arthritis     Asthma     CAD (coronary artery disease)     stent    Diabetes (HCC)     GERD (gastroesophageal reflux disease)     History of DVT (deep vein thrombosis)     History of recurrent UTIs     Hx of seasonal allergies     Hypercholesterolemia     Irritable bowel syndrome (IBS)     Migraine     Urinary urgency      Prior to Admission medications    Medication Sig Start Date End Date Taking?  Authorizing Provider   celecoxib (CELEBREX) 200 mg capsule TAKE 1 CAPSULE BY MOUTH EVERY 12 HOURS AS NEEDED FOR PAIN 5/17/22  Yes Bill Wills III, DO   traMADoL (ULTRAM) 50 mg tablet Take 1 Tablet by mouth every twelve (12) hours as needed for Pain for up to 30 days. Max Daily Amount: 100 mg. 5/13/22 6/12/22 Yes Bill Wills III, DO   loperamide (IMODIUM) 2 mg capsule Take 1 Capsule by mouth four (4) times daily as needed for Diarrhea. 5/13/22  Yes Bill Wills III, DO   metoprolol succinate (TOPROL-XL) 50 mg XL tablet Take 1 Tablet by mouth daily. 5/4/22  Yes Ileana Wills III, DO   butalbital-acetaminophen-caffeine (FIORICET, ESGIC) -40 mg per tablet Take 1 Tablet by mouth every twelve (12) hours as needed for Headache. 4/15/22  Yes Bill Wills III, DO   Bifidobacterium Infantis (Align) 4 mg cap Take 1 Capsule by mouth daily as needed for Diarrhea. Indications: ALIGN OTC 4/15/22  Yes Bill Wills III, DO   benzonatate (TESSALON) 100 mg capsule Take 1 Capsule by mouth three (3) times daily as needed for Cough. 3/29/22  Yes Bill Wills III, DO   clorazepate (TRANXENE) 3.75 mg tablet Take 1 Tablet by mouth every eight (8) hours as needed for Anxiety. Max Daily Amount: 11.25 mg. 3/21/22  Yes Bill Wills III, DO   buPROPion XL (WELLBUTRIN XL) 150 mg tablet Take 1 Tablet by mouth daily. 2/23/22  Yes Bill Wills III, DO   oxybutynin chloride XL (DITROPAN XL) 10 mg CR tablet TAKE 1 TABLET BY MOUTH EVERY DAY 2/15/22  Yes Bill Wills III, DO   amitriptyline (ELAVIL) 10 mg tablet TAKE 1 TABLET BY MOUTH EVERY NIGHT 2/14/22  Yes Bill Wills III, DO   traZODone (DESYREL) 100 mg tablet Take 100 mg by mouth nightly. Yes Provider, Historical   levothyroxine (SYNTHROID) 25 mcg tablet Take 2 Tablets by mouth Daily (before breakfast). 1/25/22  Yes Czajkowski, Bill B III, DO   albuterol (PROVENTIL HFA, VENTOLIN HFA, PROAIR HFA) 90 mcg/actuation inhaler Take 2 Puffs by inhalation every six (6) hours as needed for Wheezing.  1/12/22  Yes Bill Wills III, DO   Wixela Inhub 250-50 mcg/dose diskus inhaler INHALE 1 PUFF AND INTO THE LUNGS EVERY 12 HOURS. RINSE MOUTH AFTER USE 12/5/21  Yes Bill Wills III, DO   levocetirizine (XYZAL) 5 mg tablet Take 1 Tablet by mouth daily. 12/3/21  Yes Bill Wills III, DO   ipratropium (ATROVENT) 42 mcg (0.06 %) nasal spray 2 Sprays by Both Nostrils route four (4) times daily. 12/3/21  Yes Ria Swartz, DO   nystatin (MYCOSTATIN) 100,000 unit/mL suspension SWISH AND SWALLOW 5 ML BY MOUTH FOR 30 SECONDS 4 TIMES A DAY AS NEEDED FOR MOUTH PAIN 11/4/21  Yes Bill Wills III, DO   esomeprazole (NEXIUM) 20 mg capsule TAKE 1 CAPSULE BY MOUTH DAILY 9/24/21  Yes Bill Wills III, DO   ondansetron hcl (ZOFRAN) 4 mg tablet TAKE 1 TABLET BY MOUTH EVERY 8 HOURS AS NEEDED FOR NAUSEA OR VOMITING 9/9/21  Yes Bill Wills III, DO   levETIRAcetam (KEPPRA) 500 mg tablet TAKE 1 TABLET BY MOUTH EVERY MORNING AND 1 AND 1/2 TABLET BY MOUTH EVERY EVENING FOR MIGRAINES 8/16/21  Yes Bill Wills III, DO   dicyclomine (BENTYL) 10 mg capsule TAKE ONE CAPSULE BY MOUTH FOUR TIMES DAILY AS NEEDED 8/16/21  Yes Bill Wills III, DO   ezetimibe (ZETIA) 10 mg tablet TAKE 1 TABLET BY MOUTH DAILY 8/6/21  Yes Bill Wills III, DO   atorvastatin (LIPITOR) 20 mg tablet TAKE 1 TABLET BY MOUTH EVERY DAY 7/19/21  Yes Bill Wills III, DO   diclofenac (VOLTAREN) 1 % gel Apply  to affected area every twelve (12) hours as needed for Pain. 6/25/21  Yes Bill Wills III, DO   escitalopram oxalate (LEXAPRO) 20 mg tablet TAKE 1 TABLET BY MOUTH DAILY 4/28/20  Yes Bill Wills III, DO   warfarin (COUMADIN) 1 mg tablet Take 1 Tablet by mouth daily. 5/4/22   Mildred ARMENDARIZ III, DO   warfarin (COUMADIN) 2 mg tablet Take 1 Tablet by mouth daily.  1/28/22   Mildred ARMENDARIZ III, DO     Vitals:    06/08/22 2330 06/09/22 0048 06/09/22 0430 06/09/22 1207   BP:   (!) 156/83    Pulse: 98  94    Resp: 28  20    Temp:   98.4 °F (36.9 °C)    SpO2: 90%  94% 94%   Weight:  69.3 kg (152 lb 12.5 oz)     Height:         Lab Results   Component Value Date/Time    WBC 12.7 (H) 06/09/2022 10:55 AM    HGB 7.7 (L) 06/09/2022 10:55 AM    HCT 29.0 (L) 06/09/2022 10:55 AM    PLATELET 622 27/36/9709 10:55 AM    MCV 65.8 (L) 06/09/2022 10:55 AM     Lab Results   Component Value Date/Time    Sodium 136 06/09/2022 10:55 AM    Potassium 4.0 06/09/2022 10:55 AM    Chloride 100 06/09/2022 10:55 AM    CO2 29 06/09/2022 10:55 AM    Anion gap 7 06/09/2022 10:55 AM    Glucose 231 (H) 06/09/2022 10:55 AM    BUN 15 06/09/2022 10:55 AM    Creatinine 0.77 06/09/2022 10:55 AM    BUN/Creatinine ratio 19 06/09/2022 10:55 AM    GFR est AA >60 06/09/2022 10:55 AM    GFR est non-AA >60 06/09/2022 10:55 AM    Calcium 8.1 (L) 06/09/2022 10:55 AM    Bilirubin, total 0.8 06/09/2022 10:55 AM    Alk. phosphatase 78 06/09/2022 10:55 AM    Protein, total 6.5 06/09/2022 10:55 AM    Albumin 3.1 (L) 06/09/2022 10:55 AM    Globulin 3.4 06/09/2022 10:55 AM    A-G Ratio 0.9 (L) 06/09/2022 10:55 AM    ALT (SGPT) 17 06/09/2022 10:55 AM    AST (SGOT) 25 06/09/2022 10:55 AM     No results found for: VALF2, VALAC, VALP, VALPR, DS6, CRBAM, CRBAMP, CARB2, XCRBAM  No results found for: LITHM  RADIOLOGY REPORTS:(reviewed/updated 6/9/2022)  XR CHEST PORT    Result Date: 6/9/2022  Clinical indication: Hypoxia. Portable AP upright view of the chest obtained, comparison December 3, 2021. A diffuse interstitial pattern is increased possibly related partially to shallow inspiration. Blunting of the costophrenic angle. Stable heart size. Next     Diffuse interstitial pattern. CARDIAC PROCEDURE    Result Date: 6/7/2022  · Known severe AS. Found to have severe proximal LCx disease, and  of RCA. · Known severe AS, valve not crossed. · Right dominant system. Mild LAD disease. 99% proximal LCx prior to trifurcation into 3 OMs. Occluded mid RCA. Distal vessel collatorllized from left. · Right innominate tortuosity.  Makes cath difficult. · Patient with severe baseline anxiety likely exacerbated by steroid prep for contrast allergy. Unable to cooperate with cath. · PCI deferred until anesthesia can be arranged. · 5Fr right radial sheath removed by patent hemostasis. No results found for: Nixon Winters [id number], [id number], [id number], PREGU, POCHCG, MHCGN, HCGQR, THCGA1, SHCG, HCGN, HCGSERUM, HCGURQLPOC    PSYCHOSOCIAL HISTORY: patient reports she lost her  last year, she has 3 adult children and she lives in [place]. Patient reports she is a retired . MENTAL STATUS EXAM:    General appearance:  Well groomed, in csual clothing   psychomotor activity is wnl  Eye contact: good eye contact  Speech: Spontaneous, soft, decreased output. Affect : euthymic  Mood: mood congruent  Thought Process: Logical, goal directed  Perception: Denies AH or VH. Thought Content: denies SI/HI or Plan  Insight: Partial  Judgement: Fair  Cognition: Intact grossly. ASSESSMENT AND PLAN:  Alanis Bradshaw meets criteria for a diagnosis of  Adjustmnet disorder with depressed mood and anxiety  Will increase seroquel to 25mg at bedtime. Will start hydroxyzine  25mg as needed  Continue with current medications  There is no indication for inpatient psychiatric admission at this time      Thank your your consult. Please feel free to consult us again as needed.
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
## 2022-06-09 NOTE — PROGRESS NOTES
Warfarin Dosing - Pharmacy Consult Note    Consulting Provider: Dr. Thang Rodriguez  Indication: History of VTE/PE Protein C deficiency  Warfarin Dose prior to admission: 1 mg daily     Concurrent anticoagulants/antiplatelets: therapeutic enoxaparin    Significant Drug Interactions: No obvious interactions  Recent Labs     06/07/22  1126   INR 1.3*   HGB 8.4*        [unfilled]    Date      INR       Dose  6/9          1.3        1 mg    Assessment/Plan  Goal INR: 2 - 3  Resume PTA regimen    Warfarin 1 mg tonight    Active problem list reviewed. INR orders are placed. Chart reviewed for pertinent labs, drug/diet interactions, and past doses. Documentation of patient's clinical condition was reviewed. Pharmacy Dosing:  Pharmacy will continue to follow.
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
## 2022-06-09 NOTE — PROGRESS NOTES
Problem: Pressure Injury - Risk of  Goal: *Prevention of pressure injury  Description: Document Norbert Scale and appropriate interventions in the flowsheet.   Outcome: Progressing Towards Goal  Note: Pressure Injury Interventions:  Sensory Interventions: Keep linens dry and wrinkle-free    Moisture Interventions: Absorbent underpads,Apply protective barrier, creams and emollients    Activity Interventions: Increase time out of bed,PT/OT evaluation    Mobility Interventions: PT/OT evaluation    Nutrition Interventions: Document food/fluid/supplement intake,Offer support with meals,snacks and hydration    Friction and Shear Interventions: Minimize layers
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
## 2022-06-09 NOTE — PROGRESS NOTES
RAPID RESPONSE TEAM    I was rounding on IVCU and noticed the patient's oxygen saturations were low, I entered the room - primary RN and Dr. Aurelio Knapp were present. Patient was on NRB 10L - saturations slowly improved in the upper 80s - low 90s - per nurse, saturations had dropped in the 60s. Patient became agitated and aggressive and then became hypoxic. After several minutes oxygen saturations slowly improved. Transitioned to HFNC at 8L. Lung sounds were clear per MD and nurse. Patient is much calmer now, good respiratory erffort. Not in acute distress but requiring more oxygen. /54 HR 90s RR 20-22. MD ordered STAT labs, CXR, Hospitalist consult, Psych consult, Palliative consult, and an ABG. ABG 7.44/43.2/51.3/29  - saturation 87.6 - RT increased HFNC to 10L.     Rest per team.    Code Status addressed DNR/DNI    Yuliana Abdul  Rapid Response RN  Ext [phone]

## 2022-06-09 NOTE — ROUTINE PROCESS
Patient with about a 5 minute run of SVT. EKG performed but was NSR by the time it was completed. Patient was having bilateral shoulder pain and pain going down her R side while this was occurring however patient stated this was the same type of pain she's been having. Patient was not light headed or nauseous. BP stable.
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
## 2022-06-09 NOTE — PROGRESS NOTES
Hospitalist Progress Note    NAME: Ana Suarez   :  1940   MRN:  [id number]       Assessment / Plan:  Confusional state with episodes of agitation, concerning for delirium  Chart reviewed, per chart patient has been confused and aggressive towards the staff and refusing care. Discussed with patient some reported that patient at baseline is alert oriented x3 and very much independent and has never had any memory issues. He reported that patient can get confused when she does not have much sleep. Patient seems to be anxious since the loss of her  2 years ago. When seen patient was alert oriented x3 . Able to  maintain normal conversation. Will get B12, folate, TSH, ABG and ammonia  Also get CT of the brain without contrast   Noted psych consult, Seroquel at bedtime, and frequent reorientation  Continue with home dose Lexapro  Check UA     Acute respiratory failure with hypoxia  In Creased oxygen requirement this morning, had to be placed on high flow 8 to 10 L due to sudden hypoxia. Patient was on 3 L yesterday  Chest x-ray did not show any acute pathology, showed diffuse interstitial marking  Continue with nebs  Get D-dimer, if positive most likely will need CTA of the chest.  Patient has severe allergy to iodine, most likely will need VQ scan    CAD, POA (s/p cath in , now with occluded RCA)   - planned PCI under anesthesia this AM  - management per primary cardiology team     Anxiety, POA  Agitation, POA  Depression, POA  - recently , was overmedicating with anxiety meds  - resume home meds  - symptom management     Asthma, POA  - resume PTA inhaler     DM, POA  - BG checks with SSI coverage     Hypertension, POA  - resume home meds     Hyperlipidemia, POA  - resume Lipitor, Zetia     History of  left DVT  - resume Plavix   is on warfarin, INR is not therapeutic 1.3     IBS, POA  - resume home meds  25.0 - 29.9 Overweight / Body mass index is 28.87 kg/m².     Estimated discharge date: Per primary attending  Barriers:    Code status: DNR  Prophylaxis: Coumadin  Recommended Disposition: Home w/Family     Subjective:     Chief Complaint / Reason for Physician Visit  Follow-up delirium discussed with RN events overnight. Patient has no acute complaints, is alert oriented x3  Review of Systems:  Symptom Y/N Comments  Symptom Y/N Comments   Fever/Chills    Chest Pain     Poor Appetite    Edema     Cough    Abdominal Pain     Sputum    Joint Pain     SOB/VILA    Pruritis/Rash     Nausea/vomit    Tolerating PT/OT     Diarrhea    Tolerating Diet     Constipation    Other       Could NOT obtain due to:      Objective:     VITALS:   Last 24hrs VS reviewed since prior progress note. Most recent are:  Patient Vitals for the past 24 hrs:   Temp Pulse Resp BP SpO2   06/09/22 1228 98.7 °F (37.1 °C) 97 23 (!) 109/54 90 %   06/09/22 1207 -- -- -- -- 94 %   06/09/22 1013 98.7 °F (37.1 °C) 94 22 123/64 (!) 88 %   06/09/22 0430 98.4 °F (36.9 °C) 94 20 (!) 156/83 94 %   06/08/22 2330 -- 98 28 -- 90 %   06/08/22 2258 98.6 °F (37 °C) (!) 104 20 (!) 155/81 90 %   06/08/22 1939 99.1 °F (37.3 °C) (!) 113 24 (!) 154/130 96 %       Intake/Output Summary (Last 24 hours) at 6/9/2022 1532  Last data filed at 6/8/2022 2330  Gross per 24 hour   Intake 1500 ml   Output 500 ml   Net 1000 ml        I had a face to face encounter and independently examined this patient on 6/9/2022, as outlined below:  PHYSICAL EXAM:  General: WD, WN. Alert, cooperative, no acute distress    EENT:  EOMI. Anicteric sclerae. MMM  Resp:  CTA bilaterally, no wheezing or rales. No accessory muscle use  CV:  Regular  rhythm,  No edema  GI:  Soft, Non distended, Non tender. +Bowel sounds  Neurologic:  Alert and oriented X 3, normal speech,   Psych:   Good insight. Not anxious nor agitated  Skin:  No rashes.   No jaundice    Reviewed most current lab test results and cultures  YES  Reviewed most current radiology test results   YES  Review and summation of old records today    NO  Reviewed patient's current orders and MAR    YES  PMH/SH reviewed - no change compared to H&P  ________________________________________________________________________  Care Plan discussed with:    Comments   Patient x    Family  x    RN x    Care Manager     Consultant                        Multidiciplinary team rounds were held today with , nursing, pharmacist and clinical coordinator. Patient's plan of care was discussed; medications were reviewed and discharge planning was addressed. ________________________________________________________________________  Total NON critical care TIME:  45   Minutes    Total CRITICAL CARE TIME Spent:   Minutes non procedure based      Comments   >50% of visit spent in counseling and coordination of care     ________________________________________________________________________  Derek Renee MD     Procedures: see electronic medical records for all procedures/Xrays and details which were not copied into this note but were reviewed prior to creation of Plan. LABS:  I reviewed today's most current labs and imaging studies.   Pertinent labs include:  Recent Labs     06/09/22  1055 06/07/22  1126   WBC 12.7* 13.6*   HGB 7.7* 8.4*   HCT 29.0* 30.8*    213     Recent Labs     06/09/22  1106 06/09/22  1055 06/07/22  1126   NA  --  136 134*   K  --  4.0 4.6   CL  --  100 104   CO2  --  29 24   GLU  --  231* 237*   BUN  --  15 24*   CREA  --  0.77 1.02   CA  --  8.1* 8.8   MG  --  2.1  --    ALB  --  3.1*  --    TBILI  --  0.8  --    ALT  --  17  --    INR 1.3*  --  1.3*       Signed: Derek Renee MD

## 2022-06-10 LAB
ANION GAP SERPL CALC-SCNC: 4 MMOL/L (ref 5–15)
BASOPHILS # BLD: 0.1 K/UL (ref 0–0.1)
BASOPHILS NFR BLD: 1 % (ref 0–1)
BNP SERPL-MCNC: ABNORMAL PG/ML
BUN SERPL-MCNC: 17 MG/DL (ref 6–20)
BUN/CREAT SERPL: 26 (ref 12–20)
CALCIUM SERPL-MCNC: 8.2 MG/DL (ref 8.5–10.1)
CHLORIDE SERPL-SCNC: 99 MMOL/L (ref 97–108)
CO2 SERPL-SCNC: 32 MMOL/L (ref 21–32)
CREAT SERPL-MCNC: 0.66 MG/DL (ref 0.55–1.02)
DIFFERENTIAL METHOD BLD: ABNORMAL
EOSINOPHIL # BLD: 0.3 K/UL (ref 0–0.4)
EOSINOPHIL NFR BLD: 2 % (ref 0–7)
ERYTHROCYTE [DISTWIDTH] IN BLOOD BY AUTOMATED COUNT: 19.6 % (ref 11.5–14.5)
FOLATE SERPL-MCNC: 7.4 NG/ML
GLUCOSE BLD STRIP.AUTO-MCNC: 168 MG/DL (ref 65–117)
GLUCOSE BLD STRIP.AUTO-MCNC: 185 MG/DL (ref 65–117)
GLUCOSE BLD STRIP.AUTO-MCNC: 204 MG/DL (ref 65–117)
GLUCOSE BLD STRIP.AUTO-MCNC: 210 MG/DL (ref 65–117)
GLUCOSE SERPL-MCNC: 164 MG/DL (ref 65–100)
HCT VFR BLD AUTO: 27.7 % (ref 35–47)
HGB BLD-MCNC: 7.6 G/DL (ref 11.5–16)
IMM GRANULOCYTES # BLD AUTO: 0.1 K/UL (ref 0–0.04)
IMM GRANULOCYTES NFR BLD AUTO: 1 % (ref 0–0.5)
INR PPP: 1.3 (ref 0.9–1.1)
LYMPHOCYTES # BLD: 1.7 K/UL (ref 0.8–3.5)
LYMPHOCYTES NFR BLD: 14 % (ref 12–49)
MCH RBC QN AUTO: 17.9 PG (ref 26–34)
MCHC RBC AUTO-ENTMCNC: 27.4 G/DL (ref 30–36.5)
MCV RBC AUTO: 65.2 FL (ref 80–99)
MONOCYTES # BLD: 1.3 K/UL (ref 0–1)
MONOCYTES NFR BLD: 10 % (ref 5–13)
NEUTS SEG # BLD: 9.1 K/UL (ref 1.8–8)
NEUTS SEG NFR BLD: 73 % (ref 32–75)
NRBC # BLD: 0.02 K/UL (ref 0–0.01)
NRBC BLD-RTO: 0.2 PER 100 WBC
PLATELET # BLD AUTO: 222 K/UL (ref 150–400)
PMV BLD AUTO: 10.1 FL (ref 8.9–12.9)
POTASSIUM SERPL-SCNC: 3.4 MMOL/L (ref 3.5–5.1)
PROTHROMBIN TIME: 13.5 SEC (ref 9–11.1)
RBC # BLD AUTO: 4.25 M/UL (ref 3.8–5.2)
SERVICE CMNT-IMP: ABNORMAL
SODIUM SERPL-SCNC: 135 MMOL/L (ref 136–145)
WBC # BLD AUTO: 12.5 K/UL (ref 3.6–11)

## 2022-06-10 PROCEDURE — 99223 1ST HOSP IP/OBS HIGH 75: CPT | Performed by: PHYSICAL MEDICINE & REHABILITATION

## 2022-06-10 PROCEDURE — 74011000250 HC RX REV CODE- 250: Performed by: INTERNAL MEDICINE

## 2022-06-10 PROCEDURE — 74011250636 HC RX REV CODE- 250/636: Performed by: INTERNAL MEDICINE

## 2022-06-10 PROCEDURE — 74011636637 HC RX REV CODE- 636/637: Performed by: NURSE PRACTITIONER

## 2022-06-10 PROCEDURE — 65270000046 HC RM TELEMETRY

## 2022-06-10 PROCEDURE — 82962 GLUCOSE BLOOD TEST: CPT

## 2022-06-10 PROCEDURE — 85025 COMPLETE CBC W/AUTO DIFF WBC: CPT

## 2022-06-10 PROCEDURE — 74011636637 HC RX REV CODE- 636/637: Performed by: INTERNAL MEDICINE

## 2022-06-10 PROCEDURE — 80048 BASIC METABOLIC PNL TOTAL CA: CPT

## 2022-06-10 PROCEDURE — 74011250637 HC RX REV CODE- 250/637: Performed by: INTERNAL MEDICINE

## 2022-06-10 PROCEDURE — 77010033711 HC HIGH FLOW OXYGEN

## 2022-06-10 PROCEDURE — 36415 COLL VENOUS BLD VENIPUNCTURE: CPT

## 2022-06-10 PROCEDURE — 83880 ASSAY OF NATRIURETIC PEPTIDE: CPT

## 2022-06-10 PROCEDURE — 85610 PROTHROMBIN TIME: CPT

## 2022-06-10 RX ORDER — INSULIN LISPRO 100 [IU]/ML
INJECTION, SOLUTION INTRAVENOUS; SUBCUTANEOUS
Status: DISCONTINUED | OUTPATIENT
Start: 2022-06-10 | End: 2022-06-17 | Stop reason: HOSPADM

## 2022-06-10 RX ORDER — POTASSIUM CHLORIDE 750 MG/1
40 TABLET, FILM COATED, EXTENDED RELEASE ORAL
Status: COMPLETED | OUTPATIENT
Start: 2022-06-10 | End: 2022-06-10

## 2022-06-10 RX ORDER — FUROSEMIDE 10 MG/ML
20 INJECTION INTRAMUSCULAR; INTRAVENOUS ONCE
Status: COMPLETED | OUTPATIENT
Start: 2022-06-10 | End: 2022-06-10

## 2022-06-10 RX ORDER — ENOXAPARIN SODIUM 100 MG/ML
1 INJECTION SUBCUTANEOUS EVERY 24 HOURS
Status: COMPLETED | OUTPATIENT
Start: 2022-06-10 | End: 2022-06-12

## 2022-06-10 RX ORDER — CLOPIDOGREL BISULFATE 75 MG/1
75 TABLET ORAL DAILY
Status: DISCONTINUED | OUTPATIENT
Start: 2022-06-10 | End: 2022-06-17 | Stop reason: HOSPADM

## 2022-06-10 RX ORDER — POTASSIUM CHLORIDE 750 MG/1
10 TABLET, FILM COATED, EXTENDED RELEASE ORAL DAILY
Status: DISCONTINUED | OUTPATIENT
Start: 2022-06-11 | End: 2022-06-17 | Stop reason: HOSPADM

## 2022-06-10 RX ORDER — FUROSEMIDE 10 MG/ML
20 INJECTION INTRAMUSCULAR; INTRAVENOUS DAILY
Status: DISCONTINUED | OUTPATIENT
Start: 2022-06-10 | End: 2022-06-12

## 2022-06-10 RX ADMIN — SODIUM CHLORIDE, PRESERVATIVE FREE 10 ML: 5 INJECTION INTRAVENOUS at 22:07

## 2022-06-10 RX ADMIN — SODIUM CHLORIDE, PRESERVATIVE FREE 10 ML: 5 INJECTION INTRAVENOUS at 16:04

## 2022-06-10 RX ADMIN — Medication 2 UNITS: at 07:25

## 2022-06-10 RX ADMIN — FUROSEMIDE 20 MG: 10 INJECTION, SOLUTION INTRAMUSCULAR; INTRAVENOUS at 12:11

## 2022-06-10 RX ADMIN — TRAMADOL HYDROCHLORIDE 50 MG: 50 TABLET, COATED ORAL at 23:26

## 2022-06-10 RX ADMIN — ATORVASTATIN CALCIUM 20 MG: 40 TABLET, FILM COATED ORAL at 08:18

## 2022-06-10 RX ADMIN — ENOXAPARIN SODIUM 70 MG: 100 INJECTION SUBCUTANEOUS at 12:12

## 2022-06-10 RX ADMIN — Medication 3 UNITS: at 12:50

## 2022-06-10 RX ADMIN — MELATONIN 3 MG: at 20:10

## 2022-06-10 RX ADMIN — EZETIMIBE 10 MG: 10 TABLET ORAL at 08:19

## 2022-06-10 RX ADMIN — AMITRIPTYLINE HYDROCHLORIDE 10 MG: 10 TABLET, FILM COATED ORAL at 20:10

## 2022-06-10 RX ADMIN — ASPIRIN 81 MG: 81 TABLET, CHEWABLE ORAL at 08:16

## 2022-06-10 RX ADMIN — PANTOPRAZOLE SODIUM 40 MG: 40 TABLET, DELAYED RELEASE ORAL at 09:00

## 2022-06-10 RX ADMIN — BUPROPION HYDROCHLORIDE 150 MG: 150 TABLET, EXTENDED RELEASE ORAL at 08:16

## 2022-06-10 RX ADMIN — POTASSIUM CHLORIDE 40 MEQ: 750 TABLET, FILM COATED, EXTENDED RELEASE ORAL at 08:19

## 2022-06-10 RX ADMIN — SODIUM CHLORIDE, PRESERVATIVE FREE 10 ML: 5 INJECTION INTRAVENOUS at 07:25

## 2022-06-10 RX ADMIN — Medication 3 UNITS: at 17:35

## 2022-06-10 RX ADMIN — CLOPIDOGREL BISULFATE 75 MG: 75 TABLET ORAL at 16:02

## 2022-06-10 RX ADMIN — LEVOTHYROXINE SODIUM 50 MCG: 0.05 TABLET ORAL at 08:20

## 2022-06-10 RX ADMIN — QUETIAPINE FUMARATE 25 MG: 25 TABLET ORAL at 20:10

## 2022-06-10 RX ADMIN — FUROSEMIDE 20 MG: 10 INJECTION, SOLUTION INTRAMUSCULAR; INTRAVENOUS at 09:20

## 2022-06-10 RX ADMIN — ESCITALOPRAM OXALATE 20 MG: 10 TABLET ORAL at 08:18

## 2022-06-10 RX ADMIN — METOPROLOL SUCCINATE 50 MG: 50 TABLET, EXTENDED RELEASE ORAL at 08:19

## 2022-06-10 NOTE — PROGRESS NOTES
Hospitalist Progress Note    NAME: Eleanor Mckenzie   :  1940   MRN:  [id number]       Assessment / Plan:  Confusional state with episodes of agitation, concerning for delirium  Chart reviewed, per chart patient has been confused and aggressive towards the staff and refusing care. Discussed with patient some reported that patient at baseline is alert oriented x3 and very much independent and has never had any memory issues. He reported that patient can get confused when she does not have much sleep. Patient seems to be anxious since the loss of her  2 years ago. When seen patient was alert oriented x3 . Able to  maintain normal conversation. Will get B12, folate, TSH, ABG and ammonia  Also get CT of the brain without contrast   Noted psych consult, Seroquel at bedtime, and frequent reorientation  Continue with home dose Lexapro  Check UA     Acute respiratory failure with hypoxia  In Creased oxygen requirement this morning, had to be placed on high flow 8 to 10 L due to sudden hypoxia. Patient was on 3 L yesterday  Chest x-ray did not show any acute pathology, showed diffuse interstitial marking  Continue with nebs  Get D-dimer, if positive most likely will need CTA of the chest.  Patient has severe allergy to iodine, most likely will need VQ scan    CAD, POA (s/p cath in , now with occluded RCA)   - planned PCI under anesthesia this AM  - management per primary cardiology team     Anxiety, POA  Agitation, POA  Depression, POA  - recently , was overmedicating with anxiety meds  - resume home meds  - symptom management     Asthma, POA  - resume PTA inhaler     DM, POA  - BG checks with SSI coverage     Hypertension, POA  - resume home meds     Hyperlipidemia, POA  - resume Lipitor, Zetia     History of  left DVT  - resume Plavix   is on warfarin, INR is not therapeutic 1.3     IBS, POA  - resume home meds  25.0 - 29.9 Overweight / Body mass index is 28.87 kg/m².     Estimated discharge date: Per primary attending  Barriers:    Code status: DNR  Prophylaxis: Coumadin  Recommended Disposition: Home w/Family     Subjective:     Chief Complaint / Reason for Physician Visit  Follow-up delirium discussed with RN events overnight. Patient has no acute complaints, is alert oriented x3  Review of Systems:  Symptom Y/N Comments  Symptom Y/N Comments   Fever/Chills n   Chest Pain n    Poor Appetite    Edema     Cough    Abdominal Pain     Sputum    Joint Pain     SOB/VILA n   Pruritis/Rash     Nausea/vomit    Tolerating PT/OT     Diarrhea    Tolerating Diet y    Constipation    Other       Could NOT obtain due to:      Objective:     VITALS:   Last 24hrs VS reviewed since prior progress note. Most recent are:  Patient Vitals for the past 24 hrs:   Temp Pulse Resp BP SpO2   06/10/22 1134 98.1 °F (36.7 °C) 84 20 (!) 108/57 93 %   06/10/22 0846 -- -- -- -- 91 %   06/10/22 0819 98 °F (36.7 °C) 91 20 120/61 92 %   06/10/22 0639 -- -- -- -- 94 %   06/10/22 0253 98 °F (36.7 °C) 98 22 (!) 128/56 90 %   06/09/22 2242 98.6 °F (37 °C) 89 24 130/63 90 %   06/09/22 1909 98.9 °F (37.2 °C) 91 20 106/63 90 %   06/09/22 1625 -- 87 23 111/64 (!) 84 %   06/09/22 1624 -- 88 21 -- 94 %   06/09/22 1623 -- 86 22 -- (!) 89 %   06/09/22 1622 -- 88 28 -- (!) 88 %   06/09/22 1621 -- 89 23 -- 90 %   06/09/22 1620 -- 97 20 -- 94 %   06/09/22 1619 -- 90 16 111/64 (!) 88 %   06/09/22 1612 -- 90 -- 97/83 95 %   06/09/22 1535 98.5 °F (36.9 °C) 92 23 92/76 90 %       Intake/Output Summary (Last 24 hours) at 6/10/2022 1439  Last data filed at 6/10/2022 1202  Gross per 24 hour   Intake 300 ml   Output 850 ml   Net -550 ml        I had a face to face encounter and independently examined this patient on 6/10/2022, as outlined below:  PHYSICAL EXAM:  General: WD, WN. Alert, cooperative, no acute distress    EENT:  EOMI. Anicteric sclerae. MMM  Resp:  CTA bilaterally, no wheezing or rales.   No accessory muscle use  CV:  Regular  rhythm, No edema  GI:  Soft, Non distended, Non tender. +Bowel sounds  Neurologic:  Alert and oriented X 3, normal speech,   Psych:   Good insight. Not anxious nor agitated  Skin:  No rashes. No jaundice    Reviewed most current lab test results and cultures  YES  Reviewed most current radiology test results   YES  Review and summation of old records today    NO  Reviewed patient's current orders and MAR    YES  PMH/SH reviewed - no change compared to H&P  ________________________________________________________________________  Care Plan discussed with:    Comments   Patient x    Family  x    RN x    Care Manager     Consultant                        Multidiciplinary team rounds were held today with , nursing, pharmacist and clinical coordinator. Patient's plan of care was discussed; medications were reviewed and discharge planning was addressed. ________________________________________________________________________  Total NON critical care TIME:  45   Minutes    Total CRITICAL CARE TIME Spent:   Minutes non procedure based      Comments   >50% of visit spent in counseling and coordination of care     ________________________________________________________________________  Peri Major MD     Procedures: see electronic medical records for all procedures/Xrays and details which were not copied into this note but were reviewed prior to creation of Plan. LABS:  I reviewed today's most current labs and imaging studies. Pertinent labs include:  Recent Labs     06/10/22  0635 06/09/22  1055   WBC 12.5* 12.7*   HGB 7.6* 7.7*   HCT 27.7* 29.0*    222     Recent Labs     06/10/22  0635 06/09/22  1106 06/09/22  1055   *  --  136   K 3.4*  --  4.0   CL 99  --  100   CO2 32  --  29   *  --  231*   BUN 17  --  15   CREA 0.66  --  0.77   CA 8.2*  --  8.1*   MG  --   --  2.1   ALB  --   --  3.1*   TBILI  --   --  0.8   ALT  --   --  17   INR 1.3* 1.3*  --        Signed:  Peri Major, MD

## 2022-06-10 NOTE — PROGRESS NOTES
Progress Note      6/10/2022 8:04 AM  NAME: Skyla Hall   MRN:  [id number]   Admit Diagnosis: Chest pain, unspecified type [R07.9]  Coronary artery disease [I25.10]                Assessment:       - Cath in [facility] 2001 unclear details, PCI, left with  of RCA. Cath 6/2022 with 0ccluded RCA. 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 Yanez Agent  - IBS follows with GI  - Anxiety  - Anemia  - Mild dementia  - CONTRAST ALLERGY  -  7/2020, lives alone in an apartment, 3 kids, one son lives in [place], retired , limited functional capacity uses a cane limited by back pain  DNR                     Plan:        Patient with anxiety  , and has a hard time with this. Son helps her, has grandkids. Main complaint is back pain, uses a cane.     No chest pain  +dyspnea  No syncope.     Cath with high grade LCx. Patient with baseline anxiety, exacerbated by prednisone for dye allergy. Acute delerium 6/8 night. Improved with adjustment in psych meds and treatment. Extensive conversation with patient and son. They would like us to attempt PCI. Based on this will decide on TAVR. They understand high risk. Tentatively Tuesday afternoon with anesthesia. Will need prednisone prior to cath.     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 All agree DNR. Appreciate palliative care helping family with long term care plans. Will also ask  to see patient to see what options available for patient once leaving hospital.    Discussed with primary Dr. Michael Wilkins.                  [x]? High complexity decision making was performed         We discussed the expected course, resolution and complications of the diagnoses in detail. Medication risk, benefits, costs, interactions, and alternatives were discussed as indicated. I advised him to contact the office if his condition worsens, changes or fails to improve as anticipated. Patient expressed understanding with the diagnoses  and plan. Subjective:     Parker Ny denies chest pain, + dyspnea. Discussed with RN events overnight. Review of Systems:    Symptom Y/N Comments  Symptom Y/N Comments   Fever/Chills N   Chest Pain N    Poor Appetite N   Edema N    Cough N   Abdominal Pain N    Sputum N   Joint Pain N    SOB/VILA N   Pruritis/Rash N    Nausea/vomit N   Tolerating PT/OT     Diarrhea N   Tolerating Diet Y    Constipation N   Other       Could NOT obtain due to:      Objective:      Physical Exam:    Last 24hrs VS reviewed since prior progress note. Most recent are:    Visit Vitals  BP (!) 128/56 (BP 1 Location: Right upper arm, BP Patient Position: At rest)   Pulse 98   Temp 98 °F (36.7 °C)   Resp 22   Ht 5' 1\" (1.549 m)   Wt 69.3 kg (152 lb 12.5 oz)   SpO2 94%   Breastfeeding No   BMI 28.87 kg/m²       Intake/Output Summary (Last 24 hours) at 6/10/2022 7193  Last data filed at 6/9/2022 1802  Gross per 24 hour   Intake --   Output 475 ml   Net -475 ml        General Appearance: Well developed, well nourished, alert & oriented x 3,    no acute distress. Ears/Nose/Mouth/Throat: Hearing grossly normal.  Neck: Supple. Chest: Lungs clear to auscultation bilaterally. Cardiovascular: Regular rate and rhythm, S1S2 normal, III/VI systolic murmur.   Abdomen: Soft, non-tender, bowel sounds are active. Extremities: No edema bilaterally. Skin: Warm and dry. PMH/SH reviewed - no change compared to H&P    Data Review    Telemetry: normal sinus rhythm     Lab Data Personally Reviewed:    Recent Labs     06/10/22  0635 06/09/22  1055   WBC 12.5* 12.7*   HGB 7.6* 7.7*   HCT 27.7* 29.0*    222     Recent Labs     06/10/22  0635 06/09/22  1106 06/07/22  1126   INR 1.3* 1.3* 1.3*   PTP 13.5* 12.9* 13.4*      Recent Labs     06/10/22  0635 06/09/22  1055 06/07/22  1126   * 136 134*   K 3.4* 4.0 4.6   CL 99 100 104   CO2 32 29 24   BUN 17 15 24*   CREA 0.66 0.77 1.02   * 231* 237*   CA 8.2* 8.1* 8.8   MG  --  2.1  --      No results for input(s): CPK, CKNDX, TROIQ in the last 72 hours.     No lab exists for component: CPKMB  Lab Results   Component Value Date/Time    Cholesterol, total 107 06/25/2021 11:52 AM    HDL Cholesterol 52 06/25/2021 11:52 AM    LDL, calculated 39 06/25/2021 11:52 AM    Triglyceride 80 06/25/2021 11:52 AM    CHOL/HDL Ratio 2.1 06/25/2021 11:52 AM       Recent Labs     06/09/22  1055   AP 78   TP 6.5   ALB 3.1*   GLOB 3.4     Recent Labs     06/09/22  1119   PH 7.44   PCO2 43   PO2 51*       Medications Personally Reviewed:    Current Facility-Administered Medications   Medication Dose Route Frequency    furosemide (LASIX) injection 20 mg  20 mg IntraVENous DAILY    potassium chloride SR (KLOR-CON 10) tablet 40 mEq  40 mEq Oral NOW    [START ON 6/11/2022] potassium chloride SR (KLOR-CON 10) tablet 10 mEq  10 mEq Oral DAILY    insulin lispro (HUMALOG) injection   SubCUTAneous Q6H    glucose chewable tablet 16 g  4 Tablet Oral PRN    glucagon (GLUCAGEN) injection 1 mg  1 mg IntraMUSCular PRN    dextrose 10% infusion 0-250 mL  0-250 mL IntraVENous PRN    ondansetron (ZOFRAN) injection 4 mg  4 mg IntraVENous Q6H PRN    enoxaparin (LOVENOX) injection 70 mg ++ GIVE PARTIAL SYRINGE-DISCARD EXCESS  1 mg/kg SubCUTAneous Q12H    WARFARIN INFORMATION NOTE (COUMADIN)   Other QPM    melatonin tablet 3 mg  3 mg Oral QHS    QUEtiapine (SEROquel) tablet 25 mg  25 mg Oral QHS    hydrOXYzine HCL (ATARAX) tablet 25 mg  25 mg Oral TID PRN    pantoprazole (PROTONIX) tablet 40 mg  40 mg Oral DAILY    albuterol (PROVENTIL VENTOLIN) nebulizer solution 2.5 mg  2.5 mg Nebulization Q6H PRN    atorvastatin (LIPITOR) tablet 20 mg  20 mg Oral DAILY    metoprolol succinate (TOPROL-XL) XL tablet 50 mg  50 mg Oral DAILY    morphine injection 2 mg  2 mg IntraVENous Q4H PRN    amitriptyline (ELAVIL) tablet 10 mg  10 mg Oral QHS    buPROPion XL (WELLBUTRIN XL) tablet 150 mg  150 mg Oral DAILY    escitalopram oxalate (LEXAPRO) tablet 20 mg  20 mg Oral DAILY    levothyroxine (SYNTHROID) tablet 50 mcg  50 mcg Oral ACB    sodium chloride (NS) flush 5-40 mL  5-40 mL IntraVENous Q8H    sodium chloride (NS) flush 5-40 mL  5-40 mL IntraVENous PRN    acetaminophen (TYLENOL) tablet 650 mg  650 mg Oral Q4H PRN    naloxone (NARCAN) injection 0.4 mg  0.4 mg IntraVENous PRN    aspirin chewable tablet 81 mg  81 mg Oral DAILY    benzonatate (TESSALON) capsule 100 mg  100 mg Oral TID PRN    ezetimibe (ZETIA) tablet 10 mg  10 mg Oral DAILY    traMADoL (ULTRAM) tablet 50 mg  50 mg Oral Q12H PRN         Juliette Grajeda MD

## 2022-06-10 NOTE — PROGRESS NOTES
Spiritual Care Assessment/Progress Note  [facility]      NAME: Arleen Palma      MRN: [id number]  AGE: 78 y.o.  SEX: female  Congregation Affiliation: No preference   Language: English     6/10/2022     Total Time (in minutes): 32     Spiritual Assessment begun in [facility] through conversation with:         [x]Patient        [x] Family    [] Friend(s)        Reason for Consult: Palliative Care, Initial/Spiritual Assessment     Spiritual beliefs: (Please include comment if needed)     [x] Identifies with a tayo tradition:         [] Supported by a tayo community:            [] Claims no spiritual orientation:           [] Seeking spiritual identity:                [] Adheres to an individual form of spirituality:           [] Not able to assess:                           Identified resources for coping:      [x] Prayer                               [] Music                  [] Guided Imagery     [x] Family/friends                 [] Pet visits     [] Devotional reading                         [] Unknown     [] Other:                                             Interventions offered during this visit: (See comments for more details)    Patient Interventions: Catharsis/review of pertinent events in supportive environment,Guidance concerning next steps/process to be expected,Coping skills reviewed/reinforced,Affirmation of tayo,Normalization of emotional/spiritual concerns,Integration of medical assessment with existing values and beliefs,Initial/Spiritual assessment, patient floor,Prayer (assurance of),Congregation beliefs/image of God discussed     Family/Friend(s): Life review/legacy,Initial Assessment,Affirmation of tayo,End of life issues discussed,Normalization of emotional/spiritual concerns,Congregation beliefs/image of God discussed,Prayer (assurance of)     Plan of Care:     [] Support spiritual and/or cultural needs    [] Support AMD and/or advance care planning process      [] Support grieving process   [] Coordinate Rites and/or Rituals    [] Coordination with community clergy   [] No spiritual needs identified at this time   [] Detailed Plan of Care below (See Comments)  [] Make referral to Music Therapy  [] Make referral to Pet Therapy     [] Make referral to Addiction services  [] Make referral to [facility]  [] Make referral to Spiritual Care Partner  [] No future visits requested        [x] Contact Spiritual Care for further referrals     Comments:  's visit was in room 2158 for initial spiritual assessment. Patient, Miss Renetta De La Cruz was in bed and appeared comfortable. Her son Constantine Pak was present in patient's room. Son stated that he remembers  from a previous visit when he lost his father in this facility. Patient and son continued to engage  in conversation and life review. Patient stated that life has not been the same since she lost her . Her family and Christian are very support, as well as her tayo. She was raised in the [address] [place] [address], but became a [facility] after marrying her . Patient and son shared much about patient's late , that he was a great TV  and a very caring person. His tayo was also very strong this and influenced his family's tayo significantly.  listened actively with affirmation. Calm words spoken to encourage focus on the present. They expressed no other need for support when asked. Visit appreciated. Visited by: Pako Ontiveros.    Paging Service: [phone])

## 2022-06-10 NOTE — CONSULTS
Palliative Medicine Consult  [place]: [phone] ([phone])    Patient Name: Eleanor Mckenzie  YOB: 1940    Date of Initial Consult: 6/10/22  Reason for Consult: Care decisions   Requesting Provider: Galen Bonilla   Primary Care Physician: Jared Strauss DO     SUMMARY:   Eleanor Mckenzie is a 78 y.o. with a past history of severe aortic stenosis, mild dementia, CAD, HTN, protein C deficiency w/ hx of DVT on coumadin, anxiety, DJD, DM, asthma who was admitted on 2022 from home for TAVR evaluation and cardiac cath. Has significant SOB and has to rest when cooking in her kitchen. When evaluated by Dr Sana Mendez on 22 was alert/oriented per notes and a good candidate for TAVR- and initially tentatively posted for . While in the hospital however pt became very agitated and combative. Head CT w/out acute findings. Determined that she likely will need general anesthesia with intubation for cath- now planned for Tues 22 after Dr Alesia Castro discussed risks/benefits in detail with pt and son Deja Delacruz- high risk. Current medical issues leading to Palliative Medicine involvement include:care decisions. Social: Pt - her   . He had dementia and had hospice at end of life. Lives alone in an apartment, after her   she moved from [place], [place]. She is a retired . Uses a cane in the home. Does not drive. 3 children incl son Deja Delacruz who is local and is mPOA (she has a Durable POA document which has a medical clause that would appoint Deja Delacruz as her guardian if needed, for medical issues as well.)    PALLIATIVE DIAGNOSES:   1. Confusion, agitation- improved   2. Baseline mild dementia- memory issues noted by son recently   1. Shortness of breath due to severe AS  4. Goals of care       PLAN:   1. Pt w/ much improved agitation when I meet her today compared to previous days' notes. With sitter at bedside. She is alert/oriented x 4.   2. Goals of care:  Many detailed and thoughtful discussions with attending cardiologist and pt and son. Goals are to have cath and then TAVR if possible, as her dyspnea on exertion significantly impacts her function and quality of life. 3. Pt and son also aware that pt may not be able to tolerate TAVR and that we may need to talk about hospice in the future instead- her  had hospice at end of life when he  in . 4. Code status: Code status discussed earlier this admission by attending and pt has DNR status w/ DDNR. 5. Psychosocial distress: Pt shares about how she has had to adapt recently- grief of losing her , moving far from her friends of many years in [facility]. Moved to be closer to son Dania Garcia who comes over twice a week to help her, but states sometimes she is lonely here. 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 Our LCSW Zaida Peterson talked through resources with pt and son yesterday- see her note for more details. 6. Goals clear, following along w/ you.  7. Initial consult note routed to primary continuity provider and/or primary health care team members  8. Communicated plan of care with: Palliative [facility] Team     GOALS OF CARE / TREATMENT PREFERENCES:     GOALS OF CARE:  Patient/Health Care Proxy Stated Goals:  Other (comment) (To get TAVR)    TREATMENT PREFERENCES:   Code Status: DNR    Patient and family's personal goals include: TAVR      Advance Care Planning:  [x] The [facility] Interdisciplinary Team has updated the ACP Navigator with Health Care Decision Maker and Patient Capacity      Primary Decision Maker: [facility] - [phone]  Advance Care Planning 2022   Confirm Advance Directive Yes, on file   Does the patient have other document types Power of        Medical Interventions: Limited additional interventions       Other:    As far as possible, the palliative care team has discussed with patient / health care proxy about goals of care / treatment preferences for patient. HISTORY:     History obtained from: chart, pt, staff    CHIEF COMPLAINT: \"I am not going to an [facility]\"    HPI/SUBJECTIVE:    The patient is:   [x] Verbal and participatory  [] Non-participatory due to: Pt awake and appropriate- no SOB right now, but does get SOB when walking around her apartment. Clinical Pain Assessment (nonverbal scale for severity on nonverbal patients):   Clinical Pain Assessment  Severity: 0          Duration: for how long has pt been experiencing pain (e.g., 2 days, 1 month, years)  Frequency: how often pain is an issue (e.g., several times per day, once every few days, constant)     FUNCTIONAL ASSESSMENT:     Palliative Performance Scale (PPS):  PPS: 60       PSYCHOSOCIAL/SPIRITUAL SCREENING:     Palliative IDT has assessed this patient for cultural preferences / practices and a referral made as appropriate to needs (Cultural Services, Patient Advocacy, Ethics, etc.)    Any spiritual / Roman Catholic concerns:  [] Yes /  [x] No   If \"Yes\" to discuss with pastoral care during IDT     Does caregiver feel burdened by caring for their loved one:   [] Yes /  [x] No /  [] No Caregiver Present    If \"Yes\" to discuss with social work during IDT    Anticipatory grief assessment:   [x] Normal  / [] Maladaptive     If \"Maladaptive\" to discuss with social work during IDT    ESAS Anxiety: Anxiety: 2    ESAS Depression: Depression: 0        REVIEW OF SYSTEMS:     Positive and pertinent negative findings in ROS are noted above in HPI. The following systems were [x] reviewed / [] unable to be reviewed as noted in HPI  Other findings are noted below. Systems: constitutional, ears/nose/mouth/throat, respiratory, gastrointestinal, genitourinary, musculoskeletal, integumentary, neurologic, psychiatric, endocrine. Positive findings noted below. Modified ESAS Completed by: provider   Fatigue: 4 Drowsiness: 0   Depression: 0 Pain: 0   Anxiety: 2 Nausea: 0   Anorexia: 0 Dyspnea: 1           Stool Occurrence(s): 1        PHYSICAL EXAM:     From RN flowsheet:  Wt Readings from Last 3 Encounters:   06/09/22 152 lb 12.5 oz (69.3 kg)   04/12/22 158 lb (71.7 kg)   03/29/22 158 lb (71.7 kg)     Blood pressure (!) 108/57, pulse 84, temperature 98.1 °F (36.7 °C), resp. rate 20, height 5' 1\" (1.549 m), weight 152 lb 12.5 oz (69.3 kg), SpO2 93 %, not currently breastfeeding.     Pain Scale 1: Numeric (0 - 10)  Pain Intensity 1: 0  Pain Onset 1:  (gradual)  Pain Location 1: Shoulder  Pain Orientation 1: Left,Right  Pain Description 1: Aching  Pain Intervention(s) 1: Medication (see MAR)  Last bowel movement, if known:     Constitutional: awake, alert, oriented, calm, appears stated age  Eyes: pupils equal, anicteric  ENMT: no nasal discharge, moist mucous membranes  Respiratory: breathing not labored  Musculoskeletal: no deformity, no tenderness to palpation  Skin: warm, dry  Neurologic: following commands, moving all extremities  Psychiatric: full affect       HISTORY:     Active Problems:    Coronary artery disease (6/7/2022)      Past Medical History:   Diagnosis Date    Anxiety     Arthritis     Asthma     CAD (coronary artery disease)     stent    Diabetes ([facility] [address]     GERD (gastroesophageal reflux disease)     History of DVT (deep vein thrombosis)     History of recurrent UTIs     Hx of seasonal allergies     Hypercholesterolemia     Irritable bowel syndrome (IBS)     Migraine     Urinary urgency       Past Surgical History:   Procedure Laterality Date    HX CATARACT REMOVAL Bilateral     HX HEART CATHETERIZATION      stent    HX OPEN REDUCTION INTERNAL FIXATION Left     humerus     HX OPEN REDUCTION INTERNAL FIXATION Right     hip      Family History   Problem Relation Age of Onset    Diabetes Mother     Stroke Mother History reviewed, no pertinent family history.   Social History     Tobacco Use    Smoking status: Former Smoker     Packs/day: 0.25     Quit date:      Years since quittin.4    Smokeless tobacco: Never Used   Substance Use Topics    Alcohol use: No     Allergies   Allergen Reactions    Iodinated Contrast Media Other (comments)     Passes out    Pcn [Penicillins] Shortness of Breath    Sulfa (Sulfonamide Antibiotics) Shortness of Breath      Current Facility-Administered Medications   Medication Dose Route Frequency    furosemide (LASIX) injection 20 mg  20 mg IntraVENous DAILY    [START ON 2022] potassium chloride SR (KLOR-CON 10) tablet 10 mEq  10 mEq Oral DAILY    clopidogreL (PLAVIX) tablet 75 mg  75 mg Oral DAILY    enoxaparin (LOVENOX) injection 70 mg  1 mg/kg SubCUTAneous Q24H    insulin lispro (HUMALOG) injection   SubCUTAneous Q6H    glucose chewable tablet 16 g  4 Tablet Oral PRN    glucagon (GLUCAGEN) injection 1 mg  1 mg IntraMUSCular PRN    dextrose 10% infusion 0-250 mL  0-250 mL IntraVENous PRN    ondansetron (ZOFRAN) injection 4 mg  4 mg IntraVENous Q6H PRN    melatonin tablet 3 mg  3 mg Oral QHS    QUEtiapine (SEROquel) tablet 25 mg  25 mg Oral QHS    hydrOXYzine HCL (ATARAX) tablet 25 mg  25 mg Oral TID PRN    pantoprazole (PROTONIX) tablet 40 mg  40 mg Oral DAILY    albuterol (PROVENTIL VENTOLIN) nebulizer solution 2.5 mg  2.5 mg Nebulization Q6H PRN    atorvastatin (LIPITOR) tablet 20 mg  20 mg Oral DAILY    metoprolol succinate (TOPROL-XL) XL tablet 50 mg  50 mg Oral DAILY    morphine injection 2 mg  2 mg IntraVENous Q4H PRN    amitriptyline (ELAVIL) tablet 10 mg  10 mg Oral QHS    buPROPion XL (WELLBUTRIN XL) tablet 150 mg  150 mg Oral DAILY    escitalopram oxalate (LEXAPRO) tablet 20 mg  20 mg Oral DAILY    levothyroxine (SYNTHROID) tablet 50 mcg  50 mcg Oral ACB    sodium chloride (NS) flush 5-40 mL  5-40 mL IntraVENous Q8H    sodium chloride (NS) flush 5-40 mL  5-40 mL IntraVENous PRN    acetaminophen (TYLENOL) tablet 650 mg  650 mg Oral Q4H PRN    naloxone (NARCAN) injection 0.4 mg  0.4 mg IntraVENous PRN    aspirin chewable tablet 81 mg  81 mg Oral DAILY    benzonatate (TESSALON) capsule 100 mg  100 mg Oral TID PRN    ezetimibe (ZETIA) tablet 10 mg  10 mg Oral DAILY    traMADoL (ULTRAM) tablet 50 mg  50 mg Oral Q12H PRN          LAB AND IMAGING FINDINGS:     Lab Results   Component Value Date/Time    WBC 12.5 (H) 06/10/2022 06:35 AM    HGB 7.6 (L) 06/10/2022 06:35 AM    PLATELET 484 15/39/6829 06:35 AM     Lab Results   Component Value Date/Time    Sodium 135 (L) 06/10/2022 06:35 AM    Potassium 3.4 (L) 06/10/2022 06:35 AM    Chloride 99 06/10/2022 06:35 AM    CO2 32 06/10/2022 06:35 AM    BUN 17 06/10/2022 06:35 AM    Creatinine 0.66 06/10/2022 06:35 AM    Calcium 8.2 (L) 06/10/2022 06:35 AM    Magnesium 2.1 06/09/2022 10:55 AM      Lab Results   Component Value Date/Time    Alk. phosphatase 78 06/09/2022 10:55 AM    Protein, total 6.5 06/09/2022 10:55 AM    Albumin 3.1 (L) 06/09/2022 10:55 AM    Globulin 3.4 06/09/2022 10:55 AM     Lab Results   Component Value Date/Time    INR 1.3 (H) 06/10/2022 06:35 AM    Prothrombin time 13.5 (H) 06/10/2022 06:35 AM      Lab Results   Component Value Date/Time    Iron 13 (L) 06/09/2022 10:55 AM    TIBC 341 06/09/2022 10:55 AM    Iron % saturation 4 (L) 06/09/2022 10:55 AM      Lab Results   Component Value Date/Time    pH 7.44 06/09/2022 11:19 AM    PCO2 43 06/09/2022 11:19 AM    PO2 51 (L) 06/09/2022 11:19 AM     No components found for: GLPOC   No results found for: CPK, CKMB             Total time: 70min  Counseling / coordination time, spent as noted above: 50min  > 50% counseling / coordination?: yes    Prolonged service was provided for  []30 min   []75 min in face to face time in the presence of the patient, spent as noted above.   Time Start:   Time End:   Note: this can only be billed with 31531 (initial) or 16132 (follow up). If multiple start / stop times, list each separately.

## 2022-06-11 LAB
ANION GAP SERPL CALC-SCNC: 6 MMOL/L (ref 5–15)
BNP SERPL-MCNC: 2966 PG/ML
BUN SERPL-MCNC: 19 MG/DL (ref 6–20)
BUN/CREAT SERPL: 29 (ref 12–20)
CALCIUM SERPL-MCNC: 8.1 MG/DL (ref 8.5–10.1)
CHLORIDE SERPL-SCNC: 98 MMOL/L (ref 97–108)
CO2 SERPL-SCNC: 32 MMOL/L (ref 21–32)
CREAT SERPL-MCNC: 0.65 MG/DL (ref 0.55–1.02)
GLUCOSE BLD STRIP.AUTO-MCNC: 167 MG/DL (ref 65–117)
GLUCOSE BLD STRIP.AUTO-MCNC: 169 MG/DL (ref 65–117)
GLUCOSE BLD STRIP.AUTO-MCNC: 174 MG/DL (ref 65–117)
GLUCOSE BLD STRIP.AUTO-MCNC: 179 MG/DL (ref 65–117)
GLUCOSE SERPL-MCNC: 167 MG/DL (ref 65–100)
POTASSIUM SERPL-SCNC: 3.8 MMOL/L (ref 3.5–5.1)
SERVICE CMNT-IMP: ABNORMAL
SODIUM SERPL-SCNC: 136 MMOL/L (ref 136–145)

## 2022-06-11 PROCEDURE — 36415 COLL VENOUS BLD VENIPUNCTURE: CPT

## 2022-06-11 PROCEDURE — 74011250637 HC RX REV CODE- 250/637: Performed by: INTERNAL MEDICINE

## 2022-06-11 PROCEDURE — 80048 BASIC METABOLIC PNL TOTAL CA: CPT

## 2022-06-11 PROCEDURE — 65270000046 HC RM TELEMETRY

## 2022-06-11 PROCEDURE — 82962 GLUCOSE BLOOD TEST: CPT

## 2022-06-11 PROCEDURE — 83880 ASSAY OF NATRIURETIC PEPTIDE: CPT

## 2022-06-11 PROCEDURE — 77030038269 HC DRN EXT URIN PURWCK BARD -A

## 2022-06-11 PROCEDURE — 74011636637 HC RX REV CODE- 636/637: Performed by: INTERNAL MEDICINE

## 2022-06-11 PROCEDURE — 74011250636 HC RX REV CODE- 250/636: Performed by: INTERNAL MEDICINE

## 2022-06-11 PROCEDURE — 74011000250 HC RX REV CODE- 250: Performed by: INTERNAL MEDICINE

## 2022-06-11 RX ADMIN — AMITRIPTYLINE HYDROCHLORIDE 10 MG: 10 TABLET, FILM COATED ORAL at 21:31

## 2022-06-11 RX ADMIN — FUROSEMIDE 20 MG: 10 INJECTION, SOLUTION INTRAMUSCULAR; INTRAVENOUS at 09:21

## 2022-06-11 RX ADMIN — ENOXAPARIN SODIUM 70 MG: 100 INJECTION SUBCUTANEOUS at 12:35

## 2022-06-11 RX ADMIN — ESCITALOPRAM OXALATE 20 MG: 10 TABLET ORAL at 09:20

## 2022-06-11 RX ADMIN — EZETIMIBE 10 MG: 10 TABLET ORAL at 09:20

## 2022-06-11 RX ADMIN — Medication 2 UNITS: at 17:54

## 2022-06-11 RX ADMIN — PANTOPRAZOLE SODIUM 40 MG: 40 TABLET, DELAYED RELEASE ORAL at 09:20

## 2022-06-11 RX ADMIN — TRAMADOL HYDROCHLORIDE 50 MG: 50 TABLET, COATED ORAL at 21:31

## 2022-06-11 RX ADMIN — ATORVASTATIN CALCIUM 20 MG: 40 TABLET, FILM COATED ORAL at 09:20

## 2022-06-11 RX ADMIN — METOPROLOL SUCCINATE 50 MG: 50 TABLET, EXTENDED RELEASE ORAL at 09:20

## 2022-06-11 RX ADMIN — POTASSIUM CHLORIDE 10 MEQ: 750 TABLET, FILM COATED, EXTENDED RELEASE ORAL at 09:20

## 2022-06-11 RX ADMIN — SODIUM CHLORIDE, PRESERVATIVE FREE 10 ML: 5 INJECTION INTRAVENOUS at 06:50

## 2022-06-11 RX ADMIN — LEVOTHYROXINE SODIUM 50 MCG: 0.05 TABLET ORAL at 09:20

## 2022-06-11 RX ADMIN — CLOPIDOGREL BISULFATE 75 MG: 75 TABLET ORAL at 09:20

## 2022-06-11 RX ADMIN — SODIUM CHLORIDE, PRESERVATIVE FREE 10 ML: 5 INJECTION INTRAVENOUS at 12:35

## 2022-06-11 RX ADMIN — MELATONIN 3 MG: at 21:31

## 2022-06-11 RX ADMIN — QUETIAPINE FUMARATE 25 MG: 25 TABLET ORAL at 21:31

## 2022-06-11 RX ADMIN — Medication 2 UNITS: at 09:18

## 2022-06-11 RX ADMIN — BUPROPION HYDROCHLORIDE 150 MG: 150 TABLET, EXTENDED RELEASE ORAL at 09:20

## 2022-06-11 RX ADMIN — Medication 2 UNITS: at 12:34

## 2022-06-11 RX ADMIN — ASPIRIN 81 MG: 81 TABLET, CHEWABLE ORAL at 09:20

## 2022-06-11 RX ADMIN — SODIUM CHLORIDE, PRESERVATIVE FREE 10 ML: 5 INJECTION INTRAVENOUS at 21:32

## 2022-06-11 NOTE — PROGRESS NOTES
Progress Note      6/11/2022 8:04 AM  NAME: Bryan Reich   MRN:  [id number]   Admit Diagnosis: Chest pain, unspecified type [R07.9]  Coronary artery disease [I25.10]                Assessment:       - Cath in [facility] unclear details, PCI, left with  of RCA. Cath 6/2022 with 0ccluded RCA. 99% proximal LCx  - Aortic stenosis with echo 4/2022 EF 65% peak of 4, mean of 41, PHILIPPE of 0.7, mild AI, trace MR  - Sinus with HR of 92 NE of 146, QRS of 94 NST  - Diet controlled DM, HTN, Dyslipidemia  - Carotid 10/2021 mild bilaterally  - Hx of left DVT found to have Protien C deficiency on warfarin  - MALINDA 11/2021 mild  - Severe DJD/sciatica follows with Dr. Isela Cobos  - IBS follows with GI  - Anxiety  - Anemia  - Mild dementia  - CONTRAST ALLERGY  -  7/2020, lives alone in an apartment, 3 kids, one son lives in [place], retired , limited functional capacity uses a cane limited by back pain  DNR                     Plan:        Patient with anxiety  , and has a hard time with this. Son helps her, has grandkids. Main complaint is back pain, uses a cane.     No chest pain  +dyspnea  No syncope.     Cath with high grade LCx. Patient with baseline anxiety, exacerbated by prednisone for dye allergy. Acute delerium 6/8 night. Improved with adjustment in psych meds and treatment. Extensive conversation with patient and son. They would like us to attempt PCI. Based on this will decide on TAVR. They understand high risk. Tentatively Tuesday afternoon with anesthesia. Will need prednisone prior to cath.     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 All agree DNR. Appreciate palliative care helping family with long term care plans. Will also ask  to see patient to see what options available for patient once leaving hospital.    Discussed with primary Dr. Fritz Hernandez. Update 6/11/22: Excello shaky this am. Had indigestion sensation and sob yesterday but none today. Felt lightheaded when got up. /56, HR 84. A+OX3, NAD, S1S2, RRR, SM, lungs clear, no respi distress, no edema   Cont asa, plavix, lovenox, metoprolo, lipitor, lasix   Check labs , possibly cath next week              [x]? High complexity decision making was performed         We discussed the expected course, resolution and complications of the diagnoses in detail. Medication risk, benefits, costs, interactions, and alternatives were discussed as indicated. I advised him to contact the office if his condition worsens, changes or fails to improve as anticipated. Patient expressed understanding with the diagnoses  and plan. Subjective:     Kai Moreno denies chest pain, + dyspnea. Discussed with RN events overnight. Review of Systems:    Symptom Y/N Comments  Symptom Y/N Comments   Fever/Chills N   Chest Pain N    Poor Appetite N   Edema N    Cough N   Abdominal Pain N    Sputum N   Joint Pain N    SOB/VILA N   Pruritis/Rash N    Nausea/vomit N   Tolerating PT/OT     Diarrhea N   Tolerating Diet Y    Constipation N   Other       Could NOT obtain due to:      Objective:      Physical Exam:    Last 24hrs VS reviewed since prior progress note.  Most recent are:    Visit Vitals  BP (!) 135/56 (BP 1 Location: Right upper arm, BP Patient Position: Supine)   Pulse 84   Temp 98 °F (36.7 °C)   Resp 16   Ht 5' 1\" (1.549 m)   Wt 67.6 kg (149 lb)   SpO2 95%   Breastfeeding No   BMI 28.15 kg/m²       Intake/Output Summary (Last 24 hours) at 6/11/2022 1256  Last data filed at 6/11/2022 0911  Gross per 24 hour   Intake 425 ml   Output 800 ml   Net -375 ml General Appearance: Well developed, well nourished, alert & oriented x 3,    no acute distress. Ears/Nose/Mouth/Throat: Hearing grossly normal.  Neck: Supple. Chest: Lungs clear to auscultation bilaterally. Cardiovascular: Regular rate and rhythm, S1S2 normal, III/VI systolic murmur. Abdomen: Soft, non-tender, bowel sounds are active. Extremities: No edema bilaterally. Skin: Warm and dry. PMH/SH reviewed - no change compared to H&P    Data Review    Telemetry: normal sinus rhythm     Lab Data Personally Reviewed:    Recent Labs     06/10/22  0635 06/09/22  1055   WBC 12.5* 12.7*   HGB 7.6* 7.7*   HCT 27.7* 29.0*    222     Recent Labs     06/10/22  0635 06/09/22  1106   INR 1.3* 1.3*   PTP 13.5* 12.9*      Recent Labs     06/11/22  0308 06/10/22  0635 06/09/22  1055    135* 136   K 3.8 3.4* 4.0   CL 98 99 100   CO2 32 32 29   BUN 19 17 15   CREA 0.65 0.66 0.77   * 164* 231*   CA 8.1* 8.2* 8.1*   MG  --   --  2.1     No results for input(s): CPK, CKNDX, TROIQ in the last 72 hours.     No lab exists for component: CPKMB  Lab Results   Component Value Date/Time    Cholesterol, total 107 06/25/2021 11:52 AM    HDL Cholesterol 52 06/25/2021 11:52 AM    LDL, calculated 39 06/25/2021 11:52 AM    Triglyceride 80 06/25/2021 11:52 AM    CHOL/HDL Ratio 2.1 06/25/2021 11:52 AM       Recent Labs     06/09/22  1055   AP 78   TP 6.5   ALB 3.1*   GLOB 3.4     Recent Labs     06/09/22  1119   PH 7.44   PCO2 43   PO2 51*       Medications Personally Reviewed:    Current Facility-Administered Medications   Medication Dose Route Frequency    furosemide (LASIX) injection 20 mg  20 mg IntraVENous DAILY    potassium chloride SR (KLOR-CON 10) tablet 10 mEq  10 mEq Oral DAILY    clopidogreL (PLAVIX) tablet 75 mg  75 mg Oral DAILY    enoxaparin (LOVENOX) injection 70 mg +++PARTIAL SYRINGE+++  1 mg/kg SubCUTAneous Q24H    insulin lispro (HUMALOG) injection   SubCUTAneous AC&HS    glucose chewable tablet 16 g  4 Tablet Oral PRN    glucagon (GLUCAGEN) injection 1 mg  1 mg IntraMUSCular PRN    dextrose 10% infusion 0-250 mL  0-250 mL IntraVENous PRN    ondansetron (ZOFRAN) injection 4 mg  4 mg IntraVENous Q6H PRN    melatonin tablet 3 mg  3 mg Oral QHS    QUEtiapine (SEROquel) tablet 25 mg  25 mg Oral QHS    hydrOXYzine HCL (ATARAX) tablet 25 mg  25 mg Oral TID PRN    pantoprazole (PROTONIX) tablet 40 mg  40 mg Oral DAILY    albuterol (PROVENTIL VENTOLIN) nebulizer solution 2.5 mg  2.5 mg Nebulization Q6H PRN    atorvastatin (LIPITOR) tablet 20 mg  20 mg Oral DAILY    metoprolol succinate (TOPROL-XL) XL tablet 50 mg  50 mg Oral DAILY    morphine injection 2 mg  2 mg IntraVENous Q4H PRN    amitriptyline (ELAVIL) tablet 10 mg  10 mg Oral QHS    buPROPion XL (WELLBUTRIN XL) tablet 150 mg  150 mg Oral DAILY    escitalopram oxalate (LEXAPRO) tablet 20 mg  20 mg Oral DAILY    levothyroxine (SYNTHROID) tablet 50 mcg  50 mcg Oral ACB    sodium chloride (NS) flush 5-40 mL  5-40 mL IntraVENous Q8H    sodium chloride (NS) flush 5-40 mL  5-40 mL IntraVENous PRN    acetaminophen (TYLENOL) tablet 650 mg  650 mg Oral Q4H PRN    naloxone (NARCAN) injection 0.4 mg  0.4 mg IntraVENous PRN    aspirin chewable tablet 81 mg  81 mg Oral DAILY    benzonatate (TESSALON) capsule 100 mg  100 mg Oral TID PRN    ezetimibe (ZETIA) tablet 10 mg  10 mg Oral DAILY    traMADoL (ULTRAM) tablet 50 mg  50 mg Oral Q12H PRN         Buddy Stone MD

## 2022-06-11 NOTE — PROGRESS NOTES
Bedside shift change report given to Tima Winter RN (oncoming nurse) by Amauri Griffin RN (offgoing nurse). Report included the following information SBAR, Kardex, Procedure Summary, Intake/Output, MAR, Accordion, Recent Results, Med Rec Status, Cardiac Rhythm NSR, Alarm Parameters , Pre Procedure Checklist, Procedure Verification, Quality Measures and Dual Neuro Assessment.

## 2022-06-11 NOTE — PROGRESS NOTES
Hospitalist Progress Note    NAME: Parker Huggins   :  1940   MRN:  [id number]       Assessment / Plan:  Confusional state with episodes of agitation, concerning for delirium  Chart reviewed, per chart patient has been confused and aggressive towards the staff and refusing care. Discussed with patient some reported that patient at baseline is alert oriented x3 and very much independent and has never had any memory issues. He reported that patient can get confused when she does not have much sleep. Patient seems to be anxious since the loss of her  2 years ago. When seen patient was alert oriented x3 . Able to  maintain normal conversation. Will t B12, folate, TSH, ABG and ammonia WNL   CT of the brain  Show no acute changes   Noted psych consult, Seroquel at bedtime, and frequent reorientation  Continue with home dose Lexapro  Mental status back to basline       Acute respiratory failure with hypoxia  In Creased oxygen requirement this morning, had to be placed on high flow 8 to 10 L due to sudden hypoxia. Patient was on 3 L yesterday  Chest x-ray did not show any acute pathology, showed diffuse interstitial marking  Continue with nebs  Get D-dimer, if positive most likely will need CTA of the chest.  Patient has severe allergy to iodine, most likely will need VQ scan    CAD, POA (s/p cath in , now with occluded RCA)   - management per primary cardiology team     Anxiety, POA  Agitation, POA  Depression, POA  - recently , was overmedicating with anxiety meds  - resume home meds  - symptom management     Asthma, POA  - resume PTA inhaler     DM, POA  - BG checks with SSI coverage     Hypertension, POA  - resume home meds     Hyperlipidemia, POA  - resume Lipitor, Zetia     History of  left DVT  - resume Plavix   is on warfarin, INR is not therapeutic 1.3     IBS, POA  - resume home meds  25.0 - 29.9 Overweight / Body mass index is 28.15 kg/m².     Estimated discharge date: Per primary attending  Barriers:    Code status: DNR  Prophylaxis: Coumadin  Recommended Disposition: Home w/Family     Subjective:     Chief Complaint / Reason for Physician Visit  Follow-up delirium discussed with RN events overnight. Patient has no acute complaints, is alert oriented x3  Review of Systems:  Symptom Y/N Comments  Symptom Y/N Comments   Fever/Chills n   Chest Pain n    Poor Appetite    Edema     Cough    Abdominal Pain     Sputum    Joint Pain     SOB/VILA n   Pruritis/Rash     Nausea/vomit    Tolerating PT/OT     Diarrhea    Tolerating Diet y    Constipation    Other       Could NOT obtain due to:      Objective:     VITALS:   Last 24hrs VS reviewed since prior progress note. Most recent are:  Patient Vitals for the past 24 hrs:   Temp Pulse Resp BP SpO2   06/11/22 0923 -- -- -- (!) 112/56 --   06/11/22 0800 98 °F (36.7 °C) 82 15 (!) 124/52 94 %   06/11/22 0315 99 °F (37.2 °C) 84 14 (!) 126/46 91 %   06/10/22 2327 99.7 °F (37.6 °C) 87 14 136/63 92 %   06/10/22 1919 98.8 °F (37.1 °C) 86 16 (!) 119/51 92 %   06/10/22 1917 98.8 °F (37.1 °C) -- -- -- --   06/10/22 1553 98.3 °F (36.8 °C) 81 18 (!) 112/52 93 %   06/10/22 1134 98.1 °F (36.7 °C) 84 20 (!) 108/57 93 %       Intake/Output Summary (Last 24 hours) at 6/11/2022 1009  Last data filed at 6/11/2022 0911  Gross per 24 hour   Intake 725 ml   Output 1175 ml   Net -450 ml        I had a face to face encounter and independently examined this patient on 6/11/2022, as outlined below:  PHYSICAL EXAM:  General: WD, WN. Alert, cooperative, no acute distress    EENT:  EOMI. Anicteric sclerae. MMM  Resp:  CTA bilaterally, no wheezing or rales. No accessory muscle use  CV:  Regular  rhythm,  No edema  GI:  Soft, Non distended, Non tender. +Bowel sounds  Neurologic:  Alert and oriented X 3, normal speech,   Psych:   Good insight. Not anxious nor agitated  Skin:  No rashes.   No jaundice    Reviewed most current lab test results and cultures  YES  Reviewed most current radiology test results   YES  Review and summation of old records today    NO  Reviewed patient's current orders and MAR    YES  PMH/SH reviewed - no change compared to H&P  ________________________________________________________________________  Care Plan discussed with:    Comments   Patient x    Family  x    RN x    Care Manager     Consultant                        Multidiciplinary team rounds were held today with , nursing, pharmacist and clinical coordinator. Patient's plan of care was discussed; medications were reviewed and discharge planning was addressed. ________________________________________________________________________  Total NON critical care TIME:  45   Minutes    Total CRITICAL CARE TIME Spent:   Minutes non procedure based      Comments   >50% of visit spent in counseling and coordination of care     ________________________________________________________________________  Marco A Mcgowan MD     Procedures: see electronic medical records for all procedures/Xrays and details which were not copied into this note but were reviewed prior to creation of Plan. LABS:  I reviewed today's most current labs and imaging studies. Pertinent labs include:  Recent Labs     06/10/22  0635 06/09/22  1055   WBC 12.5* 12.7*   HGB 7.6* 7.7*   HCT 27.7* 29.0*    222     Recent Labs     06/11/22  0308 06/10/22  0635 06/09/22  1106 06/09/22  1055    135*  --  136   K 3.8 3.4*  --  4.0   CL 98 99  --  100   CO2 32 32  --  29   * 164*  --  231*   BUN 19 17  --  15   CREA 0.65 0.66  --  0.77   CA 8.1* 8.2*  --  8.1*   MG  --   --   --  2.1   ALB  --   --   --  3.1*   TBILI  --   --   --  0.8   ALT  --   --   --  17   INR  --  1.3* 1.3*  --        Signed:  Marco A Mcgowan MD

## 2022-06-11 NOTE — PROGRESS NOTES
Bedside shift change report given to Vinicius Mosquera RN (oncoming nurse) by Sumaya Loomis RN (offgoing nurse). Report included the following information SBAR, Kardex, Procedure Summary, Intake/Output, MAR, Accordion, Recent Results, Med Rec Status, Cardiac Rhythm NSR, Alarm Parameters , Pre Procedure Checklist, Procedure Verification, Quality Measures and Dual Neuro Assessment.

## 2022-06-11 NOTE — ROUTINE PROCESS
0730- Report per Angelica Bias. 1130- OOB to chair for most of am. Pt denies CP SOB. 1900- Shift uneventful. 02 cont at 4lpm hi flow. Report to Angelica Bias for HS care.

## 2022-06-12 LAB
ANION GAP SERPL CALC-SCNC: 5 MMOL/L (ref 5–15)
BNP SERPL-MCNC: 2019 PG/ML
BUN SERPL-MCNC: 20 MG/DL (ref 6–20)
BUN/CREAT SERPL: 38 (ref 12–20)
CALCIUM SERPL-MCNC: 8.4 MG/DL (ref 8.5–10.1)
CHLORIDE SERPL-SCNC: 96 MMOL/L (ref 97–108)
CO2 SERPL-SCNC: 31 MMOL/L (ref 21–32)
CREAT SERPL-MCNC: 0.52 MG/DL (ref 0.55–1.02)
ERYTHROCYTE [DISTWIDTH] IN BLOOD BY AUTOMATED COUNT: 20.5 % (ref 11.5–14.5)
GLUCOSE BLD STRIP.AUTO-MCNC: 179 MG/DL (ref 65–117)
GLUCOSE BLD STRIP.AUTO-MCNC: 188 MG/DL (ref 65–117)
GLUCOSE BLD STRIP.AUTO-MCNC: 204 MG/DL (ref 65–117)
GLUCOSE BLD STRIP.AUTO-MCNC: 209 MG/DL (ref 65–117)
GLUCOSE SERPL-MCNC: 165 MG/DL (ref 65–100)
HCT VFR BLD AUTO: 31.2 % (ref 35–47)
HGB BLD-MCNC: 8.3 G/DL (ref 11.5–16)
MCH RBC QN AUTO: 17.7 PG (ref 26–34)
MCHC RBC AUTO-ENTMCNC: 26.6 G/DL (ref 30–36.5)
MCV RBC AUTO: 66.4 FL (ref 80–99)
NRBC # BLD: 0.03 K/UL (ref 0–0.01)
NRBC BLD-RTO: 0.2 PER 100 WBC
PLATELET # BLD AUTO: 265 K/UL (ref 150–400)
PMV BLD AUTO: 10.1 FL (ref 8.9–12.9)
POTASSIUM SERPL-SCNC: 4 MMOL/L (ref 3.5–5.1)
RBC # BLD AUTO: 4.7 M/UL (ref 3.8–5.2)
SERVICE CMNT-IMP: ABNORMAL
SODIUM SERPL-SCNC: 132 MMOL/L (ref 136–145)
WBC # BLD AUTO: 12.6 K/UL (ref 3.6–11)

## 2022-06-12 PROCEDURE — 74011250637 HC RX REV CODE- 250/637: Performed by: INTERNAL MEDICINE

## 2022-06-12 PROCEDURE — 74011250636 HC RX REV CODE- 250/636: Performed by: STUDENT IN AN ORGANIZED HEALTH CARE EDUCATION/TRAINING PROGRAM

## 2022-06-12 PROCEDURE — 36415 COLL VENOUS BLD VENIPUNCTURE: CPT

## 2022-06-12 PROCEDURE — 65270000046 HC RM TELEMETRY

## 2022-06-12 PROCEDURE — 80048 BASIC METABOLIC PNL TOTAL CA: CPT

## 2022-06-12 PROCEDURE — 85027 COMPLETE CBC AUTOMATED: CPT

## 2022-06-12 PROCEDURE — 82962 GLUCOSE BLOOD TEST: CPT

## 2022-06-12 PROCEDURE — 74011000250 HC RX REV CODE- 250: Performed by: INTERNAL MEDICINE

## 2022-06-12 PROCEDURE — 83880 ASSAY OF NATRIURETIC PEPTIDE: CPT

## 2022-06-12 PROCEDURE — 74011636637 HC RX REV CODE- 636/637: Performed by: INTERNAL MEDICINE

## 2022-06-12 PROCEDURE — 74011250636 HC RX REV CODE- 250/636: Performed by: INTERNAL MEDICINE

## 2022-06-12 RX ORDER — FUROSEMIDE 10 MG/ML
20 INJECTION INTRAMUSCULAR; INTRAVENOUS DAILY
Status: DISCONTINUED | OUTPATIENT
Start: 2022-06-13 | End: 2022-06-13

## 2022-06-12 RX ORDER — POLYETHYLENE GLYCOL 3350 17 G/17G
17 POWDER, FOR SOLUTION ORAL DAILY
Status: DISCONTINUED | OUTPATIENT
Start: 2022-06-12 | End: 2022-06-16

## 2022-06-12 RX ORDER — FUROSEMIDE 10 MG/ML
40 INJECTION INTRAMUSCULAR; INTRAVENOUS ONCE
Status: COMPLETED | OUTPATIENT
Start: 2022-06-12 | End: 2022-06-12

## 2022-06-12 RX ADMIN — ACETAMINOPHEN 650 MG: 325 TABLET ORAL at 18:26

## 2022-06-12 RX ADMIN — EZETIMIBE 10 MG: 10 TABLET ORAL at 08:15

## 2022-06-12 RX ADMIN — QUETIAPINE FUMARATE 25 MG: 25 TABLET ORAL at 21:30

## 2022-06-12 RX ADMIN — ATORVASTATIN CALCIUM 20 MG: 40 TABLET, FILM COATED ORAL at 08:15

## 2022-06-12 RX ADMIN — PANTOPRAZOLE SODIUM 40 MG: 40 TABLET, DELAYED RELEASE ORAL at 08:14

## 2022-06-12 RX ADMIN — AMITRIPTYLINE HYDROCHLORIDE 10 MG: 10 TABLET, FILM COATED ORAL at 21:30

## 2022-06-12 RX ADMIN — POTASSIUM CHLORIDE 10 MEQ: 750 TABLET, FILM COATED, EXTENDED RELEASE ORAL at 08:16

## 2022-06-12 RX ADMIN — Medication 3 UNITS: at 12:02

## 2022-06-12 RX ADMIN — MELATONIN 3 MG: at 21:30

## 2022-06-12 RX ADMIN — Medication 2 UNITS: at 08:18

## 2022-06-12 RX ADMIN — TRAMADOL HYDROCHLORIDE 50 MG: 50 TABLET, COATED ORAL at 08:15

## 2022-06-12 RX ADMIN — METOPROLOL SUCCINATE 50 MG: 50 TABLET, EXTENDED RELEASE ORAL at 08:15

## 2022-06-12 RX ADMIN — SODIUM CHLORIDE, PRESERVATIVE FREE 10 ML: 5 INJECTION INTRAVENOUS at 06:40

## 2022-06-12 RX ADMIN — SODIUM CHLORIDE, PRESERVATIVE FREE 10 ML: 5 INJECTION INTRAVENOUS at 21:29

## 2022-06-12 RX ADMIN — Medication 2 UNITS: at 16:58

## 2022-06-12 RX ADMIN — Medication 2 UNITS: at 21:31

## 2022-06-12 RX ADMIN — ESCITALOPRAM OXALATE 20 MG: 10 TABLET ORAL at 08:14

## 2022-06-12 RX ADMIN — LEVOTHYROXINE SODIUM 50 MCG: 0.05 TABLET ORAL at 08:15

## 2022-06-12 RX ADMIN — CLOPIDOGREL BISULFATE 75 MG: 75 TABLET ORAL at 08:15

## 2022-06-12 RX ADMIN — TRAMADOL HYDROCHLORIDE 50 MG: 50 TABLET, COATED ORAL at 19:51

## 2022-06-12 RX ADMIN — BUPROPION HYDROCHLORIDE 150 MG: 150 TABLET, EXTENDED RELEASE ORAL at 08:15

## 2022-06-12 RX ADMIN — FUROSEMIDE 40 MG: 10 INJECTION, SOLUTION INTRAMUSCULAR; INTRAVENOUS at 09:00

## 2022-06-12 RX ADMIN — SODIUM CHLORIDE, PRESERVATIVE FREE 10 ML: 5 INJECTION INTRAVENOUS at 17:00

## 2022-06-12 RX ADMIN — ASPIRIN 81 MG: 81 TABLET, CHEWABLE ORAL at 08:15

## 2022-06-12 RX ADMIN — ENOXAPARIN SODIUM 70 MG: 100 INJECTION SUBCUTANEOUS at 13:25

## 2022-06-12 NOTE — PROGRESS NOTES
Bedside shift change report given to Kelly Browne RN (oncoming nurse) by Simran Gomez RN (offgoing nurse). Report included the following information SBAR, Kardex, Procedure Summary, Intake/Output, MAR, Accordion, Recent Results, Med Rec Status, Cardiac Rhythm NSR, Sinus Tach with activity and Alarm Parameters .

## 2022-06-12 NOTE — PROGRESS NOTES
Hospitalist Progress Note    NAME: Katia Mitchell   :  1940   MRN:  [id number]       Assessment / Plan:  Confusional state with episodes of agitation, concerning for delirium  Chart reviewed, per chart patient has been confused and aggressive towards the staff and refusing care. Discussed with patient some reported that patient at baseline is alert oriented x3 and very much independent and has never had any memory issues. He reported that patient can get confused when she does not have much sleep. Patient seems to be anxious since the loss of her  2 years ago. When seen patient was alert oriented x3 . Able to  maintain normal conversation. Will t B12, folate, TSH, ABG and ammonia WNL   CT of the brain  Show no acute changes   Noted psych consult, Seroquel at bedtime, and frequent reorientation  Continue with home dose Lexapro  Mental status back to basline       Acute respiratory failure with hypoxia  In Creased oxygen requirement this morning, had to be placed on high flow 8 to 10 L due to sudden hypoxia. Patient was on 3 L yesterday  Chest x-ray did not show any acute pathology, showed diffuse interstitial marking  Continue with nebs  Get D-dimer, if positive most likely will need CTA of the chest.  Patient has severe allergy to iodine, most likely will need VQ scan    CAD, POA (s/p cath in , now with occluded RCA)   - management per primary cardiology team     Anxiety, POA  Agitation, POA  Depression, POA  - recently , was overmedicating with anxiety meds  - resume home meds  - symptom management     Asthma, POA  - resume PTA inhaler     DM, POA  - BG checks with SSI coverage     Hypertension, POA  - resume home meds     Hyperlipidemia, POA  - resume Lipitor, Zetia     History of  left DVT  - resume Plavix  Off coumadin      IBS, POA  - resume home meds  25.0 - 29.9 Overweight / Body mass index is 28.15 kg/m².     Estimated discharge date: Per primary attending  Barriers:    Code status: DNR  Prophylaxis: Coumadin  Recommended Disposition: Home w/Family     Subjective:     Chief Complaint / Reason for Physician Visit  Follow-up delirium discussed with RN events overnight. Patient has no acute complaints, is alert oriented x3,plan for cardiac cath next week   Review of Systems:  Symptom Y/N Comments  Symptom Y/N Comments   Fever/Chills n   Chest Pain n    Poor Appetite    Edema     Cough    Abdominal Pain     Sputum    Joint Pain     SOB/VILA n   Pruritis/Rash     Nausea/vomit    Tolerating PT/OT     Diarrhea    Tolerating Diet y    Constipation    Other       Could NOT obtain due to:      Objective:     VITALS:   Last 24hrs VS reviewed since prior progress note. Most recent are:  Patient Vitals for the past 24 hrs:   Temp Pulse Resp BP SpO2   06/12/22 1444 98.9 °F (37.2 °C) 78 16 (!) 122/50 95 %   06/12/22 1324 -- -- -- -- 92 %   06/12/22 1116 97.2 °F (36.2 °C) 76 18 (!) 128/59 95 %   06/12/22 0935 -- -- -- -- 94 %   06/12/22 0832 -- 82 -- (!) 139/54 --   06/12/22 0815 -- 80 -- (!) 139/54 --   06/12/22 0721 -- -- -- -- 94 %   06/12/22 0720 98.2 °F (36.8 °C) 78 16 (!) 127/52 (!) 87 %   06/12/22 0715 -- -- -- -- 100 %   06/12/22 0300 99.6 °F (37.6 °C) 73 14 (!) 132/53 94 %   06/11/22 2321 99.6 °F (37.6 °C) 82 14 (!) 148/63 93 %   06/11/22 1957 99.6 °F (37.6 °C) 81 16 (!) 95/46 91 %       Intake/Output Summary (Last 24 hours) at 6/12/2022 1935  Last data filed at 6/12/2022 1902  Gross per 24 hour   Intake 120 ml   Output 1300 ml   Net -1180 ml        I had a face to face encounter and independently examined this patient on 6/12/2022, as outlined below:  PHYSICAL EXAM:  General: WD, WN. Alert, cooperative, no acute distress    EENT:  EOMI. Anicteric sclerae. MMM  Resp:  CTA bilaterally, no wheezing or rales. No accessory muscle use  CV:  Regular  rhythm,  No edema  GI:  Soft, Non distended, Non tender.   +Bowel sounds  Neurologic:  Alert and oriented X 3, normal speech,   Psych:   Good insight. Not anxious nor agitated  Skin:  No rashes. No jaundice    Reviewed most current lab test results and cultures  YES  Reviewed most current radiology test results   YES  Review and summation of old records today    NO  Reviewed patient's current orders and MAR    YES  PMH/SH reviewed - no change compared to H&P  ________________________________________________________________________  Care Plan discussed with:    Comments   Patient x    Family  x    RN x    Care Manager     Consultant                        Multidiciplinary team rounds were held today with , nursing, pharmacist and clinical coordinator. Patient's plan of care was discussed; medications were reviewed and discharge planning was addressed. ________________________________________________________________________  Total NON critical care TIME:  45   Minutes    Total CRITICAL CARE TIME Spent:   Minutes non procedure based      Comments   >50% of visit spent in counseling and coordination of care     ________________________________________________________________________  Maya Saucedo MD     Procedures: see electronic medical records for all procedures/Xrays and details which were not copied into this note but were reviewed prior to creation of Plan. LABS:  I reviewed today's most current labs and imaging studies. Pertinent labs include:  Recent Labs     06/12/22  0600 06/10/22  0635   WBC 12.6* 12.5*   HGB 8.3* 7.6*   HCT 31.2* 27.7*    222     Recent Labs     06/12/22  0600 06/11/22  0308 06/10/22  0635   * 136 135*   K 4.0 3.8 3.4*   CL 96* 98 99   CO2 31 32 32   * 167* 164*   BUN 20 19 17   CREA 0.52* 0.65 0.66   CA 8.4* 8.1* 8.2*   INR  --   --  1.3*       Signed:  Maya Saucedo MD

## 2022-06-12 NOTE — PROGRESS NOTES
Progress Note      6/12/2022 8:04 AM  NAME: Jen Prado   MRN:  [id number]   Admit Diagnosis: Chest pain, unspecified type [R07.9]  Coronary artery disease [I25.10]                Assessment:       - Cath in [facility] 2001 unclear details, PCI, left with  of RCA. Cath 6/2022 with 0ccluded RCA. 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 Stefanie Garcia  - IBS follows with GI  - Anxiety  - Anemia  - Mild dementia  - CONTRAST ALLERGY  -  7/2020, lives alone in an apartment, 3 kids, one son lives in [place], retired , limited functional capacity uses a cane limited by back pain  DNR                     Plan:        Patient with anxiety  , and has a hard time with this. Son helps her, has grandkids. Main complaint is back pain, uses a cane.     No chest pain  +dyspnea  No syncope.     Cath with high grade LCx. Patient with baseline anxiety, exacerbated by prednisone for dye allergy. Acute delerium 6/8 night. Improved with adjustment in psych meds and treatment. Extensive conversation with patient and son. They would like us to attempt PCI. Based on this will decide on TAVR. They understand high risk. Tentatively Tuesday afternoon with anesthesia. Will need prednisone prior to cath.     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 All agree DNR. Appreciate palliative care helping family with long term care plans. Will also ask  to see patient to see what options available for patient once leaving hospital.    Discussed with primary Dr. Marcus Greenwood. Update 6/11/22: Felt shaky this am. Had indigestion sensation and sob yesterday but none today. Felt lightheaded when got up. /56, HR 84.       Update 6/12/22:   No CP or SOB. SOB yesterday. Reports neck pain. Needing 6 L 02 NC. A+OX3, NAD, S1S2, RRR, SM, lungs clear, no respi distress, no edema   Cont asa, plavix, lovenox, metoprolo, lipitor, will give dose of 40 mg IV lasix today and then continue 20 mg daily   Check labs tomorrow BMP and BNP, CXR   Tramadol for neck pain   Possibly cath next week         [x]? High complexity decision making was performed         We discussed the expected course, resolution and complications of the diagnoses in detail. Medication risk, benefits, costs, interactions, and alternatives were discussed as indicated. I advised him to contact the office if his condition worsens, changes or fails to improve as anticipated. Patient expressed understanding with the diagnoses  and plan. Subjective:     Alexandre Roldan denies chest pain, + dyspnea. Discussed with RN events overnight. Review of Systems:    Symptom Y/N Comments  Symptom Y/N Comments   Fever/Chills N   Chest Pain N    Poor Appetite N   Edema N    Cough N   Abdominal Pain N    Sputum N   Joint Pain N    SOB/VILA N   Pruritis/Rash N    Nausea/vomit N   Tolerating PT/OT     Diarrhea N   Tolerating Diet Y    Constipation N   Other       Could NOT obtain due to:      Objective:      Physical Exam:    Last 24hrs VS reviewed since prior progress note.  Most recent are:    Visit Vitals  BP (!) 127/52 (BP 1 Location: Right upper arm, BP Patient Position: At rest;Semi fowlers)   Pulse 78   Temp 98.2 °F (36.8 °C)   Resp 16   Ht 5' 1\" (1.549 m)   Wt 67.6 kg (149 lb) SpO2 (!) 87%   Breastfeeding No   BMI 28.15 kg/m²       Intake/Output Summary (Last 24 hours) at 6/12/2022 4309  Last data filed at 6/12/2022 0331  Gross per 24 hour   Intake 295 ml   Output 650 ml   Net -355 ml        General Appearance: Well developed, well nourished, alert & oriented x 3,    no acute distress. Ears/Nose/Mouth/Throat: Hearing grossly normal.  Neck: Supple. Chest: Lungs clear to auscultation bilaterally. Cardiovascular: Regular rate and rhythm, S1S2 normal, III/VI systolic murmur. Abdomen: Soft, non-tender, bowel sounds are active. Extremities: No edema bilaterally. Skin: Warm and dry. PMH/SH reviewed - no change compared to H&P    Data Review    Telemetry: normal sinus rhythm     Lab Data Personally Reviewed:    Recent Labs     06/12/22  0600 06/10/22  0635   WBC 12.6* 12.5*   HGB 8.3* 7.6*   HCT 31.2* 27.7*    222     Recent Labs     06/10/22  0635 06/09/22  1106   INR 1.3* 1.3*   PTP 13.5* 12.9*      Recent Labs     06/12/22  0600 06/11/22  0308 06/10/22  0635 06/09/22  1055 06/09/22  1055   * 136 135*   < > 136   K 4.0 3.8 3.4*   < > 4.0   CL 96* 98 99   < > 100   CO2 31 32 32   < > 29   BUN 20 19 17   < > 15   CREA 0.52* 0.65 0.66   < > 0.77   * 167* 164*   < > 231*   CA 8.4* 8.1* 8.2*   < > 8.1*   MG  --   --   --   --  2.1    < > = values in this interval not displayed. No results for input(s): CPK, CKNDX, TROIQ in the last 72 hours.     No lab exists for component: CPKMB  Lab Results   Component Value Date/Time    Cholesterol, total 107 06/25/2021 11:52 AM    HDL Cholesterol 52 06/25/2021 11:52 AM    LDL, calculated 39 06/25/2021 11:52 AM    Triglyceride 80 06/25/2021 11:52 AM    CHOL/HDL Ratio 2.1 06/25/2021 11:52 AM       Recent Labs     06/09/22  1055   AP 78   TP 6.5   ALB 3.1*   GLOB 3.4     Recent Labs     06/09/22  1119   PH 7.44   PCO2 43   PO2 51*       Medications Personally Reviewed:    Current Facility-Administered Medications   Medication Dose Route Frequency    furosemide (LASIX) injection 20 mg  20 mg IntraVENous DAILY    potassium chloride SR (KLOR-CON 10) tablet 10 mEq  10 mEq Oral DAILY    clopidogreL (PLAVIX) tablet 75 mg  75 mg Oral DAILY    enoxaparin (LOVENOX) injection 70 mg +++PARTIAL SYRINGE+++  1 mg/kg SubCUTAneous Q24H    insulin lispro (HUMALOG) injection   SubCUTAneous AC&HS    glucose chewable tablet 16 g  4 Tablet Oral PRN    glucagon (GLUCAGEN) injection 1 mg  1 mg IntraMUSCular PRN    dextrose 10% infusion 0-250 mL  0-250 mL IntraVENous PRN    ondansetron (ZOFRAN) injection 4 mg  4 mg IntraVENous Q6H PRN    melatonin tablet 3 mg  3 mg Oral QHS    QUEtiapine (SEROquel) tablet 25 mg  25 mg Oral QHS    hydrOXYzine HCL (ATARAX) tablet 25 mg  25 mg Oral TID PRN    pantoprazole (PROTONIX) tablet 40 mg  40 mg Oral DAILY    albuterol (PROVENTIL VENTOLIN) nebulizer solution 2.5 mg  2.5 mg Nebulization Q6H PRN    atorvastatin (LIPITOR) tablet 20 mg  20 mg Oral DAILY    metoprolol succinate (TOPROL-XL) XL tablet 50 mg  50 mg Oral DAILY    morphine injection 2 mg  2 mg IntraVENous Q4H PRN    amitriptyline (ELAVIL) tablet 10 mg  10 mg Oral QHS    buPROPion XL (WELLBUTRIN XL) tablet 150 mg  150 mg Oral DAILY    escitalopram oxalate (LEXAPRO) tablet 20 mg  20 mg Oral DAILY    levothyroxine (SYNTHROID) tablet 50 mcg  50 mcg Oral ACB    sodium chloride (NS) flush 5-40 mL  5-40 mL IntraVENous Q8H    sodium chloride (NS) flush 5-40 mL  5-40 mL IntraVENous PRN    acetaminophen (TYLENOL) tablet 650 mg  650 mg Oral Q4H PRN    naloxone (NARCAN) injection 0.4 mg  0.4 mg IntraVENous PRN    aspirin chewable tablet 81 mg  81 mg Oral DAILY    benzonatate (TESSALON) capsule 100 mg  100 mg Oral TID PRN    ezetimibe (ZETIA) tablet 10 mg  10 mg Oral DAILY    traMADoL (ULTRAM) tablet 50 mg  50 mg Oral Q12H PRN         Diana Miramontes MD

## 2022-06-12 NOTE — PROGRESS NOTES
Problem: Pressure Injury - Risk of  Goal: *Prevention of pressure injury  Description: Document Norbert Scale and appropriate interventions in the flowsheet. Outcome: Progressing Towards Goal  Note: Pressure Injury Interventions:  Sensory Interventions: Minimize linen layers,Assess changes in LOC    Moisture Interventions: Internal/External urinary devices,Maintain skin hydration (lotion/cream),Minimize layers,Moisture barrier,Offer toileting Q_hr,Limit adult briefs,Absorbent underpads,Apply protective barrier, creams and emollients,Assess need for specialty bed    Activity Interventions: Assess need for specialty bed,Increase time out of bed,Pressure redistribution bed/mattress(bed type),PT/OT evaluation    Mobility Interventions: Assess need for specialty bed,HOB 30 degrees or less,PT/OT evaluation,Turn and reposition approx. every two hours(pillow and wedges)    Nutrition Interventions: Document food/fluid/supplement intake    Friction and Shear Interventions: Minimize layers                Problem: Patient Education: Go to Patient Education Activity  Goal: Patient/Family Education  Outcome: Progressing Towards Goal     Problem: Falls - Risk of  Goal: *Absence of Falls  Description: Document Mona Fall Risk and appropriate interventions in the flowsheet.   Outcome: Progressing Towards Goal  Note: Fall Risk Interventions:  Mobility Interventions: Assess mobility with egress test,Bed/chair exit alarm,Communicate number of staff needed for ambulation/transfer,OT consult for ADLs,Patient to call before getting OOB,PT Consult for mobility concerns,PT Consult for assist device competence,Strengthening exercises (ROM-active/passive),Utilize walker, cane, or other assistive device    Mentation Interventions: Door open when patient unattended,Bed/chair exit alarm,Increase mobility    Medication Interventions: Assess postural VS orthostatic hypotension,Bed/chair exit alarm,Evaluate medications/consider consulting pharmacy,Patient to call before getting OOB,Teach patient to arise slowly    Elimination Interventions: Bed/chair exit alarm,Call light in reach,Elevated toilet seat,Patient to call for help with toileting needs,Stay With Me (per policy),Toileting schedule/hourly rounds    History of Falls Interventions: Bed/chair exit alarm,Consult care management for discharge planning,Door open when patient unattended,Evaluate medications/consider consulting pharmacy,Investigate reason for fall,Room close to nurse's station,Vital signs minimum Q4HRs X 24 hrs (comment for end date)         Problem: Patient Education: Go to Patient Education Activity  Goal: Patient/Family Education  Outcome: Progressing Towards Goal     Problem: Pain  Goal: *Control of Pain  Outcome: Progressing Towards Goal  Goal: *PALLIATIVE CARE:  Alleviation of Pain  Outcome: Progressing Towards Goal     Problem: Patient Education: Go to Patient Education Activity  Goal: Patient/Family Education  Outcome: Progressing Towards Goal     Problem: Patient Education: Go to Patient Education Activity  Goal: Patient/Family Education  Outcome: Progressing Towards Goal     Problem: Unstable Angina/NSTEMI: Discharge Outcomes  Goal: *Hemodynamically stable  Outcome: Progressing Towards Goal  Goal: *Stable cardiac rhythm  Outcome: Progressing Towards Goal  Goal: *Lungs clear or at baseline  Outcome: Progressing Towards Goal  Goal: *Optimal pain control at patient's stated goal  Outcome: Progressing Towards Goal  Goal: *Identifies cardiac risk factors  Outcome: Progressing Towards Goal  Goal: *Verbalizes home exercise program, activity guidelines, cardiac precautions  Outcome: Progressing Towards Goal  Goal: *Verbalizes understanding and describes prescribed diet  Outcome: Progressing Towards Goal  Goal: *Verbalizes name, dosage, time, side effects, and number of days to continue medications  Outcome: Progressing Towards Goal  Goal: *Anxiety reduced or absent  Outcome: Progressing Towards Goal  Goal: *Understands and describes signs and symptoms to report to providers(Stroke Metric)  Outcome: Progressing Towards Goal  Goal: *Describes follow-up/return visits to physicians  Outcome: Progressing Towards Goal  Goal: *Describes available resources and support systems  Outcome: Progressing Towards Goal  Goal: *Influenza immunization  Outcome: Progressing Towards Goal  Goal: *Pneumococcal immunization  Outcome: Progressing Towards Goal  Goal: *Describes smoking cessation resources  Outcome: Progressing Towards Goal

## 2022-06-12 NOTE — PROGRESS NOTES
Bedside shift change report given to Marylin Schuler RN (oncoming nurse) by Nilton Viveros RN (offgoing nurse). Report included the following information SBAR, Kardex, Procedure Summary, Intake/Output, MAR, Accordion, Recent Results, Med Rec Status, Cardiac Rhythm NSR, Sinus Tach with activity and Alarm Parameters .

## 2022-06-12 NOTE — PROGRESS NOTES
0700: Bedside shift change report given to Analilia Lucero RN (oncoming nurse) by Josette Marroquin RN (offgoing nurse). Report included the following information SBAR and Kardex. 0715: Patient on 4L, weaned to 3 and o2 saturation dropped to mid 80s. Increased o2 5L,    1400: Pt took off nasal cannula and o2 saturations dropped to 79-80% on room air at rest. Placed pt back on 4L NC. Does not appear to be in resp distress. Appears comfortable.

## 2022-06-13 ENCOUNTER — APPOINTMENT (OUTPATIENT)
Dept: GENERAL RADIOLOGY | Age: 82
DRG: 246 | End: 2022-06-13
Attending: STUDENT IN AN ORGANIZED HEALTH CARE EDUCATION/TRAINING PROGRAM
Payer: MEDICARE

## 2022-06-13 LAB
ANION GAP SERPL CALC-SCNC: 5 MMOL/L (ref 5–15)
BNP SERPL-MCNC: 1340 PG/ML
BUN SERPL-MCNC: 25 MG/DL (ref 6–20)
BUN/CREAT SERPL: 40 (ref 12–20)
CALCIUM SERPL-MCNC: 8.7 MG/DL (ref 8.5–10.1)
CHLORIDE SERPL-SCNC: 92 MMOL/L (ref 97–108)
CO2 SERPL-SCNC: 34 MMOL/L (ref 21–32)
CREAT SERPL-MCNC: 0.63 MG/DL (ref 0.55–1.02)
GLUCOSE BLD STRIP.AUTO-MCNC: 167 MG/DL (ref 65–117)
GLUCOSE BLD STRIP.AUTO-MCNC: 171 MG/DL (ref 65–117)
GLUCOSE BLD STRIP.AUTO-MCNC: 182 MG/DL (ref 65–117)
GLUCOSE BLD STRIP.AUTO-MCNC: 219 MG/DL (ref 65–117)
GLUCOSE SERPL-MCNC: 156 MG/DL (ref 65–100)
POTASSIUM SERPL-SCNC: 3.4 MMOL/L (ref 3.5–5.1)
SERVICE CMNT-IMP: ABNORMAL
SODIUM SERPL-SCNC: 131 MMOL/L (ref 136–145)

## 2022-06-13 PROCEDURE — 65270000046 HC RM TELEMETRY

## 2022-06-13 PROCEDURE — 77010033678 HC OXYGEN DAILY

## 2022-06-13 PROCEDURE — 83880 ASSAY OF NATRIURETIC PEPTIDE: CPT

## 2022-06-13 PROCEDURE — 74011250637 HC RX REV CODE- 250/637: Performed by: INTERNAL MEDICINE

## 2022-06-13 PROCEDURE — 74011250636 HC RX REV CODE- 250/636: Performed by: STUDENT IN AN ORGANIZED HEALTH CARE EDUCATION/TRAINING PROGRAM

## 2022-06-13 PROCEDURE — 71045 X-RAY EXAM CHEST 1 VIEW: CPT

## 2022-06-13 PROCEDURE — 36415 COLL VENOUS BLD VENIPUNCTURE: CPT

## 2022-06-13 PROCEDURE — 74011636637 HC RX REV CODE- 636/637: Performed by: INTERNAL MEDICINE

## 2022-06-13 PROCEDURE — 82962 GLUCOSE BLOOD TEST: CPT

## 2022-06-13 PROCEDURE — 80048 BASIC METABOLIC PNL TOTAL CA: CPT

## 2022-06-13 PROCEDURE — 74011000250 HC RX REV CODE- 250: Performed by: INTERNAL MEDICINE

## 2022-06-13 RX ORDER — PREDNISONE 20 MG/1
40 TABLET ORAL ONCE
Status: COMPLETED | OUTPATIENT
Start: 2022-06-14 | End: 2022-06-14

## 2022-06-13 RX ORDER — POTASSIUM CHLORIDE 750 MG/1
20 TABLET, FILM COATED, EXTENDED RELEASE ORAL
Status: COMPLETED | OUTPATIENT
Start: 2022-06-13 | End: 2022-06-13

## 2022-06-13 RX ORDER — FUROSEMIDE 20 MG/1
20 TABLET ORAL DAILY
Status: DISCONTINUED | OUTPATIENT
Start: 2022-06-14 | End: 2022-06-15

## 2022-06-13 RX ADMIN — METOPROLOL SUCCINATE 50 MG: 50 TABLET, EXTENDED RELEASE ORAL at 08:37

## 2022-06-13 RX ADMIN — POTASSIUM CHLORIDE 20 MEQ: 750 TABLET, FILM COATED, EXTENDED RELEASE ORAL at 08:38

## 2022-06-13 RX ADMIN — POLYETHYLENE GLYCOL 3350 17 G: 17 POWDER, FOR SOLUTION ORAL at 08:37

## 2022-06-13 RX ADMIN — LEVOTHYROXINE SODIUM 50 MCG: 0.05 TABLET ORAL at 08:37

## 2022-06-13 RX ADMIN — PANTOPRAZOLE SODIUM 40 MG: 40 TABLET, DELAYED RELEASE ORAL at 08:37

## 2022-06-13 RX ADMIN — Medication 3 UNITS: at 12:02

## 2022-06-13 RX ADMIN — QUETIAPINE FUMARATE 25 MG: 25 TABLET ORAL at 21:13

## 2022-06-13 RX ADMIN — ACETAMINOPHEN 650 MG: 325 TABLET ORAL at 19:28

## 2022-06-13 RX ADMIN — SODIUM CHLORIDE, PRESERVATIVE FREE 10 ML: 5 INJECTION INTRAVENOUS at 05:09

## 2022-06-13 RX ADMIN — ATORVASTATIN CALCIUM 20 MG: 40 TABLET, FILM COATED ORAL at 08:37

## 2022-06-13 RX ADMIN — CLOPIDOGREL BISULFATE 75 MG: 75 TABLET ORAL at 08:40

## 2022-06-13 RX ADMIN — SODIUM CHLORIDE, PRESERVATIVE FREE 10 ML: 5 INJECTION INTRAVENOUS at 21:14

## 2022-06-13 RX ADMIN — BUPROPION HYDROCHLORIDE 150 MG: 150 TABLET, EXTENDED RELEASE ORAL at 08:36

## 2022-06-13 RX ADMIN — ESCITALOPRAM OXALATE 20 MG: 10 TABLET ORAL at 08:37

## 2022-06-13 RX ADMIN — MELATONIN 3 MG: at 21:13

## 2022-06-13 RX ADMIN — ACETAMINOPHEN 650 MG: 325 TABLET ORAL at 08:37

## 2022-06-13 RX ADMIN — ASPIRIN 81 MG: 81 TABLET, CHEWABLE ORAL at 08:36

## 2022-06-13 RX ADMIN — EZETIMIBE 10 MG: 10 TABLET ORAL at 08:36

## 2022-06-13 RX ADMIN — POTASSIUM CHLORIDE 10 MEQ: 750 TABLET, FILM COATED, EXTENDED RELEASE ORAL at 08:38

## 2022-06-13 RX ADMIN — SALINE NASAL SPRAY 2 SPRAY: 1.5 SOLUTION NASAL at 21:12

## 2022-06-13 RX ADMIN — TRAMADOL HYDROCHLORIDE 50 MG: 50 TABLET, COATED ORAL at 21:19

## 2022-06-13 RX ADMIN — FUROSEMIDE 20 MG: 10 INJECTION, SOLUTION INTRAMUSCULAR; INTRAVENOUS at 08:37

## 2022-06-13 RX ADMIN — AMITRIPTYLINE HYDROCHLORIDE 10 MG: 10 TABLET, FILM COATED ORAL at 21:14

## 2022-06-13 RX ADMIN — BENZONATATE 100 MG: 100 CAPSULE ORAL at 21:13

## 2022-06-13 RX ADMIN — Medication 2 UNITS: at 17:53

## 2022-06-13 RX ADMIN — SODIUM CHLORIDE, PRESERVATIVE FREE 10 ML: 5 INJECTION INTRAVENOUS at 14:00

## 2022-06-13 RX ADMIN — Medication 2 UNITS: at 08:38

## 2022-06-13 NOTE — ACP (ADVANCE CARE PLANNING)
Palliative Medicine  [place]: [phone] ([phone])      Primary Decision Maker: Tarik Dos Santos - [place] - [phone]  Advance Care Planning 6/7/2022   Confirm Advance Directive Yes, on file   Does the patient have other document types Power of      Patient is alert and oriented X 4. She is able to engage in a conversation, but was somewhat timid today in talking about EOL issues. It appears that she doesn't like to think about these issues much. LCSW discussed with patient, DDNR that Isabela Dacosta had signed with us on her behalf, when she was more confused and unable to speak to us. Patient verifies that her wish is for DNR, explained that it would be important for her to sign her own DDNR, patient notes that she has one at home, \"it should be with my papers\". She has always noted that she would not want CPR and no life prolonging measures at EOL.      Patient does not seem to want to have a new document drawn up and seemed a bit anxious. Let her know that she does not have to worry about this right now, that she does have one on file. Let both Keo and patient know that usually during a procedure, the DDNR is revoked. They are in agreement with this.

## 2022-06-13 NOTE — PROGRESS NOTES
1900- Bedside shift change report given to Amber Gillis RN (oncoming nurse) by Waleska Encarnacion RN (offgoing nurse). Report included the following information SBAR, Kardex, ED Summary, Intake/Output, MAR, Recent Results, Med Rec Status and Cardiac Rhythm NSR.     0700- End of Shift Note    Bedside shift change report given to Marito Alcala (oncoming nurse) by Afsaneh White RN (offgoing nurse). Report included the following information SBAR, Kardex, ED Summary, Intake/Output, MAR, Recent Results, Med Rec Status and Cardiac Rhythm NSR    Shift worked:  7p-7a     Shift summary and any significant changes:     Pt CHG for cath today. Consent signed in chart. Concerns for physician to address: Activity:  Activity Level: Up with Assistance  Number times ambulated in hallways past shift: 0  Number of times OOB to chair past shift: 0    Cardiac:   Cardiac Monitoring: Yes      Cardiac Rhythm: Sinus Rhythm    Access:   Current line(s): PIV     Genitourinary:   Urinary status: voiding    Respiratory:   O2 Device: Nasal cannula  Chronic home O2 use?: NO  Incentive spirometer at bedside: YES  Actual Volume (ml): 750 ml    GI:  Last Bowel Movement Date: 06/13/22  Current diet:  DIET ONE TIME MESSAGE  DIET ONE TIME MESSAGE  DIET ONE TIME MESSAGE  DIET NPO Sips of Water with Meds, Ice Chips  Passing flatus: YES  Tolerating current diet: YES       Pain Management:   Patient states pain is manageable on current regimen: YES    Skin:  Norbert Score: 19  Interventions: turn team, increase time out of bed and PT/OT consult    Patient Safety:  Fall Score:  Total Score: 4  Interventions: bed/chair alarm, assistive device (walker, cane, etc), gripper socks and pt to call before getting OOB  High Fall Risk: Yes    Length of Stay:  Expected LOS: 2d 4h  Actual LOS: [address], RN

## 2022-06-13 NOTE — PROGRESS NOTES
Palliative Medicine  [place]: [phone])  [facility] located within [facility]: [phone]    Met with patient and her son Anshu Child, who is at bedside. Patient is doing much better, more cognitively alert, not confused anymore, plan is for her to have her Cardiac Cath done tomorrow, per Anshu Child. Patient is calmer, able to share that she did know that she was confused last week and was thinking things that now she knows aren't true (was having some delusions). Anshu Child noted that he is hoping that patient can become somewhat more independent again. He is unsure about the level of help that she will need following her procedure and notes that they will make arrangements accordingly once they know the support patient will need. Keo and patient are open to rehab, if that is indicated. Goal is for patient to regain some of her functional status. LCSW discussed with patient, DDNR that Anshu Child had signed with us on her behalf, when she was more confused and unable to speak to us. Patient verifies that her wish is for DNR, explained that it would be important for her to sign her own DDNR, patient notes that she has one at home, \"it should be with my papers\". She has always noted that she would not want CPR and no life prolonging measures at EOL. Patient does not seem to want to have a new document drawn up and seemed a bit anxious. Let her know that she does not have to worry about this right now, that she does have one on file. Let both Keo and patient know that usually during a procedure, the DDNR is revoked. They are in agreement with this. Patient and family will be supported as needed, will check in on her on Wednesday, following her procedure.

## 2022-06-13 NOTE — PROGRESS NOTES
Problem: Pressure Injury - Risk of  Goal: *Prevention of pressure injury  Description: Document Norbert Scale and appropriate interventions in the flowsheet. Outcome: Progressing Towards Goal  Note: Pressure Injury Interventions:  Sensory Interventions: Assess changes in LOC,Avoid rigorous massage over bony prominences,Minimize linen layers,Check visual cues for pain    Moisture Interventions: Internal/External urinary devices,Offer toileting Q_hr,Absorbent underpads,Apply protective barrier, creams and emollients,Assess need for specialty bed    Activity Interventions: Assess need for specialty bed,Increase time out of bed,Pressure redistribution bed/mattress(bed type),PT/OT evaluation    Mobility Interventions: Assess need for specialty bed,Float heels,PT/OT evaluation,Turn and reposition approx. every two hours(pillow and wedges)    Nutrition Interventions: Document food/fluid/supplement intake    Friction and Shear Interventions: Apply protective barrier, creams and emollients,Feet elevated on foot rest,Foam dressings/transparent film/skin sealants,HOB 30 degrees or less,Lift sheet,Lift team/patient mobility team,Minimize layers,Transferring/repositioning devices                Problem: Patient Education: Go to Patient Education Activity  Goal: Patient/Family Education  Outcome: Progressing Towards Goal     Problem: Falls - Risk of  Goal: *Absence of Falls  Description: Document Mona Fall Risk and appropriate interventions in the flowsheet.   Outcome: Progressing Towards Goal  Note: Fall Risk Interventions:  Mobility Interventions: Assess mobility with egress test,Bed/chair exit alarm,Communicate number of staff needed for ambulation/transfer,OT consult for ADLs,Patient to call before getting OOB,PT Consult for mobility concerns,PT Consult for assist device competence,Strengthening exercises (ROM-active/passive),Utilize walker, cane, or other assistive device    Mentation Interventions: Adequate sleep, hydration, pain control    Medication Interventions: Assess postural VS orthostatic hypotension,Bed/chair exit alarm,Evaluate medications/consider consulting pharmacy,Patient to call before getting OOB,Teach patient to arise slowly    Elimination Interventions: Bed/chair exit alarm,Call light in reach,Elevated toilet seat,Patient to call for help with toileting needs,Stay With Me (per policy),Toileting schedule/hourly rounds    History of Falls Interventions: Bed/chair exit alarm,Consult care management for discharge planning,Door open when patient unattended,Evaluate medications/consider consulting pharmacy,Investigate reason for fall,Room close to nurse's station,Vital signs minimum Q4HRs X 24 hrs (comment for end date)         Problem: Patient Education: Go to Patient Education Activity  Goal: Patient/Family Education  Outcome: Progressing Towards Goal     Problem: Pain  Goal: *Control of Pain  Outcome: Progressing Towards Goal  Goal: *PALLIATIVE CARE:  Alleviation of Pain  Outcome: Progressing Towards Goal     Problem: Patient Education: Go to Patient Education Activity  Goal: Patient/Family Education  Outcome: Progressing Towards Goal     Problem: Patient Education: Go to Patient Education Activity  Goal: Patient/Family Education  Outcome: Progressing Towards Goal     Problem: Unstable Angina/NSTEMI: Discharge Outcomes  Goal: *Hemodynamically stable  Outcome: Progressing Towards Goal  Goal: *Stable cardiac rhythm  Outcome: Progressing Towards Goal  Goal: *Lungs clear or at baseline  Outcome: Progressing Towards Goal  Goal: *Optimal pain control at patient's stated goal  Outcome: Progressing Towards Goal  Goal: *Identifies cardiac risk factors  Outcome: Progressing Towards Goal  Goal: *Verbalizes home exercise program, activity guidelines, cardiac precautions  Outcome: Progressing Towards Goal  Goal: *Verbalizes understanding and describes prescribed diet  Outcome: Progressing Towards Goal  Goal: *Verbalizes name, dosage, time, side effects, and number of days to continue medications  Outcome: Progressing Towards Goal  Goal: *Anxiety reduced or absent  Outcome: Progressing Towards Goal  Goal: *Understands and describes signs and symptoms to report to providers(Stroke Metric)  Outcome: Progressing Towards Goal  Goal: *Describes follow-up/return visits to physicians  Outcome: Progressing Towards Goal  Goal: *Describes available resources and support systems  Outcome: Progressing Towards Goal  Goal: *Influenza immunization  Outcome: Progressing Towards Goal  Goal: *Pneumococcal immunization  Outcome: Progressing Towards Goal  Goal: *Describes smoking cessation resources  Outcome: Progressing Towards Goal

## 2022-06-13 NOTE — PROGRESS NOTES
TRANSFER - OUT REPORT:    Verbal report given to Nasra Maguire RN (name) on [facility]  being transferred to PCU (unit) for routine progression of care       Report consisted of patients Situation, Background, Assessment and   Recommendations(SBAR). Information from the following report(s) SBAR, Kardex, Procedure Summary, Intake/Output, MAR, Accordion, Recent Results, Med Rec Status and Cardiac Rhythm NSR was reviewed with the receiving nurse. Lines:   Peripheral IV 06/08/22 Left Hand (Active)   Site Assessment Clean, dry, & intact 06/12/22 1900   Phlebitis Assessment 0 06/12/22 1900   Infiltration Assessment 0 06/12/22 1900   Dressing Status Clean, dry, & intact 06/12/22 1900   Dressing Type Tape;Transparent 06/12/22 1900   Hub Color/Line Status Flushed;Patent 06/12/22 1900   Action Taken Open ports on tubing capped 06/12/22 1900   Alcohol Cap Used Yes 06/12/22 1900        Opportunity for questions and clarification was provided.       Patient transported with:   Monitor  O2 @ 4 liters  Patient's medications from home  Registered Nurse

## 2022-06-13 NOTE — PROGRESS NOTES
Bedside shift change report given to Bebo Mijares RN (oncoming nurse) by Fer Damon RN (offgoing nurse). Report included the following information SBAR, Kardex, Procedure Summary, Intake/Output, MAR, Accordion, Recent Results, Med Rec Status, Cardiac Rhythm NSR, Sinus Tach with activity and Alarm Parameters .

## 2022-06-13 NOTE — PROGRESS NOTES
Bedside and Verbal shift change report given to Rachel Diaz (oncoming nurse) by Nicolasa Alonso (offgoing nurse). Report included the following information SBAR, Kardex, Procedure Summary, Intake/Output, MAR and Recent Results. 2210: Patient arrived to room 2274 on PCU, vital signs stable.  No complaints at this time

## 2022-06-13 NOTE — PROGRESS NOTES
Progress Note      6/13/2022 8:04 AM  NAME: Kai Moreno   MRN:  [id number]   Admit Diagnosis: Chest pain, unspecified type [R07.9]  Coronary artery disease [I25.10]                Assessment:       - Cath in [facility] 2001 unclear details, PCI, left with  of RCA. Cath 6/2022 with 0ccluded RCA. 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 Haile Allison  - IBS follows with GI  - Anxiety  - Anemia  - Mild dementia  - CONTRAST ALLERGY  -  7/2020, lives alone in an apartment, 3 kids, one son lives in [facility], retired , limited functional capacity uses a cane limited by back pain  DNR                     Plan:        Patient with anxiety  , and has a hard time with this. Son helps her, has grandkids. Main complaint is back pain, uses a cane.     No chest pain  +dyspnea  No syncope.     Cath with high grade LCx. Patient with baseline anxiety, exacerbated by prednisone for dye allergy. Acute delerium 6/8 night. Improved with adjustment in psych meds and treatment. Extensive conversation with patient and son. They would like us to attempt PCI. Based on this will decide on TAVR. They understand high risk. Tentatively Tuesday afternoon with anesthesia. Will need prednisone prior to cath, but will minimize dose. 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 CXR pending, taper O2 as able.       - Hold warfarin for now  - Cont added aspirin  - Cont added clopidogrel  - Cont metoprolol  - Cont added IV lasix, replete K, taper off O2, follow bmp  - Cont atorvastatin     - Appreciate psychiatry evaluation, better on serquel  - Appreciate hospitalist help, head ct ok    Discussed with patient and son. All agree DNR. Appreciate palliative care helping family with long term care plans.  to see patient to see what options available for patient once leaving hospital.  Will ask PT/OT to see her post procedure with plan for SNF for rehab. Discussed with primary Dr. Kendall Villalba.                  [x]? High complexity decision making was performed         We discussed the expected course, resolution and complications of the diagnoses in detail. Medication risk, benefits, costs, interactions, and alternatives were discussed as indicated. I advised him to contact the office if his condition worsens, changes or fails to improve as anticipated. Patient expressed understanding with the diagnoses  and plan. Subjective:     Buzzy Oren denies chest pain, + dyspnea. Discussed with RN events overnight. Review of Systems:    Symptom Y/N Comments  Symptom Y/N Comments   Fever/Chills N   Chest Pain N    Poor Appetite N   Edema N    Cough N   Abdominal Pain N    Sputum N   Joint Pain N    SOB/VILA N   Pruritis/Rash N    Nausea/vomit N   Tolerating PT/OT     Diarrhea N   Tolerating Diet Y    Constipation N   Other       Could NOT obtain due to:      Objective:      Physical Exam:    Last 24hrs VS reviewed since prior progress note. Most recent are:    Visit Vitals  /62   Pulse 74   Temp 98 °F (36.7 °C)   Resp 23   Ht 5' 1\" (1.549 m)   Wt 67.6 kg (149 lb)   SpO2 90%   Breastfeeding No   BMI 28.15 kg/m²       Intake/Output Summary (Last 24 hours) at 6/13/2022 0519  Last data filed at 6/12/2022 2334  Gross per 24 hour   Intake 120 ml   Output 800 ml   Net -680 ml        General Appearance: Well developed, well nourished, alert & oriented x 3,    no acute distress. Ears/Nose/Mouth/Throat: Hearing grossly normal.  Neck: Supple. Chest: Lungs clear to auscultation bilaterally.   Cardiovascular: Regular rate and rhythm, S1S2 normal, III/VI systolic murmur. Abdomen: Soft, non-tender, bowel sounds are active. Extremities: No edema bilaterally. Skin: Warm and dry. PMH/SH reviewed - no change compared to H&P    Data Review    Telemetry: normal sinus rhythm     Lab Data Personally Reviewed:    Recent Labs     06/12/22  0600 06/10/22  0635   WBC 12.6* 12.5*   HGB 8.3* 7.6*   HCT 31.2* 27.7*    222     Recent Labs     06/10/22  0635   INR 1.3*   PTP 13.5*      Recent Labs     06/13/22  0510 06/12/22  0600 06/11/22  0308   * 132* 136   K 3.4* 4.0 3.8   CL 92* 96* 98   CO2 34* 31 32   BUN 25* 20 19   CREA 0.63 0.52* 0.65   * 165* 167*   CA 8.7 8.4* 8.1*     No results for input(s): CPK, CKNDX, TROIQ in the last 72 hours. No lab exists for component: CPKMB  Lab Results   Component Value Date/Time    Cholesterol, total 107 06/25/2021 11:52 AM    HDL Cholesterol 52 06/25/2021 11:52 AM    LDL, calculated 39 06/25/2021 11:52 AM    Triglyceride 80 06/25/2021 11:52 AM    CHOL/HDL Ratio 2.1 06/25/2021 11:52 AM       No results for input(s): AP, TBIL, TP, ALB, GLOB, GGT, AML, LPSE in the last 72 hours. No lab exists for component: SGOT, GPT, AMYP, HLPSE  No results for input(s): PH, PCO2, PO2 in the last 72 hours.     Medications Personally Reviewed:    Current Facility-Administered Medications   Medication Dose Route Frequency    potassium chloride SR (KLOR-CON 10) tablet 20 mEq  20 mEq Oral NOW    [START ON 6/14/2022] predniSONE (DELTASONE) tablet 40 mg  40 mg Oral ONCE    furosemide (LASIX) injection 20 mg  20 mg IntraVENous DAILY    polyethylene glycol (MIRALAX) packet 17 g  17 g Oral DAILY    potassium chloride SR (KLOR-CON 10) tablet 10 mEq  10 mEq Oral DAILY    clopidogreL (PLAVIX) tablet 75 mg  75 mg Oral DAILY    insulin lispro (HUMALOG) injection   SubCUTAneous AC&HS    glucose chewable tablet 16 g  4 Tablet Oral PRN    glucagon (GLUCAGEN) injection 1 mg  1 mg IntraMUSCular PRN    dextrose 10% infusion 0-250 mL  0-250 mL IntraVENous PRN    ondansetron (ZOFRAN) injection 4 mg  4 mg IntraVENous Q6H PRN    melatonin tablet 3 mg  3 mg Oral QHS    QUEtiapine (SEROquel) tablet 25 mg  25 mg Oral QHS    hydrOXYzine HCL (ATARAX) tablet 25 mg  25 mg Oral TID PRN    pantoprazole (PROTONIX) tablet 40 mg  40 mg Oral DAILY    albuterol (PROVENTIL VENTOLIN) nebulizer solution 2.5 mg  2.5 mg Nebulization Q6H PRN    atorvastatin (LIPITOR) tablet 20 mg  20 mg Oral DAILY    metoprolol succinate (TOPROL-XL) XL tablet 50 mg  50 mg Oral DAILY    morphine injection 2 mg  2 mg IntraVENous Q4H PRN    amitriptyline (ELAVIL) tablet 10 mg  10 mg Oral QHS    buPROPion XL (WELLBUTRIN XL) tablet 150 mg  150 mg Oral DAILY    escitalopram oxalate (LEXAPRO) tablet 20 mg  20 mg Oral DAILY    levothyroxine (SYNTHROID) tablet 50 mcg  50 mcg Oral ACB    sodium chloride (NS) flush 5-40 mL  5-40 mL IntraVENous Q8H    sodium chloride (NS) flush 5-40 mL  5-40 mL IntraVENous PRN    acetaminophen (TYLENOL) tablet 650 mg  650 mg Oral Q4H PRN    naloxone (NARCAN) injection 0.4 mg  0.4 mg IntraVENous PRN    aspirin chewable tablet 81 mg  81 mg Oral DAILY    benzonatate (TESSALON) capsule 100 mg  100 mg Oral TID PRN    ezetimibe (ZETIA) tablet 10 mg  10 mg Oral DAILY    traMADoL (ULTRAM) tablet 50 mg  50 mg Oral Q12H PRN         Sophia Mclaughlin MD

## 2022-06-14 ENCOUNTER — ANESTHESIA (OUTPATIENT)
Dept: CARDIAC CATH/INVASIVE PROCEDURES | Age: 82
DRG: 246 | End: 2022-06-14
Payer: MEDICARE

## 2022-06-14 ENCOUNTER — ANESTHESIA EVENT (OUTPATIENT)
Dept: CARDIAC CATH/INVASIVE PROCEDURES | Age: 82
DRG: 246 | End: 2022-06-14
Payer: MEDICARE

## 2022-06-14 LAB
ACT BLD: 341 SECS (ref 79–138)
ANION GAP SERPL CALC-SCNC: 6 MMOL/L (ref 5–15)
BNP SERPL-MCNC: 1003 PG/ML
BUN SERPL-MCNC: 24 MG/DL (ref 6–20)
BUN/CREAT SERPL: 36 (ref 12–20)
CALCIUM SERPL-MCNC: 9.1 MG/DL (ref 8.5–10.1)
CHLORIDE SERPL-SCNC: 92 MMOL/L (ref 97–108)
CO2 SERPL-SCNC: 34 MMOL/L (ref 21–32)
CREAT SERPL-MCNC: 0.67 MG/DL (ref 0.55–1.02)
ERYTHROCYTE [DISTWIDTH] IN BLOOD BY AUTOMATED COUNT: 21.3 % (ref 11.5–14.5)
GLUCOSE BLD STRIP.AUTO-MCNC: 166 MG/DL (ref 65–117)
GLUCOSE BLD STRIP.AUTO-MCNC: 176 MG/DL (ref 65–117)
GLUCOSE BLD STRIP.AUTO-MCNC: 184 MG/DL (ref 65–117)
GLUCOSE BLD STRIP.AUTO-MCNC: 220 MG/DL (ref 65–117)
GLUCOSE SERPL-MCNC: 137 MG/DL (ref 65–100)
HCT VFR BLD AUTO: 33.3 % (ref 35–47)
HGB BLD-MCNC: 8.9 G/DL (ref 11.5–16)
INR PPP: 1.2 (ref 0.9–1.1)
MCH RBC QN AUTO: 17.7 PG (ref 26–34)
MCHC RBC AUTO-ENTMCNC: 26.7 G/DL (ref 30–36.5)
MCV RBC AUTO: 66.1 FL (ref 80–99)
NRBC # BLD: 0 K/UL (ref 0–0.01)
NRBC BLD-RTO: 0 PER 100 WBC
PLATELET # BLD AUTO: 338 K/UL (ref 150–400)
PMV BLD AUTO: 10.3 FL (ref 8.9–12.9)
POTASSIUM SERPL-SCNC: 3.7 MMOL/L (ref 3.5–5.1)
PROTHROMBIN TIME: 12.2 SEC (ref 9–11.1)
RBC # BLD AUTO: 5.04 M/UL (ref 3.8–5.2)
SERVICE CMNT-IMP: ABNORMAL
SODIUM SERPL-SCNC: 132 MMOL/L (ref 136–145)
WBC # BLD AUTO: 12.8 K/UL (ref 3.6–11)

## 2022-06-14 PROCEDURE — 4A023N7 MEASUREMENT OF CARDIAC SAMPLING AND PRESSURE, LEFT HEART, PERCUTANEOUS APPROACH: ICD-10-PCS | Performed by: INTERNAL MEDICINE

## 2022-06-14 PROCEDURE — 93005 ELECTROCARDIOGRAM TRACING: CPT

## 2022-06-14 PROCEDURE — 77030019697 HC SYR ANGI INFL MRTM -B: Performed by: INTERNAL MEDICINE

## 2022-06-14 PROCEDURE — B2111ZZ FLUOROSCOPY OF MULTIPLE CORONARY ARTERIES USING LOW OSMOLAR CONTRAST: ICD-10-PCS | Performed by: INTERNAL MEDICINE

## 2022-06-14 PROCEDURE — 2709999900 HC NON-CHARGEABLE SUPPLY: Performed by: INTERNAL MEDICINE

## 2022-06-14 PROCEDURE — 99152 MOD SED SAME PHYS/QHP 5/>YRS: CPT | Performed by: INTERNAL MEDICINE

## 2022-06-14 PROCEDURE — 02C03ZZ EXTIRPATION OF MATTER FROM CORONARY ARTERY, ONE ARTERY, PERCUTANEOUS APPROACH: ICD-10-PCS | Performed by: INTERNAL MEDICINE

## 2022-06-14 PROCEDURE — 76060000034 HC ANESTHESIA 1.5 TO 2 HR: Performed by: INTERNAL MEDICINE

## 2022-06-14 PROCEDURE — 85027 COMPLETE CBC AUTOMATED: CPT

## 2022-06-14 PROCEDURE — 93454 CORONARY ARTERY ANGIO S&I: CPT | Performed by: INTERNAL MEDICINE

## 2022-06-14 PROCEDURE — 36415 COLL VENOUS BLD VENIPUNCTURE: CPT

## 2022-06-14 PROCEDURE — 77010033678 HC OXYGEN DAILY

## 2022-06-14 PROCEDURE — 74011250636 HC RX REV CODE- 250/636: Performed by: INTERNAL MEDICINE

## 2022-06-14 PROCEDURE — C1894 INTRO/SHEATH, NON-LASER: HCPCS | Performed by: INTERNAL MEDICINE

## 2022-06-14 PROCEDURE — 99153 MOD SED SAME PHYS/QHP EA: CPT | Performed by: INTERNAL MEDICINE

## 2022-06-14 PROCEDURE — 027034Z DILATION OF CORONARY ARTERY, ONE ARTERY WITH DRUG-ELUTING INTRALUMINAL DEVICE, PERCUTANEOUS APPROACH: ICD-10-PCS | Performed by: INTERNAL MEDICINE

## 2022-06-14 PROCEDURE — C1725 CATH, TRANSLUMIN NON-LASER: HCPCS | Performed by: INTERNAL MEDICINE

## 2022-06-14 PROCEDURE — 74011000250 HC RX REV CODE- 250: Performed by: INTERNAL MEDICINE

## 2022-06-14 PROCEDURE — C1769 GUIDE WIRE: HCPCS | Performed by: INTERNAL MEDICINE

## 2022-06-14 PROCEDURE — 74011250636 HC RX REV CODE- 250/636: Performed by: NURSE ANESTHETIST, CERTIFIED REGISTERED

## 2022-06-14 PROCEDURE — 97161 PT EVAL LOW COMPLEX 20 MIN: CPT

## 2022-06-14 PROCEDURE — 77030018729 HC ELECTRD DEFIB PAD CARD -B: Performed by: INTERNAL MEDICINE

## 2022-06-14 PROCEDURE — 74011000250 HC RX REV CODE- 250: Performed by: NURSE ANESTHETIST, CERTIFIED REGISTERED

## 2022-06-14 PROCEDURE — 74011636637 HC RX REV CODE- 636/637: Performed by: INTERNAL MEDICINE

## 2022-06-14 PROCEDURE — 74011000636 HC RX REV CODE- 636: Performed by: INTERNAL MEDICINE

## 2022-06-14 PROCEDURE — 76937 US GUIDE VASCULAR ACCESS: CPT | Performed by: INTERNAL MEDICINE

## 2022-06-14 PROCEDURE — C1760 CLOSURE DEV, VASC: HCPCS | Performed by: INTERNAL MEDICINE

## 2022-06-14 PROCEDURE — 65270000046 HC RM TELEMETRY

## 2022-06-14 PROCEDURE — C1724 CATH, TRANS ATHEREC,ROTATION: HCPCS | Performed by: INTERNAL MEDICINE

## 2022-06-14 PROCEDURE — 82962 GLUCOSE BLOOD TEST: CPT

## 2022-06-14 PROCEDURE — 80048 BASIC METABOLIC PNL TOTAL CA: CPT

## 2022-06-14 PROCEDURE — 85610 PROTHROMBIN TIME: CPT

## 2022-06-14 PROCEDURE — 74011250637 HC RX REV CODE- 250/637: Performed by: INTERNAL MEDICINE

## 2022-06-14 PROCEDURE — 74011000258 HC RX REV CODE- 258: Performed by: NURSE ANESTHETIST, CERTIFIED REGISTERED

## 2022-06-14 PROCEDURE — 83880 ASSAY OF NATRIURETIC PEPTIDE: CPT

## 2022-06-14 PROCEDURE — 77030022017 HC DRSG HEMO QCLOT ZMED -A: Performed by: INTERNAL MEDICINE

## 2022-06-14 PROCEDURE — C1874 STENT, COATED/COV W/DEL SYS: HCPCS | Performed by: INTERNAL MEDICINE

## 2022-06-14 PROCEDURE — C1887 CATHETER, GUIDING: HCPCS | Performed by: INTERNAL MEDICINE

## 2022-06-14 PROCEDURE — 97530 THERAPEUTIC ACTIVITIES: CPT

## 2022-06-14 PROCEDURE — 77030038269 HC DRN EXT URIN PURWCK BARD -A

## 2022-06-14 PROCEDURE — 85347 COAGULATION TIME ACTIVATED: CPT

## 2022-06-14 PROCEDURE — 92933 PRQ TRLML C ATHRC ST ANGIOP1: CPT | Performed by: INTERNAL MEDICINE

## 2022-06-14 DEVICE — STENT RONYX30008UX RESOLUTE ONYX 3.00X08
Type: IMPLANTABLE DEVICE | Status: FUNCTIONAL
Brand: RESOLUTE ONYX™

## 2022-06-14 RX ORDER — HEPARIN SODIUM 200 [USP'U]/100ML
INJECTION, SOLUTION INTRAVENOUS
Status: COMPLETED | OUTPATIENT
Start: 2022-06-14 | End: 2022-06-14

## 2022-06-14 RX ORDER — SODIUM CHLORIDE 9 MG/ML
100 INJECTION, SOLUTION INTRAVENOUS CONTINUOUS
Status: DISCONTINUED | OUTPATIENT
Start: 2022-06-14 | End: 2022-06-14

## 2022-06-14 RX ORDER — PROPOFOL 10 MG/ML
INJECTION, EMULSION INTRAVENOUS AS NEEDED
Status: DISCONTINUED | OUTPATIENT
Start: 2022-06-14 | End: 2022-06-14 | Stop reason: HOSPADM

## 2022-06-14 RX ORDER — PHENYLEPHRINE HCL IN 0.9% NACL 0.4MG/10ML
SYRINGE (ML) INTRAVENOUS AS NEEDED
Status: DISCONTINUED | OUTPATIENT
Start: 2022-06-14 | End: 2022-06-14 | Stop reason: HOSPADM

## 2022-06-14 RX ORDER — SODIUM CHLORIDE 0.9 % (FLUSH) 0.9 %
5-40 SYRINGE (ML) INJECTION EVERY 8 HOURS
Status: DISCONTINUED | OUTPATIENT
Start: 2022-06-14 | End: 2022-06-17 | Stop reason: HOSPADM

## 2022-06-14 RX ORDER — DEXMEDETOMIDINE HYDROCHLORIDE 100 UG/ML
INJECTION, SOLUTION INTRAVENOUS AS NEEDED
Status: DISCONTINUED | OUTPATIENT
Start: 2022-06-14 | End: 2022-06-14 | Stop reason: HOSPADM

## 2022-06-14 RX ORDER — ACETAMINOPHEN 325 MG/1
650 TABLET ORAL
Status: DISCONTINUED | OUTPATIENT
Start: 2022-06-14 | End: 2022-06-15

## 2022-06-14 RX ORDER — LORAZEPAM 0.5 MG/1
0.5 TABLET ORAL
Status: DISCONTINUED | OUTPATIENT
Start: 2022-06-14 | End: 2022-06-15

## 2022-06-14 RX ORDER — HEPARIN SODIUM 1000 [USP'U]/ML
INJECTION, SOLUTION INTRAVENOUS; SUBCUTANEOUS AS NEEDED
Status: DISCONTINUED | OUTPATIENT
Start: 2022-06-14 | End: 2022-06-14 | Stop reason: HOSPADM

## 2022-06-14 RX ORDER — TRAMADOL HYDROCHLORIDE 50 MG/1
25 TABLET ORAL
Status: DISCONTINUED | OUTPATIENT
Start: 2022-06-14 | End: 2022-06-17 | Stop reason: HOSPADM

## 2022-06-14 RX ORDER — NITROGLYCERIN 0.4 MG/1
0.4 TABLET SUBLINGUAL
Status: DISCONTINUED | OUTPATIENT
Start: 2022-06-14 | End: 2022-06-17 | Stop reason: HOSPADM

## 2022-06-14 RX ORDER — ONDANSETRON 2 MG/ML
4 INJECTION INTRAMUSCULAR; INTRAVENOUS
Status: DISCONTINUED | OUTPATIENT
Start: 2022-06-14 | End: 2022-06-15

## 2022-06-14 RX ORDER — NALOXONE HYDROCHLORIDE 0.4 MG/ML
0.4 INJECTION, SOLUTION INTRAMUSCULAR; INTRAVENOUS; SUBCUTANEOUS AS NEEDED
Status: DISCONTINUED | OUTPATIENT
Start: 2022-06-14 | End: 2022-06-15

## 2022-06-14 RX ORDER — WARFARIN 1 MG/1
2 TABLET ORAL ONCE
Status: COMPLETED | OUTPATIENT
Start: 2022-06-14 | End: 2022-06-14

## 2022-06-14 RX ORDER — SODIUM CHLORIDE 0.9 % (FLUSH) 0.9 %
5-40 SYRINGE (ML) INJECTION AS NEEDED
Status: DISCONTINUED | OUTPATIENT
Start: 2022-06-14 | End: 2022-06-17 | Stop reason: HOSPADM

## 2022-06-14 RX ORDER — PROPOFOL 10 MG/ML
INJECTION, EMULSION INTRAVENOUS
Status: DISCONTINUED | OUTPATIENT
Start: 2022-06-14 | End: 2022-06-14 | Stop reason: HOSPADM

## 2022-06-14 RX ORDER — FENTANYL CITRATE 50 UG/ML
INJECTION, SOLUTION INTRAMUSCULAR; INTRAVENOUS AS NEEDED
Status: DISCONTINUED | OUTPATIENT
Start: 2022-06-14 | End: 2022-06-14 | Stop reason: HOSPADM

## 2022-06-14 RX ORDER — CLOPIDOGREL BISULFATE 75 MG/1
TABLET ORAL AS NEEDED
Status: DISCONTINUED | OUTPATIENT
Start: 2022-06-14 | End: 2022-06-14 | Stop reason: HOSPADM

## 2022-06-14 RX ORDER — SODIUM CHLORIDE 9 MG/ML
INJECTION, SOLUTION INTRAVENOUS
Status: DISCONTINUED | OUTPATIENT
Start: 2022-06-14 | End: 2022-06-14 | Stop reason: HOSPADM

## 2022-06-14 RX ORDER — LIDOCAINE HYDROCHLORIDE 10 MG/ML
INJECTION, SOLUTION EPIDURAL; INFILTRATION; INTRACAUDAL; PERINEURAL AS NEEDED
Status: DISCONTINUED | OUTPATIENT
Start: 2022-06-14 | End: 2022-06-14 | Stop reason: HOSPADM

## 2022-06-14 RX ADMIN — Medication 2 UNITS: at 17:28

## 2022-06-14 RX ADMIN — ACETAMINOPHEN 650 MG: 325 TABLET ORAL at 09:10

## 2022-06-14 RX ADMIN — PROPOFOL 50 MCG/KG/MIN: 10 INJECTION, EMULSION INTRAVENOUS at 14:46

## 2022-06-14 RX ADMIN — Medication 3 UNITS: at 21:36

## 2022-06-14 RX ADMIN — ESCITALOPRAM OXALATE 20 MG: 10 TABLET ORAL at 09:04

## 2022-06-14 RX ADMIN — PROPOFOL 20 MG: 10 INJECTION, EMULSION INTRAVENOUS at 14:58

## 2022-06-14 RX ADMIN — SODIUM CHLORIDE, PRESERVATIVE FREE 10 ML: 5 INJECTION INTRAVENOUS at 21:37

## 2022-06-14 RX ADMIN — PROPOFOL 30 MG: 10 INJECTION, EMULSION INTRAVENOUS at 14:46

## 2022-06-14 RX ADMIN — Medication 80 MCG: at 15:55

## 2022-06-14 RX ADMIN — HEPARIN SODIUM 9000 UNITS: 1000 INJECTION, SOLUTION INTRAVENOUS; SUBCUTANEOUS at 15:05

## 2022-06-14 RX ADMIN — ATORVASTATIN CALCIUM 20 MG: 40 TABLET, FILM COATED ORAL at 09:04

## 2022-06-14 RX ADMIN — EZETIMIBE 10 MG: 10 TABLET ORAL at 09:04

## 2022-06-14 RX ADMIN — Medication 40 MCG: at 15:16

## 2022-06-14 RX ADMIN — TRAMADOL HYDROCHLORIDE 25 MG: 50 TABLET ORAL at 11:55

## 2022-06-14 RX ADMIN — Medication 80 MCG: at 15:28

## 2022-06-14 RX ADMIN — CLOPIDOGREL BISULFATE 75 MG: 75 TABLET ORAL at 09:04

## 2022-06-14 RX ADMIN — PANTOPRAZOLE SODIUM 40 MG: 40 TABLET, DELAYED RELEASE ORAL at 09:05

## 2022-06-14 RX ADMIN — METOPROLOL SUCCINATE 50 MG: 50 TABLET, EXTENDED RELEASE ORAL at 09:04

## 2022-06-14 RX ADMIN — AMITRIPTYLINE HYDROCHLORIDE 10 MG: 10 TABLET, FILM COATED ORAL at 21:36

## 2022-06-14 RX ADMIN — DEXMEDETOMIDINE HYDROCHLORIDE 6 MCG: 100 INJECTION, SOLUTION, CONCENTRATE INTRAVENOUS at 14:51

## 2022-06-14 RX ADMIN — SODIUM CHLORIDE: 9 INJECTION, SOLUTION INTRAVENOUS at 14:36

## 2022-06-14 RX ADMIN — PROPOFOL 20 MG: 10 INJECTION, EMULSION INTRAVENOUS at 14:49

## 2022-06-14 RX ADMIN — FENTANYL CITRATE 25 MCG: 50 INJECTION, SOLUTION INTRAMUSCULAR; INTRAVENOUS at 14:58

## 2022-06-14 RX ADMIN — QUETIAPINE FUMARATE 25 MG: 25 TABLET ORAL at 21:36

## 2022-06-14 RX ADMIN — FENTANYL CITRATE 12.5 MCG: 50 INJECTION, SOLUTION INTRAMUSCULAR; INTRAVENOUS at 14:50

## 2022-06-14 RX ADMIN — WARFARIN SODIUM 2 MG: 1 TABLET ORAL at 21:36

## 2022-06-14 RX ADMIN — PROPOFOL 30 MG: 10 INJECTION, EMULSION INTRAVENOUS at 15:01

## 2022-06-14 RX ADMIN — PREDNISONE 40 MG: 20 TABLET ORAL at 09:10

## 2022-06-14 RX ADMIN — SODIUM CHLORIDE 100 ML/HR: 9 INJECTION, SOLUTION INTRAVENOUS at 17:29

## 2022-06-14 RX ADMIN — FUROSEMIDE 20 MG: 20 TABLET ORAL at 09:05

## 2022-06-14 RX ADMIN — Medication 80 MCG: at 15:58

## 2022-06-14 RX ADMIN — PROPOFOL 30 MG: 10 INJECTION, EMULSION INTRAVENOUS at 15:07

## 2022-06-14 RX ADMIN — PROPOFOL 20 MG: 10 INJECTION, EMULSION INTRAVENOUS at 15:40

## 2022-06-14 RX ADMIN — AMINOPHYLLINE 75 MG: 25 INJECTION, SOLUTION INTRAVENOUS at 15:24

## 2022-06-14 RX ADMIN — FENTANYL CITRATE 12.5 MCG: 50 INJECTION, SOLUTION INTRAMUSCULAR; INTRAVENOUS at 14:46

## 2022-06-14 RX ADMIN — SODIUM CHLORIDE, PRESERVATIVE FREE 10 ML: 5 INJECTION INTRAVENOUS at 06:14

## 2022-06-14 RX ADMIN — PHENYLEPHRINE HYDROCHLORIDE 50 MCG/MIN: 10 INJECTION INTRAVENOUS at 14:55

## 2022-06-14 RX ADMIN — BUPROPION HYDROCHLORIDE 150 MG: 150 TABLET, EXTENDED RELEASE ORAL at 09:05

## 2022-06-14 RX ADMIN — POTASSIUM CHLORIDE 10 MEQ: 750 TABLET, FILM COATED, EXTENDED RELEASE ORAL at 09:05

## 2022-06-14 RX ADMIN — Medication 2 UNITS: at 11:56

## 2022-06-14 RX ADMIN — TRAMADOL HYDROCHLORIDE 25 MG: 50 TABLET ORAL at 21:36

## 2022-06-14 RX ADMIN — LEVOTHYROXINE SODIUM 50 MCG: 0.05 TABLET ORAL at 09:05

## 2022-06-14 RX ADMIN — ASPIRIN 81 MG: 81 TABLET, CHEWABLE ORAL at 09:05

## 2022-06-14 RX ADMIN — MELATONIN 3 MG: at 21:36

## 2022-06-14 RX ADMIN — PROPOFOL 30 MG: 10 INJECTION, EMULSION INTRAVENOUS at 15:13

## 2022-06-14 RX ADMIN — DEXMEDETOMIDINE HYDROCHLORIDE 4 MCG: 100 INJECTION, SOLUTION, CONCENTRATE INTRAVENOUS at 14:57

## 2022-06-14 NOTE — PROGRESS NOTES
Hospitalist Progress Note    NAME: Dina Travis   :  1940   MRN:  [id number]       Assessment / Plan:  Confusional state with episodes of agitation, concerning for delirium  Chart reviewed, per chart patient has been confused and aggressive towards the staff and refusing care. Discussed with patient some reported that patient at baseline is alert oriented x3 and very much independent and has never had any memory issues. He reported that patient can get confused when she does not have much sleep. Patient seems to be anxious since the loss of her  2 years ago. When seen patient was alert oriented x3 . Able to  maintain normal conversation. Will t B12, folate, TSH, ABG and ammonia WNL   CT of the brain  Show no acute changes   Noted psych consult, Seroquel at bedtime, and frequent reorientation  Continue with home dose Lexapro  Mental status back to [facility]   Plan for cath       Acute respiratory failure with hypoxia  In Creased oxygen requirement this morning, had to be placed on high flow 8 to 10 L due to sudden hypoxia. Patient was on 3 L yesterday  Chest x-ray did not show any acute pathology, showed diffuse interstitial marking  Continue with nebs  Get D-dimer, if positive most likely will need CTA of the chest.  Patient has severe allergy to iodine, most likely will need VQ scan    CAD, POA (s/p cath in , now with occluded RCA)   - management per primary cardiology team  palliative care consult      Anxiety, POA  Agitation, POA  Depression, POA  - recently , was overmedicating with anxiety meds  - resume home meds  - symptom management     Asthma, POA  - resume PTA inhaler     DM, POA  - BG checks with SSI coverage     Hypertension, POA  - resume home meds     Hyperlipidemia, POA  - resume Lipitor, Zetia     History of  left DVT  - resume Plavix  Off coumadin      IBS, POA  - resume home meds  25.0 - 29.9 Overweight / Body mass index is 28.15 kg/m².     Estimated discharge date: Per primary attending  Barriers:    Code status: DNR  Prophylaxis: Coumadin  Recommended Disposition: Home w/Family     Subjective:     Chief Complaint / Reason for Physician Visit  Follow-up delirium discussed with RN events overnight. Patient has no acute complaints, is alert oriented x3,plan for cardiac cath next week   Review of Systems:  Symptom Y/N Comments  Symptom Y/N Comments   Fever/Chills n   Chest Pain n    Poor Appetite    Edema     Cough    Abdominal Pain     Sputum    Joint Pain     SOB/VILA n   Pruritis/Rash     Nausea/vomit    Tolerating PT/OT     Diarrhea    Tolerating Diet y    Constipation    Other       Could NOT obtain due to:      Objective:     VITALS:   Last 24hrs VS reviewed since prior progress note. Most recent are:  Patient Vitals for the past 24 hrs:   Temp Pulse Resp BP SpO2   06/14/22 1131 98 °F (36.7 °C) 86 18 (!) 130/42 98 %   06/14/22 1036 97.8 °F (36.6 °C) 81 21 (!) 132/52 96 %   06/14/22 0724 98 °F (36.7 °C) 78 20 (!) 128/43 94 %   06/14/22 0423 98 °F (36.7 °C) 73 23 (!) 134/53 93 %   06/13/22 2304 98.2 °F (36.8 °C) 78 27 (!) 96/57 90 %   06/13/22 1934 97.8 °F (36.6 °C) 82 20 107/63 96 %   06/13/22 1759 -- -- -- -- 93 %   06/13/22 1509 97.4 °F (36.3 °C) 76 17 107/64 96 %       Intake/Output Summary (Last 24 hours) at 6/14/2022 1322  Last data filed at 6/14/2022 1131  Gross per 24 hour   Intake --   Output 601 ml   Net -601 ml        I had a face to face encounter and independently examined this patient on 6/14/2022, as outlined below:  PHYSICAL EXAM:  General: WD, WN. Alert, cooperative, no acute distress    EENT:  EOMI. Anicteric sclerae. MMM  Resp:  CTA bilaterally, no wheezing or rales. No accessory muscle use  CV:  Regular  rhythm,  No edema  GI:  Soft, Non distended, Non tender. +Bowel sounds  Neurologic:  Alert and oriented X 3, normal speech,   Psych:   Good insight. Not anxious nor agitated  Skin:  No rashes.   No jaundice    Reviewed most current lab test results and cultures  YES  Reviewed most current radiology test results   YES  Review and summation of old records today    NO  Reviewed patient's current orders and MAR    YES  PMH/SH reviewed - no change compared to H&P  ________________________________________________________________________  Care Plan discussed with:    Comments   Patient x    Family  x    RN x    Care Manager     Consultant                        Multidiciplinary team rounds were held today with , nursing, pharmacist and clinical coordinator. Patient's plan of care was discussed; medications were reviewed and discharge planning was addressed. ________________________________________________________________________  Total NON critical care TIME:  45   Minutes    Total CRITICAL CARE TIME Spent:   Minutes non procedure based      Comments   >50% of visit spent in counseling and coordination of care     ________________________________________________________________________  Torin Gonzáles MD     Procedures: see electronic medical records for all procedures/Xrays and details which were not copied into this note but were reviewed prior to creation of Plan. LABS:  I reviewed today's most current labs and imaging studies. Pertinent labs include:  Recent Labs     06/14/22  0414 06/12/22  0600   WBC 12.8* 12.6*   HGB 8.9* 8.3*   HCT 33.3* 31.2*    265     Recent Labs     06/14/22  0414 06/13/22  0510 06/12/22  0600   * 131* 132*   K 3.7 3.4* 4.0   CL 92* 92* 96*   CO2 34* 34* 31   * 156* 165*   BUN 24* 25* 20   CREA 0.67 0.63 0.52*   CA 9.1 8.7 8.4*       Signed:  Torin Gonzáles MD

## 2022-06-14 NOTE — PROGRESS NOTES
Progress Note      6/14/2022 8:04 AM  NAME: Pasha Platt   MRN:  [id number]   Admit Diagnosis: Chest pain, unspecified type [R07.9]  Coronary artery disease [I25.10]                Assessment:       - Cath in [facility] 2001 unclear details, PCI, left with  of RCA. Cath 6/2022 with 0ccluded RCA. 99% proximal LCx. 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 Radha Gillespie  - IBS follows with GI  - Anxiety  - Anemia  - Mild dementia  - CONTRAST ALLERGY  -  7/2020, lives alone in an apartment, 3 kids, one son lives in [place], retired , limited functional capacity uses a cane limited by back pain  DNR                     Plan:        Patient with anxiety  , and has a hard time with this. Son helps her, has grandkids. Main complaint is back pain, uses a cane.     No chest pain  +dyspnea  No syncope.     Cath with high grade LCx. Patient with baseline anxiety, exacerbated by prednisone for dye allergy. Acute delerium 6/8 night. Improved with adjustment in psych meds and treatment. 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 CXR interstitial prominence, taper O2 as able. Cath with PCI of LCx    - Resume warfarin post cath  - Cont added aspirin for one week  - Cont added clopidogrel  - Cont metoprolol  - Cont added  lasix, replete K, taper off O2, follow bmp  - Cont atorvastatin     - Appreciate psychiatry evaluation, better on serquel  - Appreciate hospitalist help, head ct ok    Discussed with patient and son. All agree DNR. Appreciate palliative care helping family with long term care plans.      to see patient to see what options available for patient once leaving hospital.  Will ask PT/OT to see her post procedure with plan for SNF for rehab. Discussed with primary Dr. Jose Schneider.                  [x]? High complexity decision making was performed         We discussed the expected course, resolution and complications of the diagnoses in detail. Medication risk, benefits, costs, interactions, and alternatives were discussed as indicated. I advised him to contact the office if his condition worsens, changes or fails to improve as anticipated. Patient expressed understanding with the diagnoses  and plan. Subjective:     Janette Giles denies chest pain, + dyspnea. Discussed with RN events overnight. Review of Systems:    Symptom Y/N Comments  Symptom Y/N Comments   Fever/Chills N   Chest Pain N    Poor Appetite N   Edema N    Cough N   Abdominal Pain N    Sputum N   Joint Pain N    SOB/VILA N   Pruritis/Rash N    Nausea/vomit N   Tolerating PT/OT     Diarrhea N   Tolerating Diet Y    Constipation N   Other       Could NOT obtain due to:      Objective:      Physical Exam:    Last 24hrs VS reviewed since prior progress note. Most recent are:    Visit Vitals  BP (!) 130/42 (BP 1 Location: Left upper arm, BP Patient Position: At rest)   Pulse 86   Temp 98 °F (36.7 °C)   Resp 18   Ht 5' 1\" (1.549 m)   Wt 67.6 kg (149 lb)   SpO2 98%   Breastfeeding No   BMI 28.15 kg/m²       Intake/Output Summary (Last 24 hours) at 6/14/2022 1556  Last data filed at 6/14/2022 1553  Gross per 24 hour   Intake 400 ml   Output 601 ml   Net -201 ml        General Appearance: Well developed, well nourished, alert & oriented x 3,    no acute distress. Ears/Nose/Mouth/Throat: Hearing grossly normal.  Neck: Supple. Chest: Lungs clear to auscultation bilaterally. Cardiovascular: Regular rate and rhythm, S1S2 normal, III/VI systolic murmur. Abdomen: Soft, non-tender, bowel sounds are active. Extremities: No edema bilaterally.   Skin: Warm and dry.    [facility]/ reviewed - no change compared to H&P    Data Review    Telemetry: normal sinus rhythm     Lab Data Personally Reviewed:    Recent Labs     06/14/22  0414 06/12/22  0600   WBC 12.8* 12.6*   HGB 8.9* 8.3*   HCT 33.3* 31.2*    265     No results for input(s): INR, PTP, APTT, INREXT, INREXT in the last 72 hours. Recent Labs     06/14/22  0414 06/13/22  0510 06/12/22  0600   * 131* 132*   K 3.7 3.4* 4.0   CL 92* 92* 96*   CO2 34* 34* 31   BUN 24* 25* 20   CREA 0.67 0.63 0.52*   * 156* 165*   CA 9.1 8.7 8.4*     No results for input(s): CPK, CKNDX, TROIQ in the last 72 hours. No lab exists for component: CPKMB  Lab Results   Component Value Date/Time    Cholesterol, total 107 06/25/2021 11:52 AM    HDL Cholesterol 52 06/25/2021 11:52 AM    LDL, calculated 39 06/25/2021 11:52 AM    Triglyceride 80 06/25/2021 11:52 AM    CHOL/HDL Ratio 2.1 06/25/2021 11:52 AM       No results for input(s): AP, TBIL, TP, ALB, GLOB, GGT, AML, LPSE in the last 72 hours. No lab exists for component: SGOT, GPT, AMYP, HLPSE  No results for input(s): PH, PCO2, PO2 in the last 72 hours.     Medications Personally Reviewed:    Current Facility-Administered Medications   Medication Dose Route Frequency    traMADoL (ULTRAM) tablet 25 mg  25 mg Oral Q6H PRN    heparinized saline 2 units/mL infusion    CONTINUOUS    heparinized saline 2 units/mL infusion    CONTINUOUS    heparinized saline 2 units/mL infusion    CONTINUOUS    lidocaine (PF) (XYLOCAINE) 10 mg/mL (1 %) injection    PRN    0.9% sodium chloride 1,000 mL with olive oil lubricant (ROTAGLIDE) 20 mL, heparin (porcine) 20,000 Units, verapamiL (ISOPTIN) 10 mg, nitroglycerin (TRIDIL) 4 mg    PRN    iopamidoL (ISOVUE-370) 76 % injection    PRN    furosemide (LASIX) tablet 20 mg  20 mg Oral DAILY    sodium chloride (OCEAN) 0.65 % nasal squeeze bottle 2 Spray  2 Spray Both Nostrils Q2H PRN    polyethylene glycol (MIRALAX) packet 17 g  17 g Oral DAILY    potassium chloride SR (KLOR-CON 10) tablet 10 mEq  10 mEq Oral DAILY    clopidogreL (PLAVIX) tablet 75 mg  75 mg Oral DAILY    insulin lispro (HUMALOG) injection   SubCUTAneous AC&HS    glucose chewable tablet 16 g  4 Tablet Oral PRN    glucagon (GLUCAGEN) injection 1 mg  1 mg IntraMUSCular PRN    dextrose 10% infusion 0-250 mL  0-250 mL IntraVENous PRN    ondansetron (ZOFRAN) injection 4 mg  4 mg IntraVENous Q6H PRN    melatonin tablet 3 mg  3 mg Oral QHS    QUEtiapine (SEROquel) tablet 25 mg  25 mg Oral QHS    hydrOXYzine HCL (ATARAX) tablet 25 mg  25 mg Oral TID PRN    pantoprazole (PROTONIX) tablet 40 mg  40 mg Oral DAILY    albuterol (PROVENTIL VENTOLIN) nebulizer solution 2.5 mg  2.5 mg Nebulization Q6H PRN    atorvastatin (LIPITOR) tablet 20 mg  20 mg Oral DAILY    metoprolol succinate (TOPROL-XL) XL tablet 50 mg  50 mg Oral DAILY    amitriptyline (ELAVIL) tablet 10 mg  10 mg Oral QHS    buPROPion XL (WELLBUTRIN XL) tablet 150 mg  150 mg Oral DAILY    escitalopram oxalate (LEXAPRO) tablet 20 mg  20 mg Oral DAILY    levothyroxine (SYNTHROID) tablet 50 mcg  50 mcg Oral ACB    sodium chloride (NS) flush 5-40 mL  5-40 mL IntraVENous Q8H    sodium chloride (NS) flush 5-40 mL  5-40 mL IntraVENous PRN    acetaminophen (TYLENOL) tablet 650 mg  650 mg Oral Q4H PRN    naloxone (NARCAN) injection 0.4 mg  0.4 mg IntraVENous PRN    aspirin chewable tablet 81 mg  81 mg Oral DAILY    benzonatate (TESSALON) capsule 100 mg  100 mg Oral TID PRN    ezetimibe (ZETIA) tablet 10 mg  10 mg Oral DAILY     Facility-Administered Medications Ordered in Other Encounters   Medication Dose Route Frequency    0.9% sodium chloride infusion   IntraVENous CONTINUOUS    fentaNYL citrate (PF) injection   IntraVENous PRN    propofoL (DIPRIVAN) 10 mg/mL injection   IntraVENous PRN    propofoL (DIPRIVAN) 10 mg/mL injection   IntraVENous CONTINUOUS    dexmedeTOMidine (PRECEDEX) 100 mcg/mL iv solution IntraVENous PRN    PHENYLephrine (SILVESTRE-SYNEPHRINE) 10 mg in 0.9% sodium chloride 250 mL infusion   IntraVENous CONTINUOUS    heparin (porcine) 1,000 unit/mL injection   Other PRN    PHENYLephrine (NEOSYNEPHRINE) in NS syringe   IntraVENous PRN    aminophylline 385.32 mg in dextrose 5% 100 mL infusion   IntraVENous Adolph Ferreira MD

## 2022-06-14 NOTE — PROGRESS NOTES
Problem: Mobility Impaired (Adult and Pediatric)  Goal: *Acute Goals and Plan of Care (Insert Text)  Description: FUNCTIONAL STATUS PRIOR TO ADMISSION: Patient was modified independent using a single point cane for functional mobility. HOME SUPPORT PRIOR TO ADMISSION: The patient lived alone with her son 6 min away. Physical Therapy Goals  Initiated 6/14/2022  1. Patient will move from supine to sit and sit to supine  in bed with minimal assistance/contact guard assist within 7 day(s). 2.  Patient will transfer from bed to chair and chair to bed with minimal assistance/contact guard assist using the least restrictive device within 7 day(s). 3.  Patient will perform sit to stand with minimal assistance/contact guard assist within 7 day(s). 4.  Patient will ambulate with minimal assistance/contact guard assist for 75 feet with the least restrictive device within 7 day(s). Outcome: Progressing Towards Goal   PHYSICAL THERAPY EVALUATION  Patient: Anya Jennings (50 y.o. female)  Date: 6/14/2022  Primary Diagnosis: Chest pain, unspecified type [R07.9]  Coronary artery disease [I25.10]  Procedure(s) (LRB):  CORONARY ANGIOGRAPHY (N/A)  INSERT STENT DEMI CORONARY (N/A)  ULTRASOUND GUIDED VASCULAR ACCESS (N/A)  ANGIOPLASTY CORONARY (N/A)  ATHERECTOMY CORONARY (N/A)  PERCUTANEOUS CORONARY INTERVENTION (N/A) Day of Surgery   Precautions: Fall       ASSESSMENT  Based on the objective data described below, the patient presents with generalized anxiety, impaired balance, and activity tolerance following admission for chest pain with a cardiac cath. Patient scheduled for a second cardiac cath this afternoon with palpable anxiety regarding the procedure. She typically lives alone and ambulates with a cane. During evaluation, she required moderate assist overall for bed mobility and transfers.  Standing balance impaired d/t a posteriorly shifted COG and total standing tolerance limited d/t an indicated O2 desaturation. Suspect SpO2 desaturation was partially related to a poor pleth from patient use of the hand. O2 quickly recovered to upper 90s When her hand was stabilized and she was cued for PLB. The patient has been largely immobile while hospitalized and is now below her functional baseline. She live alone and recommend discharge to SNF rehab when medically stable. Current Level of Function Impacting Discharge (mobility/balance): moderate assist sit to stand      Other factors to consider for discharge: additional cardiac PCI this afternoon     Patient will benefit from skilled therapy intervention to address the above noted impairments. PLAN :  Recommendations and Planned Interventions: bed mobility training, transfer training, gait training, therapeutic exercises, neuromuscular re-education and therapeutic activities      Frequency/Duration: Patient will be followed by physical therapy:  4 times a week to address goals. Recommendation for discharge: (in order for the patient to meet his/her long term goals)  Therapy up to 5 days/week in SNF setting      IF patient discharges home will need the following DME: patient owns DME required for discharge         SUBJECTIVE:   Patient stated I am not even sure that I can stand up.   \"I moved down here from [place] where I lived with my . I had to change the doctors that I have had for 40 years. \"    OBJECTIVE DATA SUMMARY:   HISTORY:    Past Medical History:   Diagnosis Date    Anxiety     Arthritis     Asthma     CAD (coronary artery disease)     stent    Diabetes ([facility] [address]     GERD (gastroesophageal reflux disease)     History of DVT (deep vein thrombosis)     History of recurrent UTIs     Hx of seasonal allergies     Hypercholesterolemia     Irritable bowel syndrome (IBS)     Migraine     Urinary urgency      Past Surgical History:   Procedure Laterality Date    HX CATARACT REMOVAL Bilateral     HX HEART CATHETERIZATION stent    HX OPEN REDUCTION INTERNAL FIXATION Left     humerus     HX OPEN REDUCTION INTERNAL FIXATION Right     hip       Personal factors and/or comorbidities impacting plan of care:     Home Situation  Home Environment: Apartment  # Steps to Enter: 0  One/Two Story Residence: One story  Living Alone: Yes  Support Systems: Child(vinicio)  Patient Expects to be Discharged to[de-identified] Skilled nursing facility  Current DME Used/Available at Home: Blood pressure cuff,Cane, straight,Walker, rolling,Wheelchair,Shower chair  Tub or Shower Type: Tub/Shower combination    EXAMINATION/PRESENTATION/DECISION MAKING:   Critical Behavior:  Neurologic State: Alert  Orientation Level: Oriented X4  Cognition: Follows commands     Hearing: Auditory  Auditory Impairment: None  Skin:    Edema:   Range Of Motion:  AROM: Generally decreased, functional           PROM: Generally decreased, functional           Strength:    Strength: Generally decreased, functional                    Tone & Sensation:   Tone: Normal              Sensation: Intact               Coordination:  Coordination: Within functional limits  Vision:      Functional Mobility:  Bed Mobility:  Rolling: Moderate assistance  Supine to Sit: Minimum assistance; Moderate assistance     Scooting: Minimum assistance  Transfers:  Sit to Stand: Moderate assistance  Stand to Sit: Minimum assistance                       Balance:   Sitting: Intact  Standing: Impaired  Standing - Static: Constant support; Fair  Standing - Dynamic : Constant support;Poor  Ambulation/Gait Training:  Distance (ft):  (side step to [facility])  Assistive Device: Gait belt;Walker, rolling  Ambulation - Level of Assistance: Minimal assistance     Gait Description (WDL): Exceptions to WDL  Gait Abnormalities: Decreased step clearance;Shuffling gait        Base of Support: Narrowed                Physical Therapy Evaluation Charge Determination   History Examination Presentation Decision-Making   MEDIUM  Complexity : 1-2 comorbidities / personal factors will impact the outcome/ POC  MEDIUM Complexity : 3 Standardized tests and measures addressing body structure, function, activity limitation and / or participation in recreation  LOW Complexity : Stable, uncomplicated  LOW Complexity : FOTO score of       Based on the above components, the patient evaluation is determined to be of the following complexity level: LOW     Pain Rating:      Activity Tolerance:   Fair    After treatment patient left in no apparent distress:   Supine in bed and Call bell within reach    COMMUNICATION/EDUCATION:   The patients plan of care was discussed with: Registered nurse. Fall prevention education was provided and the patient/caregiver indicated understanding., Patient/family have participated as able in goal setting and plan of care. and Patient/family agree to work toward stated goals and plan of care.     Thank you for this referral.  Axel Mcdonald, PT, DPT   Time Calculation: 27 mins

## 2022-06-14 NOTE — PROGRESS NOTES
1619: Pt arrived from [address] [place] [address]. L groin CDI, no hematoma. HOB flat. Pt repeatedly reminded to keep head flat to prevent from bleeding from procedure site. 1720: HOB elevated to 30 degrees. R groin CDI, no hematoma.

## 2022-06-14 NOTE — ANESTHESIA PREPROCEDURE EVALUATION
Relevant Problems   CARDIOVASCULAR   (+) Coronary artery disease   (+) Coronary artery disease involving native coronary artery of native heart without angina pectoris      ENDOCRINE   (+) Acquired hypothyroidism   (+) Type 2 diabetes mellitus without complication, without long-term current use of insulin (HCC)       Anesthetic History   No history of anesthetic complications            Review of Systems / Medical History  Patient summary reviewed, nursing notes reviewed and pertinent labs reviewed    Pulmonary          Smoker  Asthma     Comments: Former Smoker   Neuro/Psych         Headaches    Comments: Anxiety  Cardiovascular        Angina      CAD and hyperlipidemia    Exercise tolerance: <4 METS  Comments: Coronary stent     GI/Hepatic/Renal     GERD          Comments: IBS Endo/Other    Diabetes  Hypothyroidism  Arthritis and anemia     Other Findings   Comments: Hb 8.9  History of recurrent UTIs   History of DVT (deep vein thrombosis)           Physical Exam    Airway  Mallampati: II    Neck ROM: normal range of motion   Mouth opening: Normal     Cardiovascular  Regular rate and rhythm,  S1 and S2 normal,  no murmur, click, rub, or gallop             Dental    Dentition: Full lower dentures and Full upper dentures     Pulmonary  Breath sounds clear to auscultation               Abdominal  GI exam deferred       Other Findings            Anesthetic Plan    ASA: 3  Anesthesia type: MAC and general - backup          Induction: Intravenous  Anesthetic plan and risks discussed with: Patient

## 2022-06-14 NOTE — ANESTHESIA POSTPROCEDURE EVALUATION
Procedure(s):  CORONARY ANGIOGRAPHY  INSERT STENT DMEI CORONARY  ULTRASOUND GUIDED VASCULAR ACCESS  ANGIOPLASTY CORONARY  ATHERECTOMY CORONARY  PERCUTANEOUS CORONARY INTERVENTION. MAC, general - backup    Anesthesia Post Evaluation      Multimodal analgesia: multimodal analgesia used between 6 hours prior to anesthesia start to PACU discharge  Patient location during evaluation: bedside  Patient participation: complete - patient participated  Level of consciousness: awake  Pain management: adequate  Airway patency: patent  Anesthetic complications: no  Cardiovascular status: acceptable  Respiratory status: acceptable  Hydration status: acceptable  Post anesthesia nausea and vomiting:  controlled  Final Post Anesthesia Temperature Assessment:  Normothermia (36.0-37.5 degrees C)      INITIAL Post-op Vital signs:   Vitals Value Taken Time   /108 06/14/22 1812   Temp 36.6 °C (97.8 °F) 06/14/22 1619   Pulse 80 06/14/22 1822   Resp 16 06/14/22 1619   SpO2 93 % 06/14/22 1822   Vitals shown include unvalidated device data.

## 2022-06-14 NOTE — PROGRESS NOTES
Progress Note      6/14/2022 8:04 AM  NAME: Raven Gutierrez   MRN:  [id number]   Admit Diagnosis: Chest pain, unspecified type [R07.9]  Coronary artery disease [I25.10]                Assessment:       - Cath in [facility] 2001 unclear details, PCI, left with  of RCA. Cath 6/2022 with 0ccluded RCA. 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 Cathleen Reyes  - IBS follows with GI  - Anxiety  - Anemia  - Mild dementia  - CONTRAST ALLERGY  -  7/2020, lives alone in an apartment, 3 kids, one son lives in [facility], retired , limited functional capacity uses a cane limited by back pain  DNR                     Plan:        Patient with anxiety  , and has a hard time with this. Son helps her, has grandkids. Main complaint is back pain, uses a cane.     No chest pain  +dyspnea  No syncope.     Cath with high grade LCx. Patient with baseline anxiety, exacerbated by prednisone for dye allergy. Acute delerium 6/8 night. Improved with adjustment in psych meds and treatment. 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 CXR interstitial prominence, taper O2 as able. Cath this afternoon    - Resume warfarin post cath  - Cont added aspirin  - Cont added clopidogrel  - Cont metoprolol  - Cont added  lasix, replete K, taper off O2, follow bmp  - Cont atorvastatin     - Appreciate psychiatry evaluation, better on serquel  - Appreciate hospitalist help, head ct ok    Discussed with patient and son. All agree DNR. Appreciate palliative care helping family with long term care plans.      to see patient to see what options available for patient once leaving hospital.  Will ask PT/OT to see her post procedure with plan for SNF for rehab. Discussed with primary Dr. Erik Saini.                  [x]? High complexity decision making was performed         We discussed the expected course, resolution and complications of the diagnoses in detail. Medication risk, benefits, costs, interactions, and alternatives were discussed as indicated. I advised him to contact the office if his condition worsens, changes or fails to improve as anticipated. Patient expressed understanding with the diagnoses  and plan. Subjective:     Bryan Reich denies chest pain, + dyspnea. Discussed with RN events overnight. Review of Systems:    Symptom Y/N Comments  Symptom Y/N Comments   Fever/Chills N   Chest Pain N    Poor Appetite N   Edema N    Cough N   Abdominal Pain N    Sputum N   Joint Pain N    SOB/VILA N   Pruritis/Rash N    Nausea/vomit N   Tolerating PT/OT     Diarrhea N   Tolerating Diet Y    Constipation N   Other       Could NOT obtain due to:      Objective:      Physical Exam:    Last 24hrs VS reviewed since prior progress note. Most recent are:    Visit Vitals  BP (!) 134/53 (BP 1 Location: Right upper arm, BP Patient Position: At rest)   Pulse 73   Temp 98 °F (36.7 °C)   Resp 23   Ht 5' 1\" (1.549 m)   Wt 67.6 kg (149 lb)   SpO2 93%   Breastfeeding No   BMI 28.15 kg/m²       Intake/Output Summary (Last 24 hours) at 6/14/2022 4548  Last data filed at 6/14/2022 0423  Gross per 24 hour   Intake --   Output 401 ml   Net -401 ml        General Appearance: Well developed, well nourished, alert & oriented x 3,    no acute distress. Ears/Nose/Mouth/Throat: Hearing grossly normal.  Neck: Supple. Chest: Lungs clear to auscultation bilaterally. Cardiovascular: Regular rate and rhythm, S1S2 normal, III/VI systolic murmur. Abdomen: Soft, non-tender, bowel sounds are active. Extremities: No edema bilaterally. Skin: Warm and dry.     PMH/SH reviewed - no change compared to H&P    Data Review    Telemetry: normal sinus rhythm     Lab Data Personally Reviewed:    Recent Labs     06/14/22  0414 06/12/22  0600   WBC 12.8* 12.6*   HGB 8.9* 8.3*   HCT 33.3* 31.2*    265     No results for input(s): INR, PTP, APTT, INREXT, INREXT in the last 72 hours. Recent Labs     06/14/22  0414 06/13/22  0510 06/12/22  0600   * 131* 132*   K 3.7 3.4* 4.0   CL 92* 92* 96*   CO2 34* 34* 31   BUN 24* 25* 20   CREA 0.67 0.63 0.52*   * 156* 165*   CA 9.1 8.7 8.4*     No results for input(s): CPK, CKNDX, TROIQ in the last 72 hours. No lab exists for component: CPKMB  Lab Results   Component Value Date/Time    Cholesterol, total 107 06/25/2021 11:52 AM    HDL Cholesterol 52 06/25/2021 11:52 AM    LDL, calculated 39 06/25/2021 11:52 AM    Triglyceride 80 06/25/2021 11:52 AM    CHOL/HDL Ratio 2.1 06/25/2021 11:52 AM       No results for input(s): AP, TBIL, TP, ALB, GLOB, GGT, AML, LPSE in the last 72 hours. No lab exists for component: SGOT, GPT, AMYP, HLPSE  No results for input(s): PH, PCO2, PO2 in the last 72 hours.     Medications Personally Reviewed:    Current Facility-Administered Medications   Medication Dose Route Frequency    predniSONE (DELTASONE) tablet 40 mg  40 mg Oral ONCE    furosemide (LASIX) tablet 20 mg  20 mg Oral DAILY    sodium chloride (OCEAN) 0.65 % nasal squeeze bottle 2 Spray  2 Spray Both Nostrils Q2H PRN    polyethylene glycol (MIRALAX) packet 17 g  17 g Oral DAILY    potassium chloride SR (KLOR-CON 10) tablet 10 mEq  10 mEq Oral DAILY    clopidogreL (PLAVIX) tablet 75 mg  75 mg Oral DAILY    insulin lispro (HUMALOG) injection   SubCUTAneous AC&HS    glucose chewable tablet 16 g  4 Tablet Oral PRN    glucagon (GLUCAGEN) injection 1 mg  1 mg IntraMUSCular PRN    dextrose 10% infusion 0-250 mL  0-250 mL IntraVENous PRN    ondansetron (ZOFRAN) injection 4 mg  4 mg IntraVENous Q6H PRN    melatonin tablet 3 mg  3 mg Oral QHS    QUEtiapine (SEROquel) tablet 25 mg  25 mg Oral QHS    hydrOXYzine HCL (ATARAX) tablet 25 mg  25 mg Oral TID PRN    pantoprazole (PROTONIX) tablet 40 mg  40 mg Oral DAILY    albuterol (PROVENTIL VENTOLIN) nebulizer solution 2.5 mg  2.5 mg Nebulization Q6H PRN    atorvastatin (LIPITOR) tablet 20 mg  20 mg Oral DAILY    metoprolol succinate (TOPROL-XL) XL tablet 50 mg  50 mg Oral DAILY    morphine injection 2 mg  2 mg IntraVENous Q4H PRN    amitriptyline (ELAVIL) tablet 10 mg  10 mg Oral QHS    buPROPion XL (WELLBUTRIN XL) tablet 150 mg  150 mg Oral DAILY    escitalopram oxalate (LEXAPRO) tablet 20 mg  20 mg Oral DAILY    levothyroxine (SYNTHROID) tablet 50 mcg  50 mcg Oral ACB    sodium chloride (NS) flush 5-40 mL  5-40 mL IntraVENous Q8H    sodium chloride (NS) flush 5-40 mL  5-40 mL IntraVENous PRN    acetaminophen (TYLENOL) tablet 650 mg  650 mg Oral Q4H PRN    naloxone (NARCAN) injection 0.4 mg  0.4 mg IntraVENous PRN    aspirin chewable tablet 81 mg  81 mg Oral DAILY    benzonatate (TESSALON) capsule 100 mg  100 mg Oral TID PRN    ezetimibe (ZETIA) tablet 10 mg  10 mg Oral DAILY    traMADoL (ULTRAM) tablet 50 mg  50 mg Oral Q12H PRN         Sophia Mclaughlin MD

## 2022-06-15 ENCOUNTER — TRANSCRIBE ORDER (OUTPATIENT)
Dept: CARDIAC REHAB | Age: 82
End: 2022-06-15

## 2022-06-15 DIAGNOSIS — Z95.5 STENTED CORONARY ARTERY: Primary | ICD-10-CM

## 2022-06-15 LAB
ANION GAP SERPL CALC-SCNC: 8 MMOL/L (ref 5–15)
B PERT DNA SPEC QL NAA+PROBE: NOT DETECTED
BORDETELLA PARAPERTUSSIS PCR, BORPAR: NOT DETECTED
BUN SERPL-MCNC: 24 MG/DL (ref 6–20)
BUN/CREAT SERPL: 38 (ref 12–20)
C PNEUM DNA SPEC QL NAA+PROBE: NOT DETECTED
CALCIUM SERPL-MCNC: 8.5 MG/DL (ref 8.5–10.1)
CHLORIDE SERPL-SCNC: 98 MMOL/L (ref 97–108)
CO2 SERPL-SCNC: 30 MMOL/L (ref 21–32)
CREAT SERPL-MCNC: 0.63 MG/DL (ref 0.55–1.02)
FLUAV H1 2009 PAND RNA SPEC QL NAA+PROBE: NOT DETECTED
FLUAV H1 RNA SPEC QL NAA+PROBE: NOT DETECTED
FLUAV H3 RNA SPEC QL NAA+PROBE: NOT DETECTED
FLUAV SUBTYP SPEC NAA+PROBE: NOT DETECTED
FLUBV RNA SPEC QL NAA+PROBE: NOT DETECTED
GLUCOSE BLD STRIP.AUTO-MCNC: 147 MG/DL (ref 65–117)
GLUCOSE BLD STRIP.AUTO-MCNC: 200 MG/DL (ref 65–117)
GLUCOSE BLD STRIP.AUTO-MCNC: 221 MG/DL (ref 65–117)
GLUCOSE BLD STRIP.AUTO-MCNC: 264 MG/DL (ref 65–117)
GLUCOSE SERPL-MCNC: 114 MG/DL (ref 65–100)
HADV DNA SPEC QL NAA+PROBE: NOT DETECTED
HCOV 229E RNA SPEC QL NAA+PROBE: NOT DETECTED
HCOV HKU1 RNA SPEC QL NAA+PROBE: NOT DETECTED
HCOV NL63 RNA SPEC QL NAA+PROBE: NOT DETECTED
HCOV OC43 RNA SPEC QL NAA+PROBE: NOT DETECTED
HMPV RNA SPEC QL NAA+PROBE: NOT DETECTED
HPIV1 RNA SPEC QL NAA+PROBE: NOT DETECTED
HPIV2 RNA SPEC QL NAA+PROBE: NOT DETECTED
HPIV3 RNA SPEC QL NAA+PROBE: NOT DETECTED
HPIV4 RNA SPEC QL NAA+PROBE: NOT DETECTED
INR PPP: 1.1 (ref 0.9–1.1)
M PNEUMO DNA SPEC QL NAA+PROBE: NOT DETECTED
POTASSIUM SERPL-SCNC: 3.7 MMOL/L (ref 3.5–5.1)
PROTHROMBIN TIME: 11.8 SEC (ref 9–11.1)
RSV RNA SPEC QL NAA+PROBE: NOT DETECTED
RV+EV RNA SPEC QL NAA+PROBE: NOT DETECTED
SARS-COV-2 PCR, COVPCR: NOT DETECTED
SERVICE CMNT-IMP: ABNORMAL
SODIUM SERPL-SCNC: 136 MMOL/L (ref 136–145)

## 2022-06-15 PROCEDURE — 97535 SELF CARE MNGMENT TRAINING: CPT

## 2022-06-15 PROCEDURE — 74011250637 HC RX REV CODE- 250/637: Performed by: INTERNAL MEDICINE

## 2022-06-15 PROCEDURE — 97116 GAIT TRAINING THERAPY: CPT

## 2022-06-15 PROCEDURE — 74011636637 HC RX REV CODE- 636/637: Performed by: INTERNAL MEDICINE

## 2022-06-15 PROCEDURE — 97165 OT EVAL LOW COMPLEX 30 MIN: CPT

## 2022-06-15 PROCEDURE — 77010033678 HC OXYGEN DAILY

## 2022-06-15 PROCEDURE — 93005 ELECTROCARDIOGRAM TRACING: CPT

## 2022-06-15 PROCEDURE — 0202U NFCT DS 22 TRGT SARS-COV-2: CPT

## 2022-06-15 PROCEDURE — 74011250636 HC RX REV CODE- 250/636: Performed by: INTERNAL MEDICINE

## 2022-06-15 PROCEDURE — 97530 THERAPEUTIC ACTIVITIES: CPT

## 2022-06-15 PROCEDURE — 82962 GLUCOSE BLOOD TEST: CPT

## 2022-06-15 PROCEDURE — 85610 PROTHROMBIN TIME: CPT

## 2022-06-15 PROCEDURE — 80048 BASIC METABOLIC PNL TOTAL CA: CPT

## 2022-06-15 PROCEDURE — 74011000250 HC RX REV CODE- 250: Performed by: INTERNAL MEDICINE

## 2022-06-15 PROCEDURE — 65270000046 HC RM TELEMETRY

## 2022-06-15 RX ORDER — WARFARIN 1 MG/1
3 TABLET ORAL ONCE
Status: COMPLETED | OUTPATIENT
Start: 2022-06-15 | End: 2022-06-15

## 2022-06-15 RX ORDER — FUROSEMIDE 20 MG/1
20 TABLET ORAL DAILY
Status: DISCONTINUED | OUTPATIENT
Start: 2022-06-16 | End: 2022-06-17 | Stop reason: HOSPADM

## 2022-06-15 RX ORDER — FUROSEMIDE 10 MG/ML
20 INJECTION INTRAMUSCULAR; INTRAVENOUS DAILY
Status: DISCONTINUED | OUTPATIENT
Start: 2022-06-15 | End: 2022-06-15

## 2022-06-15 RX ADMIN — AMITRIPTYLINE HYDROCHLORIDE 10 MG: 10 TABLET, FILM COATED ORAL at 21:50

## 2022-06-15 RX ADMIN — Medication 3 UNITS: at 11:38

## 2022-06-15 RX ADMIN — WARFARIN SODIUM 3 MG: 1 TABLET ORAL at 17:06

## 2022-06-15 RX ADMIN — BUPROPION HYDROCHLORIDE 150 MG: 150 TABLET, EXTENDED RELEASE ORAL at 08:52

## 2022-06-15 RX ADMIN — SODIUM CHLORIDE, PRESERVATIVE FREE 10 ML: 5 INJECTION INTRAVENOUS at 06:05

## 2022-06-15 RX ADMIN — METOPROLOL SUCCINATE 50 MG: 50 TABLET, EXTENDED RELEASE ORAL at 09:00

## 2022-06-15 RX ADMIN — SODIUM CHLORIDE, PRESERVATIVE FREE 10 ML: 5 INJECTION INTRAVENOUS at 22:03

## 2022-06-15 RX ADMIN — QUETIAPINE FUMARATE 25 MG: 25 TABLET ORAL at 21:49

## 2022-06-15 RX ADMIN — FUROSEMIDE 20 MG: 10 INJECTION, SOLUTION INTRAMUSCULAR; INTRAVENOUS at 08:47

## 2022-06-15 RX ADMIN — CLOPIDOGREL BISULFATE 75 MG: 75 TABLET ORAL at 08:52

## 2022-06-15 RX ADMIN — PANTOPRAZOLE SODIUM 40 MG: 40 TABLET, DELAYED RELEASE ORAL at 08:52

## 2022-06-15 RX ADMIN — ESCITALOPRAM OXALATE 20 MG: 10 TABLET ORAL at 08:52

## 2022-06-15 RX ADMIN — Medication 1 CAPSULE: at 15:03

## 2022-06-15 RX ADMIN — SALINE NASAL SPRAY 2 SPRAY: 1.5 SOLUTION NASAL at 09:01

## 2022-06-15 RX ADMIN — LEVOTHYROXINE SODIUM 50 MCG: 0.05 TABLET ORAL at 08:52

## 2022-06-15 RX ADMIN — POTASSIUM CHLORIDE 10 MEQ: 750 TABLET, FILM COATED, EXTENDED RELEASE ORAL at 08:52

## 2022-06-15 RX ADMIN — ATORVASTATIN CALCIUM 20 MG: 40 TABLET, FILM COATED ORAL at 08:52

## 2022-06-15 RX ADMIN — SODIUM CHLORIDE, PRESERVATIVE FREE 10 ML: 5 INJECTION INTRAVENOUS at 06:06

## 2022-06-15 RX ADMIN — EZETIMIBE 10 MG: 10 TABLET ORAL at 08:52

## 2022-06-15 RX ADMIN — Medication 3 UNITS: at 17:04

## 2022-06-15 RX ADMIN — ASPIRIN 81 MG: 81 TABLET, CHEWABLE ORAL at 08:52

## 2022-06-15 RX ADMIN — SODIUM CHLORIDE, PRESERVATIVE FREE 10 ML: 5 INJECTION INTRAVENOUS at 08:54

## 2022-06-15 RX ADMIN — Medication 2 UNITS: at 08:50

## 2022-06-15 RX ADMIN — MELATONIN 3 MG: at 21:49

## 2022-06-15 RX ADMIN — Medication 3 UNITS: at 21:49

## 2022-06-15 NOTE — PROGRESS NOTES
Patient is alert and oriented. 1 person assist to bedside commode. Up with walker or cane. Continent. CHG bath given. Denies any pain or discomfort. Incision site to left groin, dressing dry and intact-no s/s of hematoma, redness, or swelling. O2 titrated up to 5LPM to keep sats >88% over night- O2 titrated back down to 3LPM at 0600-sats 96%. Call bell in reach. Currently resting quietly.

## 2022-06-15 NOTE — PROGRESS NOTES
Problem: Mobility Impaired (Adult and Pediatric)  Goal: *Acute Goals and Plan of Care (Insert Text)  Description: FUNCTIONAL STATUS PRIOR TO ADMISSION: Patient was modified independent using a single point cane for functional mobility. HOME SUPPORT PRIOR TO ADMISSION: The patient lived alone with her son 6 min away. Physical Therapy Goals  Initiated 6/14/2022  1. Patient will move from supine to sit and sit to supine  in bed with minimal assistance/contact guard assist within 7 day(s). 2.  Patient will transfer from bed to chair and chair to bed with minimal assistance/contact guard assist using the least restrictive device within 7 day(s). 3.  Patient will perform sit to stand with minimal assistance/contact guard assist within 7 day(s). 4.  Patient will ambulate with minimal assistance/contact guard assist for 75 feet with the least restrictive device within 7 day(s). Outcome: Progressing Towards Goal   PHYSICAL THERAPY TREATMENT  Patient: Arleen Palma (22 y.o. female)  Date: 6/15/2022  Diagnosis: Chest pain, unspecified type [R07.9]  Coronary artery disease [I25.10] <principal problem not specified>  Procedure(s) (LRB):  CORONARY ANGIOGRAPHY (N/A)  INSERT STENT DEMI CORONARY (N/A)  ULTRASOUND GUIDED VASCULAR ACCESS (N/A)  ANGIOPLASTY CORONARY (N/A)  ATHERECTOMY CORONARY (N/A)  PERCUTANEOUS CORONARY INTERVENTION (N/A) 1 Day Post-Op  Precautions:    Chart, physical therapy assessment, plan of care and goals were reviewed. ASSESSMENT  Patient continues with skilled PT services and is progressing towards goals. Pt received seated on the [facility], reporting having a bout of diarrhea that started yesterday. Pt agreeable to a limited therapy session, reporting fearing she would have another episode of diarrhea. Pt post op day 1 of coronary stent placement and reports she hasn't ambulated any further than the bedside commode since having the procedure. O2 in place via nc on 2L this session.   She reports that she was ambulating to the bathroom prior to the procedure, when she was on another unit. Pt requiring physical assistance for standing and a short distance of ambulation back to the bed with HHA. She returned to supine in bed with SBA and was able to assist with scooting up in the bed. Pt lives alone and was fairly active and I and would benefit from rehab at discharge. Current Level of Function Impacting Discharge (mobility/balance):   Bed Mobility:  Sit to Supine: Stand-by assistance  Scooting: Minimum assistance (toward hob, pt assisting by pushing with BLE)  Transfers:  Sit to Stand: Minimum assistance; Moderate assistance  Stand to Sit: Contact guard assistance;Minimum assistance  Ambulation/Gait Training:  Distance (ft): 3 Feet (ft)  Assistive Device: Gait belt (HHA)  Ambulation - Level of Assistance: Minimal assistance     Other factors to consider for discharge: lives alone, functioning below her baseline, no support at home         PLAN :  Patient continues to benefit from skilled intervention to address the above impairments. Continue treatment per established plan of care. to address goals. Recommendation for discharge: (in order for the patient to meet his/her long term goals)  Therapy 3 hours per day 5-7 days per week    This discharge recommendation:  A follow-up discussion with the attending provider and/or case management is planned    IF patient discharges home will need the following DME: to be determined (TBD)       SUBJECTIVE:   Patient stated \"they won't let me get up.     OBJECTIVE DATA SUMMARY:   Critical Behavior:  Neurologic State: Alert  Orientation Level: Oriented X4  Cognition: Follows commands     Functional Mobility Training:  Bed Mobility:        Sit to Supine: Stand-by assistance  Scooting: Minimum assistance (toward hob, pt assisting by pushing with BLE)        Transfers:  Sit to Stand: Minimum assistance; Moderate assistance  Stand to Sit: Contact guard assistance;Minimum assistance               Balance:  Sitting: Intact  Standing: Impaired  Standing - Static: Constant support; Fair  Standing - Dynamic : Constant support; Fair  Ambulation/Gait Training:  Distance (ft): 3 Feet (ft)  Assistive Device: Gait belt (HHA)  Ambulation - Level of Assistance: Minimal assistance        Gait Abnormalities: Decreased step clearance; Step to gait        Base of Support: Narrowed        Pain Rating:  Back pain(chronic), not quantified    Activity Tolerance:   Fair    After treatment patient left in no apparent distress:   Supine in bed, Call bell within reach, and Side rails x 3    COMMUNICATION/COLLABORATION:   The patients plan of care was discussed with: Occupational therapist and Registered nurse.      Sebastián Brandon, PT   Time Calculation: 25 mins

## 2022-06-15 NOTE — PROGRESS NOTES
Problem: Self Care Deficits Care Plan (Adult)  Goal: *Acute Goals and Plan of Care (Insert Text)  Description: FUNCTIONAL STATUS PRIOR TO ADMISSION: Patient was modified independent using a single point cane for functional mobility. Patient reports she is typically independent in [facility] and most IADLs (to include driving). Patient endorses several falls, attributing to being \"pigeon-toed. \" Patient states she does not go out of the home much. HOME SUPPORT: The patient lived alone, however has supportive son who lives 6 minutes away. Occupational Therapy Goals  Initiated 6/15/2022  1. Patient will perform standing grooming with contact guard assist using most appropriate DME prn within 7 day(s). 2.  Patient will perform bathing with contact guard assist using most appropriate DME prn within 7 day(s). 3.  Patient will perform upper body dressing and lower body dressing with contact guard assist using most appropriate DME prn within 7 day(s). 4.  Patient will perform toilet transfers with contact guard assist using most appropriate DME prn within 7 day(s). 5.  Patient will perform all aspects of toileting with contact guard assist using most appropriate DME prn within 7 day(s). 6.  Patient will participate in upper extremity therapeutic exercise/activities with supervision/set-up for 10 minutes within 7 day(s). 7.  Patient will utilize energy conservation and fall prevention techniques during functional activities with verbal cues within 7 day(s).       Outcome: Progressing Towards Goal   OCCUPATIONAL THERAPY EVALUATION  Patient: Eleanor Mckenzie (85 y.o. female)  Date: 6/15/2022  Primary Diagnosis: Chest pain, unspecified type [R07.9]  Coronary artery disease [I25.10]  Procedure(s) (LRB):  CORONARY ANGIOGRAPHY (N/A)  INSERT STENT DEMI CORONARY (N/A)  ULTRASOUND GUIDED VASCULAR ACCESS (N/A)  ANGIOPLASTY CORONARY (N/A)  ATHERECTOMY CORONARY (N/A)  PERCUTANEOUS CORONARY INTERVENTION (N/A) 1 Day Post-Op Precautions:      ASSESSMENT  Based on the objective data described below, the patient presents with generalized weakness, impaired functional mobility and balance, diarrhea (x2 days), decreased activity tolerance, new use supplemental O2, and overall decline in functional independence s/p admission for CAD. Patient is received on BSC on 3L O2 with SpO2 >90% throughout session. With set-up, patient is able to perform seated bowel hygiene, however very unsteady in standing and requires overall mod assist to pull up brief over hips. Patient takes several steps back to bed with B HHA and overall min assist, returning self to supine with SBA. At this time, patient is well below her highly independent baseline. For best outcome, recommend IPR at discharge. Current Level of Function Impacting Discharge (ADLs/self-care): up to mod A    Functional Outcome Measure: The patient scored 45/100 on the Barthel Index. Other factors to consider for discharge: lives alone; mod I - I at baseline     Patient will benefit from skilled therapy intervention to address the above noted impairments. PLAN :  Recommendations and Planned Interventions: self care training, functional mobility training, therapeutic exercise, balance training, therapeutic activities, endurance activities, patient education, and home safety training    Frequency/Duration: Patient will be followed by occupational therapy 4 times a week to address goals. Recommendation for discharge: (in order for the patient to meet his/her long term goals)  Therapy 3 hours per day 5-7 days per week    This discharge recommendation:  Has been made in collaboration with the attending provider and/or case management    IF patient discharges home will need the following DME: TBD       SUBJECTIVE:   Patient stated I feel so weak.     OBJECTIVE DATA SUMMARY:   HISTORY:   Past Medical History:   Diagnosis Date    Anxiety     Arthritis     Asthma     CAD (coronary artery disease)     stent    Diabetes (HCC)     GERD (gastroesophageal reflux disease)     History of DVT (deep vein thrombosis)     History of recurrent UTIs     Hx of seasonal allergies     Hypercholesterolemia     Irritable bowel syndrome (IBS)     Migraine     Urinary urgency      Past Surgical History:   Procedure Laterality Date    HX CATARACT REMOVAL Bilateral     HX HEART CATHETERIZATION      stent    HX OPEN REDUCTION INTERNAL FIXATION Left     humerus     HX OPEN REDUCTION INTERNAL FIXATION Right     hip       Expanded or extensive additional review of patient history:     Home Situation  Home Environment: Apartment  # Steps to Enter: 0  One/Two Story Residence: One story  Living Alone: Yes  Support Systems: Child(vinicio)  Patient Expects to be Discharged to[de-identified] Skilled nursing facility  Current DME Used/Available at Home: Blood pressure cuff,Cane, straight,Walker, rolling,Wheelchair,Shower chair  Tub or Shower Type: Tub/Shower combination    Hand dominance: Right    EXAMINATION OF PERFORMANCE DEFICITS:  Cognitive/Behavioral Status:  Neurologic State: Alert; Appropriate for age  Orientation Level: Oriented X4  Cognition: Follows commands  Perception: Appears intact  Perseveration: No perseveration noted  Safety/Judgement: Awareness of environment; Fall prevention;Home safety    Skin:     Edema:     Hearing: Auditory  Auditory Impairment: None    Vision/Perceptual:    Acuity: Within Defined Limits    Corrective Lenses: Glasses    Range of Motion:  AROM: Generally decreased, functional  PROM: Generally decreased, functional    Strength:  Strength: Generally decreased, functional    Coordination:  Coordination: Within functional limits  Fine Motor Skills-Upper: Left Intact; Right Intact    Gross Motor Skills-Upper: Left Intact; Right Intact    Tone & Sensation:  Tone: Normal  Sensation: Intact    Balance:  Sitting: Intact  Standing: Impaired  Standing - Static: Constant support; Fair  Standing - Dynamic : Constant support; Fair    Functional Mobility and Transfers for ADLs:  Bed Mobility:  Sit to Supine: Stand-by assistance  Scooting: Minimum assistance (toward hob, pt assisting by pushing with BLE)    Transfers:  Sit to Stand: Minimum assistance; Moderate assistance  Stand to Sit: Contact guard assistance;Minimum assistance  Toilet Transfer : Minimum assistance; Additional time    ADL Assessment:  Feeding: Setup    Oral Facial Hygiene/Grooming: Setup    Bathing: Minimum assistance    Upper Body Dressing: Setup    Lower Body Dressing: Moderate assistance    Toileting: Moderate assistance    ADL Intervention and task modifications:  Grooming  Grooming Assistance: Set-up  Position Performed: Seated in chair  Washing Hands: Set-up (using hand  wipes)  Cues: Verbal cues provided    Toileting  Toileting Assistance: Moderate assistance  Bladder Hygiene: Stand-by assistance  Bowel Hygiene: Contact guard assistance  Clothing Management: Moderate assistance  Cues: Verbal cues provided;Visual cues provided;Physical assistance for pants up  Adaptive Equipment:  (BSC, HHA)    Cognitive Retraining  Safety/Judgement: Awareness of environment; Fall prevention;Home safety    Functional Measure:    Barthel Index:  Bathin  Bladder: 5  Bowels: 5  Groomin  Dressin  Feeding: 10  Mobility: 0  Stairs: 0  Toilet Use: 5  Transfer (Bed to Chair and Back): 10  Total: 45/100      The Barthel ADL Index: Guidelines  1. The index should be used as a record of what a patient does, not as a record of what a patient could do. 2. The main aim is to establish degree of independence from any help, physical or verbal, however minor and for whatever reason. 3. The need for supervision renders the patient not independent. 4. A patient's performance should be established using the best available evidence. Asking the patient, friends/relatives and nurses are the usual sources, but direct observation and common sense are also important.  However direct testing is not needed. 5. Usually the patient's performance over the preceding 24-48 hours is important, but occasionally longer periods will be relevant. 6. Middle categories imply that the patient supplies over 50 per cent of the effort. 7. Use of aids to be independent is allowed. Score Interpretation (from [address])    Independent   60-79 Minimally independent   40-59 Partially dependent   20-39 Very dependent   <20 Totally dependent     -Coleen Jaime., Barthel, DMARTY. (1965). Functional evaluation: the Barthel Index. [address] ([address] (1997). The Barthel activities of daily living index: self-reporting versus actual performance in the old (> or = 75 years). Journal [address] 45(7), 14 [facility], YUNF, Kathaleen Boas., Melanie Danielson. (1999). Measuring the change in disability after inpatient rehabilitation; comparison of the responsiveness of the Barthel Index and Functional Williams Measure. Journal of Neurology, Neurosurgery, and Psychiatry, 66(4), 852-677. Vonita Schilder, N.J.A, TOD Churchill, & Margaret Ferguson, M.A. (2004) Assessment of post-stroke quality of life in cost-effectiveness studies: The usefulness of the Barthel Index and the EuroQoL-5D. Quality of Life Research, 15, 923-12         Occupational Therapy Evaluation Charge Determination   History Examination Decision-Making   LOW Complexity : Brief history review  LOW Complexity : 1-3 performance deficits relating to physical, cognitive , or psychosocial skils that result in activity limitations and / or participation restrictions  LOW Complexity : No comorbidities that affect functional and no verbal or physical assistance needed to complete eval tasks       Based on the above components, the patient evaluation is determined to be of the following complexity level: LOW   Pain Rating:  Reports chronic low back pain.     Activity Tolerance:   Fair and requires rest breaks    After treatment patient left in no apparent distress:    Supine in bed, Call bell within reach, and Side rails x 3    COMMUNICATION/EDUCATION:   The patients plan of care was discussed with: Physical therapist and Registered nurse. Home safety education was provided and the patient/caregiver indicated understanding., Patient/family have participated as able in goal setting and plan of care. , and Patient/family agree to work toward stated goals and plan of care.     Thank you for this referral.  Gail Cano OT  Time Calculation: 25 mins

## 2022-06-15 NOTE — PROGRESS NOTES
Warfarin Dosing - Pharmacy Consult Note    Consulting Provider: Dr. Iqra Pulido  Indication: History of VTE/PE Protein C deficiency  Warfarin Dose prior to admission: 1 mg daily     Concurrent anticoagulants/antiplatelets: clopidogrel + aspirin    Significant Drug Interactions: No obvious interactions  Recent Labs     06/14/22  1835 06/14/22  0414 06/12/22  0600   INR 1.2*  --   --    HGB  --  8.9* 8.3*   PLT  --  [phone]     [unfilled]    Date      INR       Dose  6/9          1.3        1 mg  6/10        1.3         ----  6/14        1.2        1.5 mg    Assessment/Plan  Goal INR: 2 - 3  Will order warfarin 2 mg PO x 1 dose     Active problem list reviewed. INR orders are placed. Chart reviewed for pertinent labs, drug/diet interactions, and past doses. Documentation of patient's clinical condition was reviewed. Pharmacy Dosing:  Pharmacy will continue to follow.

## 2022-06-15 NOTE — PROGRESS NOTES
Transition of Care Plan:     RUR:14% low risk   Disposition:IPR:Pending referrals to:  [facility] (will need PCR COVID test)  [facility] 2nd choice  Will be an EMTALA  Follow up appointments: PCP  DME needed: Pt has access to cane,   Transportation at Discharge: Family will arrange   [place] or means to access home:      Yes  IM Medicare Letter:N/a  Is patient a BCPI-A Bundle: NA                     If yes, was Bundle Letter given?:  NA  Is patient a  and connected with the [place]? NA              If yes, was [employer] transfer form completed and [facility] notified? NA  Caregiver Contact: Beba Jacob- Son [phone]  Discharge Caregiver contacted prior to discharge? Yes  Care Conference needed?:     No        CM met with Pt and son to discuss recommendation for IPR. Pt and son was receptive. CM was requested to send referrals to [address] [place] [address] and [facility] (2nd choice). Referrals sent. FOC provided and signed. Pt will need a PCR COVID test if able to dc to [facility]. Floor nurse notified.       Lianne Espinoza, [facility], KAYLA Johnson

## 2022-06-15 NOTE — PROGRESS NOTES
Warfarin Dosing - Pharmacy Consult Note    Consulting Provider: Dr. Sharmin Hassan  Indication: History of VTE/PE Protein C deficiency  Warfarin Dose prior to admission: 1 mg daily     Concurrent anticoagulants/antiplatelets: clopidogrel + aspirin    Significant Drug Interactions: No obvious interactions  Recent Labs     06/15/22  0347 06/14/22  1835 06/14/22  0414   INR 1.1 1.2*  --    HGB  --   --  8.9*   PLT  --   --  338     [unfilled]    Date      INR       Dose  6/9          1.3        1 mg  6/10        1.3         ----  6/14        1.2        1.5 mg  6/15        1.1        2 mg  6/16    Assessment/Plan  Goal INR: 2 - 3  Will order warfarin 3 mg PO x 1 dose     Active problem list reviewed. INR orders are placed. Chart reviewed for pertinent labs, drug/diet interactions, and past doses. Documentation of patient's clinical condition was reviewed. Pharmacy Dosing:  Pharmacy will continue to follow.

## 2022-06-15 NOTE — PROGRESS NOTES
Comprehensive Nutrition Assessment    Type and Reason for Visit: Initial,RD nutrition re-screen/LOS    Nutrition Recommendations/Plan:   1. Continue consistent carb diet  2. Please document % meals consumed in flowsheet I/O's under intake        Nutrition Assessment:     Chart reviewed for LOS. Pt noted for acute respiratory failure with hypoxia, CAD, anxiety, depression, DM, HTN, HLD, IBS. S/p cath. Pt reports diarrhea the last couple of days, likely an IBS flair up. She does not tolerate food with much spices. She has had dentures since she was 52yrs old, and has trouble chewing tough foods. She is not a big eater in general and hasn't been cooking much since her  passed. She does have meals with her son occasionally, and intends on setting up meals on wheels when she gets out of rehab. Pt has a menu now and is calling in preferences while in house. Patient Vitals for the past 168 hrs:   % Diet Eaten   06/15/22 1135 51 - 75%   06/15/22 0855 76 - 100%   06/11/22 0911 51 - 75%   06/10/22 1810 26 - 50%   06/10/22 1030 51 - 75%     Wt Readings from Last 5 Encounters:   06/11/22 67.6 kg (149 lb)   04/12/22 71.7 kg (158 lb)   03/29/22 71.7 kg (158 lb)   03/29/22 71.7 kg (158 lb)   02/09/22 70.3 kg (155 lb)   ]    Nutrition Related Findings:    Labs: -147-220. Meds: lipitor, zetia, lasix, humalog, risaquad, synthroid, klorcon, warfarin. Edema: trace BLE. BM 6/15. Wound Type: None    Current Nutrition Intake & Therapies:  Average Meal Intake: 51-75%  Average Supplement Intake: None ordered  ADULT DIET Regular; 4 carb choices (60 gm/meal)    Anthropometric Measures:  Height: 5' 1\" (154.9 cm)  Ideal Body Weight (IBW): 105 lbs (48 kg)     Current Body Wt:  67.6 kg (149 lb), 141.9 % IBW. Bed scale  Current BMI (kg/m2): 28.2        Weight Adjustment: No adjustment                 BMI Category: Overweight (BMI 25.0-29. 9)    Estimated Daily Nutrient Needs:  Energy Requirements Based On: Formula  Weight Used for Energy Requirements: Current  Energy (kcal/day): 1390 kcals (BMR x 1. 3AF)  Weight Used for Protein Requirements: Current  Protein (g/day): 54-68g (0.8-1.0g/kg)  Method Used for Fluid Requirements: 1 ml/kcal  Fluid (ml/day): 1390mL    Nutrition Diagnosis:   · Inadequate protein-energy intake related to biting/chewing (masticatory) difficulty as evidenced by intake 51-75%      Nutrition Interventions:   Food and/or Nutrient Delivery: Continue current diet  Nutrition Education/Counseling: No recommendations at this time  Coordination of Nutrition Care: Continue to monitor while inpatient       Goals:     Goals:  (PO intake >50% meals next 3-5 days)       Nutrition Monitoring and Evaluation:   Behavioral-Environmental Outcomes: None identified  Food/Nutrient Intake Outcomes: Food and nutrient intake  Physical Signs/Symptoms Outcomes: Biochemical data,GI status,Weight    Discharge Planning:    Continue current diet    Osvaldo Walton, [address]  Contact: [id number]

## 2022-06-15 NOTE — PROGRESS NOTES
Hospitalist Progress Note    NAME: Janette Giles   :  1940   MRN:  [id number]       Assessment / Plan:  Confusional state with episodes of agitation, concerning for delirium  Chart reviewed, per chart patient has been confused and aggressive towards the staff and refusing care. Discussed with patient some reported that patient at baseline is alert oriented x3 and very much independent and has never had any memory issues. He reported that patient can get confused when she does not have much sleep. Patient seems to be anxious since the loss of her  2 years ago. When seen patient was alert oriented x3 . Able to  maintain normal conversation. Will t B12, folate, TSH, ABG and ammonia WNL   CT of the brain  Show no acute changes   Noted psych consult, Seroquel at bedtime, and frequent reorientation  Continue with home dose Lexapro  [facility]   S/p cath with PCI of LCX       Acute respiratory failure with hypoxia  In Creased oxygen requirement this morning, had to be placed on high flow 8 to 10 L due to sudden hypoxia. Patient was on 3 L yesterday  Chest x-ray did not show any acute pathology, showed diffuse interstitial marking  Continue with nebs  Get D-dimer, if positive most likely will need CTA of the chest.  Patient has severe allergy to iodine, most likely will need VQ scan    CAD, POA (s/p cath in , now with occluded RCA)   - management per primary cardiology team  palliative care consult      Anxiety, POA  Agitation, POA  Depression, POA  - recently , was overmedicating with anxiety meds  - resume home meds  - symptom management     Asthma, POA  - resume PTA inhaler     DM, POA  - BG checks with SSI coverage     Hypertension, POA  - resume home meds     Hyperlipidemia, POA  - resume Lipitor, Zetia     History of  left DVT  - resume Plavix  Off coumadin      IBS, POA  - resume home meds  25.0 - 29.9 Overweight / Body mass index is 28.15 kg/m².     Estimated discharge date: Per primary attending  Barriers:    Code status: DNR  Prophylaxis: Coumadin  Recommended Disposition: Home w/Family     Subjective:     Chief Complaint / Reason for Physician Visit  Follow-up delirium discussed with RN events overnight. Patient has no acute complaints, is alert oriented x3,plan for cardiac cath next week   Review of Systems:  Symptom Y/N Comments  Symptom Y/N Comments   Fever/Chills n   Chest Pain n    Poor Appetite    Edema     Cough    Abdominal Pain     Sputum    Joint Pain     SOB/VILA n   Pruritis/Rash     Nausea/vomit    Tolerating PT/OT     Diarrhea    Tolerating Diet y    Constipation    Other       Could NOT obtain due to:      Objective:     VITALS:   Last 24hrs VS reviewed since prior progress note.  Most recent are:  Patient Vitals for the past 24 hrs:   Temp Pulse Resp BP SpO2   06/15/22 1135 98.4 °F (36.9 °C) 78 16 (!) 123/58 94 %   06/15/22 0700 97.8 °F (36.6 °C) 82 16 124/61 95 %   06/15/22 0600 -- 76 16 (!) 137/59 92 %   06/15/22 0500 -- 99 16 (!) 134/56 (!) 72 %   06/15/22 0400 98 °F (36.7 °C) 74 12 (!) 127/45 95 %   06/15/22 0305 -- 84 -- -- (!) 84 %   06/15/22 0259 -- 80 14 (!) 135/51 (!) 89 %   06/15/22 0159 -- 76 14 (!) 128/49 --   06/15/22 0059 -- 76 12 (!) 129/51 --   06/14/22 2359 98.4 °F (36.9 °C) 79 12 (!) 130/48 91 %   06/14/22 2259 -- 76 16 (!) 126/51 (!) 89 %   06/14/22 2159 -- 80 16 92/75 97 %   06/14/22 2059 -- 80 14 (!) 136/46 95 %   06/14/22 2000 -- 76 -- -- --   06/14/22 1959 98.5 °F (36.9 °C) 97 16 125/70 (!) 82 %   06/14/22 1929 -- 85 -- (!) 112/48 99 %   06/14/22 1809 -- -- -- 119/60 --   06/14/22 1800 -- 77 -- (!) 73/46 96 %   06/14/22 1730 -- 76 -- (!) 83/52 98 %   06/14/22 1715 -- 75 -- (!) 97/43 93 %   06/14/22 1700 -- 75 -- (!) 102/38 100 %   06/14/22 1645 -- 75 -- (!) 109/44 100 %   06/14/22 1630 -- 77 -- -- (!) 74 %   06/14/22 1619 97.8 °F (36.6 °C) 74 16 117/85 96 %   06/14/22 1608 -- 73 12 (!) 90/44 94 %       Intake/Output Summary (Last 24 hours) at 6/15/2022 1426  Last data filed at 6/15/2022 1135  Gross per 24 hour   Intake 1540 ml   Output 350 ml   Net 1190 ml        I had a face to face encounter and independently examined this patient on 6/15/2022, as outlined below:  PHYSICAL EXAM:  General: WD, WN. Alert, cooperative, no acute distress    EENT:  EOMI. Anicteric sclerae. MMM  Resp:  CTA bilaterally, no wheezing or rales. No accessory muscle use  CV:  Regular  rhythm,  No edema  GI:  Soft, Non distended, Non tender. +Bowel sounds  Neurologic:  Alert and oriented X 3, normal speech,   Psych:   Good insight. Not anxious nor agitated  Skin:  No rashes. No jaundice    Reviewed most current lab test results and cultures  YES  Reviewed most current radiology test results   YES  Review and summation of old records today    NO  Reviewed patient's current orders and MAR    YES  PMH/ reviewed - no change compared to H&P  ________________________________________________________________________  Care Plan discussed with:    Comments   Patient x    Family  x    RN x    Care Manager     Consultant                        Multidiciplinary team rounds were held today with , nursing, pharmacist and clinical coordinator. Patient's plan of care was discussed; medications were reviewed and discharge planning was addressed. ________________________________________________________________________  Total NON critical care TIME:  45   Minutes    Total CRITICAL CARE TIME Spent:   Minutes non procedure based      Comments   >50% of visit spent in counseling and coordination of care     ________________________________________________________________________  Maya Saucedo MD     Procedures: see electronic medical records for all procedures/Xrays and details which were not copied into this note but were reviewed prior to creation of Plan. LABS:  I reviewed today's most current labs and imaging studies.   Pertinent labs include:  Recent Labs 06/14/22 0414   WBC 12.8*   HGB 8.9*   HCT 33.3*        Recent Labs     06/15/22  0347 06/14/22  1835 06/14/22 0414 06/13/22  0510     --  132* 131*   K 3.7  --  3.7 3.4*   CL 98  --  92* 92*   CO2 30  --  34* 34*   *  --  137* 156*   BUN 24*  --  24* 25*   CREA 0.63  --  0.67 0.63   CA 8.5  --  9.1 8.7   INR 1.1 1.2*  --   --        Signed:  Mathew Landrum MD

## 2022-06-15 NOTE — PROGRESS NOTES
Problem: Pressure Injury - Risk of  Goal: *Prevention of pressure injury  Description: Document Norbert Scale and appropriate interventions in the flowsheet. Outcome: Progressing Towards Goal  Note: Pressure Injury Interventions:  Sensory Interventions: Assess changes in LOC,Maintain/enhance activity level,Keep linens dry and wrinkle-free,Minimize linen layers,Monitor skin under medical devices    Moisture Interventions: Limit adult briefs,Maintain skin hydration (lotion/cream),Minimize layers    Activity Interventions: Increase time out of bed    Mobility Interventions: Float heels,HOB 30 degrees or less    Nutrition Interventions: Document food/fluid/supplement intake    Friction and Shear Interventions: HOB 30 degrees or less,Lift sheet,Minimize layers,Apply protective barrier, creams and emollients                Problem: Patient Education: Go to Patient Education Activity  Goal: Patient/Family Education  Outcome: Progressing Towards Goal     Problem: Falls - Risk of  Goal: *Absence of Falls  Description: Document Mona Fall Risk and appropriate interventions in the flowsheet.   Outcome: Progressing Towards Goal  Note: Fall Risk Interventions:  Mobility Interventions: Bed/chair exit alarm,Patient to call before getting OOB,Utilize walker, cane, or other assistive device    Mentation Interventions: Adequate sleep, hydration, pain control,Bed/chair exit alarm,Door open when patient unattended,Eyeglasses and hearing aids,Increase mobility,Room close to nurse's station,Toileting rounds,Update white board    Medication Interventions: Teach patient to arise slowly,Bed/chair exit alarm,Patient to call before getting OOB    Elimination Interventions: Bed/chair exit alarm,Call light in reach,Toilet paper/wipes in reach,Patient to call for help with toileting needs    History of Falls Interventions: Room close to nurse's station         Problem: Patient Education: Go to Patient Education Activity  Goal: Patient/Family Education  Outcome: Progressing Towards Goal     Problem: Pain  Goal: *Control of Pain  Outcome: Progressing Towards Goal  Goal: *PALLIATIVE CARE:  Alleviation of Pain  Outcome: Progressing Towards Goal     Problem: Patient Education: Go to Patient Education Activity  Goal: Patient/Family Education  Outcome: Progressing Towards Goal     Problem: Patient Education: Go to Patient Education Activity  Goal: Patient/Family Education  Outcome: Progressing Towards Goal     Problem: Unstable Angina/NSTEMI: Discharge Outcomes  Goal: *Hemodynamically stable  Outcome: Progressing Towards Goal  Goal: *Stable cardiac rhythm  Outcome: Progressing Towards Goal  Goal: *Lungs clear or at baseline  Outcome: Progressing Towards Goal  Goal: *Optimal pain control at patient's stated goal  Outcome: Progressing Towards Goal  Goal: *Identifies cardiac risk factors  Outcome: Progressing Towards Goal  Goal: *Verbalizes home exercise program, activity guidelines, cardiac precautions  Outcome: Progressing Towards Goal  Goal: *Verbalizes understanding and describes prescribed diet  Outcome: Progressing Towards Goal  Goal: *Verbalizes name, dosage, time, side effects, and number of days to continue medications  Outcome: Progressing Towards Goal  Goal: *Anxiety reduced or absent  Outcome: Progressing Towards Goal  Goal: *Understands and describes signs and symptoms to report to providers(Stroke Metric)  Outcome: Progressing Towards Goal  Goal: *Describes follow-up/return visits to physicians  Outcome: Progressing Towards Goal  Goal: *Describes available resources and support systems  Outcome: Progressing Towards Goal  Goal: *Influenza immunization  Outcome: Progressing Towards Goal  Goal: *Pneumococcal immunization  Outcome: Progressing Towards Goal  Goal: *Describes smoking cessation resources  Outcome: Progressing Towards Goal     Problem: Patient Education: Go to Patient Education Activity  Goal: Patient/Family Education  Outcome: Progressing Towards Goal

## 2022-06-15 NOTE — PROGRESS NOTES
Progress Note      6/15/2022 8:04 AM  NAME: Carlos Esparza   MRN:  [id number]   Admit Diagnosis: Chest pain, unspecified type [R07.9]  Coronary artery disease [I25.10]                Assessment:       - Cath in [facility] 2001 unclear details, PCI, left with  of RCA. Cath 6/2022 with 0ccluded RCA. 99% proximal LCx. Cath with PCI of pLCx  - Aortic stenosis with echo 4/2022 EF 65% peak of 4, mean of 41, PHILIPPE of 0.7, mild AI, trace MR  - Sinus with HR of 92 MD of 146, QRS of 94 NST  - Diet controlled DM, HTN, Dyslipidemia  - Carotid 10/2021 mild bilaterally  - Hx of left DVT found to have Protien C deficiency on warfarin  - MALINDA 11/2021 mild  - Severe DJD/sciatica follows with Dr. Apolonia Dejesus  - IBS follows with GI  - Anxiety  - Anemia  - Mild dementia  - CONTRAST ALLERGY  -  7/2020, lives alone in an apartment, 3 kids, one son lives in [place], retired , limited functional capacity uses a cane limited by back pain  DNR                     Plan:        Patient with anxiety  , and has a hard time with this. Son helps her, has grandkids. Main complaint is back pain, uses a cane.     No chest pain  +dyspnea  No syncope.     Cath with high grade LCx. Patient with baseline anxiety, exacerbated by prednisone for dye allergy. Acute delerium 6/8 night. Improved with adjustment in psych meds and treatment. 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 CXR interstitial prominence, taper O2 as able. Cath with PCI of LCx    - Resume warfarin  - Cont added aspirin for one week  - Cont added clopidogrel  - Cont metoprolol  - Cont added  Lasix IV, replete K, taper off O2, follow bmp  - Cont atorvastatin     - Appreciate psychiatry evaluation, better on serquel  - Appreciate hospitalist help, head ct ok    Discussed with patient and son. All agree DNR. Appreciate palliative care helping family with long term care plans. PT/OT evaluation.   Plan for SNF once down to 2L O2.                    [x]? High complexity decision making was performed         We discussed the expected course, resolution and complications of the diagnoses in detail. Medication risk, benefits, costs, interactions, and alternatives were discussed as indicated. I advised him to contact the office if his condition worsens, changes or fails to improve as anticipated. Patient expressed understanding with the diagnoses  and plan. Subjective:     Eleanor Mckenzie denies chest pain, + dyspnea. Discussed with RN events overnight. Review of Systems:    Symptom Y/N Comments  Symptom Y/N Comments   Fever/Chills N   Chest Pain N    Poor Appetite N   Edema N    Cough N   Abdominal Pain N    Sputum N   Joint Pain N    SOB/VLIA N   Pruritis/Rash N    Nausea/vomit N   Tolerating PT/OT     Diarrhea N   Tolerating Diet Y    Constipation N   Other       Could NOT obtain due to:      Objective:      Physical Exam:    Last 24hrs VS reviewed since prior progress note. Most recent are:    Visit Vitals  BP (!) 135/51   Pulse 84   Temp 98.4 °F (36.9 °C)   Resp 12   Ht 5' 1\" (1.549 m)   Wt 67.6 kg (149 lb)   SpO2 (!) 84%   Breastfeeding No   BMI 28.15 kg/m²       Intake/Output Summary (Last 24 hours) at 6/15/2022 0606  Last data filed at 6/14/2022 2359  Gross per 24 hour   Intake 920 ml   Output 200 ml   Net 720 ml        General Appearance: Well developed, well nourished, alert & oriented x 3,    no acute distress. Ears/Nose/Mouth/Throat: Hearing grossly normal.  Neck: Supple. Chest: Lungs clear to auscultation bilaterally. Cardiovascular: Regular rate and rhythm, S1S2 normal, III/VI systolic murmur. Abdomen: Soft, non-tender, bowel sounds are active. Extremities: No edema bilaterally. Skin: Warm and dry.     PMH/SH reviewed - no change compared to H&P    Data Review    Telemetry: normal sinus rhythm     Lab Data Personally Reviewed:    Recent Labs     06/14/22  0414   WBC 12.8*   HGB 8.9*   HCT 33.3*    Recent Labs     06/15/22  0347 06/14/22  1835   INR 1.1 1.2*   PTP 11.8* 12.2*      Recent Labs     06/15/22  0347 06/14/22  0414 06/13/22  0510    132* 131*   K 3.7 3.7 3.4*   CL 98 92* 92*   CO2 30 34* 34*   BUN 24* 24* 25*   CREA 0.63 0.67 0.63   * 137* 156*   CA 8.5 9.1 8.7     No results for input(s): CPK, CKNDX, TROIQ in the last 72 hours. No lab exists for component: CPKMB  Lab Results   Component Value Date/Time    Cholesterol, total 107 06/25/2021 11:52 AM    HDL Cholesterol 52 06/25/2021 11:52 AM    LDL, calculated 39 06/25/2021 11:52 AM    Triglyceride 80 06/25/2021 11:52 AM    CHOL/HDL Ratio 2.1 06/25/2021 11:52 AM       No results for input(s): AP, TBIL, TP, ALB, GLOB, GGT, AML, LPSE in the last 72 hours. No lab exists for component: SGOT, GPT, AMYP, HLPSE  No results for input(s): PH, PCO2, PO2 in the last 72 hours.     Medications Personally Reviewed:    Current Facility-Administered Medications   Medication Dose Route Frequency    furosemide (LASIX) injection 20 mg  20 mg IntraVENous DAILY    traMADoL (ULTRAM) tablet 25 mg  25 mg Oral Q6H PRN    sodium chloride (NS) flush 5-40 mL  5-40 mL IntraVENous Q8H    sodium chloride (NS) flush 5-40 mL  5-40 mL IntraVENous PRN    nitroglycerin (NITROSTAT) tablet 0.4 mg  0.4 mg SubLINGual Q5MIN PRN    WARFARIN INFORMATION NOTE (COUMADIN)   Other QPM    sodium chloride (OCEAN) 0.65 % nasal squeeze bottle 2 Spray  2 Spray Both Nostrils Q2H PRN    polyethylene glycol (MIRALAX) packet 17 g  17 g Oral DAILY    potassium chloride SR (KLOR-CON 10) tablet 10 mEq  10 mEq Oral DAILY    clopidogreL (PLAVIX) tablet 75 mg  75 mg Oral DAILY    insulin lispro (HUMALOG) injection   SubCUTAneous AC&HS    glucose chewable tablet 16 g  4 Tablet Oral PRN    glucagon (GLUCAGEN) injection 1 mg  1 mg IntraMUSCular PRN    dextrose 10% infusion 0-250 mL  0-250 mL IntraVENous PRN    ondansetron (ZOFRAN) injection 4 mg  4 mg IntraVENous Q6H PRN    melatonin tablet 3 mg  3 mg Oral QHS    QUEtiapine (SEROquel) tablet 25 mg  25 mg Oral QHS    pantoprazole (PROTONIX) tablet 40 mg  40 mg Oral DAILY    albuterol (PROVENTIL VENTOLIN) nebulizer solution 2.5 mg  2.5 mg Nebulization Q6H PRN    atorvastatin (LIPITOR) tablet 20 mg  20 mg Oral DAILY    metoprolol succinate (TOPROL-XL) XL tablet 50 mg  50 mg Oral DAILY    amitriptyline (ELAVIL) tablet 10 mg  10 mg Oral QHS    buPROPion XL (WELLBUTRIN XL) tablet 150 mg  150 mg Oral DAILY    escitalopram oxalate (LEXAPRO) tablet 20 mg  20 mg Oral DAILY    levothyroxine (SYNTHROID) tablet 50 mcg  50 mcg Oral ACB    sodium chloride (NS) flush 5-40 mL  5-40 mL IntraVENous Q8H    sodium chloride (NS) flush 5-40 mL  5-40 mL IntraVENous PRN    acetaminophen (TYLENOL) tablet 650 mg  650 mg Oral Q4H PRN    naloxone (NARCAN) injection 0.4 mg  0.4 mg IntraVENous PRN    aspirin chewable tablet 81 mg  81 mg Oral DAILY    benzonatate (TESSALON) capsule 100 mg  100 mg Oral TID PRN    ezetimibe (ZETIA) tablet 10 mg  10 mg Oral DAILY         Axel Morris MD

## 2022-06-15 NOTE — CARDIO/PULMONARY
Cardiopulmonary Rehab:    Chart reviewed. Pt is a 78 y.o.  F admitted with Chest pain, unspecified type [R07.9]  Coronary artery disease [I25.10]. S/p PCI/DEMI of LCx on 6/14/22. Pt visited. CAD education folder given to [facility]. Educated using teach back method. Reviewed CAD diagnosis definition and purpose of intervention. Discussed risk factors for CAD to include the following: family history, elevated BMI, hyperlipidemia, hypertension, diabetes, stress, and smoking. Discussed Heart Healthy/Low Sodium (2000 mg) diet. Reviewed the importance of medication compliance. Discussed follow up appointments with cardiologist, signs and symptoms of angina, and what to report to physician after discharge. Pt with chronic back pain, limited mobility. Inpatient rehab recommneded at discharge. When asked about exercise at home, pt states \"I'm 78years old, I've been there and cant do that\". Outpatient cardiac rehab is not appropriate at this time. [facility] verbalized understanding with questions answered.  Naima Augustine RN

## 2022-06-16 VITALS
OXYGEN SATURATION: 99 % | DIASTOLIC BLOOD PRESSURE: 98 MMHG | BODY MASS INDEX: 28.13 KG/M2 | WEIGHT: 149 LBS | HEIGHT: 61 IN | HEART RATE: 74 BPM | TEMPERATURE: 98.3 F | SYSTOLIC BLOOD PRESSURE: 122 MMHG | RESPIRATION RATE: 16 BRPM

## 2022-06-16 LAB
ANION GAP SERPL CALC-SCNC: 6 MMOL/L (ref 5–15)
ATRIAL RATE: 74 BPM
ATRIAL RATE: 75 BPM
BNP SERPL-MCNC: 954 PG/ML
BUN SERPL-MCNC: 16 MG/DL (ref 6–20)
BUN/CREAT SERPL: 29 (ref 12–20)
CALCIUM SERPL-MCNC: 8.6 MG/DL (ref 8.5–10.1)
CALCULATED P AXIS, ECG09: 29 DEGREES
CALCULATED P AXIS, ECG09: 49 DEGREES
CALCULATED R AXIS, ECG10: 0 DEGREES
CALCULATED R AXIS, ECG10: 22 DEGREES
CALCULATED T AXIS, ECG11: 31 DEGREES
CALCULATED T AXIS, ECG11: 5 DEGREES
CHLORIDE SERPL-SCNC: 100 MMOL/L (ref 97–108)
CO2 SERPL-SCNC: 31 MMOL/L (ref 21–32)
CREAT SERPL-MCNC: 0.56 MG/DL (ref 0.55–1.02)
DIAGNOSIS, 93000: NORMAL
DIAGNOSIS, 93000: NORMAL
GLUCOSE BLD STRIP.AUTO-MCNC: 147 MG/DL (ref 65–117)
GLUCOSE BLD STRIP.AUTO-MCNC: 207 MG/DL (ref 65–117)
GLUCOSE BLD STRIP.AUTO-MCNC: 244 MG/DL (ref 65–117)
GLUCOSE SERPL-MCNC: 143 MG/DL (ref 65–100)
INR PPP: 1.5 (ref 0.9–1.1)
P-R INTERVAL, ECG05: 162 MS
P-R INTERVAL, ECG05: 168 MS
POTASSIUM SERPL-SCNC: 3.5 MMOL/L (ref 3.5–5.1)
PROTHROMBIN TIME: 15.3 SEC (ref 9–11.1)
Q-T INTERVAL, ECG07: 430 MS
Q-T INTERVAL, ECG07: 454 MS
QRS DURATION, ECG06: 92 MS
QRS DURATION, ECG06: 96 MS
QTC CALCULATION (BEZET), ECG08: 480 MS
QTC CALCULATION (BEZET), ECG08: 503 MS
SERVICE CMNT-IMP: ABNORMAL
SODIUM SERPL-SCNC: 137 MMOL/L (ref 136–145)
VENTRICULAR RATE, ECG03: 74 BPM
VENTRICULAR RATE, ECG03: 75 BPM

## 2022-06-16 PROCEDURE — 85610 PROTHROMBIN TIME: CPT

## 2022-06-16 PROCEDURE — 83880 ASSAY OF NATRIURETIC PEPTIDE: CPT

## 2022-06-16 PROCEDURE — 74011250637 HC RX REV CODE- 250/637: Performed by: INTERNAL MEDICINE

## 2022-06-16 PROCEDURE — 80048 BASIC METABOLIC PNL TOTAL CA: CPT

## 2022-06-16 PROCEDURE — 36415 COLL VENOUS BLD VENIPUNCTURE: CPT

## 2022-06-16 PROCEDURE — 74011636637 HC RX REV CODE- 636/637: Performed by: INTERNAL MEDICINE

## 2022-06-16 PROCEDURE — 74011000250 HC RX REV CODE- 250: Performed by: INTERNAL MEDICINE

## 2022-06-16 PROCEDURE — 82962 GLUCOSE BLOOD TEST: CPT

## 2022-06-16 PROCEDURE — 77010033678 HC OXYGEN DAILY

## 2022-06-16 RX ORDER — CLOPIDOGREL BISULFATE 75 MG/1
75 TABLET ORAL DAILY
Qty: 90 TABLET | Refills: 3 | Status: SHIPPED | OUTPATIENT
Start: 2022-06-17 | End: 2022-07-15

## 2022-06-16 RX ORDER — GUAIFENESIN 100 MG/5ML
81 LIQUID (ML) ORAL DAILY
Qty: 7 TABLET | Refills: 0 | Status: SHIPPED
Start: 2022-06-14 | End: 2022-06-21

## 2022-06-16 RX ORDER — QUETIAPINE FUMARATE 25 MG/1
25 TABLET, FILM COATED ORAL
Qty: 90 TABLET | Refills: 3 | Status: SHIPPED | OUTPATIENT
Start: 2022-06-16 | End: 2022-08-02 | Stop reason: SDUPTHER

## 2022-06-16 RX ORDER — POTASSIUM CHLORIDE 750 MG/1
20 TABLET, FILM COATED, EXTENDED RELEASE ORAL
Status: COMPLETED | OUTPATIENT
Start: 2022-06-16 | End: 2022-06-16

## 2022-06-16 RX ORDER — POTASSIUM CHLORIDE 750 MG/1
10 TABLET, FILM COATED, EXTENDED RELEASE ORAL DAILY
Qty: 90 TABLET | Refills: 3 | Status: SHIPPED | OUTPATIENT
Start: 2022-06-17 | End: 2022-08-22

## 2022-06-16 RX ORDER — FUROSEMIDE 20 MG/1
20 TABLET ORAL DAILY
Qty: 90 TABLET | Refills: 3 | Status: SHIPPED | OUTPATIENT
Start: 2022-06-16 | End: 2022-10-17

## 2022-06-16 RX ORDER — WARFARIN 1 MG/1
1 TABLET ORAL ONCE
Status: COMPLETED | OUTPATIENT
Start: 2022-06-16 | End: 2022-06-16

## 2022-06-16 RX ADMIN — Medication 2 UNITS: at 17:14

## 2022-06-16 RX ADMIN — ACETAMINOPHEN 650 MG: 325 TABLET ORAL at 17:14

## 2022-06-16 RX ADMIN — EZETIMIBE 10 MG: 10 TABLET ORAL at 09:04

## 2022-06-16 RX ADMIN — FUROSEMIDE 20 MG: 20 TABLET ORAL at 09:04

## 2022-06-16 RX ADMIN — SODIUM CHLORIDE, PRESERVATIVE FREE 10 ML: 5 INJECTION INTRAVENOUS at 04:51

## 2022-06-16 RX ADMIN — POTASSIUM CHLORIDE 10 MEQ: 750 TABLET, FILM COATED, EXTENDED RELEASE ORAL at 09:04

## 2022-06-16 RX ADMIN — Medication 1 CAPSULE: at 17:14

## 2022-06-16 RX ADMIN — POTASSIUM CHLORIDE 20 MEQ: 750 TABLET, FILM COATED, EXTENDED RELEASE ORAL at 06:48

## 2022-06-16 RX ADMIN — Medication 2 UNITS: at 09:03

## 2022-06-16 RX ADMIN — CLOPIDOGREL BISULFATE 75 MG: 75 TABLET ORAL at 09:04

## 2022-06-16 RX ADMIN — WARFARIN SODIUM 1 MG: 1 TABLET ORAL at 17:14

## 2022-06-16 RX ADMIN — ESCITALOPRAM OXALATE 20 MG: 10 TABLET ORAL at 09:04

## 2022-06-16 RX ADMIN — PANTOPRAZOLE SODIUM 40 MG: 40 TABLET, DELAYED RELEASE ORAL at 09:04

## 2022-06-16 RX ADMIN — ASPIRIN 81 MG: 81 TABLET, CHEWABLE ORAL at 09:04

## 2022-06-16 RX ADMIN — LEVOTHYROXINE SODIUM 50 MCG: 0.05 TABLET ORAL at 06:48

## 2022-06-16 RX ADMIN — METOPROLOL SUCCINATE 50 MG: 50 TABLET, EXTENDED RELEASE ORAL at 09:04

## 2022-06-16 RX ADMIN — Medication 3 UNITS: at 12:46

## 2022-06-16 RX ADMIN — BUPROPION HYDROCHLORIDE 150 MG: 150 TABLET, EXTENDED RELEASE ORAL at 09:04

## 2022-06-16 NOTE — PROGRESS NOTES
Warfarin Dosing - Pharmacy Consult Note    Consulting Provider: Dr. Thang Rodriguez  Indication: History of VTE/PE Protein C deficiency  Warfarin Dose prior to admission: 1 mg daily     Concurrent anticoagulants/antiplatelets: clopidogrel + aspirin    Significant Drug Interactions: No obvious interactions  Recent Labs     06/16/22  0449 06/15/22  0347 06/14/22  1835 06/14/22  0414   INR 1.5* 1.1 1.2*  --    HGB  --   --   --  8.9*   PLT  --   --   --  338     [unfilled]    Date      INR       Dose  6/9          1.3        1 mg  6/10        1.3         ----  6/14        1.2        1.5 mg  6/15        1.1        2 mg  6/16        1.5        3 mg  6/17    Assessment/Plan  Goal INR: 2 - 3  Will order warfarin 1 mg PO x 1 dose     Active problem list reviewed. INR orders are placed. Chart reviewed for pertinent labs, drug/diet interactions, and past doses. Documentation of patient's clinical condition was reviewed. Pharmacy Dosing:  Pharmacy will continue to follow.

## 2022-06-16 NOTE — PROGRESS NOTES
Transition of Care Plan:     RUR:13% low risk   Disposition:IPR:  [facility]   Will be an EMTALA  Follow up appointments: PCP  DME needed: Pt has access to cane,   Transportation at [address] @ 6:45pm   [place] or means to access home:      Yes  [place] Medicare Letter:N/a  Is patient a BCPI-A Bundle: NA                     If yes, was Bundle Letter given?:  NA  Is patient a [place] and connected with the [facility] [address] [facility]  If yes, was [place] transfer form completed and [facility] notified? NA  Caregiver Contact: Keo White [phone]  Discharge Caregiver contacted prior to [address] needed? :     No      CM notified of acceptance to [facility]  and [facility]. [facility] currently does not have a bed available for the Pt today. [facility] is able to admit today. CM spoke with Pt's son Nilo Montejo, he is receptive to Pt discharging to [facility] today. CM notified attending and floor nurse. Call report: [phone]  Room 100  Accepting physician: Dr. Andres Nicholson     [facility] to transport at 6:45pm today.        Mello Barrera, [facility], KAYLA Johnson

## 2022-06-16 NOTE — DISCHARGE INSTRUCTIONS
[address] [facility], [address]   [phone]  [facility] [address] [facility]    [email or web address]    Patient Discharge Instructions    Kai Moreno / [id number] : 1940    Admitted 2022 Discharged: 2022       · It is important that you take the medication exactly as they are prescribed. · Keep your medication in the bottles provided by the pharmacist and keep a list of the medication names, dosages, and times to be taken in your wallet. · Do not take other medications without consulting your doctor. BRING ALL OF YOUR MEDICINES TO YOUR OFFICE VISIT. Follow-up with Dr. Nimo Lai to schedule TAVR. Cardiac Catheterization  Discharge Instructions     Do not drive, operate any machinery, or sign any legal documents for 24 hours after your procedure. You must have someone to drive you home.  You may take a shower 24 hours after your cardiac catheterization. Be sure to get the dressing wet and then remove it; gently wash the area with warm soapy water. Pat dry and leave open to air. To help prevent infections, be sure to keep the cath site clean and dry. No lotions, creams, powders, ointments, etc. in the cath site for approximately 1 week.  Do not take a tub bath, get in a hot tub or swimming pool for approximately 5 days or until the cath site is completely healed.  No strenuous activity or heavy lifting over 10 lbs. for 7 days.  Drink plenty of fluids for 24-48 hours after your cath to flush the contrast dye from your kidneys. No alcoholic beverages for 24 hours. You may resume your previous diet (low fat, low cholesterol) after your cath.  After your cath, some bruising or discomfort is common during the healing process. Tylenol, 1-2 tablets every 6 hours as needed, is recommended if you experience any discomfort.   If you experience any signs or symptoms of infection such as fever, chills, or poorly healing incision, persistent tenderness or swelling in the groin, redness and/or warmth to the touch, numbness, significant tingling or pain at the groin site or affected extremity, rash, drainage from the cath site, or if the leg feels tight or swollen, call your physician right away.  If bleeding at the cath site occurs, take a clean gauze pad and apply direct pressure to the groin just above the puncture site. Call 911 immediately, and continue to apply direct pressure until an ambulance gets to your location.  You may return to work  2  days after your cardiac cath if no groin bleeding. Information obtained by :  I understand that if any problems occur once I am at home I am to contact my physician. I understand and acknowledge receipt of the instructions indicated above. R.N.'s Signature                                                                  Date/Time                                                                                                                                              Patient or Representative Signature                                                          Date/Time      Adyd Stephens MD             [address] [place] [address], [address]    [phone]  [address]    [email or web address]

## 2022-06-16 NOTE — PROGRESS NOTES
Progress Note      6/16/2022 8:04 AM  NAME: Ana Suarez   MRN:  [id number]   Admit Diagnosis: Chest pain, unspecified type [R07.9]  Coronary artery disease [I25.10]                Assessment:       - Cath in [place] 2001 unclear details, PCI, left with  of RCA. Cath 6/2022 with 0ccluded RCA. 99% proximal LCx. 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 Rocco Arreola  - IBS follows with GI  - Anxiety  - Anemia  - Mild dementia  - CONTRAST ALLERGY  -  7/2020, lives alone in an apartment, 3 kids, one son lives in [facility], retired , limited functional capacity uses a cane limited by back pain  DNR                     Plan:        Patient with anxiety  , and has a hard time with this. Son helps her, has grandkids. Main complaint is back pain, uses a cane.     No chest pain  +dyspnea  No syncope.     Cath with high grade LCx. Patient with baseline anxiety, exacerbated by prednisone for dye allergy. Acute delerium 6/8 night. Improved with adjustment in psych meds and treatment. 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 CXR interstitial prominence, taper O2 as able. Cath with PCI of LCx    - Cont warfarin for goal INR 2-3  - Cont added aspirin for one week until 6/21  - Cont added clopidogrel for one year  - Cont metoprolol  - Cont added  Lasix, replete K, taper off O2, follow bmp  - Cont atorvastatin     - Appreciate psychiatry evaluation, better on serquel  - Appreciate hospitalist help, head ct ok    - Cont taper O2 as able, likely will need home O2 at night. Discussed with patient and son. All agree DNR.     Appreciate palliative care helping family with long term care plans. PT/OT evaluation. Discharge to rehab once bed available                    [x]? High complexity decision making was performed         We discussed the expected course, resolution and complications of the diagnoses in detail. Medication risk, benefits, costs, interactions, and alternatives were discussed as indicated. I advised him to contact the office if his condition worsens, changes or fails to improve as anticipated. Patient expressed understanding with the diagnoses  and plan. Subjective:     Alberto Harper denies chest pain, + dyspnea. Discussed with RN events overnight. Review of Systems:    Symptom Y/N Comments  Symptom Y/N Comments   Fever/Chills N   Chest Pain N    Poor Appetite N   Edema N    Cough N   Abdominal Pain N    Sputum N   Joint Pain N    SOB/VILA N   Pruritis/Rash N    Nausea/vomit N   Tolerating PT/OT     Diarrhea N   Tolerating Diet Y    Constipation N   Other       Could NOT obtain due to:      Objective:      Physical Exam:    Last 24hrs VS reviewed since prior progress note. Most recent are:    Visit Vitals  BP (!) 126/42 (BP 1 Location: Right upper arm, BP Patient Position: At rest)   Pulse 92   Temp 98.2 °F (36.8 °C)   Resp 18   Ht 5' 1\" (1.549 m)   Wt 67.6 kg (149 lb)   SpO2 95%   Breastfeeding No   BMI 28.15 kg/m²       Intake/Output Summary (Last 24 hours) at 6/16/2022 0586  Last data filed at 6/15/2022 1135  Gross per 24 hour   Intake 620 ml   Output 350 ml   Net 270 ml        General Appearance: Well developed, well nourished, alert & oriented x 3,    no acute distress. Ears/Nose/Mouth/Throat: Hearing grossly normal.  Neck: Supple. Chest: Lungs clear to auscultation bilaterally. Cardiovascular: Regular rate and rhythm, S1S2 normal, III/VI systolic murmur. Abdomen: Soft, non-tender, bowel sounds are active. Extremities: No edema bilaterally. Skin: Warm and dry.     PMH/SH reviewed - no change compared to H&P    Data Review    Telemetry: normal sinus rhythm     Lab Data Personally Reviewed:    Recent Labs     06/14/22  0414   WBC 12.8*   HGB 8.9*   HCT 33.3*        Recent Labs     06/16/22 0449 06/15/22  0347 06/14/22  1835   INR 1.5* 1.1 1.2*   PTP 15.3* 11.8* 12.2*      Recent Labs     06/16/22  0449 06/15/22  0347 06/14/22  0414    136 132*   K 3.5 3.7 3.7    98 92*   CO2 31 30 34*   BUN 16 24* 24*   CREA 0.56 0.63 0.67   * 114* 137*   CA 8.6 8.5 9.1     No results for input(s): CPK, CKNDX, TROIQ in the last 72 hours. No lab exists for component: CPKMB  Lab Results   Component Value Date/Time    Cholesterol, total 107 06/25/2021 11:52 AM    HDL Cholesterol 52 06/25/2021 11:52 AM    LDL, calculated 39 06/25/2021 11:52 AM    Triglyceride 80 06/25/2021 11:52 AM    CHOL/HDL Ratio 2.1 06/25/2021 11:52 AM       No results for input(s): AP, TBIL, TP, ALB, GLOB, GGT, AML, LPSE in the last 72 hours. No lab exists for component: SGOT, GPT, AMYP, HLPSE  No results for input(s): PH, PCO2, PO2 in the last 72 hours.     Medications Personally Reviewed:    Current Facility-Administered Medications   Medication Dose Route Frequency    potassium chloride SR (KLOR-CON 10) tablet 20 mEq  20 mEq Oral NOW    L.acidophilus-paracasei-S.thermophil-bifidobacter (RISAQUAD) 8 billion cell capsule  1 Capsule Oral DAILY    furosemide (LASIX) tablet 20 mg  20 mg Oral DAILY    traMADoL (ULTRAM) tablet 25 mg  25 mg Oral Q6H PRN    sodium chloride (NS) flush 5-40 mL  5-40 mL IntraVENous Q8H    sodium chloride (NS) flush 5-40 mL  5-40 mL IntraVENous PRN    nitroglycerin (NITROSTAT) tablet 0.4 mg  0.4 mg SubLINGual Q5MIN PRN    WARFARIN INFORMATION NOTE (COUMADIN)   Other QPM    sodium chloride (OCEAN) 0.65 % nasal squeeze bottle 2 Spray  2 Spray Both Nostrils Q2H PRN    polyethylene glycol (MIRALAX) packet 17 g  17 g Oral DAILY    potassium chloride SR (KLOR-CON 10) tablet 10 mEq  10 mEq Oral DAILY    clopidogreL (PLAVIX) tablet 75 mg  75 mg Oral DAILY    insulin lispro (HUMALOG) injection   SubCUTAneous AC&HS    glucose chewable tablet 16 g  4 Tablet Oral PRN    glucagon (GLUCAGEN) injection 1 mg  1 mg IntraMUSCular PRN    dextrose 10% infusion 0-250 mL  0-250 mL IntraVENous PRN    ondansetron (ZOFRAN) injection 4 mg  4 mg IntraVENous Q6H PRN    melatonin tablet 3 mg  3 mg Oral QHS    QUEtiapine (SEROquel) tablet 25 mg  25 mg Oral QHS    pantoprazole (PROTONIX) tablet 40 mg  40 mg Oral DAILY    albuterol (PROVENTIL VENTOLIN) nebulizer solution 2.5 mg  2.5 mg Nebulization Q6H PRN    atorvastatin (LIPITOR) tablet 20 mg  20 mg Oral DAILY    metoprolol succinate (TOPROL-XL) XL tablet 50 mg  50 mg Oral DAILY    amitriptyline (ELAVIL) tablet 10 mg  10 mg Oral QHS    buPROPion XL (WELLBUTRIN XL) tablet 150 mg  150 mg Oral DAILY    escitalopram oxalate (LEXAPRO) tablet 20 mg  20 mg Oral DAILY    levothyroxine (SYNTHROID) tablet 50 mcg  50 mcg Oral ACB    sodium chloride (NS) flush 5-40 mL  5-40 mL IntraVENous Q8H    sodium chloride (NS) flush 5-40 mL  5-40 mL IntraVENous PRN    acetaminophen (TYLENOL) tablet 650 mg  650 mg Oral Q4H PRN    naloxone (NARCAN) injection 0.4 mg  0.4 mg IntraVENous PRN    aspirin chewable tablet 81 mg  81 mg Oral DAILY    benzonatate (TESSALON) capsule 100 mg  100 mg Oral TID PRN    ezetimibe (ZETIA) tablet 10 mg  10 mg Oral DAILY         Hallie Smith MD

## 2022-06-16 NOTE — DISCHARGE SUMMARY
Cardiology Discharge Summary             Patient ID:  Dina Travis  [id number]  29 y.o.  1940    Admit Date: 6/7/2022     Discharge Date: 6/16/2022   Admitting Physician: Jose Elias Ovalle MD   Discharge Physician: Jose Elias Ovalle MD                  Assessment:       - Cath in [address] unclear details, PCI, left with  of RCA. Cath 6/2022 with 0ccluded RCA.  99% proximal LCx. 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 Orlando Teague  - IBS follows with GI  - Anxiety  - Anemia  - Mild dementia  - CONTRAST ALLERGY  -  7/2020, lives alone in an apartment, 3 kids, one son lives in [address] [facility], retired , limited functional capacity uses a cane limited by back pain  DNR                     Plan:        Patient with anxiety  , and has a hard time with this. Son helps her, has grandkids. Main complaint is back pain, uses a cane.     No chest pain  +dyspnea  No syncope.     Cath with high grade LCx. Patient with baseline anxiety, exacerbated by prednisone for dye allergy.      Acute delerium 6/8 night. Improved with adjustment in psych meds and treatment.     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   CXR interstitial prominence, taper O2 as able.       Cath with PCI of LCx     - Cont warfarin for goal INR 2-3  - Cont added aspirin for one week until 6/21  - Cont added clopidogrel for one year  - Cont metoprolol  - Cont added  Lasix, replete K, taper off O2, follow bmp  - Cont atorvastatin     - Appreciate psychiatry evaluation, better on serquel  - Appreciate hospitalist help, head ct ok     - Cont taper O2 as able, likely will need home O2 at night.     Discussed with patient and son. All agree DNR. Appreciate palliative care helping family with long term care plans.     PT/OT evaluation.     Discharge to rehab once bed available                         [x]? ?        High complexity decision making was performed              Consults:  Cardiac surgery  Hospitalist  Psychiatry    > 30 minutes coordinating discharge. Hospital Course and Discharge Exam:    On 1-2L O2, being tapered. On the day of discharge, ambulatory and taking oral.  All questions answered. Aware of signs and symptoms warranting urgent medical follow-up or calling 911. Visit Vitals  BP (!) 122/98 (BP 1 Location: Right upper arm, BP Patient Position: At rest;Lying)   Pulse 74   Temp 98.3 °F (36.8 °C)   Resp 16   Ht 5' 1\" (1.549 m)   Wt 67.6 kg (149 lb)   SpO2 99%   Breastfeeding No   BMI 28.15 kg/m²       Physical Exam:  Nondiaphoretic, not in acute distress. Unlabored, clear to auscultation bilaterally. Regular rate and rhythm, no murmur, rub, or gallop. No JVD or peripheral edema. Palpable radial pulses bilaterally. Groin site(s) OK. No cyanosis. Skin warm and dry. Awake, appropriate, neuro grossly nonfocal.  Ambulatory.     Lab Results   Component Value Date/Time    WBC 12.8 (H) 06/14/2022 04:14 AM    HGB 8.9 (L) 06/14/2022 04:14 AM    HCT 33.3 (L) 06/14/2022 04:14 AM    PLATELET 726 05/87/9930 04:14 AM    MCV 66.1 (L) 06/14/2022 04:14 AM     Lab Results   Component Value Date/Time    Sodium 137 06/16/2022 04:49 AM    Potassium 3.5 06/16/2022 04:49 AM    Chloride 100 06/16/2022 04:49 AM    CO2 31 06/16/2022 04:49 AM    Anion gap 6 06/16/2022 04:49 AM    Glucose 143 (H) 06/16/2022 04:49 AM    BUN 16 06/16/2022 04:49 AM    Creatinine 0.56 06/16/2022 04:49 AM    BUN/Creatinine ratio 29 (H) 06/16/2022 04:49 AM    GFR est AA >60 06/16/2022 04:49 AM    GFR est non-AA >60 06/16/2022 04:49 AM    Calcium 8.6 06/16/2022 04:49 AM     No results found for: CPK, RCK1, RCK2, RCK3, RCK4, CKMB, CKNDX, CKND1, TROPT, TROIQ, BNPP, BNP  Lab Results   Component Value Date/Time    TSH 1.04 06/09/2022 10:55 AM     Lab Results   Component Value Date/Time    Cholesterol, total 107 06/25/2021 11:52 AM    HDL Cholesterol 52 06/25/2021 11:52 AM    LDL, calculated 39 06/25/2021 11:52 AM    VLDL, calculated 16 06/25/2021 11:52 AM    Triglyceride 80 06/25/2021 11:52 AM    CHOL/HDL Ratio 2.1 06/25/2021 11:52 AM     Lab Results   Component Value Date/Time    Hemoglobin A1c 7.3 (H) 06/25/2021 11:52 AM    Hemoglobin A1c (POC) 7.6 (A) 03/29/2022 01:24 PM             Disposition: Rehab    Patient Instructions:   Current Discharge Medication List      START taking these medications    Details   aspirin 81 mg chewable tablet Take 1 Tablet by mouth daily for 7 days. Qty: 7 Tablet, Refills: 0      clopidogreL (PLAVIX) 75 mg tab Take 1 Tablet by mouth daily. Qty: 90 Tablet, Refills: 3      furosemide (LASIX) 20 mg tablet Take 1 Tablet by mouth daily. Qty: 90 Tablet, Refills: 3      potassium chloride SR (KLOR-CON 10) 10 mEq tablet Take 1 Tablet by mouth daily. Qty: 90 Tablet, Refills: 3      QUEtiapine (SEROquel) 25 mg tablet Take 1 Tablet by mouth nightly. Qty: 90 Tablet, Refills: 3         CONTINUE these medications which have NOT CHANGED    Details   celecoxib (CELEBREX) 200 mg capsule TAKE 1 CAPSULE BY MOUTH EVERY 12 HOURS AS NEEDED FOR PAIN  Qty: 60 Capsule, Refills: 11    Comments: ZERO refills remain on this prescription. Your patient is requesting advance approval of refills for this medication to PREVENT ANY MISSED DOSES      loperamide (IMODIUM) 2 mg capsule Take 1 Capsule by mouth four (4) times daily as needed for Diarrhea. Qty: 30 Capsule, Refills: 4      metoprolol succinate (TOPROL-XL) 50 mg XL tablet Take 1 Tablet by mouth daily.   Qty: 90 Tablet, Refills: 3      butalbital-acetaminophen-caffeine (FIORICET, ESGIC) -40 mg per tablet Take 1 Tablet by mouth every twelve (12) hours as needed for Headache. Qty: 30 Tablet, Refills: 1      Bifidobacterium Infantis (Align) 4 mg cap Take 1 Capsule by mouth daily as needed for Diarrhea. Indications: ALIGN OTC  Qty: 90 Capsule, Refills: 3      benzonatate (TESSALON) 100 mg capsule Take 1 Capsule by mouth three (3) times daily as needed for Cough. Qty: 30 Capsule, Refills: 2      buPROPion XL (WELLBUTRIN XL) 150 mg tablet Take 1 Tablet by mouth daily. Qty: 90 Tablet, Refills: 3      oxybutynin chloride XL (DITROPAN XL) 10 mg CR tablet TAKE 1 TABLET BY MOUTH EVERY DAY  Qty: 90 Tablet, Refills: 3      amitriptyline (ELAVIL) 10 mg tablet TAKE 1 TABLET BY MOUTH EVERY NIGHT  Qty: 90 Tablet, Refills: 3    Associated Diagnoses: Anxiety and depression      traZODone (DESYREL) 100 mg tablet Take 100 mg by mouth nightly. levothyroxine (SYNTHROID) 25 mcg tablet Take 2 Tablets by mouth Daily (before breakfast). Qty: 180 Tablet, Refills: 3      albuterol (PROVENTIL HFA, VENTOLIN HFA, PROAIR HFA) 90 mcg/actuation inhaler Take 2 Puffs by inhalation every six (6) hours as needed for Wheezing. Qty: 8 g, Refills: 2      Wixela Inhub 250-50 mcg/dose diskus inhaler INHALE 1 PUFF AND INTO THE LUNGS EVERY 12 HOURS. RINSE MOUTH AFTER USE  Qty: 60 Each, Refills: 12    Associated Diagnoses: Simple chronic bronchitis (HCC)      levocetirizine (XYZAL) 5 mg tablet Take 1 Tablet by mouth daily. Qty: 30 Tablet, Refills: 5      ipratropium (ATROVENT) 42 mcg (0.06 %) nasal spray 2 Sprays by Both Nostrils route four (4) times daily.   Qty: 15 mL, Refills: 5      nystatin (MYCOSTATIN) 100,000 unit/mL suspension SWISH AND SWALLOW 5 ML BY MOUTH FOR 30 SECONDS 4 TIMES A DAY AS NEEDED FOR MOUTH PAIN  Qty: 60 mL, Refills: 2      esomeprazole (NEXIUM) 20 mg capsule TAKE 1 CAPSULE BY MOUTH DAILY  Qty: 90 Capsule, Refills: 3      ondansetron hcl (ZOFRAN) 4 mg tablet TAKE 1 TABLET BY MOUTH EVERY 8 HOURS AS NEEDED FOR NAUSEA OR VOMITING  Qty: 30 Tablet, Refills: 2      levETIRAcetam (KEPPRA) 500 mg tablet TAKE 1 TABLET BY MOUTH EVERY MORNING AND 1 AND 1/2 TABLET BY MOUTH EVERY EVENING FOR MIGRAINES  Qty: 225 Tablet, Refills: 3    Associated Diagnoses: Migraine without aura and without status migrainosus, not intractable      dicyclomine (BENTYL) 10 mg capsule TAKE ONE CAPSULE BY MOUTH FOUR TIMES DAILY AS NEEDED  Qty: 120 Capsule, Refills: 3      ezetimibe (ZETIA) 10 mg tablet TAKE 1 TABLET BY MOUTH DAILY  Qty: 90 Tablet, Refills: 3      atorvastatin (LIPITOR) 20 mg tablet TAKE 1 TABLET BY MOUTH EVERY DAY  Qty: 90 Tablet, Refills: 3      diclofenac (VOLTAREN) 1 % gel Apply  to affected area every twelve (12) hours as needed for Pain. Qty: 100 g, Refills: 2      escitalopram oxalate (LEXAPRO) 20 mg tablet TAKE 1 TABLET BY MOUTH DAILY  Qty: 90 Tab, Refills: 3      !! warfarin (COUMADIN) 1 mg tablet Take 1 Tablet by mouth daily. Qty: 90 Tablet, Refills: 3      !! warfarin (COUMADIN) 2 mg tablet Take 1 Tablet by mouth daily. Qty: 90 Tablet, Refills: 3       !! - Potential duplicate medications found. Please discuss with provider. STOP taking these medications       traMADoL (ULTRAM) 50 mg tablet Comments:   Reason for Stopping:         clorazepate (TRANXENE) 3.75 mg tablet Comments:   Reason for Stopping:               FOLLOW-UP:       Call the office [phone] to make an appointment with Dr. Lani Marquez for two weeks. [address] [place] [address], [address] [phone]  [place], [address] [facility]    [email or web address]    Thank you for placing your trust for your heart with the physicians and staff of [facility]. We have long provided [place] with the most advanced cardiovascular care possible using a personalized and caring approach. And we hope to continue this strong tradition well into the future. A heart condition, or the fear of having a heart condition, can be a stressful time for a patient and their family.  There are appointments, testing procedures and unfamiliar terms that can cause natural questions and concerns. While we encourage you to speak with your nurse or physician regarding any questions you might have, please consider this web site as a powerful resource you can use at any time. Often, questions or concerns only arise once you've left our office. There are many useful sections on this web site and links to other professional organizations and advocacy groups. These sources can help answer many questions you might have from the comfort of your own home or office. Again, thank you for considering [facility] as your trusted provider of heart care. Please don't hesitate to let us know if there is anything we can do to enhance your experience with us.      Signed:  Nancy Funk MD  6/16/2022

## 2022-06-16 NOTE — PROGRESS NOTES
Hospitalist Progress Note    NAME: Katia Mitchell   :  1940   MRN:  [id number]       Assessment / Plan:  Confusional state with episodes of agitation, concerning for delirium  Chart reviewed, per chart patient has been confused and aggressive towards the staff and refusing care. Discussed with patient some reported that patient at baseline is alert oriented x3 and very much independent and has never had any memory issues. He reported that patient can get confused when she does not have much sleep. Patient seems to be anxious since the loss of her  2 years ago. When seen patient was alert oriented x3 . Able to  maintain normal conversation. Will t B12, folate, TSH, ABG and ammonia WNL   CT of the brain  Show no acute changes   Noted psych consult, Seroquel at bedtime, and frequent reorientation  Continue with home dose Lexapro  Mental status back to basline   S/p cath with PCI of LCX       Acute respiratory failure with hypoxia  In Creased oxygen requirement this morning, had to be placed on high flow 8 to 10 L due to sudden hypoxia.   Patient was on 3 L yesterday  Chest x-ray did not show any acute pathology, showed diffuse interstitial marking  Continue with nebs  Get D-dimer, if positive most likely will need CTA of the chest.  Patient has severe allergy to iodine, most likely will need VQ scan    CAD, POA (s/p cath in , now with occluded RCA)   - management per primary cardiology team  palliative care consult      Anxiety, POA  Agitation, POA  Depression, POA  - recently , was overmedicating with anxiety meds  - resume home meds  - symptom management     Asthma, POA  - resume PTA inhaler     DM, POA  - BG checks with SSI coverage     Hypertension, POA  - resume home meds     Hyperlipidemia, POA  - resume Lipitor, Zetia     History of  left DVT  - resume Plavix  Off coumadin     Diarrhea  - Start patient on probiotic     IBS, POA  - resume home meds  25.0 - 29.9 Overweight / Body mass index is 28.15 kg/m². Estimated discharge date: Per primary attending  Barriers:    Code status: DNR  Prophylaxis: Coumadin  Recommended Disposition: Home w/Family     Subjective:     Chief Complaint / Reason for Physician Visit  Follow-up delirium discussed with RN events overnight. Patient has no acute complaints, is alert oriented x3,plan for cardiac cath next week   Review of Systems:  Symptom Y/N Comments  Symptom Y/N Comments   Fever/Chills n   Chest Pain n    Poor Appetite    Edema     Cough    Abdominal Pain     Sputum    Joint Pain     SOB/VILA n   Pruritis/Rash     Nausea/vomit    Tolerating PT/OT     Diarrhea    Tolerating Diet y    Constipation    Other       Could NOT obtain due to:      Objective:     VITALS:   Last 24hrs VS reviewed since prior progress note. Most recent are:  Patient Vitals for the past 24 hrs:   Temp Pulse Resp BP SpO2   06/16/22 1548 98.3 °F (36.8 °C) 74 16 (!) 122/98 99 %   06/16/22 1134 98.4 °F (36.9 °C) 75 16 (!) 121/54 100 %   06/16/22 0743 98.5 °F (36.9 °C) 90 16 (!) 130/50 96 %   06/16/22 0443 98.2 °F (36.8 °C) 92 18 (!) 126/42 95 %   06/15/22 2340 98.4 °F (36.9 °C) 81 16 (!) 126/48 96 %   06/15/22 1959 98.5 °F (36.9 °C) 89 16 (!) 138/49 92 %       Intake/Output Summary (Last 24 hours) at 6/16/2022 1633  Last data filed at 6/16/2022 1611  Gross per 24 hour   Intake --   Output 800 ml   Net -800 ml        I had a face to face encounter and independently examined this patient on 6/16/2022, as outlined below:  PHYSICAL EXAM:  General: WD, WN. Alert, cooperative, no acute distress    EENT:  EOMI. Anicteric sclerae. MMM  Resp:  CTA bilaterally, no wheezing or rales. No accessory muscle use  CV:  Regular  rhythm,  No edema  GI:  Soft, Non distended, Non tender. +Bowel sounds  Neurologic:  Alert and oriented X 3, normal speech,   Psych:   Good insight. Not anxious nor agitated  Skin:  No rashes.   No jaundice    Reviewed most current lab test results and cultures YES  Reviewed most current radiology test results   YES  Review and summation of old records today    NO  Reviewed patient's current orders and MAR    YES  PMH/SH reviewed - no change compared to H&P  ________________________________________________________________________  Care Plan discussed with:    Comments   Patient x    Family  x    RN x    Care Manager     Consultant                        Multidiciplinary team rounds were held today with , nursing, pharmacist and clinical coordinator. Patient's plan of care was discussed; medications were reviewed and discharge planning was addressed. ________________________________________________________________________  Total NON critical care TIME:  45   Minutes    Total CRITICAL CARE TIME Spent:   Minutes non procedure based      Comments   >50% of visit spent in counseling and coordination of care     ________________________________________________________________________  Merlin Arnold MD     Procedures: see electronic medical records for all procedures/Xrays and details which were not copied into this note but were reviewed prior to creation of Plan. LABS:  I reviewed today's most current labs and imaging studies. Pertinent labs include:  Recent Labs     06/14/22  0414   WBC 12.8*   HGB 8.9*   HCT 33.3*        Recent Labs     06/16/22  0449 06/15/22  0347 06/14/22  1835 06/14/22  0414    136  --  132*   K 3.5 3.7  --  3.7    98  --  92*   CO2 31 30  --  34*   * 114*  --  137*   BUN 16 24*  --  24*   CREA 0.56 0.63  --  0.67   CA 8.6 8.5  --  9.1   INR 1.5* 1.1 1.2*  --        Signed:  Merlin Arnold MD

## 2022-06-17 NOTE — PROGRESS NOTES
1945 - Bedside report from [facility]. [facility] is here for pickup. Emtala completion in process. Day RN called - son has all of pt's medications. Pt has her glasses, dentures and all jewelry on. Pt is alert and oriented x 4.       2005 - VSS. DC via stretcher w all belongings. Paperwork including original emtala given to [facility].

## 2022-06-21 ENCOUNTER — TRANSCRIBE ORDER (OUTPATIENT)
Dept: SCHEDULING | Age: 82
End: 2022-06-21

## 2022-06-21 ENCOUNTER — TELEPHONE (OUTPATIENT)
Dept: CARDIOTHORACIC SURGERY | Age: 82
End: 2022-06-21

## 2022-06-21 DIAGNOSIS — M47.26 OSTEOARTHRITIS OF SPINE WITH RADICULOPATHY, LUMBAR REGION: ICD-10-CM

## 2022-06-21 DIAGNOSIS — M51.36 DDD (DEGENERATIVE DISC DISEASE), LUMBAR: Primary | ICD-10-CM

## 2022-06-21 DIAGNOSIS — M43.16 SPONDYLOLISTHESIS, LUMBAR REGION: ICD-10-CM

## 2022-06-21 DIAGNOSIS — M54.31 BILATERAL SCIATICA: ICD-10-CM

## 2022-06-21 DIAGNOSIS — M54.32 BILATERAL SCIATICA: ICD-10-CM

## 2022-06-21 NOTE — TELEPHONE ENCOUNTER
Pt called requesting cardiac clearance for back surgery. Discussed with her that she has an appointment to be seen in July and that we can not give her cardiac clearance at this time. She should call Dr. Iqra Pulido for medical clearance if needed. We were then disconnected and I tried to call back but did not get an answer. I called her son back and updated him. He understood. She then called me back and said that she did not need us to do cardiac clearance but she needed someone to schedule her ortho appointment. I told her that she was speaking with the cardiac surgery office not the orthopedic office. She then said thank you for your time and hung up.

## 2022-06-30 ENCOUNTER — HOSPITAL ENCOUNTER (OUTPATIENT)
Dept: LAB | Age: 82
Discharge: HOME | End: 2022-06-30

## 2022-06-30 PROCEDURE — 86920 COMPATIBILITY TEST SPIN: CPT

## 2022-06-30 PROCEDURE — 36415 COLL VENOUS BLD VENIPUNCTURE: CPT

## 2022-06-30 PROCEDURE — 86900 BLOOD TYPING SEROLOGIC ABO: CPT

## 2022-07-01 LAB — HISTORY CHECKED?,CKHIST: NORMAL

## 2022-07-01 PROCEDURE — P9016 RBC LEUKOCYTES REDUCED: HCPCS

## 2022-07-02 LAB
ABO + RH BLD: NORMAL
BLD PROD TYP BPU: NORMAL
BLD PROD TYP BPU: NORMAL
BLOOD GROUP ANTIBODIES SERPL: NORMAL
BPU ID: NORMAL
BPU ID: NORMAL
CROSSMATCH RESULT,%XM: NORMAL
CROSSMATCH RESULT,%XM: NORMAL
SPECIMEN EXP DATE BLD: NORMAL
STATUS OF UNIT,%ST: NORMAL
STATUS OF UNIT,%ST: NORMAL
UNIT DIVISION, %UDIV: 0
UNIT DIVISION, %UDIV: 0

## 2022-07-11 DIAGNOSIS — F41.9 ANXIETY: ICD-10-CM

## 2022-07-11 RX ORDER — CLORAZEPATE DIPOTASSIUM 3.75 MG/1
3.75 TABLET ORAL
Qty: 90 TABLET | Refills: 3 | OUTPATIENT
Start: 2022-07-11

## 2022-07-11 RX ORDER — HYDROGEN PEROXIDE 3 %
20 SOLUTION, NON-ORAL MISCELLANEOUS DAILY
Qty: 90 CAPSULE | Refills: 3 | Status: SHIPPED | OUTPATIENT
Start: 2022-07-11 | End: 2022-08-22

## 2022-07-12 ENCOUNTER — TELEPHONE (OUTPATIENT)
Dept: INTERNAL MEDICINE CLINIC | Age: 82
End: 2022-07-12

## 2022-07-12 LAB — INR, EXTERNAL: 2.9

## 2022-07-12 NOTE — TELEPHONE ENCOUNTER
Pharmacy Progress Note  - Telephone Anticoagulation Management    David [facility] RN, home health nurse, regarding Ms. Solorzano Imus 78 y.o. 's PT/INR result today. PHI verified. Patient's most recent INR was 2.9. [facility] reports patient has been taking warfarin 1 mg daily. Denies s/sx of bleeding/bruises. · Warfarin start date: ~ 2008   · INR Goal:  2.0-3.0    · Current warfarin regimen:  1 mg daily  · Warfarin tablet strength: 2 mg   · Duration of therapy: Indefinite    INR History:   (normal INR range 0.8-1.2)     Date   INR   Dose/Comments  07/12/22 2.9   1 mg daily  Lapse in INR clinic  04/26/22          2.9                   34.5     3 mg x 1, then 2 mg x 4, then 2 mg Tues, Thurs, Sun, 1 mg daily ROW  04/12/22          1.4                   17.4     Hold x 2, 1 mg x 1, then 2 mg Tues, Thurs, Sun, 1 mg daily ROW  03/29/22          4.8                   58.0     1 mg MWF, 2 mg daily ROW; (+) increased dose  03/09/22          2.3                   27.7     Hold x 2, then 1 mg MWF, 2 mg daily ROW  03/02/22          4.2                   50. 9     Hold x 1, then 1 mg x 1, then 2 mg daily  02/09/22          3.2                   37.5     2 mg daily ; (+) ASA  01/12/22          2.5                   29.6     2 mg daily  12/07/21          2.3                   27.4     1 mg x 3 days, then 2 mg daily  --> Pt took 2 mg daily   12/04/21          4.2                               2 mg daily ; (+) APAP   11/01/21          2.8                   34.0     2 mg daily ; s/p TFESI  09/27/21          1.9                   22.8     4 mg x 1, then 2 mg daily -- pt held x 2 days and 2 mg daily  09/20/21          1.2                   14.0     Hold x 2, 1/2 tab on Thurs, and 2 mg daily ROW; Pt held x 2, then 2 mg daily until INR check   09/08/21          3.6                   43. 7     2 mg daily       A/P:       Anticoagulation:  Considering Ms. Dunlap's past history, todays findings, and per Anticoagulation Collaborative Practice Agreement/Protocol:    1. POC INR (2.9) is Therapeutic for INR goal today. 2.  Continue warfarin 1 mg daily for now. 3. Next INR check will be in 1 week(s). Thank you,  Zulema Taveras, PharmD, BCACP, [address] [place] [address] in place:  Yes   Recommendation Provided To: Patient/Caregiver: 1 via Telephone   Intervention Detail: Lab(s) Ordered   Intervention Accepted By: Patient/Caregiver: 1   Time Spent (min): 10

## 2022-07-12 NOTE — TELEPHONE ENCOUNTER
Krystal Tucker 31  [phone]        PT:  34.4    INR: 2.9    currently on 1 mg coumadin every day.

## 2022-07-13 ENCOUNTER — OFFICE VISIT (OUTPATIENT)
Dept: CARDIOTHORACIC SURGERY | Age: 82
End: 2022-07-13
Payer: COMMERCIAL

## 2022-07-13 VITALS
DIASTOLIC BLOOD PRESSURE: 60 MMHG | TEMPERATURE: 98 F | WEIGHT: 136.2 LBS | BODY MASS INDEX: 25.73 KG/M2 | HEART RATE: 66 BPM | RESPIRATION RATE: 18 BRPM | SYSTOLIC BLOOD PRESSURE: 120 MMHG | OXYGEN SATURATION: 94 %

## 2022-07-13 DIAGNOSIS — I35.0 NONRHEUMATIC AORTIC VALVE STENOSIS: Primary | ICD-10-CM

## 2022-07-13 DIAGNOSIS — F41.9 ANXIETY: ICD-10-CM

## 2022-07-13 PROCEDURE — 1123F ACP DISCUSS/DSCN MKR DOCD: CPT

## 2022-07-13 PROCEDURE — 99214 OFFICE O/P EST MOD 30 MIN: CPT

## 2022-07-13 RX ORDER — HYDROGEN PEROXIDE 3 %
20 SOLUTION, NON-ORAL MISCELLANEOUS DAILY
Qty: 90 CAPSULE | Refills: 3 | Status: CANCELLED | OUTPATIENT
Start: 2022-07-13

## 2022-07-13 RX ORDER — CLORAZEPATE DIPOTASSIUM 3.75 MG/1
3.75 TABLET ORAL
Qty: 90 TABLET | Refills: 3 | Status: CANCELLED | OUTPATIENT
Start: 2022-07-13

## 2022-07-13 RX ORDER — CLOPIDOGREL BISULFATE 75 MG/1
75 TABLET ORAL DAILY
Qty: 90 TABLET | Refills: 3 | Status: CANCELLED | OUTPATIENT
Start: 2022-07-13

## 2022-07-13 NOTE — TELEPHONE ENCOUNTER
Caller requests Refill of:  1. clorazepate (TRANXENE) 3.75 mg tablet  2. esomeprazole (NEXIUM) 20 mg capsule  3. clopidogreL (PLAVIX) 75 mg tab      Please send to:  [id number] - [phone], [address] AT [facility] [address]      Visit Appointment History:   Future:   11/25/22  Previous: 3/29/22      Caller confirmed instructions and dosages as correct. Caller was advised that Meds will be refilled as soon as possible, however there can be a 48-72 business hour turn around on refill requests.

## 2022-07-13 NOTE — PROGRESS NOTES
Patient: Asael Neville   Age: 78 y.o. Patient Care Team:  David Null DO as PCP - General (Internal Medicine Physician)  David Null DO as PCP - [facility] Provider  Azeb Carroll MD (Cardio Vascular Surgery)  Brielle Bedolla MD (Cardiothoracic Surgery)    PCP: David Null DO    Cardiologist: Melissa Villela    Diagnosis/Reason for Consultation: The encounter diagnosis was Nonrheumatic aortic valve stenosis. Problem List:   Patient Active Problem List   Diagnosis Code    OAB (overactive bladder) N32.81    Coronary artery disease involving native coronary artery of native heart without angina pectoris I25.10    Acquired hypothyroidism E03.9    Type 2 diabetes mellitus without complication, without long-term current use of insulin ([facility]) E11.9    Hyperlipidemia associated with type 2 diabetes mellitus ([facility] [address] E11.69, E78.5    Age-related osteoporosis without current pathological fracture M81.0    Opioid use, unspecified with unspecified opioid-induced disorder F11.99    Coronary artery disease I25.10         HPI: 78 y.o. female with PMHx of AS, CAD, HTN, HLD, protein C deficiency and previous DVT on chronic coumadin, carotid stenosis, PAD, varicose veins, DJD, chronic tremor, hypothyroidism, asthma, IBS, DM, contrast allergy that is referred to the [address] [place] [address] by Dr. Melissa Villela for interventional evaluation of  Severe AS.      She has SOB with exertion. She denies SOB at rest. She has to rest during activities such as cooking in her kitchen. Her activity level is limited by back pain. Her SOB has been getting progressively worse over the past couple of months.      She is a former smoker, denies alcohol. She is . She lives alone in an apparent. She is a retired . She has a family history of stroke. She has received 2 COVID vaccines and 1 booster. Admitted 6/7/22 through 6/16/2022.  Initially a cath was planned, but the patient was not receiving adequate sedation. It got deferred until anesthesia was available. Hospital stay c/b delirium. Was eventually cathed and stented 6/14/22. She discharged 6/16 to rehab.     7/13/22- Since leaving rehab, has been doing \"okay\". Living by herself. Has skilled nursing, PT/OT, coming to the house. Sometimes gets a little lightheaded when standing too long. +SOB, depends on how far she walks. Walks with a walker, can do about 15 steps at a time. Still dealing with anxiety, which sometimes is accompanied by palpitations. Still + claudication. SOB/VILA: Yes, if she walks far  Fatigue: No  Palpitations: Yes, with anxiety  Chest pain: No  Chest tightness with activity: No  Lightheadedness: Yes, if she stands too long  Syncope: No  Falls: No  Orthopnea: No  PND: No  LE edema: No   Medication changes in past 3 months: Yes  Blood/Blackness/Tarriness of stools: No  Heart Failure Admission w/in past year:  No  Heart Failure Admission w/in past 2 weeks: No    NYHA Classification: II   Class I (Mild): No limitation of physical activity. Ordinary physical activity does not cause undue fatigue, palpitation, or dyspnea. Class II (Mild): Slight limitation of physical activity. Comfortable at rest, but ordinary physical activity results in fatigue, palpitation, or dyspnea. Class III (Moderate): Marked limitation of physical activity. Comfortable at rest, but less than ordinary activity causes fatigue, palpitation, or dyspnea   Class IV (Severe): Unable to carry out any physical activity without discomfort. Symptoms  of cardiac insufficiency at rest.  If any physical activity is undertaken, discomfort is increased.     Angina Classification: 0   Class 0: No symptoms   Class 1: Angina with strenuous exercise   Class 2: Angina with moderate exercise   Class 3: Angina with mild exertion   Walking 1-2 level blocks at normal pace; Climbing 1 flight of stairs at normal pace  Class 4: Angina at any level of physical exertion       Past Medical History:   Diagnosis Date    Anxiety     Arthritis     Asthma     CAD (coronary artery disease)     stent    Diabetes (HCC)     GERD (gastroesophageal reflux disease)     History of DVT (deep vein thrombosis)     History of recurrent UTIs     Hx of seasonal allergies     Hypercholesterolemia     Irritable bowel syndrome (IBS)     Migraine     Urinary urgency      Endocarditis: no  Pulmonary HTN: no   Greater than 2/3 systemic: no  Immunocompromised/Steroids: no  Liver Dz/Cirrhosis: no   If yes, MELD score:  n/a  Last Dental Visit:  None recent   Any dental pain/concerns: none, has dentures    Past Surgical History:   Procedure Laterality Date    HX CATARACT REMOVAL Bilateral     HX HEART CATHETERIZATION      stent    HX OPEN REDUCTION INTERNAL FIXATION Left     humerus     HX OPEN REDUCTION INTERNAL FIXATION Right     hip      Social History     Tobacco Use    Smoking status: Former Smoker     Packs/day: 0.25     Quit date:      Years since quittin.5    Smokeless tobacco: Never Used   Substance Use Topics    Alcohol use: No      Family History   Problem Relation Age of Onset    Diabetes Mother     Stroke Mother      Prior to Admission medications    Medication Sig Start Date End Date Taking? Authorizing Provider   esomeprazole (NEXIUM) 20 mg capsule Take 1 Capsule by mouth daily. 22  Yes Bill Wills III, DO   clopidogreL (PLAVIX) 75 mg tab Take 1 Tablet by mouth daily. 22  Yes Gilda Cuevas MD   furosemide (LASIX) 20 mg tablet Take 1 Tablet by mouth daily. 22  Yes Gilda Cuevas MD   QUEtiapine (SEROquel) 25 mg tablet Take 1 Tablet by mouth nightly.  22  Yes Gilda Cuevas MD   celecoxib (CELEBREX) 200 mg capsule TAKE 1 CAPSULE BY MOUTH EVERY 12 HOURS AS NEEDED FOR PAIN 22  Yes Bill Wills III DO   loperamide (IMODIUM) 2 mg capsule Take 1 Capsule by mouth four (4) times daily as needed for Diarrhea. 5/13/22  Yes Bill Wills III, DO   warfarin (COUMADIN) 1 mg tablet Take 1 Tablet by mouth daily. 5/4/22  Yes Bill Wills III, DO   metoprolol succinate (TOPROL-XL) 50 mg XL tablet Take 1 Tablet by mouth daily. 5/4/22  Yes Vinicio Wills III, DO   butalbital-acetaminophen-caffeine (FIORICET, ESGIC) -40 mg per tablet Take 1 Tablet by mouth every twelve (12) hours as needed for Headache. 4/15/22  Yes Bill Wills III, DO   Bifidobacterium Infantis (Align) 4 mg cap Take 1 Capsule by mouth daily as needed for Diarrhea. Indications: ALIGN OTC 4/15/22  Yes Bill Wills III, DO   benzonatate (TESSALON) 100 mg capsule Take 1 Capsule by mouth three (3) times daily as needed for Cough. 3/29/22  Yes Bill Wills III, DO   buPROPion XL (WELLBUTRIN XL) 150 mg tablet Take 1 Tablet by mouth daily. 2/23/22  Yes Bill Wills III, DO   amitriptyline (ELAVIL) 10 mg tablet TAKE 1 TABLET BY MOUTH EVERY NIGHT 2/14/22  Yes Bill Wills III, DO   traZODone (DESYREL) 100 mg tablet Take 100 mg by mouth nightly. Yes Provider, Historical   levothyroxine (SYNTHROID) 25 mcg tablet Take 2 Tablets by mouth Daily (before breakfast). 1/25/22  Yes Bill Wills III, DO   albuterol (PROVENTIL HFA, VENTOLIN HFA, PROAIR HFA) 90 mcg/actuation inhaler Take 2 Puffs by inhalation every six (6) hours as needed for Wheezing. 1/12/22  Yes Bill Wills III, DO   ipratropium (ATROVENT) 42 mcg (0.06 %) nasal spray 2 Sprays by Both Nostrils route four (4) times daily. Patient taking differently: 2 Sprays by Both Nostrils route four (4) times daily as needed.  12/3/21  Yes Chioma Powell, DO   nystatin (MYCOSTATIN) 100,000 unit/mL suspension SWISH AND SWALLOW 5 ML BY MOUTH FOR 30 SECONDS 4 TIMES A DAY AS NEEDED FOR MOUTH PAIN 11/4/21  Yes Bill Wills III, DO   ondansetron hcl (ZOFRAN) 4 mg tablet TAKE 1 TABLET BY MOUTH EVERY 8 HOURS AS NEEDED FOR NAUSEA OR VOMITING 9/9/21  Yes Bill Wills III, DO   dicyclomine (BENTYL) 10 mg capsule TAKE ONE CAPSULE BY MOUTH FOUR TIMES DAILY AS NEEDED 8/16/21  Yes Bill Wills III, DO   ezetimibe (ZETIA) 10 mg tablet TAKE 1 TABLET BY MOUTH DAILY 8/6/21  Yes Bill Wills III, DO   atorvastatin (LIPITOR) 20 mg tablet TAKE 1 TABLET BY MOUTH EVERY DAY 7/19/21  Yes Bill Wills III, DO   diclofenac (VOLTAREN) 1 % gel Apply  to affected area every twelve (12) hours as needed for Pain. 6/25/21  Yes Bill Wills III, DO   escitalopram oxalate (LEXAPRO) 20 mg tablet TAKE 1 TABLET BY MOUTH DAILY 4/28/20  Yes Bill Wills III, DO   levETIRAcetam (KEPPRA) 500 mg tablet TAKE 1 TABLET BY MOUTH EVERY MORNING AND 1 AND 1/2 TABLET BY MOUTH EVERY EVENING FOR MIGRAINES  Patient taking differently: Take 500 mg by mouth nightly. TAKE 1 TABLET BY MOUTH EVERY MORNING AND 1 AND 1/2 TABLET BY MOUTH EVERY EVENING FOR MIGRAINES 7/6/22   Elex Grippe B III, DO   levocetirizine (XYZAL) 5 mg tablet TAKE 1 TABLET BY MOUTH EVERY DAY  Patient not taking: Reported on 7/13/2022 6/26/22   Elex Grippe B III, DO   potassium chloride SR (KLOR-CON 10) 10 mEq tablet Take 1 Tablet by mouth daily. 6/17/22   Earline Fitzgerald MD   oxybutynin chloride XL (DITROPAN XL) 10 mg CR tablet TAKE 1 TABLET BY MOUTH EVERY DAY  Patient not taking: Reported on 7/13/2022 2/15/22   Elex Grippe B III, DO   warfarin (COUMADIN) 2 mg tablet Take 1 Tablet by mouth daily. Patient not taking: Reported on 7/13/2022 1/28/22   Elex Grippe B III, DO   Wixela Inhub 250-50 mcg/dose diskus inhaler INHALE 1 PUFF AND INTO THE LUNGS EVERY 12 HOURS.  RINSE MOUTH AFTER USE  Patient not taking: Reported on 7/13/2022 12/5/21   Elex Grippe B III, DO       Allergies   Allergen Reactions    Iodinated Contrast Media Other (comments)     Passes out    Pcn [Penicillins] Shortness of Breath    Sulfa (Sulfonamide Antibiotics) Shortness of Breath       Current Medications:   Current Outpatient Medications   Medication Sig Dispense Refill    esomeprazole (NEXIUM) 20 mg capsule Take 1 Capsule by mouth daily. 90 Capsule 3    clopidogreL (PLAVIX) 75 mg tab Take 1 Tablet by mouth daily. 90 Tablet 3    furosemide (LASIX) 20 mg tablet Take 1 Tablet by mouth daily. 90 Tablet 3    QUEtiapine (SEROquel) 25 mg tablet Take 1 Tablet by mouth nightly. 90 Tablet 3    celecoxib (CELEBREX) 200 mg capsule TAKE 1 CAPSULE BY MOUTH EVERY 12 HOURS AS NEEDED FOR PAIN 60 Capsule 11    loperamide (IMODIUM) 2 mg capsule Take 1 Capsule by mouth four (4) times daily as needed for Diarrhea. 30 Capsule 4    warfarin (COUMADIN) 1 mg tablet Take 1 Tablet by mouth daily. 90 Tablet 3    metoprolol succinate (TOPROL-XL) 50 mg XL tablet Take 1 Tablet by mouth daily. 90 Tablet 3    butalbital-acetaminophen-caffeine (FIORICET, ESGIC) -40 mg per tablet Take 1 Tablet by mouth every twelve (12) hours as needed for Headache. 30 Tablet 1    Bifidobacterium Infantis (Align) 4 mg cap Take 1 Capsule by mouth daily as needed for Diarrhea. Indications: ALIGN OTC 90 Capsule 3    benzonatate (TESSALON) 100 mg capsule Take 1 Capsule by mouth three (3) times daily as needed for Cough. 30 Capsule 2    buPROPion XL (WELLBUTRIN XL) 150 mg tablet Take 1 Tablet by mouth daily. 90 Tablet 3    amitriptyline (ELAVIL) 10 mg tablet TAKE 1 TABLET BY MOUTH EVERY NIGHT 90 Tablet 3    traZODone (DESYREL) 100 mg tablet Take 100 mg by mouth nightly.  levothyroxine (SYNTHROID) 25 mcg tablet Take 2 Tablets by mouth Daily (before breakfast). 180 Tablet 3    albuterol (PROVENTIL HFA, VENTOLIN HFA, PROAIR HFA) 90 mcg/actuation inhaler Take 2 Puffs by inhalation every six (6) hours as needed for Wheezing. 8 g 2    ipratropium (ATROVENT) 42 mcg (0.06 %) nasal spray 2 Sprays by Both Nostrils route four (4) times daily.  (Patient taking differently: 2 Sprays by Both Nostrils route four (4) times daily as needed.) 15 mL 5    nystatin (MYCOSTATIN) 100,000 unit/mL suspension SWISH AND SWALLOW 5 ML BY MOUTH FOR 30 SECONDS 4 TIMES A DAY AS NEEDED FOR MOUTH PAIN 60 mL 2    ondansetron hcl (ZOFRAN) 4 mg tablet TAKE 1 TABLET BY MOUTH EVERY 8 HOURS AS NEEDED FOR NAUSEA OR VOMITING 30 Tablet 2    dicyclomine (BENTYL) 10 mg capsule TAKE ONE CAPSULE BY MOUTH FOUR TIMES DAILY AS NEEDED 120 Capsule 3    ezetimibe (ZETIA) 10 mg tablet TAKE 1 TABLET BY MOUTH DAILY 90 Tablet 3    atorvastatin (LIPITOR) 20 mg tablet TAKE 1 TABLET BY MOUTH EVERY DAY 90 Tablet 3    diclofenac (VOLTAREN) 1 % gel Apply  to affected area every twelve (12) hours as needed for Pain. 100 g 2    escitalopram oxalate (LEXAPRO) 20 mg tablet TAKE 1 TABLET BY MOUTH DAILY 90 Tab 3    levETIRAcetam (KEPPRA) 500 mg tablet TAKE 1 TABLET BY MOUTH EVERY MORNING AND 1 AND 1/2 TABLET BY MOUTH EVERY EVENING FOR MIGRAINES (Patient taking differently: Take 500 mg by mouth nightly. TAKE 1 TABLET BY MOUTH EVERY MORNING AND 1 AND 1/2 TABLET BY MOUTH EVERY EVENING FOR MIGRAINES) 225 Tablet 3    levocetirizine (XYZAL) 5 mg tablet TAKE 1 TABLET BY MOUTH EVERY DAY (Patient not taking: Reported on 7/13/2022) 90 Tablet 3    potassium chloride SR (KLOR-CON 10) 10 mEq tablet Take 1 Tablet by mouth daily. 90 Tablet 3    oxybutynin chloride XL (DITROPAN XL) 10 mg CR tablet TAKE 1 TABLET BY MOUTH EVERY DAY (Patient not taking: Reported on 7/13/2022) 90 Tablet 3    warfarin (COUMADIN) 2 mg tablet Take 1 Tablet by mouth daily. (Patient not taking: Reported on 7/13/2022) 90 Tablet 3    Wixela Inhub 250-50 mcg/dose diskus inhaler INHALE 1 PUFF AND INTO THE LUNGS EVERY 12 HOURS. RINSE MOUTH AFTER USE (Patient not taking: Reported on 7/13/2022) 60 Each 12       Vitals: Blood pressure 120/60, pulse 66, temperature 98 °F (36.7 °C), resp. rate 18, weight 136 lb 3.2 oz (61.8 kg), SpO2 94 %.        Allergies: is allergic to iodinated contrast media, pcn [penicillins], and sulfa (sulfonamide antibiotics). Review of Systems: Pertinent Positives per HPI   [] Unable to obtain  ROS due to  []mental status change  []sedated   []intubated   [x]Total of 13 systems reviewed as follows:  Constitutional: Negative fever, negative chills  Eyes:   Negative for amauroses fugax  ENT:   Negative sore throat,oral abscess   Endocrine Negative for thyroid replacement Rx; goiter; DM  Respiratory:  Negative chronic cough,sputum production  Cards:   Negative for palpitations, varicosities, claudication, lower extremity edema  GI:   Negative for dysphagia, bleeding, nausea, vomiting, diarrhea, and abdominal pain  Genitourinary: Negative for frequency, dysuria, BPH   Integument:  Negative for rash and pruritus  Hematologic:  Negative for easy bruising; bleeding dyscrasia   Musculoskel: Negative for muscle weakness inhibiting ambulation  Neurological:  Negative for stroke, TIA, syncope, dizziness  Behavl/Psych: Negative for feelings of anxiety, depression     Cardiovascular Testing:     EK/15/22: Normal sinus rhythm   Moderate voltage criteria for LVH, may be normal variant    TTE:  Completed at [facility], to be scanned into media   Aortic stenosis with echo 2022 EF 65% peak of 4, mean of 41, PHILIPPE of 0.7, mild AI, trace MR     Cardiac catheterization:   · Known severe AS. Found to have severe proximal LCx disease, and  of RCA. · Known severe AS, valve not crossed. · Right dominant system. Mild LAD disease. 99% proximal LCx prior to trifurcation into 3 OMs. Occluded mid RCA. Distal vessel collatorllized from left. · Right innominate tortuosity. Makes cath difficult. · Patient with severe baseline anxiety likely exacerbated by steroid prep for contrast allergy. Unable to cooperate with cath. · PCI deferred until anesthesia can be arranged. · 5Fr right radial sheath removed by patent hemostasis.     Coronary Findings        Diagnostic  Dominance: Right     Left Main Moderate with 30% disease. Left Anterior Descending   Moderate vessel with moderate diagonal with mild luminal irregularities. Left Circumflex   Moderate vessel with 99% proximal stenosis prior to trifurcation of three OM's   Right Coronary Artery   Moderate vessel occluded in mid portion, distal well collateralized from left.      Intervention        No interventions have been documented.     Left Heart     Aorta The aortic root is normal. There was mild (2+) aortic regurgitation.            Carotid Dopplers: awaiting records from [facility]     PFTs: FEV1/Predicted:  n/a  Normal FEV1 > 1 Liter, Predicted = 100%, DLCO > 80%                          Mild Lung Disease (60-70% Predicted)                          Moderate Lung Disease (50-55% Predicted)                          Severe Lung Disease (< 50% Predicted or PO2 < 60 or pCO2>50 on RA)     Gated C/A/P CTA: images from [facility] to be scanned         Physical Exam:  General: Well nourished well groomed, appears stated age accompanied by daughter in law  Neuro: A&OX3. GRAHAM. PERRL. Steady walker assisted gait  Head:Normocephalic. Atraumatic. Symmetrical  Neck: Trachea Midline  Resp: CTA B. No Adv BS/cough/sputum/tachypnea with seated conversation  CV: S1S2 RRR. Grade II systolic murmur RIS, 2nd intercostal space. No JVD/carotid bruits. Pink/warm/dry extremities. Trace LE peripheral edema  GI:Benign ab. Soft. NT/ND. Active BS  : Voids  Integ: No obvious s/s of infection or breakdown  Musculo/Skeletal: FROM in all major joints.  Normal muscle tone    Clinic Evaluation:   Clinic Evaluation:   KCCQ-12: scanned into EMR     5 meter gait: 19.35 seconds     Frality Survey:  Mirella Raya Index ADL - 5/6  scanned into EMR      STS 4.2 Risk Score / Predicted 30 day mortality: - calculations scanned into EMR  Procedure: Isolated AVR  Risk of Mortality:  5.287%  Renal Failure:  3.700%  Permanent Stroke:  1.968%  Prolonged Ventilation:  14.893%  DSW Infection:  0.106%  Reoperation:  3.855%  Morbidity or Mortality:  19.412%  Short Length of Stay:  22.881%  Long Length of Stay:  10.632%       Assessment/Plan:      1. Severe AS: PHILIPPE 0.7 cm2, mean gradient 41 mmHg, peak velocity 4 m/s. CTA completed. Will plan for RTF 23 S3 Ultra TAVR. Will use Lovenox while holding coumadin for TAVR, will need contrast allergy pretreatment. Hold warfarin x 5 days and then give Lovenox x 2 days (once daily dosing). 2. CAD s/p stents 6/14/22  3. Protein C deficiency/previous DVT: on coumadin, will hold coumadin prior to TAVR (see above)  4. HTN: on metoprolol XL 25 daily  5. HLD: on statin, zetia  6. PAD: Pt reports claudication, ABIs 12/21 were normal  7. Carotid stenosis: carotids 10/21 mild stenosis bilaterally  8. Asthma/chronic cough: On advair, PRN albuterol, tessalon pearls PRN  9. DM Type II: Pt had been on metformin but stopped taking, A1C 7.6 in march 2022. 10. Iron deficiency anemia: Hgb 8.9 on recent labs  11. Overactive bladder: on ditropan  12. Migraines: firoicet PRN, keppra BID   13. Chronic back pain: tramadol PRN, celebrex,   14. Anxiety and depression: On wellbutrin, elavil, trazadone, lexapro, Seroquel   15. IBS: takes prn immodium, bentyl PRN  16. Hypothyroid: cont synthroid  17. GERD: nexium     The patient was evaluated by Jefe Newton who discussed conventional aortic valve replacement and TAVR, as well as the risks and benefits of both versus continuing medical therapy with the patient and daughter[place]in[place]. All questions were answered. Jefe Newton and Sloane Shah felt that TAVR is a better option for this patient, given age, frailty, and co-morbidities. With all the options available, the patient has agreed to proceed with TAVR for the treatment of her aortic stenosis and will be scheduled for 7/20/22. Addendum:  Patient seen and examined. Images re-reviewed. Discussed with patient in detail risks and benefits and she agreed to proceed.  She is a good candidate for TF TAVR and mental status is much more clear today, and medications adjusted. Discussed with her referring physician Dr. Washington Mendoza who was in agreement. We will plan to perform her TAVR next Wednesday.

## 2022-07-15 ENCOUNTER — HOSPITAL ENCOUNTER (OUTPATIENT)
Dept: GENERAL RADIOLOGY | Age: 82
Discharge: HOME | End: 2022-07-15
Attending: NURSE PRACTITIONER
Payer: COMMERCIAL

## 2022-07-15 ENCOUNTER — HOSPITAL ENCOUNTER (OUTPATIENT)
Dept: PREADMISSION TESTING | Age: 82
Discharge: HOME | End: 2022-07-15
Attending: THORACIC SURGERY (CARDIOTHORACIC VASCULAR SURGERY)
Payer: COMMERCIAL

## 2022-07-15 VITALS
HEART RATE: 62 BPM | DIASTOLIC BLOOD PRESSURE: 90 MMHG | RESPIRATION RATE: 20 BRPM | BODY MASS INDEX: 26.13 KG/M2 | WEIGHT: 141.98 LBS | SYSTOLIC BLOOD PRESSURE: 126 MMHG | OXYGEN SATURATION: 94 % | TEMPERATURE: 98.3 F | HEIGHT: 62 IN

## 2022-07-15 DIAGNOSIS — D68.59 PROTEIN C DEFICIENCY (HCC): Primary | ICD-10-CM

## 2022-07-15 LAB
ALBUMIN SERPL-MCNC: 3.1 G/DL (ref 3.5–5)
ALBUMIN/GLOB SERPL: 0.8 {RATIO} (ref 1.1–2.2)
ALP SERPL-CCNC: 76 U/L (ref 45–117)
ALT SERPL-CCNC: 13 U/L (ref 12–78)
ANION GAP SERPL CALC-SCNC: 6 MMOL/L (ref 5–15)
APTT PPP: 31.3 SEC (ref 22.1–31)
AST SERPL-CCNC: 12 U/L (ref 15–37)
BASOPHILS # BLD: 0 K/UL (ref 0–0.1)
BASOPHILS NFR BLD: 1 % (ref 0–1)
BILIRUB SERPL-MCNC: 0.5 MG/DL (ref 0.2–1)
BUN SERPL-MCNC: 21 MG/DL (ref 6–20)
BUN/CREAT SERPL: 25 (ref 12–20)
CALCIUM SERPL-MCNC: 8.8 MG/DL (ref 8.5–10.1)
CHLORIDE SERPL-SCNC: 105 MMOL/L (ref 97–108)
CO2 SERPL-SCNC: 27 MMOL/L (ref 21–32)
CREAT SERPL-MCNC: 0.84 MG/DL (ref 0.55–1.02)
DIFFERENTIAL METHOD BLD: ABNORMAL
EOSINOPHIL # BLD: 0.2 K/UL (ref 0–0.4)
EOSINOPHIL NFR BLD: 4 % (ref 0–7)
ERYTHROCYTE [DISTWIDTH] IN BLOOD BY AUTOMATED COUNT: 22.2 % (ref 11.5–14.5)
EST. AVERAGE GLUCOSE BLD GHB EST-MCNC: 134 MG/DL
GLOBULIN SER CALC-MCNC: 4.1 G/DL (ref 2–4)
GLUCOSE SERPL-MCNC: 106 MG/DL (ref 65–100)
HBA1C MFR BLD: 6.3 % (ref 4–5.6)
HCT VFR BLD AUTO: 35.1 % (ref 35–47)
HGB BLD-MCNC: 9.7 G/DL (ref 11.5–16)
IMM GRANULOCYTES # BLD AUTO: 0 K/UL (ref 0–0.04)
IMM GRANULOCYTES NFR BLD AUTO: 0 % (ref 0–0.5)
INR PPP: 1.8 (ref 0.9–1.1)
LYMPHOCYTES # BLD: 1.1 K/UL (ref 0.8–3.5)
LYMPHOCYTES NFR BLD: 22 % (ref 12–49)
MAGNESIUM SERPL-MCNC: 2.2 MG/DL (ref 1.6–2.4)
MCH RBC QN AUTO: 20.2 PG (ref 26–34)
MCHC RBC AUTO-ENTMCNC: 27.6 G/DL (ref 30–36.5)
MCV RBC AUTO: 73 FL (ref 80–99)
MONOCYTES # BLD: 0.5 K/UL (ref 0–1)
MONOCYTES NFR BLD: 10 % (ref 5–13)
NEUTS SEG # BLD: 3 K/UL (ref 1.8–8)
NEUTS SEG NFR BLD: 63 % (ref 32–75)
NRBC # BLD: 0 K/UL (ref 0–0.01)
NRBC BLD-RTO: 0 PER 100 WBC
PLATELET # BLD AUTO: 340 K/UL (ref 150–400)
PMV BLD AUTO: 9.9 FL (ref 8.9–12.9)
POTASSIUM SERPL-SCNC: 3.8 MMOL/L (ref 3.5–5.1)
PROT SERPL-MCNC: 7.2 G/DL (ref 6.4–8.2)
PROTHROMBIN TIME: 18.2 SEC (ref 9–11.1)
RBC # BLD AUTO: 4.81 M/UL (ref 3.8–5.2)
RBC MORPH BLD: ABNORMAL
SODIUM SERPL-SCNC: 138 MMOL/L (ref 136–145)
THERAPEUTIC RANGE,PTTT: ABNORMAL SECS (ref 58–77)
TSH SERPL DL<=0.05 MIU/L-ACNC: 0.78 UIU/ML (ref 0.36–3.74)
WBC # BLD AUTO: 4.8 K/UL (ref 3.6–11)

## 2022-07-15 PROCEDURE — 85025 COMPLETE CBC W/AUTO DIFF WBC: CPT

## 2022-07-15 PROCEDURE — 85610 PROTHROMBIN TIME: CPT

## 2022-07-15 PROCEDURE — 83036 HEMOGLOBIN GLYCOSYLATED A1C: CPT

## 2022-07-15 PROCEDURE — 71046 X-RAY EXAM CHEST 2 VIEWS: CPT

## 2022-07-15 PROCEDURE — 36415 COLL VENOUS BLD VENIPUNCTURE: CPT

## 2022-07-15 PROCEDURE — 80053 COMPREHEN METABOLIC PANEL: CPT

## 2022-07-15 PROCEDURE — 84443 ASSAY THYROID STIM HORMONE: CPT

## 2022-07-15 PROCEDURE — 85730 THROMBOPLASTIN TIME PARTIAL: CPT

## 2022-07-15 PROCEDURE — 83735 ASSAY OF MAGNESIUM: CPT

## 2022-07-15 RX ORDER — ENOXAPARIN SODIUM 100 MG/ML
60 INJECTION SUBCUTANEOUS DAILY
Qty: 1 EACH | Refills: 0 | Status: SHIPPED | OUTPATIENT
Start: 2022-07-18 | End: 2022-07-22

## 2022-07-15 RX ORDER — TRAMADOL HYDROCHLORIDE 50 MG/1
50 TABLET ORAL
COMMUNITY
End: 2022-10-10

## 2022-07-15 NOTE — PERIOP NOTES
Hibiclens/Chlorhexidine    Preventing Infections Before and After - Your Surgery    IMPORTANT INSTRUCTIONS    Please read and follow these instructions carefully. If you are unable to comply with the below instructions your procedure will be cancelled. Every Night for Three (3) nights before your surgery:  1. Shower with an antibacterial soap, such as Dial, or the soap provided at your preassessment appointment. A shower is better than a bath for cleaning your skin. 2. If needed, ask someone to help you reach all areas of your body. Dont forget to clean your belly button with every shower. The night before your surgery: If you lose your Hibiclens/chlorhexidine please contact surgery center or you can purchase it at a local pharmacy  1. On the night before your surgery, shower with an antibacterial soap, such as Dial, or the soap provided at your preassessment appointment. 2. With one packet of Hibiclens/Chlorhexidine in hand, turn water off.  3. Apply Hibiclens antiseptic skin cleanser with a clean, freshly washed washcloth. ? Gently apply to your body from chin to toes (except the genital area) and especially the area(s) where your incision(s) will be. ? Leave Hibiclens/Chlorhexidine on your skin for at least 20 seconds. CAUTION: If needed, Hibiclens/chlorhexidine may be used to clean the folds of skin of the legs (such as in the area of the groin) and on your buttocks and hips. However, do not use Hibiclens/Chlorhexidine above the neck or in the genital area (your bottom) or put inside any area of your body. 4. Turn the water back on and rinse. 5. Dry gently with a clean, freshly washed towel. 6. After your shower, do not use any powder, deodorant, perfumes or lotion. 7. Use clean, freshly washed towels and washcloths every time you shower. 8. Wear clean, freshly washed pajamas to bed the night before surgery. 9. Sleep on clean, freshly washed sheets.   10. Do not allow pets to sleep in your bed with you. The Morning of your surgery:  1. Shower again thoroughly with an antibacterial soap, such as Dial or the soap provided at your preassessment appointment. If needed, ask someone for help to reach all areas of your body. Dont forget to clean your belly button! Rinse. 2. Dry gently with a clean, freshly washed towel. 3. After your shower, do not use any powder, deodorant, perfumes or lotion prior to surgery. 4. Put on clean, freshly washed clothing. Tips to help prevent infections after your surgery:  1. Protect your surgical wound from germs:  ? Hand washing is the most important thing you and your caregivers can do to prevent infections. ? Keep your bandage clean and dry! ? Do not touch your surgical wound. 2. Use clean, freshly washed towels and washcloths every time you shower; do not share bath linens with others. 3. Until your surgical wound is healed, wear clothing and sleep on bed linens each day that are clean and freshly washed. 4. Do not allow pets to sleep in your bed with you or touch your surgical wound. 5. Do not smoke - smoking delays wound healing. This may be a good time to stop smoking. 6. If you have diabetes, it is important for you to manage your blood sugar levels properly before your surgery as well as after your surgery. Poorly managed blood sugar levels slow down wound healing and prevent you from healing completely. If you lose your Hibiclens/chlorhexidine, please call the [facility], or it is available for purchase at your pharmacy.                ___________________      ___________________      ________________  (Signature of Patient)          (Witness)                   (Date and Time)

## 2022-07-15 NOTE — CONSENT
Informed Consent for Blood Component Transfusion Note    I have discussed with the patient the rationale for blood component transfusion; its benefits in treating or preventing fatigue, organ damage, or death; and its risk which includes mild transfusion reactions, rare risk of blood borne infection, or more serious but rare reactions. I have discussed the alternatives to transfusion, including the risk and consequences of not receiving transfusion. The patient had an opportunity to ask questions and had agreed to proceed with transfusion of blood components.     Electronically signed by Claudetta Lenz, NP on 7/15/22 at 1:14 PM

## 2022-07-15 NOTE — PERIOP NOTES
Will return 07/18/22 for type and screen due to recent blood transfusion. Urine /reflex same day (unable to void day of pre-op).

## 2022-07-15 NOTE — PERIOP NOTES
[facility]  Preoperative Instructions        Surgery Date 07/20/22         Time of Arrival 0800  Contact # [phone] [place]  1. On the day of your surgery, please report to the [address] and sign in at your designated time. Come through the [address]    2. You must have someone with you to drive you home. You should not drive a car for 24 hours following surgery. Please make arrangements for a friend or family member to stay with you for the first 24 hours after your surgery. 3. Do not have anything to eat or drink (including water, gum, mints, coffee, juice) after midnight ?07/19/22    . ? This may not apply to medications prescribed by your physician. ?(Please note below the special instructions with medications to take the morning of your procedure.)    4. We recommend you do not drink any alcoholic beverages for 24 hours before and after your surgery. 5. Contact your surgeons office for instructions on the following medications: non-steroidal anti-inflammatory drugs (i.e. Advil, Aleve), vitamins, and supplements. (Some surgeons will want you to stop these medications prior to surgery and others may allow you to take them)  **If you are currently taking Plavix, Coumadin, Aspirin and/or other blood-thinning agents, contact your surgeon for instructions. ** Your surgeon will partner with the physician prescribing these medications to determine if it is safe to stop or if you need to continue taking. Please do not stop taking these medications without instructions from your surgeon    6. Wear comfortable clothes. Wear glasses instead of contacts. Do not bring any money or jewelry. Please bring picture ID, insurance card, and any prearranged co-payment or hospital payment. Do not wear make-up, particularly mascara the morning of your surgery. Do not wear nail polish, particularly if you are having foot /hand surgery.   Wear your hair loose or down, no ponytails, buns, jose pins or clips. All body piercings must be removed. Please shower with antibacterial soap for three consecutive days before and on the morning of surgery, but do not apply any lotions, powders or deodorants after the shower on the day of surgery. Please use a fresh towels after each shower. Please sleep in clean clothes and change bed linens the night before surgery. Please do not shave for 48 hours prior to surgery. Shaving of the face is acceptable. 7. You should understand that if you do not follow these instructions your surgery may be cancelled. If your physical condition changes (I.e. fever, cold or flu) please contact your surgeon as soon as possible. 8. It is important that you be on time. If a situation occurs where you may be late, please call [phone] (OR Holding Area). 9. If you have any questions and or problems, please call [phone] (Pre-admission Testing). 10. Your surgery time may be subject to change. You will receive a phone call the evening prior if your time changes. 11.  If having outpatient surgery, you must have someone to drive you here, stay with you during the duration of your stay, and to drive you home at time of discharge. Special Instructions: Follow surgeon instructions for medications    Last dose coumadin 07/14 Thursday  Last metoprolol 07/18 Monday   Start lovenox on 07/18/22 and 07/19/22    TAKE NO MEDICATIONS DAY OF SURGERY EXCEPT:no medications day of procedure      I understand a pre-operative phone call will be made to verify my surgery time. In the event that I am not available, I give permission for a message to be left on my answering service and/or with another person?   yes          ___________________      __________   _________    (Signature of Patient)             (Witness)                (Date and Time)

## 2022-07-15 NOTE — PERIOP NOTES
Incentive Spirometer        Using the incentive spirometer helps expand the small air sacs of your lungs, helps you breathe deeply, and helps improve your lung function. Use your incentive spirometer twice a day (10 breaths each time) prior to surgery. How to Use Your Incentive Spirometer:  1. Hold the incentive spirometer in an upright position. 2. Breathe out as usual.   3. Place the mouthpiece in your mouth and seal your lips tightly around it. 4. Take a deep breath. Breathe in slowly and as deeply as possible. Keep the blue flow rate guide between the arrows. 5. Hold your breath as long as possible. Then exhale slowly and allow the piston to fall to the bottom of the column. 6. Rest for a few seconds and repeat steps one through five at least 10 times. PAT Tidal Volume______500____________  x__10______________  Date__07/15/22_____________________    Amelia Sark THE INCENTIVE SPIROMETER WITH YOU TO THE HOSPITAL ON THE DAY OF YOUR SURGERY. Opportunity given to ask and answer questions as well as to observe return demonstration.     Patient signature_____________________________    Witness____________________________

## 2022-07-15 NOTE — H&P
Patient: Brigette Royal   Age: 78 y.o. Patient Care Team:  Candido Patterson DO as PCP - General (Internal Medicine Physician)  Candido Patterson DO as PCP - [facility]  Satish Jefferson MD ([address] [place] [address] Vascular Surgery)  Kierra Marquez MD (Cardiothoracic Surgery)    PCP: Candido Patterson DO    Cardiologist: Anna Harry    Diagnosis/Reason for Consultation: The encounter diagnosis was Abnormal cardiac valve. Problem List:   Patient Active Problem List   Diagnosis Code    OAB (overactive bladder) N32.81    Coronary artery disease involving native coronary artery of native heart without angina pectoris I25.10    Acquired hypothyroidism E03.9    Type 2 diabetes mellitus without complication, without long-term current use of insulin ([facility]) E11.9    Hyperlipidemia associated with type 2 diabetes mellitus ([facility] [address] E11.69, E78.5    Age-related osteoporosis without current pathological fracture M81.0    Opioid use, unspecified with unspecified opioid-induced disorder F11.99    Coronary artery disease I25.10         HPI: 78 y.o. female with PMHx of AS, CAD, HTN, HLD, protein C deficiency and previous DVT on chronic coumadin, carotid stenosis, PAD, varicose veins, DJD, chronic tremor, hypothyroidism, asthma, IBS, DM, contrast allergy that is referred to the [address] [place] [address] by Dr. Anan Harry for interventional evaluation of  Severe AS.      She has SOB with exertion. She denies SOB at rest. She has to rest during activities such as cooking in her kitchen. Her activity level is limited by back pain. Her SOB has been getting progressively worse over the past couple of months.      She is a former smoker, denies alcohol. She is . She lives alone in an appartment. She is a retired . She has a family history of stroke. She has received 2 COVID vaccines and 1 booster. Admitted 6/7/22 through 6/16/2022.  Initially a cath was planned, but the patient was not receiving adequate sedation. It got deferred until anesthesia was available. Hospital stay c/b delirium. Was eventually cathed and stented 6/14/22. She discharged 6/16 to rehab.     7/13/22- Since leaving rehab, has been doing \"okay\". Living by herself. Has skilled nursing, PT/OT, coming to the house. Sometimes gets a little lightheaded when standing too long. +SOB, depends on how far she walks. Walks with a walker, can do about 15 steps at a time. Still dealing with anxiety, which sometimes is accompanied by palpitations. Still + claudication. SOB/VILA: Yes, if she walks far  Fatigue: No  Palpitations: Yes, with anxiety  Chest pain: No  Chest tightness with activity: No  Lightheadedness: Yes, if she stands too long  Syncope: No  Falls: No  Orthopnea: No  PND: No  LE edema: No   Medication changes in past 3 months: Yes  Blood/Blackness/Tarriness of stools: No  Heart Failure Admission w/in past year:  No  Heart Failure Admission w/in past 2 weeks: No    NYHA Classification: II   Class I (Mild): No limitation of physical activity. Ordinary physical activity does not cause undue fatigue, palpitation, or dyspnea. Class II (Mild): Slight limitation of physical activity. Comfortable at rest, but ordinary physical activity results in fatigue, palpitation, or dyspnea. Class III (Moderate): Marked limitation of physical activity. Comfortable at rest, but less than ordinary activity causes fatigue, palpitation, or dyspnea   Class IV (Severe): Unable to carry out any physical activity without discomfort. Symptoms  of cardiac insufficiency at rest.  If any physical activity is undertaken, discomfort is increased.     Angina Classification: 0   Class 0: No symptoms   Class 1: Angina with strenuous exercise   Class 2: Angina with moderate exercise   Class 3: Angina with mild exertion   Walking 1-2 level blocks at normal pace; Climbing 1 flight of stairs at normal pace  Class 4: Angina at any level of physical exertion Past Medical History:   Diagnosis Date    Anxiety     Arthritis     Asthma     CAD (coronary artery disease)     stent    Diabetes (HCC)     GERD (gastroesophageal reflux disease)     History of DVT (deep vein thrombosis)     History of recurrent UTIs     Hx of seasonal allergies     Hypercholesterolemia     Irritable bowel syndrome (IBS)     Migraine     Urinary urgency      Endocarditis: no  Pulmonary HTN: no   Greater than 2/3 systemic: no  Immunocompromised/Steroids: no  Liver Dz/Cirrhosis: no   If yes, MELD score:  n/a  Last Dental Visit:  None recent   Any dental pain/concerns: none, has dentures    Past Surgical History:   Procedure Laterality Date    HX CATARACT REMOVAL Bilateral     HX HEART CATHETERIZATION      stent    HX OPEN REDUCTION INTERNAL FIXATION Left     humerus     HX OPEN REDUCTION INTERNAL FIXATION Right     hip      Social History     Tobacco Use    Smoking status: Former Smoker     Packs/day: 0.25     Quit date:      Years since quittin.5    Smokeless tobacco: Never Used   Substance Use Topics    Alcohol use: No      Family History   Problem Relation Age of Onset    Diabetes Mother     Stroke Mother      Prior to Admission medications    Medication Sig Start Date End Date Taking? Authorizing Provider   esomeprazole (NEXIUM) 20 mg capsule Take 1 Capsule by mouth daily. 22   Bill Wills III, DO   levETIRAcetam (KEPPRA) 500 mg tablet TAKE 1 TABLET BY MOUTH EVERY MORNING AND 1 AND 1/2 TABLET BY MOUTH EVERY EVENING FOR MIGRAINES  Patient taking differently: Take 500 mg by mouth nightly. TAKE 1 TABLET BY MOUTH EVERY MORNING AND 1 AND 1/2 TABLET BY MOUTH EVERY EVENING FOR MIGRAINES 22   Frankie ARMENDARIZ III, DO   levocetirizine (XYZAL) 5 mg tablet TAKE 1 TABLET BY MOUTH EVERY DAY  Patient not taking: Reported on 2022   Frankie ARMENDARIZ III, DO   clopidogreL (PLAVIX) 75 mg tab Take 1 Tablet by mouth daily.  22 Rebecca Holt MD   furosemide (LASIX) 20 mg tablet Take 1 Tablet by mouth daily. 6/16/22   Rebecca Holt MD   potassium chloride SR (KLOR-CON 10) 10 mEq tablet Take 1 Tablet by mouth daily. 6/17/22   Rebecca Holt MD   QUEtiapine (SEROquel) 25 mg tablet Take 1 Tablet by mouth nightly. 6/16/22   Abram Gleason MD   celecoxib (CELEBREX) 200 mg capsule TAKE 1 CAPSULE BY MOUTH EVERY 12 HOURS AS NEEDED FOR PAIN 5/17/22   Clearence Rita B III, DO   loperamide (IMODIUM) 2 mg capsule Take 1 Capsule by mouth four (4) times daily as needed for Diarrhea. 5/13/22   Clearence Rita B III, DO   warfarin (COUMADIN) 1 mg tablet Take 1 Tablet by mouth daily. 5/4/22   Wliliam Wills III, DO   metoprolol succinate (TOPROL-XL) 50 mg XL tablet Take 1 Tablet by mouth daily. 5/4/22   Clearence Rita III, DO   butalbital-acetaminophen-caffeine (FIORICET, ESGIC) -40 mg per tablet Take 1 Tablet by mouth every twelve (12) hours as needed for Headache. 4/15/22   Clearence Rita B III, DO   Bifidobacterium Infantis (Align) 4 mg cap Take 1 Capsule by mouth daily as needed for Diarrhea. Indications: ALIGN OTC 4/15/22   Clearence Rita B III, DO   benzonatate (TESSALON) 100 mg capsule Take 1 Capsule by mouth three (3) times daily as needed for Cough. 3/29/22   William Wills III, DO   buPROPion XL (WELLBUTRIN XL) 150 mg tablet Take 1 Tablet by mouth daily. 2/23/22   Clearence Rita B III, DO   oxybutynin chloride XL (DITROPAN XL) 10 mg CR tablet TAKE 1 TABLET BY MOUTH EVERY DAY  Patient not taking: Reported on 7/13/2022 2/15/22   Clearence Rita B III, DO   amitriptyline (ELAVIL) 10 mg tablet TAKE 1 TABLET BY MOUTH EVERY NIGHT 2/14/22   Clearence Rita B III, DO   traZODone (DESYREL) 100 mg tablet Take 100 mg by mouth nightly. Provider, Historical   warfarin (COUMADIN) 2 mg tablet Take 1 Tablet by mouth daily.   Patient not taking: Reported on 7/13/2022 1/28/22   Chichi Caldwell, Bill ARMENDARIZ III, DO   levothyroxine (SYNTHROID) 25 mcg tablet Take 2 Tablets by mouth Daily (before breakfast). 1/25/22   James Duqueine B III, DO   albuterol (PROVENTIL HFA, VENTOLIN HFA, PROAIR HFA) 90 mcg/actuation inhaler Take 2 Puffs by inhalation every six (6) hours as needed for Wheezing. 1/12/22   Bill Wills III, DO   Wixela Inhub 250-50 mcg/dose diskus inhaler INHALE 1 PUFF AND INTO THE LUNGS EVERY 12 HOURS. RINSE MOUTH AFTER USE  Patient not taking: Reported on 7/13/2022 12/5/21   James Duqueine B DEAN, DO   ipratropium (ATROVENT) 42 mcg (0.06 %) nasal spray 2 Sprays by Both Nostrils route four (4) times daily. Patient taking differently: 2 Sprays by Both Nostrils route four (4) times daily as needed. 12/3/21   Duane Wills III, DO   nystatin (MYCOSTATIN) 100,000 unit/mL suspension SWISH AND SWALLOW 5 ML BY MOUTH FOR 30 SECONDS 4 TIMES A DAY AS NEEDED FOR MOUTH PAIN 11/4/21   James Duqueine B III, DO   ondansetron hcl (ZOFRAN) 4 mg tablet TAKE 1 TABLET BY MOUTH EVERY 8 HOURS AS NEEDED FOR NAUSEA OR VOMITING 9/9/21   Duane Wills III, DO   dicyclomine (BENTYL) 10 mg capsule TAKE ONE CAPSULE BY MOUTH FOUR TIMES DAILY AS NEEDED 8/16/21   James White B III, DO   ezetimibe (ZETIA) 10 mg tablet TAKE 1 TABLET BY MOUTH DAILY 8/6/21   Josephh White B III, DO   atorvastatin (LIPITOR) 20 mg tablet TAKE 1 TABLET BY MOUTH EVERY DAY 7/19/21   James Duqueine B III, DO   diclofenac (VOLTAREN) 1 % gel Apply  to affected area every twelve (12) hours as needed for Pain.  6/25/21   James White B III, DO   escitalopram oxalate (LEXAPRO) 20 mg tablet TAKE 1 TABLET BY MOUTH DAILY 4/28/20   James White B III, DO       Allergies   Allergen Reactions    Iodinated Contrast Media Other (comments)     Passes out    Pcn [Penicillins] Shortness of Breath    Sulfa (Sulfonamide Antibiotics) Shortness of Breath       Current Medications:   Current Outpatient Medications   Medication Sig Dispense Refill    esomeprazole (NEXIUM) 20 mg capsule Take 1 Capsule by mouth daily. 90 Capsule 3    levETIRAcetam (KEPPRA) 500 mg tablet TAKE 1 TABLET BY MOUTH EVERY MORNING AND 1 AND 1/2 TABLET BY MOUTH EVERY EVENING FOR MIGRAINES (Patient taking differently: Take 500 mg by mouth nightly. TAKE 1 TABLET BY MOUTH EVERY MORNING AND 1 AND 1/2 TABLET BY MOUTH EVERY EVENING FOR MIGRAINES) 225 Tablet 3    levocetirizine (XYZAL) 5 mg tablet TAKE 1 TABLET BY MOUTH EVERY DAY (Patient not taking: Reported on 7/13/2022) 90 Tablet 3    clopidogreL (PLAVIX) 75 mg tab Take 1 Tablet by mouth daily. 90 Tablet 3    furosemide (LASIX) 20 mg tablet Take 1 Tablet by mouth daily. 90 Tablet 3    potassium chloride SR (KLOR-CON 10) 10 mEq tablet Take 1 Tablet by mouth daily. 90 Tablet 3    QUEtiapine (SEROquel) 25 mg tablet Take 1 Tablet by mouth nightly. 90 Tablet 3    celecoxib (CELEBREX) 200 mg capsule TAKE 1 CAPSULE BY MOUTH EVERY 12 HOURS AS NEEDED FOR PAIN 60 Capsule 11    loperamide (IMODIUM) 2 mg capsule Take 1 Capsule by mouth four (4) times daily as needed for Diarrhea. 30 Capsule 4    warfarin (COUMADIN) 1 mg tablet Take 1 Tablet by mouth daily. 90 Tablet 3    metoprolol succinate (TOPROL-XL) 50 mg XL tablet Take 1 Tablet by mouth daily. 90 Tablet 3    butalbital-acetaminophen-caffeine (FIORICET, ESGIC) -40 mg per tablet Take 1 Tablet by mouth every twelve (12) hours as needed for Headache. 30 Tablet 1    Bifidobacterium Infantis (Align) 4 mg cap Take 1 Capsule by mouth daily as needed for Diarrhea. Indications: ALIGN OTC 90 Capsule 3    benzonatate (TESSALON) 100 mg capsule Take 1 Capsule by mouth three (3) times daily as needed for Cough. 30 Capsule 2    buPROPion XL (WELLBUTRIN XL) 150 mg tablet Take 1 Tablet by mouth daily.  90 Tablet 3    oxybutynin chloride XL (DITROPAN XL) 10 mg CR tablet TAKE 1 TABLET BY MOUTH EVERY DAY (Patient not taking: Reported on 7/13/2022) 90 Tablet 3    amitriptyline (ELAVIL) 10 mg tablet TAKE 1 TABLET BY MOUTH EVERY NIGHT 90 Tablet 3    traZODone (DESYREL) 100 mg tablet Take 100 mg by mouth nightly.  warfarin (COUMADIN) 2 mg tablet Take 1 Tablet by mouth daily. (Patient not taking: Reported on 7/13/2022) 90 Tablet 3    levothyroxine (SYNTHROID) 25 mcg tablet Take 2 Tablets by mouth Daily (before breakfast). 180 Tablet 3    albuterol (PROVENTIL HFA, VENTOLIN HFA, PROAIR HFA) 90 mcg/actuation inhaler Take 2 Puffs by inhalation every six (6) hours as needed for Wheezing. 8 g 2    Wixela Inhub 250-50 mcg/dose diskus inhaler INHALE 1 PUFF AND INTO THE LUNGS EVERY 12 HOURS. RINSE MOUTH AFTER USE (Patient not taking: Reported on 7/13/2022) 60 Each 12    ipratropium (ATROVENT) 42 mcg (0.06 %) nasal spray 2 Sprays by Both Nostrils route four (4) times daily. (Patient taking differently: 2 Sprays by Both Nostrils route four (4) times daily as needed.) 15 mL 5    nystatin (MYCOSTATIN) 100,000 unit/mL suspension SWISH AND SWALLOW 5 ML BY MOUTH FOR 30 SECONDS 4 TIMES A DAY AS NEEDED FOR MOUTH PAIN 60 mL 2    ondansetron hcl (ZOFRAN) 4 mg tablet TAKE 1 TABLET BY MOUTH EVERY 8 HOURS AS NEEDED FOR NAUSEA OR VOMITING 30 Tablet 2    dicyclomine (BENTYL) 10 mg capsule TAKE ONE CAPSULE BY MOUTH FOUR TIMES DAILY AS NEEDED 120 Capsule 3    ezetimibe (ZETIA) 10 mg tablet TAKE 1 TABLET BY MOUTH DAILY 90 Tablet 3    atorvastatin (LIPITOR) 20 mg tablet TAKE 1 TABLET BY MOUTH EVERY DAY 90 Tablet 3    diclofenac (VOLTAREN) 1 % gel Apply  to affected area every twelve (12) hours as needed for Pain. 100 g 2    escitalopram oxalate (LEXAPRO) 20 mg tablet TAKE 1 TABLET BY MOUTH DAILY 90 Tab 3       Vitals: T98.3 HR 62 RR 20 sats 94% /90    Allergies: is allergic to iodinated contrast media, pcn [penicillins], and sulfa (sulfonamide antibiotics).     Review of Systems: Pertinent Positives per HPI   [] Unable to obtain  ROS due to  []mental status change []sedated   []intubated   [x]Total of 13 systems reviewed as follows:  Constitutional: Negative fever, negative chills  Eyes:   Negative for amauroses fugax  ENT:   Negative sore throat,oral abscess   Endocrine Negative for thyroid replacement Rx; goiter; DM  Respiratory:  Negative chronic cough,sputum production  Cards:   Negative for palpitations, varicosities, claudication, lower extremity edema  GI:   Negative for dysphagia, bleeding, nausea, vomiting, diarrhea, and abdominal pain  Genitourinary: Negative for frequency, dysuria, BPH   Integument:  Negative for rash and pruritus  Hematologic:  Negative for easy bruising; bleeding dyscrasia   Musculoskel: Negative for muscle weakness inhibiting ambulation  Neurological:  Negative for stroke, TIA, syncope, dizziness  Behavl/Psych: Negative for feelings of anxiety, depression     Cardiovascular Testing:     EK/15/22: Normal sinus rhythm   Moderate voltage criteria for LVH, may be normal variant    TTE:  Completed at [facility], to be scanned into media   Aortic stenosis with echo 2022 EF 65% peak of 4, mean of 41, PHILIPPE of 0.7, mild AI, trace MR     Cardiac catheterization:   · Known severe AS. Found to have severe proximal LCx disease, and  of RCA. · Known severe AS, valve not crossed. · Right dominant system. Mild LAD disease. 99% proximal LCx prior to trifurcation into 3 OMs. Occluded mid RCA. Distal vessel collatorllized from left. · Right innominate tortuosity. Makes cath difficult. · Patient with severe baseline anxiety likely exacerbated by steroid prep for contrast allergy. Unable to cooperate with cath. · PCI deferred until anesthesia can be arranged. · 5Fr right radial sheath removed by patent hemostasis. Coronary Findings        Diagnostic  Dominance: Right     Left Main   Moderate with 30% disease. Left Anterior Descending   Moderate vessel with moderate diagonal with mild luminal irregularities.    Left Circumflex   Moderate vessel with 99% proximal stenosis prior to trifurcation of three OM's   Right Coronary Artery   Moderate vessel occluded in mid portion, distal well collateralized from left.      Intervention        No interventions have been documented.     Left Heart     Aorta The aortic root is normal. There was mild (2+) aortic regurgitation.            Carotid Dopplers: awaiting records from [facility]     PFTs: FEV1/Predicted:  n/a  Normal FEV1 > 1 Liter, Predicted = 100%, DLCO > 80%                          Mild Lung Disease (60-70% Predicted)                          Moderate Lung Disease (50-55% Predicted)                          Severe Lung Disease (< 50% Predicted or PO2 < 60 or pCO2>50 on RA)     Gated C/A/P CTA: images from [facility] to be scanned         Physical Exam:  General: Well nourished well groomed, appears stated age accompanied by daughter in law  Neuro: A&OX3. GRAHAM. PERRL. Steady walker assisted gait  Head:Normocephalic. Atraumatic. Symmetrical  Neck: Trachea Midline  Resp: CTA B. No Adv BS/cough/sputum/tachypnea with seated conversation  CV: S1S2 RRR. Grade II systolic murmur RIS, 2nd intercostal space. No JVD/carotid bruits. Pink/warm/dry extremities. Trace LE peripheral edema  GI:Benign ab. Soft. NT/ND. Active BS  : Voids  Integ: No obvious s/s of infection or breakdown  Musculo/Skeletal: FROM in all major joints. Normal muscle tone    Clinic Evaluation:   Clinic Evaluation:   KCCQ-12: scanned into EMR     5 meter gait: 19.35 seconds     Frality Survey:  [facility] Speaker Index ADL - 5/6  scanned into EMR      STS 4.2 Risk Score / Predicted 30 day mortality: - calculations scanned into EMR  Procedure: Isolated AVR  Risk of Mortality:  5.287%  Renal Failure:  3.700%  Permanent Stroke:  1.968%  Prolonged Ventilation:  14.893%  DSW Infection:  0.106%  Reoperation:  3.855%  Morbidity or Mortality:  19.412%  Short Length of Stay:  22.881%  Long Length of Stay:  10.632%       Assessment/Plan:      1.  Severe AS: PHILIPPE 0.7 cm2, mean gradient 41 mmHg, peak velocity 4 m/s. CTA completed. Will plan for RTF 23 S3 Ultra TAVR. Will use Lovenox while holding coumadin for TAVR, will need contrast allergy pretreatment. Hold warfarin x 5 days and then give Lovenox x 2 days (once daily dosing). 2. CAD s/p stents 6/14/22  3. Protein C deficiency/previous DVT: on coumadin, will hold coumadin prior to TAVR (see above)  4. HTN: on metoprolol XL 25 daily -Last dose Monday morning. 5. HLD: on statin, zetia  6. PAD: Pt reports claudication, ABIs 12/21 were normal  7. Carotid stenosis: carotids 10/21 mild stenosis bilaterally  8. Asthma/chronic cough: On advair, PRN albuterol, tessalon pearls PRN  9. DM Type II: Pt had been on metformin but stopped taking, A1C 7.6 in march 2022. 10. Iron deficiency anemia: Hgb 8.9 on recent labs  11. Overactive bladder: on ditropan  12. Migraines: firoicet PRN, keppra BID   13. Chronic back pain: tramadol PRN, celebrex,   14. Anxiety and depression: On wellbutrin, elavil, trazadone, lexapro, Seroquel   15. IBS: takes prn immodium, bentyl PRN  16. Hypothyroid: cont synthroid  17. GERD: nexium     The patient was evaluated by Jefe Pulido who discussed conventional aortic valve replacement and TAVR, as well as the risks and benefits of both versus continuing medical therapy with the patient and daughter-inlaw. All questions were answered. Jefe Pulido and Donnie Terrazas felt that TAVR is a better option for this patient, given age, frailty, and co-morbidities. With all the options available, the patient has agreed to proceed with TAVR for the treatment of her aortic stenosis and will be scheduled for 7/20/22.

## 2022-07-18 ENCOUNTER — HOSPITAL ENCOUNTER (OUTPATIENT)
Dept: PREADMISSION TESTING | Age: 82
Discharge: HOME | DRG: 267 | End: 2022-07-18
Payer: MEDICARE

## 2022-07-18 PROCEDURE — 86920 COMPATIBILITY TEST SPIN: CPT

## 2022-07-18 PROCEDURE — 86900 BLOOD TYPING SEROLOGIC ABO: CPT

## 2022-07-18 PROCEDURE — 36415 COLL VENOUS BLD VENIPUNCTURE: CPT

## 2022-07-19 ENCOUNTER — HOSPITAL ENCOUNTER (OUTPATIENT)
Dept: PREADMISSION TESTING | Age: 82
Discharge: HOME | DRG: 267 | End: 2022-07-19
Payer: MEDICARE

## 2022-07-19 LAB
APPEARANCE UR: CLEAR
BACTERIA URNS QL MICRO: NEGATIVE /HPF
BILIRUB UR QL: NEGATIVE
COLOR UR: NORMAL
EPITH CASTS URNS QL MICRO: NORMAL /LPF
GLUCOSE UR STRIP.AUTO-MCNC: NEGATIVE MG/DL
HGB UR QL STRIP: NEGATIVE
HYALINE CASTS URNS QL MICRO: NORMAL /LPF (ref 0–2)
KETONES UR QL STRIP.AUTO: NEGATIVE MG/DL
LEUKOCYTE ESTERASE UR QL STRIP.AUTO: NEGATIVE
NITRITE UR QL STRIP.AUTO: NEGATIVE
PH UR STRIP: 5.5 [PH] (ref 5–8)
PROT UR STRIP-MCNC: NEGATIVE MG/DL
RBC #/AREA URNS HPF: NORMAL /HPF (ref 0–5)
SP GR UR REFRACTOMETRY: 1.01
UA: UC IF INDICATED,UAUC: NORMAL
UROBILINOGEN UR QL STRIP.AUTO: 0.2 EU/DL (ref 0.2–1)
WBC URNS QL MICRO: NORMAL /HPF (ref 0–4)

## 2022-07-19 PROCEDURE — 81001 URINALYSIS AUTO W/SCOPE: CPT

## 2022-07-20 ENCOUNTER — ANESTHESIA EVENT (OUTPATIENT)
Dept: CARDIAC CATH/INVASIVE PROCEDURES | Age: 82
DRG: 267 | End: 2022-07-20
Payer: MEDICARE

## 2022-07-20 ENCOUNTER — APPOINTMENT (OUTPATIENT)
Dept: NON INVASIVE DIAGNOSTICS | Age: 82
DRG: 267 | End: 2022-07-20
Attending: INTERNAL MEDICINE
Payer: MEDICARE

## 2022-07-20 ENCOUNTER — HOSPITAL ENCOUNTER (INPATIENT)
Age: 82
LOS: 2 days | Discharge: HOME HEALTH | DRG: 267 | End: 2022-07-22
Attending: INTERNAL MEDICINE | Admitting: INTERNAL MEDICINE
Payer: MEDICARE

## 2022-07-20 ENCOUNTER — ANESTHESIA (OUTPATIENT)
Dept: CARDIAC CATH/INVASIVE PROCEDURES | Age: 82
DRG: 267 | End: 2022-07-20
Payer: MEDICARE

## 2022-07-20 DIAGNOSIS — Z95.2 HEART VALVE REPLACED: Primary | ICD-10-CM

## 2022-07-20 DIAGNOSIS — Q24.8 ABNORMAL CARDIAC VALVE: ICD-10-CM

## 2022-07-20 PROBLEM — I35.0 AS (AORTIC STENOSIS): Status: ACTIVE | Noted: 2022-07-20

## 2022-07-20 LAB
ACT BLD: 121 SECS (ref 79–138)
ACT BLD: 242 SECS (ref 79–138)
BASOPHILS # BLD: 0.1 K/UL (ref 0–0.1)
BASOPHILS NFR BLD: 1 % (ref 0–1)
DIFFERENTIAL METHOD BLD: ABNORMAL
ECHO AO ROOT DIAM: 2 CM
ECHO AO ROOT INDEX: 1.21 CM/M2
ECHO AV AREA PEAK VELOCITY: 1.7 CM2
ECHO AV AREA VTI: 1.8 CM2
ECHO AV AREA/BSA VTI: 1.1 CM2/M2
ECHO AV MEAN GRADIENT: 8 MMHG
ECHO AV MEAN VELOCITY: 1.3 M/S
ECHO AV PEAK GRADIENT: 21 MMHG
ECHO AV PEAK GRADIENT: 21 MMHG
ECHO AV PEAK VELOCITY: 2.3 M/S
ECHO AV PEAK VELOCITY: 2.3 M/S
ECHO AV VTI: 33.3 CM
ECHO LA DIAMETER INDEX: 2.42 CM/M2
ECHO LA DIAMETER: 4 CM
ECHO LA TO AORTIC ROOT RATIO: 2
ECHO LV FRACTIONAL SHORTENING: 29 % (ref 28–44)
ECHO LV INTERNAL DIMENSION DIASTOLE INDEX: 2.55 CM/M2
ECHO LV INTERNAL DIMENSION DIASTOLIC: 4.2 CM (ref 3.9–5.3)
ECHO LV INTERNAL DIMENSION SYSTOLIC INDEX: 1.82 CM/M2
ECHO LV INTERNAL DIMENSION SYSTOLIC: 3 CM
ECHO LV IVSD: 1.6 CM (ref 0.6–0.9)
ECHO LV MASS 2D: 262.6 G (ref 67–162)
ECHO LV MASS INDEX 2D: 159.2 G/M2 (ref 43–95)
ECHO LV POSTERIOR WALL DIASTOLIC: 1.5 CM (ref 0.6–0.9)
ECHO LV RELATIVE WALL THICKNESS RATIO: 0.71
ECHO LVOT AREA: 3.1 CM2
ECHO LVOT AV VTI INDEX: 0.57
ECHO LVOT DIAM: 2 CM
ECHO LVOT MEAN GRADIENT: 3 MMHG
ECHO LVOT PEAK GRADIENT: 6 MMHG
ECHO LVOT PEAK GRADIENT: 6 MMHG
ECHO LVOT PEAK VELOCITY: 1.2 M/S
ECHO LVOT PEAK VELOCITY: 1.2 M/S
ECHO LVOT STROKE VOLUME INDEX: 36.2 ML/M2
ECHO LVOT SV: 59.7 ML
ECHO LVOT VTI: 19 CM
ECHO MV AREA VTI: 2.2 CM2
ECHO MV LVOT VTI INDEX: 1.45
ECHO MV MAX VELOCITY: 1.6 M/S
ECHO MV MEAN GRADIENT: 3 MMHG
ECHO MV MEAN VELOCITY: 0.9 M/S
ECHO MV PEAK GRADIENT: 10 MMHG
ECHO MV VTI: 27.5 CM
ECHO RV FREE WALL PEAK S': 17 CM/S
ECHO RV INTERNAL DIMENSION: 3.6 CM
EOSINOPHIL # BLD: 0.2 K/UL (ref 0–0.4)
EOSINOPHIL NFR BLD: 3 % (ref 0–7)
ERYTHROCYTE [DISTWIDTH] IN BLOOD BY AUTOMATED COUNT: 21.2 % (ref 11.5–14.5)
GLUCOSE BLD STRIP.AUTO-MCNC: 147 MG/DL (ref 65–117)
GLUCOSE BLD STRIP.AUTO-MCNC: 280 MG/DL (ref 65–117)
HCT VFR BLD AUTO: 29.3 % (ref 35–47)
HGB BLD-MCNC: 8.4 G/DL (ref 11.5–16)
HISTORY CHECKED?,CKHIST: NORMAL
IMM GRANULOCYTES # BLD AUTO: 0.1 K/UL (ref 0–0.04)
IMM GRANULOCYTES NFR BLD AUTO: 1 % (ref 0–0.5)
INR BLD: 1.1 (ref 0.9–1.2)
LYMPHOCYTES # BLD: 1.3 K/UL (ref 0.8–3.5)
LYMPHOCYTES NFR BLD: 21 % (ref 12–49)
MCH RBC QN AUTO: 20.4 PG (ref 26–34)
MCHC RBC AUTO-ENTMCNC: 28.7 G/DL (ref 30–36.5)
MCV RBC AUTO: 71.3 FL (ref 80–99)
MONOCYTES # BLD: 0.6 K/UL (ref 0–1)
MONOCYTES NFR BLD: 10 % (ref 5–13)
NEUTS SEG # BLD: 3.9 K/UL (ref 1.8–8)
NEUTS SEG NFR BLD: 64 % (ref 32–75)
NRBC # BLD: 0 K/UL (ref 0–0.01)
NRBC BLD-RTO: 0 PER 100 WBC
PLATELET # BLD AUTO: 252 K/UL (ref 150–400)
PMV BLD AUTO: 9.6 FL (ref 8.9–12.9)
RBC # BLD AUTO: 4.11 M/UL (ref 3.8–5.2)
RBC MORPH BLD: ABNORMAL
SERVICE CMNT-IMP: ABNORMAL
SERVICE CMNT-IMP: ABNORMAL
WBC # BLD AUTO: 6.2 K/UL (ref 3.6–11)

## 2022-07-20 PROCEDURE — C1894 INTRO/SHEATH, NON-LASER: HCPCS | Performed by: INTERNAL MEDICINE

## 2022-07-20 PROCEDURE — 74011250636 HC RX REV CODE- 250/636: Performed by: INTERNAL MEDICINE

## 2022-07-20 PROCEDURE — 77030030195 HC CATH ANGI DX PRF4 MRTM -A: Performed by: INTERNAL MEDICINE

## 2022-07-20 PROCEDURE — 77030005401 HC CATH RAD ARRO -A: Performed by: NURSE ANESTHETIST, CERTIFIED REGISTERED

## 2022-07-20 PROCEDURE — C1769 GUIDE WIRE: HCPCS | Performed by: INTERNAL MEDICINE

## 2022-07-20 PROCEDURE — 74011000250 HC RX REV CODE- 250: Performed by: INTERNAL MEDICINE

## 2022-07-20 PROCEDURE — 85610 PROTHROMBIN TIME: CPT

## 2022-07-20 PROCEDURE — 85347 COAGULATION TIME ACTIVATED: CPT

## 2022-07-20 PROCEDURE — 77030020061 HC IV BLD WRMR ADMIN SET 3M -B: Performed by: NURSE ANESTHETIST, CERTIFIED REGISTERED

## 2022-07-20 PROCEDURE — 76060000035 HC ANESTHESIA 2 TO 2.5 HR: Performed by: INTERNAL MEDICINE

## 2022-07-20 PROCEDURE — 74011000250 HC RX REV CODE- 250: Performed by: NURSE ANESTHETIST, CERTIFIED REGISTERED

## 2022-07-20 PROCEDURE — 77010033678 HC OXYGEN DAILY

## 2022-07-20 PROCEDURE — 74011250637 HC RX REV CODE- 250/637: Performed by: INTERNAL MEDICINE

## 2022-07-20 PROCEDURE — 74011000636 HC RX REV CODE- 636: Performed by: INTERNAL MEDICINE

## 2022-07-20 PROCEDURE — 65270000046 HC RM TELEMETRY

## 2022-07-20 PROCEDURE — 2709999900 HC NON-CHARGEABLE SUPPLY: Performed by: INTERNAL MEDICINE

## 2022-07-20 PROCEDURE — 77030004549 HC CATH ANGI DX PRF MRTM -A: Performed by: INTERNAL MEDICINE

## 2022-07-20 PROCEDURE — 74011250636 HC RX REV CODE- 250/636: Performed by: NURSE ANESTHETIST, CERTIFIED REGISTERED

## 2022-07-20 PROCEDURE — 36415 COLL VENOUS BLD VENIPUNCTURE: CPT

## 2022-07-20 PROCEDURE — 33210 INSERT ELECTRD/PM CATH SNGL: CPT | Performed by: INTERNAL MEDICINE

## 2022-07-20 PROCEDURE — P9016 RBC LEUKOCYTES REDUCED: HCPCS

## 2022-07-20 PROCEDURE — 77030013797 HC KT TRNSDUC PRSSR EDWD -A: Performed by: NURSE ANESTHETIST, CERTIFIED REGISTERED

## 2022-07-20 PROCEDURE — 82962 GLUCOSE BLOOD TEST: CPT

## 2022-07-20 PROCEDURE — C1889 IMPLANT/INSERT DEVICE, NOC: HCPCS | Performed by: INTERNAL MEDICINE

## 2022-07-20 PROCEDURE — 93308 TTE F-UP OR LMTD: CPT

## 2022-07-20 PROCEDURE — 33361 REPLACE AORTIC VALVE PERQ: CPT | Performed by: INTERNAL MEDICINE

## 2022-07-20 PROCEDURE — 74011250636 HC RX REV CODE- 250/636: Performed by: ANESTHESIOLOGY

## 2022-07-20 PROCEDURE — 93005 ELECTROCARDIOGRAM TRACING: CPT

## 2022-07-20 PROCEDURE — 74011000250 HC RX REV CODE- 250: Performed by: NURSE PRACTITIONER

## 2022-07-20 PROCEDURE — B4101ZZ FLUOROSCOPY OF ABDOMINAL AORTA USING LOW OSMOLAR CONTRAST: ICD-10-PCS | Performed by: INTERNAL MEDICINE

## 2022-07-20 PROCEDURE — 36200 PLACE CATHETER IN AORTA: CPT | Performed by: INTERNAL MEDICINE

## 2022-07-20 PROCEDURE — 02RF38Z REPLACEMENT OF AORTIC VALVE WITH ZOOPLASTIC TISSUE, PERCUTANEOUS APPROACH: ICD-10-PCS | Performed by: INTERNAL MEDICINE

## 2022-07-20 PROCEDURE — 74011000258 HC RX REV CODE- 258: Performed by: NURSE ANESTHETIST, CERTIFIED REGISTERED

## 2022-07-20 PROCEDURE — 75710 ARTERY X-RAYS ARM/LEG: CPT | Performed by: INTERNAL MEDICINE

## 2022-07-20 PROCEDURE — 77030029065 HC DRSG HEMO QCLOT ZMED -B: Performed by: INTERNAL MEDICINE

## 2022-07-20 PROCEDURE — 85025 COMPLETE CBC W/AUTO DIFF WBC: CPT

## 2022-07-20 PROCEDURE — 33361 REPLACE AORTIC VALVE PERQ: CPT | Performed by: THORACIC SURGERY (CARDIOTHORACIC VASCULAR SURGERY)

## 2022-07-20 PROCEDURE — 4A023N7 MEASUREMENT OF CARDIAC SAMPLING AND PRESSURE, LEFT HEART, PERCUTANEOUS APPROACH: ICD-10-PCS | Performed by: INTERNAL MEDICINE

## 2022-07-20 PROCEDURE — 77030039825 HC MSK NSL PAP SUPERNO2VA VYRM -B: Performed by: NURSE ANESTHETIST, CERTIFIED REGISTERED

## 2022-07-20 PROCEDURE — 74011250637 HC RX REV CODE- 250/637: Performed by: NURSE PRACTITIONER

## 2022-07-20 PROCEDURE — 93306 TTE W/DOPPLER COMPLETE: CPT

## 2022-07-20 DEVICE — IMPLANTABLE DEVICE: Type: IMPLANTABLE DEVICE | Status: FUNCTIONAL

## 2022-07-20 RX ORDER — FACIAL-BODY WIPES
10 EACH TOPICAL DAILY PRN
Status: DISCONTINUED | OUTPATIENT
Start: 2022-07-20 | End: 2022-07-22 | Stop reason: HOSPADM

## 2022-07-20 RX ORDER — FENTANYL CITRATE 50 UG/ML
25-50 INJECTION, SOLUTION INTRAMUSCULAR; INTRAVENOUS
Status: DISCONTINUED | OUTPATIENT
Start: 2022-07-20 | End: 2022-07-20

## 2022-07-20 RX ORDER — DIPHENHYDRAMINE HYDROCHLORIDE 50 MG/ML
12.5 INJECTION, SOLUTION INTRAMUSCULAR; INTRAVENOUS AS NEEDED
Status: DISCONTINUED | OUTPATIENT
Start: 2022-07-20 | End: 2022-07-20

## 2022-07-20 RX ORDER — SODIUM CHLORIDE 9 MG/ML
50 INJECTION, SOLUTION INTRAVENOUS CONTINUOUS
Status: DISCONTINUED | OUTPATIENT
Start: 2022-07-20 | End: 2022-07-20

## 2022-07-20 RX ORDER — NALOXONE HYDROCHLORIDE 0.4 MG/ML
0.4 INJECTION, SOLUTION INTRAMUSCULAR; INTRAVENOUS; SUBCUTANEOUS AS NEEDED
Status: DISCONTINUED | OUTPATIENT
Start: 2022-07-20 | End: 2022-07-22 | Stop reason: HOSPADM

## 2022-07-20 RX ORDER — TRAMADOL HYDROCHLORIDE 50 MG/1
50-100 TABLET ORAL
Status: DISCONTINUED | OUTPATIENT
Start: 2022-07-20 | End: 2022-07-20

## 2022-07-20 RX ORDER — LABETALOL HYDROCHLORIDE 5 MG/ML
INJECTION, SOLUTION INTRAVENOUS AS NEEDED
Status: DISCONTINUED | OUTPATIENT
Start: 2022-07-20 | End: 2022-07-20 | Stop reason: HOSPADM

## 2022-07-20 RX ORDER — HEPARIN SODIUM 200 [USP'U]/100ML
INJECTION, SOLUTION INTRAVENOUS
Status: COMPLETED | OUTPATIENT
Start: 2022-07-20 | End: 2022-07-20

## 2022-07-20 RX ORDER — AMITRIPTYLINE HYDROCHLORIDE 10 MG/1
10 TABLET, FILM COATED ORAL
Status: DISCONTINUED | OUTPATIENT
Start: 2022-07-20 | End: 2022-07-22 | Stop reason: HOSPADM

## 2022-07-20 RX ORDER — PROPOFOL 10 MG/ML
INJECTION, EMULSION INTRAVENOUS AS NEEDED
Status: DISCONTINUED | OUTPATIENT
Start: 2022-07-20 | End: 2022-07-20 | Stop reason: HOSPADM

## 2022-07-20 RX ORDER — SODIUM CHLORIDE 9 MG/ML
INJECTION, SOLUTION INTRAVENOUS
Status: DISCONTINUED | OUTPATIENT
Start: 2022-07-20 | End: 2022-07-20 | Stop reason: HOSPADM

## 2022-07-20 RX ORDER — AMOXICILLIN 250 MG
1 CAPSULE ORAL 2 TIMES DAILY
Status: DISCONTINUED | OUTPATIENT
Start: 2022-07-21 | End: 2022-07-22 | Stop reason: HOSPADM

## 2022-07-20 RX ORDER — SODIUM CHLORIDE 9 MG/ML
1000 INJECTION, SOLUTION INTRAVENOUS CONTINUOUS
Status: DISPENSED | OUTPATIENT
Start: 2022-07-20 | End: 2022-07-20

## 2022-07-20 RX ORDER — PROPOFOL 10 MG/ML
INJECTION, EMULSION INTRAVENOUS
Status: DISCONTINUED | OUTPATIENT
Start: 2022-07-20 | End: 2022-07-20 | Stop reason: HOSPADM

## 2022-07-20 RX ORDER — SODIUM CHLORIDE 9 MG/ML
50 INJECTION, SOLUTION INTRAVENOUS CONTINUOUS
Status: CANCELLED | OUTPATIENT
Start: 2022-07-20 | End: 2022-07-21

## 2022-07-20 RX ORDER — WARFARIN 1 MG/1
1 TABLET ORAL ONCE
Status: COMPLETED | OUTPATIENT
Start: 2022-07-20 | End: 2022-07-20

## 2022-07-20 RX ORDER — HEPARIN SODIUM 1000 [USP'U]/ML
INJECTION, SOLUTION INTRAVENOUS; SUBCUTANEOUS AS NEEDED
Status: DISCONTINUED | OUTPATIENT
Start: 2022-07-20 | End: 2022-07-20 | Stop reason: HOSPADM

## 2022-07-20 RX ORDER — ONDANSETRON 2 MG/ML
4 INJECTION INTRAMUSCULAR; INTRAVENOUS AS NEEDED
Status: DISCONTINUED | OUTPATIENT
Start: 2022-07-20 | End: 2022-07-20

## 2022-07-20 RX ORDER — FENTANYL CITRATE 50 UG/ML
INJECTION, SOLUTION INTRAMUSCULAR; INTRAVENOUS AS NEEDED
Status: DISCONTINUED | OUTPATIENT
Start: 2022-07-20 | End: 2022-07-20 | Stop reason: HOSPADM

## 2022-07-20 RX ORDER — QUETIAPINE FUMARATE 25 MG/1
25 TABLET, FILM COATED ORAL
Status: DISCONTINUED | OUTPATIENT
Start: 2022-07-20 | End: 2022-07-22 | Stop reason: HOSPADM

## 2022-07-20 RX ORDER — ONDANSETRON 2 MG/ML
INJECTION INTRAMUSCULAR; INTRAVENOUS AS NEEDED
Status: DISCONTINUED | OUTPATIENT
Start: 2022-07-20 | End: 2022-07-20 | Stop reason: HOSPADM

## 2022-07-20 RX ORDER — ALBUTEROL SULFATE 0.83 MG/ML
2.5 SOLUTION RESPIRATORY (INHALATION)
Status: DISCONTINUED | OUTPATIENT
Start: 2022-07-20 | End: 2022-07-22 | Stop reason: HOSPADM

## 2022-07-20 RX ORDER — ACETAMINOPHEN 325 MG/1
650 TABLET ORAL
Status: DISCONTINUED | OUTPATIENT
Start: 2022-07-20 | End: 2022-07-22 | Stop reason: HOSPADM

## 2022-07-20 RX ORDER — MIDAZOLAM HYDROCHLORIDE 1 MG/ML
1 INJECTION, SOLUTION INTRAMUSCULAR; INTRAVENOUS AS NEEDED
Status: CANCELLED | OUTPATIENT
Start: 2022-07-20

## 2022-07-20 RX ORDER — ESCITALOPRAM OXALATE 10 MG/1
20 TABLET ORAL DAILY
Status: DISCONTINUED | OUTPATIENT
Start: 2022-07-21 | End: 2022-07-22 | Stop reason: HOSPADM

## 2022-07-20 RX ORDER — SODIUM CHLORIDE 0.9 % (FLUSH) 0.9 %
5-40 SYRINGE (ML) INJECTION EVERY 8 HOURS
Status: DISCONTINUED | OUTPATIENT
Start: 2022-07-20 | End: 2022-07-20 | Stop reason: SDUPTHER

## 2022-07-20 RX ORDER — LEVOTHYROXINE SODIUM 25 UG/1
50 TABLET ORAL
Status: DISCONTINUED | OUTPATIENT
Start: 2022-07-21 | End: 2022-07-22 | Stop reason: HOSPADM

## 2022-07-20 RX ORDER — ALBUTEROL SULFATE 0.83 MG/ML
2.5 SOLUTION RESPIRATORY (INHALATION)
Status: DISCONTINUED | OUTPATIENT
Start: 2022-07-20 | End: 2022-07-20

## 2022-07-20 RX ORDER — MIDAZOLAM HYDROCHLORIDE 1 MG/ML
0.5 INJECTION, SOLUTION INTRAMUSCULAR; INTRAVENOUS
Status: DISCONTINUED | OUTPATIENT
Start: 2022-07-20 | End: 2022-07-20

## 2022-07-20 RX ORDER — BUPROPION HYDROCHLORIDE 150 MG/1
150 TABLET ORAL DAILY
Status: DISCONTINUED | OUTPATIENT
Start: 2022-07-21 | End: 2022-07-22 | Stop reason: HOSPADM

## 2022-07-20 RX ORDER — FENTANYL CITRATE 50 UG/ML
50 INJECTION, SOLUTION INTRAMUSCULAR; INTRAVENOUS AS NEEDED
Status: CANCELLED | OUTPATIENT
Start: 2022-07-20

## 2022-07-20 RX ORDER — SODIUM CHLORIDE 0.9 % (FLUSH) 0.9 %
5-40 SYRINGE (ML) INJECTION AS NEEDED
Status: DISCONTINUED | OUTPATIENT
Start: 2022-07-20 | End: 2022-07-20 | Stop reason: SDUPTHER

## 2022-07-20 RX ORDER — DEXMEDETOMIDINE HYDROCHLORIDE 100 UG/ML
INJECTION, SOLUTION INTRAVENOUS AS NEEDED
Status: DISCONTINUED | OUTPATIENT
Start: 2022-07-20 | End: 2022-07-20 | Stop reason: HOSPADM

## 2022-07-20 RX ORDER — CEFAZOLIN SODIUM/WATER 2 G/20 ML
SYRINGE (ML) INTRAVENOUS AS NEEDED
Status: DISCONTINUED | OUTPATIENT
Start: 2022-07-20 | End: 2022-07-20 | Stop reason: HOSPADM

## 2022-07-20 RX ORDER — SODIUM CHLORIDE 9 MG/ML
1000 INJECTION, SOLUTION INTRAVENOUS CONTINUOUS
Status: DISCONTINUED | OUTPATIENT
Start: 2022-07-20 | End: 2022-07-20

## 2022-07-20 RX ORDER — FENTANYL CITRATE 50 UG/ML
25 INJECTION, SOLUTION INTRAMUSCULAR; INTRAVENOUS
Status: DISCONTINUED | OUTPATIENT
Start: 2022-07-20 | End: 2022-07-20

## 2022-07-20 RX ORDER — GUAIFENESIN 100 MG/5ML
81 LIQUID (ML) ORAL DAILY
Status: DISCONTINUED | OUTPATIENT
Start: 2022-07-21 | End: 2022-07-20

## 2022-07-20 RX ORDER — TRAZODONE HYDROCHLORIDE 50 MG/1
100 TABLET ORAL
Status: DISCONTINUED | OUTPATIENT
Start: 2022-07-20 | End: 2022-07-22 | Stop reason: HOSPADM

## 2022-07-20 RX ORDER — SODIUM CHLORIDE, SODIUM LACTATE, POTASSIUM CHLORIDE, CALCIUM CHLORIDE 600; 310; 30; 20 MG/100ML; MG/100ML; MG/100ML; MG/100ML
75 INJECTION, SOLUTION INTRAVENOUS CONTINUOUS
Status: CANCELLED | OUTPATIENT
Start: 2022-07-20 | End: 2022-07-21

## 2022-07-20 RX ORDER — MIDAZOLAM HYDROCHLORIDE 1 MG/ML
.5-2 INJECTION, SOLUTION INTRAMUSCULAR; INTRAVENOUS
Status: DISCONTINUED | OUTPATIENT
Start: 2022-07-20 | End: 2022-07-20

## 2022-07-20 RX ORDER — MORPHINE SULFATE 2 MG/ML
2 INJECTION, SOLUTION INTRAMUSCULAR; INTRAVENOUS
Status: DISCONTINUED | OUTPATIENT
Start: 2022-07-20 | End: 2022-07-20

## 2022-07-20 RX ORDER — OXYCODONE HYDROCHLORIDE 5 MG/1
10 TABLET ORAL
Status: DISCONTINUED | OUTPATIENT
Start: 2022-07-20 | End: 2022-07-20

## 2022-07-20 RX ORDER — LEVETIRACETAM 500 MG/1
500 TABLET ORAL
Status: DISCONTINUED | OUTPATIENT
Start: 2022-07-20 | End: 2022-07-20

## 2022-07-20 RX ORDER — TRAMADOL HYDROCHLORIDE 50 MG/1
50 TABLET ORAL
Status: DISCONTINUED | OUTPATIENT
Start: 2022-07-20 | End: 2022-07-22 | Stop reason: HOSPADM

## 2022-07-20 RX ORDER — SODIUM CHLORIDE, SODIUM LACTATE, POTASSIUM CHLORIDE, CALCIUM CHLORIDE 600; 310; 30; 20 MG/100ML; MG/100ML; MG/100ML; MG/100ML
75 INJECTION, SOLUTION INTRAVENOUS CONTINUOUS
Status: DISCONTINUED | OUTPATIENT
Start: 2022-07-20 | End: 2022-07-20

## 2022-07-20 RX ORDER — HYDROMORPHONE HYDROCHLORIDE 1 MG/ML
0.2 INJECTION, SOLUTION INTRAMUSCULAR; INTRAVENOUS; SUBCUTANEOUS
Status: DISCONTINUED | OUTPATIENT
Start: 2022-07-20 | End: 2022-07-20

## 2022-07-20 RX ORDER — CLOPIDOGREL BISULFATE 75 MG/1
150 TABLET ORAL ONCE
Status: COMPLETED | OUTPATIENT
Start: 2022-07-20 | End: 2022-07-20

## 2022-07-20 RX ORDER — SODIUM CHLORIDE 0.9 % (FLUSH) 0.9 %
5-40 SYRINGE (ML) INJECTION AS NEEDED
Status: DISCONTINUED | OUTPATIENT
Start: 2022-07-20 | End: 2022-07-22 | Stop reason: HOSPADM

## 2022-07-20 RX ORDER — PROTAMINE SULFATE 10 MG/ML
INJECTION, SOLUTION INTRAVENOUS AS NEEDED
Status: DISCONTINUED | OUTPATIENT
Start: 2022-07-20 | End: 2022-07-20 | Stop reason: HOSPADM

## 2022-07-20 RX ORDER — SODIUM CHLORIDE, SODIUM LACTATE, POTASSIUM CHLORIDE, CALCIUM CHLORIDE 600; 310; 30; 20 MG/100ML; MG/100ML; MG/100ML; MG/100ML
INJECTION, SOLUTION INTRAVENOUS
Status: DISCONTINUED | OUTPATIENT
Start: 2022-07-20 | End: 2022-07-20 | Stop reason: HOSPADM

## 2022-07-20 RX ORDER — LIDOCAINE HYDROCHLORIDE 10 MG/ML
0.1 INJECTION, SOLUTION EPIDURAL; INFILTRATION; INTRACAUDAL; PERINEURAL AS NEEDED
Status: CANCELLED | OUTPATIENT
Start: 2022-07-20

## 2022-07-20 RX ORDER — SODIUM CHLORIDE 0.9 % (FLUSH) 0.9 %
5-40 SYRINGE (ML) INJECTION EVERY 8 HOURS
Status: CANCELLED | OUTPATIENT
Start: 2022-07-20

## 2022-07-20 RX ORDER — ONDANSETRON 2 MG/ML
4 INJECTION INTRAMUSCULAR; INTRAVENOUS
Status: DISCONTINUED | OUTPATIENT
Start: 2022-07-20 | End: 2022-07-22 | Stop reason: HOSPADM

## 2022-07-20 RX ORDER — CLOPIDOGREL BISULFATE 75 MG/1
75 TABLET ORAL DAILY
Status: DISCONTINUED | OUTPATIENT
Start: 2022-07-21 | End: 2022-07-22 | Stop reason: HOSPADM

## 2022-07-20 RX ORDER — LANOLIN ALCOHOL/MO/W.PET/CERES
400 CREAM (GRAM) TOPICAL 2 TIMES DAILY
Status: DISCONTINUED | OUTPATIENT
Start: 2022-07-21 | End: 2022-07-22 | Stop reason: HOSPADM

## 2022-07-20 RX ORDER — PANTOPRAZOLE SODIUM 40 MG/1
40 TABLET, DELAYED RELEASE ORAL
Status: DISCONTINUED | OUTPATIENT
Start: 2022-07-21 | End: 2022-07-22 | Stop reason: HOSPADM

## 2022-07-20 RX ORDER — SODIUM CHLORIDE 0.9 % (FLUSH) 0.9 %
5-40 SYRINGE (ML) INJECTION AS NEEDED
Status: CANCELLED | OUTPATIENT
Start: 2022-07-20

## 2022-07-20 RX ORDER — LIDOCAINE HYDROCHLORIDE 10 MG/ML
INJECTION INFILTRATION; PERINEURAL AS NEEDED
Status: DISCONTINUED | OUTPATIENT
Start: 2022-07-20 | End: 2022-07-20 | Stop reason: HOSPADM

## 2022-07-20 RX ORDER — ATORVASTATIN CALCIUM 40 MG/1
20 TABLET, FILM COATED ORAL
Status: DISCONTINUED | OUTPATIENT
Start: 2022-07-20 | End: 2022-07-22 | Stop reason: HOSPADM

## 2022-07-20 RX ORDER — OXYCODONE AND ACETAMINOPHEN 5; 325 MG/1; MG/1
1 TABLET ORAL AS NEEDED
Status: DISCONTINUED | OUTPATIENT
Start: 2022-07-20 | End: 2022-07-20

## 2022-07-20 RX ORDER — LEVOTHYROXINE SODIUM 50 UG/1
50 TABLET ORAL
Status: DISCONTINUED | OUTPATIENT
Start: 2022-07-21 | End: 2022-07-20 | Stop reason: CLARIF

## 2022-07-20 RX ORDER — EZETIMIBE 10 MG/1
10 TABLET ORAL DAILY
Status: DISCONTINUED | OUTPATIENT
Start: 2022-07-21 | End: 2022-07-22 | Stop reason: HOSPADM

## 2022-07-20 RX ORDER — SODIUM CHLORIDE 0.9 % (FLUSH) 0.9 %
5-40 SYRINGE (ML) INJECTION EVERY 8 HOURS
Status: DISCONTINUED | OUTPATIENT
Start: 2022-07-20 | End: 2022-07-22 | Stop reason: HOSPADM

## 2022-07-20 RX ORDER — BENZONATATE 100 MG/1
100 CAPSULE ORAL
Status: DISCONTINUED | OUTPATIENT
Start: 2022-07-20 | End: 2022-07-22 | Stop reason: HOSPADM

## 2022-07-20 RX ADMIN — HEPARIN SODIUM 2000 UNITS: 1000 INJECTION, SOLUTION INTRAVENOUS; SUBCUTANEOUS at 11:26

## 2022-07-20 RX ADMIN — PHENYLEPHRINE HYDROCHLORIDE 40 MCG/MIN: 10 INJECTION INTRAVENOUS at 10:24

## 2022-07-20 RX ADMIN — MIDAZOLAM 2 MG: 1 INJECTION INTRAMUSCULAR; INTRAVENOUS at 09:33

## 2022-07-20 RX ADMIN — DEXMEDETOMIDINE HYDROCHLORIDE 4 MCG: 100 INJECTION, SOLUTION, CONCENTRATE INTRAVENOUS at 10:17

## 2022-07-20 RX ADMIN — DEXMEDETOMIDINE HYDROCHLORIDE 4 MCG: 100 INJECTION, SOLUTION, CONCENTRATE INTRAVENOUS at 10:14

## 2022-07-20 RX ADMIN — CLOPIDOGREL BISULFATE 150 MG: 75 TABLET ORAL at 09:01

## 2022-07-20 RX ADMIN — PROPOFOL 75 MCG/KG/MIN: 10 INJECTION, EMULSION INTRAVENOUS at 10:15

## 2022-07-20 RX ADMIN — FENTANYL CITRATE 50 MCG: 50 INJECTION, SOLUTION INTRAMUSCULAR; INTRAVENOUS at 09:33

## 2022-07-20 RX ADMIN — PROTAMINE SULFATE 100 MG: 10 INJECTION, SOLUTION INTRAVENOUS at 11:36

## 2022-07-20 RX ADMIN — CEFAZOLIN SODIUM 2 G: 1 INJECTION, POWDER, FOR SOLUTION INTRAMUSCULAR; INTRAVENOUS at 23:52

## 2022-07-20 RX ADMIN — ATORVASTATIN CALCIUM 20 MG: 40 TABLET, FILM COATED ORAL at 23:47

## 2022-07-20 RX ADMIN — HEPARIN SODIUM 9000 UNITS: 1000 INJECTION, SOLUTION INTRAVENOUS; SUBCUTANEOUS at 11:14

## 2022-07-20 RX ADMIN — Medication 2 G: at 10:20

## 2022-07-20 RX ADMIN — PROPOFOL 10 MG: 10 INJECTION, EMULSION INTRAVENOUS at 10:23

## 2022-07-20 RX ADMIN — SODIUM CHLORIDE, PRESERVATIVE FREE 10 ML: 5 INJECTION INTRAVENOUS at 23:48

## 2022-07-20 RX ADMIN — CEFAZOLIN SODIUM 2 G: 1 INJECTION, POWDER, FOR SOLUTION INTRAMUSCULAR; INTRAVENOUS at 16:27

## 2022-07-20 RX ADMIN — SODIUM CHLORIDE, POTASSIUM CHLORIDE, SODIUM LACTATE AND CALCIUM CHLORIDE: 600; 310; 30; 20 INJECTION, SOLUTION INTRAVENOUS at 10:06

## 2022-07-20 RX ADMIN — AMITRIPTYLINE HYDROCHLORIDE 10 MG: 10 TABLET, FILM COATED ORAL at 23:47

## 2022-07-20 RX ADMIN — DEXMEDETOMIDINE HYDROCHLORIDE 4 MCG: 100 INJECTION, SOLUTION, CONCENTRATE INTRAVENOUS at 10:12

## 2022-07-20 RX ADMIN — QUETIAPINE FUMARATE 25 MG: 25 TABLET ORAL at 23:47

## 2022-07-20 RX ADMIN — PROPOFOL 10 MG: 10 INJECTION, EMULSION INTRAVENOUS at 10:20

## 2022-07-20 RX ADMIN — SODIUM CHLORIDE: 9 INJECTION, SOLUTION INTRAVENOUS at 10:06

## 2022-07-20 RX ADMIN — WARFARIN SODIUM 1 MG: 1 TABLET ORAL at 17:32

## 2022-07-20 RX ADMIN — LABETALOL HYDROCHLORIDE 5 MG: 5 INJECTION, SOLUTION INTRAVENOUS at 11:54

## 2022-07-20 RX ADMIN — TRAMADOL HYDROCHLORIDE 50 MG: 50 TABLET ORAL at 18:12

## 2022-07-20 RX ADMIN — TRAZODONE HYDROCHLORIDE 100 MG: 50 TABLET ORAL at 23:47

## 2022-07-20 RX ADMIN — FENTANYL CITRATE 25 MCG: 50 INJECTION, SOLUTION INTRAMUSCULAR; INTRAVENOUS at 10:15

## 2022-07-20 RX ADMIN — ONDANSETRON HYDROCHLORIDE 4 MG: 2 INJECTION, SOLUTION INTRAMUSCULAR; INTRAVENOUS at 11:44

## 2022-07-20 NOTE — PROGRESS NOTES
2:20 PM Report received from [address]. SBAR, Kardex, Procedure Summary, Intake/Output, MAR, Med Rec Status, and Cardiac Rhythm NSR  were discussed. Pt with bilat groin sites cdi, tender and soft. Purwick placed. Bed rest orders reviewed. Pt verbalizes understanding. 3:18 PM pt resting quietly in room. Bilat groins cdi, bruising, mild tenderness upon palp. Pedal pulses palp. Vss. Meal given. Voiding in purwick.     Report given to ALCIRA Beckford RN

## 2022-07-20 NOTE — PROGRESS NOTES
Pharmacist Daily Dosing of Warfarin    Indication & Goal INR:  Protein C deficiency, hx DVT; goal INR 2-3    PTA Warfarin Dose: 1 mg daily    Notable concurrent conditions and medications: None    Labs:  Recent Labs     07/20/22  1225 07/20/22  0900   INR  --  1.1   HGB 8.4*  --      --          Impression/Plan:   Will order warfarin 1 mg PO x 1 dose. Daily INR has been ordered  CBC w/o differential every other day has been ordered     Pharmacy will follow daily and adjust the dose as appropriate.     Thank you,  Lidia Kimball, JENNIFER      Warfarin Protocol    Located on pharmacy Teams site: Clinical Practice -> Anticoagulation & Cardiology -> Anticoagulation Policies, Protocols, Guidance

## 2022-07-20 NOTE — ANESTHESIA PROCEDURE NOTES
Arterial Line Placement    Performed by: Owen Wood DO  Authorized by: Owen Wood DO     Pre-Procedure  Indications:  Arterial pressure monitoring and blood sampling  Preanesthetic Checklist: patient identified, risks and benefits discussed, anesthesia consent, site marked, patient being monitored, timeout performed and patient being monitored      Procedure:   Prep:  ChloraPrep  Seldinger Technique?: Yes    Orientation:  Left  Location:  Radial artery  Catheter size:  20 G  Number of attempts:  1    Assessment:   Post-procedure:  Line secured and sterile dressing applied  Patient Tolerance:  Patient tolerated the procedure well with no immediate complications  Comment:   Collateral perfusion verified

## 2022-07-20 NOTE — Clinical Note
TRANSFER - OUT REPORT:     Verbal report given to: Compa Grigsby. Report consisted of patient's Situation, Background, Assessment and   Recommendations(SBAR). Opportunity for questions and clarification was provided. Patient transported with a Registered Nurse. Patient transported to: recovery.

## 2022-07-20 NOTE — Clinical Note
Sheath #3: Sheath: inserted. Sheath inserted/placed in the left femoral  vein. Hemostasis achieved. Upon evaluation of the common femoral artery stick using fluoroscopy, the access site puncture was within the safe zone.

## 2022-07-20 NOTE — PROGRESS NOTES
1720 Pt oob to chair, sat up for 30 min, assisted back to bed. 1800 assessed groin sites. Small hematoma noted to left groin sites. Hand held pressure for 15 minutes, hematoma resolved. Dr Rita Crespo notified. Per md restart bedrest orders.  Pt will be on bedrest until 10pm

## 2022-07-20 NOTE — PROGRESS NOTES
TRANSFER - IN REPORT:    Verbal report received from Donny Magdaleno RN and Juan Manuel Pro CRNA on [facility]  being received from [address] [place] [address] for routine progression of care. Report consisted of patients Situation, Background, Assessment and Recommendations(SBAR). Information from the following report(s) Procedure Summary and MAR was reviewed with the receiving clinician. Opportunity for questions and clarification was provided. Assessment completed upon patients arrival to [address] [place] [address] and care assumed. Cardiac Cath Lab Recovery Arrival Note:    Susanne arrived to [facility] recovery area. Patient procedure= TAVR. Patient on cardiac monitor, non-invasive blood pressure, SPO2 monitor. On O2 @ 4 lpm via NC.  IV  of NS on pump at 75 ml/hr. Patient status doing well without problems. Patient is A&Ox 4. Patient reports no c/o. PROCEDURE SITE CHECK:    Procedure site:without any bleeding and no hematoma, no pain/discomfort reported at procedure site. No change in patient status. Continue to monitor patient and status.

## 2022-07-20 NOTE — Clinical Note
Temporary pacemaker inserted. Inserted through the left groin. Temporary Pacer pacing in the right ventricle. Device secured using: tegaderm. Rate = 80 bpm.   Electrical capture and mechanical capture obtained.

## 2022-07-20 NOTE — ANESTHESIA PREPROCEDURE EVALUATION
Relevant Problems   CARDIOVASCULAR   (+) Coronary artery disease   (+) Coronary artery disease involving native coronary artery of native heart without angina pectoris      ENDOCRINE   (+) Acquired hypothyroidism   (+) Type 2 diabetes mellitus without complication, without long-term current use of insulin (HCC)       Anesthetic History   No history of anesthetic complications            Review of Systems / Medical History  Patient summary reviewed, nursing notes reviewed and pertinent labs reviewed    Pulmonary          Smoker  Asthma     Comments: Former Smoker   Neuro/Psych         Headaches and psychiatric history    Comments: Anxiety  Cardiovascular      Valvular problems/murmurs: aortic stenosis  Angina      CAD, cardiac stents and hyperlipidemia    Exercise tolerance: <4 METS  Comments:      GI/Hepatic/Renal     GERD: well controlled          Comments: IBS Endo/Other    Diabetes  Hypothyroidism  Blood dyscrasia, arthritis and anemia     Other Findings              Physical Exam    Airway  Mallampati: III  TM Distance: 4 - 6 cm  Neck ROM: decreased range of motion   Mouth opening: Normal     Cardiovascular  Regular rate and rhythm,  S1 and S2 normal,  no murmur, click, rub, or gallop  Rhythm: regular  Rate: normal         Dental    Dentition: Full lower dentures and Full upper dentures     Pulmonary  Breath sounds clear to auscultation               Abdominal  GI exam deferred       Other Findings            Anesthetic Plan    ASA: 4  Anesthesia type: MAC          Induction: Intravenous  Anesthetic plan and risks discussed with: Patient and Son / Daughter

## 2022-07-20 NOTE — ANESTHESIA POSTPROCEDURE EVALUATION
Procedure(s):  TRANSCATHETER AORTIC VALVE REPLACEMENT. MAC    Anesthesia Post Evaluation      Multimodal analgesia: multimodal analgesia used between 6 hours prior to anesthesia start to PACU discharge  Patient location during evaluation: PACU  Patient participation: complete - patient participated  Level of consciousness: awake and alert  Pain management: adequate  Airway patency: patent  Anesthetic complications: no  Cardiovascular status: acceptable  Respiratory status: acceptable  Hydration status: acceptable  Comments: Seen, no complaints   Post anesthesia nausea and vomiting:  none  Final Post Anesthesia Temperature Assessment:  Normothermia (36.0-37.5 degrees C)      INITIAL Post-op Vital signs:   Vitals Value Taken Time   /51 07/20/22 1245   Temp 36.5    Pulse 75 07/20/22 1246   Resp 16 07/20/22 1246   SpO2 86 % 07/20/22 1246   Vitals shown include unvalidated device data.

## 2022-07-20 NOTE — Clinical Note
Sheath #2: Sheath: inserted. Sheath inserted/placed in the left femoral  artery. Hemostasis achieved. Upon evaluation of the common femoral artery stick using fluoroscopy, the access site puncture was within the safe zone.

## 2022-07-20 NOTE — PROGRESS NOTES
Cardiac Cath Lab Recovery Arrival Note:      Vijay Cobos arrived to Cardiac Cath Lab, Recovery Area. Staff introduced to patient. Patient identifiers verified with NAME and DATE OF BIRTH. Procedure verified with patient. Consent forms reviewed and signed by patient or authorized representative and verified. Allergies verified. Patient and family oriented to department. Patient and family informed of procedure and plan of care. Questions answered with review. Patient prepped for procedure, per orders from physician, prior to arrival.    Patient on cardiac monitor, non-invasive blood pressure, SPO2 monitor. On RA. Patient is A&Ox 4. Patient reports pain in lower back; wears 02 as needed. Patient in stretcher, in low position, with side rails up, call bell within reach, patient instructed to call if assistance as needed. Patient prep in: [id number], [place] [address]. Patient family has pager # none  Family in: son tawny in [facility].    Prep by: renée verma and patric verma

## 2022-07-20 NOTE — OP NOTES
Transcatheter Aortic Valve Replacement Percutaneously through the Femoral Artery     Indication  The valve was placed for severe aortic stenosis. Low risk. Interventional cardiologist: Ellen Rees MD, Marcos Ramírez MD  Cardiac surgeon: Vasquez Fountain MD.     Access  The valve was placed using a transfemoral approach. Initially a 6Fr sheath was placed in the RFA, 6Fr long sheath in LFV, 6Fr sheath in the LFA using ultrasound and micropuncture technique. The LFV was used to place temporary venous wire. The LFA was used to place a pig tail for angiography. The RFA was preclosed with 2 perclose sutures. Then delivery sheath was placed and patient heparinized. Procedure     Prior to placement of the aortic valve, an aortogram were performed. Left heart catheterization was peformed. A balloon aortic valvuloplasty was not performed prior to valve placement. A 23 mm Zaldivar Hermilo S3 Ultra aortic valve was then deployed during rapid ventricular pacing. Post-deployment balloon inflation was not performed. Cardiopulmonary bypass support was not used for hemodynamic support during the procedure. At the completion of the case, catheters were removed. Protamine was given. The valve delivery sheath was removed and hemostasis obtained by 2 perclose suture. 6Fr LFA hemostasis via external pressure. Other venous sheaths were removed and hemostasis obtained by manual compression for 15 minutes. As a cardiac surgeon I was present for all aspects of the procedure. I was present and monitored the hemodynamics and had cardiac surgery and perfusion available in the cath lab. Valve deployment was performed safely. The patient was in sinus rhythm. I was also personally involved in the tie down of the Perclose devices and there was hemostasis. Hemodynamics     Pre- deplyment hemodynamics showed severe aortic stenosis with mean gradient of 43 mm Hg, with LVEDP of 18 mm Hg.   Post-deployment hemodynamics showed transvalvular mean gradient of 4 mm Hg, with LVEDP of 15 mm Hg. Angiography  1. Aortic angiography pre intervention demonstrates severe AS and moderate AI. 2. Aortic angiography post TAVR demonstrates the valve in good position with no AI with patent coronary arteries. 3. Abdominal aortography showed patent vessels with patent RFA with good hemostasis. Conclusion:  Successful transaortic valve replacement with no paravalvular leak. Estimated blood loss:  Mild     Implants:  23 mm Zaldivar Hermilo S3 Ultra  2 Perclose sutures in the RFA     Complications:  None. The patient was transferred to the ICU in stable condition. I was present for the entire procedure. I have edited the procedure note as appropriate.

## 2022-07-20 NOTE — PROCEDURES
Transcatheter Aortic Valve Replacement Percutaneously through the Femoral Artery    Indication  The valve was placed for severe aortic stenosis. Low risk. Interventional cardiologist: Natalee White MD, Daisy Monsalve MD  Cardiac surgeon: Bautista Cormier MD.    Access  The valve was placed using a transfemoral approach. Initially a 6Fr sheath was placed in the RFA, 6Fr long sheath in LFV, 6Fr sheath in the LFA using ultrasound and micropuncture technique. The LFV was used to place temporary venous wire. The LFA was used to place a pig tail for angiography. The RFA was preclosed with 2 perclose sutures. Then delivery sheath was placed and patient heparinized. Procedure    Prior to placement of the aortic valve, an aortogram were performed. Left heart catheterization was peformed. A balloon aortic valvuloplasty was not performed prior to valve placement. A 23 mm Zaldivar Hermilo S3 Ultra aortic valve was then deployed during rapid ventricular pacing. Post-deployment balloon inflation was not performed. Cardiopulmonary bypass support was not used for hemodynamic support during the procedure. At the completion of the case, catheters were removed. Protamine was given. The valve delivery sheath was removed and hemostasis obtained by 2 perclose suture. 6Fr LFA hemostasis via external pressure. Other venous sheaths were removed and hemostasis obtained by manual compression for 15 minutes. Hemodynamics    Pre- deplyment hemodynamics showed severe aortic stenosis with mean gradient of 43 mm Hg, with LVEDP of 18 mm Hg. Post-deployment hemodynamics showed transvalvular mean gradient of 4 mm Hg, with LVEDP of 15 mm Hg. Angiography  1. Aortic angiography pre intervention demonstrates severe AS and moderate AI. 2. Aortic angiography post TAVR demonstrates the valve in good position with no AI with patent coronary arteries.   3. Abdominal aortography showed patent vessels with patent RFA with good hemostasis. Conclusion:  Successful transaortic valve replacement with no paravalvular leak. Estimated blood loss:   Mild    Implants:  23 mm Zaldivar Hermilo S3 Ultra  2 Perclose sutures in the RFA    Complications:  None. The patient was transferred to the ICU in stable condition. I was present for the entire procedure. I have edited the procedure note as appropriate.  Lo

## 2022-07-20 NOTE — PROGRESS NOTES
Primary Nurse Payton Landin and Love Correa RN performed a dual skin assessment on this patient No impairment noted  Norbert score is 18; meplix on sacrum

## 2022-07-20 NOTE — PROGRESS NOTES
TRANSFER - OUT REPORT:    Verbal report given to Alethea Amaral RN(name) on [facility]  being transferred to IVCU(unit) for routine progression of care       Report consisted of patients Situation, Background, Assessment and   Recommendations(SBAR). Information from the following report(s) Procedure Summary and MAR was reviewed with the receiving nurse. Lines:   Peripheral IV 07/20/22 Left Antecubital (Active)       Peripheral IV 07/20/22 Right Antecubital (Active)       Arterial Line 07/20/22 Left Radial artery (Active)        Opportunity for questions and clarification was provided.       Patient transported with:   Registered Nurse

## 2022-07-20 NOTE — Clinical Note
Single view of the obtained using power injection. Total volume = 15 mL. Rate = 15 mL/sec. Pressure = 900 PSI.

## 2022-07-21 ENCOUNTER — APPOINTMENT (OUTPATIENT)
Dept: ULTRASOUND IMAGING | Age: 82
DRG: 267 | End: 2022-07-21
Attending: INTERNAL MEDICINE
Payer: MEDICARE

## 2022-07-21 ENCOUNTER — TRANSCRIBE ORDER (OUTPATIENT)
Dept: CARDIAC REHAB | Age: 82
End: 2022-07-21

## 2022-07-21 ENCOUNTER — APPOINTMENT (OUTPATIENT)
Dept: VASCULAR SURGERY | Age: 82
DRG: 267 | End: 2022-07-21
Attending: INTERNAL MEDICINE
Payer: MEDICARE

## 2022-07-21 DIAGNOSIS — Z95.2 HEART VALVE REPLACED: Primary | ICD-10-CM

## 2022-07-21 LAB
ALBUMIN SERPL-MCNC: 2.6 G/DL (ref 3.5–5)
ALBUMIN/GLOB SERPL: 0.8 {RATIO} (ref 1.1–2.2)
ALP SERPL-CCNC: 58 U/L (ref 45–117)
ALT SERPL-CCNC: 10 U/L (ref 12–78)
ANION GAP SERPL CALC-SCNC: 5 MMOL/L (ref 5–15)
AST SERPL-CCNC: 15 U/L (ref 15–37)
ATRIAL RATE: 78 BPM
ATRIAL RATE: 94 BPM
BILIRUB SERPL-MCNC: 0.4 MG/DL (ref 0.2–1)
BUN SERPL-MCNC: 7 MG/DL (ref 6–20)
BUN/CREAT SERPL: 11 (ref 12–20)
CALCIUM SERPL-MCNC: 8.3 MG/DL (ref 8.5–10.1)
CALCULATED P AXIS, ECG09: 31 DEGREES
CALCULATED R AXIS, ECG10: -18 DEGREES
CALCULATED T AXIS, ECG11: -2 DEGREES
CALCULATED T AXIS, ECG11: -22 DEGREES
CHLORIDE SERPL-SCNC: 104 MMOL/L (ref 97–108)
CO2 SERPL-SCNC: 28 MMOL/L (ref 21–32)
CREAT SERPL-MCNC: 0.62 MG/DL (ref 0.55–1.02)
DIAGNOSIS, 93000: NORMAL
DIAGNOSIS, 93000: NORMAL
ERYTHROCYTE [DISTWIDTH] IN BLOOD BY AUTOMATED COUNT: 21.2 % (ref 11.5–14.5)
GLOBULIN SER CALC-MCNC: 3.2 G/DL (ref 2–4)
GLUCOSE BLD STRIP.AUTO-MCNC: 136 MG/DL (ref 65–117)
GLUCOSE BLD STRIP.AUTO-MCNC: 149 MG/DL (ref 65–117)
GLUCOSE BLD STRIP.AUTO-MCNC: 166 MG/DL (ref 65–117)
GLUCOSE BLD STRIP.AUTO-MCNC: 190 MG/DL (ref 65–117)
GLUCOSE SERPL-MCNC: 164 MG/DL (ref 65–100)
HCT VFR BLD AUTO: 26.9 % (ref 35–47)
HGB BLD-MCNC: 7.5 G/DL (ref 11.5–16)
INR PPP: 1.2 (ref 0.9–1.1)
MCH RBC QN AUTO: 20.1 PG (ref 26–34)
MCHC RBC AUTO-ENTMCNC: 27.9 G/DL (ref 30–36.5)
MCV RBC AUTO: 71.9 FL (ref 80–99)
NRBC # BLD: 0 K/UL (ref 0–0.01)
NRBC BLD-RTO: 0 PER 100 WBC
P-R INTERVAL, ECG05: 154 MS
P-R INTERVAL, ECG05: 188 MS
PLATELET # BLD AUTO: 213 K/UL (ref 150–400)
PMV BLD AUTO: 9.9 FL (ref 8.9–12.9)
POTASSIUM SERPL-SCNC: 3.7 MMOL/L (ref 3.5–5.1)
PROT SERPL-MCNC: 5.8 G/DL (ref 6.4–8.2)
PROTHROMBIN TIME: 12.3 SEC (ref 9–11.1)
Q-T INTERVAL, ECG07: 380 MS
Q-T INTERVAL, ECG07: 456 MS
QRS DURATION, ECG06: 144 MS
QRS DURATION, ECG06: 94 MS
QTC CALCULATION (BEZET), ECG08: 475 MS
QTC CALCULATION (BEZET), ECG08: 519 MS
RBC # BLD AUTO: 3.74 M/UL (ref 3.8–5.2)
SERVICE CMNT-IMP: ABNORMAL
SODIUM SERPL-SCNC: 137 MMOL/L (ref 136–145)
VENTRICULAR RATE, ECG03: 78 BPM
VENTRICULAR RATE, ECG03: 94 BPM
WBC # BLD AUTO: 6.4 K/UL (ref 3.6–11)

## 2022-07-21 PROCEDURE — 85027 COMPLETE CBC AUTOMATED: CPT

## 2022-07-21 PROCEDURE — 85610 PROTHROMBIN TIME: CPT

## 2022-07-21 PROCEDURE — 77030027138 HC INCENT SPIROMETER -A

## 2022-07-21 PROCEDURE — 74011000250 HC RX REV CODE- 250: Performed by: RADIOLOGY

## 2022-07-21 PROCEDURE — 93005 ELECTROCARDIOGRAM TRACING: CPT

## 2022-07-21 PROCEDURE — 74011250637 HC RX REV CODE- 250/637: Performed by: THORACIC SURGERY (CARDIOTHORACIC VASCULAR SURGERY)

## 2022-07-21 PROCEDURE — 36415 COLL VENOUS BLD VENIPUNCTURE: CPT

## 2022-07-21 PROCEDURE — 65270000046 HC RM TELEMETRY

## 2022-07-21 PROCEDURE — 99232 SBSQ HOSP IP/OBS MODERATE 35: CPT | Performed by: THORACIC SURGERY (CARDIOTHORACIC VASCULAR SURGERY)

## 2022-07-21 PROCEDURE — 77030038269 HC DRN EXT URIN PURWCK BARD -A

## 2022-07-21 PROCEDURE — 94760 N-INVAS EAR/PLS OXIMETRY 1: CPT

## 2022-07-21 PROCEDURE — 80053 COMPREHEN METABOLIC PANEL: CPT

## 2022-07-21 PROCEDURE — 74011000250 HC RX REV CODE- 250: Performed by: NURSE PRACTITIONER

## 2022-07-21 PROCEDURE — 93926 LOWER EXTREMITY STUDY: CPT

## 2022-07-21 PROCEDURE — 82962 GLUCOSE BLOOD TEST: CPT

## 2022-07-21 PROCEDURE — 74011250637 HC RX REV CODE- 250/637: Performed by: INTERNAL MEDICINE

## 2022-07-21 PROCEDURE — 74011250637 HC RX REV CODE- 250/637: Performed by: NURSE PRACTITIONER

## 2022-07-21 PROCEDURE — 76942 ECHO GUIDE FOR BIOPSY: CPT

## 2022-07-21 PROCEDURE — 77010033678 HC OXYGEN DAILY

## 2022-07-21 RX ORDER — METOPROLOL SUCCINATE 25 MG/1
25 TABLET, EXTENDED RELEASE ORAL DAILY
Status: DISCONTINUED | OUTPATIENT
Start: 2022-07-21 | End: 2022-07-22 | Stop reason: HOSPADM

## 2022-07-21 RX ORDER — INSULIN LISPRO 100 [IU]/ML
INJECTION, SOLUTION INTRAVENOUS; SUBCUTANEOUS
Status: DISCONTINUED | OUTPATIENT
Start: 2022-07-21 | End: 2022-07-22 | Stop reason: HOSPADM

## 2022-07-21 RX ORDER — MAGNESIUM SULFATE 100 %
4 CRYSTALS MISCELLANEOUS AS NEEDED
Status: DISCONTINUED | OUTPATIENT
Start: 2022-07-21 | End: 2022-07-22 | Stop reason: HOSPADM

## 2022-07-21 RX ORDER — FUROSEMIDE 20 MG/1
20 TABLET ORAL DAILY
Status: DISCONTINUED | OUTPATIENT
Start: 2022-07-21 | End: 2022-07-22 | Stop reason: HOSPADM

## 2022-07-21 RX ORDER — CLOPIDOGREL BISULFATE 75 MG/1
75 TABLET ORAL DAILY
Qty: 30 TABLET | Refills: 4 | Status: SHIPPED | OUTPATIENT
Start: 2022-07-22 | End: 2022-10-17

## 2022-07-21 RX ORDER — WARFARIN 1 MG/1
1 TABLET ORAL ONCE
Status: COMPLETED | OUTPATIENT
Start: 2022-07-21 | End: 2022-07-21

## 2022-07-21 RX ORDER — POTASSIUM CHLORIDE 750 MG/1
10 TABLET, FILM COATED, EXTENDED RELEASE ORAL DAILY
Status: DISCONTINUED | OUTPATIENT
Start: 2022-07-21 | End: 2022-07-22 | Stop reason: HOSPADM

## 2022-07-21 RX ORDER — DEXTROSE MONOHYDRATE 100 MG/ML
0-250 INJECTION, SOLUTION INTRAVENOUS AS NEEDED
Status: DISCONTINUED | OUTPATIENT
Start: 2022-07-21 | End: 2022-07-22 | Stop reason: HOSPADM

## 2022-07-21 RX ORDER — DOCUSATE SODIUM 100 MG/1
100 CAPSULE, LIQUID FILLED ORAL 2 TIMES DAILY
Qty: 60 CAPSULE | Refills: 6 | Status: SHIPPED | OUTPATIENT
Start: 2022-07-21 | End: 2022-10-17

## 2022-07-21 RX ORDER — LANOLIN ALCOHOL/MO/W.PET/CERES
1 CREAM (GRAM) TOPICAL
Status: DISCONTINUED | OUTPATIENT
Start: 2022-07-21 | End: 2022-07-22 | Stop reason: HOSPADM

## 2022-07-21 RX ORDER — LANOLIN ALCOHOL/MO/W.PET/CERES
325 CREAM (GRAM) TOPICAL
Qty: 30 TABLET | Refills: 6 | Status: SHIPPED | OUTPATIENT
Start: 2022-07-22 | End: 2022-10-17

## 2022-07-21 RX ADMIN — Medication 400 MG: at 08:28

## 2022-07-21 RX ADMIN — SENNOSIDES AND DOCUSATE SODIUM 1 TABLET: 50; 8.6 TABLET ORAL at 17:33

## 2022-07-21 RX ADMIN — TRAMADOL HYDROCHLORIDE 50 MG: 50 TABLET ORAL at 21:31

## 2022-07-21 RX ADMIN — FERROUS SULFATE TAB 325 MG (65 MG ELEMENTAL FE) 325 MG: 325 (65 FE) TAB at 08:28

## 2022-07-21 RX ADMIN — AMITRIPTYLINE HYDROCHLORIDE 10 MG: 10 TABLET, FILM COATED ORAL at 21:31

## 2022-07-21 RX ADMIN — Medication 400 MG: at 17:33

## 2022-07-21 RX ADMIN — POTASSIUM CHLORIDE 10 MEQ: 750 TABLET, FILM COATED, EXTENDED RELEASE ORAL at 08:28

## 2022-07-21 RX ADMIN — SENNOSIDES AND DOCUSATE SODIUM 1 TABLET: 50; 8.6 TABLET ORAL at 08:28

## 2022-07-21 RX ADMIN — Medication 450 UNITS: at 11:26

## 2022-07-21 RX ADMIN — WARFARIN SODIUM 1 MG: 1 TABLET ORAL at 17:33

## 2022-07-21 RX ADMIN — TRAMADOL HYDROCHLORIDE 50 MG: 50 TABLET ORAL at 12:17

## 2022-07-21 RX ADMIN — ACETAMINOPHEN 650 MG: 325 TABLET ORAL at 21:31

## 2022-07-21 RX ADMIN — PANTOPRAZOLE SODIUM 40 MG: 40 TABLET, DELAYED RELEASE ORAL at 08:28

## 2022-07-21 RX ADMIN — EZETIMIBE 10 MG: 10 TABLET ORAL at 08:28

## 2022-07-21 RX ADMIN — LEVOTHYROXINE SODIUM 50 MCG: 0.03 TABLET ORAL at 06:00

## 2022-07-21 RX ADMIN — SODIUM CHLORIDE, PRESERVATIVE FREE 10 ML: 5 INJECTION INTRAVENOUS at 21:32

## 2022-07-21 RX ADMIN — TRAZODONE HYDROCHLORIDE 100 MG: 50 TABLET ORAL at 21:31

## 2022-07-21 RX ADMIN — ATORVASTATIN CALCIUM 20 MG: 40 TABLET, FILM COATED ORAL at 21:31

## 2022-07-21 RX ADMIN — ESCITALOPRAM OXALATE 20 MG: 10 TABLET ORAL at 08:28

## 2022-07-21 RX ADMIN — SODIUM CHLORIDE, PRESERVATIVE FREE 10 ML: 5 INJECTION INTRAVENOUS at 05:08

## 2022-07-21 RX ADMIN — METOPROLOL SUCCINATE 25 MG: 25 TABLET, FILM COATED, EXTENDED RELEASE ORAL at 08:28

## 2022-07-21 RX ADMIN — CLOPIDOGREL BISULFATE 75 MG: 75 TABLET ORAL at 08:28

## 2022-07-21 RX ADMIN — FUROSEMIDE 20 MG: 20 TABLET ORAL at 08:28

## 2022-07-21 RX ADMIN — BUPROPION HYDROCHLORIDE 150 MG: 150 TABLET, EXTENDED RELEASE ORAL at 08:28

## 2022-07-21 RX ADMIN — QUETIAPINE FUMARATE 25 MG: 25 TABLET ORAL at 21:31

## 2022-07-21 NOTE — PROGRESS NOTES
Pedal pulses Left foot obtained continuously during procedure (thrombin injection/pseudoaneurysm). Dr. Hafsa Aceves aware.

## 2022-07-21 NOTE — PROGRESS NOTES
Pharmacist Daily Dosing of Warfarin    Indication & Goal INR:  Protein C deficiency, hx DVT; goal INR 2-3    PTA Warfarin Dose: 1 mg daily    Notable concurrent conditions and medications: None    Labs:  Recent Labs     07/21/22  0501 07/20/22  1225 07/20/22  1225 07/20/22  0900   INR 1.2*  --   --  1.1   HGB 7.5*   < > 8.4*  --      --  252  --    TBILI 0.4  --   --   --    ALB 2.6*  --   --   --     < > = values in this interval not displayed. Impression/Plan:   Will order warfarin 1 mg PO x 1 dose. Daily INR has been ordered  CBC w/o differential every other day has been ordered     Pharmacy will follow daily and adjust the dose as appropriate.     Thank you,  Farideh Sanchez, PHARMD      Warfarin Protocol    Located on pharmacy Teams site: Clinical Practice -> Anticoagulation & Cardiology -> Anticoagulation Policies, Protocols, Guidance

## 2022-07-21 NOTE — CARDIO/PULMONARY
Cardiac Rehab Note: chart review/referral     Consult has been acknowledged     TAVR 7/20/22    Smoking history assessed. Patient is a former smoker. Smoking Cessation Program link has not been added to the AVS.     Education folder, with heart heathy diet, warning signs, heart facts, and out patient cardiac rehab program provided to [facility] on 7/21/22                                              Educated using teach back method. Discussed risk factors for CAD to include the following: family history, elevated BMI, hyperlipidemia, hypertension, diabetes, and stress. Discussed Heart Healthy/Low Sodium (less than 2000 mg) diet. Reviewed the importance of medication compliance, follow up appointments with cardiologist, signs and symptoms of angina, and what to report to physician after discharge. Emphasized the value of cardiac rehab. Discussed Cardiac Rehab Program format, benefits, and encouraged enrollment to assist with risk modification and management. Patient declined program. Encouraged patient to call if she changes her mind. [facility] verbalized understanding with questions answered.   Jovani Benavidez RN

## 2022-07-21 NOTE — PROGRESS NOTES
Transport Day Willfavian) here to transfer pt. Back to her inpt. Room via stretcher - pt. Noted supine and reiterated to pt. To keep her left leg down and remain supine for 6 hours. Pt agrees.

## 2022-07-21 NOTE — PROGRESS NOTES
[facility] FLOOR Progress Note    Admit Date: 2022  POD: 1 Day Post-Op      Procedure:  Procedure(s):  TRANSCATHETER AORTIC VALVE REPLACEMENT    Subjective/overnight events:   Pt seen with Dr. Letha Santiago, in bed. In NSR, on 2L, afebrile . VSS. Overnight, developed bruising to L groin and required O2 for borderline saturations. C/o discomfort at groin site and is disappointed that she is not discharging today . NSR on EKG with a HR of 94 and LA of 0.15. Objective:     Visit Vitals  BP 93/74   Pulse 90   Temp 98.2 °F (36.8 °C)   Resp 18   Ht 5' 2\" (1.575 m)   Wt 141 lb 12.1 oz (64.3 kg)   SpO2 92%   Breastfeeding No   BMI 25.93 kg/m²     Temp (24hrs), Av.2 °F (36.8 °C), Min:97.7 °F (36.5 °C), Max:98.7 °F (37.1 °C)      Last 24hr Input/Output:    Intake/Output Summary (Last 24 hours) at 2022 1023  Last data filed at 2022 0512  Gross per 24 hour   Intake 1770 ml   Output 1200 ml   Net 570 ml        Chest tube output: N/A    EKG/Rhythm:   EKG Results       Procedure 720 Value Units Date/Time    EKG, 12 LEAD, INITIAL [[id number]] Collected: 22    Order Status: Completed Updated: 22     Ventricular Rate 94 BPM      Atrial Rate 94 BPM      P-R Interval 154 ms      QRS Duration 94 ms      Q-T Interval 380 ms      QTC Calculation (Bezet) 475 ms      Calculated R Axis -18 degrees      Calculated T Axis -22 degrees      Diagnosis --     Normal sinus rhythm  Inferior infarct , age undetermined  Abnormal ECG  When compared with ECG of 2022 12:15,  MANUAL COMPARISON REQUIRED, DATA IS UNCONFIRMED      EKG, 12 LEAD, INITIAL [[id number]]     Order Status: Sent             Oxygen: 2L    CXR:   CXR Results  (Last 48 hours)      None              Admission Weight: Last Weight   Weight: 141 lb 15.6 oz (64.4 kg) Weight: 141 lb 12.1 oz (64.3 kg)       EXAM:  General: Awake, alert, oriented    Lungs:   Clear to auscultation bilaterally. Incision:  R groin CDI, no ecchymosis or swelling.  L groin with significant and tender ecchymosis, but no appreciable hematoma. Heart:  Regular rate and rhythm, S1, S2 normal, no murmur, click, rub or gallop. Abdomen:   Soft, non-tender. Bowel sounds normal. No masses,  No organomegaly. Extremities:  No edema. PPP   Neurologic:  Gross motor and sensory apparatus intact. Activity: OOB TID, ambulate     Diet:  Cardiac    Lab Data Reviewed:   Recent Labs     07/21/22  0701 07/21/22  0501   WBC  --  6.4   HGB  --  7.5*   HCT  --  26.9*   PLT  --  213   NA  --  137   K  --  3.7   BUN  --  7   CREA  --  0.62   GLU  --  164*   GLUCPOC 149*  --    INR  --  1.2*         Assessment:     Active Problems:    AS (aortic stenosis) (7/20/2022)             Plan/Recommendations/Medical Decision Making:     Severe AS s/p TAVR 7/20/22 with 23 mm Zaldivar Hermilo S3 Ultra aortic valve. NSR. Developed left groin pseudoaneurysm overnight, see #3 below. On Lipitor, Zetia, Plavix (will need Plavix until 12/14/22). Resume Warfarin (INR goal 2-3)when okay with IR and Dr. Marcial Swartz. Repeat echo in one month. CAD s/p stents 6/14/22. Continue Lipitor, Plavix. Left groin pseudoaneurysm. Plan for injection by IR today. Resume Warfarin when okay with IR and Dr. Marcial Swartz. Post-op respiratory insufficiency, ? secondary to atelectasis +/- volume overload, stable. Received Lasix this AM. Continue IS. OOB all meals, ambulate when able. Wean O2 to keep sats >92%. Post-op pain control stable. Continue prn Tylenol/Ultram.  Protein C deficiency/previous DVT. See above regarding Warfarin. HTN: Continue metoprolol XL 25 daily. HLD: Continue statin, zetia  PAD: Pt reports claudication, ABIs 12/21 were normal. Continue statin, Zetia, Plavix (and warfarin when able)  Carotid stenosis: carotids 10/21 mild stenosis bilaterally. Continue statin, zetia, Plavix (and warfarin when able). Asthma/chronic cough:  On advair, PRN albuterol, tessalon pearls PRN  DM Type II: Pt had been on metformin but stopped taking, A1C 6.3. Will add SSI. Iron deficiency anemia: Hgb 7.5 from 8.4. Iron added by Dr. Bob Henry. Repeat labs in AM and consider transfusion for Hgb <7. Overactive bladder: on ditropan PTA  Migraines: firoicet PRN, keppra BID PTA  Chronic back pain: tramadol PRN. Anxiety and depression: Continue PTA regimen of Wellbutrin, elavil, trazadone, lexapro, Seroquel  IBS: takes prn immodium, bentyl PRN  Hypothyroid: cont synthroid  GERD: On Nexium PTA; Protonix inpatient as it is non-formulary. DVT ppx- Warfarin when cleared as above   Dispo- Remain in IVCU for today. Potential dc tomorrow with follow up with Dr. Billy Brothers next week with CBC and protime same time. Signed By: Kiki Saravia NP    Addendum: Pt seen and examined. Agree with NP Anmol Walter note. She has a left fem art pseudoaneurysm and will have it injected by IR today - thus she needs to stay tonight. Discussed this plan with Dr. Melissa Villela. I personally spent 25 minutes dedicated to her care today.

## 2022-07-21 NOTE — PROGRESS NOTES
Attempted to get patient oob to chair but she refused at this time and stated \"it hurts my sciatica too much\"     Report given to bayron box. Right cath site c/d/I . Left groin site is bruised but soft.

## 2022-07-21 NOTE — ROUTINE PROCESS
TRANSFER - OUT REPORT:    Verbal report given to Carla Morris RN, ext [phone](name) on [facility]  being transferred to IVCU(unit) for routine progression of care       Report consisted of patients Situation, Background, Assessment and   Recommendations(SBAR). Information from the following report(s) Procedure Summary was reviewed with the receiving nurse. Lines:   Peripheral IV 07/20/22 Left Antecubital (Active)   Site Assessment Clean, dry, & intact 07/21/22 0745   Phlebitis Assessment 0 07/21/22 0745   Infiltration Assessment 0 07/21/22 0745   Dressing Status Clean, dry, & intact 07/21/22 0745   Dressing Type Transparent 07/21/22 0512   Hub Color/Line Status Capped 07/21/22 0512       Peripheral IV 07/20/22 Right Antecubital (Active)   Site Assessment Clean, dry, & intact 07/21/22 0745   Phlebitis Assessment 0 07/21/22 0745   Infiltration Assessment 0 07/21/22 0745   Dressing Status Clean, dry, & intact 07/21/22 0745   Dressing Type Transparent 07/21/22 0512   Hub Color/Line Status Capped 07/21/22 0512       Arterial Line 07/20/22 Left Radial artery (Active)   Site Assessment Clean, dry, & intact 07/20/22 1933   Dressing Status Clean, dry, & intact 07/20/22 1933   Dressing Type Gauze;Transparent 07/20/22 1933   Line Status Discontinued (Catheter tip intact) 07/20/22 1933        Opportunity for questions and clarification was provided. Patient transported with:   Monitor - pt. On telemetry continuously in xray recovery and intact upon transfer to IVCU    Oxygen at 2L/NC              Name of procedure:  Ultrasound Guided Thrombin injection (pseudoaneurysm)    Vital Signs:  see connectcare    Post Procedure Care Needed/order sets placed in connect care.      See connectcare for orders

## 2022-07-21 NOTE — PROGRESS NOTES
Pt encouraged and informed to keep left lower extremity straight and flat. Pt. May bend right lower extremity at knee r/t pain with arthritis and pt stating \"I have to move it sometimes\". Pt. Voided in diaper-cleaned pt. Perineal area and new chuk applied to area.

## 2022-07-21 NOTE — PROGRESS NOTES
1930 -Bedside report from [facility]. L groin soft, moderately to largely bruised groin and upper thigh. No hematoma assessed. 2200 - assisted to position of comfort on side. 0300 - Pt resting quietly. Emergency on unit, VS and labs, etc. Will need to be postponed for this pt. VSS, SR on monitor. 0515  -VSS but remains on 1.5 L nc sats in lower 90's. Enc CTDB. Bruising is more pronounced to L groin, mostly soft but tender to palpation. Will keep on BR until ultrasound is completed. Has been turned and repositioned in bed.     0600 - EKG done, labs sent. 2215 - Bedside report to RN.  SR.  No tele changes. Awaiting US L groin artery. Written site care instructions reviewed w pt. Message left on US VM.

## 2022-07-21 NOTE — ROUTINE PROCESS
Pt transferred to [facility] recovery via stretcher accomp. By Rikki Gonzalez. Pt noted on oxygen at 1.5 liters/NC and noted on telemetry. Pt noted with bruising left groin area.   Bandages noted left wrist, right groin

## 2022-07-21 NOTE — DISCHARGE INSTRUCTIONS
Cardiac Surgery Specialist - Valve Clinic    [facility] [address] [facility], [address] [place] [address]  Office- [phone]  Fax- [phone]  _____________________________________________________________  [facility]  Dr. Addie Mcneal, [facility]    Perla Nicolas, CHARMAINE Beck, AGALinda CHRISTY PA-C    ___________________________________________________________     Surgery & Date: Procedure(s):  TRANSCATHETER AORTIC VALVE REPLACEMENT    Patient ID:  Park Mitchell  [id number]  78 y.o.  1940    Admit Date: 7/20/2022    Discharge Date: 7/21/2022       Admission Diagnoses:   - Non-rheumatic Aortic stenosis    Discharge Diagnoses:   - s/p TAVR    Discharge Condition: Stable    Cardiology Procedures this Admission:  TAVR    Disposition: Home    Reference discharge instructions provided by nursing for diet and activity. Signed:  Addie Mcneal MD  7/21/2022  9:41 AM        MEDICATIONS:  Please refer to your After Visit Summary for your medication list.           Guidelines to Follow After Your TAVR  The purpose of these instructions is to provide you with information on how to care for yourself after your Transcatheter Aortic Valve Replacement (TAVR). If you have any questions after your discharge, please call us at [phone]. Prior to discharge, you will receive a temporary implant card. A permanent card will be sent to you in approximately 6 to 8 weeks. Share this card with your doctors, including your dentist. It is important to share information regarding your heart valve replacement before any medical, dental, or MRI procedures.      Seek care immediately (dial 028) if you experience:    Sudden decrease in strength and sensation (numbness or tingling) of face, arm, or leg, especially one side of your body  Sudden confusion or trouble speaking or understanding speech  Sudden trouble seeing in one or both eyes   A change in skin color or temperature (numb, tingly, coolness to touch, blue color) of the arms or legs  Sudden swelling, bleeding, and/or sudden onset of pain in one of your groins  Sudden onset of chest, back, or abdominal pain  Sudden severe shortness of breath    Notify your cardiologist if you develop:    Fever and chills with a temperature above 101.0°F  Bleeding, redness, swelling, increased pain, or foul smelling discharge at incision site  Shortness of breath  Dizzy or fainting spells  Increased swelling in feet and ankles or weight gain of more than 5 pounds in 2 days  Prolonged increased fatigue, severe weakness, nausea, or vomiting  Irregular or rapid heart rate  Questions regarding cardiac medication    Activity    You should walk at least 3 to 4 times per day. Pace your activities, increasing them gradually. Daily exercise and adequate rest are important. You can climb stairs slowly, as tolerated. Sexual activity can be resumed 2 to 3 weeks after being discharged, or when you can easily climb a flight of stairs. Avoid lifting, pushing, or pulling objects heavier than 10 pounds for 10 days after your procedure. As a reference, a gallon of milk weights about 9 pounds. In most cases, driving can be resumed 1 week after being discharged. However, each patient is different, and your cardiologist will advise you when you can drive at the time of your discharge. Do not drive if you are taking narcotic pain medication. You can ride as a passenger in a car at any time, but wear your seatbelt. Generally, you may return to work about 1 week after being discharged, but you surgeon/cardiologist will verify this depending on your needs and type or work. Medications   Take all of your medications as prescribed. If you need to stop any of your medications for any reason, please call and tell us.  When you go home, you may be prescribed different medications than what you were taking prior to your procedure. Please take these medications as directed. They may be adjusted by the Valve Clinic or your primary cardiologist at your follow-up appointment. Keep a current list of your medication, dosages, and times to be taken in your wallet or purse. Anti-platelet medications (Plavix) is usually prescribed to prevent blood clots from forming on your new valve, which can cause a stroke. This medicine raises your chance of bleeding. You will take Plavix for approximately 3 months. Please notify your cardiologist for any of the following signs of bleeding: black or tarry stools, red/pink urine, black or dark brown vomit, nose bleeds, increased bruising, or bleeding gums. Diet   You should resume your preoperative diet. A low-fat and low-salt diet is recommended. Foods that are high in salt can lead to fluid retention and may cause congestive heart failure. Avoid adding salt in cooking or at the table. Avoid canned, processed, or frozen foods, as they have a lot of salt added to them. Site Incision Care   Keep your incision sites clean and dry. You may remove your dressings and take a shower when you are home. Pat your incision sites dry with a clean towel. Do not rub them. Do not soak or lay in water until all of your incisions are fully healed. Do not apply any lotions, creams, powders, or ointments to the incision until the scab has fallen off. Bruising is common around the incision site. You may also notice a small pea-sized lump. This is normal and usually disappears within a few weeks. Look at your incision sites each day. Call your cardiologist's office right away if you have any signs of infection such as pain, redness, swelling, drainage, bleeding, chills, or a fever of 101.0°F.    Pain or Discomfort   Mild amounts of post-procedural discomfort may continue for a few weeks.  Usually, patients have adequate pain relief from taking Acetaminophen (Tylenol). If you have no history of kidney disease or kidney problems, you may also use Ibuprofen (Motrin), as directed by your doctor. Swelling and Weighing Yourself   Elevate your legs 2 to 3 times daily for 30 to 60 minutes at a time. Avoid sitting for prolonged periods of time. You may be prescribed a diuretic (water pill) to help reduce swelling. You should weight yourself daily. Weigh yourself at the same time each day, on the same scale, and call our office if you have gained more than 5 pounds in 2 days. Dental Visits   After having a TAVR, you should try to avoid routine dental cleanings or non-emergent work of your teeth for 6 months following your procedure. An infection, known as bacterial endocarditis, is an ongoing potential complication after heart valve surgery. Bacterial endocarditis can damage your heart valve. To prevent this serious infection, antibiotics are often prescribed prior to invasive procedures, including dental work. This is routinely taken 1 hour before each visit and is usually prescribed by your dentist. It is also important to visit the dentist regularly (about every 6 months) because teeth in bad repair can lead to valve infections. Follow-up Appointments   Follow-up appointments with your doctor help make sure you are recovering well. Appointments will be scheduled to seen post discharge 3-5 days, at 1 month and 1 year. You will have an echocardiogram at 1 month and at 1 year. You will be seen again in our office for a one-year appointment with another echocardiogram.    PLEASE bring your medication bottles to each appointment. Please call Cardiac Rehab at [phone] if you do not already have an appointment. Please sign up for My Chart. CALL [phone] FOR ANY QUESTIONS OR PROBLEMS.

## 2022-07-22 ENCOUNTER — APPOINTMENT (OUTPATIENT)
Dept: ULTRASOUND IMAGING | Age: 82
DRG: 267 | End: 2022-07-22
Attending: RADIOLOGY
Payer: MEDICARE

## 2022-07-22 VITALS
TEMPERATURE: 98.1 F | BODY MASS INDEX: 26.9 KG/M2 | WEIGHT: 146.16 LBS | RESPIRATION RATE: 18 BRPM | HEART RATE: 76 BPM | DIASTOLIC BLOOD PRESSURE: 47 MMHG | SYSTOLIC BLOOD PRESSURE: 124 MMHG | HEIGHT: 62 IN | OXYGEN SATURATION: 96 %

## 2022-07-22 DIAGNOSIS — D64.9 ANEMIA, UNSPECIFIED TYPE: Primary | ICD-10-CM

## 2022-07-22 LAB
ABO + RH BLD: NORMAL
ALBUMIN SERPL-MCNC: 2.5 G/DL (ref 3.5–5)
ALBUMIN/GLOB SERPL: 0.8 {RATIO} (ref 1.1–2.2)
ALP SERPL-CCNC: 57 U/L (ref 45–117)
ALT SERPL-CCNC: 8 U/L (ref 12–78)
ANION GAP SERPL CALC-SCNC: 6 MMOL/L (ref 5–15)
AST SERPL-CCNC: 19 U/L (ref 15–37)
ATRIAL RATE: 77 BPM
BASOPHILS # BLD: 0.1 K/UL (ref 0–0.1)
BASOPHILS NFR BLD: 1 % (ref 0–1)
BILIRUB SERPL-MCNC: 0.5 MG/DL (ref 0.2–1)
BLD PROD TYP BPU: NORMAL
BLOOD GROUP ANTIBODIES SERPL: NORMAL
BPU ID: NORMAL
BUN SERPL-MCNC: 11 MG/DL (ref 6–20)
BUN/CREAT SERPL: 20 (ref 12–20)
CALCIUM SERPL-MCNC: 7.9 MG/DL (ref 8.5–10.1)
CALCULATED P AXIS, ECG09: 41 DEGREES
CALCULATED R AXIS, ECG10: 8 DEGREES
CALCULATED T AXIS, ECG11: -2 DEGREES
CHLORIDE SERPL-SCNC: 102 MMOL/L (ref 97–108)
CO2 SERPL-SCNC: 27 MMOL/L (ref 21–32)
CREAT SERPL-MCNC: 0.54 MG/DL (ref 0.55–1.02)
CROSSMATCH RESULT,%XM: NORMAL
DIAGNOSIS, 93000: NORMAL
DIFFERENTIAL METHOD BLD: ABNORMAL
EOSINOPHIL # BLD: 0.3 K/UL (ref 0–0.4)
EOSINOPHIL NFR BLD: 4 % (ref 0–7)
ERYTHROCYTE [DISTWIDTH] IN BLOOD BY AUTOMATED COUNT: 21.2 % (ref 11.5–14.5)
GLOBULIN SER CALC-MCNC: 3.3 G/DL (ref 2–4)
GLUCOSE BLD STRIP.AUTO-MCNC: 141 MG/DL (ref 65–117)
GLUCOSE SERPL-MCNC: 126 MG/DL (ref 65–100)
HCT VFR BLD AUTO: 30.1 % (ref 35–47)
HGB BLD-MCNC: 8.4 G/DL (ref 11.5–16)
IMM GRANULOCYTES # BLD AUTO: 0 K/UL (ref 0–0.04)
IMM GRANULOCYTES NFR BLD AUTO: 0 % (ref 0–0.5)
INR PPP: 1.2 (ref 0.9–1.1)
LYMPHOCYTES # BLD: 1.3 K/UL (ref 0.8–3.5)
LYMPHOCYTES NFR BLD: 19 % (ref 12–49)
MCH RBC QN AUTO: 20 PG (ref 26–34)
MCHC RBC AUTO-ENTMCNC: 27.9 G/DL (ref 30–36.5)
MCV RBC AUTO: 71.8 FL (ref 80–99)
MONOCYTES # BLD: 1 K/UL (ref 0–1)
MONOCYTES NFR BLD: 14 % (ref 5–13)
NEUTS SEG # BLD: 4.2 K/UL (ref 1.8–8)
NEUTS SEG NFR BLD: 62 % (ref 32–75)
NRBC # BLD: 0 K/UL (ref 0–0.01)
NRBC BLD-RTO: 0 PER 100 WBC
P-R INTERVAL, ECG05: 160 MS
PLATELET # BLD AUTO: 190 K/UL (ref 150–400)
PMV BLD AUTO: 10.1 FL (ref 8.9–12.9)
POTASSIUM SERPL-SCNC: 3.5 MMOL/L (ref 3.5–5.1)
PROT SERPL-MCNC: 5.8 G/DL (ref 6.4–8.2)
PROTHROMBIN TIME: 12.2 SEC (ref 9–11.1)
Q-T INTERVAL, ECG07: 412 MS
QRS DURATION, ECG06: 90 MS
QTC CALCULATION (BEZET), ECG08: 466 MS
RBC # BLD AUTO: 4.19 M/UL (ref 3.8–5.2)
RBC MORPH BLD: ABNORMAL
SERVICE CMNT-IMP: ABNORMAL
SODIUM SERPL-SCNC: 135 MMOL/L (ref 136–145)
SPECIMEN EXP DATE BLD: NORMAL
STATUS OF UNIT,%ST: NORMAL
UNIT DIVISION, %UDIV: 0
VENTRICULAR RATE, ECG03: 77 BPM
WBC # BLD AUTO: 6.9 K/UL (ref 3.6–11)

## 2022-07-22 PROCEDURE — 74011000250 HC RX REV CODE- 250: Performed by: NURSE PRACTITIONER

## 2022-07-22 PROCEDURE — 85610 PROTHROMBIN TIME: CPT

## 2022-07-22 PROCEDURE — 36415 COLL VENOUS BLD VENIPUNCTURE: CPT

## 2022-07-22 PROCEDURE — 74011250637 HC RX REV CODE- 250/637: Performed by: THORACIC SURGERY (CARDIOTHORACIC VASCULAR SURGERY)

## 2022-07-22 PROCEDURE — 74011250637 HC RX REV CODE- 250/637: Performed by: INTERNAL MEDICINE

## 2022-07-22 PROCEDURE — 74011250637 HC RX REV CODE- 250/637: Performed by: NURSE PRACTITIONER

## 2022-07-22 PROCEDURE — 3E053GC INTRODUCTION OF OTHER THERAPEUTIC SUBSTANCE INTO PERIPHERAL ARTERY, PERCUTANEOUS APPROACH: ICD-10-PCS | Performed by: RADIOLOGY

## 2022-07-22 PROCEDURE — 80053 COMPREHEN METABOLIC PANEL: CPT

## 2022-07-22 PROCEDURE — 74011000250 HC RX REV CODE- 250: Performed by: RADIOLOGY

## 2022-07-22 PROCEDURE — 82962 GLUCOSE BLOOD TEST: CPT

## 2022-07-22 PROCEDURE — 85025 COMPLETE CBC W/AUTO DIFF WBC: CPT

## 2022-07-22 PROCEDURE — 93005 ELECTROCARDIOGRAM TRACING: CPT

## 2022-07-22 PROCEDURE — 76882 US LMTD JT/FCL EVL NVASC XTR: CPT

## 2022-07-22 PROCEDURE — C1892 INTRO/SHEATH,FIXED,PEEL-AWAY: HCPCS

## 2022-07-22 PROCEDURE — 77030014115 US GUIDE INJ PSEUDOANEURYSM

## 2022-07-22 RX ORDER — WARFARIN 1 MG/1
2 TABLET ORAL ONCE
Status: DISCONTINUED | OUTPATIENT
Start: 2022-07-22 | End: 2022-07-22 | Stop reason: HOSPADM

## 2022-07-22 RX ADMIN — BUPROPION HYDROCHLORIDE 150 MG: 150 TABLET, EXTENDED RELEASE ORAL at 08:50

## 2022-07-22 RX ADMIN — TRAMADOL HYDROCHLORIDE 50 MG: 50 TABLET ORAL at 08:49

## 2022-07-22 RX ADMIN — SENNOSIDES AND DOCUSATE SODIUM 1 TABLET: 50; 8.6 TABLET ORAL at 08:50

## 2022-07-22 RX ADMIN — LEVOTHYROXINE SODIUM 50 MCG: 0.03 TABLET ORAL at 05:17

## 2022-07-22 RX ADMIN — ESCITALOPRAM OXALATE 20 MG: 10 TABLET ORAL at 08:49

## 2022-07-22 RX ADMIN — FUROSEMIDE 20 MG: 20 TABLET ORAL at 08:50

## 2022-07-22 RX ADMIN — CLOPIDOGREL BISULFATE 75 MG: 75 TABLET ORAL at 08:49

## 2022-07-22 RX ADMIN — FERROUS SULFATE TAB 325 MG (65 MG ELEMENTAL FE) 325 MG: 325 (65 FE) TAB at 08:50

## 2022-07-22 RX ADMIN — EZETIMIBE 10 MG: 10 TABLET ORAL at 08:49

## 2022-07-22 RX ADMIN — THROMBIN, TOPICAL (BOVINE) 250 UNITS: KIT at 12:55

## 2022-07-22 RX ADMIN — METOPROLOL SUCCINATE 25 MG: 25 TABLET, FILM COATED, EXTENDED RELEASE ORAL at 08:49

## 2022-07-22 RX ADMIN — Medication 400 MG: at 08:50

## 2022-07-22 RX ADMIN — PANTOPRAZOLE SODIUM 40 MG: 40 TABLET, DELAYED RELEASE ORAL at 08:50

## 2022-07-22 RX ADMIN — POTASSIUM CHLORIDE 10 MEQ: 750 TABLET, FILM COATED, EXTENDED RELEASE ORAL at 08:50

## 2022-07-22 RX ADMIN — SODIUM CHLORIDE, PRESERVATIVE FREE 10 ML: 5 INJECTION INTRAVENOUS at 05:17

## 2022-07-22 NOTE — DISCHARGE SUMMARY
Cardiology Discharge Summary             Patient ID:  Maicol Olivas  [id number]  89 y.o.  1940    Admit Date: 7/20/2022     Discharge Date: 7/22/2022   Admitting Physician: Tania Pichardo MD   Discharge Physician: Tania Pichardo MD                Assessment:       S/p TAVR  Left groin pseudoaneurysm     - Cath in [facility] 2001 unclear details, PCI, left with  of RCA. Cath 6/2022 with 0ccluded RCA. 99% proximal LCx. 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 - Cont warfarin for goal INR 2-3, currently 1.2  - Cont added clopidogrel for six months until 12/14/2022 for PCI  - Cont metoprolol  - Cont Lasix, K, taper off O2  - Cont atorvastatin     Anemia, no signs of bleeding, add iron and colace     Taper O2, has home O2  Declines rehab/snf  Has attentive son and home health at home. Will need follow up with Dr. Blanca Mg next week, would do CBC and protime that day. Follow up with me in one month with echo              [x]        High complexity decision making was performed          Consults:  IR for injection of pseudo    > 30 minutes coordinating discharge.     Hospital Course and Discharge Exam:    On the day of discharge, ambulatory and taking oral.  All questions answered. Aware of signs and symptoms warranting urgent medical follow-up or calling 911. Visit Vitals  BP (!) 117/54 (BP 1 Location: Right upper arm, BP Patient Position: At rest)   Pulse 72   Temp 97.9 °F (36.6 °C)   Resp 20   Ht 5' 2\" (1.575 m)   Wt 64.3 kg (141 lb 12.1 oz)   SpO2 95%   Breastfeeding No   BMI 25.93 kg/m²       Physical Exam:  Nondiaphoretic, not in acute distress. Unlabored, clear to auscultation bilaterally. Regular rate and rhythm, no murmur, rub, or gallop. No JVD or peripheral edema. Palpable radial pulses bilaterally. Groin site(s) OK. No cyanosis. Skin warm and dry. Awake, appropriate, neuro grossly nonfocal.  Ambulatory.     Lab Results   Component Value Date/Time    WBC 6.9 07/22/2022 05:30 AM    HGB 8.4 (L) 07/22/2022 05:30 AM    HCT 30.1 (L) 07/22/2022 05:30 AM    PLATELET 631 12/12/8531 05:30 AM    MCV 71.8 (L) 07/22/2022 05:30 AM     Lab Results   Component Value Date/Time    Sodium 135 (L) 07/22/2022 05:30 AM    Potassium 3.5 07/22/2022 05:30 AM    Chloride 102 07/22/2022 05:30 AM    CO2 27 07/22/2022 05:30 AM    Anion gap 6 07/22/2022 05:30 AM    Glucose 126 (H) 07/22/2022 05:30 AM    BUN 11 07/22/2022 05:30 AM    Creatinine 0.54 (L) 07/22/2022 05:30 AM    BUN/Creatinine ratio 20 07/22/2022 05:30 AM    GFR est AA >60 07/22/2022 05:30 AM    GFR est non-AA >60 07/22/2022 05:30 AM    Calcium 7.9 (L) 07/22/2022 05:30 AM     No results found for: CPK, RCK1, RCK2, RCK3, RCK4, CKMB, CKNDX, CKND1, TROPT, TROIQ, BNPP, BNP  Lab Results   Component Value Date/Time    TSH 0.78 07/15/2022 11:45 AM     Lab Results   Component Value Date/Time    Cholesterol, total 107 06/25/2021 11:52 AM    HDL Cholesterol 52 06/25/2021 11:52 AM    LDL, calculated 39 06/25/2021 11:52 AM    VLDL, calculated 16 06/25/2021 11:52 AM    Triglyceride 80 06/25/2021 11:52 AM    CHOL/HDL Ratio 2.1 06/25/2021 11:52 AM     Lab Results   Component Value Date/Time Hemoglobin A1c 6.3 (H) 07/15/2022 11:45 AM    Hemoglobin A1c (POC) 7.6 (A) 03/29/2022 01:24 PM             Disposition: home    Patient Instructions:   Current Discharge Medication List        START taking these medications    Details   clopidogreL (PLAVIX) 75 mg tab Take 1 Tablet by mouth in the morning. Qty: 30 Tablet, Refills: 4      ferrous sulfate 325 mg (65 mg iron) tablet Take 1 Tablet by mouth daily (with breakfast). Qty: 30 Tablet, Refills: 6      docusate sodium (Colace) 100 mg capsule Take 1 Capsule by mouth two (2) times a day. Qty: 60 Capsule, Refills: 6           CONTINUE these medications which have NOT CHANGED    Details   traMADoL (ULTRAM) 50 mg tablet Take 50 mg by mouth every six (6) hours as needed for Pain.      esomeprazole (NEXIUM) 20 mg capsule Take 1 Capsule by mouth daily. Qty: 90 Capsule, Refills: 3      levETIRAcetam (KEPPRA) 500 mg tablet TAKE 1 TABLET BY MOUTH EVERY MORNING AND 1 AND 1/2 TABLET BY MOUTH EVERY EVENING FOR MIGRAINES  Qty: 225 Tablet, Refills: 3    Associated Diagnoses: Migraine without aura and without status migrainosus, not intractable      levocetirizine (XYZAL) 5 mg tablet TAKE 1 TABLET BY MOUTH EVERY DAY  Qty: 90 Tablet, Refills: 3      furosemide (LASIX) 20 mg tablet Take 1 Tablet by mouth daily. Qty: 90 Tablet, Refills: 3      potassium chloride SR (KLOR-CON 10) 10 mEq tablet Take 1 Tablet by mouth daily. Qty: 90 Tablet, Refills: 3      QUEtiapine (SEROquel) 25 mg tablet Take 1 Tablet by mouth nightly. Qty: 90 Tablet, Refills: 3      loperamide (IMODIUM) 2 mg capsule Take 1 Capsule by mouth four (4) times daily as needed for Diarrhea. Qty: 30 Capsule, Refills: 4      metoprolol succinate (TOPROL-XL) 50 mg XL tablet Take 1 Tablet by mouth daily. Qty: 90 Tablet, Refills: 3      benzonatate (TESSALON) 100 mg capsule Take 1 Capsule by mouth three (3) times daily as needed for Cough.   Qty: 30 Capsule, Refills: 2      buPROPion XL (WELLBUTRIN XL) 150 mg tablet Take 1 Tablet by mouth daily. Qty: 90 Tablet, Refills: 3      oxybutynin chloride XL (DITROPAN XL) 10 mg CR tablet TAKE 1 TABLET BY MOUTH EVERY DAY  Qty: 90 Tablet, Refills: 3      amitriptyline (ELAVIL) 10 mg tablet TAKE 1 TABLET BY MOUTH EVERY NIGHT  Qty: 90 Tablet, Refills: 3    Associated Diagnoses: Anxiety and depression      traZODone (DESYREL) 100 mg tablet Take 100 mg by mouth nightly. levothyroxine (SYNTHROID) 25 mcg tablet Take 2 Tablets by mouth Daily (before breakfast). Qty: 180 Tablet, Refills: 3      albuterol (PROVENTIL HFA, VENTOLIN HFA, PROAIR HFA) 90 mcg/actuation inhaler Take 2 Puffs by inhalation every six (6) hours as needed for Wheezing. Qty: 8 g, Refills: 2      Wixela Inhub 250-50 mcg/dose diskus inhaler INHALE 1 PUFF AND INTO THE LUNGS EVERY 12 HOURS. RINSE MOUTH AFTER USE  Qty: 60 Each, Refills: 12    Associated Diagnoses: Simple chronic bronchitis (HCC)      ipratropium (ATROVENT) 42 mcg (0.06 %) nasal spray 2 Sprays by Both Nostrils route four (4) times daily. Qty: 15 mL, Refills: 5      nystatin (MYCOSTATIN) 100,000 unit/mL suspension SWISH AND SWALLOW 5 ML BY MOUTH FOR 30 SECONDS 4 TIMES A DAY AS NEEDED FOR MOUTH PAIN  Qty: 60 mL, Refills: 2      ondansetron hcl (ZOFRAN) 4 mg tablet TAKE 1 TABLET BY MOUTH EVERY 8 HOURS AS NEEDED FOR NAUSEA OR VOMITING  Qty: 30 Tablet, Refills: 2      dicyclomine (BENTYL) 10 mg capsule TAKE ONE CAPSULE BY MOUTH FOUR TIMES DAILY AS NEEDED  Qty: 120 Capsule, Refills: 3      ezetimibe (ZETIA) 10 mg tablet TAKE 1 TABLET BY MOUTH DAILY  Qty: 90 Tablet, Refills: 3      atorvastatin (LIPITOR) 20 mg tablet TAKE 1 TABLET BY MOUTH EVERY DAY  Qty: 90 Tablet, Refills: 3      diclofenac (VOLTAREN) 1 % gel Apply  to affected area every twelve (12) hours as needed for Pain.   Qty: 100 g, Refills: 2      escitalopram oxalate (LEXAPRO) 20 mg tablet TAKE 1 TABLET BY MOUTH DAILY  Qty: 90 Tab, Refills: 3      warfarin (COUMADIN) 1 mg tablet Take 1 Tablet by mouth daily.  Qty: 90 Tablet, Refills: 3      Bifidobacterium Infantis (Align) 4 mg cap Take 1 Capsule by mouth daily as needed for Diarrhea. Indications: ALIGN OTC  Qty: 90 Capsule, Refills: 3           STOP taking these medications       enoxaparin (Lovenox) 60 mg/0.6 mL injection Comments:   Reason for Stopping:               FOLLOW-UP:       Follow up with Dr. Susie Leal next week. Follow up with me in one month with echo. [address] [facility], [address]    [phone]  [place], [address]    [email or web address]    Thank you for placing your trust for your heart with the physicians and staff of [facility]. We have long provided [place] with the most advanced cardiovascular care possible using a personalized and caring approach. And we hope to continue this strong tradition well into the future. A heart condition, or the fear of having a heart condition, can be a stressful time for a patient and their family. There are appointments, testing procedures and unfamiliar terms that can cause natural questions and concerns. While we encourage you to speak with your nurse or physician regarding any questions you might have, please consider this web site as a powerful resource you can use at any time. Often, questions or concerns only arise once you've left our office. There are many useful sections on this web site and links to other professional organizations and advocacy groups. These sources can help answer many questions you might have from the comfort of your own home or office. Again, thank you for considering [facility] as your trusted provider of heart care. Please don't hesitate to let us know if there is anything we can do to enhance your experience with us.      Signed:  Homar Ramírez MD  7/22/2022

## 2022-07-22 NOTE — PROGRESS NOTES
TRANSFER - OUT REPORT:    Verbal [facility] for routine progression of care       Report consisted of patients Situation, Background, Assessment and   Recommendations(SBAR). Information from the following report(s) Procedure Summary was reviewed with the receiving nurse. Lines:   Peripheral IV 07/20/22 Left Antecubital (Active)   Site Assessment Clean, dry, & intact 07/22/22 0941   Phlebitis Assessment 0 07/22/22 0941   Infiltration Assessment 0 07/22/22 0941   Dressing Status Clean, dry, & intact 07/22/22 0941   Dressing Type Transparent 07/22/22 0212   Hub Color/Line Status Capped 07/22/22 0212       Peripheral IV 07/20/22 Right Antecubital (Active)   Site Assessment Clean, dry, & intact 07/22/22 0941   Phlebitis Assessment 0 07/22/22 0941   Infiltration Assessment 0 07/22/22 0941   Dressing Status Clean, dry, & intact 07/22/22 0941   Dressing Type Transparent 07/22/22 0212   Hub Color/Line Status Capped 07/22/22 0212       Arterial Line 07/20/22 Left Radial artery (Active)   Site Assessment Clean, dry, & intact 07/20/22 1933   Dressing Status Clean, dry, & intact 07/20/22 1933   Dressing Type Gauze;Transparent 07/20/22 1933   Line Status Discontinued (Catheter tip intact) 07/20/22 1933        Opportunity for questions and clarification was provided.       Patient transported with:   Chronicity

## 2022-07-22 NOTE — PROGRESS NOTES
Transition of Care Plan:    RUR: 16%  Disposition: home with OZZY orders (At [address])  Follow up appointments: PCP, specialists as indicated   DME needed: N/A  Transportation at Discharge: Arina grayson or means to access home: yes        Medicare Letter: reviewed on 7/22/22  Is patient a BCPI-A Bundle: No      If yes, was Bundle Letter given?:    Is patient a  and connected with the [place]? No              If yes, was [employer] transfer form completed and [facility] notified? Caregiver Contact: Parth Kurtz (son) [phone]   Discharge Caregiver contacted prior to discharge? Yes by phone  Care Conference needed?: No    Reason for Admission:   abnormal cardiac valve, s/p TAVR                     RUR Score: 16%             PCP: First and Last name:   Carmen Blackmon, DO     Name of Practice:    Are you a current patient: Yes/No: yes   Approximate date of last visit: 3-4 months ago   Can you participate in a virtual visit if needed: with assistance     Do you (patient/family) have any concerns for transition/discharge? None voiced               Plan for utilizing home health:   yes, OZZY orders At [address]:  Full Code      Healthcare Decision Maker:   Click here to complete [address] including selection of the Healthcare Decision Maker Relationship (ie \"Primary\")            Primary Decision Maker: Sabrina Holt [facility] - [phone]    Transition of Care Plan:   home with [facility] , private duty caregivers      Chart reviewed. CM aware of discharge order. Met with pt at bedside to introduce self and role as CM. Verified contact information and demographics. Pt resides alone in first floor apartment with 4 CHINED[address]. Pt has supportive son who lives 5 minutes away. Recent hx of IPR stay at [facility] in June 2022. PCP is Dr. Kirstin Reyes with last visit about 3-4 months ago. Preferred pharmacy is [address]  Pt has a RW, transport chair, and home O2. Pt currently open to [facility] for [facility] services. CM submitted OZZY orders via [email or web address]. Spoke with son, he will transport pt home this evening, ETA is 7PM. Son shared that pt has private duty caregivers through [address]. 2nd IMM letter provided at bedside. Signed copy placed into chart. Please see scanned documents. Pt ready for d/c from CM standpoint. Pt currently on bedrest until d/c this evening per assigned RN. Care Management Interventions  PCP Verified by CM: Yes  Palliative Care Criteria Met (RRAT>21 & CHF Dx)?: No  Mode of Transport at Discharge:  Other (see comment) (son )  Hospital Transport Time of Discharge: 1900  Transition of Care Consult (CM Consult): Discharge Planning  Discharge Durable Medical Equipment: No  Physical Therapy Consult: No  Occupational Therapy Consult: No  Speech Therapy Consult: No  Support Systems: Child(vinicio), Caregiver/Home Care Staff  Confirm Follow Up Transport: Family  The Patient and/or Patient Representative was Provided with a Choice of Provider and Agrees with the Discharge Plan?: Yes  Name of the Patient Representative Who was Provided with a Choice of Provider and Agrees with the Discharge Plan: kenan  Freedom of Choice List was Provided with Basic Dialogue that Supports the Patient's Individualized Plan of Care/Goals, Treatment Preferences and Shares the Quality Data Associated with the Providers?: Yes  [place] Resource Information Provided?: No  Discharge Location  Patient Expects to be Discharged to[de-identified] Home with home health ([facility] )    Tim Rush, [address], [address]

## 2022-07-22 NOTE — PROGRESS NOTES
0021 - Bedside report from [facility]. VSS. SR.Pt sitting up in bed, looks good. L groin ecchymotic but soft, R groin benign. 2100 -Assisted up to chair, moderate 1 assist.  Pt is very frail, weak. Insists she will be going home post discharge and 'Not back to a rehab facilty\". Lives alone, says local son will check on her daily and she has care aides and [address] but not around the clock. Pt is most definitely a fall risk. Enc. IS use (does poorly), deep breathing. 93% 1-2 L nc.      2130 -Tramadol and tylenol for migrane headache onset, relieved. 2200 - sat in chair x 1 hour without issues. Unsteady once again. 0430 - Sleeping soundly. NAD. - Bedside report to RN.

## 2022-07-22 NOTE — PROGRESS NOTES
hospitals FLOOR Progress Note    Admit Date: 2022  POD: 2 Day Post-Op      Procedure:  Procedure(s):  TRANSCATHETER AORTIC VALVE REPLACEMENT    Subjective/overnight events:   Pt seen with Dr. Valente Singh, in bed. In NSR, on 1L, remains afebrile . Vital signs remain stable. No events overnight. Still with tenderness to left groin; otherwise without complaints . Repeat EKG pending.      Objective:     Visit Vitals  BP (!) 131/50 (BP 1 Location: Right upper arm, BP Patient Position: At rest)   Pulse 70   Temp 98.4 °F (36.9 °C)   Resp 19   Ht 5' 2\" (1.575 m)   Wt 146 lb 2.6 oz (66.3 kg)   SpO2 96%   Breastfeeding No   BMI 26.73 kg/m²     Temp (24hrs), Av.6 °F (37 °C), Min:97.9 °F (36.6 °C), Max:99.3 °F (37.4 °C)      Last 24hr Input/Output:    Intake/Output Summary (Last 24 hours) at 2022 0956  Last data filed at 2022 0426  Gross per 24 hour   Intake 860 ml   Output 2450 ml   Net -1590 ml        Chest tube output: N/A    EKG/Rhythm:   EKG Results       Procedure 720 Value Units Date/Time    EKG, 12 LEAD, INITIAL [[id number]]     Order Status: Sent     EKG, 12 LEAD, INITIAL [[id number]] Collected: 22 0615    Order Status: Completed Updated: 22 1156     Ventricular Rate 94 BPM      Atrial Rate 94 BPM      P-R Interval 154 ms      QRS Duration 94 ms      Q-T Interval 380 ms      QTC Calculation (Bezet) 475 ms      Calculated R Axis -18 degrees      Calculated T Axis -22 degrees      Diagnosis --     Normal sinus rhythm  Inferior infarct , age undetermined  Abnormal ECG    Confirmed by Anastacia Seed ([id number]) on 2022 11:56:26 AM      EKG, 12 LEAD, INITIAL [[id number]] Collected: 22 1215    Order Status: Completed Updated: 22 1144     Ventricular Rate 78 BPM      Atrial Rate 78 BPM      P-R Interval 188 ms      QRS Duration 144 ms      Q-T Interval 456 ms      QTC Calculation (Bezet) 519 ms      Calculated P Axis 31 degrees      Calculated T Axis -2 degrees      Diagnosis --     Normal sinus rhythm  Right bundle branch block  Abnormal ECG    Confirmed by Jacob Dawn ([id number]) on 7/21/2022 11:44:28 AM              Oxygen: 1L    CXR:   CXR Results  (Last 48 hours)      None              Admission Weight: Last Weight   Weight: 141 lb 15.6 oz (64.4 kg) Weight: 146 lb 2.6 oz (66.3 kg)       EXAM:  General: Awake, alert, oriented    Lungs:   Clear to auscultation bilaterally. Incision:  R groin CDI, no ecchymosis or swelling. L groin with significant and tender ecchymosis, but no appreciable hematoma. Heart:  Regular rate and rhythm, S1, S2 normal, no murmur, click, rub or gallop. Abdomen:   Soft, non-tender. Bowel sounds normal. No masses,  No organomegaly. Extremities:  No edema. PPP   Neurologic:  Gross motor and sensory apparatus intact. Activity: OOB TID, ambulate     Diet:  Cardiac    Lab Data Reviewed:   Recent Labs     07/22/22  0618 07/22/22  0530   WBC  --  6.9   HGB  --  8.4*   HCT  --  30.1*   PLT  --  190   NA  --  135*   K  --  3.5   BUN  --  11   CREA  --  0.54*   GLU  --  126*   GLUCPOC 141*  --    INR  --  1.2*         Assessment:     Active Problems:    AS (aortic stenosis) (7/20/2022)           Plan/Recommendations/Medical Decision Making:     Severe AS s/p TAVR 7/20/22 with 23 mm Zaldivar Hermilo S3 Ultra aortic valve. NSR. Repeat EKG pending . On Lipitor, Zetia, Plavix (will need Plavix until 12/14/22). Warfarin resumed by Dr. Mariaa Mar (INR goal 2-3). Repeat echo in one month. CAD s/p stents 6/14/22. Continue Lipitor, Plavix. 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 Repeat US pending. Post-op respiratory insufficiency, ? secondary to atelectasis +/- volume overload, improving. Received Lasix 7/21/22. Continue IS. OOB all meals, ambulate when able. Per Dr. Mariaa Mar, wears 0-1L, has O2 at home and was saturating well on RA when he turned her O2 off at bedside. Post-op pain control stable. Continue prn Tylenol/Ultram.  Protein C deficiency/previous DVT. See above regarding Warfarin. HTN: Continue metoprolol XL 25 daily. HLD: Continue statin, zetia  PAD: Pt reports claudication, ABIs 12/21 were normal. Continue statin, Zetia, Plavix. Carotid stenosis: carotids 10/21 mild stenosis bilaterally. Continue statin, zetia, Plavix . Asthma/chronic cough: On advair, PRN albuterol, tessalon pearls PRN  DM Type II: Pt had been on metformin but stopped taking, A1C 6.3. Continue SSI for now. Iron deficiency anemia: Hgb 8.4 today. Continue Iron. Consider transfusion for Hgb <7. Overactive bladder: on ditropan PTA  Migraines: firoicet PRN, keppra BID PTA  Chronic back pain: tramadol PRN. Anxiety and depression: Continue PTA regimen of Wellbutrin, elavil, trazadone, lexapro, Seroquel  IBS: takes prn immodium, bentyl PRN  Hypothyroid: cont synthroid  GERD: On Nexium PTA; Protonix inpatient as it is non-formulary. DVT ppx- Warfarin   Dispo- For repeat US today. Potential dc later today if groin is stable. Follow up appointments with Dr. Alice De La Rosa (also in discharge summary): [address], [facility] [place] 1, [address]  7/26/22 at 11 AM  8/25/22 at 1:30 PM     Will receive repeat echo and follow up with Dr. Mariaa Mar as well in 1 month.          Signed By: Kendal Gitelman, NP

## 2022-07-22 NOTE — PROGRESS NOTES
Pharmacist Daily Dosing of Warfarin    Indication & Goal INR:  Protein C deficiency, hx DVT; goal INR 2-3    PTA Warfarin Dose: 1 mg daily    Notable concurrent conditions and medications: None    Labs:  Recent Labs     07/22/22  0530 07/21/22  0501 07/20/22  1225 07/20/22  1225 07/20/22  0900   INR 1.2* 1.2*  --   --  1.1   HGB 8.4* 7.5*   < > 8.4*  --     213  --  252  --    TBILI 0.5 0.4  --   --   --    ALB 2.5* 2.6*   < >  --   --     < > = values in this interval not displayed. Impression/Plan:   Will order warfarin 2 mg PO x 1 dose. Daily INR has been ordered  CBC w/o differential every other day has been ordered     Pharmacy will follow daily and adjust the dose as appropriate.     Thank you,  Franklin Keene, PHARMD      Warfarin Protocol    Located on pharmacy Teams site: Clinical Practice -> Anticoagulation & Cardiology -> Anticoagulation Policies, Protocols, Guidance

## 2022-07-25 ENCOUNTER — TELEPHONE (OUTPATIENT)
Dept: INTERNAL MEDICINE CLINIC | Age: 82
End: 2022-07-25

## 2022-07-25 LAB — INR, EXTERNAL: 2.3

## 2022-07-25 RX ORDER — ATORVASTATIN CALCIUM 20 MG/1
TABLET, FILM COATED ORAL
Qty: 90 TABLET | Refills: 3 | Status: SHIPPED | OUTPATIENT
Start: 2022-07-25

## 2022-07-25 NOTE — TELEPHONE ENCOUNTER
Pharmacy Progress Note - Anticoagulation Management    Warfarin start date: ~ 2008   INR Goal:  2.0-3.0    Current warfarin regimen:  1 mg daily  Warfarin tablet strength: 2 mg   Duration of therapy: Indefinite    S/p TAVR on 7/21/22. Will be on plavix for 6 months until 12/14/22. INR is therapeutic. Continue warfarin 1 mg daily. Recheck INR in 1 week. INR History:   (normal INR range 0.8-1.2)     Date                INR                              Dose/Comments  07/25/22 2.3    1 mg daily; s/p TAVR  07/12/22          2.9                               1 mg daily  Lapse in INR clinic  04/26/22          2.9                   34.5     3 mg x 1, then 2 mg x 4, then 2 mg Tues, Thurs, Sun, 1 mg daily ROW  04/12/22          1.4                   17.4     Hold x 2, 1 mg x 1, then 2 mg Tues, Thurs, Sun, 1 mg daily ROW  03/29/22          4.8                   58.0     1 mg MWF, 2 mg daily ROW; (+) increased dose  03/09/22          2.3                   27.7     Hold x 2, then 1 mg MWF, 2 mg daily ROW  03/02/22          4.2                   50.9     Hold x 1, then 1 mg x 1, then 2 mg daily  02/09/22          3.2                   37.5     2 mg daily ; (+) ASA  01/12/22          2.5                   29.6     2 mg daily  12/07/21          2.3                   27.4     1 mg x 3 days, then 2 mg daily  --> Pt took 2 mg daily   12/04/21          4.2                               2 mg daily ; (+) APAP   11/01/21          2.8                   34.0     2 mg daily ; s/p TFESI  09/27/21          1.9                   22.8     4 mg x 1, then 2 mg daily -- pt held x 2 days and 2 mg daily  09/20/21          1.2                   14.0     Hold x 2, 1/2 tab on Thurs, and 2 mg daily ROW; Pt held x 2, then 2 mg daily until INR check   09/08/21          3.6                   43.7     2 mg daily    Orders Placed This Encounter    PT/INR EXTERNAL     This order was created through the anticoagulation tracking navigator section. Thank you for the consult,  billie Le, PharmD, BCACP, [address] in place: Yes  Recommendation Provided To: Patient/Caregiver: 1 via Telephone  Intervention Detail: Lab(s) Ordered  Intervention Accepted By: Patient/Caregiver: 1  Time Spent (min): 10

## 2022-07-25 NOTE — PROGRESS NOTES
Patient: Khurram Velazco   Age: 78 y.o. Patient Care Team:  Olga Rodas DO as PCP - General (Internal Medicine Physician)  Olga Rodas DO as PCP - [address] [place] [address]  [facility] Provider  Yuko Snell MD (Cardio Vascular Surgery)  Sita Carr MD (Cardiothoracic Surgery)    PCP: Olga Rodas DO    Cardiologist: Bee Carr    Diagnosis/Reason for Consultation: The encounter diagnosis was S/P TAVR (transcatheter aortic valve replacement). 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 y.o. female  S/P TAVR with a Hermilo 3 via R Femoral Artery approach on 7/20/22 for severe and symptomatic Aortic Stenosis. There were no intra-op complications. She did develop a pseudoaneurysm post op that required IR to embolize. Khurram Velazco was discharged to Home on POD2.  she  is here today for her  Initial Post Op Appointment. PMHx of AS, CAD, HTN, HLD, protein C deficiency and previous DVT on chronic coumadin, carotid stenosis, PAD, varicose veins, DJD, chronic tremor, hypothyroidism, asthma, IBS, DM, contrast allergy    She arrived in a wheelchair but is able to ambulate short distances. She complains of generalized weakness and bruising to her right groin. She denies dizziness.      Last Dental Visit:  none recent   Any dental pain/concerns: none, has dentures    NYHA Classification: CLASS III   Class I (Mild): No limitation of physical activity. Ordinary physical activity does not cause undue fatigue, palpitation, or dyspnea. Class II (Mild): Slight limitation of physical activity. Comfortable at rest, but ordinary physical activity results in fatigue, palpitation, or dyspnea. Class III (Moderate): Marked limitation of physical activity. Comfortable at rest, but less than ordinary activity causes fatigue, palpitation, or dyspnea   Class IV (Severe): Unable to carry out any physical activity without discomfort. Symptoms  of cardiac insufficiency at rest.  If any physical activity is undertaken, discomfort is increased.     Angina Classification: Class 0   Class 0: No symptoms   Class 1: Angina with strenuous exercise   Class 2: Angina with moderate exercise   Class 3: Angina with mild exertion   Walking 1-2 level blocks at normal pace; Climbing 1 flight of stairs at normal pace  Class 4: Angina at any level of physical exertion       Past Medical History:   Diagnosis Date    Anxiety     Arthritis     Asthma     CAD (coronary artery disease)     stent    Chronic obstructive pulmonary disease (HCC)     Chronic pain     Diabetes (HCC)     GERD (gastroesophageal reflux disease)     History of blood transfusion     06/22    History of DVT (deep vein thrombosis)     History of recurrent UTIs     Hx of seasonal allergies     Hypercholesterolemia     Irritable bowel syndrome (IBS)     Migraine     Psychiatric disorder     anxiety    Thromboembolus (HCC)     left leg    Thyroid disease     Urinary urgency          Past Surgical History:   Procedure Laterality Date    HX APPENDECTOMY      HX CATARACT REMOVAL Bilateral     HX HEART CATHETERIZATION      stent    HX HEENT      dental extraction    HX HYSTERECTOMY      HX OOPHORECTOMY      HX OPEN REDUCTION INTERNAL FIXATION Left     humerus     HX OPEN REDUCTION INTERNAL FIXATION Right     hip    HX ORTHOPAEDIC Left     humerus    HX TONSILLECTOMY      HX TUBAL LIGATION      ME TOTAL HIP ARTHROPLASTY Right       Social History     Tobacco Use    Smoking status: Former     Packs/day: 0.25     Years: 20.00     Pack years: 5.00     Types: Cigarettes     Quit date:      Years since quittin.5    Smokeless tobacco: Never   Substance Use Topics    Alcohol use: No      Family History   Problem Relation Age of Onset    Diabetes Mother     Stroke Mother      Prior to Admission medications    Medication Sig Start Date End Date Taking? Authorizing Provider   celecoxib (CeleBREX) 200 mg capsule Take 200 mg by mouth two (2) times a day. Yes Provider, Historical   atorvastatin (LIPITOR) 20 mg tablet TAKE 1 TABLET BY MOUTH EVERY DAY 22  Yes Bill Wills III, DO   clopidogreL (PLAVIX) 75 mg tab Take 1 Tablet by mouth in the morning. 22  Yes Abram Gleason MD   ferrous sulfate 325 mg (65 mg iron) tablet Take 1 Tablet by mouth daily (with breakfast). 22  Yes Rebecca Holt MD   docusate sodium (Colace) 100 mg capsule Take 1 Capsule by mouth two (2) times a day. 22  Yes Rebecca Holt MD   traMADoL (ULTRAM) 50 mg tablet Take 50 mg by mouth every six (6) hours as needed for Pain. Yes Provider, Historical   esomeprazole (NEXIUM) 20 mg capsule Take 1 Capsule by mouth daily. 22  Yes Bill Wills III, DO   levETIRAcetam (KEPPRA) 500 mg tablet TAKE 1 TABLET BY MOUTH EVERY MORNING AND 1 AND 1/2 TABLET BY MOUTH EVERY EVENING FOR MIGRAINES  Patient taking differently: Take 500 mg by mouth nightly. TAKE 1 TABLET BY MOUTH EVERY MORNING AND 1 AND 1/2 TABLET BY MOUTH EVERY EVENING FOR MIGRAINES 22  Yes Bill Wills III, DO   furosemide (LASIX) 20 mg tablet Take 1 Tablet by mouth daily. 22  Yes Rebecca Holt MD   potassium chloride SR (KLOR-CON 10) 10 mEq tablet Take 1 Tablet by mouth daily. 22  Yes Rebecca Holt MD   QUEtiapine (SEROquel) 25 mg tablet Take 1 Tablet by mouth nightly.  22  Yes Rebecca Holt MD warfarin (COUMADIN) 1 mg tablet Take 1 Tablet by mouth daily. 5/4/22  Yes Bill Wills III, DO   metoprolol succinate (TOPROL-XL) 50 mg XL tablet Take 1 Tablet by mouth daily. 5/4/22  Yes Bill Wills III, DO   buPROPion XL (WELLBUTRIN XL) 150 mg tablet Take 1 Tablet by mouth daily. 2/23/22  Yes Bill Wills III, DO   oxybutynin chloride XL (DITROPAN XL) 10 mg CR tablet TAKE 1 TABLET BY MOUTH EVERY DAY 2/15/22  Yes Bill Wills III, DO   amitriptyline (ELAVIL) 10 mg tablet TAKE 1 TABLET BY MOUTH EVERY NIGHT 2/14/22  Yes Bill Wills III, DO   traZODone (DESYREL) 100 mg tablet Take 100 mg by mouth nightly. Yes Provider, Historical   levothyroxine (SYNTHROID) 25 mcg tablet Take 2 Tablets by mouth Daily (before breakfast). 1/25/22  Yes Bill Wills III, DO   albuterol (PROVENTIL HFA, VENTOLIN HFA, PROAIR HFA) 90 mcg/actuation inhaler Take 2 Puffs by inhalation every six (6) hours as needed for Wheezing. 1/12/22  Yes Bill Wills III, DO   ipratropium (ATROVENT) 42 mcg (0.06 %) nasal spray 2 Sprays by Both Nostrils route four (4) times daily. Patient taking differently: 2 Sprays by Both Nostrils route four (4) times daily as needed.  12/3/21  Yes Bill Wills III, DO   nystatin (MYCOSTATIN) 100,000 unit/mL suspension SWISH AND SWALLOW 5 ML BY MOUTH FOR 30 SECONDS 4 TIMES A DAY AS NEEDED FOR MOUTH PAIN 11/4/21  Yes Bill Wills III, DO   ondansetron hcl (ZOFRAN) 4 mg tablet TAKE 1 TABLET BY MOUTH EVERY 8 HOURS AS NEEDED FOR NAUSEA OR VOMITING 9/9/21  Yes Bill Wills III, DO   ezetimibe (ZETIA) 10 mg tablet TAKE 1 TABLET BY MOUTH DAILY 8/6/21  Yes Czajkowski, Bill B III, DO   escitalopram oxalate (LEXAPRO) 20 mg tablet TAKE 1 TABLET BY MOUTH DAILY 4/28/20  Yes Bill Wills III, DO   levocetirizine (XYZAL) 5 mg tablet TAKE 1 TABLET BY MOUTH EVERY DAY  Patient not taking: Reported on 7/26/2022 6/26/22   [facility], Bill B III, DO   loperamide (IMODIUM) 2 mg capsule Take 1 Capsule by mouth four (4) times daily as needed for Diarrhea. Patient not taking: Reported on 7/26/2022 5/13/22   Yary Khat B III, DO   Bifidobacterium Infantis (Align) 4 mg cap Take 1 Capsule by mouth daily as needed for Diarrhea. Indications: ALIGN OTC  Patient not taking: Reported on 7/26/2022 4/15/22   Yary Khat B III, DO   benzonatate (TESSALON) 100 mg capsule Take 1 Capsule by mouth three (3) times daily as needed for Cough. Patient not taking: Reported on 7/26/2022 3/29/22   Yary Khat B III, DO   Wixela Inhub 250-50 mcg/dose diskus inhaler INHALE 1 PUFF AND INTO THE LUNGS EVERY 12 HOURS. RINSE MOUTH AFTER USE  Patient not taking: Reported on 7/26/2022 12/5/21   Yary Khat B III, DO   dicyclomine (BENTYL) 10 mg capsule TAKE ONE CAPSULE BY MOUTH FOUR TIMES DAILY AS NEEDED  Patient not taking: Reported on 7/26/2022 8/16/21   Yary Khat B III, DO   diclofenac (VOLTAREN) 1 % gel Apply  to affected area every twelve (12) hours as needed for Pain. 6/25/21   Yary Khat B III, DO       Allergies   Allergen Reactions    Iodinated Contrast Media Other (comments)     Passes out    Morphine Other (comments)     Agitation,hallucinations    Pcn [Penicillins] Shortness of Breath    Pepcid [Famotidine] Nausea and Vomiting    Sulfa (Sulfonamide Antibiotics) Shortness of Breath       Current Medications:   Current Outpatient Medications   Medication Sig Dispense Refill    celecoxib (CeleBREX) 200 mg capsule Take 200 mg by mouth two (2) times a day. atorvastatin (LIPITOR) 20 mg tablet TAKE 1 TABLET BY MOUTH EVERY DAY 90 Tablet 3    clopidogreL (PLAVIX) 75 mg tab Take 1 Tablet by mouth in the morning. 30 Tablet 4    ferrous sulfate 325 mg (65 mg iron) tablet Take 1 Tablet by mouth daily (with breakfast). 30 Tablet 6    docusate sodium (Colace) 100 mg capsule Take 1 Capsule by mouth two (2) times a day.  61 Capsule 6    traMADoL (ULTRAM) 50 mg tablet Take 50 mg by mouth every six (6) hours as needed for Pain.      esomeprazole (NEXIUM) 20 mg capsule Take 1 Capsule by mouth daily. 90 Capsule 3    levETIRAcetam (KEPPRA) 500 mg tablet TAKE 1 TABLET BY MOUTH EVERY MORNING AND 1 AND 1/2 TABLET BY MOUTH EVERY EVENING FOR MIGRAINES (Patient taking differently: Take 500 mg by mouth nightly. TAKE 1 TABLET BY MOUTH EVERY MORNING AND 1 AND 1/2 TABLET BY MOUTH EVERY EVENING FOR MIGRAINES) 225 Tablet 3    furosemide (LASIX) 20 mg tablet Take 1 Tablet by mouth daily. 90 Tablet 3    potassium chloride SR (KLOR-CON 10) 10 mEq tablet Take 1 Tablet by mouth daily. 90 Tablet 3    QUEtiapine (SEROquel) 25 mg tablet Take 1 Tablet by mouth nightly. 90 Tablet 3    warfarin (COUMADIN) 1 mg tablet Take 1 Tablet by mouth daily. 90 Tablet 3    metoprolol succinate (TOPROL-XL) 50 mg XL tablet Take 1 Tablet by mouth daily. 90 Tablet 3    buPROPion XL (WELLBUTRIN XL) 150 mg tablet Take 1 Tablet by mouth daily. 90 Tablet 3    oxybutynin chloride XL (DITROPAN XL) 10 mg CR tablet TAKE 1 TABLET BY MOUTH EVERY DAY 90 Tablet 3    amitriptyline (ELAVIL) 10 mg tablet TAKE 1 TABLET BY MOUTH EVERY NIGHT 90 Tablet 3    traZODone (DESYREL) 100 mg tablet Take 100 mg by mouth nightly. levothyroxine (SYNTHROID) 25 mcg tablet Take 2 Tablets by mouth Daily (before breakfast). 180 Tablet 3    albuterol (PROVENTIL HFA, VENTOLIN HFA, PROAIR HFA) 90 mcg/actuation inhaler Take 2 Puffs by inhalation every six (6) hours as needed for Wheezing. 8 g 2    ipratropium (ATROVENT) 42 mcg (0.06 %) nasal spray 2 Sprays by Both Nostrils route four (4) times daily.  (Patient taking differently: 2 Sprays by Both Nostrils route four (4) times daily as needed.) 15 mL 5    nystatin (MYCOSTATIN) 100,000 unit/mL suspension SWISH AND SWALLOW 5 ML BY MOUTH FOR 30 SECONDS 4 TIMES A DAY AS NEEDED FOR MOUTH PAIN 60 mL 2    ondansetron hcl (ZOFRAN) 4 mg tablet TAKE 1 TABLET BY MOUTH EVERY 8 HOURS AS NEEDED FOR NAUSEA OR VOMITING 30 Tablet 2    ezetimibe (ZETIA) 10 mg tablet TAKE 1 TABLET BY MOUTH DAILY 90 Tablet 3    escitalopram oxalate (LEXAPRO) 20 mg tablet TAKE 1 TABLET BY MOUTH DAILY 90 Tab 3    levocetirizine (XYZAL) 5 mg tablet TAKE 1 TABLET BY MOUTH EVERY DAY (Patient not taking: Reported on 7/26/2022) 90 Tablet 3    loperamide (IMODIUM) 2 mg capsule Take 1 Capsule by mouth four (4) times daily as needed for Diarrhea. (Patient not taking: Reported on 7/26/2022) 30 Capsule 4    Bifidobacterium Infantis (Align) 4 mg cap Take 1 Capsule by mouth daily as needed for Diarrhea. Indications: ALIGN OTC (Patient not taking: Reported on 7/26/2022) 90 Capsule 3    benzonatate (TESSALON) 100 mg capsule Take 1 Capsule by mouth three (3) times daily as needed for Cough. (Patient not taking: Reported on 7/26/2022) 30 Capsule 2    Wixela Inhub 250-50 mcg/dose diskus inhaler INHALE 1 PUFF AND INTO THE LUNGS EVERY 12 HOURS. RINSE MOUTH AFTER USE (Patient not taking: Reported on 7/26/2022) 60 Each 12    dicyclomine (BENTYL) 10 mg capsule TAKE ONE CAPSULE BY MOUTH FOUR TIMES DAILY AS NEEDED (Patient not taking: Reported on 7/26/2022) 120 Capsule 3    diclofenac (VOLTAREN) 1 % gel Apply  to affected area every twelve (12) hours as needed for Pain. 100 g 2       Vitals: Blood pressure (!) 122/58, pulse 70, resp. rate 16, SpO2 92 %. Allergies: is allergic to iodinated contrast media, morphine, pcn [penicillins], pepcid [famotidine], and sulfa (sulfonamide antibiotics).     Review of Systems: Pertinent Positives per HPI   [x]Total of 13 systems reviewed as follows:  Constitutional: Negative fever, negative chills  Eyes:   Negative for amauroses fugax  ENT:   Negative sore throat,oral abscess   Endocrine Negative for thyroid replacement Rx; goiter; DM  Respiratory:  Negative chronic cough,sputum production  Cards:   Negative for palpitations, varicosities, claudication, lower extremity edema  GI:   Negative for dysphagia, bleeding, nausea, vomiting, diarrhea, and abdominal pain  Genitourinary: Negative for frequency, dysuria  Integument:  Negative for rash and pruritus  Hematologic:  Negative for easy bruising; bleeding dyscrasia   Musculoskel: Negative for muscle weakness inhibiting ambulation  Neurological:  Negative for stroke, TIA, syncope, dizziness  Behavl/Psych: Negative for feelings of anxiety, depression     Cardiovascular Testing:     EKG: NSR    TTE:  07/20/22    ECHO ADULT FOLLOW-UP OR LIMITED 07/20/2022 7/20/2022    Interpretation Summary  Formatting of this result is different from the original.      Left Ventricle: Normal left ventricular systolic function with a visually estimated EF of 55 - 60%. Left ventricle size is normal. Normal wall thickness. Normal wall motion. Normal diastolic function. Aortic Valve: Bioprosthetic valve. No regurgitation. AV mean gradient is 8 mmHg. Signed by: Rosalina Macedo MD on 7/20/2022  5:47 PM       Physical Exam:  General: Well nourished well groomed female appearing stated age accompanied by daughter  Neuro: A&OX3. GRAHAM. PERRL. Steady  assisted gait -arrived in wheelchair, able to walk short distances  Head:Normocephalic. Atraumatic. Symmetrical  Neck: Trachea Midline  Resp: CTA B. No Adv BS/cough/sputum/tachypnea with seated conversation  CV: S1S2 RRR. No appreciable murmur. No JVD/carotid bruits. Pink/warm/dry extremities. no LE peripheral edema  GI:Benign ab. Soft. NT/ND. Active BS  : Voids  Integ: No obvious s/s of infection or breakdown  Musculo/Skeletal: FROM in all major joints. normal muscle tone    Clinic Evaluation:   [facility]-12: scanned into EMR      Assessment/Plan:     Severe AS s/p TAVR 7/20/22 with 23 mm Zaldivar Hermilo S3 Ultra aortic valve. NSR. Plavix (will need Plavix until 12/14/22). Warfarin resumed by Dr. Braeden Ellis (INR goal 2-3). INR 2.3 yesterday. Repeat echo in one month. CAD s/p stents 6/14/22. On BB, statin, plavix.  No ASA dt coumadin and plavix. 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 Stable, will have [facility] recheck CBC. Post-op respiratory insufficiency, ? secondary to atelectasis +/- volume overload, improved  Post-op pain control stable. Continue prn Tylenol/Ultram.   Protein C deficiency/previous DVT. On coumadin  HTN: Continue metoprolol XL 25 daily. HLD: Continue statin, zetia  PAD: Pt reports claudication, ABIs 12/21 were normal. Continue statin, Zetia, Plavix. Carotid stenosis: carotids 10/21 mild stenosis bilaterally. Continue statin, zetia, Plavix . Asthma/chronic cough: On advair, PRN albuterol, tessalon pearls PRN  DM Type II: Pt had been on metformin but stopped taking, A1C 6.3. Iron deficiency anemia: Hgb 8.4 post op. Continue Iron. Have  recheck CBC. Overactive bladder: on ditropan PTA  Migraines: firoicet PRN, keppra BID PTA  Chronic back pain: tramadol PRN. Anxiety and depression: Continue PTA regimen of Wellbutrin, elavil, trazadone, lexapro, Seroquel  IBS: takes prn immodium, bentyl PRN  Hypothyroid: cont synthroid  GERD: On Nexium PTA;        SBE prophylax reviewed.     May begin Cardiac Rehab     F/U with primary cardiologist

## 2022-07-25 NOTE — TELEPHONE ENCOUNTER
----- Message from [address] [facility] [address] sent at 7/25/2022  4:14 PM EDT -----  Subject: Message to Provider    QUESTIONS  Information for Provider? Angela Andrade calling to report patients INR levels,   Could not get the office after multiple failed attempts. Her INR level is   a 2.3 PT level PT 27.1 . Currently on 1mg of warfarin daily. If changes   needs to be made please call her   ---------------------------------------------------------------------------  --------------  [address]  [phone]; OK to leave message on voicemail  ---------------------------------------------------------------------------  --------------  SCRIPT ANSWERS  Relationship to Patient? Third Party  Third Party Type? Home Health Care? Representative Name?  Angela Andrade

## 2022-07-26 ENCOUNTER — OFFICE VISIT (OUTPATIENT)
Dept: CARDIOTHORACIC SURGERY | Age: 82
End: 2022-07-26
Payer: COMMERCIAL

## 2022-07-26 ENCOUNTER — TELEPHONE (OUTPATIENT)
Dept: INTERNAL MEDICINE CLINIC | Age: 82
End: 2022-07-26

## 2022-07-26 VITALS
OXYGEN SATURATION: 92 % | DIASTOLIC BLOOD PRESSURE: 58 MMHG | RESPIRATION RATE: 16 BRPM | HEART RATE: 70 BPM | SYSTOLIC BLOOD PRESSURE: 122 MMHG

## 2022-07-26 DIAGNOSIS — Z95.2 S/P TAVR (TRANSCATHETER AORTIC VALVE REPLACEMENT): Primary | ICD-10-CM

## 2022-07-26 PROCEDURE — 99213 OFFICE O/P EST LOW 20 MIN: CPT | Performed by: NURSE PRACTITIONER

## 2022-07-26 PROCEDURE — 93000 ELECTROCARDIOGRAM COMPLETE: CPT | Performed by: NURSE PRACTITIONER

## 2022-07-26 PROCEDURE — 1123F ACP DISCUSS/DSCN MKR DOCD: CPT | Performed by: NURSE PRACTITIONER

## 2022-07-26 RX ORDER — CELECOXIB 200 MG/1
200 CAPSULE ORAL 2 TIMES DAILY
COMMUNITY

## 2022-07-27 ENCOUNTER — TELEPHONE (OUTPATIENT)
Dept: INTERNAL MEDICINE CLINIC | Age: 82
End: 2022-07-27

## 2022-07-27 NOTE — TELEPHONE ENCOUNTER
Patient needs a call to discuss Warfarin & dosage per [address] that was there with patient at time of call that advises patient states she has called & not got a call back in reference to this matter. Patient spoke with Nurse yesterday & not documented this was mentioned. Please call to discuss.  Thank you

## 2022-07-27 NOTE — TELEPHONE ENCOUNTER
Pharmacy Progress Note - Telephone Encounter    S/O: Ms. Beverly Bynum 78 y.o. female, referred by Dr. Faviola Jones, was contacted via an outbound telephone call today. Verified patients identifiers (name & ) per HIPAA policy. [facility] nurse was present with patient at this time. [facility] nurse wanted to confirm patient's current warfarin dosing. Warfarin start date: ~    INR Goal:  2.0-3.0    Current warfarin regimen:  1 mg daily  Warfarin tablet strength: 2 mg   Duration of therapy: Indefinite      A/P:  - Discuss INR this week was therapeutic. Recommend recheck next week w/ weekly [facility] visits.  - Patient endorses understanding to the provided information. All questions answered at this time.      Thank you,  Zulema Rincon, PharmD, BCACP, [facility] [address] in place: Yes  Recommendation Provided To: Patient/Caregiver: 1 via Telephone  Intervention Detail: Lab(s) Ordered  Intervention Accepted By: Patient/Caregiver: 1  Time Spent (min): 10

## 2022-08-02 ENCOUNTER — DOCUMENTATION ONLY (OUTPATIENT)
Dept: CARDIOTHORACIC SURGERY | Age: 82
End: 2022-08-02

## 2022-08-02 ENCOUNTER — TELEPHONE (OUTPATIENT)
Dept: INTERNAL MEDICINE CLINIC | Age: 82
End: 2022-08-02

## 2022-08-02 DIAGNOSIS — G43.009 MIGRAINE WITHOUT AURA AND WITHOUT STATUS MIGRAINOSUS, NOT INTRACTABLE: ICD-10-CM

## 2022-08-02 RX ORDER — FLASH GLUCOSE SENSOR
1 KIT MISCELLANEOUS SEE ADMIN INSTRUCTIONS
Qty: 2 KIT | Refills: 11 | Status: SHIPPED | OUTPATIENT
Start: 2022-08-02

## 2022-08-02 RX ORDER — FLASH GLUCOSE SCANNING READER
1 EACH MISCELLANEOUS SEE ADMIN INSTRUCTIONS
Qty: 1 EACH | Refills: 0 | Status: SHIPPED | OUTPATIENT
Start: 2022-08-02

## 2022-08-02 NOTE — TELEPHONE ENCOUNTER
Pharmacy Progress Note - Telephone Encounter    S/O: Ms. Khurram Velazco 78 y.o. female, referred by Dr. Jie Elmore, was contacted via an outbound telephone call to discuss her previous call today. Verified patients identifiers (name & ) per HIPAA policy. Patient prefers to use CGM if able to monitor her blood sugars  Has a One Touch glucometer available as well    A/P:  - Will send in [employer] 2 CGM supplies per patient's request. She is not on any hyperglycemic agents at this time. - Patient endorses understanding to the provided information. All questions answered at this time. There are no discontinued medications. Orders Placed This Encounter    flash glucose sensor (FreeStyle Tomasa 2 Sensor) kit     Si Each by Does Not Apply route See Admin Instructions. Apply and replace sensor every 14 days. Use to scan sensor daily. Dispense:  2 Kit     Refill:  11    flash glucose scanning reader (FreeStyle Tomasa 2 [place]) misc     Si Each by Does Not Apply route See Admin Instructions.  Use to scan sensor daily     Dispense:  1 Each     Refill:  0       Thank you,  Zulema Sosa, PharmD, BCACP, [address] [place] [address] in place: Yes  Recommendation Provided To: Patient/Caregiver: 2 via Telephone  Intervention Detail: New Rx: 2, reason: Patient Preference  Intervention Accepted By: Patient/Caregiver: 2  Time Spent (min): 15

## 2022-08-02 NOTE — TELEPHONE ENCOUNTER
Pt states that in the hospital she was also prescribed keflex 750 mg in addition to the 500 mg she is now taking. Please look into this to see if pt needs this as well. Thanks.

## 2022-08-02 NOTE — PROGRESS NOTES
36- MsJorden Lr called on Friday about a planned back procedure 8/23- it sounds like it may be an epidural injection? Called and spoke with Dr. Jolly Dutta- she cannot stop ASA and Plavix for a full year after procedure. I called to update the patient but she did not answer; voicemail left.

## 2022-08-03 RX ORDER — ESCITALOPRAM OXALATE 20 MG/1
20 TABLET ORAL DAILY
Qty: 90 TABLET | Refills: 3 | Status: SHIPPED | OUTPATIENT
Start: 2022-08-03

## 2022-08-03 RX ORDER — LEVETIRACETAM 500 MG/1
TABLET ORAL
Qty: 225 TABLET | Refills: 3 | Status: SHIPPED | OUTPATIENT
Start: 2022-08-03

## 2022-08-03 RX ORDER — QUETIAPINE FUMARATE 25 MG/1
25 TABLET, FILM COATED ORAL
Qty: 90 TABLET | Refills: 3 | Status: SHIPPED | OUTPATIENT
Start: 2022-08-03

## 2022-08-03 NOTE — TELEPHONE ENCOUNTER
PCP: Tyson Garcia DO    Last appt: 5/13/2022  Future Appointments   Date Time Provider Toro Simmonsi   8/25/2022  1:30 PM Jayme Dejesus MD [facility] BS AMB   8/31/2022 10:00 AM Gretchen Dacosta MD [facility] BS AMB   11/25/2022  9:00 AM Barak Rodríguez, DO [facility] BS AMB       Requested Prescriptions     Pending Prescriptions Disp Refills    levETIRAcetam (KEPPRA) 500 mg tablet 225 Tablet 3     Sig: TAKE 1 TABLET BY MOUTH EVERY MORNING AND 1 AND 1/2 TABLET BY MOUTH EVERY EVENING FOR MIGRAINES    QUEtiapine (SEROquel) 25 mg tablet 90 Tablet 3     Sig: Take 1 Tablet by mouth nightly. escitalopram oxalate (LEXAPRO) 20 mg tablet 90 Tablet 3     Sig: Take 1 Tablet by mouth in the morning.        Prior labs and Blood pressures:  BP Readings from Last 3 Encounters:   07/26/22 (!) 122/58   07/22/22 (!) 124/47   07/15/22 (!) 126/90     Lab Results   Component Value Date/Time    Sodium 135 (L) 07/22/2022 05:30 AM    Potassium 3.5 07/22/2022 05:30 AM    Chloride 102 07/22/2022 05:30 AM    CO2 27 07/22/2022 05:30 AM    Anion gap 6 07/22/2022 05:30 AM    Glucose 126 (H) 07/22/2022 05:30 AM    BUN 11 07/22/2022 05:30 AM    Creatinine 0.54 (L) 07/22/2022 05:30 AM    BUN/Creatinine ratio 20 07/22/2022 05:30 AM    GFR est AA >60 07/22/2022 05:30 AM    GFR est non-AA >60 07/22/2022 05:30 AM    Calcium 7.9 (L) 07/22/2022 05:30 AM     Lab Results   Component Value Date/Time    Hemoglobin A1c 6.3 (H) 07/15/2022 11:45 AM    Hemoglobin A1c (POC) 7.6 (A) 03/29/2022 01:24 PM     Lab Results   Component Value Date/Time    Cholesterol, total 107 06/25/2021 11:52 AM    HDL Cholesterol 52 06/25/2021 11:52 AM    LDL, calculated 39 06/25/2021 11:52 AM    VLDL, calculated 16 06/25/2021 11:52 AM    Triglyceride 80 06/25/2021 11:52 AM    CHOL/HDL Ratio 2.1 06/25/2021 11:52 AM     No results found for: Jonathan Cheese, [id number]    Lab Results   Component Value Date/Time    TSH 0.78 07/15/2022 11:45 AM

## 2022-08-04 ENCOUNTER — TELEPHONE (OUTPATIENT)
Dept: CARDIOTHORACIC SURGERY | Age: 82
End: 2022-08-04

## 2022-08-04 NOTE — TELEPHONE ENCOUNTER
Patient would like a call back from message she states she left a week ago. She has medication questions, and question about going to the dentist.  I did inform her that our NP did leave her a message to return our call. She said she didn't receive a message but her phone is going in and out.   Please call her at [phone]

## 2022-08-08 ENCOUNTER — TELEPHONE (OUTPATIENT)
Dept: INTERNAL MEDICINE CLINIC | Age: 82
End: 2022-08-08

## 2022-08-08 LAB — INR, EXTERNAL: 3.4

## 2022-08-08 NOTE — TELEPHONE ENCOUNTER
Patient states that she would like to know what type of adjustments need to be made to her coumadin at this time. Message per nurse on Friday:    Alberto Brito with [facility] is calling in PT/INR results and will need a call back yet today. PT 41.2   INR  3.4    1 mg warfarin per day.

## 2022-08-08 NOTE — TELEPHONE ENCOUNTER
Pharmacy Progress Note  - Telephone Anticoagulation Management    S/O: [address] Bonds Nurse) called to discuss Ms. Maurice Sweet ,78 y.o. female, anticoagulation management today for the diagnosis of Protein C deficiency, LLE Deep Vein Thrombosis. Patient's most recent INR was 3.4. Warfarin start date: ~ 2008   INR Goal:  2.0-3.0    Current warfarin regimen:  1 mg daily  Warfarin tablet strength: 2 mg   Duration of therapy: Indefinite      Wt Readings from Last 3 Encounters:   07/22/22 146 lb 2.6 oz (66.3 kg)   07/15/22 141 lb 15.6 oz (64.4 kg)   07/13/22 136 lb 3.2 oz (61.8 kg)     BP Readings from Last 3 Encounters:   07/26/22 (!) 122/58   07/22/22 (!) 124/47   07/15/22 (!) 126/90       Past Medical History:   Diagnosis Date    Anxiety     Arthritis     Asthma     CAD (coronary artery disease)     stent    Chronic obstructive pulmonary disease (HCC)     Chronic pain     Diabetes (HCC)     GERD (gastroesophageal reflux disease)     History of blood transfusion     06/22    History of DVT (deep vein thrombosis)     History of recurrent UTIs     Hx of seasonal allergies     Hypercholesterolemia     Irritable bowel syndrome (IBS)     Migraine     Psychiatric disorder     anxiety    Thromboembolus (HCC)     left leg    Thyroid disease     Urinary urgency      Allergies   Allergen Reactions    Iodinated Contrast Media Other (comments)     Passes out    Morphine Other (comments)     Agitation,hallucinations    Pcn [Penicillins] Shortness of Breath    Pepcid [Famotidine] Nausea and Vomiting    Sulfa (Sulfonamide Antibiotics) Shortness of Breath     Current Outpatient Medications   Medication Sig    levETIRAcetam (KEPPRA) 500 mg tablet TAKE 1 TABLET BY MOUTH EVERY MORNING AND 1 AND 1/2 TABLET BY MOUTH EVERY EVENING FOR MIGRAINES    QUEtiapine (SEROquel) 25 mg tablet Take 1 Tablet by mouth nightly. escitalopram oxalate (LEXAPRO) 20 mg tablet Take 1 Tablet by mouth in the morning.     flash glucose sensor (FreeStyle Tomasa 2 Sensor) kit 1 Each by Does Not Apply route See Mason Godfrey. Apply and replace sensor every 14 days. Use to scan sensor daily. flash glucose scanning reader (FreeStyle Tomasa 2 [place]) misc 1 Each by Does Not Apply route See Admin Instructions. Use to scan sensor daily    celecoxib (CeleBREX) 200 mg capsule Take 200 mg by mouth two (2) times a day. atorvastatin (LIPITOR) 20 mg tablet TAKE 1 TABLET BY MOUTH EVERY DAY    clopidogreL (PLAVIX) 75 mg tab Take 1 Tablet by mouth in the morning. ferrous sulfate 325 mg (65 mg iron) tablet Take 1 Tablet by mouth daily (with breakfast). docusate sodium (Colace) 100 mg capsule Take 1 Capsule by mouth two (2) times a day. traMADoL (ULTRAM) 50 mg tablet Take 50 mg by mouth every six (6) hours as needed for Pain.    esomeprazole (NEXIUM) 20 mg capsule Take 1 Capsule by mouth daily. levocetirizine (XYZAL) 5 mg tablet TAKE 1 TABLET BY MOUTH EVERY DAY (Patient not taking: Reported on 7/26/2022)    furosemide (LASIX) 20 mg tablet Take 1 Tablet by mouth daily. potassium chloride SR (KLOR-CON 10) 10 mEq tablet Take 1 Tablet by mouth daily. loperamide (IMODIUM) 2 mg capsule Take 1 Capsule by mouth four (4) times daily as needed for Diarrhea. (Patient not taking: Reported on 7/26/2022)    warfarin (COUMADIN) 1 mg tablet Take 1 Tablet by mouth daily. metoprolol succinate (TOPROL-XL) 50 mg XL tablet Take 1 Tablet by mouth daily. Bifidobacterium Infantis (Align) 4 mg cap Take 1 Capsule by mouth daily as needed for Diarrhea. Indications: ALIGN OTC (Patient not taking: Reported on 7/26/2022)    benzonatate (TESSALON) 100 mg capsule Take 1 Capsule by mouth three (3) times daily as needed for Cough. (Patient not taking: Reported on 7/26/2022)    buPROPion XL (WELLBUTRIN XL) 150 mg tablet Take 1 Tablet by mouth daily.     oxybutynin chloride XL (DITROPAN XL) 10 mg CR tablet TAKE 1 TABLET BY MOUTH EVERY DAY    amitriptyline (ELAVIL) 10 mg tablet TAKE 1 TABLET BY MOUTH EVERY NIGHT    traZODone (DESYREL) 100 mg tablet Take 100 mg by mouth nightly. levothyroxine (SYNTHROID) 25 mcg tablet Take 2 Tablets by mouth Daily (before breakfast). albuterol (PROVENTIL HFA, VENTOLIN HFA, PROAIR HFA) 90 mcg/actuation inhaler Take 2 Puffs by inhalation every six (6) hours as needed for Wheezing. Wixela Inhub 250-50 mcg/dose diskus inhaler INHALE 1 PUFF AND INTO THE LUNGS EVERY 12 HOURS. RINSE MOUTH AFTER USE (Patient not taking: Reported on 7/26/2022)    ipratropium (ATROVENT) 42 mcg (0.06 %) nasal spray 2 Sprays by Both Nostrils route four (4) times daily. (Patient taking differently: 2 Sprays by Both Nostrils route four (4) times daily as needed.)    nystatin (MYCOSTATIN) 100,000 unit/mL suspension SWISH AND SWALLOW 5 ML BY MOUTH FOR 30 SECONDS 4 TIMES A DAY AS NEEDED FOR MOUTH PAIN    ondansetron hcl (ZOFRAN) 4 mg tablet TAKE 1 TABLET BY MOUTH EVERY 8 HOURS AS NEEDED FOR NAUSEA OR VOMITING    dicyclomine (BENTYL) 10 mg capsule TAKE ONE CAPSULE BY MOUTH FOUR TIMES DAILY AS NEEDED (Patient not taking: Reported on 7/26/2022)    ezetimibe (ZETIA) 10 mg tablet TAKE 1 TABLET BY MOUTH DAILY    diclofenac (VOLTAREN) 1 % gel Apply  to affected area every twelve (12) hours as needed for Pain. No current facility-administered medications for this visit.      Lab Results   Component Value Date/Time    WBC 6.9 07/22/2022 05:30 AM    HGB 8.4 (L) 07/22/2022 05:30 AM    HCT 30.1 (L) 07/22/2022 05:30 AM    PLATELET 992 88/45/2950 05:30 AM    MCV 71.8 (L) 07/22/2022 05:30 AM     Lab Results   Component Value Date/Time    Sodium 135 (L) 07/22/2022 05:30 AM    Potassium 3.5 07/22/2022 05:30 AM    Chloride 102 07/22/2022 05:30 AM    CO2 27 07/22/2022 05:30 AM    Anion gap 6 07/22/2022 05:30 AM    Glucose 126 (H) 07/22/2022 05:30 AM    BUN 11 07/22/2022 05:30 AM    Creatinine 0.54 (L) 07/22/2022 05:30 AM    BUN/Creatinine ratio 20 07/22/2022 05:30 AM    GFR est AA >60 07/22/2022 05:30 AM    GFR est non-AA >60 07/22/2022 05:30 AM    Calcium 7.9 (L) 07/22/2022 05:30 AM    Bilirubin, total 0.5 07/22/2022 05:30 AM    Alk. phosphatase 57 07/22/2022 05:30 AM    Protein, total 5.8 (L) 07/22/2022 05:30 AM    Albumin 2.5 (L) 07/22/2022 05:30 AM    Globulin 3.3 07/22/2022 05:30 AM    A-G Ratio 0.8 (L) 07/22/2022 05:30 AM    ALT (SGPT) 8 (L) 07/22/2022 05:30 AM       INR History:   (normal INR range 0.8-1.2)     Date   INR   Dose/Comments  08/08/22 3.4    1 mg daily   07/25/22          2.3                               1 mg daily; s/p TAVR  07/12/22          2.9                               1 mg daily  Lapse in INR clinic  04/26/22          2.9                   34.5     3 mg x 1, then 2 mg x 4, then 2 mg Tues, Thurs, Sun, 1 mg daily ROW  04/12/22          1.4                   17.4     Hold x 2, 1 mg x 1, then 2 mg Tues, Thurs, Sun, 1 mg daily ROW  03/29/22          4.8                   58.0     1 mg MWF, 2 mg daily ROW; (+) increased dose  03/09/22          2.3                   27.7     Hold x 2, then 1 mg MWF, 2 mg daily ROW  03/02/22          4.2                   50.9     Hold x 1, then 1 mg x 1, then 2 mg daily  02/09/22          3.2                   37.5     2 mg daily ; (+) ASA  01/12/22          2.5                   29.6     2 mg daily  12/07/21          2.3                   27.4     1 mg x 3 days, then 2 mg daily  --> Pt took 2 mg daily   12/04/21          4.2                               2 mg daily ; (+) APAP   11/01/21          2.8                   34.0     2 mg daily ; s/p TFESI  09/27/21          1.9                   22.8     4 mg x 1, then 2 mg daily -- pt held x 2 days and 2 mg daily  09/20/21          1.2                   14.0     Hold x 2, 1/2 tab on Thurs, and 2 mg daily ROW; Pt held x 2, then 2 mg daily until INR check   09/08/21          3.6                   43.7     2 mg daily      A/P:       Anticoagulation:  Considering Ms. Dunlap's past history, todays findings, and per Anticoagulation Collaborative Practice Agreement/Protocol:    POC INR (3.4) is supratherapeutic for INR goal today. Hold warfarin tonight. Then Continue warfarin 1 mg daily. Next INR check will be in 1 week(s). Medication reconciliation was completed during the visit. There are no discontinued medications. Notifications of recommendations will be sent to Dr. Shivam Huddleston DO for review.     Thank you,  Zulema Rincon, PharmD, BCACP, [address] [place] [address] in place: Yes  Recommendation Provided To: Patient/Caregiver: 2 via Telephone  Intervention Detail: Dose Adjustment: 1, reason: Therapy De-escalation and Lab(s) Ordered  Intervention Accepted By: Patient/Caregiver: 2  Time Spent (min): 10

## 2022-08-08 NOTE — TELEPHONE ENCOUNTER
Patient states she needs a call back to discuss New Medication That is LEVETIRACETAM 750MG TABLETS patient states that also goes with her levETIRAcetam (KEPPRA) 500 mg tablet. Patient states she needs both medications/dosages per her Cardiologist due to recent heart surgery. Patient states she also needs to discuss INR & possible adjustments needed on Warfarin. Please call to discuss & advise.  Thank you

## 2022-08-14 ENCOUNTER — APPOINTMENT (OUTPATIENT)
Dept: CT IMAGING | Age: 82
End: 2022-08-14
Attending: EMERGENCY MEDICINE
Payer: COMMERCIAL

## 2022-08-14 ENCOUNTER — HOSPITAL ENCOUNTER (EMERGENCY)
Age: 82
Discharge: HOME | End: 2022-08-14
Attending: EMERGENCY MEDICINE
Payer: COMMERCIAL

## 2022-08-14 VITALS
SYSTOLIC BLOOD PRESSURE: 154 MMHG | BODY MASS INDEX: 26.78 KG/M2 | HEART RATE: 79 BPM | HEIGHT: 62 IN | WEIGHT: 145.5 LBS | OXYGEN SATURATION: 94 % | RESPIRATION RATE: 18 BRPM | DIASTOLIC BLOOD PRESSURE: 118 MMHG

## 2022-08-14 DIAGNOSIS — S00.03XA SCALP HEMATOMA, INITIAL ENCOUNTER: Primary | ICD-10-CM

## 2022-08-14 DIAGNOSIS — D68.9 COAGULOPATHY (HCC): ICD-10-CM

## 2022-08-14 LAB
ALBUMIN SERPL-MCNC: 3.3 G/DL (ref 3.5–5)
ALBUMIN/GLOB SERPL: 0.9 {RATIO} (ref 1.1–2.2)
ALP SERPL-CCNC: 81 U/L (ref 45–117)
ALT SERPL-CCNC: 16 U/L (ref 12–78)
ANION GAP SERPL CALC-SCNC: 5 MMOL/L (ref 5–15)
AST SERPL-CCNC: 16 U/L (ref 15–37)
BASOPHILS # BLD: 0.1 K/UL (ref 0–0.1)
BASOPHILS NFR BLD: 1 % (ref 0–1)
BILIRUB SERPL-MCNC: 0.3 MG/DL (ref 0.2–1)
BUN SERPL-MCNC: 24 MG/DL (ref 6–20)
BUN/CREAT SERPL: 24 (ref 12–20)
CALCIUM SERPL-MCNC: 8.8 MG/DL (ref 8.5–10.1)
CHLORIDE SERPL-SCNC: 106 MMOL/L (ref 97–108)
CO2 SERPL-SCNC: 26 MMOL/L (ref 21–32)
CREAT SERPL-MCNC: 0.98 MG/DL (ref 0.55–1.02)
DIFFERENTIAL METHOD BLD: ABNORMAL
EOSINOPHIL # BLD: 0.3 K/UL (ref 0–0.4)
EOSINOPHIL NFR BLD: 4 % (ref 0–7)
ERYTHROCYTE [DISTWIDTH] IN BLOOD BY AUTOMATED COUNT: 25.5 % (ref 11.5–14.5)
GLOBULIN SER CALC-MCNC: 3.5 G/DL (ref 2–4)
GLUCOSE SERPL-MCNC: 135 MG/DL (ref 65–100)
HCT VFR BLD AUTO: 35.2 % (ref 35–47)
HGB BLD-MCNC: 10.2 G/DL (ref 11.5–16)
IMM GRANULOCYTES # BLD AUTO: 0 K/UL (ref 0–0.04)
IMM GRANULOCYTES NFR BLD AUTO: 0 % (ref 0–0.5)
INR PPP: 2.9 (ref 0.9–1.1)
LYMPHOCYTES # BLD: 1.4 K/UL (ref 0.8–3.5)
LYMPHOCYTES NFR BLD: 19 % (ref 12–49)
MCH RBC QN AUTO: 23.6 PG (ref 26–34)
MCHC RBC AUTO-ENTMCNC: 29 G/DL (ref 30–36.5)
MCV RBC AUTO: 81.3 FL (ref 80–99)
MONOCYTES # BLD: 0.7 K/UL (ref 0–1)
MONOCYTES NFR BLD: 10 % (ref 5–13)
NEUTS SEG # BLD: 4.8 K/UL (ref 1.8–8)
NEUTS SEG NFR BLD: 66 % (ref 32–75)
NRBC # BLD: 0 K/UL (ref 0–0.01)
NRBC BLD-RTO: 0 PER 100 WBC
PLATELET # BLD AUTO: 139 K/UL (ref 150–400)
POTASSIUM SERPL-SCNC: 4.6 MMOL/L (ref 3.5–5.1)
PROT SERPL-MCNC: 6.8 G/DL (ref 6.4–8.2)
PROTHROMBIN TIME: 27.9 SEC (ref 9–11.1)
RBC # BLD AUTO: 4.33 M/UL (ref 3.8–5.2)
RBC MORPH BLD: ABNORMAL
SODIUM SERPL-SCNC: 137 MMOL/L (ref 136–145)
WBC # BLD AUTO: 7.3 K/UL (ref 3.6–11)

## 2022-08-14 PROCEDURE — 99284 EMERGENCY DEPT VISIT MOD MDM: CPT

## 2022-08-14 PROCEDURE — 85610 PROTHROMBIN TIME: CPT

## 2022-08-14 PROCEDURE — 80053 COMPREHEN METABOLIC PANEL: CPT

## 2022-08-14 PROCEDURE — 70450 CT HEAD/BRAIN W/O DYE: CPT

## 2022-08-14 PROCEDURE — 85025 COMPLETE CBC W/AUTO DIFF WBC: CPT

## 2022-08-14 PROCEDURE — 36415 COLL VENOUS BLD VENIPUNCTURE: CPT

## 2022-08-15 NOTE — ED PROVIDER NOTES
EMERGENCY DEPARTMENT HISTORY AND PHYSICAL EXAM      Date: 8/14/2022  Patient Name: Oscar San    History of Presenting Illness     Chief Complaint   Patient presents with    Fall     Patient arrives via EMS from home and had a fall when she turned too fast and lost her balance. Patient is on blood thinners but denies any LOC. Patient complaining of right knee pain and has hematoma to the right back of the head       History Provided By: Patient    HPI: Oscar San, 78 y.o. female presents to the ED with cc of GLF hitting the back of her head. Pt recently had been admitted for TAVR dc on Coumadin. Pt states he had gone to rehab and today was her first day back at home. She states she slipped on bathroom floor falling back wards hitting the back of her head. Headache mild in severity, worse with touch, she noted swelling to post scalp immediately. SHe denies any neck or back pain or other injury. She denies any CP/SOA, abd pain, n/v/d. She denies any pain or injury to the extremities. There was no palpitations prior to falling    There are no other complaints, changes, or physical findings at this time. PCP: Guille Reece, DO    No current facility-administered medications on file prior to encounter. Current Outpatient Medications on File Prior to Encounter   Medication Sig Dispense Refill    levETIRAcetam (KEPPRA) 500 mg tablet TAKE 1 TABLET BY MOUTH EVERY MORNING AND 1 AND 1/2 TABLET BY MOUTH EVERY EVENING FOR MIGRAINES 225 Tablet 3    QUEtiapine (SEROquel) 25 mg tablet Take 1 Tablet by mouth nightly. 90 Tablet 3    escitalopram oxalate (LEXAPRO) 20 mg tablet Take 1 Tablet by mouth in the morning. 90 Tablet 3    flash glucose sensor (FreeStyle Tomasa 2 Sensor) kit 1 Each by Does Not Apply route See Mason Godfrey. Apply and replace sensor every 14 days. Use to scan sensor daily.  2 Kit 11    flash glucose scanning reader (FreeStyle Tomasa 2 Sandy) misc 1 Each by Does Not Apply route See Admin Instructions. Use to scan sensor daily 1 Each 0    celecoxib (CeleBREX) 200 mg capsule Take 200 mg by mouth two (2) times a day. atorvastatin (LIPITOR) 20 mg tablet TAKE 1 TABLET BY MOUTH EVERY DAY 90 Tablet 3    clopidogreL (PLAVIX) 75 mg tab Take 1 Tablet by mouth in the morning. 30 Tablet 4    ferrous sulfate 325 mg (65 mg iron) tablet Take 1 Tablet by mouth daily (with breakfast). 30 Tablet 6    docusate sodium (Colace) 100 mg capsule Take 1 Capsule by mouth two (2) times a day. 60 Capsule 6    traMADoL (ULTRAM) 50 mg tablet Take 50 mg by mouth every six (6) hours as needed for Pain.      esomeprazole (NEXIUM) 20 mg capsule Take 1 Capsule by mouth daily. 90 Capsule 3    levocetirizine (XYZAL) 5 mg tablet TAKE 1 TABLET BY MOUTH EVERY DAY (Patient not taking: Reported on 7/26/2022) 90 Tablet 3    furosemide (LASIX) 20 mg tablet Take 1 Tablet by mouth daily. 90 Tablet 3    potassium chloride SR (KLOR-CON 10) 10 mEq tablet Take 1 Tablet by mouth daily. 90 Tablet 3    loperamide (IMODIUM) 2 mg capsule Take 1 Capsule by mouth four (4) times daily as needed for Diarrhea. (Patient not taking: Reported on 7/26/2022) 30 Capsule 4    warfarin (COUMADIN) 1 mg tablet Take 1 Tablet by mouth daily. 90 Tablet 3    metoprolol succinate (TOPROL-XL) 50 mg XL tablet Take 1 Tablet by mouth daily. 90 Tablet 3    Bifidobacterium Infantis (Align) 4 mg cap Take 1 Capsule by mouth daily as needed for Diarrhea. Indications: ALIGN OTC (Patient not taking: Reported on 7/26/2022) 90 Capsule 3    benzonatate (TESSALON) 100 mg capsule Take 1 Capsule by mouth three (3) times daily as needed for Cough. (Patient not taking: Reported on 7/26/2022) 30 Capsule 2    buPROPion XL (WELLBUTRIN XL) 150 mg tablet Take 1 Tablet by mouth daily.  90 Tablet 3    oxybutynin chloride XL (DITROPAN XL) 10 mg CR tablet TAKE 1 TABLET BY MOUTH EVERY DAY 90 Tablet 3    amitriptyline (ELAVIL) 10 mg tablet TAKE 1 TABLET BY MOUTH EVERY NIGHT 90 Tablet 3 traZODone (DESYREL) 100 mg tablet Take 100 mg by mouth nightly. levothyroxine (SYNTHROID) 25 mcg tablet Take 2 Tablets by mouth Daily (before breakfast). 180 Tablet 3    albuterol (PROVENTIL HFA, VENTOLIN HFA, PROAIR HFA) 90 mcg/actuation inhaler Take 2 Puffs by inhalation every six (6) hours as needed for Wheezing. 8 g 2    Wixela Inhub 250-50 mcg/dose diskus inhaler INHALE 1 PUFF AND INTO THE LUNGS EVERY 12 HOURS. RINSE MOUTH AFTER USE (Patient not taking: Reported on 7/26/2022) 60 Each 12    ipratropium (ATROVENT) 42 mcg (0.06 %) nasal spray 2 Sprays by Both Nostrils route four (4) times daily. (Patient taking differently: 2 Sprays by Both Nostrils route four (4) times daily as needed.) 15 mL 5    nystatin (MYCOSTATIN) 100,000 unit/mL suspension SWISH AND SWALLOW 5 ML BY MOUTH FOR 30 SECONDS 4 TIMES A DAY AS NEEDED FOR MOUTH PAIN 60 mL 2    ondansetron hcl (ZOFRAN) 4 mg tablet TAKE 1 TABLET BY MOUTH EVERY 8 HOURS AS NEEDED FOR NAUSEA OR VOMITING 30 Tablet 2    dicyclomine (BENTYL) 10 mg capsule TAKE ONE CAPSULE BY MOUTH FOUR TIMES DAILY AS NEEDED (Patient not taking: Reported on 7/26/2022) 120 Capsule 3    ezetimibe (ZETIA) 10 mg tablet TAKE 1 TABLET BY MOUTH DAILY 90 Tablet 3    diclofenac (VOLTAREN) 1 % gel Apply  to affected area every twelve (12) hours as needed for Pain.  100 g 2       Past History     Past Medical History:  Past Medical History:   Diagnosis Date    Anxiety     Arthritis     Asthma     CAD (coronary artery disease)     stent    Chronic obstructive pulmonary disease (HCC)     Chronic pain     Diabetes (HCC)     GERD (gastroesophageal reflux disease)     History of blood transfusion     06/22    History of DVT (deep vein thrombosis)     History of recurrent UTIs     Hx of seasonal allergies     Hypercholesterolemia     Irritable bowel syndrome (IBS)     Migraine     Psychiatric disorder     anxiety    Thromboembolus (HCC)     left leg    Thyroid disease     Urinary urgency        Past Surgical History:  Past Surgical History:   Procedure Laterality Date    HX APPENDECTOMY      HX CATARACT REMOVAL Bilateral     HX HEART CATHETERIZATION      stent    HX HEENT      dental extraction    HX HYSTERECTOMY      HX OOPHORECTOMY      HX OPEN REDUCTION INTERNAL FIXATION Left     humerus     HX OPEN REDUCTION INTERNAL FIXATION Right     hip    HX ORTHOPAEDIC Left     humerus    HX TONSILLECTOMY      HX TUBAL LIGATION      RI TOTAL HIP ARTHROPLASTY Right        Family History:  Family History   Problem Relation Age of Onset    Diabetes Mother     Stroke Mother        Social History:  Social History     Tobacco Use    Smoking status: Former     Packs/day: 0.25     Years: 20.00     Pack years: 5.00     Types: Cigarettes     Quit date:      Years since quittin.6    Smokeless tobacco: Never   Vaping Use    Vaping Use: Never used   Substance Use Topics    Alcohol use: No    Drug use: No       Allergies: Allergies   Allergen Reactions    Iodinated Contrast Media Other (comments)     Passes out    Morphine Other (comments)     Agitation,hallucinations    Pcn [Penicillins] Shortness of Breath    Pepcid [Famotidine] Nausea and Vomiting    Sulfa (Sulfonamide Antibiotics) Shortness of Breath         Review of Systems   Review of Systems   Constitutional: Negative. Negative for appetite change, chills, fatigue and fever. HENT:  Negative for congestion, rhinorrhea, sinus pressure and sore throat. Closed head injury with posterior scalp hematoma. Eyes: Negative. Respiratory: Negative. Negative for cough, choking, chest tightness, shortness of breath and wheezing. Cardiovascular: Negative. Negative for chest pain, palpitations and leg swelling. Gastrointestinal:  Negative for abdominal pain, constipation, diarrhea, nausea and vomiting. Endocrine: Negative. Genitourinary: Negative. Negative for difficulty urinating, dysuria, flank pain and urgency. Musculoskeletal: Negative.     Skin: Negative. Neurological: Negative. Negative for dizziness, speech difficulty, weakness, light-headedness, numbness and headaches. Psychiatric/Behavioral: Negative. All other systems reviewed and are negative. Physical Exam   Physical Exam  Vitals and nursing note reviewed. Constitutional:       General: She is not in acute distress. Appearance: She is well-developed. She is not ill-appearing or diaphoretic. HENT:      Head: Normocephalic and atraumatic. Comments: Posterior scalp hematoma, small abrasion no active bleeding, no laceration seen     Mouth/Throat:      Mouth: Mucous membranes are moist.      Pharynx: No oropharyngeal exudate. Eyes:      Conjunctiva/sclera: Conjunctivae normal.      Pupils: Pupils are equal, round, and reactive to light. Neck:      Vascular: No JVD. Trachea: No tracheal deviation. Cardiovascular:      Rate and Rhythm: Normal rate and regular rhythm. Heart sounds: Normal heart sounds. No murmur heard. Pulmonary:      Effort: Pulmonary effort is normal. No respiratory distress. Breath sounds: Normal breath sounds. No stridor. No wheezing or rales. Abdominal:      General: There is no distension. Palpations: Abdomen is soft. Tenderness: There is no abdominal tenderness. There is no guarding or rebound. Musculoskeletal:         General: Normal range of motion. Cervical back: Normal range of motion and neck supple. No tenderness. Right lower leg: No edema. Left lower leg: No edema. Comments: No C/T/L spine ttp, no ttp to all 4 extremities, no deformities, distal PMS intact x 4    Skin:     General: Skin is warm and dry. Capillary Refill: Capillary refill takes less than 2 seconds. Neurological:      Mental Status: She is alert and oriented to person, place, and time. Cranial Nerves: No cranial nerve deficit.       Comments: No gross motor or sensory deficits    Psychiatric:         Mood and Affect: Mood normal.         Behavior: Behavior normal.       Diagnostic Study Results     Labs -  Recent Results (from the past 24 hour(s))   CBC WITH AUTOMATED DIFF    Collection Time: 08/14/22  3:09 AM   Result Value Ref Range    WBC 7.3 3.6 - 11.0 K/uL    RBC 4.33 3.80 - 5.20 M/uL    HGB 10.2 (L) 11.5 - 16.0 g/dL    HCT 35.2 35.0 - 47.0 %    MCV 81.3 80.0 - 99.0 FL    MCH 23.6 (L) 26.0 - 34.0 PG    MCHC 29.0 (L) 30.0 - 36.5 g/dL    RDW 25.5 (H) 11.5 - 14.5 %    PLATELET 506 (L) 250 - 400 K/uL    NRBC 0.0 0  WBC    ABSOLUTE NRBC 0.00 0.00 - 0.01 K/uL    NEUTROPHILS 66 32 - 75 %    LYMPHOCYTES 19 12 - 49 %    MONOCYTES 10 5 - 13 %    EOSINOPHILS 4 0 - 7 %    BASOPHILS 1 0 - 1 %    IMMATURE GRANULOCYTES 0 0.0 - 0.5 %    ABS. NEUTROPHILS 4.8 1.8 - 8.0 K/UL    ABS. LYMPHOCYTES 1.4 0.8 - 3.5 K/UL    ABS. MONOCYTES 0.7 0.0 - 1.0 K/UL    ABS. EOSINOPHILS 0.3 0.0 - 0.4 K/UL    ABS. BASOPHILS 0.1 0.0 - 0.1 K/UL    ABS. IMM. GRANS. 0.0 0.00 - 0.04 K/UL    DF SMEAR SCANNED      RBC COMMENTS ANISOCYTOSIS  1+        RBC COMMENTS HYPOCHROMIA  PRESENT        RBC COMMENTS OVALOCYTES  PRESENT        RBC COMMENTS MICROCYTOSIS  1+       METABOLIC PANEL, COMPREHENSIVE    Collection Time: 08/14/22  3:09 AM   Result Value Ref Range    Sodium 137 136 - 145 mmol/L    Potassium 4.6 3.5 - 5.1 mmol/L    Chloride 106 97 - 108 mmol/L    CO2 26 21 - 32 mmol/L    Anion gap 5 5 - 15 mmol/L    Glucose 135 (H) 65 - 100 mg/dL    BUN 24 (H) 6 - 20 MG/DL    Creatinine 0.98 0.55 - 1.02 MG/DL    BUN/Creatinine ratio 24 (H) 12 - 20      GFR est AA >60 >60 ml/min/1.73m2    GFR est non-AA 54 (L) >60 ml/min/1.73m2    Calcium 8.8 8.5 - 10.1 MG/DL    Bilirubin, total 0.3 0.2 - 1.0 MG/DL    ALT (SGPT) 16 12 - 78 U/L    AST (SGOT) 16 15 - 37 U/L    Alk.  phosphatase 81 45 - 117 U/L    Protein, total 6.8 6.4 - 8.2 g/dL    Albumin 3.3 (L) 3.5 - 5.0 g/dL    Globulin 3.5 2.0 - 4.0 g/dL    A-G Ratio 0.9 (L) 1.1 - 2.2     PROTHROMBIN TIME + INR    Collection Time: 08/14/22  3:09 AM   Result Value Ref Range    INR 2.9 (H) 0.9 - 1.1      Prothrombin time 27.9 (H) 9.0 - 11.1 sec         Radiologic Studies -   CT HEAD WO CONT   Final Result   Right occipital scalp hematoma and swelling. Chronic white matter   change compatible with microvascular ischemic change. Chronic right basal   ganglia lacune. No acute intracranial findings. CT Results  (Last 48 hours)                 08/14/22 0251  CT HEAD WO CONT Final result    Impression:  Right occipital scalp hematoma and swelling. Chronic white matter   change compatible with microvascular ischemic change. Chronic right basal   ganglia lacune. No acute intracranial findings. Narrative:  EXAM: CT HEAD WO CONT       INDICATION: [facility], on Coumadin       COMPARISON: CT 6/9/2022. CONTRAST: None. TECHNIQUE: Unenhanced CT of the head was performed using 5 mm images. Brain and   bone windows were generated. Coronal and sagittal reformats. CT dose reduction   was achieved through use of a standardized protocol tailored for this   examination and automatic exposure control for dose modulation. FINDINGS:   There is right occipital scalp swelling and hematoma. The ventricles and sulci   are normal in size, shape and configuration. There is no significant region   periventricular and subcortical white matter hypodensity with chronic right   basal ganglia lacune. There is no intracranial hemorrhage, extra-axial   collection, or mass effect. The basilar cisterns are open. No CT evidence of   acute infarct. The bone windows demonstrate no abnormalities. The visualized portions of the   paranasal sinuses and mastoid air cells are clear. CXR Results  (Last 48 hours)      None            Medical Decision Making   I am the first provider for this patient. I reviewed the vital signs, available nursing notes, past medical history, past surgical history, family history and social history.     Vital Signs-Reviewed the patient's vital signs. Records Reviewed: Nursing Notes, Old Medical Records, Ambulance Run Sheet, Previous Radiology Studies, and Previous Laboratory Studies  Recent TAVR on Coumadin    Provider Notes (Medical Decision Making):   Chente JOSE[facility] [place] [address] phematoma    ED Course:   Initial assessment performed. The patients presenting problems have been discussed, and they are in agreement with the care plan formulated and outlined with them. I have encouraged them to ask questions as they arise throughout their visit. Head CT neg, Coumadin at therapeutic level Discussed ice 20 minutes 3-4 time a dy to post scal for two days        Disposition:    DC- Adult Discharges: All of the diagnostic tests were reviewed and questions answered. Diagnosis, care plan and treatment options were discussed. The patient understands the instructions and will follow up as directed. The patients results have been reviewed with them. They have been counseled regarding their diagnosis. The patient verbally convey understanding and agreement of the signs, symptoms, diagnosis, treatment and prognosis and additionally agrees to follow up as recommended with their PCP in 24 - 48 hours. They also agree with the care-plan and convey that all of their questions have been answered. I have also put together some discharge instructions for them that include: 1) educational information regarding their diagnosis, 2) how to care for their diagnosis at home, as well a 3) list of reasons why they would want to return to the ED prior to their follow-up appointment, should their condition change. DISCHARGE PLAN:  1. Discharge Medication List as of 8/14/2022  4:29 AM      No medication changes    2.    Follow-up Information       Follow up With Specialties Details Why Contact Carol Cheatham DO Internal Medicine Physician  As needed [address] [facility]  [id number]  [phone]            3. Return to ED if worse     Diagnosis     Clinical Impression:   1. Scalp hematoma, initial encounter    2. Coagulopathy ([address]        Attestations:    Anthony Farrell, DO        Please note that this dictation was completed with [employer], the computer voice recognition software. Quite often unanticipated grammatical, syntax, homophones, and other interpretive errors are inadvertently transcribed by the computer software. Please disregard these errors. Please excuse any errors that have escaped final proofreading. Thank you.

## 2022-08-16 ENCOUNTER — TELEPHONE (OUTPATIENT)
Dept: INTERNAL MEDICINE CLINIC | Age: 82
End: 2022-08-16

## 2022-08-16 LAB — INR, EXTERNAL: 1.7

## 2022-08-16 NOTE — TELEPHONE ENCOUNTER
----- Message from Raylene Claude sent at 8/16/2022  1:03 PM EDT -----  Subject: Message to Provider    QUESTIONS  Information for Provider? Reg Briggs RN with this pt's home health is   calling in PT INR for Coumadin dosing. She states INR is 1.7 and PT is   20.5, and pt is currently on 1 mg of Coumadin daily. Nurse Woodrow Kelly can be   called at [phone], OK to leave voicemail.  ---------------------------------------------------------------------------  --------------  Cordelia ROBLES  [phone]; OK to leave message on voicemail, OK to respond with   electronic message via [employer] portal (only for patients who have   registered [employer] account)  ---------------------------------------------------------------------------  --------------  SCRIPT ANSWERS  Relationship to Patient? Third Party  Third Party Type? Home Health Care? [id number]

## 2022-08-16 NOTE — TELEPHONE ENCOUNTER
Pharmacy Progress Note  - Telephone Anticoagulation Management    S/O: [facility] Nurse) called to discuss Ms. Gaston Lang ,78 y.o. female, anticoagulation management today for the diagnosis of Protein C deficiency, LLE Deep Vein Thrombosis. Patient's most recent INR was 1.7. Interim History:  ED visit on 8/14/22 for GLF. Head CT shows Right occipital scalp hematoma and swelling. Chronic white matter  change compatible with microvascular ischemic change. Chronic right basal  ganglia lacune. No acute intracranial findings. INR was 2.9 during this encounter. Adherence:   Able to recall regimen? YES  Miss/extra dose? NO  Need refill?  NO    Upcoming procedure(s):  NO      Wt Readings from Last 3 Encounters:   08/14/22 145 lb 8.1 oz (66 kg)   07/22/22 146 lb 2.6 oz (66.3 kg)   07/15/22 141 lb 15.6 oz (64.4 kg)     BP Readings from Last 3 Encounters:   08/14/22 (!) 154/118   07/26/22 (!) 122/58   07/22/22 (!) 124/47       Past Medical History:   Diagnosis Date    Anxiety     Arthritis     Asthma     CAD (coronary artery disease)     stent    Chronic obstructive pulmonary disease (HCC)     Chronic pain     Diabetes (HCC)     GERD (gastroesophageal reflux disease)     History of blood transfusion     06/22    History of DVT (deep vein thrombosis)     History of recurrent UTIs     Hx of seasonal allergies     Hypercholesterolemia     Irritable bowel syndrome (IBS)     Migraine     Psychiatric disorder     anxiety    Thromboembolus (HCC)     left leg    Thyroid disease     Urinary urgency      Allergies   Allergen Reactions    Iodinated Contrast Media Other (comments)     Passes out    Morphine Other (comments)     Agitation,hallucinations    Pcn [Penicillins] Shortness of Breath    Pepcid [Famotidine] Nausea and Vomiting    Sulfa (Sulfonamide Antibiotics) Shortness of Breath     Current Outpatient Medications   Medication Sig    levETIRAcetam (KEPPRA) 500 mg tablet TAKE 1 TABLET BY MOUTH EVERY MORNING AND 1 AND 1/2 TABLET BY MOUTH EVERY EVENING FOR MIGRAINES    QUEtiapine (SEROquel) 25 mg tablet Take 1 Tablet by mouth nightly. escitalopram oxalate (LEXAPRO) 20 mg tablet Take 1 Tablet by mouth in the morning. flash glucose sensor (FreeStyle Tomasa 2 Sensor) kit 1 Each by Does Not Apply route See Mason Godfrey. Apply and replace sensor every 14 days. Use to scan sensor daily. flash glucose scanning reader (FreeStyle Tomasa 2 [place]) misc 1 Each by Does Not Apply route See Admin Instructions. Use to scan sensor daily    celecoxib (CeleBREX) 200 mg capsule Take 200 mg by mouth two (2) times a day. atorvastatin (LIPITOR) 20 mg tablet TAKE 1 TABLET BY MOUTH EVERY DAY    clopidogreL (PLAVIX) 75 mg tab Take 1 Tablet by mouth in the morning. ferrous sulfate 325 mg (65 mg iron) tablet Take 1 Tablet by mouth daily (with breakfast). docusate sodium (Colace) 100 mg capsule Take 1 Capsule by mouth two (2) times a day. traMADoL (ULTRAM) 50 mg tablet Take 50 mg by mouth every six (6) hours as needed for Pain.    esomeprazole (NEXIUM) 20 mg capsule Take 1 Capsule by mouth daily. levocetirizine (XYZAL) 5 mg tablet TAKE 1 TABLET BY MOUTH EVERY DAY (Patient not taking: Reported on 7/26/2022)    furosemide (LASIX) 20 mg tablet Take 1 Tablet by mouth daily. potassium chloride SR (KLOR-CON 10) 10 mEq tablet Take 1 Tablet by mouth daily. loperamide (IMODIUM) 2 mg capsule Take 1 Capsule by mouth four (4) times daily as needed for Diarrhea. (Patient not taking: Reported on 7/26/2022)    warfarin (COUMADIN) 1 mg tablet Take 1 Tablet by mouth daily. metoprolol succinate (TOPROL-XL) 50 mg XL tablet Take 1 Tablet by mouth daily. Bifidobacterium Infantis (Align) 4 mg cap Take 1 Capsule by mouth daily as needed for Diarrhea. Indications: ALIGN OTC (Patient not taking: Reported on 7/26/2022)    benzonatate (TESSALON) 100 mg capsule Take 1 Capsule by mouth three (3) times daily as needed for Cough. (Patient not taking: Reported on 7/26/2022)    buPROPion XL (WELLBUTRIN XL) 150 mg tablet Take 1 Tablet by mouth daily. oxybutynin chloride XL (DITROPAN XL) 10 mg CR tablet TAKE 1 TABLET BY MOUTH EVERY DAY    amitriptyline (ELAVIL) 10 mg tablet TAKE 1 TABLET BY MOUTH EVERY NIGHT    traZODone (DESYREL) 100 mg tablet Take 100 mg by mouth nightly. levothyroxine (SYNTHROID) 25 mcg tablet Take 2 Tablets by mouth Daily (before breakfast). albuterol (PROVENTIL HFA, VENTOLIN HFA, PROAIR HFA) 90 mcg/actuation inhaler Take 2 Puffs by inhalation every six (6) hours as needed for Wheezing. Wixela Inhub 250-50 mcg/dose diskus inhaler INHALE 1 PUFF AND INTO THE LUNGS EVERY 12 HOURS. RINSE MOUTH AFTER USE (Patient not taking: Reported on 7/26/2022)    ipratropium (ATROVENT) 42 mcg (0.06 %) nasal spray 2 Sprays by Both Nostrils route four (4) times daily. (Patient taking differently: 2 Sprays by Both Nostrils route four (4) times daily as needed.)    nystatin (MYCOSTATIN) 100,000 unit/mL suspension SWISH AND SWALLOW 5 ML BY MOUTH FOR 30 SECONDS 4 TIMES A DAY AS NEEDED FOR MOUTH PAIN    ondansetron hcl (ZOFRAN) 4 mg tablet TAKE 1 TABLET BY MOUTH EVERY 8 HOURS AS NEEDED FOR NAUSEA OR VOMITING    dicyclomine (BENTYL) 10 mg capsule TAKE ONE CAPSULE BY MOUTH FOUR TIMES DAILY AS NEEDED (Patient not taking: Reported on 7/26/2022)    ezetimibe (ZETIA) 10 mg tablet TAKE 1 TABLET BY MOUTH DAILY    diclofenac (VOLTAREN) 1 % gel Apply  to affected area every twelve (12) hours as needed for Pain. No current facility-administered medications for this visit.      Lab Results   Component Value Date/Time    WBC 7.3 08/14/2022 03:09 AM    HGB 10.2 (L) 08/14/2022 03:09 AM    HCT 35.2 08/14/2022 03:09 AM    PLATELET 308 (L) 13/34/7492 03:09 AM    MCV 81.3 08/14/2022 03:09 AM     Lab Results   Component Value Date/Time    Sodium 137 08/14/2022 03:09 AM    Potassium 4.6 08/14/2022 03:09 AM    Chloride 106 08/14/2022 03:09 AM    CO2 26 08/14/2022 03:09 AM    Anion gap 5 08/14/2022 03:09 AM    Glucose 135 (H) 08/14/2022 03:09 AM    BUN 24 (H) 08/14/2022 03:09 AM    Creatinine 0.98 08/14/2022 03:09 AM    BUN/Creatinine ratio 24 (H) 08/14/2022 03:09 AM    GFR est AA >60 08/14/2022 03:09 AM    GFR est non-AA 54 (L) 08/14/2022 03:09 AM    Calcium 8.8 08/14/2022 03:09 AM    Bilirubin, total 0.3 08/14/2022 03:09 AM    Alk.  phosphatase 81 08/14/2022 03:09 AM    Protein, total 6.8 08/14/2022 03:09 AM    Albumin 3.3 (L) 08/14/2022 03:09 AM    Globulin 3.5 08/14/2022 03:09 AM    A-G Ratio 0.9 (L) 08/14/2022 03:09 AM    ALT (SGPT) 16 08/14/2022 03:09 AM       INR History:   (normal INR range 0.8-1.2)     Date   INR   Dose/Comments  08/16/22 1.7    1 mg daily  08/14/22 2.9    Hold x 1, then 1 mg daily  08/08/22          3.4                               1 mg daily  07/25/22          2.3                               1 mg daily; s/p TAVR  07/12/22          2.9                               1 mg daily  Lapse in INR clinic  04/26/22          2.9                   34.5     3 mg x 1, then 2 mg x 4, then 2 mg Tues, Thurs, Sun, 1 mg daily ROW  04/12/22          1.4                   17.4     Hold x 2, 1 mg x 1, then 2 mg Tues, Thurs, Sun, 1 mg daily ROW  03/29/22          4.8                   58.0     1 mg MWF, 2 mg daily ROW; (+) increased dose  03/09/22          2.3                   27.7     Hold x 2, then 1 mg MWF, 2 mg daily ROW  03/02/22          4.2                   50.9     Hold x 1, then 1 mg x 1, then 2 mg daily  02/09/22          3.2                   37.5     2 mg daily ; (+) ASA  01/12/22          2.5                   29.6     2 mg daily  12/07/21          2.3                   27.4     1 mg x 3 days, then 2 mg daily  --> Pt took 2 mg daily   12/04/21          4.2                               2 mg daily ; (+) APAP   11/01/21          2.8                   34.0     2 mg daily ; s/p TFESI  09/27/21          1.9                   22.8     4 mg x 1, then 2 mg daily -- pt held x 2 days and 2 mg daily  09/20/21          1.2                   14.0     Hold x 2, 1/2 tab on Thurs, and 2 mg daily ROW; Pt held x 2, then 2 mg daily until INR check   09/08/21          3.6                   43.7     2 mg daily       A/P:       Anticoagulation:  Considering Ms. Dunlap's past history, todays findings, and per Anticoagulation Collaborative Practice Agreement/Protocol:    POC INR (1.7) is subtherapeutic for INR goal today. Was therapeutic two days ago   Continue warfarin 1 mg daily. Next INR check will be in 1 week(s). Medication reconciliation was completed during the visit. There are no discontinued medications. Notifications of recommendations will be sent to Dr. Tessa Sanchez DO for review.     Thank you,  Zulema Conde, PharmD, BCACP, [facility] in place: Yes  Recommendation Provided To: Patient/Caregiver: 1 via Telephone  Intervention Detail: Lab(s) Ordered  Intervention Accepted By: Patient/Caregiver: 1  Time Spent (min): 10

## 2022-08-16 NOTE — TELEPHONE ENCOUNTER
Pharmacy Progress Note - Telephone Call    [facility] nurse was contacted via an outbound telephone call regarding Ms. Case Childers 78 y.o. 's INR today. A voicemail was left for [facility] nurse to return my call.        Thank you,  Zulema Crystal, PharmD, BCACP, [address] [place] [address] in place: Yes  Time Spent (min): 5

## 2022-08-23 ENCOUNTER — TELEPHONE (OUTPATIENT)
Dept: INTERNAL MEDICINE CLINIC | Age: 82
End: 2022-08-23

## 2022-08-23 NOTE — TELEPHONE ENCOUNTER
Kevin 100 states she needs a call back to get a Plan of care for Patient with PT/INR of:    PT    29.7     INR  2.5    Please call to discuss & advise at [phone].  Thank you Weakness Weakness

## 2022-08-24 LAB — INR, EXTERNAL: 2.5

## 2022-08-24 NOTE — TELEPHONE ENCOUNTER
Pharmacy Progress Note - Telephone Call    Ms. Amarilis Clarke 78 y.o. was contacted via an outbound telephone call regarding her PT/INR result today. A voicemail was left for patient to return my call.        INR History:   (normal INR range 0.8-1.2)      Date                INR                              Dose/Comments  08/23/22 2.5     1 mg daily  08/16/22          1.7                                1 mg daily  08/14/22          2.9                                Hold x 1, then 1 mg daily  08/08/22          3.4                               1 mg daily  07/25/22          2.3                               1 mg daily; s/p TAVR  07/12/22          2.9                               1 mg daily  Lapse in INR clinic  04/26/22          2.9                   34.5     3 mg x 1, then 2 mg x 4, then 2 mg Tues, Thurs, Sun, 1 mg daily ROW  04/12/22          1.4                   17.4     Hold x 2, 1 mg x 1, then 2 mg Tues, Thurs, Sun, 1 mg daily ROW  03/29/22          4.8                   58.0     1 mg MWF, 2 mg daily ROW; (+) increased dose  03/09/22          2.3                   27.7     Hold x 2, then 1 mg MWF, 2 mg daily ROW  03/02/22          4.2                   50.9     Hold x 1, then 1 mg x 1, then 2 mg daily  02/09/22          3.2                   37.5     2 mg daily ; (+) ASA  01/12/22          2.5                   29.6     2 mg daily  12/07/21          2.3                   27.4     1 mg x 3 days, then 2 mg daily  --> Pt took 2 mg daily   12/04/21          4.2                               2 mg daily ; (+) APAP   11/01/21          2.8                   34.0     2 mg daily ; s/p TFESI  09/27/21          1.9                   22.8     4 mg x 1, then 2 mg daily -- pt held x 2 days and 2 mg daily  09/20/21          1.2                   14.0     Hold x 2, 1/2 tab on Thurs, and 2 mg daily ROW; Pt held x 2, then 2 mg daily until INR check   09/08/21          3.6                   43.7     2 mg daily     Continue warfarin 1 mg daily. Recheck INR in 1 week.       Thank you,  Zulema Thomas, PharmD, BCACP, [address] in place: Yes  Recommendation Provided To: Patient/Caregiver: 1 via Telephone  Intervention Detail: Lab(s) Ordered  Intervention Accepted By: Patient/Caregiver: 1  Time Spent (min): 10

## 2022-08-24 NOTE — TELEPHONE ENCOUNTER
Pharmacy Progress Note - Telephone Encounter    [facility] nurse regarding Ms. Oscar San 78 y.o.   RN states patient's scheduled for [place] recertification next week. Recommend rechecking INR next week. Continue warfarin 1 mg daily  All questions answered at this time.      Thank you,  Zulema Le, PharmD, BCACP, [address] [place] [address] in place: Yes  Recommendation Provided To: Patient/Caregiver: 1 via Telephone  Intervention Detail: Lab(s) Ordered  Intervention Accepted By: Patient/Caregiver: 1  Time Spent (min): 10

## 2022-08-30 ENCOUNTER — TELEPHONE (OUTPATIENT)
Dept: INTERNAL MEDICINE CLINIC | Age: 82
End: 2022-08-30

## 2022-08-30 LAB — INR, EXTERNAL: 3.1

## 2022-08-30 NOTE — TELEPHONE ENCOUNTER
Daphney///[facility] nurse states she needs a call back in reference to getting a Plan of Care for PT/INR results. Please call.  Thank you      PT-----36.9  INR-----3.1

## 2022-08-30 NOTE — TELEPHONE ENCOUNTER
Pharmacy Progress Note - Telephone Encounter    S/O: Jared DANG RN was contacted regarding Ms. Kee Polanco 78 y.o., via an outbound telephone call to discuss patient's PT/INR result for this week. [phone]    - Current regimen: warfarin 1 mg daily  - Pt had some diarrhea last week. No missed doses of warfarin  - Reports patient has been skipping her lasix dose.   - Has cardiology appt 9/1/22 with Dr Nory Vásquez    INR History:   (normal INR range 0.8-1.2)      Date                INR                              Dose/Comments  08/30/22 3.1     1 mg daily (+) diarrhea  08/23/22          2.5                                1 mg daily  08/16/22          1.7                                1 mg daily  08/14/22          2.9                                Hold x 1, then 1 mg daily  08/08/22          3.4                               1 mg daily  07/25/22          2.3                               1 mg daily; s/p TAVR  07/12/22          2.9                               1 mg daily  Lapse in INR clinic  04/26/22          2.9                   34.5     3 mg x 1, then 2 mg x 4, then 2 mg Tues, Thurs, Sun, 1 mg daily ROW  04/12/22          1.4                   17.4     Hold x 2, 1 mg x 1, then 2 mg Tues, Thurs, Sun, 1 mg daily ROW  03/29/22          4.8                   58.0     1 mg MWF, 2 mg daily ROW; (+) increased dose  03/09/22          2.3                   27.7     Hold x 2, then 1 mg MWF, 2 mg daily ROW  03/02/22          4.2                   50.9     Hold x 1, then 1 mg x 1, then 2 mg daily  02/09/22          3.2                   37.5     2 mg daily ; (+) ASA  01/12/22          2.5                   29.6     2 mg daily  12/07/21          2.3                   27.4     1 mg x 3 days, then 2 mg daily  --> Pt took 2 mg daily   12/04/21          4.2                               2 mg daily ; (+) APAP   11/01/21          2.8                   34.0     2 mg daily ; s/p TFESI  09/27/21          1.9 22.8     4 mg x 1, then 2 mg daily -- pt held x 2 days and 2 mg daily       A/P:  - Slightly supratherapeutic INR this week - likely d/t recent diarrhea  - Continue warfarin 1 mg daily. Recheck INR at next week's routine [facility] visit.   -All questions answered at this time.        Thank you,  Zulema Buchanan, PharmD, BCACP, [address] in [address]: Yes  Recommendation Provided To: Patient/Caregiver: 1 via Telephone  Intervention Detail: Lab(s) Ordered  Intervention Accepted By: Patient/Caregiver: 1  Time Spent (min): 5

## 2022-09-01 ENCOUNTER — OFFICE VISIT (OUTPATIENT)
Dept: CARDIOTHORACIC SURGERY | Age: 82
End: 2022-09-01
Payer: COMMERCIAL

## 2022-09-01 VITALS
DIASTOLIC BLOOD PRESSURE: 64 MMHG | RESPIRATION RATE: 14 BRPM | SYSTOLIC BLOOD PRESSURE: 130 MMHG | OXYGEN SATURATION: 95 % | HEART RATE: 67 BPM

## 2022-09-01 DIAGNOSIS — I35.0 NONRHEUMATIC AORTIC VALVE STENOSIS: ICD-10-CM

## 2022-09-01 DIAGNOSIS — Z95.2 S/P TAVR (TRANSCATHETER AORTIC VALVE REPLACEMENT): Primary | ICD-10-CM

## 2022-09-01 PROCEDURE — 99214 OFFICE O/P EST MOD 30 MIN: CPT | Performed by: NURSE PRACTITIONER

## 2022-09-01 PROCEDURE — 93000 ELECTROCARDIOGRAM COMPLETE: CPT | Performed by: NURSE PRACTITIONER

## 2022-09-01 PROCEDURE — 1123F ACP DISCUSS/DSCN MKR DOCD: CPT | Performed by: NURSE PRACTITIONER

## 2022-09-05 RX ORDER — NYSTATIN 100000 [USP'U]/ML
SUSPENSION ORAL
Qty: 60 ML | Refills: 2 | Status: SHIPPED | OUTPATIENT
Start: 2022-09-05

## 2022-09-07 ENCOUNTER — TELEPHONE (OUTPATIENT)
Dept: INTERNAL MEDICINE CLINIC | Age: 82
End: 2022-09-07

## 2022-09-07 LAB — INR, EXTERNAL: 1.4

## 2022-09-07 NOTE — TELEPHONE ENCOUNTER
Makenna with [facility] [employer]  [phone]      States reporting PT/INR results:    ProTime: 17.0  INR:  1.4      Pt on 1 mg warfarin daily richardson

## 2022-09-07 NOTE — TELEPHONE ENCOUNTER
Pharmacy Progress Note - Telephone Encounter    S/O: Makenna DANG RN was contacted regarding Ms. Bekah Daigle 78 y.o., via an outbound telephone call to discuss patient's PT/INR result for this week. Current regimen: warfarin 1 mg daily    Denies any significant changes. No missed doses. INR History:   (normal INR range 0.8-1.2)      Date                INR                              Dose/Comments  09/07/22 1.4     1 mg daily  08/30/22          3.1                                1 mg daily (+) diarrhea  08/23/22          2.5                                1 mg daily  08/16/22          1.7                                1 mg daily  08/14/22          2.9                                Hold x 1, then 1 mg daily  08/08/22          3.4                               1 mg daily  07/25/22          2.3                               1 mg daily; s/p TAVR  07/12/22          2.9                               1 mg daily  Lapse in INR clinic  04/26/22          2.9                   34.5     3 mg x 1, then 2 mg x 4, then 2 mg Tues, Thurs, Sun, 1 mg daily ROW  04/12/22          1.4                   17.4     Hold x 2, 1 mg x 1, then 2 mg Tues, Thurs, Sun, 1 mg daily ROW  03/29/22          4.8                   58.0     1 mg MWF, 2 mg daily ROW; (+) increased dose  03/09/22          2.3                   27.7     Hold x 2, then 1 mg MWF, 2 mg daily ROW  03/02/22          4.2                   50.9     Hold x 1, then 1 mg x 1, then 2 mg daily  02/09/22          3.2                   37.5     2 mg daily ; (+) ASA      A/P:  - Take 2 mg x 1, then continue 1 mg daily.  - Recheck at next weekly [facility] visit. Sonny DANG RN endorses understanding to the provided information. All questions answered at this time. There are no discontinued medications. No orders of the defined types were placed in this encounter.         Thank you,  Zulema Crockett, PharmD, BCACP, [address] in place: Yes  Recommendation Provided To: Patient/Caregiver: 2 via Telephone  Intervention Detail: Dose Adjustment: 1, reason: Therapy Optimization and Lab(s) Ordered  Intervention Accepted By: Patient/Caregiver: 2  Time Spent (min): 10

## 2022-09-07 NOTE — TELEPHONE ENCOUNTER
States calling again as has not heard back yet at this time. States just wants to make sure clinical has the message. Please call back to advise.

## 2022-09-15 ENCOUNTER — TELEPHONE (OUTPATIENT)
Dept: INTERNAL MEDICINE CLINIC | Age: 82
End: 2022-09-15

## 2022-09-15 LAB — INR, EXTERNAL: 1.6

## 2022-09-15 NOTE — TELEPHONE ENCOUNTER
Pharmacy Progress Note - Telephone Encounter     S/O: [facility] RN was contacted regarding Ms. Andris Blizzard 78 y.o., via an outbound telephone call to discuss patient's PT/INR result for this week. [phone]. Indication:  Protein C deficiency, LLE Deep Vein Thrombosis. Current regimen: 2 mg x 1, then 1 mg daily    Reports patient did take 2 mg booster last week. No missed doses. Wt Readings from Last 3 Encounters:   08/14/22 145 lb 8.1 oz (66 kg)   07/22/22 146 lb 2.6 oz (66.3 kg)   07/15/22 141 lb 15.6 oz (64.4 kg)        INR History:   (normal INR range 0.8-1.2)      Date                INR                              Dose/Comments  09/15/22  1.6           2 mg x 1, then 1 mg daily  09/07/22          1.4                                1 mg daily  08/30/22          3.1                                1 mg daily (+) diarrhea  08/23/22          2.5                                1 mg daily  08/16/22          1.7                                1 mg daily  08/14/22          2.9                                Hold x 1, then 1 mg daily  08/08/22          3.4                               1 mg daily  07/25/22          2.3                               1 mg daily; s/p TAVR  07/12/22          2.9                               1 mg daily  Lapse in INR clinic  04/26/22          2.9                   34.5     3 mg x 1, then 2 mg x 4, then 2 mg Tues, Thurs, Sun, 1 mg daily ROW  04/12/22          1.4                   17.4     Hold x 2, 1 mg x 1, then 2 mg Tues, Thurs, Sun, 1 mg daily ROW  03/29/22          4.8                   58.0     1 mg MWF, 2 mg daily ROW; (+) increased dose  03/09/22          2.3                   27.7     Hold x 2, then 1 mg MWF, 2 mg daily ROW  03/02/22          4.2                   50.9     Hold x 1, then 1 mg x 1, then 2 mg daily  02/09/22          3.2                   37.5     2 mg daily ; (+) ASA    A/P:  - INR remains subtherapeutic this week.    - Recommend 2 mg today and tomorrow. Then continue 1 mg daily.  - Recheck next with usual weekly [facility] visits. - All questions answered at this time.      Thank you for the consult,  Zulema Kumar, PharmD, BCACP, [address] [place] [address] in place: Yes  Recommendation Provided To: Patient/Caregiver: 2 via Telephone  Intervention Detail: Dose Adjustment: 1, reason: Therapy Optimization and Lab(s) Ordered  Intervention Accepted By: Patient/Caregiver: 2  Time Spent (min): 10

## 2022-09-15 NOTE — TELEPHONE ENCOUNTER
#[phone]  Prudencio Jamil with [facility] [address] needs a call back as soon as possible today. Pt's INR 1.6   Pt taking 1 mg of warfarin daily.

## 2022-09-21 ENCOUNTER — TELEPHONE (OUTPATIENT)
Dept: INTERNAL MEDICINE CLINIC | Age: 82
End: 2022-09-21

## 2022-09-21 LAB — INR, EXTERNAL: 2.4

## 2022-09-21 NOTE — TELEPHONE ENCOUNTER
Pharmacy Progress Note - Telephone Encounter    S/O: [facility] RN was contacted regarding Ms. Skinner 78 y.o., via an outbound telephone call to discuss patient's PT/INR result for this week. Indication:  Protein C deficiency, LLE Deep Vein Thrombosis. Current regimen: 2 mg x 2, then 1 mg daily    Reports diarrhea this week. Pt has IBS. INR History:   (normal INR range 0.8-1.2)      Date                INR                              Dose/Comments  09/21/22    2.4            2 mg x 2, then 1 mg daily ; (+) diarrhea  09/15/22          1.6                                2 mg x 1, then 1 mg daily  09/07/22          1.4                                1 mg daily  08/30/22          3.1                                1 mg daily (+) diarrhea  08/23/22          2.5                                1 mg daily  08/16/22          1.7                                1 mg daily  08/14/22          2.9                                Hold x 1, then 1 mg daily  08/08/22          3.4                               1 mg daily  07/25/22          2.3                               1 mg daily; s/p TAVR  07/12/22          2.9                               1 mg daily  Lapse in INR clinic  04/26/22          2.9                   34.5     3 mg x 1, then 2 mg x 4, then 2 mg Tues, Thurs, Sun, 1 mg daily ROW  04/12/22          1.4                   17.4     Hold x 2, 1 mg x 1, then 2 mg Tues, Thurs, Sun, 1 mg daily ROW  03/29/22          4.8                   58.0     1 mg MWF, 2 mg daily ROW; (+) increased dose  03/09/22          2.3                   27.7     Hold x 2, then 1 mg MWF, 2 mg daily ROW  03/02/22          4.2                   50.9     Hold x 1, then 1 mg x 1, then 2 mg daily  02/09/22          3.2                   37.5     2 mg daily ; (+) ASA      A/P:  - INR therapeutic for goal today. - Continue warfarin 1 mg daily   - Recheck in 2 weeks. - [address] [place] [address] endorses understanding to the provided information.  All questions answered at this time. There are no discontinued medications. No orders of the defined types were placed in this encounter.         Thank you,  Zulema Carreno, PharmD, BCACP, [address] [place] [address] in place: Yes  Recommendation Provided To: Patient/Caregiver: 1 via Telephone  Intervention Detail: Lab(s) Ordered  Intervention Accepted By: Patient/Caregiver: 1  Time Spent (min): 10

## 2022-09-21 NOTE — TELEPHONE ENCOUNTER
----- Message from Marcy Davis sent at 9/21/2022  4:07 PM EDT -----  Subject: Message to Provider    QUESTIONS  Information for Provider? Daphney calling with patient's PT and INR   results. INR is 2.4 and PT is 29.2 Patient is on warfarin 1mg daily.   ---------------------------------------------------------------------------  --------------  Osman Rodriguez INFO  [phone]; OK to leave message on voicemail  ---------------------------------------------------------------------------  --------------  SCRIPT ANSWERS  Relationship to Patient? Third Party  Third Party Type? Home Health Care? Representative Name?  Jared Asp

## 2022-09-27 ENCOUNTER — TELEPHONE (OUTPATIENT)
Dept: INTERNAL MEDICINE CLINIC | Age: 82
End: 2022-09-27

## 2022-09-27 NOTE — TELEPHONE ENCOUNTER
Daphney-ALCIRA states patient has an order for   oxygen but is non compliant but has been in the 96%-97% range. [phone]      Do you want her to DC the O2?

## 2022-09-28 NOTE — TELEPHONE ENCOUNTER
Called and spoke to [facility] . Informed her that per Dr. Pablo Mcginnis that patient does not need the O2 so she can stop it.

## 2022-10-05 ENCOUNTER — TELEPHONE (OUTPATIENT)
Dept: PRIMARY CARE CLINIC | Age: 82
End: 2022-10-05

## 2022-10-05 LAB — INR, EXTERNAL: 1.7

## 2022-10-05 NOTE — TELEPHONE ENCOUNTER
----- Message from Kathie Caldwell sent at 10/5/2022  4:26 PM EDT -----  Subject: Message to Provider    QUESTIONS  Information for Provider? Spoke with Ray Hsu, she is calling from At [address] [place] [address] to report this patient PT/ INR results, they are INR is 1.7 and the   PT is 19.8. Please return her call to discuss, thank you. Did try to reach   office but no one  the line. ---------------------------------------------------------------------------  --------------  Merlin ROBLES  [phone]; OK to leave message on voicemail  ---------------------------------------------------------------------------  --------------  SCRIPT ANSWERS  Relationship to Patient? Third Party  Third Party Type? Home Health Care? Representative Name?  [address]

## 2022-10-06 NOTE — TELEPHONE ENCOUNTER
Pharmacy Progress Note - Telephone Encounter    S/O: Ky DANG RN was contacted regarding Ms. Argelia Murray 78 y.o., via an outbound telephone call to discuss patient's PT/INR result for this week. Indication:  Protein C deficiency, LLE Deep Vein Thrombosis. Current regimen: 1 mg daily     Reports patient fell over the weekend.  Did not go to the hospital    INR History:   (normal INR range 0.8-1.2)      Date                INR                              Dose/Comments  10/06/22   1.7          1 mg daily   09/21/22          2.4                                2 mg x 2, then 1 mg daily ; (+) diarrhea  09/15/22          1.6                                2 mg x 1, then 1 mg daily  09/07/22          1.4                                1 mg daily  08/30/22          3.1                                1 mg daily (+) diarrhea  08/23/22          2.5                                1 mg daily  08/16/22          1.7                                1 mg daily  08/14/22          2.9                                Hold x 1, then 1 mg daily  08/08/22          3.4                               1 mg daily  07/25/22          2.3                               1 mg daily; s/p TAVR  07/12/22          2.9                               1 mg daily  Lapse in INR clinic  04/26/22          2.9                   34.5     3 mg x 1, then 2 mg x 4, then 2 mg Tues, Thurs, Sun, 1 mg daily ROW  04/12/22          1.4                   17.4     Hold x 2, 1 mg x 1, then 2 mg Tues, Thurs, Sun, 1 mg daily ROW  03/29/22          4.8                   58.0     1 mg MWF, 2 mg daily ROW; (+) increased dose  03/09/22          2.3                   27.7     Hold x 2, then 1 mg MWF, 2 mg daily ROW  03/02/22          4.2                   50.9     Hold x 1, then 1 mg x 1, then 2 mg daily  02/09/22          3.2                   37.5     2 mg daily ; (+) ASA    A/P:  Recommend increasing warfarin regimen to 2 mg on Thursday and 1 mg daily ROW  Recheck in 1-2 weeks. All questions answered at this time.     Thank you,  Zulema Ortiz, ShonD, BCACP, [facility] [facility] in place: Yes  Recommendation Provided To: Patient/Caregiver: 2 via Telephone  Intervention Detail: Dose Adjustment: 1, reason: Therapy Optimization and Lab(s) Ordered  Intervention Accepted By: Patient/Caregiver: 2  Time Spent (min): 10

## 2022-10-10 DIAGNOSIS — M54.9 CHRONIC BACK PAIN GREATER THAN 3 MONTHS DURATION: Primary | ICD-10-CM

## 2022-10-10 DIAGNOSIS — G89.29 CHRONIC BACK PAIN GREATER THAN 3 MONTHS DURATION: Primary | ICD-10-CM

## 2022-10-10 RX ORDER — TRAMADOL HYDROCHLORIDE 50 MG/1
TABLET ORAL
Qty: 60 TABLET | Refills: 3 | Status: SHIPPED | OUTPATIENT
Start: 2022-10-10 | End: 2022-11-09

## 2022-10-11 ENCOUNTER — TELEPHONE (OUTPATIENT)
Dept: INTERNAL MEDICINE CLINIC | Age: 82
End: 2022-10-11

## 2022-10-11 LAB — INR, EXTERNAL: 2

## 2022-10-11 NOTE — TELEPHONE ENCOUNTER
Tracey Cardona, with At [facility]  [phone]    PT: 23.8  INR:  2      States please call to advise regarding warfarin dosage.

## 2022-10-11 NOTE — TELEPHONE ENCOUNTER
Pharmacy Progress Note - Telephone Call    [facility] RN was contacted via an outbound telephone call regarding Ms. Nancy Mezaer 's PT/INR result today. VM left.     Thank you,  Zulema Jiménez, PharmD, BCACP, [address] beds at the [facility] in place: Yes  Time Spent (min): 5

## 2022-10-11 NOTE — TELEPHONE ENCOUNTER
Pharmacy Progress Note - Telephone Encounter    S/O: Mychal Villanueva, was contacted office/me via an inbound telephone call to discuss Ms. Anay Pérez 78 y.o. 's PT/INR result today. Verified patients identifiers (name & ) per HIPAA policy. Indication:  Protein C deficiency, LLE Deep Vein Thrombosis. Current regimen: 2 mg Thursday and 1 mg daily ROW     INR History:   (normal INR range 0.8-1.2)      Date                INR                              Dose/Comments  10/11/22    2.0          2 mg Thurs, 1 mg daily ROW   10/06/22          1.7                                1 mg daily   22          2.4                                2 mg x 2, then 1 mg daily ; (+) diarrhea  09/15/22          1.6                                2 mg x 1, then 1 mg daily  22          1.4                                1 mg daily  22          3.1                                1 mg daily (+) diarrhea  22          2.5                                1 mg daily  22          1.7                                1 mg daily  22          2.9                                Hold x 1, then 1 mg daily  22          3.4                               1 mg daily  22          2.3                               1 mg daily; s/p TAVR  22          2.9                               1 mg daily  Lapse in INR clinic  22          2.9                   34.5     3 mg x 1, then 2 mg x 4, then 2 mg Tues, Thurs, Sun, 1 mg daily ROW  22          1.4                   17.4     Hold x 2, 1 mg x 1, then 2 mg Tues, Thurs, Sun, 1 mg daily ROW  22          4.8                   58.0     1 mg MWF, 2 mg daily ROW; (+) increased dose  22          2.3                   27.7     Hold x 2, then 1 mg MWF, 2 mg daily ROW    A/P:  - INR therapeutic for goal.  - Continue warfarin 2 mg Thursday and 1 mg daily ROW   - Recheck in 1 week  - Rao García RN endorses understanding to the provided information.  All questions answered at this time. There are no discontinued medications. Orders Placed This Encounter    PT/INR EXTERNAL     This order was created through the anticoagulation tracking navigator section.            Thank you,  Zulema Jiménez, PharmD, BCACP, [address] [place] [address] in place: Yes  Recommendation Provided To: Patient/Caregiver: 1 via Telephone  Intervention Detail: Lab(s) Ordered  Intervention Accepted By: Patient/Caregiver: 1  Time Spent (min): 10

## 2022-10-14 ENCOUNTER — TELEPHONE (OUTPATIENT)
Dept: INTERNAL MEDICINE CLINIC | Age: 82
End: 2022-10-14

## 2022-10-14 DIAGNOSIS — J41.0 SIMPLE CHRONIC BRONCHITIS (HCC): ICD-10-CM

## 2022-10-14 NOTE — TELEPHONE ENCOUNTER
Patient states she needs a call back to discuss that she has had 2 heart surgeries & been in Hospital or rehab all Summer. Patient states she needs a call back to discuss her Medications as she would like for Dr. Hallie Mcrae to look over her Medication List & make an adjust ment as she is having Consistent Diarrhea & also needs to discuss a Different IBS medication as what she is taking doesn't seem to be working at all anymore. Patient states she is also on 2 Depression medication since her  passed away but patient states she no longer thinks she needs 2 & would like for Dr. Hallie Mcrae to decide which one she should stay on. Please call to discuss & advise.  Thank you

## 2022-10-17 NOTE — TELEPHONE ENCOUNTER
Returned call to pt, reports she would like Dr. Yaya Jacob to review her meds, she has been in hospital/rehab and feels as though she may not need all they have prescribed, does not feel as though she needs multiple pills for depression, has had severe diarrhea and feels as though something she is taking may be causing, has [facility] nurse come in, does not feel as though she can come to office for an appt. is currently using oxygen as needed    She would like to know if she still needs to take potassium    Also needs refill of Selena Franco

## 2022-10-18 ENCOUNTER — TELEPHONE (OUTPATIENT)
Dept: INTERNAL MEDICINE CLINIC | Age: 82
End: 2022-10-18

## 2022-10-18 VITALS — WEIGHT: 127 LBS | BODY MASS INDEX: 23.37 KG/M2 | HEIGHT: 62 IN

## 2022-10-18 LAB — INR, EXTERNAL: 1.9

## 2022-10-18 RX ORDER — FLUTICASONE PROPIONATE AND SALMETEROL 250; 50 UG/1; UG/1
POWDER RESPIRATORY (INHALATION)
Qty: 60 EACH | Refills: 12 | Status: SHIPPED | OUTPATIENT
Start: 2022-10-18

## 2022-10-18 NOTE — TELEPHONE ENCOUNTER
#[phone] Tammy  with At [facility] states that she needs to know how much warfarin does pt need to take?     PT 22.8  INR 1.9

## 2022-10-18 NOTE — TELEPHONE ENCOUNTER
Pharmacy Progress Note - Telephone Encounter    S/O: Amauri Rodriguez RN, was contacted regarding Ms. Didi Castellon 78 y.o. female, via an outbound telephone call to discuss PT/INR today. - No longer on lasix. - Reports recent wt was 127 lbs   - INR today was 1.9. No missed doses     Indication:  Protein C deficiency, LLE Deep Vein Thrombosis.     Current regimen: 2 mg Thursday and 1 mg daily ROW      INR History:   (normal INR range 0.8-1.2)      Date                INR                              Dose/Comments  10/18/22    1.9          2 mg Thurs, 1 mg daily ROW  10/11/22           2.0                                2 mg Thurs, 1 mg daily ROW   10/06/22          1.7                                1 mg daily   09/21/22          2.4                                2 mg x 2, then 1 mg daily ; (+) diarrhea  09/15/22          1.6                                2 mg x 1, then 1 mg daily  09/07/22          1.4                                1 mg daily  08/30/22          3.1                                1 mg daily (+) diarrhea  08/23/22          2.5                                1 mg daily  08/16/22          1.7                                1 mg daily  08/14/22          2.9                                Hold x 1, then 1 mg daily  08/08/22          3.4                               1 mg daily  07/25/22          2.3                               1 mg daily; s/p TAVR  07/12/22          2.9                               1 mg daily  Lapse in INR clinic  04/26/22          2.9                   34.5     3 mg x 1, then 2 mg x 4, then 2 mg Tues, Thurs, Sun, 1 mg daily ROW  04/12/22          1.4                   17.4     Hold x 2, 1 mg x 1, then 2 mg Tues, Thurs, Sun, 1 mg daily ROW  03/29/22          4.8                   58.0     1 mg MWF, 2 mg daily ROW; (+) increased dose  03/09/22          2.3                   27.7     Hold x 2, then 1 mg MWF, 2 mg daily ROW    Wt Readings from Last 3 Encounters:   10/18/22 127 lb (57.6 kg)   08/14/22 145 lb 8.1 oz (66 kg)   07/22/22 146 lb 2.6 oz (66.3 kg)       A/P:  - INR subtherapeutic for goal.   - Change warfarin to 2 mg on Tuesday, Thursday and 1 mg daily the rest of the week  - Recheck INR at upcoming weekly [facility] visit. Lacey Valadez [facility] RN endorses understanding to the provided information. All questions answered at this time.          Thank you,  Zulema Tsai, PharmD, BCACP, [address] [facility] in place: Yes  Recommendation Provided To: Patient/Caregiver: 2 via Telephone  Intervention Detail: Dose Adjustment: 1, reason: Therapy Optimization and Lab(s) Ordered  Intervention Accepted By: Patient/Caregiver: 2  Time Spent (min): 10

## 2022-10-25 ENCOUNTER — TELEPHONE (OUTPATIENT)
Dept: INTERNAL MEDICINE CLINIC | Age: 82
End: 2022-10-25

## 2022-10-25 NOTE — TELEPHONE ENCOUNTER
Marisol Munroe with [facility]  [phone]    States please be advised:    INR 1.6  PT 19.7    States please call back.

## 2022-10-27 NOTE — TELEPHONE ENCOUNTER
Called and spoke to Jessenia Co with At [facility]  [phone]    Informed her of the change per Dr. Liu Crowell.   No other questions at this time.

## 2022-10-28 ENCOUNTER — TELEPHONE (OUTPATIENT)
Dept: INTERNAL MEDICINE CLINIC | Age: 82
End: 2022-10-28

## 2022-10-28 NOTE — TELEPHONE ENCOUNTER
Called patient to confirm her AWV . Patient stated she is still homebound after having two heart surgery but patient kept scheduled appointment and will call to cancel if needed. Patient request a letter from PCP for her landlord about \"living conditions\". Please call patient to advise.

## 2022-11-02 ENCOUNTER — TELEPHONE (OUTPATIENT)
Dept: INTERNAL MEDICINE CLINIC | Age: 82
End: 2022-11-02

## 2022-11-02 DIAGNOSIS — Z99.81 OXYGEN DEPENDENT: Primary | ICD-10-CM

## 2022-11-02 NOTE — TELEPHONE ENCOUNTER
Called, spoke with Pt. Two pt identifiers confirmed.       Portable oxygen is going out of town for 3 days next week  Patient states she does not have a pulmonologist.     Patient states it was ordered by heart surgeon and they have directed her back to PCP because she is no longer under their care     Patient states she cannot make it to the 11/4/2022 appt with Dr. Do Suarez due to transportation issues     Patient is paying out of pocket for o2 and just needs the order for portable so she can go out of town for family vacation, they are leaving 11/11/22 and [phone] needs order

## 2022-11-02 NOTE — TELEPHONE ENCOUNTER
[facility] states they need pt's O2 usage faxed to them. Aniyah Viera the home health nurse referred  them to call us for that information. Fax [phone]: [address] on that fax please.

## 2022-11-03 ENCOUNTER — TELEPHONE (OUTPATIENT)
Dept: INTERNAL MEDICINE CLINIC | Age: 82
End: 2022-11-03

## 2022-11-03 NOTE — TELEPHONE ENCOUNTER
#[phone] pt states that she was talking with Micaela Proctor, but had just woke up and didn't understand what you told her. Pt is asking for a call back to let her know what she needed to know. Thanks.

## 2022-11-03 NOTE — TELEPHONE ENCOUNTER
"Pt VM on triage stating that she has some concerns regd her monitor she worse in August. She stated that she was told that she had some issues on it and wanted to know what we need to do to address them.   #[phone]        Per chart review, pt was called w/ her monitor results on 8/31/22:  \"Patient aware and will continue with other testing. Gretchen Colin MA     ----- Message -----  From: Levi Alberto PA  Sent: 8/29/2022   8:46 AM EDT  To: Gretchen Colin MA     Short episodes of SVT noted.  Routine follow-up otherwise.           Result Text  1.  Sinus rhythm is a baseline and predominant rhythm throughout the study.  2.  Occasional PACs and rare PVCs are noted.  3.  Rare atrial couplets noted.  4.  Short-lived episodes of SVT are noted.  She demonstrates a 5 beat run of SVT at 110 beats a minute.  She also has a short-lived episode at 3 beats of SVT at 156 beats a minute.  5.  Symptoms mostly occur during sinus mechanism.  Rates between 89 and 99 bpm. \"    Echo from 9/30/33 showed normal EF: 66-70%      " DO Estephania King LPN  Caller: Unspecified (Yesterday,  1:57 PM)  Refer her to pulmonary then. Dr lynn Briseno.      VORB per Dr. Abdirashid Niño for portable o2 and Pulmonary consult     Orders faxed to Fax [phone]: [facility]      Patient updated, states she does not see need for pulmonary consult

## 2022-11-03 NOTE — TELEPHONE ENCOUNTER
Called, spoke with Pt. Two pt identifiers confirmed. Patient returned call she wanted to review her entire history related to use of oxygen. Patient states she is paying out of pocket for most of her meds and DME. Patient states she will be converting to Medicare A and B come 1st of the year. Patient advised that if she is wanting her oxygen converted to covered by her Medicare she will need to schedule the appt with the pulmonologist.  Contact information provided.

## 2022-11-04 ENCOUNTER — TELEPHONE (OUTPATIENT)
Dept: INTERNAL MEDICINE CLINIC | Age: 82
End: 2022-11-04
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
## 2022-11-07 ENCOUNTER — OFFICE VISIT (OUTPATIENT)
Dept: FAMILY MEDICINE CLINIC | Age: 82
End: 2022-11-07
Payer: COMMERCIAL

## 2022-11-07 VITALS
TEMPERATURE: 97.7 F | HEART RATE: 72 BPM | HEIGHT: 62 IN | SYSTOLIC BLOOD PRESSURE: 119 MMHG | DIASTOLIC BLOOD PRESSURE: 58 MMHG | OXYGEN SATURATION: 94 % | RESPIRATION RATE: 20 BRPM | BODY MASS INDEX: 24.37 KG/M2 | WEIGHT: 132.4 LBS

## 2022-11-07 DIAGNOSIS — Z76.89 ESTABLISHING CARE WITH NEW DOCTOR, ENCOUNTER FOR: ICD-10-CM

## 2022-11-07 DIAGNOSIS — Z79.01 CHRONIC ANTICOAGULATION: ICD-10-CM

## 2022-11-07 DIAGNOSIS — E03.9 ACQUIRED HYPOTHYROIDISM: ICD-10-CM

## 2022-11-07 DIAGNOSIS — E11.69 HYPERLIPIDEMIA ASSOCIATED WITH TYPE 2 DIABETES MELLITUS (HCC): ICD-10-CM

## 2022-11-07 DIAGNOSIS — D64.9 CHRONIC ANEMIA: ICD-10-CM

## 2022-11-07 DIAGNOSIS — Z79.899 ENCOUNTER FOR LONG-TERM (CURRENT) USE OF MEDICATIONS: ICD-10-CM

## 2022-11-07 DIAGNOSIS — E78.5 HYPERLIPIDEMIA ASSOCIATED WITH TYPE 2 DIABETES MELLITUS (HCC): ICD-10-CM

## 2022-11-07 DIAGNOSIS — E11.9 TYPE 2 DIABETES MELLITUS WITHOUT COMPLICATION, WITHOUT LONG-TERM CURRENT USE OF INSULIN (HCC): Primary | ICD-10-CM

## 2022-11-07 DIAGNOSIS — I25.10 CORONARY ARTERY DISEASE INVOLVING NATIVE CORONARY ARTERY OF NATIVE HEART WITHOUT ANGINA PECTORIS: ICD-10-CM

## 2022-11-07 PROCEDURE — 99204 OFFICE O/P NEW MOD 45 MIN: CPT | Performed by: FAMILY MEDICINE

## 2022-11-07 PROCEDURE — G8427 DOCREV CUR MEDS BY ELIG CLIN: HCPCS | Performed by: FAMILY MEDICINE

## 2022-11-07 PROCEDURE — G8536 NO DOC ELDER MAL SCRN: HCPCS | Performed by: FAMILY MEDICINE

## 2022-11-07 PROCEDURE — 1101F PT FALLS ASSESS-DOCD LE1/YR: CPT | Performed by: FAMILY MEDICINE

## 2022-11-07 PROCEDURE — 1123F ACP DISCUSS/DSCN MKR DOCD: CPT | Performed by: FAMILY MEDICINE

## 2022-11-07 PROCEDURE — G8432 DEP SCR NOT DOC, RNG: HCPCS | Performed by: FAMILY MEDICINE

## 2022-11-07 PROCEDURE — G8420 CALC BMI NORM PARAMETERS: HCPCS | Performed by: FAMILY MEDICINE

## 2022-11-07 PROCEDURE — 3044F HG A1C LEVEL LT 7.0%: CPT | Performed by: FAMILY MEDICINE

## 2022-11-07 PROCEDURE — 1090F PRES/ABSN URINE INCON ASSESS: CPT | Performed by: FAMILY MEDICINE

## 2022-11-07 RX ORDER — LEVOCETIRIZINE DIHYDROCHLORIDE 5 MG/1
1 TABLET, FILM COATED ORAL DAILY
COMMUNITY
Start: 2022-06-26

## 2022-11-07 RX ORDER — ACETAMINOPHEN 500 MG
1000 TABLET ORAL
COMMUNITY

## 2022-11-07 RX ORDER — CLOPIDOGREL BISULFATE 75 MG/1
TABLET ORAL
COMMUNITY
Start: 2022-10-22

## 2022-11-07 RX ORDER — LEVETIRACETAM 750 MG/1
750 TABLET ORAL EVERY EVENING
COMMUNITY

## 2022-11-07 NOTE — TELEPHONE ENCOUNTER
Called, spoke with Pt. Two pt identifiers confirmed.     Patient states she needs to move and her apartment needs a letter stating she needs to move an apartment that provides an environment conducive to her respiratory status

## 2022-11-07 NOTE — PROGRESS NOTES
Bernard Still is a 78 y.o. female, who's a new patient to our practice. Previous PCP: CHIVO Cotter  Last seen there: >8 months ago. Present with her nurse aid. has a past medical history of Anxiety, Arthritis, Asthma, CAD (coronary artery disease), Chronic obstructive pulmonary disease ([address] Chronic pain, Diabetes ([address] GERD (gastroesophageal reflux disease), History of blood transfusion, History of DVT (deep vein thrombosis), History of recurrent UTIs, seasonal allergies, Hypercholesterolemia, Irritable bowel syndrome (IBS), Migraine, Psychiatric disorder, Thromboembolus ([address] Thyroid disease, and Urinary urgency. HTN: metoprolol    HLD: zetia, lipitor    DM2: diet controlled she claimed  A1C 6.3% 07/2022,     Cardiology: Dr. Hewitt Has  CAD: plavix and coumadin  S/p stent, valve replacement  Coumadin 1mg for 3 days then 2mg for 4 days    Chronic anemia    Hypothyroid ? No longer synthroid 25mcg    Chronic pain. Sees orthopedic   Tramadol, amitriptyline     Psych   Seroquel, trazodone, wellbutrin    Migraine: Kepra    IBS Bentyl      Reviewed: active problem list, medication list, allergies, notes from last encounter, lab results    A comprehensive review of systems was negative except for that written in the HPI, on 14 ROS. Allergies   Allergen Reactions    Iodinated Contrast Media Other (comments)     Passes out    Morphine Other (comments)     Agitation,hallucinations    Pcn [Penicillins] Shortness of Breath    Pepcid [Famotidine] Nausea and Vomiting    Sulfa (Sulfonamide Antibiotics) Shortness of Breath     Current Outpatient Medications on File Prior to Visit   Medication Sig Dispense Refill    clopidogreL (PLAVIX) 75 mg tab TAKE 1 TABLET BY MOUTH IN THE MORNING      levocetirizine (XYZAL) 5 mg tablet Take 1 Tablet by mouth daily.       acetaminophen (Tylenol Extra Strength) 500 mg tablet Take 1,000 mg by mouth every six (6) hours as needed for Pain.      levETIRAcetam (KEPPRA) 750 mg tablet Take 750 mg by mouth every evening. fluticasone propion-salmeteroL (Wixela Inhub) 250-50 mcg/dose diskus inhaler INHALE 1 PUFF AND INTO THE LUNGS EVERY 12 HOURS. RINSE MOUTH AFTER USE 60 Each 12    traMADoL (ULTRAM) 50 mg tablet TAKE 1 TABLET BY MOUTH EVERY 12 HOURS AS NEEDED FOR PAIN. MAX DAILY AMOUNT: 100 MG 60 Tablet 3    nystatin (MYCOSTATIN) 100,000 unit/mL suspension SWISH 5ML FOR 30 SECONDS AND SWALLOW FOUR TIMES DAILY AS NEEDED FOR MOUTH PAIN 60 mL 2    esomeprazole (NEXIUM) 20 mg capsule TAKE 1 CAPSULE BY MOUTH DAILY 90 Capsule 3    potassium chloride SR (KLOR-CON 10) 10 mEq tablet TAKE 1 TABLET BY MOUTH DAILY 90 Tablet 3    metoprolol succinate (TOPROL-XL) 50 mg XL tablet TAKE 1 TABLET BY MOUTH DAILY 90 Tablet 3    ezetimibe (ZETIA) 10 mg tablet TAKE 1 TABLET BY MOUTH DAILY 90 Tablet 3    levETIRAcetam (KEPPRA) 500 mg tablet TAKE 1 TABLET BY MOUTH EVERY MORNING AND 1 AND 1/2 TABLET BY MOUTH EVERY EVENING FOR MIGRAINES 225 Tablet 3    QUEtiapine (SEROquel) 25 mg tablet Take 1 Tablet by mouth nightly. 90 Tablet 3    escitalopram oxalate (LEXAPRO) 20 mg tablet Take 1 Tablet by mouth in the morning. 90 Tablet 3    flash glucose sensor (FreeStyle Tomasa 2 Sensor) kit 1 Each by Does Not Apply route See Mason Godfrey. Apply and replace sensor every 14 days. Use to scan sensor daily. 2 Kit 11    flash glucose scanning reader (FreeStyle Tomasa 2 [place]) misc 1 Each by Does Not Apply route See Admin Instructions. Use to scan sensor daily 1 Each 0    atorvastatin (LIPITOR) 20 mg tablet TAKE 1 TABLET BY MOUTH EVERY DAY 90 Tablet 3    warfarin (COUMADIN) 1 mg tablet Take 1 Tablet by mouth daily. 90 Tablet 3    Bifidobacterium Infantis (Align) 4 mg cap Take 1 Capsule by mouth daily as needed for Diarrhea. Indications: ALIGN OTC 90 Capsule 3    buPROPion XL (WELLBUTRIN XL) 150 mg tablet Take 1 Tablet by mouth daily.  90 Tablet 3    oxybutynin chloride XL (DITROPAN XL) 10 mg CR tablet TAKE 1 TABLET BY MOUTH EVERY DAY 90 Tablet 3    amitriptyline (ELAVIL) 10 mg tablet TAKE 1 TABLET BY MOUTH EVERY NIGHT 90 Tablet 3    traZODone (DESYREL) 100 mg tablet Take 100 mg by mouth nightly. albuterol (PROVENTIL HFA, VENTOLIN HFA, PROAIR HFA) 90 mcg/actuation inhaler Take 2 Puffs by inhalation every six (6) hours as needed for Wheezing. 8 g 2    ondansetron hcl (ZOFRAN) 4 mg tablet TAKE 1 TABLET BY MOUTH EVERY 8 HOURS AS NEEDED FOR NAUSEA OR VOMITING 30 Tablet 2    dicyclomine (BENTYL) 10 mg capsule TAKE ONE CAPSULE BY MOUTH FOUR TIMES DAILY AS NEEDED 120 Capsule 3    diclofenac (VOLTAREN) 1 % gel Apply  to affected area every twelve (12) hours as needed for Pain. (Patient not taking: Reported on 11/7/2022) 100 g 2     No current facility-administered medications on file prior to visit. 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 Normal without focal findings. Cranial nerves 2-12 intact. Normal coordination and gait. Mental status: Alert, oriented, thought content appropriate, affect: stable, mood-congruent.     Head: Normocephalic, without obvious abnormality, atraumatic  Eyes: conjunctivae/corneas clear. PERRL, EOM's intact. Neck: supple, symmetrical, trachea midline, no JVD  Lungs: clear to auscultation bilaterally  Heart: regular rate and rhythm, S1, S2 normal, no murmur, click, rub or gallop  Abdomen: soft, non-tender. Extremities: extremities normal, atraumatic, no cyanosis or edema      Assessment/Plans:    Diagnoses and all orders for this visit:    1. Type 2 diabetes mellitus without complication, without long-term current use of insulin (HCC)  -     HEMOGLOBIN A1C WITH EAG  -     CBC W/O DIFF  -     METABOLIC PANEL, COMPREHENSIVE  -     TSH 3RD GENERATION  -     PROTHROMBIN TIME + INR    2. Acquired hypothyroidism  -     TSH 3RD GENERATION    3. Hyperlipidemia associated with type 2 diabetes mellitus (HCC)  -     METABOLIC PANEL, COMPREHENSIVE  -     TSH 3RD GENERATION    4. Coronary artery disease involving native coronary artery of native heart without angina pectoris  -     CBC W/O DIFF  -     METABOLIC PANEL, COMPREHENSIVE  -     TSH 3RD GENERATION    5. Encounter for long-term (current) use of medications  -     HEMOGLOBIN A1C WITH EAG  -     CBC W/O DIFF  -     METABOLIC PANEL, COMPREHENSIVE  -     TSH 3RD GENERATION  -     PROTHROMBIN TIME + INR    6. Establishing care with new doctor, encounter for    7. Chronic anemia  -     CBC W/O DIFF    8. Chronic anticoagulation  -     CBC W/O DIFF    Discussed plans, risk/benefits of treatments/observations. Through the use of shared decision making, above plans were agreed upon. Medication compliance advised. Patient verbalized understanding. Follow-up and Dispositions    Return in about 2 days (around 11/9/2022) for meds, labs review.          Bandar Reyes MD  11/7/2022
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
## 2022-11-08 LAB
ALBUMIN SERPL-MCNC: 4.2 G/DL (ref 3.6–4.6)
ALBUMIN/GLOB SERPL: 1.4 {RATIO} (ref 1.2–2.2)
ALP SERPL-CCNC: 127 IU/L (ref 44–121)
ALT SERPL-CCNC: 31 IU/L (ref 0–32)
AST SERPL-CCNC: 21 IU/L (ref 0–40)
BILIRUB SERPL-MCNC: 0.3 MG/DL (ref 0–1.2)
BUN SERPL-MCNC: 12 MG/DL (ref 8–27)
BUN/CREAT SERPL: 16 (ref 12–28)
CALCIUM SERPL-MCNC: 9.1 MG/DL (ref 8.7–10.3)
CHLORIDE SERPL-SCNC: 104 MMOL/L (ref 96–106)
CO2 SERPL-SCNC: 18 MMOL/L (ref 20–29)
CREAT SERPL-MCNC: 0.74 MG/DL (ref 0.57–1)
EGFR: 80 ML/MIN/1.73
ERYTHROCYTE [DISTWIDTH] IN BLOOD BY AUTOMATED COUNT: 15.4 % (ref 11.7–15.4)
EST. AVERAGE GLUCOSE BLD GHB EST-MCNC: 160 MG/DL
GLOBULIN SER CALC-MCNC: 2.9 G/DL (ref 1.5–4.5)
GLUCOSE SERPL-MCNC: 132 MG/DL (ref 70–99)
HBA1C MFR BLD: 7.2 % (ref 4.8–5.6)
HCT VFR BLD AUTO: 41.5 % (ref 34–46.6)
HGB BLD-MCNC: 12.3 G/DL (ref 11.1–15.9)
INR PPP: 2.1 (ref 0.9–1.2)
MCH RBC QN AUTO: 23.8 PG (ref 26.6–33)
MCHC RBC AUTO-ENTMCNC: 29.6 G/DL (ref 31.5–35.7)
MCV RBC AUTO: 80 FL (ref 79–97)
PLATELET # BLD AUTO: 182 X10E3/UL (ref 150–450)
POTASSIUM SERPL-SCNC: 4.3 MMOL/L (ref 3.5–5.2)
PROT SERPL-MCNC: 7.1 G/DL (ref 6–8.5)
PROTHROMBIN TIME: 21.4 SEC (ref 9.1–12)
RBC # BLD AUTO: 5.16 X10E6/UL (ref 3.77–5.28)
SODIUM SERPL-SCNC: 139 MMOL/L (ref 134–144)
TSH SERPL DL<=0.005 MIU/L-ACNC: 1.58 UIU/ML (ref 0.45–4.5)
WBC # BLD AUTO: 7.6 X10E3/UL (ref 3.4–10.8)

## 2022-11-08 NOTE — TELEPHONE ENCOUNTER
Bill Wills III, DO  You 19 hours ago (4:55 PM)     SC  Looks like she saw dr Darwin Davila today in new pt visit. He can write any letters for her, as she is no longer under my care     Called, spoke with Pt. Two pt identifiers confirmed.     Patient notified

## 2022-11-09 ENCOUNTER — TELEPHONE (OUTPATIENT)
Dept: FAMILY MEDICINE CLINIC | Age: 82
End: 2022-11-09

## 2022-11-09 NOTE — TELEPHONE ENCOUNTER
Zhou Taveras from At  [facility] ([phone]) called. PT  17.8   INR  1.5  today. Patient taking Coumadin 2 mg T-W-Th-F  and 1 mg S-S-M.

## 2022-11-10 ENCOUNTER — OFFICE VISIT (OUTPATIENT)
Dept: FAMILY MEDICINE CLINIC | Age: 82
End: 2022-11-10
Payer: COMMERCIAL

## 2022-11-10 VITALS
TEMPERATURE: 97.5 F | HEART RATE: 70 BPM | BODY MASS INDEX: 24.22 KG/M2 | WEIGHT: 132.4 LBS | OXYGEN SATURATION: 96 % | SYSTOLIC BLOOD PRESSURE: 132 MMHG | DIASTOLIC BLOOD PRESSURE: 68 MMHG | RESPIRATION RATE: 12 BRPM

## 2022-11-10 DIAGNOSIS — Z79.01 CHRONIC ANTICOAGULATION: ICD-10-CM

## 2022-11-10 DIAGNOSIS — E03.9 ACQUIRED HYPOTHYROIDISM: ICD-10-CM

## 2022-11-10 DIAGNOSIS — E87.6 HYPOKALEMIA: ICD-10-CM

## 2022-11-10 DIAGNOSIS — R13.11 ORAL PHASE DYSPHAGIA: ICD-10-CM

## 2022-11-10 DIAGNOSIS — E11.9 TYPE 2 DIABETES MELLITUS WITHOUT COMPLICATION, WITH LONG-TERM CURRENT USE OF INSULIN (HCC): Primary | ICD-10-CM

## 2022-11-10 DIAGNOSIS — Z79.4 TYPE 2 DIABETES MELLITUS WITHOUT COMPLICATION, WITH LONG-TERM CURRENT USE OF INSULIN (HCC): Primary | ICD-10-CM

## 2022-11-10 DIAGNOSIS — J45.22 MILD INTERMITTENT ASTHMA WITH STATUS ASTHMATICUS: ICD-10-CM

## 2022-11-10 PROBLEM — E11.22 TYPE 2 DIABETES MELLITUS WITH CHRONIC KIDNEY DISEASE (HCC): Status: ACTIVE | Noted: 2022-11-10

## 2022-11-10 PROBLEM — I50.22 CHRONIC SYSTOLIC (CONGESTIVE) HEART FAILURE (HCC): Status: ACTIVE | Noted: 2022-11-10

## 2022-11-10 PROCEDURE — G8420 CALC BMI NORM PARAMETERS: HCPCS | Performed by: FAMILY MEDICINE

## 2022-11-10 PROCEDURE — G8510 SCR DEP NEG, NO PLAN REQD: HCPCS | Performed by: FAMILY MEDICINE

## 2022-11-10 PROCEDURE — 1090F PRES/ABSN URINE INCON ASSESS: CPT | Performed by: FAMILY MEDICINE

## 2022-11-10 PROCEDURE — 99215 OFFICE O/P EST HI 40 MIN: CPT | Performed by: FAMILY MEDICINE

## 2022-11-10 PROCEDURE — 1101F PT FALLS ASSESS-DOCD LE1/YR: CPT | Performed by: FAMILY MEDICINE

## 2022-11-10 PROCEDURE — G8536 NO DOC ELDER MAL SCRN: HCPCS | Performed by: FAMILY MEDICINE

## 2022-11-10 PROCEDURE — G8427 DOCREV CUR MEDS BY ELIG CLIN: HCPCS | Performed by: FAMILY MEDICINE

## 2022-11-10 PROCEDURE — 3051F HG A1C>EQUAL 7.0%<8.0%: CPT | Performed by: FAMILY MEDICINE

## 2022-11-10 PROCEDURE — 1123F ACP DISCUSS/DSCN MKR DOCD: CPT | Performed by: FAMILY MEDICINE

## 2022-11-10 RX ORDER — METFORMIN HYDROCHLORIDE 500 MG/1
500 TABLET, EXTENDED RELEASE ORAL
Qty: 30 TABLET | Refills: 2 | Status: SHIPPED | OUTPATIENT
Start: 2022-11-10

## 2022-11-10 NOTE — LETTER
11/10/2022    Ms. Eder Coelho  [address]  [address] [address]      Dear Eder Coelho:    Please find your most recent results below. Resulted Orders   HEMOGLOBIN A1C WITH EAG   Result Value Ref Range    Hemoglobin A1c 7.2 (H) 4.8 - 5.6 %    Estimated average glucose 160 mg/dL    Narrative    Performed at:  [facility]  [address]  [id number]  : Billy Donovan MD, Phone:  [phone]   CBC W/O DIFF   Result Value Ref Range    WBC 7.6 3.4 - 10.8 x10E3/uL    RBC 5.16 3.77 - 5.28 x10E6/uL    HGB 12.3 11.1 - 15.9 g/dL    HCT 41.5 34.0 - 46.6 %    MCV 80 79 - 97 fL    MCH 23.8 (L) 26.6 - 33.0 pg    MCHC 29.6 (L) 31.5 - 35.7 g/dL    RDW 15.4 11.7 - 15.4 %    PLATELET 393 132 - 756 x10E3/uL    Narrative    Performed at:  [facility] [address] [place]  [id number]  : Billy Donovan MD, Phone:  [phone]   METABOLIC PANEL, COMPREHENSIVE   Result Value Ref Range    Glucose 132 (H) 70 - 99 mg/dL    BUN 12 8 - 27 mg/dL    Creatinine 0.74 0.57 - 1.00 mg/dL    eGFR 80 >59 mL/min/1.73    BUN/Creatinine ratio 16 12 - 28    Sodium 139 134 - 144 mmol/L    Potassium 4.3 3.5 - 5.2 mmol/L    Chloride 104 96 - 106 mmol/L    CO2 18 (L) 20 - 29 mmol/L    Calcium 9.1 8.7 - 10.3 mg/dL    Protein, total 7.1 6.0 - 8.5 g/dL    Albumin 4.2 3.6 - 4.6 g/dL    GLOBULIN, TOTAL 2.9 1.5 - 4.5 g/dL    A-G Ratio 1.4 1.2 - 2.2    Bilirubin, total 0.3 0.0 - 1.2 mg/dL    Alk.  phosphatase 127 (H) 44 - 121 IU/L    AST (SGOT) 21 0 - 40 IU/L    ALT (SGPT) 31 0 - 32 IU/L    Narrative    Performed at:  [address] [facility]  [address]  [id number]  : Billy Donovan MD, Phone:  [phone]   TSH 3RD GENERATION   Result Value Ref Range    TSH 1.580 0.450 - 4.500 uIU/mL    Narrative    Performed at:  [address]  [id number]  : Billy Donovan MD, Phone:  [phone]   PROTHROMBIN TIME + INR   Result Value Ref Range    INR 2.1 (H) 0.9 - 1.2    Prothrombin time 21.4 (H) 9.1 - 12.0 sec    Narrative    Performed at:  [address] [email or web address]  [address]  [id number]  : Anaya Walton MD, Phone:  [phone]       Sincerely,      Atul Ford MD

## 2022-11-10 NOTE — PROGRESS NOTES
Purvi Lundberg is a 78 y.o. female,     2nd visit w this physician,    Previously  in the health care. Her  passed   Son lives 5 minutes from here     Present with her nurse aid. has a past medical history of Anxiety, Arthritis, Asthma, CAD (coronary artery disease), Chronic obstructive pulmonary disease ([address] Chronic pain, Diabetes ([address] GERD (gastroesophageal reflux disease), History of blood transfusion, History of DVT (deep vein thrombosis), History of recurrent UTIs, seasonal allergies, Hypercholesterolemia, Irritable bowel syndrome (IBS), Migraine, Psychiatric disorder, Thromboembolus ([address] Thyroid disease, and Urinary urgency. She says apartment dry, she have to use 2 humidifier  She wants to move and break contract. But I can't prove that the apartment was the cause of her health. I'm unable to do it. Ref pulm    She's not coughing for the past hour. but says she wants something for it. She talks a lot. Hx of asthma, COPD  Former smoker, quit 40 years ago. Takes anthistamine  Have albuterol and Wixella   Refer to pulm    Cough when she eats. Refer to GI    DM2: diet controlled she claimed  A1C 7.2% 11/2022, 6.3% 07/2022  Start Metformin ER 500mg     Cardiology: Dr. Renae Ledesma  CAD: plavix and coumadin    She's also not sure why she's on potassium, she wants to stop   We'll d/c and do labs    S/p stent, valve replacement  Coumadin 1mg for 3 days then 2mg for 4 days  INR 2.1, 2.6  F/up 3 weeks. HTN: BP at goal  metoprolol    HLD: zetia, lipitor    Chronic anemia   Anemia resolved    Hypothyroid ? She says she doesn't know why she was on it, ws put it on while in hospital.   She wants to try and stop thyroid meds - start today 11/10/2022  Ok to d/c 50mcg for 6-7 weeks and recheck lab    Chronic pain.  Sees orthopedic   Tramadol, amitriptyline     Psych   Seroquel, trazodone, wellbutrin    Migraine: Kepra    IBS Bentyl      Reviewed: active problem list, medication list, allergies, notes from last encounter, lab results    A comprehensive review of systems was negative except for that written in the HPI, on 14 ROS. Allergies   Allergen Reactions    Iodinated Contrast Media Other (comments)     Passes out    Morphine Other (comments)     Agitation,hallucinations    Pcn [Penicillins] Shortness of Breath    Pepcid [Famotidine] Nausea and Vomiting    Sulfa (Sulfonamide Antibiotics) Shortness of Breath     Current Outpatient Medications on File Prior to Visit   Medication Sig Dispense Refill    clopidogreL (PLAVIX) 75 mg tab TAKE 1 TABLET BY MOUTH IN THE MORNING      levocetirizine (XYZAL) 5 mg tablet Take 1 Tablet by mouth daily. acetaminophen (TYLENOL) 500 mg tablet Take 1,000 mg by mouth every six (6) hours as needed for Pain.      levETIRAcetam (KEPPRA) 750 mg tablet Take 750 mg by mouth every evening. fluticasone propion-salmeteroL (Wixela Inhub) 250-50 mcg/dose diskus inhaler INHALE 1 PUFF AND INTO THE LUNGS EVERY 12 HOURS. RINSE MOUTH AFTER USE 60 Each 12    nystatin (MYCOSTATIN) 100,000 unit/mL suspension SWISH 5ML FOR 30 SECONDS AND SWALLOW FOUR TIMES DAILY AS NEEDED FOR MOUTH PAIN 60 mL 2    esomeprazole (NEXIUM) 20 mg capsule TAKE 1 CAPSULE BY MOUTH DAILY 90 Capsule 3    metoprolol succinate (TOPROL-XL) 50 mg XL tablet TAKE 1 TABLET BY MOUTH DAILY 90 Tablet 3    ezetimibe (ZETIA) 10 mg tablet TAKE 1 TABLET BY MOUTH DAILY 90 Tablet 3    levETIRAcetam (KEPPRA) 500 mg tablet TAKE 1 TABLET BY MOUTH EVERY MORNING AND 1 AND 1/2 TABLET BY MOUTH EVERY EVENING FOR MIGRAINES 225 Tablet 3    QUEtiapine (SEROquel) 25 mg tablet Take 1 Tablet by mouth nightly. 90 Tablet 3    escitalopram oxalate (LEXAPRO) 20 mg tablet Take 1 Tablet by mouth in the morning. 90 Tablet 3    flash glucose sensor (FreeStyle Tomasa 2 Sensor) kit 1 Each by Does Not Apply route See Mason Godfrey. Apply and replace sensor every 14 days. Use to scan sensor daily.  2 Kit 11    flash glucose scanning reader (FreeStyle Tomasa 2 [place]) misc 1 Each by Does Not Apply route See Admin Instructions. Use to scan sensor daily 1 Each 0    atorvastatin (LIPITOR) 20 mg tablet TAKE 1 TABLET BY MOUTH EVERY DAY 90 Tablet 3    warfarin (COUMADIN) 1 mg tablet Take 1 Tablet by mouth daily. 90 Tablet 3    Bifidobacterium Infantis (Align) 4 mg cap Take 1 Capsule by mouth daily as needed for Diarrhea. Indications: ALIGN OTC 90 Capsule 3    buPROPion XL (WELLBUTRIN XL) 150 mg tablet Take 1 Tablet by mouth daily. 90 Tablet 3    oxybutynin chloride XL (DITROPAN XL) 10 mg CR tablet TAKE 1 TABLET BY MOUTH EVERY DAY 90 Tablet 3    amitriptyline (ELAVIL) 10 mg tablet TAKE 1 TABLET BY MOUTH EVERY NIGHT 90 Tablet 3    traZODone (DESYREL) 100 mg tablet Take 100 mg by mouth nightly. albuterol (PROVENTIL HFA, VENTOLIN HFA, PROAIR HFA) 90 mcg/actuation inhaler Take 2 Puffs by inhalation every six (6) hours as needed for Wheezing. 8 g 2    ondansetron hcl (ZOFRAN) 4 mg tablet TAKE 1 TABLET BY MOUTH EVERY 8 HOURS AS NEEDED FOR NAUSEA OR VOMITING 30 Tablet 2    dicyclomine (BENTYL) 10 mg capsule TAKE ONE CAPSULE BY MOUTH FOUR TIMES DAILY AS NEEDED 120 Capsule 3    [] traMADoL (ULTRAM) 50 mg tablet TAKE 1 TABLET BY MOUTH EVERY 12 HOURS AS NEEDED FOR PAIN. MAX DAILY AMOUNT: 100 MG 60 Tablet 3    diclofenac (VOLTAREN) 1 % gel Apply  to affected area every twelve (12) hours as needed for Pain. (Patient not taking: No sig reported) 100 g 2     No current facility-administered medications on file prior to visit.      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 Normal without focal findings. Cranial nerves 2-12 intact. Normal coordination and gait. Mental status: Alert, oriented, thought content appropriate, affect: stable, mood-congruent. Head: Normocephalic, without obvious abnormality, atraumatic  Eyes: conjunctivae/corneas clear. PERRL, EOM's intact. Neck: supple, symmetrical, trachea midline, no JVD  Lungs: clear to auscultation bilaterally  Heart: regular rate and rhythm, S1, S2 normal, no murmur, click, rub or gallop  Abdomen: soft, non-tender. Extremities: extremities normal, atraumatic, no cyanosis or edema      Assessment/Plans:    Diagnoses and all orders for this visit:    1. Type 2 diabetes mellitus without complication, with long-term current use of insulin (HCC)  -     metFORMIN ER (GLUCOPHAGE XR) 500 mg tablet; Take 1 Tablet by mouth daily (with dinner). 2. Chronic anticoagulation    3. Acquired hypothyroidism  -     TSH 3RD GENERATION; Future    4. Hypokalemia  -     METABOLIC PANEL, BASIC; Future    5. Mild intermittent asthma with status asthmaticus  -     REFERRAL TO PULMONARY DISEASE    6. Oral phase dysphagia  -     REFERRAL TO GASTROENTEROLOGY      Discussed plans, risk/benefits of treatments/observations. Through the use of shared decision making, above plans were agreed upon. Medication compliance advised. Patient verbalized understanding. Follow-up and Dispositions    Return for 3wks for INR, 7wks for DM2, thyroid, hypokalemia.          Katharine Olivares MD  11/10/2022

## 2022-11-10 NOTE — PROGRESS NOTES
Patient identified by 2 identifiers. Chief Complaint   Patient presents with    Follow-up     1. Have you been to the ER, urgent care clinic since your last visit? Hospitalized since your last visit? No    2. Have you seen or consulted any other health care providers outside of the [address] [place] [address] since your last visit? Include any pap smears or colon screening.  No

## 2022-11-15 ENCOUNTER — TELEPHONE (OUTPATIENT)
Dept: FAMILY MEDICINE CLINIC | Age: 82
End: 2022-11-15

## 2022-11-15 NOTE — TELEPHONE ENCOUNTER
Spoke with Ky Salgado. Patient's O@ sats today dropped to 86%. 97% with O2 2L on. She is going to have patient schedule appt with Pulmonary. Patient also told that having back pain since stopped Celebrex.

## 2022-11-15 NOTE — TELEPHONE ENCOUNTER
Pt states she needs to restart medication:     RE: celecoxib (CELEBREX) 200 mg capsule       Because of back pain - the pain is making her have to use her oxygen     States she has only be off of it one day     She has a big bottle so doesn't need it refilled right now - just wants the doctor to know and say it is okay to start taking it again     Best number to reach her is [phone]

## 2022-11-15 NOTE — TELEPHONE ENCOUNTER
Guerrero Donis from [facility] called, stating today patient's oxygen level was 86%      Patient does have oxygen in the home and uses it PRN   when she put the oxygen on  the level is 97%        This is a new issue for patient,  she had previously been weaned off of oxygen       Also patient is complaining of back pain   10 out of 10    She states it is because Dr Clarice Blake took her off of the Celebrex at her last appointment    Phone  [phone] would like a return call from nurse

## 2022-11-16 NOTE — TELEPHONE ENCOUNTER
Message  Received: Today  MD Becki Johnson LPN  Caller: Unspecified (Yesterday,  1:47 PM)  Will leave all O2, Pulm to Pulmonary specialist. If concern go to ED. She needs to be seen for INR in 2 wks from last visit 11/10/2022 I.e. next week, as our notes indicated. Instead she made apt for > 6 weeks away. Pls have her seen in 2 weeks or we can't manage her INR, and she should find another doctor.      Eduin Castillo MD   11/16/2022

## 2022-11-16 NOTE — TELEPHONE ENCOUNTER
Spoke with Sushant Blanc from [facility] she is going to do INR on Tues for appt on Wed.

## 2022-11-17 RX ORDER — FLASH GLUCOSE SCANNING READER
EACH MISCELLANEOUS
Qty: 1 EACH | Refills: 0 | Status: SHIPPED | OUTPATIENT
Start: 2022-11-17

## 2022-11-23 ENCOUNTER — TELEPHONE (OUTPATIENT)
Dept: FAMILY MEDICINE CLINIC | Age: 82
End: 2022-11-23

## 2022-11-23 ENCOUNTER — VIRTUAL VISIT (OUTPATIENT)
Dept: FAMILY MEDICINE CLINIC | Age: 82
End: 2022-11-23
Payer: COMMERCIAL

## 2022-11-23 DIAGNOSIS — F41.8 DEPRESSION WITH ANXIETY: ICD-10-CM

## 2022-11-23 DIAGNOSIS — R79.1 SUBTHERAPEUTIC INTERNATIONAL NORMALIZED RATIO (INR): ICD-10-CM

## 2022-11-23 DIAGNOSIS — Z79.01 CHRONIC ANTICOAGULATION: Primary | ICD-10-CM

## 2022-11-23 DIAGNOSIS — Z95.2 H/O AORTIC VALVE REPLACEMENT: ICD-10-CM

## 2022-11-23 PROCEDURE — 99443 PR PHYS/QHP TELEPHONE EVALUATION 21-30 MIN: CPT | Performed by: FAMILY MEDICINE

## 2022-11-23 RX ORDER — TRAMADOL HYDROCHLORIDE 50 MG/1
TABLET ORAL
COMMUNITY
Start: 2022-11-14

## 2022-11-23 RX ORDER — BENZONATATE 100 MG/1
CAPSULE ORAL
COMMUNITY
Start: 2022-11-10

## 2022-11-23 NOTE — TELEPHONE ENCOUNTER
----- Message from Nancy Colmenares sent at 11/22/2022  4:11 PM EST -----  Subject: Message to Provider    QUESTIONS  Information for Provider? Nazario Caban from [email or web address] called to report pt's   INR for today? 1.7  ---------------------------------------------------------------------------  --------------  Maritza Ramirez INFO  [phone]; OK to leave message on voicemail  ---------------------------------------------------------------------------  --------------  SCRIPT ANSWERS  Relationship to Patient? Third Party  Third Party Type? Home Health Care? Representative Name?  Nazario Caban

## 2022-11-23 NOTE — PROGRESS NOTES
Juan Benavidez is a 78 y.o. female evaluated via telephone on 11/23/2022. Consent:  She and/or health care decision maker is aware that she may receive a bill for this telephone service, depending on her insurance coverage, and has provided verbal consent to proceed: Yes      Documentation:  I communicated with the patient and/or health care decision maker about;   Details of this discussion including any medical advice provided:      has a past medical history of Anxiety, Arthritis, Asthma, CAD (coronary artery disease), Chronic anticoagulation (11/23/2022), Chronic obstructive pulmonary disease ([facility] Chronic pain, Depression with anxiety (11/23/2022), Diabetes ([facility] GERD (gastroesophageal reflux disease), History of blood transfusion, History of DVT (deep vein thrombosis), History of recurrent UTIs, seasonal allergies, Hypercholesterolemia, Irritable bowel syndrome (IBS), Migraine, Psychiatric disorder, Thromboembolus ([facility] Thyroid disease, and Urinary urgency. She still talks about already have a new apartment and her current place refuses to let her off contract. She have hired a , told her I can't help, I don't know about it. S/p stent, valve replacement  Coumadin 1mg for 3 days then 2mg for 4 days  INR 1.7, 2.1, 2.6  Increase to coumadin to 2mg 5 days/week, 1mg for 2 days per week. hx of depression anxiety with somatization. Denies bipolar  Denies visual or auditory hallucination or delusion  Denies SI or HI  Psych saw in the past, but past many years it's been managed by her previous PCPs  Have been on these meds for years Seroquel, trazodone, wellbutrin, lexapro  Currently stable    DM2: diet controlled she claimed  A1C 7.2% 11/2022, 6.3% 07/2022  Start Metformin ER 500mg     HTN: BP at goal  metoprolol     HLD: zetia, lipitor    Cardiology: Dr. Ken Yoder  CAD: plavix and coumadin    Asthma, COPD - pulm specialist    Gastro referral done  nexium  Thrush  Nausea?  - verbal report maybe 2X/week, due to her IBS    IBS Bentyl - nausea and diarrhea    Hx of hep b    Chronic pain. Sees orthopedic              Tramadol, amitriptyline     Migraine: Kepra       Diagnoses and all orders for this visit:    1. Chronic anticoagulation    2. Subtherapeutic international normalized ratio (INR)    3. H/O aortic valve replacement    4. Depression with anxiety    Follow-up and Dispositions    Return in about 1 week (around 11/30/2022) for chronic anticoag, INR. Past Medical History:   Diagnosis Date    Anxiety     Arthritis     Asthma     CAD (coronary artery disease)     stent    Chronic anticoagulation 11/23/2022    Chronic obstructive pulmonary disease (HCC)     Chronic pain     Depression with anxiety 11/23/2022    Diabetes (HCC)     GERD (gastroesophageal reflux disease)     History of blood transfusion     06/22    History of DVT (deep vein thrombosis)     History of recurrent UTIs     Hx of seasonal allergies     Hypercholesterolemia     Irritable bowel syndrome (IBS)     Migraine     Psychiatric disorder     anxiety    Thromboembolus (HCC)     left leg    Thyroid disease     Urinary urgency        Current Outpatient Medications on File Prior to Visit   Medication Sig Dispense Refill    benzonatate (TESSALON) 100 mg capsule TAKE 1 CAPSULE BY MOUTH THREE TIMES DAILY AS NEEDED FOR COUGH      traMADoL (ULTRAM) 50 mg tablet       FreeStyle Tomasa 2 [place] misc USE AS DIRECTED TO SCAN SENSOR DAILY 1 Each 0    dicyclomine (BENTYL) 10 mg capsule TAKE ONE CAPSULE BY MOUTH FOUR TIMES DAILY AS NEEDED 120 Capsule 0    metFORMIN ER (GLUCOPHAGE XR) 500 mg tablet Take 1 Tablet by mouth daily (with dinner). 30 Tablet 2    clopidogreL (PLAVIX) 75 mg tab TAKE 1 TABLET BY MOUTH IN THE MORNING      levocetirizine (XYZAL) 5 mg tablet Take 1 Tablet by mouth daily.       acetaminophen (TYLENOL) 500 mg tablet Take 1,000 mg by mouth every six (6) hours as needed for Pain.      levETIRAcetam (KEPPRA) 750 mg tablet Take 750 mg by mouth every evening. fluticasone propion-salmeteroL (Wixela Inhub) 250-50 mcg/dose diskus inhaler INHALE 1 PUFF AND INTO THE LUNGS EVERY 12 HOURS. RINSE MOUTH AFTER USE 60 Each 12    nystatin (MYCOSTATIN) 100,000 unit/mL suspension SWISH 5ML FOR 30 SECONDS AND SWALLOW FOUR TIMES DAILY AS NEEDED FOR MOUTH PAIN 60 mL 2    esomeprazole (NEXIUM) 20 mg capsule TAKE 1 CAPSULE BY MOUTH DAILY 90 Capsule 3    metoprolol succinate (TOPROL-XL) 50 mg XL tablet TAKE 1 TABLET BY MOUTH DAILY 90 Tablet 3    ezetimibe (ZETIA) 10 mg tablet TAKE 1 TABLET BY MOUTH DAILY 90 Tablet 3    levETIRAcetam (KEPPRA) 500 mg tablet TAKE 1 TABLET BY MOUTH EVERY MORNING AND 1 AND 1/2 TABLET BY MOUTH EVERY EVENING FOR MIGRAINES 225 Tablet 3    QUEtiapine (SEROquel) 25 mg tablet Take 1 Tablet by mouth nightly. 90 Tablet 3    escitalopram oxalate (LEXAPRO) 20 mg tablet Take 1 Tablet by mouth in the morning. 90 Tablet 3    flash glucose sensor (FreeStyle Tomasa 2 Sensor) kit 1 Each by Does Not Apply route See Mason Godfrey. Apply and replace sensor every 14 days. Use to scan sensor daily. 2 Kit 11    atorvastatin (LIPITOR) 20 mg tablet TAKE 1 TABLET BY MOUTH EVERY DAY 90 Tablet 3    warfarin (COUMADIN) 1 mg tablet Take 1 Tablet by mouth daily. (Patient taking differently: Take 1 mg by mouth daily. 1mg M-TU-[id number] and 2 mg F-S-S) 90 Tablet 3    Bifidobacterium Infantis (Align) 4 mg cap Take 1 Capsule by mouth daily as needed for Diarrhea. Indications: ALIGN OTC 90 Capsule 3    buPROPion XL (WELLBUTRIN XL) 150 mg tablet Take 1 Tablet by mouth daily. 90 Tablet 3    oxybutynin chloride XL (DITROPAN XL) 10 mg CR tablet TAKE 1 TABLET BY MOUTH EVERY DAY 90 Tablet 3    amitriptyline (ELAVIL) 10 mg tablet TAKE 1 TABLET BY MOUTH EVERY NIGHT 90 Tablet 3    traZODone (DESYREL) 100 mg tablet Take 100 mg by mouth nightly.       albuterol (PROVENTIL HFA, VENTOLIN HFA, PROAIR HFA) 90 mcg/actuation inhaler Take 2 Puffs by inhalation every six (6) hours as needed for Wheezing. 8 g 2    ondansetron hcl (ZOFRAN) 4 mg tablet TAKE 1 TABLET BY MOUTH EVERY 8 HOURS AS NEEDED FOR NAUSEA OR VOMITING 30 Tablet 2    diclofenac (VOLTAREN) 1 % gel Apply  to affected area every twelve (12) hours as needed for Pain. 100 g 2     No current facility-administered medications on file prior to visit. I affirm this is a Patient Initiated Episode with a Patient who has not had a related appointment within my department in the past 7 days or scheduled within the next 24 hours.     Total Time: minutes: 21-30 minutes    Note: not billable if this call serves to triage the patient into an appointment for the relevant concern      Renzo Doherty MD

## 2022-11-23 NOTE — PROGRESS NOTES
Chief Complaint   Patient presents with    Follow-up       1. Have you been to the ER, urgent care clinic since your last visit? Hospitalized since your last visit? No    2. Have you seen or consulted any other health care providers outside of the [address] [place] [address] since your last visit? Include any pap smears or colon screening.  No

## 2022-11-30 RX ORDER — WARFARIN 1 MG/1
1 TABLET ORAL DAILY
Qty: 90 TABLET | Refills: 0 | Status: SHIPPED | OUTPATIENT
Start: 2022-11-30

## 2022-12-02 ENCOUNTER — TELEPHONE (OUTPATIENT)
Dept: FAMILY MEDICINE CLINIC | Age: 82
End: 2022-12-02

## 2022-12-02 NOTE — TELEPHONE ENCOUNTER
Left message to call office with any questions and please fax copy of results to Dr Cori Crowley @ [phone].

## 2022-12-02 NOTE — TELEPHONE ENCOUNTER
----- Message from Darcy Calhoun sent at 12/1/2022 10:49 AM EST -----  Subject: Message to Provider    QUESTIONS  Information for Provider? Delfina Palacio called from [facility] with patients INR   2.1 today 12/01, please call if any question or new orders thank you   ---------------------------------------------------------------------------  --------------  CALL BACK INFO  [phone]; OK to leave message on voicemail  ---------------------------------------------------------------------------  --------------  SCRIPT ANSWERS  Relationship to Patient? Third Party  Third Party Type? Home Health Care? Representative Name?  Delfina Palacio

## 2022-12-06 ENCOUNTER — TELEPHONE (OUTPATIENT)
Dept: FAMILY MEDICINE CLINIC | Age: 82
End: 2022-12-06

## 2022-12-06 NOTE — TELEPHONE ENCOUNTER
Spoke with Dr. Echo Sparks. She said to have patient hold Coumadin tonight and see what INR is tomorrow. Spoke with patient. Informed her to hold Coumadin tonight and have [address] call with INR. Patient repeated instructions back and said that she wrote them down. She verbalized understanding.

## 2022-12-06 NOTE — TELEPHONE ENCOUNTER
Pt reports Coumadiin concern     States she had a nose bleed this morning which is unusual for her     Pls try her at     [phone]

## 2022-12-06 NOTE — TELEPHONE ENCOUNTER
Spoke with patient. Had a nose bleed earlier today. She was able to get it to stop without any problems. She is worried d/t being on coumadin. She takes  2mg 5 days/week, 1mg for 2 days per week. Last PT/INR  25.3/2.1 on 12/1/22. Done by Home Health nurse. Nurse to see patient tomorrow per patient. Patient instructed to go to ER if another nose bleed. She verbalized understanding.

## 2022-12-08 ENCOUNTER — TELEPHONE (OUTPATIENT)
Dept: FAMILY MEDICINE CLINIC | Age: 82
End: 2022-12-08

## 2022-12-08 NOTE — TELEPHONE ENCOUNTER
Spoke with Carlos Medellin from [facility]. Patient's INR 1.3 today. Patient taking Coumadin 2 mg 4 days/week & 1 mg 3 days/week.

## 2022-12-08 NOTE — TELEPHONE ENCOUNTER
INR results given to Dr. Elvia Rodas. She said for patient to continue the Coumadin 2 mg 4 days a week & 1 mg 3 days a week. Spoke with patient informed her of instructions from Dr. Elvia Rodas. Patient verbalized understanding.

## 2022-12-12 NOTE — TELEPHONE ENCOUNTER
Patient called, stating her previous pcp gave her a rx for       Myrbetriq 25 mg qty 90  for frequent urination     She forgot to mention to Dr Eva Mak that she would need a refill at her appointment     She is all out of the medication and last night she had to go to the bathroom 6 times     She does not sleep well when she has to get up so many times     For this rx [employer] has ask her to have the physician fax it to them   It will only cost $90.00 for 90 days supply   versus $200.00 at local pharmacy     Patient is aware Dr Eva Mak is out of the office,   can someone else fill for her     Please call pt to let her know  [phone]

## 2022-12-12 NOTE — TELEPHONE ENCOUNTER
Patient called, stating her previous pcp gave her a rx for      Myrbetriq 25 mg qty 90  for frequent urination    She forgot to mention to Dr Trevor Oneill that she would need a refill at her appointment    She is all out of the medication and last night she had to go to the bathroom 6 times    She does not sleep well when she has to get up so many times    For this rx [employer] has ask her to have the physician fax it to them   It will only cost $90.00 for 90 days supply   versus $200.00 at local pharmacy    Patient is aware Dr Trevor Oneill is out of the office,   can someone else fill for her    Please call pt to let her know  [phone]

## 2022-12-14 RX ORDER — MIRABEGRON 25 MG/1
TABLET, FILM COATED, EXTENDED RELEASE ORAL
Qty: 10 TABLET | Refills: 0 | Status: SHIPPED | OUTPATIENT
Start: 2022-12-14

## 2022-12-15 ENCOUNTER — TELEPHONE (OUTPATIENT)
Dept: FAMILY MEDICINE CLINIC | Age: 82
End: 2022-12-15

## 2022-12-15 NOTE — TELEPHONE ENCOUNTER
----- Message from [address] sent at 12/14/2022  4:37 PM EST -----  Subject: Message to Provider    QUESTIONS  Information for Provider? PT results are 31.8, INR results are 2.6, For   12/114/22. From Daphney at [facility].  ---------------------------------------------------------------------------  --------------  [address]  [phone]; OK to leave message on voicemail  ---------------------------------------------------------------------------  --------------  SCRIPT ANSWERS  Relationship to Patient? Third Party  Third Party Type? Home Health Care? Representative Name?  ΣΑΡΑΝΤΙ

## 2022-12-15 NOTE — TELEPHONE ENCOUNTER
Reviewed INR with Dr. Portia Deutsch. Per Dr. Portia Deutsch continue same dose of Coumadin and keep appt with Dr. Marilin Dupree on 12/22/22. LM with this message for GREGORY from [facility].

## 2022-12-20 ENCOUNTER — DOCUMENTATION ONLY (OUTPATIENT)
Dept: FAMILY MEDICINE CLINIC | Age: 82
End: 2022-12-20

## 2022-12-21 RX ORDER — CELECOXIB 200 MG/1
200 CAPSULE ORAL 2 TIMES DAILY
COMMUNITY
Start: 2022-12-03

## 2022-12-21 RX ORDER — LOPERAMIDE HYDROCHLORIDE 2 MG/1
2 CAPSULE ORAL 4 TIMES DAILY
COMMUNITY
Start: 2022-11-30

## 2022-12-21 NOTE — PERIOP NOTES
[facility]  Ambulatory Surgery Unit  Pre-operative Instructions    Procedure Date  Tuesday 1/3/2022            Tentative Arrival Time 2:15 pm      1. On the day of your procedure, please report to the Ambulatory Surgery Unit Registration Desk and sign in at your designated time. The Ambulatory Surgery Unit is located in [facility] on the [facility] side of the [facility] [place] across from the [address] [place] [address]. Please have all of your health insurance cards, copayment, and a photo ID.    **TWO adults may accompany you the day of the procedure. We have limited seating available. If our waiting room is at capacity, your ride may be asked to remain in their vehicle. No one under 15 is allowed in the waiting room. 2. You must have someone with you to drive you home as directed by your surgeon. 3. You may have a light breakfast and take normal morning medications. 4. We recommend you do not drink any alcoholic beverages for 24 hours before and after your procedure. 5. Contact your surgeons office for instructions on the following medications: non-steroidal anti-inflammatory drugs (i.e. Advil, Aleve), vitamins, and supplements. (Some surgeons will want you to stop these medications prior to surgery and others may allow you to take them)   **If you are currently taking Plavix, Coumadin, Aspirin and/or other blood-thinning agents, contact your surgeon for instructions. ** Your surgeon will partner with the physician prescribing these medications to determine if it is safe to stop or if you need to continue taking. Please do not stop taking these medications without instructions from your surgeon. 6. In an effort to help prevent surgical site infection, we ask that you shower with an anti-bacterial soap (i.e. Dial or Safeguard) on the morning of your procedure. Do not apply any lotions, powders, or deodorants after showering. 7. Wear comfortable clothes.  Wear glasses instead of contacts. Do not bring any jewelry or money (other than copays or fees as instructed). Do not wear make-up, particularly mascara, the morning of your procedure. Wear your hair loose or down, no ponytails, buns, jose pins or clips. All body piercings must be removed. 8. You should understand that if you do not follow these instructions your procedure may be cancelled. If your physical condition changes (i.e. fever, cold or flu) please contact your surgeon as soon as possible. 9. It is important that you be on time. If a situation occurs where you may be late, or if you have any questions or problems, please call [phone].    10. Your procedure time may be subject to change. You will receive a phone call the day prior to confirm your arrival time. I understand a pre-operative phone call will be made to verify my procedure time. In the event that I am not available, I give permission for a message to be left on my answering service and/or with another person?       Yes    Reviewed instructions via telephone, patient verbalized understanding         ___________________      ___________________      ___________________  (Signature of Patient)          (Witness)                   (Date and Time)

## 2022-12-21 NOTE — PERIOP NOTES
Patient called and lvm on front office line stating that her son will drive her for her procedure that day and stay with her that day and night

## 2022-12-29 ENCOUNTER — TELEPHONE (OUTPATIENT)
Dept: FAMILY MEDICINE CLINIC | Age: 82
End: 2022-12-29

## 2023-01-03 ENCOUNTER — APPOINTMENT (OUTPATIENT)
Dept: GENERAL RADIOLOGY | Age: 83
End: 2023-01-03
Attending: PHYSICAL MEDICINE & REHABILITATION
Payer: COMMERCIAL

## 2023-01-03 ENCOUNTER — HOSPITAL ENCOUNTER (OUTPATIENT)
Age: 83
Setting detail: OUTPATIENT SURGERY
Discharge: HOME | End: 2023-01-03
Attending: PHYSICAL MEDICINE & REHABILITATION | Admitting: PHYSICAL MEDICINE & REHABILITATION
Payer: COMMERCIAL

## 2023-01-03 VITALS
DIASTOLIC BLOOD PRESSURE: 61 MMHG | HEIGHT: 62 IN | WEIGHT: 123 LBS | TEMPERATURE: 98.4 F | HEART RATE: 62 BPM | BODY MASS INDEX: 22.63 KG/M2 | RESPIRATION RATE: 16 BRPM | SYSTOLIC BLOOD PRESSURE: 125 MMHG | OXYGEN SATURATION: 93 %

## 2023-01-03 LAB
GLUCOSE BLD STRIP.AUTO-MCNC: 173 MG/DL (ref 65–117)
INR BLD: 2.5 (ref 0.9–1.2)
SERVICE CMNT-IMP: ABNORMAL

## 2023-01-03 PROCEDURE — 74011000250 HC RX REV CODE- 250: Performed by: PHYSICAL MEDICINE & REHABILITATION

## 2023-01-03 PROCEDURE — 82962 GLUCOSE BLOOD TEST: CPT

## 2023-01-03 PROCEDURE — 76000 FLUOROSCOPY <1 HR PHYS/QHP: CPT

## 2023-01-03 PROCEDURE — 85610 PROTHROMBIN TIME: CPT

## 2023-01-03 PROCEDURE — 2709999900 HC NON-CHARGEABLE SUPPLY: Performed by: PHYSICAL MEDICINE & REHABILITATION

## 2023-01-03 PROCEDURE — 76210000046 HC AMBSU PH II REC FIRST 0.5 HR: Performed by: PHYSICAL MEDICINE & REHABILITATION

## 2023-01-03 PROCEDURE — 77030003665 HC NDL SPN BBMI -A: Performed by: PHYSICAL MEDICINE & REHABILITATION

## 2023-01-03 PROCEDURE — 74011250636 HC RX REV CODE- 250/636: Performed by: PHYSICAL MEDICINE & REHABILITATION

## 2023-01-03 PROCEDURE — 76030000002 HC AMB SURG OR TIME FIRST 0.: Performed by: PHYSICAL MEDICINE & REHABILITATION

## 2023-01-03 PROCEDURE — 72020 X-RAY EXAM OF SPINE 1 VIEW: CPT

## 2023-01-03 RX ORDER — DEXAMETHASONE SODIUM PHOSPHATE 4 MG/ML
10 INJECTION, SOLUTION INTRA-ARTICULAR; INTRALESIONAL; INTRAMUSCULAR; INTRAVENOUS; SOFT TISSUE ONCE
Status: COMPLETED | OUTPATIENT
Start: 2023-01-03 | End: 2023-01-03

## 2023-01-03 RX ORDER — BUPIVACAINE HYDROCHLORIDE 5 MG/ML
5 INJECTION, SOLUTION EPIDURAL; INTRACAUDAL ONCE
Status: COMPLETED | OUTPATIENT
Start: 2023-01-03 | End: 2023-01-03

## 2023-01-03 RX ORDER — LIDOCAINE HYDROCHLORIDE 20 MG/ML
5 INJECTION, SOLUTION INFILTRATION; PERINEURAL ONCE
Status: COMPLETED | OUTPATIENT
Start: 2023-01-03 | End: 2023-01-03

## 2023-01-03 NOTE — PERIOP NOTES
Patient received to PACU, VSS. Patient awake and alert with no complaints of pain. Injection site intact. Neuro:  Push/Pull assessment:        LLE Response: push/pull strong   RLE Response: push/pull strong    Discharge instructions given. Patient verbalized understanding of instructions and follow up. Patient states ready for discharge - patient discharged at this time by wheelchair with all belongings. Son to provide transportation home.

## 2023-01-03 NOTE — OP NOTES
Operative Note    Patient: Mike Vines  YOB: 1940  MRN: [id number]    Date of Procedure: 1/3/2023   Facet Medial Branch Block Injection Operative Report    Indications: This is a 78 y.o. female who presents with LBP. She was positive for LS DJD. The patient was admitted for diagnostic procedure as conservative measures have failed. Date of Surgery: 1/3/2023    Preoperative Diagnosis: Lumbar Spondylosis    Postoperative Diagnosis: Lumbar Spondylosis    Surgeon(s) and Role:     * Jenny Huggins MD - Primary     Procedure:  Procedure(s):  BILATERAL L4-5, L5-S1 MEDIAL BRANCH BLOCK    Procedure in Detail:  After appropriate informed consent was obtained, the patient was taken to the operating suite and placed in the prone position on the operating table on appropriate padding. The LS region was prepped and draped in the usual sterile fashion. Intraoperative fluoroscopy was used to localize the LS spine. The skin was infiltrated with 2% lidocaine. A 25-g needle was advanced into the Cy L4-5 and L5-S1 Medial Branches under fluoroscopic guidance. No contrast was injected to confirm needle placement location due to allergy. Permanent fluoroscopic images were saved in the patient's record. Next, 3ml of 0.5% marcaine and 10mg of Dexamethasone were injected divided equally between locations. The needle was removed from the patient. The patient was then turned back into the supine position on the stretcher and was taken to the Recovery Room in stable condition.     Estimated Blood Loss:  none     Specimens: None       Drains: None          Complications:  None    Signed By: Hua Beatty MD                        January 3, 2023            Electronically Signed by Hua Beatty MD on 1/3/2023 at 3:21 PM

## 2023-01-03 NOTE — H&P
Procedural Case Note    1/3/2023    (3:01 PM)    Kenn Fam    1940   (78 y.o.)    [id number]    CC:  pain    ROS:   Complete ROS obtained, no CP, no SOB, no N or V    PMH:     Past Medical History:   Diagnosis Date    Anxiety     Aortic valve stenosis 07/2022    Arthritis     Asthma     CAD (coronary artery disease)     stent    Chronic anticoagulation 11/23/2022    Chronic obstructive pulmonary disease (HCC)     Chronic pain     Depression with anxiety 11/23/2022    Diabetes (HCC)     GERD (gastroesophageal reflux disease)     H/O aortic valve replacement 07/2022    History of blood transfusion     06/22    History of DVT (deep vein thrombosis)     History of recurrent UTIs     Hx of seasonal allergies     Hypercholesterolemia     Irritable bowel syndrome (IBS)     Migraine     Oxygen dependent     PRN at 2LPM [place]    Psychiatric disorder     anxiety    Thromboembolus ([address]     left leg    Thyroid disease     pt denies- not tx for it    Urinary urgency        ALLERGIES:     Allergies   Allergen Reactions    Zantac [Ranitidine Hcl] Nausea Only    Iodinated Contrast Media Other (comments)     Passes out    Morphine Other (comments)     Agitation,hallucinations    Pcn [Penicillins] Shortness of Breath    Pepcid [Famotidine] Nausea and Vomiting    Sulfa (Sulfonamide Antibiotics) Shortness of Breath       MEDS:     Current Facility-Administered Medications   Medication Dose Route Frequency    bupivacaine (PF) (MARCAINE) 0.5 % (5 mg/mL) injection 25 mg  5 mL Epidural ONCE    lidocaine (XYLOCAINE) 20 mg/mL (2 %) injection 100 mg  5 mL IntraDERMal ONCE    dexamethasone (DECADRON) 4 mg/mL injection 10 mg  10 mg Intra artICUlar ONCE        Visit Vitals  /62 (BP 1 Location: Right arm, BP Patient Position: At rest)   Pulse 65   Temp 98.2 °F (36.8 °C)   Resp 16   Ht 5' 2\" (1.575 m)   Wt 55.8 kg (123 lb)   SpO2 93%   BMI 22.50 kg/m²     PE:  Gen: NAD  Head: normocephalic  Heart: RRR  Lungs: CTA junito  Abd: NT, ND, soft  Neuro: awake and alert  Skin: intact    IMPRESSION:   Lumbar spondylosis    Note:  The clinical status was discussed in detail with the patient. The procedure was again discussed and described in detail. All understand and accept the planned procedure and risks; reject other forms of treatment. All questions are answered.     Seema Anderson MD

## 2023-01-03 NOTE — DISCHARGE INSTRUCTIONS
Discharge Instructions  Lumbar Facet Block/ Medial Branch Block  You had a lumbar facet injection today. You will probably have some numbness in your low back area for the next 6 to 8 hours. The steroids will slowly become effective, reducing your pain, over the next 2 weeks. You should begin feeling better after a few days, but it may take up to 2 weeks to notice the difference. The benefit you get from your injection will last a variable amount of time, depending on the severity of your lumbar spine problem. If the results you experience are significant, but not long lasting, you may be a candidate for a procedure that can be longer lasting (radiofrequency ablation of the nerves innervating the facet joints). Pain:  Most people do not have any increase in pain after this injection. However, you might experience some soreness at the site of the injection. If this happens, putting an ice pack over the sore area will help. Bandage: You have a small bandage covering the site of the injection. You may remove it once you get home. Restrictions:  Someone should drive you home after the injection. After that, you have no restrictions. You may resume your normal level of activity. You may take a shower or bath, and you may eat normally. You should continue your current exercises and/or therapy routine. Medications:  Continue your current medications as prescribed. If your pain decreases, you may reduce the amount of your pain medicines. If you stopped taking anticoagulants or blood-thinners before the injection, start them tomorrow. If you have diabetes, your blood sugar may be elevated for a few days. Call your primary doctor with any questions.   Call Dr. Eileen Castillo at [phone] if you experience:  Fever (101 degrees Fahrenheit or greater)  Nausea or vomiting  Headache unrelieved by your normal pain medicine  Redness or swelling at the injection site that lasts more than 1 day  New numbness, tingling, weakness, or pain that you didnt have before the injection. If still having pain in 1-2 weeks, call office at [phone] for a follow up appointment. DISCHARGE SUMMARY from Nurse    The following personal items collected during your admission are returned to you:   Dental Appliance: Dental Appliances: Lowers, Uppers, With patient  Vision: Visual Aid: Glasses, With patient  Hearing Aid:    Jewelry: Jewelry: Alisia Mcduffie, With patient  Clothing: Clothing: With patient  Other Valuables: Other Valuables: None  Valuables sent to safe: If you were given prescriptions, please review the written information on prescribed medications. You will receive a Post Operative Call from one of the Recovery Room Nurses on the day after your surgery to check on you. It is very important for us to know how you are recovering after your surgery. You may receive an e-mail or letter in the mail from [employer] regarding your experience with us in the Ambulatory Surgery Unit. Your feedback is valuable to us and we appreciate your participation in the survey. If you have not had your influenza or pneumococcal vaccines, please follow up with your primary care physician. The discharge information has been reviewed with the patient. The patient verbalized understanding.

## 2023-01-03 NOTE — PERIOP NOTES
Ricarda Bronson [facility]  1940  [id number]    Situation:  Verbal report given from: Madhu Lund RN  Procedure: Procedure(s):  BILATERAL L4-5, L5-S1 MEDIAL BRANCH BLOCK    Background:    Preoperative diagnosis: LUMBAR SPONDYLOSIS    Postoperative diagnosis: LUMBAR SPONDYLOSIS    :  Dr. Sampson Minor:  Intra-procedure medications, procedure, and allergies reviewed        Recommendation:    Discharge patient home after discharge instructions reviewed with patient. Rest until local has worn off.

## 2023-01-11 ENCOUNTER — TELEPHONE (OUTPATIENT)
Dept: FAMILY MEDICINE CLINIC | Age: 83
End: 2023-01-11

## 2023-01-11 NOTE — TELEPHONE ENCOUNTER
----- Message from Merlin Calvo sent at 1/11/2023 11:54 AM EST -----  Subject: Message to Provider    QUESTIONS  Information for Provider? Samantha states she needs to know as soon as   possible how much Warfarin to take. Her INR is too high.   ---------------------------------------------------------------------------  --------------  Olamide Guaman [facility]  [phone]; OK to leave message on voicemail  ---------------------------------------------------------------------------  --------------  SCRIPT ANSWERS  Relationship to Patient?  Self

## 2023-01-11 NOTE — TELEPHONE ENCOUNTER
Spoke with patient. INR on 1/3/23 was 2.5 while in hospital.  Appt scheduled for tomorrow with Dr. Luzmaria Chavis to check INR.

## 2023-01-12 ENCOUNTER — TELEPHONE (OUTPATIENT)
Dept: FAMILY MEDICINE CLINIC | Age: 83
End: 2023-01-12

## 2023-01-18 ENCOUNTER — OFFICE VISIT (OUTPATIENT)
Dept: FAMILY MEDICINE CLINIC | Age: 83
End: 2023-01-18
Payer: COMMERCIAL

## 2023-01-18 VITALS
SYSTOLIC BLOOD PRESSURE: 121 MMHG | DIASTOLIC BLOOD PRESSURE: 84 MMHG | TEMPERATURE: 96.8 F | WEIGHT: 126 LBS | BODY MASS INDEX: 23.19 KG/M2 | HEIGHT: 62 IN | RESPIRATION RATE: 16 BRPM | OXYGEN SATURATION: 94 % | HEART RATE: 74 BPM

## 2023-01-18 DIAGNOSIS — R79.1 SUBTHERAPEUTIC INTERNATIONAL NORMALIZED RATIO (INR): ICD-10-CM

## 2023-01-18 DIAGNOSIS — F32.A ANXIETY AND DEPRESSION: ICD-10-CM

## 2023-01-18 DIAGNOSIS — Z79.01 CHRONIC ANTICOAGULATION: Primary | ICD-10-CM

## 2023-01-18 DIAGNOSIS — Z91.199 NONCOMPLIANCE: ICD-10-CM

## 2023-01-18 DIAGNOSIS — Z95.2 H/O AORTIC VALVE REPLACEMENT: ICD-10-CM

## 2023-01-18 DIAGNOSIS — F41.9 ANXIETY AND DEPRESSION: ICD-10-CM

## 2023-01-18 DIAGNOSIS — Z71.9 ENCOUNTER FOR EDUCATION: ICD-10-CM

## 2023-01-18 LAB
INR BLD: 1.7
PT POC: NORMAL
VALID INTERNAL CONTROL?: YES

## 2023-01-18 RX ORDER — WARFARIN 1 MG/1
TABLET ORAL
Qty: 90 TABLET | Refills: 0 | Status: SHIPPED | OUTPATIENT
Start: 2023-01-18

## 2023-01-18 RX ORDER — BUPROPION HYDROCHLORIDE 150 MG/1
150 TABLET ORAL DAILY
Qty: 90 TABLET | Refills: 0 | Status: SHIPPED | OUTPATIENT
Start: 2023-01-18

## 2023-01-18 NOTE — PROGRESS NOTES
Izabela Miramontes is a 78 y.o. female    Met this pt 11/2022, 2 months ago. She is w her care taker today    Her apt is 2:40 pm, address at [address] [facility] was given to her. It's not until 4:08 when she was checked in at the front. It may not be her fault but we discussed needs to f/up separately for her other medical issues. 4:29 I had to walk in to interrupt her conversation w the nurse and see her.      has a past medical history of Anxiety, Aortic valve stenosis (07/2022), Arthritis, Asthma, CAD (coronary artery disease), Chronic anticoagulation (11/23/2022), Chronic obstructive pulmonary disease ([facility] [address] Chronic pain, Depression with anxiety (11/23/2022), Diabetes ([facility] GERD (gastroesophageal reflux disease), H/O aortic valve replacement (07/2022), History of blood transfusion, History of DVT (deep vein thrombosis), History of recurrent UTIs, seasonal allergies, Hypercholesterolemia, Irritable bowel syndrome (IBS), Migraine, Noncompliance (1/18/2023), Oxygen dependent, Psychiatric disorder, Thromboembolus ([facility] [address] Thyroid disease, and Urinary urgency. She's here for INR. I last saw her for her INR in 11/23/2022, it was not at goal, she was to f/up in 1 week, it's now   Almost 2 months later. Noncompliant. S/p stent, valve replacement  Coumadin 1mg for 3 days then 2mg for 4 days  INR 2.5 (1/3/2023 knee procedure w her pain doctor), 1.7, 2.1, 2.6  11/23/2022 INR 1.7 ->Increase to coumadin to 2mg 5 days/week, 1mg for 2 days per week. But she decided to take 1mg everyday b/c she thinks INR 2.5 is too high for her. Again I told her INR goal 2-3   To restart same dose, f/up 1 week    Education and various things. Apt was done at 4:58pm  Billing base on time. Reviewed: active problem list, medication list, allergies, notes from last encounter, lab results    A comprehensive review of systems was negative except for that written in the HPI.       Allergies   Allergen Reactions    Zantac [Ranitidine Hcl] Nausea Only    Iodinated Contrast Media Other (comments)     Passes out    Morphine Other (comments)     Agitation,hallucinations    Pcn [Penicillins] Shortness of Breath    Pepcid [Famotidine] Nausea and Vomiting    Sulfa (Sulfonamide Antibiotics) Shortness of Breath     Current Outpatient Medications on File Prior to Visit   Medication Sig Dispense Refill    celecoxib (CELEBREX) 200 mg capsule Take 200 mg by mouth two (2) times a day. loperamide (IMODIUM) 2 mg capsule Take 2 mg by mouth four (4) times daily. mirabegron ER (Myrbetriq) 25 mg ER tablet Take 1 Tablet by mouth daily. 90 Tablet 0    benzonatate (TESSALON) 100 mg capsule TAKE 1 CAPSULE BY MOUTH THREE TIMES DAILY AS NEEDED FOR COUGH      traMADoL (ULTRAM) 50 mg tablet Take 50 mg by mouth two (2) times a day. FreeStyle Tomasa 2 [place] misc USE AS DIRECTED TO SCAN SENSOR DAILY 1 Each 0    metFORMIN ER (GLUCOPHAGE XR) 500 mg tablet Take 1 Tablet by mouth daily (with dinner). 30 Tablet 2    clopidogreL (PLAVIX) 75 mg tab TAKE 1 TABLET BY MOUTH IN THE MORNING      levocetirizine (XYZAL) 5 mg tablet Take 1 Tablet by mouth daily. acetaminophen (TYLENOL) 500 mg tablet Take 1,000 mg by mouth every six (6) hours as needed for Pain.      levETIRAcetam (KEPPRA) 750 mg tablet Take 750 mg by mouth every evening. fluticasone propion-salmeteroL (Wixela Inhub) 250-50 mcg/dose diskus inhaler INHALE 1 PUFF AND INTO THE LUNGS EVERY 12 HOURS.  RINSE MOUTH AFTER USE 60 Each 12    nystatin (MYCOSTATIN) 100,000 unit/mL suspension SWISH 5ML FOR 30 SECONDS AND SWALLOW FOUR TIMES DAILY AS NEEDED FOR MOUTH PAIN 60 mL 2    esomeprazole (NEXIUM) 20 mg capsule TAKE 1 CAPSULE BY MOUTH DAILY 90 Capsule 3    metoprolol succinate (TOPROL-XL) 50 mg XL tablet TAKE 1 TABLET BY MOUTH DAILY 90 Tablet 3    ezetimibe (ZETIA) 10 mg tablet TAKE 1 TABLET BY MOUTH DAILY 90 Tablet 3    levETIRAcetam (KEPPRA) 500 mg tablet TAKE 1 TABLET BY MOUTH EVERY MORNING AND 1 AND 1/2 TABLET BY MOUTH EVERY EVENING FOR MIGRAINES 225 Tablet 3    QUEtiapine (SEROquel) 25 mg tablet Take 1 Tablet by mouth nightly. 90 Tablet 3    escitalopram oxalate (LEXAPRO) 20 mg tablet Take 1 Tablet by mouth in the morning. 90 Tablet 3    flash glucose sensor (FreeStyle Tomasa 2 Sensor) kit 1 Each by Does Not Apply route See Mason Godfrey. Apply and replace sensor every 14 days. Use to scan sensor daily. 2 Kit 11    atorvastatin (LIPITOR) 20 mg tablet TAKE 1 TABLET BY MOUTH EVERY DAY 90 Tablet 3    Bifidobacterium Infantis (Align) 4 mg cap Take 1 Capsule by mouth daily as needed for Diarrhea. Indications: ALIGN OTC 90 Capsule 3    oxybutynin chloride XL (DITROPAN XL) 10 mg CR tablet TAKE 1 TABLET BY MOUTH EVERY DAY 90 Tablet 3    amitriptyline (ELAVIL) 10 mg tablet TAKE 1 TABLET BY MOUTH EVERY NIGHT 90 Tablet 3    traZODone (DESYREL) 100 mg tablet Take 100 mg by mouth nightly. albuterol (PROVENTIL HFA, VENTOLIN HFA, PROAIR HFA) 90 mcg/actuation inhaler Take 2 Puffs by inhalation every six (6) hours as needed for Wheezing. 8 g 2    ondansetron hcl (ZOFRAN) 4 mg tablet TAKE 1 TABLET BY MOUTH EVERY 8 HOURS AS NEEDED FOR NAUSEA OR VOMITING 30 Tablet 2    diclofenac (VOLTAREN) 1 % gel Apply  to affected area every twelve (12) hours as needed for Pain. 100 g 2     No current facility-administered medications on file prior to visit.      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    Coronary artery disease I25.10 AS (aortic stenosis) I35.0    Chronic systolic (congestive) heart failure I50.22    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 Normal without focal findings. Cranial nerves 2-12 intact. Normal coordination and gait. Mental status: Alert, oriented, thought content appropriate, affect: stable, mood-congruent. Head: Normocephalic, without obvious abnormality, atraumatic  Eyes: conjunctivae/corneas clear. PERRL, EOM's intact. Neck: supple, symmetrical, trachea midline, no JVD  Lungs: clear to auscultation bilaterally  Heart: regular rate and rhythm, S1, S2 normal, no murmur, click, rub or gallop  Abdomen: soft, non-tender. Extremities: extremities normal, atraumatic, no cyanosis or edema      Assessment/Plans:    Diagnoses and all orders for this visit:    1. Chronic anticoagulation  -     AMB POC PT/INR  -     warfarin (COUMADIN) 1 mg tablet; 2mg We,Th,Fr,Dodd,Mo and 1mg Sat and Tuesday    2. Subtherapeutic international normalized ratio (INR)    3. H/O aortic valve replacement  -     warfarin (COUMADIN) 1 mg tablet; 2mg We,Th,Fr,Dodd,Mo and 1mg Sat and Tuesday    4. Encounter for education    5. Noncompliance    6. Anxiety and depression  -     buPROPion XL (WELLBUTRIN XL) 150 mg tablet; Take 1 Tablet by mouth daily. Discussed plans, risk/benefits of treatments/observations. Through the use of shared decision making, above plans were agreed upon. Medication compliance advised. Patient verbalized understanding.      Follow-up and Dispositions    Return in about 1 week (around 1/25/2023) for INR.          Pennie Farris MD  1/19/2023

## 2023-01-18 NOTE — PROGRESS NOTES
Chief Complaint   Patient presents with    Anticoagulation     Check INR       1. Have you been to the ER, urgent care clinic since your last visit? Hospitalized since your last visit? No    2. Have you seen or consulted any other health care providers outside of the [address] [place] [address] since your last visit? Include any pap smears or colon screening.  Yes Ortho

## 2023-01-30 RX ORDER — CELECOXIB 200 MG/1
200 CAPSULE ORAL
Qty: 30 CAPSULE | Refills: 1 | Status: SHIPPED | OUTPATIENT
Start: 2023-01-30

## 2023-02-02 ENCOUNTER — TELEPHONE (OUTPATIENT)
Dept: FAMILY MEDICINE CLINIC | Age: 83
End: 2023-02-02

## 2023-02-02 NOTE — TELEPHONE ENCOUNTER
Spoke with Minh Yen from [facility]. No record of O2 use noted. Pox 94 % room air.  She is going to check with Cardiologist.

## 2023-02-02 NOTE — TELEPHONE ENCOUNTER
Miki Osgood calling back from [address]     She isnt sure if Dr Sandra Ramirez is concerned about patient oxygen levels or another doctor but pt is 78years old and she is just trying to help her. RE:     Katarzyna Rosaline    11:31 AM  Note   #[phone]  Miki Osgood with [employer] states that she will need a diagnosis code for the portable O2 in order to process the claim. Please call with that diagnosis code. Thanks.

## 2023-02-06 DIAGNOSIS — Z79.4 TYPE 2 DIABETES MELLITUS WITHOUT COMPLICATION, WITH LONG-TERM CURRENT USE OF INSULIN (HCC): ICD-10-CM

## 2023-02-06 DIAGNOSIS — E11.9 TYPE 2 DIABETES MELLITUS WITHOUT COMPLICATION, WITH LONG-TERM CURRENT USE OF INSULIN (HCC): ICD-10-CM

## 2023-02-06 RX ORDER — TRAZODONE HYDROCHLORIDE 100 MG/1
TABLET ORAL
Qty: 30 TABLET | Refills: 0 | Status: SHIPPED | OUTPATIENT
Start: 2023-02-06 | End: 2023-02-08

## 2023-02-06 RX ORDER — METFORMIN HYDROCHLORIDE 500 MG/1
TABLET, EXTENDED RELEASE ORAL
Qty: 30 TABLET | Refills: 2 | Status: SHIPPED | OUTPATIENT
Start: 2023-02-06 | End: 2023-02-08

## 2023-02-08 DIAGNOSIS — E11.9 TYPE 2 DIABETES MELLITUS WITHOUT COMPLICATION, WITH LONG-TERM CURRENT USE OF INSULIN (HCC): ICD-10-CM

## 2023-02-08 DIAGNOSIS — Z79.4 TYPE 2 DIABETES MELLITUS WITHOUT COMPLICATION, WITH LONG-TERM CURRENT USE OF INSULIN (HCC): ICD-10-CM

## 2023-02-08 RX ORDER — TRAZODONE HYDROCHLORIDE 100 MG/1
TABLET ORAL
Qty: 30 TABLET | Refills: 0 | Status: SHIPPED | OUTPATIENT
Start: 2023-02-08

## 2023-02-08 RX ORDER — METFORMIN HYDROCHLORIDE 500 MG/1
TABLET, EXTENDED RELEASE ORAL
Qty: 30 TABLET | Refills: 2 | Status: SHIPPED | OUTPATIENT
Start: 2023-02-08 | End: 2023-02-09

## 2023-02-09 ENCOUNTER — OFFICE VISIT (OUTPATIENT)
Dept: FAMILY MEDICINE CLINIC | Age: 83
End: 2023-02-09
Payer: MEDICARE

## 2023-02-09 VITALS
OXYGEN SATURATION: 95 % | DIASTOLIC BLOOD PRESSURE: 48 MMHG | TEMPERATURE: 97.7 F | HEART RATE: 62 BPM | BODY MASS INDEX: 23.45 KG/M2 | HEIGHT: 62 IN | SYSTOLIC BLOOD PRESSURE: 114 MMHG | WEIGHT: 127.4 LBS | RESPIRATION RATE: 18 BRPM

## 2023-02-09 DIAGNOSIS — E11.69 TYPE 2 DIABETES MELLITUS WITH OTHER SPECIFIED COMPLICATION, WITHOUT LONG-TERM CURRENT USE OF INSULIN (HCC): ICD-10-CM

## 2023-02-09 DIAGNOSIS — Z79.01 CHRONIC ANTICOAGULATION: Primary | ICD-10-CM

## 2023-02-09 DIAGNOSIS — Z95.2 H/O AORTIC VALVE REPLACEMENT: ICD-10-CM

## 2023-02-09 DIAGNOSIS — Z79.899 ENCOUNTER FOR LONG-TERM (CURRENT) USE OF MEDICATIONS: ICD-10-CM

## 2023-02-09 DIAGNOSIS — F32.A ANXIETY AND DEPRESSION: ICD-10-CM

## 2023-02-09 DIAGNOSIS — I50.22 CHRONIC SYSTOLIC (CONGESTIVE) HEART FAILURE (HCC): ICD-10-CM

## 2023-02-09 DIAGNOSIS — Z91.199 NONCOMPLIANCE: ICD-10-CM

## 2023-02-09 DIAGNOSIS — I82.5Y2 CHRONIC DEEP VEIN THROMBOSIS (DVT) OF PROXIMAL VEIN OF LEFT LOWER EXTREMITY (HCC): ICD-10-CM

## 2023-02-09 DIAGNOSIS — E11.69 HYPERLIPIDEMIA ASSOCIATED WITH TYPE 2 DIABETES MELLITUS (HCC): ICD-10-CM

## 2023-02-09 DIAGNOSIS — E78.5 HYPERLIPIDEMIA ASSOCIATED WITH TYPE 2 DIABETES MELLITUS (HCC): ICD-10-CM

## 2023-02-09 DIAGNOSIS — F41.9 ANXIETY AND DEPRESSION: ICD-10-CM

## 2023-02-09 LAB
INR BLD: 1.3
PT POC: NORMAL
VALID INTERNAL CONTROL?: YES

## 2023-02-09 PROCEDURE — 85610 PROTHROMBIN TIME: CPT | Performed by: FAMILY MEDICINE

## 2023-02-09 PROCEDURE — G0463 HOSPITAL OUTPT CLINIC VISIT: HCPCS | Performed by: FAMILY MEDICINE

## 2023-02-09 RX ORDER — WARFARIN 2 MG/1
2 TABLET ORAL DAILY
Qty: 90 TABLET | Refills: 0 | Status: SHIPPED | OUTPATIENT
Start: 2023-02-09

## 2023-02-09 RX ORDER — WARFARIN 2 MG/1
2 TABLET ORAL DAILY
COMMUNITY
Start: 2023-01-22 | End: 2023-02-09 | Stop reason: SDUPTHER

## 2023-02-09 NOTE — PROGRESS NOTES
Vera Tovar is a 78 y.o. female    Met this pt 11/2022, 2 months ago. She have been consistently noncompliant to f/up and meds direction on her INR and coumadin    She is w her care taker today       has a past medical history of Anxiety, Aortic valve stenosis (07/2022), Arthritis, Asthma, CAD (coronary artery disease), Chronic anticoagulation (11/23/2022), Chronic obstructive pulmonary disease ([facility] [address] Chronic pain, Depression with anxiety (11/23/2022), Diabetes ([facility] [address] GERD (gastroesophageal reflux disease), H/O aortic valve replacement (07/2022), History of blood transfusion, History of DVT (deep vein thrombosis), History of recurrent UTIs, seasonal allergies, Hypercholesterolemia, Irritable bowel syndrome (IBS), Migraine, Noncompliance (1/18/2023), Oxygen dependent, Psychiatric disorder, Thromboembolus ([facility] [address] Thyroid disease, and Urinary urgency. Some weight loss, her weight is up and down. Her BMI is 23 today. She can't eat b/c of no denture. She can't buy denture b/c of money  We discussed glucerna. S/p stent, valve replacement  Coumadin 1mg for 3 days then 2mg for 4 days  INR 1.3, 1.7, 2.5, (1/3/2023 knee procedure), 2.1, 2.6  coumadin to 2mg 5 days/week, 1mg for 2 days per week. Last visit she wasn't taking her coumadin correctly b/c she thinks INR 2.5 was too high, but this visit she is taking coumadin as directed. We'll increase coumadin to 2mg everyday    DM2: A1C 7.2% 11/2022, 6.3% 07/2022  Metformin ER 500mg - causes stomach aches and pain - we'll d/c  Start jardiance instead     HTN: BP at goal  metoprolol     HLD: zetia, lipitor    hx of depression anxiety with somatization.    Denies bipolar  Denies visual or auditory hallucination or delusion  Denies SI or HI  Psych saw in the past, but past many years it's been managed by her previous PCPs  Have been on these meds for years Seroquel, trazodone, wellbutrin, lexapro  Currently stable    Migraine: [address]    Cardiology: Dr. Cordelia Snowden, Dr. Jordan Block  CAD: plavix and coumadin  S/p transcatheter aortic valve replacement. Asthma, COPD - pulm specialist     Gastro referral done  nexium  IBS Bentyl - nausea and diarrhea    Hx of hep b     Chronic pain. Sees orthopedic              Tramadol, amitriptyline       Reviewed: active problem list, medication list, allergies, notes from last encounter, lab results    A comprehensive review of systems was negative except for that written in the HPI. Allergies   Allergen Reactions    Zantac [Ranitidine Hcl] Nausea Only    Iodinated Contrast Media Other (comments)     Passes out    Morphine Other (comments)     Agitation,hallucinations    Pcn [Penicillins] Shortness of Breath    Pepcid [Famotidine] Nausea and Vomiting    Sulfa (Sulfonamide Antibiotics) Shortness of Breath     Current Outpatient Medications on File Prior to Visit   Medication Sig Dispense Refill    amitriptyline (ELAVIL) 10 mg tablet TAKE 1 TABLET BY MOUTH EVERY NIGHT 90 Tablet 0    celecoxib (CELEBREX) 200 mg capsule Take 1 Capsule by mouth daily as needed for Pain. 30 Capsule 1    buPROPion XL (WELLBUTRIN XL) 150 mg tablet Take 1 Tablet by mouth daily. 90 Tablet 0    warfarin (COUMADIN) 1 mg tablet 2mg We,Th,Fr,Su,Mo and 1mg Sat and Tuesday 90 Tablet 0    loperamide (IMODIUM) 2 mg capsule Take 2 mg by mouth four (4) times daily. mirabegron ER (Myrbetriq) 25 mg ER tablet Take 1 Tablet by mouth daily. 90 Tablet 0    benzonatate (TESSALON) 100 mg capsule TAKE 1 CAPSULE BY MOUTH THREE TIMES DAILY AS NEEDED FOR COUGH      traMADoL (ULTRAM) 50 mg tablet Take 50 mg by mouth two (2) times a day. FreeStyle Tomasa 2 [place] misc USE AS DIRECTED TO SCAN SENSOR DAILY 1 Each 0    levocetirizine (XYZAL) 5 mg tablet Take 1 Tablet by mouth daily. acetaminophen (TYLENOL) 500 mg tablet Take 1,000 mg by mouth every six (6) hours as needed for Pain.      levETIRAcetam (KEPPRA) 750 mg tablet Take 750 mg by mouth every evening.       fluticasone propion-salmeteroL (Wixela Inhub) 250-50 mcg/dose diskus inhaler INHALE 1 PUFF AND INTO THE LUNGS EVERY 12 HOURS. RINSE MOUTH AFTER USE 60 Each 12    nystatin (MYCOSTATIN) 100,000 unit/mL suspension SWISH 5ML FOR 30 SECONDS AND SWALLOW FOUR TIMES DAILY AS NEEDED FOR MOUTH PAIN 60 mL 2    esomeprazole (NEXIUM) 20 mg capsule TAKE 1 CAPSULE BY MOUTH DAILY 90 Capsule 3    metoprolol succinate (TOPROL-XL) 50 mg XL tablet TAKE 1 TABLET BY MOUTH DAILY 90 Tablet 3    ezetimibe (ZETIA) 10 mg tablet TAKE 1 TABLET BY MOUTH DAILY 90 Tablet 3    levETIRAcetam (KEPPRA) 500 mg tablet TAKE 1 TABLET BY MOUTH EVERY MORNING AND 1 AND 1/2 TABLET BY MOUTH EVERY EVENING FOR MIGRAINES 225 Tablet 3    QUEtiapine (SEROquel) 25 mg tablet Take 1 Tablet by mouth nightly. 90 Tablet 3    escitalopram oxalate (LEXAPRO) 20 mg tablet Take 1 Tablet by mouth in the morning. 90 Tablet 3    flash glucose sensor (FreeStyle Tomasa 2 Sensor) kit 1 Each by Does Not Apply route See Mason Godfrey. Apply and replace sensor every 14 days. Use to scan sensor daily. 2 Kit 11    atorvastatin (LIPITOR) 20 mg tablet TAKE 1 TABLET BY MOUTH EVERY DAY 90 Tablet 3    Bifidobacterium Infantis (Align) 4 mg cap Take 1 Capsule by mouth daily as needed for Diarrhea. Indications: ALIGN OTC 90 Capsule 3    oxybutynin chloride XL (DITROPAN XL) 10 mg CR tablet TAKE 1 TABLET BY MOUTH EVERY DAY 90 Tablet 3    albuterol (PROVENTIL HFA, VENTOLIN HFA, PROAIR HFA) 90 mcg/actuation inhaler Take 2 Puffs by inhalation every six (6) hours as needed for Wheezing. 8 g 2    ondansetron hcl (ZOFRAN) 4 mg tablet TAKE 1 TABLET BY MOUTH EVERY 8 HOURS AS NEEDED FOR NAUSEA OR VOMITING 30 Tablet 2    diclofenac (VOLTAREN) 1 % gel Apply  to affected area every twelve (12) hours as needed for Pain.  100 g 2    traZODone (DESYREL) 100 mg tablet TAKE 1 TABLET BY MOUTH EVERY DAY 30 Tablet 0    clopidogreL (PLAVIX) 75 mg tab TAKE 1 TABLET BY MOUTH IN THE MORNING (Patient not taking: Reported on 2/9/2023)       No current facility-administered medications on file prior to visit. 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    Coronary artery disease I25.10    AS (aortic stenosis) I35.0    Chronic systolic (congestive) heart failure I50.22    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 Normal without focal findings. Cranial nerves 2-12 intact. Normal coordination and gait. Mental status: Alert, oriented, thought content appropriate, affect: stable, mood-congruent. Head: Normocephalic, without obvious abnormality, atraumatic  Eyes: conjunctivae/corneas clear. PERRL, EOM's intact. Neck: supple, symmetrical, trachea midline, no JVD  Lungs: clear to auscultation bilaterally  Heart: regular rate and rhythm, S1, S2 normal, no murmur, click, rub or gallop  Abdomen: soft, non-tender.    Extremities: extremities normal, atraumatic, no cyanosis or edema      Assessment/Plans:    Diagnoses and all orders for this visit:    1. Chronic anticoagulation  -     AMB POC PT/INR  -     CBC W/O DIFF  -     warfarin (COUMADIN) 2 mg tablet; Take 1 Tablet by mouth daily. 2. H/O aortic valve replacement  -     warfarin (COUMADIN) 2 mg tablet; Take 1 Tablet by mouth daily. 3. Chronic deep vein thrombosis (DVT) of proximal vein of left lower extremity (HCC)  -     warfarin (COUMADIN) 2 mg tablet; Take 1 Tablet by mouth daily. 4. Noncompliance    5. Type 2 diabetes mellitus with other specified complication, without long-term current use of insulin (HCC)  -     HEMOGLOBIN A1C WITH EAG  -     LIPID PANEL  -     METABOLIC PANEL, COMPREHENSIVE  -     CBC W/O DIFF  -     TSH 3RD GENERATION  -     empagliflozin (Jardiance) 10 mg tablet; Take 1 Tablet by mouth daily. 6. Anxiety and depression  -     METABOLIC PANEL, COMPREHENSIVE  -     TSH 3RD GENERATION    7. Hyperlipidemia associated with type 2 diabetes mellitus (HCC)  -     LIPID PANEL  -     METABOLIC PANEL, COMPREHENSIVE  -     CBC W/O DIFF  -     TSH 3RD GENERATION    8. Chronic systolic (congestive) heart failure (HCC)  -     METABOLIC PANEL, COMPREHENSIVE  -     CBC W/O DIFF  -     TSH 3RD GENERATION    9. Encounter for long-term (current) use of medications  -     HEMOGLOBIN A1C WITH EAG  -     LIPID PANEL  -     METABOLIC PANEL, COMPREHENSIVE  -     CBC W/O DIFF  -     TSH 3RD GENERATION    Discussed plans, risk/benefits of treatments/observations. Through the use of shared decision making, above plans were agreed upon. Medication compliance advised. Patient verbalized understanding. Follow-up and Dispositions    Return in about 2 weeks (around 2/23/2023) for INR.          Tim Reveles MD  2/9/2023

## 2023-02-09 NOTE — PROGRESS NOTES
Chief Complaint   Patient presents with    Anticoagulation     Check INR. Wants to discuss meds    1. Have you been to the ER, urgent care clinic since your last visit? Hospitalized since your last visit? No    2. Have you seen or consulted any other health care providers outside of the [address] [place] [address] since your last visit? Include any pap smears or colon screening.  Yes

## 2023-02-10 LAB
ALBUMIN SERPL-MCNC: 4.1 G/DL (ref 3.6–4.6)
ALBUMIN/GLOB SERPL: 1.6 {RATIO} (ref 1.2–2.2)
ALP SERPL-CCNC: 67 IU/L (ref 44–121)
ALT SERPL-CCNC: 6 IU/L (ref 0–32)
AST SERPL-CCNC: 14 IU/L (ref 0–40)
BILIRUB SERPL-MCNC: 0.3 MG/DL (ref 0–1.2)
BUN SERPL-MCNC: 20 MG/DL (ref 8–27)
BUN/CREAT SERPL: 24 (ref 12–28)
CALCIUM SERPL-MCNC: 9.2 MG/DL (ref 8.7–10.3)
CHLORIDE SERPL-SCNC: 104 MMOL/L (ref 96–106)
CHOLEST SERPL-MCNC: 103 MG/DL (ref 100–199)
CO2 SERPL-SCNC: 21 MMOL/L (ref 20–29)
CREAT SERPL-MCNC: 0.83 MG/DL (ref 0.57–1)
EGFRCR SERPLBLD CKD-EPI 2021: 69 ML/MIN/1.73
ERYTHROCYTE [DISTWIDTH] IN BLOOD BY AUTOMATED COUNT: 14.7 % (ref 11.7–15.4)
EST. AVERAGE GLUCOSE BLD GHB EST-MCNC: 160 MG/DL
GLOBULIN SER CALC-MCNC: 2.5 G/DL (ref 1.5–4.5)
GLUCOSE SERPL-MCNC: 112 MG/DL (ref 70–99)
HBA1C MFR BLD: 7.2 % (ref 4.8–5.6)
HCT VFR BLD AUTO: 35 % (ref 34–46.6)
HDLC SERPL-MCNC: 46 MG/DL
HGB BLD-MCNC: 10.3 G/DL (ref 11.1–15.9)
LDLC SERPL CALC-MCNC: 34 MG/DL (ref 0–99)
MCH RBC QN AUTO: 23.7 PG (ref 26.6–33)
MCHC RBC AUTO-ENTMCNC: 29.4 G/DL (ref 31.5–35.7)
MCV RBC AUTO: 81 FL (ref 79–97)
PLATELET # BLD AUTO: 202 X10E3/UL (ref 150–450)
POTASSIUM SERPL-SCNC: 4 MMOL/L (ref 3.5–5.2)
PROT SERPL-MCNC: 6.6 G/DL (ref 6–8.5)
RBC # BLD AUTO: 4.35 X10E6/UL (ref 3.77–5.28)
SODIUM SERPL-SCNC: 142 MMOL/L (ref 134–144)
TRIGL SERPL-MCNC: 129 MG/DL (ref 0–149)
TSH SERPL DL<=0.005 MIU/L-ACNC: 8.67 UIU/ML (ref 0.45–4.5)
VLDLC SERPL CALC-MCNC: 23 MG/DL (ref 5–40)
WBC # BLD AUTO: 8.1 X10E3/UL (ref 3.4–10.8)

## 2023-02-24 NOTE — PERIOP NOTES
[facility]  Ambulatory Surgery Unit  Pre-operative Instructions    Procedure Date  2/28/2023            Tentative Arrival Time 1530      1. On the day of your procedure, please report to the Ambulatory Surgery Unit Registration Desk and sign in at your designated time. The Ambulatory Surgery Unit is located in [facility] on the [facility] side of the [facility] across from the [address] [place] [address]. Please have all of your health insurance cards, copayment, and a photo ID.    **TWO adults may accompany you the day of the procedure. We have limited seating available. If our waiting room is at capacity, your ride may be asked to remain in their vehicle. No one under 15 is allowed in the waiting room. 2. You must have someone with you to drive you home as directed by your surgeon. 3. You may have a light breakfast and take normal morning medications. 4. We recommend you do not drink any alcoholic beverages for 24 hours before and after your procedure. 5. Contact your surgeons office for instructions on the following medications: non-steroidal anti-inflammatory drugs (i.e. Advil, Aleve), vitamins, and supplements. (Some surgeons will want you to stop these medications prior to surgery and others may allow you to take them)   **If you are currently taking Plavix, Coumadin, Aspirin and/or other blood-thinning agents, contact your surgeon for instructions. ** Your surgeon will partner with the physician prescribing these medications to determine if it is safe to stop or if you need to continue taking. Please do not stop taking these medications without instructions from your surgeon. 6. In an effort to help prevent surgical site infection, we ask that you shower with an anti-bacterial soap (i.e. Dial or Safeguard) on the morning of your procedure. Do not apply any lotions, powders, or deodorants after showering. 7. Wear comfortable clothes. Wear glasses instead of contacts.  Do not bring any jewelry or money (other than copays or fees as instructed). Do not wear make-up, particularly mascara, the morning of your procedure. Wear your hair loose or down, no ponytails, buns, jose pins or clips. All body piercings must be removed. 8. You should understand that if you do not follow these instructions your procedure may be cancelled. If your physical condition changes (i.e. fever, cold or flu) please contact your surgeon as soon as possible. 9. It is important that you be on time. If a situation occurs where you may be late, or if you have any questions or problems, please call [phone].    10. Your procedure time may be subject to change. You will receive a phone call the day prior to confirm your arrival time. I understand a pre-operative phone call will be made to verify my procedure time. In the event that I am not available, I give permission for a message to be left on my answering service and/or with another person?       Yes      Reviewed instructions with patient, able to verbalized understanding.         ___________________      ___________________      ___________________  (Signature of Patient)          (Witness)                   (Date and Time)

## 2023-02-28 ENCOUNTER — HOSPITAL ENCOUNTER (OUTPATIENT)
Age: 83
Setting detail: OUTPATIENT SURGERY
Discharge: HOME | End: 2023-02-28
Attending: PHYSICAL MEDICINE & REHABILITATION | Admitting: PHYSICAL MEDICINE & REHABILITATION
Payer: MEDICARE

## 2023-02-28 ENCOUNTER — APPOINTMENT (OUTPATIENT)
Dept: GENERAL RADIOLOGY | Age: 83
End: 2023-02-28
Attending: PHYSICAL MEDICINE & REHABILITATION
Payer: MEDICARE

## 2023-02-28 VITALS
DIASTOLIC BLOOD PRESSURE: 95 MMHG | HEIGHT: 61 IN | TEMPERATURE: 97.6 F | SYSTOLIC BLOOD PRESSURE: 128 MMHG | HEART RATE: 63 BPM | WEIGHT: 127 LBS | RESPIRATION RATE: 16 BRPM | BODY MASS INDEX: 23.98 KG/M2 | OXYGEN SATURATION: 92 %

## 2023-02-28 LAB
GLUCOSE BLD STRIP.AUTO-MCNC: 96 MG/DL (ref 65–117)
INR BLD: 1.9 (ref 0.9–1.2)
SERVICE CMNT-IMP: NORMAL

## 2023-02-28 PROCEDURE — 74011000250 HC RX REV CODE- 250: Performed by: PHYSICAL MEDICINE & REHABILITATION

## 2023-02-28 PROCEDURE — 76210000046 HC AMBSU PH II REC FIRST 0.5 HR: Performed by: PHYSICAL MEDICINE & REHABILITATION

## 2023-02-28 PROCEDURE — 82962 GLUCOSE BLOOD TEST: CPT

## 2023-02-28 PROCEDURE — 76000 FLUOROSCOPY <1 HR PHYS/QHP: CPT

## 2023-02-28 PROCEDURE — 85610 PROTHROMBIN TIME: CPT

## 2023-02-28 PROCEDURE — 72020 X-RAY EXAM OF SPINE 1 VIEW: CPT

## 2023-02-28 PROCEDURE — 74011250636 HC RX REV CODE- 250/636: Performed by: PHYSICAL MEDICINE & REHABILITATION

## 2023-02-28 PROCEDURE — 2709999900 HC NON-CHARGEABLE SUPPLY: Performed by: PHYSICAL MEDICINE & REHABILITATION

## 2023-02-28 PROCEDURE — 77030003665 HC NDL SPN BBMI -A: Performed by: PHYSICAL MEDICINE & REHABILITATION

## 2023-02-28 PROCEDURE — 76030000002 HC AMB SURG OR TIME FIRST 0.: Performed by: PHYSICAL MEDICINE & REHABILITATION

## 2023-02-28 RX ORDER — LIDOCAINE HYDROCHLORIDE 20 MG/ML
10 INJECTION, SOLUTION INFILTRATION; PERINEURAL ONCE
Status: COMPLETED | OUTPATIENT
Start: 2023-02-28 | End: 2023-02-28

## 2023-02-28 RX ORDER — BUPIVACAINE HYDROCHLORIDE 5 MG/ML
10 INJECTION, SOLUTION EPIDURAL; INTRACAUDAL ONCE
Status: COMPLETED | OUTPATIENT
Start: 2023-02-28 | End: 2023-02-28

## 2023-02-28 RX ORDER — DEXAMETHASONE SODIUM PHOSPHATE 4 MG/ML
6 INJECTION, SOLUTION INTRA-ARTICULAR; INTRALESIONAL; INTRAMUSCULAR; INTRAVENOUS; SOFT TISSUE ONCE
Status: COMPLETED | OUTPATIENT
Start: 2023-02-28 | End: 2023-02-28

## 2023-02-28 NOTE — DISCHARGE INSTRUCTIONS
Discharge Instructions    Lumbar Facet Block/Medial Branch Block    You had a lumbar facet injection today. You will probably have some numbness in your lower back area for the next 6-8 hours. The steroids will slowly become effective, reducing your pain, over the next 2 weeks. You should begin feeling better after a few days, but it may take up to 2 weeks to notice the difference. The benefit you get from your injection will last a variable amount of time, depending on the severity of your spine problem. If the results you experience are significant, but not lasting a long time, you may be a candidate for a procedure that can be longer lasting (radiofrequency ablation of the nerves innervating the facet joints). Pain:  Most people do not have any increase in pain after this injection. However, you might experience some soreness at the site of the injection. If this happens, putting an icepack over the sore area will help. Bandage: You have a small bandage covering the site of the injection. You may remove it when you get home. Restrictions:  Someone should drive you home after the injection. After that, you have no restrictions. You may resume your normal level of activity. You may take a shower or bath, and you may eat normally. You should continue your current exercised and/or therapy routine. Medications:  Continue your current medications as prescribed. If your pain decreases, you may reduce the amount of your pain medicines. If you stopped taking anticoagulants or blood-thinners before the injection, start them tomorrow. If you have diabetes, your blood sugar may be elevated for a few days. Call your primary doctor with any questions.     Call Dr. Miguelito Merino at [phone] if you experience:  Fever (101 degrees Fahrenheit or greater)  Nausea or vomiting  Headache unrelieved by your normal pain medicine  Redness or swelling at the injection site that lasts more than 1 day  New numbness, tingling, weakness, or pain that you didnt have before the injection     If still having pain in 1-2 weeks, call office at [phone] for a follow up appointment. DISCHARGE SUMMARY from Nurse    The following personal items collected during your admission are returned to you:   Dental Appliance: Dental Appliances: With patient  Vision: Visual Aid: Glasses  Hearing Aid:    Jewelry: Jewelry: With patient, Necklace, Earrings  Clothing: Clothing: With patient  Other Valuables: Other Valuables: Cell Phone, Breanna Alvarado (IN PACU purse with patient)  Valuables sent to safe: If you were given prescriptions, please review the written information on prescribed medications. You will receive a Post Operative Call from one of the Recovery Room Nurses on the day after your surgery to check on you. It is very important for us to know how you are recovering after your surgery. You may receive an e-mail or letter in the mail from [employer] regarding your experience with us in the Ambulatory Surgery Unit. Your feedback is valuable to us and we appreciate your participation in the survey. If you have not had your influenza or pneumococcal vaccines, please follow up with your primary care physician. The discharge information has been reviewed with the patient. The patient verbalized understanding.

## 2023-02-28 NOTE — PERIOP NOTES
PATIENT DENIES PAIN WHEN ASSESSED BY DR. Chavez Martel. PATIENT TOLERATING PROCEDURE WITHOUT COMPLAINT.

## 2023-02-28 NOTE — PERIOP NOTES
[facility]  1940  [id number]    Situation:  Verbal report given from: Ezio Campbell RN  Procedure: Procedure(s):  BILATERAL L4-5, L5-S1 MEDIAL BRANCH BLOCK    Background:    Preoperative diagnosis: Spondylolisthesis of lumbar region [M43.16]  Degenerative disc disease, lumbar [M51.36]    Postoperative diagnosis: Spondylolisthesis of lumbar region [M43.16]  Degenerative     :  Dr. Hernandez Share:  Intra-procedure medications, procedure, and allergies reviewed        Recommendation:    Discharge patient home after discharge instructions reviewed with patient. Rest until local has worn off.

## 2023-02-28 NOTE — OP NOTES
Operative Note    Patient: Janine Moffett  YOB: 1940  MRN: [id number]    Date of Procedure: 2/28/2023   Facet Medial Branch Block Injection Operative Report    Indications: This is a 78 y.o. female who presents with LBP. She was positive for LS DJD. The patient was admitted for diagnostic procedure as conservative measures have failed. Date of Surgery: 2/28/2023    Preoperative Diagnosis: Lumbar Spondylosis    Postoperative Diagnosis: Lumbar Spondylosis    Surgeon(s) and Role:     * Mya Escalera MD - Primary     Procedure:  Procedure(s):  BILATERAL L4-5, L5-S1 MEDIAL BRANCH BLOCK    Procedure in Detail:  After appropriate informed consent was obtained, the patient was taken to the operating suite and placed in the prone position on the operating table on appropriate padding. The LS region was prepped and draped in the usual sterile fashion. Intraoperative fluoroscopy was used to localize the LS spine. The skin was infiltrated with 2% lidocaine. A 25-g needle was advanced into the Cy L4-5 and L5-S1 Medial Branches under fluoroscopic guidance. No contrast use due to allergy. Permanent fluoroscopic images were saved in the patient's record. Next, 4ml of 0.5% marcaine and 10mg of Dexamethasone were injected divided equally between locations. The needle was removed from the patient. The patient was then turned back into the supine position on the stretcher and was taken to the Recovery Room in stable condition.     Estimated Blood Loss:  none     Specimens: None       Drains: None          Complications:  None    Signed By: Varun oBnilla MD                        February 28, 2023          Electronically Signed by Varun Bonilla MD on 2/28/2023 at 4:48 PM

## 2023-02-28 NOTE — PERIOP NOTES
Patient: David Estrada MRN: [id number]  SSN: [id number]   YOB: 1940  Age: 78 y.o. Sex: female     Patient is status post Procedure(s):  BILATERAL L4-5, L5-S1 MEDIAL BRANCH BLOCK.     Surgeon(s) and Role:     * Blanca Jimenez MD - Primary    Local/Dose/Irrigation:  SEE MAR                                     Dressing/Packing:     Lara Charles

## 2023-02-28 NOTE — PERIOP NOTES
Patient received to PACU, VSS. Patient awake and alert with no complaints of pain. Injection site intact. Neuro:  Push/Pull assessment:     LLE Response: push/pull strong   RLE Response: push/pull strong    Discharge instructions given. Patient verbalized understanding of instructions and follow up. Patient states ready for discharge - patient discharged at this time by wheelchair with all belongings. Gerald Gonsalves to provide transportation home.

## 2023-02-28 NOTE — H&P
Procedural Case Note    2/28/2023    (3:56 PM)    McCook Barley    1940   (78 y.o.)    [id number]    CC:  pain    ROS:   Complete ROS obtained, no CP, no SOB, no N or V    PMH:     Past Medical History:   Diagnosis Date    Anxiety     Aortic valve stenosis 07/2022    Arthritis     Asthma     CAD (coronary artery disease)     stent    Chronic anticoagulation 11/23/2022    Chronic obstructive pulmonary disease (HCC)     Chronic pain     Depression with anxiety 11/23/2022    Diabetes (HCC)     GERD (gastroesophageal reflux disease)     H/O aortic valve replacement 07/2022    Hepatitis B     1960's    History of blood transfusion     06/22    History of DVT (deep vein thrombosis)     History of recurrent UTIs     Hx of seasonal allergies     Hypercholesterolemia     Irritable bowel syndrome (IBS)     Migraine     Noncompliance 01/18/2023    Oxygen dependent     PRN at 2LPM [place]    Psychiatric disorder     anxiety    Thromboembolus ([address]     left leg    Thyroid disease     pt denies- not tx for it    Urinary urgency        ALLERGIES:     Allergies   Allergen Reactions    Zantac [Ranitidine Hcl] Nausea Only    Iodinated Contrast Media Other (comments)     Passes out    Morphine Other (comments)     Agitation,hallucinations    Pcn [Penicillins] Shortness of Breath    Pepcid [Famotidine] Nausea and Vomiting    Sulfa (Sulfonamide Antibiotics) Shortness of Breath       MEDS:     Current Facility-Administered Medications   Medication Dose Route Frequency    bupivacaine (PF) (MARCAINE) 0.5 % (5 mg/mL) injection 50 mg  10 mL Epidural ONCE    lidocaine (XYLOCAINE) 20 mg/mL (2 %) injection 200 mg  10 mL IntraDERMal ONCE    dexamethasone (DECADRON) 4 mg/mL injection 6 mg  6 mg Intra artICUlar ONCE        Visit Vitals  /77 (BP 1 Location: Right upper arm, BP Patient Position: At rest)   Pulse 67   Temp 98 °F (36.7 °C)   Resp 12   Ht 5' 1\" (1.549 m)   Wt 57.6 kg (127 lb)   SpO2 98%   BMI 24.00 kg/m²     PE:  Gen: NAD  Head: normocephalic  Heart: RRR  Lungs: CTA junito  Abd: NT, ND, soft  Neuro: awake and alert  Skin: intact    IMPRESSION:   Lumbar spondylosis    Note:  The clinical status was discussed in detail with the patient. The procedure was again discussed and described in detail. All understand and accept the planned procedure and risks; reject other forms of treatment. All questions are answered.     Selina Hutchins MD

## 2023-03-02 ENCOUNTER — OFFICE VISIT (OUTPATIENT)
Dept: FAMILY MEDICINE CLINIC | Age: 83
End: 2023-03-02

## 2023-03-02 VITALS
DIASTOLIC BLOOD PRESSURE: 40 MMHG | BODY MASS INDEX: 23.79 KG/M2 | OXYGEN SATURATION: 95 % | HEART RATE: 53 BPM | SYSTOLIC BLOOD PRESSURE: 102 MMHG | HEIGHT: 61 IN | TEMPERATURE: 96.5 F | RESPIRATION RATE: 18 BRPM | WEIGHT: 126 LBS

## 2023-03-02 DIAGNOSIS — Z79.01 CHRONIC ANTICOAGULATION: ICD-10-CM

## 2023-03-02 DIAGNOSIS — E11.69 TYPE 2 DIABETES MELLITUS WITH OTHER SPECIFIED COMPLICATION, WITHOUT LONG-TERM CURRENT USE OF INSULIN (HCC): ICD-10-CM

## 2023-03-02 DIAGNOSIS — Z95.2 H/O AORTIC VALVE REPLACEMENT: ICD-10-CM

## 2023-03-02 DIAGNOSIS — R79.1 SUBTHERAPEUTIC INTERNATIONAL NORMALIZED RATIO (INR): Primary | ICD-10-CM

## 2023-03-02 DIAGNOSIS — K86.89 PANCREATIC MASS: ICD-10-CM

## 2023-03-02 DIAGNOSIS — E03.9 ACQUIRED HYPOTHYROIDISM: ICD-10-CM

## 2023-03-02 LAB
INR BLD: 1.5
PT POC: NORMAL
VALID INTERNAL CONTROL?: YES

## 2023-03-02 RX ORDER — LEVOTHYROXINE SODIUM 25 UG/1
25 TABLET ORAL
Qty: 90 TABLET | Refills: 1 | Status: SHIPPED | OUTPATIENT
Start: 2023-03-02

## 2023-03-02 NOTE — PROGRESS NOTES
Chief Complaint   Patient presents with    Anticoagulation     Check INR    1. \"Have you been to the ER, urgent care clinic since your last visit? Hospitalized since your last visit? \" No    2. \"Have you seen or consulted any other health care providers outside of the [address] [place] [address] since your last visit? \" Yes Ortho      3. For patients aged 39-70: Has the patient had a colonoscopy / FIT/ Cologuard? NA - based on age      If the patient is female:    4. For patients aged 41-77: Has the patient had a mammogram within the past 2 years? NA - based on age or sex      11. For patients aged 21-65: Has the patient had a pap smear?  NA - based on age or sex

## 2023-03-02 NOTE — PROGRESS NOTES
Kasia Coronel is a 78 y.o. female    Met this pt 11/2022, 2 months ago. She have been consistently noncompliant to f/up and meds direction on her INR and coumadin    She is w her care taker today       has a past medical history of Anxiety, Aortic valve stenosis (07/2022), Arthritis, Asthma, CAD (coronary artery disease), Chronic anticoagulation (11/23/2022), Chronic obstructive pulmonary disease ([facility] [address] Chronic pain, Depression with anxiety (11/23/2022), Diabetes ([address] GERD (gastroesophageal reflux disease), H/O aortic valve replacement (07/2022), Hepatitis B, History of blood transfusion, History of DVT (deep vein thrombosis), History of recurrent UTIs, seasonal allergies, Hypercholesterolemia, Irritable bowel syndrome (IBS), Migraine, Noncompliance (01/18/2023), Oxygen dependent, Psychiatric disorder, Thromboembolus ([facility] [address] Thyroid disease, and Urinary urgency. Labs reviewed w pt    Some weight loss, her weight is up and down. Her BMI is 23 today. She can't eat b/c of no denture. She can't buy denture b/c of money  We discussed glucerna. S/p stent, valve replacement  INR 1.5, 1.3, 1.7, 2.5, (1/3/2023 knee procedure), 2.1, 2.6  Last visit we increased coumadin to 2mg everyday  \"I take too much\", \"according to me\". But she is taking it as prescribed. She says not eating much. Not eating vegetables. Will increase to 4mg Thursday and 2mg all other day    Hx of anemia, mild anemia, she denies seeing blood anywhere, denies melena. continue to monitor. TSH slightly off she feels better on levothyroxine 25mcg  Hypothyroid   We'll restart 25mg daily    DM2: A1C 7.2% 02/2023, 7.2% 11/2022, 6.3% 07/2022  Metformin ER 500mg - causes stomach aches and pain - we'll d/c  Start jardiance instead     HTN: BP at goal  metoprolol     HLD: zetia, lipitor    hx of depression anxiety with somatization.    Denies bipolar  Denies visual or auditory hallucination or delusion  Denies SI or HI  Psych saw in the past, but past many years it's been managed by her previous PCPs  Have been on these meds for years Seroquel, trazodone, wellbutrin, lexapro  Currently stable    Migraine: Milvia Evans  Refer neurologist    Cardiology: Dr. Afshna Crane, Dr. Reginia Schwab  CAD: plavix and coumadin  S/p transcatheter aortic valve replacement. Asthma, COPD - pulm specialist     Gastro referral done  nexium  IBS Bentyl - nausea and diarrhea    Hx of hep b     Chronic pain. Sees orthopedic              Tramadol, amitriptyline       Reviewed: active problem list, medication list, allergies, notes from last encounter, lab results    A comprehensive review of systems was negative except for that written in the HPI. Allergies   Allergen Reactions    Zantac [Ranitidine Hcl] Nausea Only    Iodinated Contrast Media Other (comments)     Passes out    Morphine Other (comments)     Agitation,hallucinations    Pcn [Penicillins] Shortness of Breath    Pepcid [Famotidine] Nausea and Vomiting    Sulfa (Sulfonamide Antibiotics) Shortness of Breath     Current Outpatient Medications on File Prior to Visit   Medication Sig Dispense Refill    warfarin (COUMADIN) 2 mg tablet Take 1 Tablet by mouth daily. 90 Tablet 0    traZODone (DESYREL) 100 mg tablet TAKE 1 TABLET BY MOUTH EVERY DAY 30 Tablet 0    amitriptyline (ELAVIL) 10 mg tablet TAKE 1 TABLET BY MOUTH EVERY NIGHT 90 Tablet 0    celecoxib (CELEBREX) 200 mg capsule Take 1 Capsule by mouth daily as needed for Pain. 30 Capsule 1    buPROPion XL (WELLBUTRIN XL) 150 mg tablet Take 1 Tablet by mouth daily. 90 Tablet 0    loperamide (IMODIUM) 2 mg capsule Take 2 mg by mouth four (4) times daily. mirabegron ER (Myrbetriq) 25 mg ER tablet Take 1 Tablet by mouth daily. 90 Tablet 0    benzonatate (TESSALON) 100 mg capsule TAKE 1 CAPSULE BY MOUTH THREE TIMES DAILY AS NEEDED FOR COUGH      traMADoL (ULTRAM) 50 mg tablet Take 50 mg by mouth two (2) times a day.       FreeStyle Tomasa 2 [place] misc USE AS DIRECTED TO SCAN SENSOR DAILY 1 Each 0    clopidogreL (PLAVIX) 75 mg tab TAKE 1 TABLET BY MOUTH IN THE MORNING      levocetirizine (XYZAL) 5 mg tablet Take 1 Tablet by mouth daily. acetaminophen (TYLENOL) 500 mg tablet Take 1,000 mg by mouth every six (6) hours as needed for Pain.      levETIRAcetam (KEPPRA) 750 mg tablet Take 750 mg by mouth every evening. fluticasone propion-salmeteroL (Wixela Inhub) 250-50 mcg/dose diskus inhaler INHALE 1 PUFF AND INTO THE LUNGS EVERY 12 HOURS. RINSE MOUTH AFTER USE 60 Each 12    nystatin (MYCOSTATIN) 100,000 unit/mL suspension SWISH 5ML FOR 30 SECONDS AND SWALLOW FOUR TIMES DAILY AS NEEDED FOR MOUTH PAIN 60 mL 2    esomeprazole (NEXIUM) 20 mg capsule TAKE 1 CAPSULE BY MOUTH DAILY 90 Capsule 3    metoprolol succinate (TOPROL-XL) 50 mg XL tablet TAKE 1 TABLET BY MOUTH DAILY 90 Tablet 3    ezetimibe (ZETIA) 10 mg tablet TAKE 1 TABLET BY MOUTH DAILY 90 Tablet 3    levETIRAcetam (KEPPRA) 500 mg tablet TAKE 1 TABLET BY MOUTH EVERY MORNING AND 1 AND 1/2 TABLET BY MOUTH EVERY EVENING FOR MIGRAINES 225 Tablet 3    QUEtiapine (SEROquel) 25 mg tablet Take 1 Tablet by mouth nightly. 90 Tablet 3    escitalopram oxalate (LEXAPRO) 20 mg tablet Take 1 Tablet by mouth in the morning. 90 Tablet 3    flash glucose sensor (FreeStyle Tomasa 2 Sensor) kit 1 Each by Does Not Apply route See Mason Godfrey. Apply and replace sensor every 14 days. Use to scan sensor daily. 2 Kit 11    atorvastatin (LIPITOR) 20 mg tablet TAKE 1 TABLET BY MOUTH EVERY DAY 90 Tablet 3    Bifidobacterium Infantis (Align) 4 mg cap Take 1 Capsule by mouth daily as needed for Diarrhea. Indications: ALIGN OTC 90 Capsule 3    oxybutynin chloride XL (DITROPAN XL) 10 mg CR tablet TAKE 1 TABLET BY MOUTH EVERY DAY 90 Tablet 3    albuterol (PROVENTIL HFA, VENTOLIN HFA, PROAIR HFA) 90 mcg/actuation inhaler Take 2 Puffs by inhalation every six (6) hours as needed for Wheezing.  8 g 2    ondansetron hcl (ZOFRAN) 4 mg tablet TAKE 1 TABLET BY MOUTH EVERY 8 HOURS AS NEEDED FOR NAUSEA OR VOMITING 30 Tablet 2    diclofenac (VOLTAREN) 1 % gel Apply  to affected area every twelve (12) hours as needed for Pain. 100 g 2    warfarin (COUMADIN) 1 mg tablet 2mg We,Th,Fr,Su,Mo and 1mg Sat and Tuesday (Patient not taking: Reported on 3/2/2023) 90 Tablet 0     No current facility-administered medications on file prior to visit. 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    Coronary artery disease I25.10    AS (aortic stenosis) I35.0    Chronic systolic (congestive) heart failure I50.22    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 Normal without focal findings. Cranial nerves 2-12 intact. Normal coordination and gait. Mental status: Alert, oriented, thought content appropriate, affect: stable, mood-congruent. Head: Normocephalic, without obvious abnormality, atraumatic  Eyes: conjunctivae/corneas clear. PERRL, EOM's intact.    Neck: supple, symmetrical, trachea midline, no JVD  Lungs: clear to auscultation bilaterally  Heart: regular rate and rhythm, S1, S2 normal, no murmur, click, rub or gallop  Abdomen: soft, non-tender. Extremities: extremities normal, atraumatic, no cyanosis or edema      Assessment/Plans:    Diagnoses and all orders for this visit:    1. Subtherapeutic international normalized ratio (INR)    2. Chronic anticoagulation  -     AMB POC PT/INR    3. H/O aortic valve replacement    4. Acquired hypothyroidism  -     levothyroxine (SYNTHROID) 25 mcg tablet; Take 1 Tablet by mouth Daily (before breakfast). 5. Pancreatic mass  -     CT ABDOMEN W WO CONT AND PELVIS W CONT; Future    6. Type 2 diabetes mellitus with other specified complication, without long-term current use of insulin (HCC)  -     empagliflozin (Jardiance) 10 mg tablet; Take 1 Tablet by mouth daily. Discussed plans, risk/benefits of treatments/observations. Through the use of shared decision making, above plans were agreed upon. Medication compliance advised. Patient verbalized understanding. Follow-up and Dispositions    Return in about 11 days (around 3/13/2023) for INR.          Jacky Alex MD  3/2/2023

## 2023-03-06 RX ORDER — FLASH GLUCOSE SENSOR
1 KIT MISCELLANEOUS SEE ADMIN INSTRUCTIONS
Qty: 2 KIT | Refills: 11 | Status: SHIPPED | OUTPATIENT
Start: 2023-03-06

## 2023-03-06 NOTE — TELEPHONE ENCOUNTER
Patient is requesting a prescription be sent for free style christine sensors     Her sensor expires 2 days from today   She will need a new rx    Please send to new pharmacy     [address] [place] [address] Av    Patient's phone  [phone]

## 2023-03-07 DIAGNOSIS — M54.9 CHRONIC BACK PAIN GREATER THAN 3 MONTHS DURATION: Primary | ICD-10-CM

## 2023-03-07 DIAGNOSIS — G89.29 CHRONIC BACK PAIN GREATER THAN 3 MONTHS DURATION: Primary | ICD-10-CM

## 2023-03-08 ENCOUNTER — TELEPHONE (OUTPATIENT)
Dept: FAMILY MEDICINE CLINIC | Age: 83
End: 2023-03-08

## 2023-03-08 DIAGNOSIS — R30.0 DYSURIA: Primary | ICD-10-CM

## 2023-03-08 RX ORDER — NITROFURANTOIN 25; 75 MG/1; MG/1
100 CAPSULE ORAL 2 TIMES DAILY
Qty: 10 CAPSULE | Refills: 0 | Status: SHIPPED | OUTPATIENT
Start: 2023-03-08 | End: 2023-03-13

## 2023-03-08 RX ORDER — OXYBUTYNIN CHLORIDE 10 MG/1
TABLET, EXTENDED RELEASE ORAL
Qty: 90 TABLET | Refills: 0 | Status: SHIPPED | OUTPATIENT
Start: 2023-03-08

## 2023-03-08 RX ORDER — METOPROLOL SUCCINATE 50 MG/1
50 TABLET, EXTENDED RELEASE ORAL DAILY
Qty: 90 TABLET | Refills: 0 | Status: SHIPPED | OUTPATIENT
Start: 2023-03-08

## 2023-03-08 RX ORDER — TRAMADOL HYDROCHLORIDE 50 MG/1
TABLET ORAL
Qty: 60 TABLET | Refills: 0 | Status: SHIPPED | OUTPATIENT
Start: 2023-03-08 | End: 2023-04-07

## 2023-03-08 RX ORDER — TRAMADOL HYDROCHLORIDE 50 MG/1
TABLET ORAL
Qty: 60 TABLET | OUTPATIENT
Start: 2023-03-08

## 2023-03-08 RX ORDER — METFORMIN HYDROCHLORIDE 500 MG/1
500 TABLET, EXTENDED RELEASE ORAL
Qty: 30 TABLET | Refills: 2 | Status: SHIPPED | OUTPATIENT
Start: 2023-03-08

## 2023-03-08 NOTE — TELEPHONE ENCOUNTER
Connie calling - Obed Sepulveda     RE: Álvaro Tucker Sensor     Patient only has 1 hour left - if you have one in office maybe patient could use it  (?)      No direct number - pls call patient

## 2023-03-08 NOTE — TELEPHONE ENCOUNTER
Patient called, checking on status of rx being sent for uti    She is having pain,  burning, itching sensation    Patient also states her insurance does cover Irais Montgomery,   she will need to go back to Metformin        [address]

## 2023-03-08 NOTE — TELEPHONE ENCOUNTER
Pt states she has a UTI - she tested at home     She wants something called in       [address] number to reach her is [phone]

## 2023-03-08 NOTE — TELEPHONE ENCOUNTER
Pt needs info sent to pharmacy so they will fill (sounds like prior auth maybe - she is confused)     Re: Patient is requesting a prescription be sent for free style christine sensors      Her sensor expires 2 days from today   She will need a new rx     Please send to new pharmacy      [address] [facility]     Patient's phone  [phone]

## 2023-03-08 NOTE — TELEPHONE ENCOUNTER
Left message for patient . Rx sent in 3/6/23. If Insurance not covering it find out what brand they cover and let us know so can order.

## 2023-03-09 NOTE — TELEPHONE ENCOUNTER
MD Ryan Pantoja, JAVI  Caller: Unspecified (Yesterday, 10:13 AM)  I sent in macrobid BID X5 days.      Meek Jenkins MD   3/8/2023               Patient notified of above and that metformin called in

## 2023-03-13 ENCOUNTER — OFFICE VISIT (OUTPATIENT)
Dept: FAMILY MEDICINE CLINIC | Age: 83
End: 2023-03-13
Payer: MEDICARE

## 2023-03-13 VITALS
RESPIRATION RATE: 18 BRPM | WEIGHT: 124.4 LBS | HEIGHT: 61 IN | HEART RATE: 61 BPM | OXYGEN SATURATION: 95 % | TEMPERATURE: 98.2 F | SYSTOLIC BLOOD PRESSURE: 116 MMHG | DIASTOLIC BLOOD PRESSURE: 53 MMHG | BODY MASS INDEX: 23.49 KG/M2

## 2023-03-13 DIAGNOSIS — Z95.2 H/O AORTIC VALVE REPLACEMENT: ICD-10-CM

## 2023-03-13 DIAGNOSIS — Z79.01 CHRONIC ANTICOAGULATION: Primary | ICD-10-CM

## 2023-03-13 DIAGNOSIS — I82.5Y2 CHRONIC DEEP VEIN THROMBOSIS (DVT) OF PROXIMAL VEIN OF LEFT LOWER EXTREMITY (HCC): ICD-10-CM

## 2023-03-13 LAB
INR BLD: 1.6
PT POC: NORMAL
VALID INTERNAL CONTROL?: YES

## 2023-03-13 PROCEDURE — 1101F PT FALLS ASSESS-DOCD LE1/YR: CPT | Performed by: FAMILY MEDICINE

## 2023-03-13 PROCEDURE — G9717 DOC PT DX DEP/BP F/U NT REQ: HCPCS | Performed by: FAMILY MEDICINE

## 2023-03-13 PROCEDURE — 1090F PRES/ABSN URINE INCON ASSESS: CPT | Performed by: FAMILY MEDICINE

## 2023-03-13 PROCEDURE — 1123F ACP DISCUSS/DSCN MKR DOCD: CPT | Performed by: FAMILY MEDICINE

## 2023-03-13 PROCEDURE — G8427 DOCREV CUR MEDS BY ELIG CLIN: HCPCS | Performed by: FAMILY MEDICINE

## 2023-03-13 PROCEDURE — 85610 PROTHROMBIN TIME: CPT | Performed by: FAMILY MEDICINE

## 2023-03-13 PROCEDURE — 99213 OFFICE O/P EST LOW 20 MIN: CPT | Performed by: FAMILY MEDICINE

## 2023-03-13 PROCEDURE — G8536 NO DOC ELDER MAL SCRN: HCPCS | Performed by: FAMILY MEDICINE

## 2023-03-13 PROCEDURE — G8420 CALC BMI NORM PARAMETERS: HCPCS | Performed by: FAMILY MEDICINE

## 2023-03-13 RX ORDER — PREGABALIN 25 MG/1
CAPSULE ORAL
COMMUNITY
Start: 2023-03-11

## 2023-03-13 RX ORDER — WARFARIN 2 MG/1
TABLET ORAL
Qty: 90 TABLET | Refills: 0
Start: 2023-03-13

## 2023-03-13 NOTE — PROGRESS NOTES
Coleman iCsneros is a 78 y.o. female    Met this pt 11/2022, 4 months ago. has a past medical history of Anxiety, Aortic valve stenosis (07/2022), Arthritis, Asthma, CAD (coronary artery disease), Chronic anticoagulation (11/23/2022), Chronic obstructive pulmonary disease ([facility] [address] Chronic pain, Depression with anxiety (11/23/2022), Diabetes ([facility] [address] GERD (gastroesophageal reflux disease), H/O aortic valve replacement (07/2022), Hepatitis B, History of blood transfusion, History of DVT (deep vein thrombosis), History of recurrent UTIs, seasonal allergies, Hypercholesterolemia, Irritable bowel syndrome (IBS), Migraine, Noncompliance (01/18/2023), Oxygen dependent, Psychiatric disorder, Thromboembolus ([facility] [address] Thyroid disease, and Urinary urgency. S/p stent, valve replacement  INR 1.6, 1.5, 1.3, 1.7, 2.5, (1/3/2023 knee procedure), 2.1, 2.6  Last visit we increased coumadin to 2mg everyday  4mg Thursday and 2mg all other day  Increase to 4mg Mo, Wed, Fri.  2mg Tue, Thur, Sat, Sun    She takes plaix  Also celebrex 200mg BID - to stop celebrex  Hypothyroid   restarted 25mg daily    DM2: A1C 7.2% 02/2023, 7.2% 11/2022, 6.3% 07/2022  Metformin ER 500mg      HTN: BP at goal  metoprolol     HLD: zetia, lipitor    Hx of anemia, mild anemia, she denies seeing blood anywhere, denies melena. continue to monitor. hx of depression anxiety with somatization. Denies bipolar  Denies visual or auditory hallucination or delusion  Denies SI or HI  Psych saw in the past, but past many years it's been managed by her previous PCPs  Have been on these meds for years Seroquel, trazodone, wellbutrin, lexapro  Currently stable    Migraine: Samuel Jon  Refer neurologist    Cardiology: Dr. Mer Huber, Dr. Raine Watt  CAD: plavix and coumadin  S/p transcatheter aortic valve replacement. Asthma, COPD - pulm specialist     Gastro referral done  nexium  IBS Bentyl - nausea and diarrhea    Hx of hep b     Chronic pain.  Sees orthopedic Tramadol, amitriptyline       Reviewed: active problem list, medication list, allergies, notes from last encounter, lab results    A comprehensive review of systems was negative except for that written in the HPI. Allergies   Allergen Reactions    Zantac [Ranitidine Hcl] Nausea Only    Iodinated Contrast Media Other (comments)     Passes out    Morphine Other (comments)     Agitation,hallucinations    Pcn [Penicillins] Shortness of Breath    Pepcid [Famotidine] Nausea and Vomiting    Sulfa (Sulfonamide Antibiotics) Shortness of Breath     Current Outpatient Medications on File Prior to Visit   Medication Sig Dispense Refill    pregabalin (LYRICA) 25 mg capsule       oxybutynin chloride XL (DITROPAN XL) 10 mg CR tablet TAKE 1 TABLET BY MOUTH EVERY DAY 90 Tablet 0    nitrofurantoin, macrocrystal-monohydrate, (MACROBID) 100 mg capsule Take 1 Capsule by mouth two (2) times a day for 5 days. 10 Capsule 0    metFORMIN ER (GLUCOPHAGE XR) 500 mg tablet Take 1 Tablet by mouth daily (with dinner). 30 Tablet 2    metoprolol succinate (TOPROL-XL) 50 mg XL tablet Take 1 Tablet by mouth daily. 90 Tablet 0    flash glucose sensor (FreeStyle Tomasa 2 Sensor) kit 1 Each by Does Not Apply route See Mason Godfrey. Apply and replace sensor every 14 days. Use to scan sensor daily. 2 Kit 11    levothyroxine (SYNTHROID) 25 mcg tablet Take 1 Tablet by mouth Daily (before breakfast). 90 Tablet 1    traZODone (DESYREL) 100 mg tablet TAKE 1 TABLET BY MOUTH EVERY DAY 30 Tablet 0    amitriptyline (ELAVIL) 10 mg tablet TAKE 1 TABLET BY MOUTH EVERY NIGHT 90 Tablet 0    celecoxib (CELEBREX) 200 mg capsule Take 1 Capsule by mouth daily as needed for Pain. (Patient taking differently: Take 200 mg by mouth two (2) times a day.) 30 Capsule 1    buPROPion XL (WELLBUTRIN XL) 150 mg tablet Take 1 Tablet by mouth daily. 90 Tablet 0    loperamide (IMODIUM) 2 mg capsule Take 2 mg by mouth four (4) times daily.       mirabegron ER (Myrbetriq) 25 mg ER tablet Take 1 Tablet by mouth daily. 90 Tablet 0    benzonatate (TESSALON) 100 mg capsule TAKE 1 CAPSULE BY MOUTH THREE TIMES DAILY AS NEEDED FOR COUGH      FreeStyle Tomasa 2 [place] misc USE AS DIRECTED TO SCAN SENSOR DAILY 1 Each 0    clopidogreL (PLAVIX) 75 mg tab TAKE 1 TABLET BY MOUTH IN THE MORNING      levocetirizine (XYZAL) 5 mg tablet Take 1 Tablet by mouth daily. acetaminophen (TYLENOL) 500 mg tablet Take 1,000 mg by mouth every six (6) hours as needed for Pain.      levETIRAcetam (KEPPRA) 750 mg tablet Take 750 mg by mouth every evening. fluticasone propion-salmeteroL (Wixela Inhub) 250-50 mcg/dose diskus inhaler INHALE 1 PUFF AND INTO THE LUNGS EVERY 12 HOURS. RINSE MOUTH AFTER USE 60 Each 12    nystatin (MYCOSTATIN) 100,000 unit/mL suspension SWISH 5ML FOR 30 SECONDS AND SWALLOW FOUR TIMES DAILY AS NEEDED FOR MOUTH PAIN 60 mL 2    esomeprazole (NEXIUM) 20 mg capsule TAKE 1 CAPSULE BY MOUTH DAILY 90 Capsule 3    ezetimibe (ZETIA) 10 mg tablet TAKE 1 TABLET BY MOUTH DAILY 90 Tablet 3    levETIRAcetam (KEPPRA) 500 mg tablet TAKE 1 TABLET BY MOUTH EVERY MORNING AND 1 AND 1/2 TABLET BY MOUTH EVERY EVENING FOR MIGRAINES 225 Tablet 3    QUEtiapine (SEROquel) 25 mg tablet Take 1 Tablet by mouth nightly. 90 Tablet 3    escitalopram oxalate (LEXAPRO) 20 mg tablet Take 1 Tablet by mouth in the morning. 90 Tablet 3    atorvastatin (LIPITOR) 20 mg tablet TAKE 1 TABLET BY MOUTH EVERY DAY 90 Tablet 3    Bifidobacterium Infantis (Align) 4 mg cap Take 1 Capsule by mouth daily as needed for Diarrhea. Indications: ALIGN OTC 90 Capsule 3    albuterol (PROVENTIL HFA, VENTOLIN HFA, PROAIR HFA) 90 mcg/actuation inhaler Take 2 Puffs by inhalation every six (6) hours as needed for Wheezing. 8 g 2    ondansetron hcl (ZOFRAN) 4 mg tablet TAKE 1 TABLET BY MOUTH EVERY 8 HOURS AS NEEDED FOR NAUSEA OR VOMITING 30 Tablet 2    traMADoL (ULTRAM) 50 mg tablet TAKE 1 TABLET BY MOUTH EVERY 12 HOURS AS NEEDED FOR PAIN.  MAX DAILY AMOUNT: 100 MG (Patient not taking: Reported on 3/13/2023) 60 Tablet 0    empagliflozin (Jardiance) 10 mg tablet Take 1 Tablet by mouth daily. (Patient not taking: Reported on 3/13/2023) 90 Tablet 1    diclofenac (VOLTAREN) 1 % gel Apply  to affected area every twelve (12) hours as needed for Pain. (Patient not taking: Reported on 3/13/2023) 100 g 2     No current facility-administered medications on file prior to visit. 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    Coronary artery disease I25.10    AS (aortic stenosis) I35.0    Chronic systolic (congestive) heart failure I50.22    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 Normal without focal findings. Cranial nerves 2-12 intact. Normal coordination and gait. Mental status: Alert, oriented, thought content appropriate, affect: stable, mood-congruent. Head: Normocephalic, without obvious abnormality, atraumatic  Eyes: conjunctivae/corneas clear. PERRL, EOM's intact.    Neck: supple, symmetrical, trachea midline, no JVD  Lungs: clear to auscultation bilaterally  Heart: regular rate and rhythm, S1, S2 normal, no murmur, click, rub or gallop  Abdomen: soft, non-tender. Extremities: extremities normal, atraumatic, no cyanosis or edema      Assessment/Plans:    Diagnoses and all orders for this visit:    1. Chronic anticoagulation  -     AMB POC PT/INR  -     warfarin (COUMADIN) 2 mg tablet; Increase to 4mg Mo, Wed, Fri.  2mg Tue, Thur, Sat, Sun    2. H/O aortic valve replacement  -     warfarin (COUMADIN) 2 mg tablet; Increase to 4mg Mo, Wed, Fri.  2mg Tue, Thur, Sat, Sun    3. Chronic deep vein thrombosis (DVT) of proximal vein of left lower extremity (HCC)  -     warfarin (COUMADIN) 2 mg tablet; Increase to 4mg Mo, Wed, Fri.  2mg Tue, Thur, Sat, Sun    Discussed plans, risk/benefits of treatments/observations. Through the use of shared decision making, above plans were agreed upon. Medication compliance advised. Patient verbalized understanding. Follow-up and Dispositions    Return in about 9 days (around 3/22/2023) for INR.            Abigail Tee MD  3/13/2023

## 2023-03-13 NOTE — PROGRESS NOTES
Chief Complaint   Patient presents with    Anticoagulation     Check INR    1. Have you been to the ER, urgent care clinic since your last visit? Hospitalized since your last visit? No    2. Have you seen or consulted any other health care providers outside of the [address] [place] [address] since your last visit? Include any pap smears or colon screening.  No

## 2023-03-14 RX ORDER — METFORMIN HYDROCHLORIDE 500 MG/1
TABLET, EXTENDED RELEASE ORAL
Qty: 30 TABLET | Refills: 2 | Status: SHIPPED | OUTPATIENT
Start: 2023-03-14

## 2023-03-16 ENCOUNTER — TELEPHONE (OUTPATIENT)
Dept: FAMILY MEDICINE CLINIC | Age: 83
End: 2023-03-16

## 2023-03-16 NOTE — TELEPHONE ENCOUNTER
----- Message from Kiley Ley sent at 3/15/2023  5:01 PM EDT -----  Subject: Message to Provider    QUESTIONS  Information for Provider? Pt would like to leave message for provider. Pt   is canceling her appt for CT Scan for 03/16. She would like to discuss on   03/22 with PCP. If there are any questions please contact pt to discuss. ---------------------------------------------------------------------------  --------------  Connie PEREZ  [phone]; OK to leave message on voicemail  ---------------------------------------------------------------------------  --------------  SCRIPT ANSWERS  Relationship to Patient?  Self

## 2023-03-22 ENCOUNTER — OFFICE VISIT (OUTPATIENT)
Dept: FAMILY MEDICINE CLINIC | Age: 83
End: 2023-03-22

## 2023-03-22 VITALS
RESPIRATION RATE: 18 BRPM | BODY MASS INDEX: 24.32 KG/M2 | HEART RATE: 70 BPM | OXYGEN SATURATION: 91 % | TEMPERATURE: 96.7 F | WEIGHT: 128.8 LBS | DIASTOLIC BLOOD PRESSURE: 54 MMHG | HEIGHT: 61 IN | SYSTOLIC BLOOD PRESSURE: 102 MMHG

## 2023-03-22 DIAGNOSIS — G89.29 CHRONIC BACK PAIN GREATER THAN 3 MONTHS DURATION: ICD-10-CM

## 2023-03-22 DIAGNOSIS — I82.5Y2 CHRONIC DEEP VEIN THROMBOSIS (DVT) OF PROXIMAL VEIN OF LEFT LOWER EXTREMITY (HCC): ICD-10-CM

## 2023-03-22 DIAGNOSIS — Z79.01 CHRONIC ANTICOAGULATION: Primary | ICD-10-CM

## 2023-03-22 DIAGNOSIS — M54.9 CHRONIC BACK PAIN GREATER THAN 3 MONTHS DURATION: ICD-10-CM

## 2023-03-22 DIAGNOSIS — Z95.2 H/O AORTIC VALVE REPLACEMENT: ICD-10-CM

## 2023-03-22 LAB
INR BLD: 2.1
PT POC: NORMAL
VALID INTERNAL CONTROL?: YES

## 2023-03-22 RX ORDER — WARFARIN 2 MG/1
TABLET ORAL
Qty: 135 TABLET | Refills: 1 | Status: SHIPPED | OUTPATIENT
Start: 2023-03-22

## 2023-03-22 RX ORDER — TRAMADOL HYDROCHLORIDE 50 MG/1
50 TABLET ORAL
Qty: 15 TABLET | Refills: 0 | Status: SHIPPED | OUTPATIENT
Start: 2023-03-22 | End: 2023-04-21

## 2023-03-22 NOTE — PROGRESS NOTES
Cristóbal Garcia is a 78 y.o. female      Assessment/Plans:    Diagnoses and all orders for this visit:    1. Chronic anticoagulation  -     AMB POC PT/INR  -     warfarin (COUMADIN) 2 mg tablet; 4mg Mo, Wed, Fri.  2mg Tue, Thur, Sat, Sun    2. H/O aortic valve replacement  -     warfarin (COUMADIN) 2 mg tablet; 4mg Mo, Wed, Fri.  2mg Tue, Thur, Sat, Sun    3. Chronic deep vein thrombosis (DVT) of proximal vein of left lower extremity (HCC)  -     warfarin (COUMADIN) 2 mg tablet; 4mg Mo, Wed, Fri.  2mg Tue, Thur, Sat, Sun    4. Chronic back pain greater than 3 months duration  -     traMADoL (ULTRAM) 50 mg tablet; Take 1 Tablet by mouth every fourty-eight (48) hours for 30 days. Max Daily Amount: 50 mg. Discussed plans, risk/benefits of treatments/observations. Through the use of shared decision making, above plans were agreed upon. Medication compliance advised. Patient verbalized understanding. Follow-up and Dispositions    Return in about 3 weeks (around 4/12/2023) for INR. has a past medical history of Anxiety, Aortic valve stenosis (07/2022), Arthritis, Asthma, CAD (coronary artery disease), Chronic anticoagulation (11/23/2022), Chronic obstructive pulmonary disease ([facility] [address] Chronic pain, Depression with anxiety (11/23/2022), Diabetes ([facility] [address] GERD (gastroesophageal reflux disease), H/O aortic valve replacement (07/2022), Hepatitis B, History of blood transfusion, History of DVT (deep vein thrombosis), History of recurrent UTIs, seasonal allergies, Hypercholesterolemia, Irritable bowel syndrome (IBS), Migraine, Noncompliance (01/18/2023), Oxygen dependent, Psychiatric disorder, Thromboembolus ([address] Thyroid disease, and Urinary urgency. Subjective: We discussed pain today  Told her to go to ortho for pain  They added lyrica 25mg    1 time off refill #15 tramadol to help her move around during the day. We discussed insurance issue, no prior [place] will be done. Have to  pay out of pocket. S/p stent, valve replacement  Denies any abnormal bleeding. She is c/o of easy bruising. INR 2.1, 1.6, 1.5, 1.3, 1.7, 2.5, (1/3/2023 knee procedure), 2.1, 2.6  Current coumadin dose 4mg Mo, Wed, Fri.  2mg Tue, Thur, Sat, Sun    She takes plavix    Below not address at this visit  Hypothyroid   restarted 25mg daily    DM2: A1C 7.2% 02/2023, 7.2% 11/2022, 6.3% 07/2022  Metformin ER 500mg      HTN: BP at goal  metoprolol     HLD: zetia, lipitor    Hx of anemia, mild anemia, she denies seeing blood anywhere, denies melena. continue to monitor. hx of depression anxiety with somatization. Denies bipolar  Denies visual or auditory hallucination or delusion  Denies SI or HI  Psych saw in the past, but past many years it's been managed by her previous PCPs  Have been on these meds for years Seroquel, trazodone, wellbutrin, lexapro  Currently stable    Migraine: Jin Weston  Refer neurologist    Cardiology: Dr. Duyen Hardin, Dr. Bijan Lee  CAD: plavix and coumadin  S/p transcatheter aortic valve replacement. Asthma, COPD - pulm specialist     Gastro referral done  nexium  IBS Bentyl - nausea and diarrhea    Hx of hep b     Chronic pain. Sees orthopedic              Tramadol, amitriptyline    Lyrica 25mg    Visit Vitals  BP (!) 102/54   Pulse 70   Temp (!) 96.7 °F (35.9 °C) (Oral)   Resp 18   Ht 5' 1\" (1.549 m)   Wt 128 lb 12.8 oz (58.4 kg)   SpO2 91%   BMI 24.34 kg/m²     General appearance: alert, cooperative, no distress, appears stated age  Neurologic: Alert and oriented X 3, normal strength and tone, symmetric. Normal without focal findings. Cranial nerves 2-12 intact. Normal coordination and gait. Mental status: Alert, oriented, thought content appropriate, affect: stable, mood-congruent. Head: Normocephalic, without obvious abnormality, atraumatic  Eyes: conjunctivae/corneas clear. PERRL, EOM's intact.    Neck: supple, symmetrical, trachea midline, no JVD  Lungs: clear to auscultation bilaterally  Heart: regular rate and rhythm, S1, S2 normal, no murmur, click, rub or gallop  Abdomen: soft, non-tender. Extremities: extremities normal, atraumatic, no cyanosis or edema      Reviewed: active problem list, medication list, allergies, health maintenance, notes from last encounter, lab results    A comprehensive review of systems was negative except for that written in the HPI. Allergies   Allergen Reactions    Zantac [Ranitidine Hcl] Nausea Only    Iodinated Contrast Media Other (comments)     Passes out    Morphine Other (comments)     Agitation,hallucinations    Pcn [Penicillins] Shortness of Breath    Pepcid [Famotidine] Nausea and Vomiting    Sulfa (Sulfonamide Antibiotics) Shortness of Breath     Current Outpatient Medications on File Prior to Visit   Medication Sig Dispense Refill    metFORMIN ER (GLUCOPHAGE XR) 500 mg tablet TAKE 1 TABLET BY MOUTH DAILY WITH DINNER 30 Tablet 2    pregabalin (LYRICA) 25 mg capsule       oxybutynin chloride XL (DITROPAN XL) 10 mg CR tablet TAKE 1 TABLET BY MOUTH EVERY DAY 90 Tablet 0    metoprolol succinate (TOPROL-XL) 50 mg XL tablet Take 1 Tablet by mouth daily. 90 Tablet 0    flash glucose sensor (FreeStyle Tomasa 2 Sensor) kit 1 Each by Does Not Apply route See Mason Godfrey. Apply and replace sensor every 14 days. Use to scan sensor daily. 2 Kit 11    levothyroxine (SYNTHROID) 25 mcg tablet Take 1 Tablet by mouth Daily (before breakfast). 90 Tablet 1    traZODone (DESYREL) 100 mg tablet TAKE 1 TABLET BY MOUTH EVERY DAY 30 Tablet 0    amitriptyline (ELAVIL) 10 mg tablet TAKE 1 TABLET BY MOUTH EVERY NIGHT 90 Tablet 0    loperamide (IMODIUM) 2 mg capsule Take 2 mg by mouth four (4) times daily. mirabegron ER (Myrbetriq) 25 mg ER tablet Take 1 Tablet by mouth daily.  90 Tablet 0    benzonatate (TESSALON) 100 mg capsule TAKE 1 CAPSULE BY MOUTH THREE TIMES DAILY AS NEEDED FOR COUGH      FreeStyle Tomasa 2 [place] misc USE AS DIRECTED TO SCAN SENSOR DAILY 1 Each 0    clopidogreL (PLAVIX) 75 mg tab TAKE 1 TABLET BY MOUTH IN THE MORNING      levocetirizine (XYZAL) 5 mg tablet Take 1 Tablet by mouth daily. acetaminophen (TYLENOL) 500 mg tablet Take 1,000 mg by mouth every six (6) hours as needed for Pain.      levETIRAcetam (KEPPRA) 750 mg tablet Take 750 mg by mouth every evening. nystatin (MYCOSTATIN) 100,000 unit/mL suspension SWISH 5ML FOR 30 SECONDS AND SWALLOW FOUR TIMES DAILY AS NEEDED FOR MOUTH PAIN 60 mL 2    esomeprazole (NEXIUM) 20 mg capsule TAKE 1 CAPSULE BY MOUTH DAILY 90 Capsule 3    ezetimibe (ZETIA) 10 mg tablet TAKE 1 TABLET BY MOUTH DAILY 90 Tablet 3    levETIRAcetam (KEPPRA) 500 mg tablet TAKE 1 TABLET BY MOUTH EVERY MORNING AND 1 AND 1/2 TABLET BY MOUTH EVERY EVENING FOR MIGRAINES 225 Tablet 3    QUEtiapine (SEROquel) 25 mg tablet Take 1 Tablet by mouth nightly. 90 Tablet 3    escitalopram oxalate (LEXAPRO) 20 mg tablet Take 1 Tablet by mouth in the morning. 90 Tablet 3    atorvastatin (LIPITOR) 20 mg tablet TAKE 1 TABLET BY MOUTH EVERY DAY 90 Tablet 3    Bifidobacterium Infantis (Align) 4 mg cap Take 1 Capsule by mouth daily as needed for Diarrhea. Indications: ALIGN OTC 90 Capsule 3    ondansetron hcl (ZOFRAN) 4 mg tablet TAKE 1 TABLET BY MOUTH EVERY 8 HOURS AS NEEDED FOR NAUSEA OR VOMITING 30 Tablet 2    empagliflozin (Jardiance) 10 mg tablet Take 1 Tablet by mouth daily. (Patient not taking: No sig reported) 90 Tablet 1    celecoxib (CELEBREX) 200 mg capsule Take 1 Capsule by mouth daily as needed for Pain. (Patient not taking: Reported on 3/22/2023) 30 Capsule 1    buPROPion XL (WELLBUTRIN XL) 150 mg tablet Take 1 Tablet by mouth daily. 90 Tablet 0    fluticasone propion-salmeteroL (Wixela Inhub) 250-50 mcg/dose diskus inhaler INHALE 1 PUFF AND INTO THE LUNGS EVERY 12 HOURS.  RINSE MOUTH AFTER USE (Patient not taking: Reported on 3/22/2023) 60 Each 12    albuterol (PROVENTIL HFA, VENTOLIN HFA, PROAIR HFA) 90 mcg/actuation inhaler Take 2 Puffs by inhalation every six (6) hours as needed for Wheezing. (Patient not taking: Reported on 3/22/2023) 8 g 2    diclofenac (VOLTAREN) 1 % gel Apply  to affected area every twelve (12) hours as needed for Pain. (Patient not taking: No sig reported) 100 g 2     No current facility-administered medications on file prior to visit.      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    Coronary artery disease I25.10    AS (aortic stenosis) I35.0    Chronic systolic (congestive) heart failure I50.22    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

## 2023-03-23 RX ORDER — LEVOCETIRIZINE DIHYDROCHLORIDE 5 MG/1
TABLET, FILM COATED ORAL
Qty: 90 TABLET | Refills: 0 | Status: SHIPPED | OUTPATIENT
Start: 2023-03-23

## 2023-04-04 ENCOUNTER — VIRTUAL VISIT (OUTPATIENT)
Dept: FAMILY MEDICINE CLINIC | Age: 83
End: 2023-04-04
Payer: MEDICARE

## 2023-04-04 PROCEDURE — 99443 PR PHYS/QHP TELEPHONE EVALUATION 21-30 MIN: CPT | Performed by: FAMILY MEDICINE

## 2023-04-04 RX ORDER — BUTALBITAL, ACETAMINOPHEN AND CAFFEINE 50; 325; 40 MG/1; MG/1; MG/1
1 TABLET ORAL
Qty: 30 TABLET | Refills: 0 | Status: SHIPPED
Start: 2023-04-04

## 2023-04-04 RX ORDER — DULOXETIN HYDROCHLORIDE 60 MG/1
60 CAPSULE, DELAYED RELEASE ORAL DAILY
Qty: 30 CAPSULE | Refills: 2 | Status: SHIPPED
Start: 2023-04-04

## 2023-04-04 RX ORDER — LEVETIRACETAM 750 MG/1
TABLET ORAL
Qty: 90 TABLET | Refills: 0 | Status: SHIPPED
Start: 2023-04-04

## 2023-04-04 RX ORDER — BUSPIRONE HYDROCHLORIDE 15 MG/1
15 TABLET ORAL
Qty: 60 TABLET | Refills: 0 | Status: SHIPPED
Start: 2023-04-04

## 2023-04-04 RX ORDER — LEVETIRACETAM 500 MG/1
TABLET ORAL
Qty: 90 TABLET | Refills: 1 | Status: SHIPPED
Start: 2023-04-04

## 2023-04-04 NOTE — PROGRESS NOTES
Lazarus Cheeks is a 78 y.o. female evaluated via telephone on 4/4/2023. Consent:  She and/or health care decision maker is aware that she may receive a bill for this telephone service, depending on her insurance coverage, and has provided verbal consent to proceed: Yes      Documentation:  I communicated with the patient and/or health care decision maker about;   Details of this discussion including any medical advice provided: This pt establish care w us recently       has a past medical history of Anxiety, Aortic valve stenosis (07/2022), Arthritis, Asthma, CAD (coronary artery disease), Chronic anticoagulation (11/23/2022), Chronic obstructive pulmonary disease ([facility] [address] Chronic pain, Depression with anxiety (11/23/2022), Diabetes ([facility] [address] GERD (gastroesophageal reflux disease), H/O aortic valve replacement (07/2022), Hepatitis B, History of blood transfusion, History of DVT (deep vein thrombosis), History of recurrent UTIs, seasonal allergies, Hypercholesterolemia, Irritable bowel syndrome (IBS), Migraine, Noncompliance (01/18/2023), Oxygen dependent, Psychiatric disorder, Thromboembolus ([facility] [address] Thyroid disease, and Urinary urgency. Neuro manages headaches, to go back to them. Keppra  Referred to neuro, do another one today    DM2: A1C 7.2% 02/2023, 7.2% 11/2022, 6.3% 07/2022  Metformin ER 500mg - makes her nausea  Insurance did not approve jardiance so we changed to metformin er  She also wanted continuous glucose monitoring, told her insurance won't approve but signed it for her. She says called her insurance and can afford to take it. Hx of anxiety  Wellbutrin XL - d/c  Change to Cymbalta   Add buspar    Urinary frequency  Hx of irritable bladder saw urology in the past was on myrbetriq and oxybutynin  Refer to urology    Chronic pain. Sees orthopedic              Tramadol, amitriptyline               Lyrica 25mg  They stop writing tramadol for her.  I did that last time we exhaustively went over it was a one time only. She requesting increasing number of pills  Do another referral to pain clinic. Diagnoses and all orders for this visit:    1. Anxiety  -     DULoxetine (CYMBALTA) 60 mg capsule; Take 1 Capsule by mouth daily. -     busPIRone (BUSPAR) 15 mg tablet; Take 1 Tablet by mouth two (2) times daily as needed (anxiety). 2. Type 2 diabetes mellitus with other specified complication, without long-term current use of insulin ([facility])  -     empagliflozin (Jardiance) 10 mg tablet; Take 1 Tablet by mouth daily. 3. Chronic pain syndrome  -     REFERRAL TO PAIN MANAGEMENT  -     DULoxetine (CYMBALTA) 60 mg capsule; Take 1 Capsule by mouth daily. 4. Migraine without status migrainosus, not intractable, unspecified migraine type  -     REFERRAL TO NEUROLOGY  -     levETIRAcetam (KEPPRA) 500 mg tablet; TAKE 1 TABLET BY MOUTH EVERY MORNING  -     levETIRAcetam (KEPPRA) 750 mg tablet; Take 750mg nightly  -     butalbital-acetaminophen-caffeine (FIORICET, ESGIC) -40 mg per tablet; Take 1 Tablet by mouth every six (6) hours as needed for Headache. 5. Migraine without aura and without status migrainosus, not intractable  -     levETIRAcetam (KEPPRA) 500 mg tablet; TAKE 1 TABLET BY MOUTH EVERY MORNING    6. Irritable bladder  -     REFERRAL TO UROLOGY    Follow-up and Dispositions    Return in about 3 weeks (around 4/25/2023) for chronic medical issues.          Past Medical History:   Diagnosis Date    Anxiety     Aortic valve stenosis 07/2022    Arthritis     Asthma     CAD (coronary artery disease)     stent    Chronic anticoagulation 11/23/2022    Chronic obstructive pulmonary disease (HCC)     Chronic pain     Depression with anxiety 11/23/2022    Diabetes ([facility])     GERD (gastroesophageal reflux disease)     H/O aortic valve replacement 07/2022    Hepatitis B     1960's    History of blood transfusion     06/22    History of DVT (deep vein thrombosis)     History of recurrent UTIs Hx of seasonal allergies     Hypercholesterolemia     Irritable bowel syndrome (IBS)     Migraine     Noncompliance 01/18/2023    Oxygen dependent     PRN at 2LPM [place]    Psychiatric disorder     anxiety    Thromboembolus ([address]     left leg    Thyroid disease     pt denies- not tx for it    Urinary urgency        Current Outpatient Medications on File Prior to Visit   Medication Sig Dispense Refill    levocetirizine (XYZAL) 5 mg tablet TAKE 1 TABLET BY MOUTH EVERY DAY 90 Tablet 0    warfarin (COUMADIN) 2 mg tablet 4mg Mo, Wed, Fri.  2mg Tue, Thur, Sat, Sun 135 Tablet 1    traMADoL (ULTRAM) 50 mg tablet Take 1 Tablet by mouth every fourty-eight (48) hours for 30 days. Max Daily Amount: 50 mg. 15 Tablet 0    oxybutynin chloride XL (DITROPAN XL) 10 mg CR tablet TAKE 1 TABLET BY MOUTH EVERY DAY 90 Tablet 0    metoprolol succinate (TOPROL-XL) 50 mg XL tablet Take 1 Tablet by mouth daily. 90 Tablet 0    flash glucose sensor (FreeStyle Tomasa 2 Sensor) kit 1 Each by Does Not Apply route See Mason Godfrey. Apply and replace sensor every 14 days. Use to scan sensor daily. 2 Kit 11    levothyroxine (SYNTHROID) 25 mcg tablet Take 1 Tablet by mouth Daily (before breakfast). 90 Tablet 1    traZODone (DESYREL) 100 mg tablet TAKE 1 TABLET BY MOUTH EVERY DAY 30 Tablet 0    amitriptyline (ELAVIL) 10 mg tablet TAKE 1 TABLET BY MOUTH EVERY NIGHT 90 Tablet 0    loperamide (IMODIUM) 2 mg capsule Take 2 mg by mouth four (4) times daily. mirabegron ER (Myrbetriq) 25 mg ER tablet Take 1 Tablet by mouth daily. 90 Tablet 0    benzonatate (TESSALON) 100 mg capsule TAKE 1 CAPSULE BY MOUTH THREE TIMES DAILY AS NEEDED FOR COUGH      FreeStyle Tomasa 2 [place] misc USE AS DIRECTED TO SCAN SENSOR DAILY 1 Each 0    clopidogreL (PLAVIX) 75 mg tab TAKE 1 TABLET BY MOUTH IN THE MORNING      acetaminophen (TYLENOL) 500 mg tablet Take 1,000 mg by mouth every six (6) hours as needed for Pain.       fluticasone propion-salmeteroL (Garret Graham) 250-50 mcg/dose diskus inhaler INHALE 1 PUFF AND INTO THE LUNGS EVERY 12 HOURS. RINSE MOUTH AFTER USE 60 Each 12    nystatin (MYCOSTATIN) 100,000 unit/mL suspension SWISH 5ML FOR 30 SECONDS AND SWALLOW FOUR TIMES DAILY AS NEEDED FOR MOUTH PAIN 60 mL 2    esomeprazole (NEXIUM) 20 mg capsule TAKE 1 CAPSULE BY MOUTH DAILY 90 Capsule 3    ezetimibe (ZETIA) 10 mg tablet TAKE 1 TABLET BY MOUTH DAILY 90 Tablet 3    QUEtiapine (SEROquel) 25 mg tablet Take 1 Tablet by mouth nightly. 90 Tablet 3    escitalopram oxalate (LEXAPRO) 20 mg tablet Take 1 Tablet by mouth in the morning. 90 Tablet 3    atorvastatin (LIPITOR) 20 mg tablet TAKE 1 TABLET BY MOUTH EVERY DAY 90 Tablet 3    Bifidobacterium Infantis (Align) 4 mg cap Take 1 Capsule by mouth daily as needed for Diarrhea. Indications: ALIGN OTC 90 Capsule 3    ondansetron hcl (ZOFRAN) 4 mg tablet TAKE 1 TABLET BY MOUTH EVERY 8 HOURS AS NEEDED FOR NAUSEA OR VOMITING 30 Tablet 2    pregabalin (LYRICA) 25 mg capsule       celecoxib (CELEBREX) 200 mg capsule Take 1 Capsule by mouth daily as needed for Pain. (Patient not taking: No sig reported) 30 Capsule 1    albuterol (PROVENTIL HFA, VENTOLIN HFA, PROAIR HFA) 90 mcg/actuation inhaler Take 2 Puffs by inhalation every six (6) hours as needed for Wheezing. (Patient not taking: No sig reported) 8 g 2    diclofenac (VOLTAREN) 1 % gel Apply  to affected area every twelve (12) hours as needed for Pain. (Patient not taking: No sig reported) 100 g 2     No current facility-administered medications on file prior to visit. I affirm this is a Patient Initiated Episode with a Patient who has not had a related appointment within my department in the past 7 days or scheduled within the next 24 hours.     Total Time: minutes: >50 minutes    Note: not billable if this call serves to triage the patient into an appointment for the relevant concern      Maco Villela MD

## 2023-04-04 NOTE — PROGRESS NOTES
Chief Complaint   Patient presents with    Medication Evaluation     Wants to go back on Celebrex. Anxiety med & gentesa     Urinary urgency getting worse at night. Wants back on Jardiance. 1. Have you been to the ER, urgent care clinic since your last visit? Hospitalized since your last visit? No    2. Have you seen or consulted any other health care providers outside of the [address] [place] [address] since your last visit? Include any pap smears or colon screening.  No

## 2023-04-05 RX ORDER — LOPERAMIDE HYDROCHLORIDE 2 MG/1
CAPSULE ORAL
Qty: 30 CAPSULE | Refills: 2 | Status: SHIPPED
Start: 2023-04-05

## 2023-04-18 RX ORDER — TRAZODONE HYDROCHLORIDE 100 MG/1
TABLET ORAL
Qty: 90 TABLET | Refills: 1 | Status: SHIPPED | OUTPATIENT
Start: 2023-04-18

## 2023-04-26 ENCOUNTER — OFFICE VISIT (OUTPATIENT)
Dept: FAMILY MEDICINE CLINIC | Age: 83
End: 2023-04-26
Payer: MEDICARE

## 2023-04-26 VITALS
RESPIRATION RATE: 18 BRPM | BODY MASS INDEX: 24.28 KG/M2 | SYSTOLIC BLOOD PRESSURE: 139 MMHG | WEIGHT: 128.6 LBS | TEMPERATURE: 97.5 F | HEIGHT: 61 IN | OXYGEN SATURATION: 91 % | DIASTOLIC BLOOD PRESSURE: 69 MMHG | HEART RATE: 74 BPM

## 2023-04-26 DIAGNOSIS — Z79.01 CHRONIC ANTICOAGULATION: Primary | ICD-10-CM

## 2023-04-26 DIAGNOSIS — N39.0 URINARY TRACT INFECTION WITHOUT HEMATURIA, SITE UNSPECIFIED: ICD-10-CM

## 2023-04-26 PROBLEM — J41.0 SIMPLE CHRONIC BRONCHITIS (HCC): Status: ACTIVE | Noted: 2023-04-26

## 2023-04-26 PROBLEM — D68.9 COAGULOPATHY (HCC): Status: ACTIVE | Noted: 2023-04-26

## 2023-04-26 LAB
INR BLD: 2.8
PT POC: NORMAL
VALID INTERNAL CONTROL?: YES

## 2023-04-26 PROCEDURE — 99213 OFFICE O/P EST LOW 20 MIN: CPT | Performed by: FAMILY MEDICINE

## 2023-04-26 PROCEDURE — 1090F PRES/ABSN URINE INCON ASSESS: CPT | Performed by: FAMILY MEDICINE

## 2023-04-26 PROCEDURE — 1101F PT FALLS ASSESS-DOCD LE1/YR: CPT | Performed by: FAMILY MEDICINE

## 2023-04-26 PROCEDURE — G8420 CALC BMI NORM PARAMETERS: HCPCS | Performed by: FAMILY MEDICINE

## 2023-04-26 PROCEDURE — G8427 DOCREV CUR MEDS BY ELIG CLIN: HCPCS | Performed by: FAMILY MEDICINE

## 2023-04-26 PROCEDURE — 1123F ACP DISCUSS/DSCN MKR DOCD: CPT | Performed by: FAMILY MEDICINE

## 2023-04-26 PROCEDURE — 85610 PROTHROMBIN TIME: CPT | Performed by: FAMILY MEDICINE

## 2023-04-26 PROCEDURE — G9717 DOC PT DX DEP/BP F/U NT REQ: HCPCS | Performed by: FAMILY MEDICINE

## 2023-04-26 PROCEDURE — G8536 NO DOC ELDER MAL SCRN: HCPCS | Performed by: FAMILY MEDICINE

## 2023-04-26 RX ORDER — CIPROFLOXACIN 500 MG/1
500 TABLET ORAL 2 TIMES DAILY
Qty: 10 TABLET | Refills: 0 | Status: SHIPPED | OUTPATIENT
Start: 2023-04-26 | End: 2023-05-01

## 2023-04-26 NOTE — PROGRESS NOTES
Carlton Yanez is a 78 y.o. female    She was 1hr and 15 minutes late today    Assessment/Plans:    Diagnoses and all orders for this visit:    1. Chronic anticoagulation  -     AMB POC PT/INR    2. Urinary tract infection without hematuria, site unspecified  -     ciprofloxacin HCl (CIPRO) 500 mg tablet; Take 1 Tablet by mouth two (2) times a day for 5 days. Discussed plans, risk/benefits of treatments/observations. Through the use of shared decision making, above plans were agreed upon. Medication compliance advised. Patient verbalized understanding. Follow-up and Dispositions    Return in about 3 weeks (around 5/17/2023) for INR. has a past medical history of Anxiety, Aortic valve stenosis (07/2022), Arthritis, Asthma, CAD (coronary artery disease), Chronic anticoagulation (11/23/2022), Chronic obstructive pulmonary disease ([facility] [address] Chronic pain, Depression with anxiety (11/23/2022), Diabetes ([facility] [address] GERD (gastroesophageal reflux disease), H/O aortic valve replacement (07/2022), Hepatitis B, History of blood transfusion, History of DVT (deep vein thrombosis), History of recurrent UTIs, seasonal allergies, Hypercholesterolemia, Irritable bowel syndrome (IBS), Migraine, Noncompliance (01/18/2023), Oxygen dependent, Psychiatric disorder, Thromboembolus ([facility] [address] Thyroid disease, and Urinary urgency. Subjective:    Discussed we can only see her for INR given that she was 1hr,15mins late. I have other patient waiting. S/p stent, valve replacement  Denies any abnormal bleeding. She is c/o of easy bruising. INR 2.8, 2.1, 1.6, 1.5, 1.3, 1.7, 2.5, (1/3/2023 knee procedure), 2.1, 2.6  Current coumadin dose 4mg Mo, Wed, Fri.  2mg Tue, Thur, Sat, Sun    Frequent urination similar to her previous UTI.  But she couldn't give a urine today  We'll do cipro BID X 5 days        She takes plavix    Below not address at this visit  Hypothyroid   restarted 25mg daily    DM2: A1C 7.2% 02/2023, 7.2% 11/2022, 6.3% 07/2022  Metformin ER 500mg      HTN: BP at goal  metoprolol     HLD: zetia, lipitor    Hx of anemia, mild anemia, she denies seeing blood anywhere, denies melena. continue to monitor. hx of depression anxiety with somatization. Denies bipolar  Denies visual or auditory hallucination or delusion  Denies SI or HI  Psych saw in the past, but past many years it's been managed by her previous PCPs  Have been on these meds for years Seroquel, trazodone, wellbutrin, lexapro  Currently stable    Migraine: John Ko  Refer neurologist    Cardiology: Dr. Pranay Singh, Dr. Vidal Dupree  CAD: plavix and coumadin  S/p transcatheter aortic valve replacement. Asthma, COPD - pulm specialist     Gastro referral done  nexium  IBS Bentyl - nausea and diarrhea    Hx of hep b     Chronic pain. Sees orthopedic              Tramadol, amitriptyline    Lyrica 25mg    Visit Vitals  /69 (BP 1 Location: Right arm, BP Patient Position: Sitting, BP Cuff Size: Adult)   Pulse 74   Temp 97.5 °F (36.4 °C) (Temporal)   Resp 18   Ht 5' 1\" (1.549 m)   Wt 128 lb 9.6 oz (58.3 kg)   SpO2 91%   BMI 24.30 kg/m²     General appearance: alert, cooperative, no distress, appears stated age  Neurologic: Alert and oriented X 3, normal strength and tone, symmetric. Normal without focal findings. Cranial nerves 2-12 intact. Normal coordination and gait. Mental status: Alert, oriented, thought content appropriate, affect: stable, mood-congruent. Head: Normocephalic, without obvious abnormality, atraumatic  Eyes: conjunctivae/corneas clear. PERRL, EOM's intact. Neck: supple, symmetrical, trachea midline, no JVD  Lungs: clear to auscultation bilaterally  Heart: regular rate and rhythm, S1, S2 normal, no murmur, click, rub or gallop  Abdomen: soft, non-tender.    Extremities: extremities normal, atraumatic, no cyanosis or edema      Reviewed: active problem list, medication list, allergies, health maintenance, notes from last encounter, lab results    A comprehensive review of systems was negative except for that written in the HPI. Allergies   Allergen Reactions    Zantac [Ranitidine Hcl] Nausea Only    Iodinated Contrast Media Other (comments)     Passes out    Morphine Other (comments)     Agitation,hallucinations    Pcn [Penicillins] Shortness of Breath    Pepcid [Famotidine] Nausea and Vomiting    Sulfa (Sulfonamide Antibiotics) Shortness of Breath     Current Outpatient Medications on File Prior to Visit   Medication Sig Dispense Refill    ondansetron hcl (ZOFRAN) 4 mg tablet TAKE 1 TABLET BY MOUTH EVERY 8 HOURS AS NEEDED FOR NAUSEA/ VOMITING 30 Tablet 0    traZODone (DESYREL) 100 mg tablet TAKE 1 TABLET BY MOUTH EVERY DAY 90 Tablet 1    mirabegron ER (Myrbetriq) 25 mg ER tablet Take 1 Tablet by mouth daily. 30 Tablet 0    busPIRone (BUSPAR) 15 mg tablet TAKE 1 TABLET BY MOUTH TWICE DAILY AS NEEDED FOR ANXIETY 60 Tablet 0    levETIRAcetam (KEPPRA) 750 mg tablet TAKE 1 TABLET BY MOUTH EVERY NIGHT 90 Tablet 0    loperamide (IMODIUM) 2 mg capsule TAKE 1 CAPSULE BY MOUTH FOUR TIMES DAILY AS NEEDED FOR DIARRHEA 30 Capsule 2    empagliflozin (Jardiance) 10 mg tablet Take 1 Tablet by mouth daily. 30 Tablet 5    DULoxetine (CYMBALTA) 60 mg capsule Take 1 Capsule by mouth daily. 30 Capsule 2    levETIRAcetam (KEPPRA) 500 mg tablet TAKE 1 TABLET BY MOUTH EVERY MORNING 90 Tablet 1    butalbital-acetaminophen-caffeine (FIORICET, ESGIC) -40 mg per tablet Take 1 Tablet by mouth every six (6) hours as needed for Headache. 30 Tablet 0    levocetirizine (XYZAL) 5 mg tablet TAKE 1 TABLET BY MOUTH EVERY DAY 90 Tablet 0    warfarin (COUMADIN) 2 mg tablet 4mg Mo, Wed, Fri.  2mg Tue, Thur, Sat, Sun 135 Tablet 1    pregabalin (LYRICA) 25 mg capsule       oxybutynin chloride XL (DITROPAN XL) 10 mg CR tablet TAKE 1 TABLET BY MOUTH EVERY DAY 90 Tablet 0    metoprolol succinate (TOPROL-XL) 50 mg XL tablet Take 1 Tablet by mouth daily.  90 Tablet 0    flash glucose sensor (FreeStyle Tomasa 2 Sensor) kit 1 Each by Does Not Apply route See Mason Godfrey. Apply and replace sensor every 14 days. Use to scan sensor daily. 2 Kit 11    levothyroxine (SYNTHROID) 25 mcg tablet Take 1 Tablet by mouth Daily (before breakfast). 90 Tablet 1    amitriptyline (ELAVIL) 10 mg tablet TAKE 1 TABLET BY MOUTH EVERY NIGHT 90 Tablet 0    benzonatate (TESSALON) 100 mg capsule TAKE 1 CAPSULE BY MOUTH THREE TIMES DAILY AS NEEDED FOR COUGH      FreeStyle Tomasa 2 [place] misc USE AS DIRECTED TO SCAN SENSOR DAILY 1 Each 0    clopidogreL (PLAVIX) 75 mg tab TAKE 1 TABLET BY MOUTH IN THE MORNING      acetaminophen (TYLENOL) 500 mg tablet Take 2 Tablets by mouth every six (6) hours as needed for Pain. fluticasone propion-salmeteroL (Wixela Inhub) 250-50 mcg/dose diskus inhaler INHALE 1 PUFF AND INTO THE LUNGS EVERY 12 HOURS. RINSE MOUTH AFTER USE 60 Each 12    nystatin (MYCOSTATIN) 100,000 unit/mL suspension SWISH 5ML FOR 30 SECONDS AND SWALLOW FOUR TIMES DAILY AS NEEDED FOR MOUTH PAIN 60 mL 2    esomeprazole (NEXIUM) 20 mg capsule TAKE 1 CAPSULE BY MOUTH DAILY 90 Capsule 3    ezetimibe (ZETIA) 10 mg tablet TAKE 1 TABLET BY MOUTH DAILY 90 Tablet 3    QUEtiapine (SEROquel) 25 mg tablet Take 1 Tablet by mouth nightly. 90 Tablet 3    escitalopram oxalate (LEXAPRO) 20 mg tablet Take 1 Tablet by mouth in the morning. 90 Tablet 3    atorvastatin (LIPITOR) 20 mg tablet TAKE 1 TABLET BY MOUTH EVERY DAY 90 Tablet 3    Bifidobacterium Infantis (Align) 4 mg cap Take 1 Capsule by mouth daily as needed for Diarrhea. Indications: ALIGN OTC 90 Capsule 3    celecoxib (CELEBREX) 200 mg capsule Take 1 Capsule by mouth daily as needed for Pain. (Patient not taking: Reported on 3/22/2023) 30 Capsule 1    albuterol (PROVENTIL HFA, VENTOLIN HFA, PROAIR HFA) 90 mcg/actuation inhaler Take 2 Puffs by inhalation every six (6) hours as needed for Wheezing.  (Patient not taking: Reported on 3/22/2023) 8 g 2    diclofenac (VOLTAREN) 1 % gel Apply  to affected area every twelve (12) hours as needed for Pain. (Patient not taking: Reported on 3/13/2023) 100 g 2     No current facility-administered medications on file prior to visit.      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

## 2023-04-26 NOTE — PROGRESS NOTES
Chief Complaint   Patient presents with    Anticoagulation     PT INR     1. Have you been to the ER, urgent care clinic since your last visit? Hospitalized since your last visit? No    2. Have you seen or consulted any other health care providers outside of the [address] [place] [address] since your last visit? Include any pap smears or colon screening.  No

## 2023-05-08 RX ORDER — AMITRIPTYLINE HYDROCHLORIDE 10 MG/1
TABLET, FILM COATED ORAL
Qty: 30 TABLET | OUTPATIENT
Start: 2023-05-08

## 2023-05-08 RX ORDER — AMITRIPTYLINE HYDROCHLORIDE 10 MG/1
TABLET, FILM COATED ORAL
COMMUNITY
Start: 2023-05-06 | End: 2023-05-08 | Stop reason: SDUPTHER

## 2023-05-08 RX ORDER — AMITRIPTYLINE HYDROCHLORIDE 10 MG/1
TABLET, FILM COATED ORAL
Qty: 90 TABLET | Refills: 0 | Status: SHIPPED | OUTPATIENT
Start: 2023-05-08

## 2023-05-08 RX ORDER — MIRABEGRON 25 MG/1
TABLET, FILM COATED, EXTENDED RELEASE ORAL
Qty: 30 TABLET | Refills: 0 | Status: SHIPPED | OUTPATIENT
Start: 2023-05-08

## 2023-05-08 NOTE — TELEPHONE ENCOUNTER
Last appointment: 4/26/23 with MD Arias  Next appointment: 5/11/23 with MD Arias  Previous refill encounter(s): 4/11/23 with MD Arias    Requested Prescriptions     Pending Prescriptions Disp Refills    MYRBETRIQ 25 MG TB24 [Pharmacy Med Name: MYRBETRIQ 25MG TABLETS] 30 tablet 0     Sig: TAKE 1 TABLET BY MOUTH DAILY

## 2023-05-11 ENCOUNTER — OFFICE VISIT (OUTPATIENT)
Age: 83
End: 2023-05-11
Payer: MEDICARE

## 2023-05-11 VITALS
SYSTOLIC BLOOD PRESSURE: 99 MMHG | TEMPERATURE: 97.7 F | RESPIRATION RATE: 18 BRPM | WEIGHT: 134.6 LBS | DIASTOLIC BLOOD PRESSURE: 52 MMHG | HEIGHT: 61 IN | OXYGEN SATURATION: 96 % | BODY MASS INDEX: 25.41 KG/M2 | HEART RATE: 69 BPM

## 2023-05-11 DIAGNOSIS — Z95.2 PRESENCE OF PROSTHETIC HEART VALVE: ICD-10-CM

## 2023-05-11 DIAGNOSIS — G30.0 MILD EARLY ONSET ALZHEIMER'S DEMENTIA, UNSPECIFIED WHETHER BEHAVIORAL, PSYCHOTIC, OR MOOD DISTURBANCE OR ANXIETY (HCC): ICD-10-CM

## 2023-05-11 DIAGNOSIS — Z79.01 LONG TERM (CURRENT) USE OF ANTICOAGULANTS: ICD-10-CM

## 2023-05-11 DIAGNOSIS — F02.A0 MILD EARLY ONSET ALZHEIMER'S DEMENTIA, UNSPECIFIED WHETHER BEHAVIORAL, PSYCHOTIC, OR MOOD DISTURBANCE OR ANXIETY (HCC): ICD-10-CM

## 2023-05-11 DIAGNOSIS — Z00.00 MEDICARE ANNUAL WELLNESS VISIT, SUBSEQUENT: Primary | ICD-10-CM

## 2023-05-11 DIAGNOSIS — Z91.81 AT HIGH RISK FOR FALLS: ICD-10-CM

## 2023-05-11 LAB
POC INR: 1.7
PROTHROMBIN TIME, POC: ABNORMAL

## 2023-05-11 PROCEDURE — 85610 PROTHROMBIN TIME: CPT | Performed by: FAMILY MEDICINE

## 2023-05-11 PROCEDURE — 99214 OFFICE O/P EST MOD 30 MIN: CPT | Performed by: FAMILY MEDICINE

## 2023-05-11 RX ORDER — WARFARIN SODIUM 2 MG/1
TABLET ORAL
Qty: 120 TABLET | Refills: 1 | Status: SHIPPED | OUTPATIENT
Start: 2023-05-11

## 2023-05-11 RX ORDER — ENOXAPARIN SODIUM 100 MG/ML
70 INJECTION SUBCUTANEOUS EVERY 12 HOURS
COMMUNITY
Start: 2021-05-28 | End: 2023-05-11 | Stop reason: ALTCHOICE

## 2023-05-11 RX ORDER — NALOXONE HYDROCHLORIDE 4 MG/.1ML
4 SPRAY NASAL
COMMUNITY
Start: 2020-10-07

## 2023-05-11 RX ORDER — MONTELUKAST SODIUM 4 MG/1
1 TABLET, CHEWABLE ORAL DAILY
COMMUNITY
Start: 2017-01-06

## 2023-05-11 RX ORDER — DICYCLOMINE HYDROCHLORIDE 10 MG/1
CAPSULE ORAL
COMMUNITY
Start: 2015-08-24

## 2023-05-11 RX ORDER — FLUTICASONE PROPIONATE 50 MCG
2 SPRAY, SUSPENSION (ML) NASAL
COMMUNITY
Start: 2017-05-05

## 2023-05-11 RX ORDER — BUSPIRONE HYDROCHLORIDE 15 MG/1
1 TABLET ORAL 2 TIMES DAILY PRN
COMMUNITY
Start: 2023-04-06

## 2023-05-11 RX ORDER — TOPIRAMATE 25 MG/1
25 TABLET ORAL DAILY PRN
COMMUNITY
Start: 2017-06-26

## 2023-05-11 RX ORDER — TRAZODONE HYDROCHLORIDE 100 MG/1
100 TABLET ORAL DAILY
COMMUNITY
Start: 2022-08-31

## 2023-05-11 RX ORDER — POTASSIUM CHLORIDE 750 MG/1
1 TABLET, FILM COATED, EXTENDED RELEASE ORAL DAILY
COMMUNITY
Start: 2022-08-22

## 2023-05-11 RX ORDER — AZELASTINE 1 MG/ML
1 SPRAY, METERED NASAL 2 TIMES DAILY
COMMUNITY
Start: 2021-06-25

## 2023-05-11 RX ORDER — ESCITALOPRAM OXALATE 20 MG/1
20 TABLET ORAL EVERY MORNING
COMMUNITY
Start: 2023-04-24

## 2023-05-11 RX ORDER — PREGABALIN 50 MG/1
50 CAPSULE ORAL 2 TIMES DAILY
COMMUNITY
Start: 2023-04-27

## 2023-05-11 RX ORDER — FLUTICASONE PROPIONATE AND SALMETEROL 250; 50 UG/1; UG/1
POWDER RESPIRATORY (INHALATION)
COMMUNITY
Start: 2020-10-26

## 2023-05-11 RX ORDER — EMPAGLIFLOZIN 10 MG/1
10 TABLET, FILM COATED ORAL DAILY
COMMUNITY
Start: 2023-05-04

## 2023-05-11 RX ORDER — ACETAMINOPHEN 325 MG/1
650 TABLET ORAL EVERY 6 HOURS PRN
COMMUNITY
Start: 2018-02-15

## 2023-05-11 RX ORDER — CLORAZEPATE DIPOTASSIUM 3.75 MG/1
3.75 TABLET ORAL
COMMUNITY
Start: 2016-10-14

## 2023-05-11 RX ORDER — BUPROPION HYDROCHLORIDE 150 MG/1
150 TABLET ORAL DAILY PRN
COMMUNITY
Start: 2021-02-09 | End: 2023-05-11 | Stop reason: ALTCHOICE

## 2023-05-11 RX ORDER — ALUMINUM ZIRCONIUM OCTACHLOROHYDREX GLY 16 G/100G
1 GEL TOPICAL
COMMUNITY
Start: 2022-04-15

## 2023-05-11 RX ORDER — FUROSEMIDE 20 MG/1
20 TABLET ORAL DAILY
COMMUNITY
Start: 2022-07-11

## 2023-05-11 RX ORDER — LOPERAMIDE HYDROCHLORIDE 2 MG/1
CAPSULE ORAL
COMMUNITY
Start: 2023-04-05

## 2023-05-11 RX ORDER — ALBUTEROL SULFATE 90 UG/1
2 AEROSOL, METERED RESPIRATORY (INHALATION) DAILY PRN
COMMUNITY
Start: 2020-07-29

## 2023-05-11 RX ORDER — CLOPIDOGREL BISULFATE 75 MG/1
75 TABLET ORAL DAILY
COMMUNITY
Start: 2023-03-18

## 2023-05-11 RX ORDER — ONDANSETRON 4 MG/1
4 TABLET, FILM COATED ORAL EVERY 8 HOURS PRN
COMMUNITY
Start: 2023-04-24

## 2023-05-11 RX ORDER — EZETIMIBE 10 MG/1
TABLET ORAL
COMMUNITY
Start: 2023-05-10

## 2023-05-11 RX ORDER — WARFARIN SODIUM 1 MG/1
1 TABLET ORAL
COMMUNITY
Start: 2017-05-24 | End: 2023-05-11 | Stop reason: ALTCHOICE

## 2023-05-11 RX ORDER — VERAPAMIL HYDROCHLORIDE 40 MG/1
40 TABLET ORAL 2 TIMES DAILY
COMMUNITY
Start: 2017-04-18

## 2023-05-11 RX ORDER — BLOOD SUGAR DIAGNOSTIC
STRIP MISCELLANEOUS
COMMUNITY
Start: 2022-06-29

## 2023-05-11 RX ORDER — QUETIAPINE FUMARATE 25 MG/1
1 TABLET, FILM COATED ORAL
COMMUNITY
Start: 2022-08-03

## 2023-05-11 RX ORDER — LEVETIRACETAM 750 MG/1
750 TABLET ORAL NIGHTLY
COMMUNITY
Start: 2023-04-04

## 2023-05-11 RX ORDER — DULOXETIN HYDROCHLORIDE 60 MG/1
60 CAPSULE, DELAYED RELEASE ORAL DAILY
COMMUNITY
Start: 2023-04-30

## 2023-05-11 RX ORDER — WARFARIN SODIUM 2 MG/1
TABLET ORAL
COMMUNITY
Start: 2023-03-23 | End: 2023-05-11 | Stop reason: SDUPTHER

## 2023-05-11 RX ORDER — LEVOCETIRIZINE DIHYDROCHLORIDE 5 MG/1
5 TABLET, FILM COATED ORAL DAILY
COMMUNITY
Start: 2023-03-21

## 2023-05-11 RX ORDER — LEVETIRACETAM 500 MG/1
500 TABLET ORAL EVERY MORNING
COMMUNITY
Start: 2023-04-04

## 2023-05-11 RX ORDER — CELECOXIB 200 MG/1
CAPSULE ORAL
COMMUNITY
Start: 2023-05-04

## 2023-05-11 RX ORDER — OXYBUTYNIN CHLORIDE 10 MG/1
10 TABLET, EXTENDED RELEASE ORAL DAILY
COMMUNITY
Start: 2016-09-09

## 2023-05-11 RX ORDER — BUTALBITAL, ACETAMINOPHEN AND CAFFEINE 50; 325; 40 MG/1; MG/1; MG/1
TABLET ORAL
COMMUNITY
Start: 2023-04-04

## 2023-05-11 RX ORDER — BENZONATATE 100 MG/1
CAPSULE ORAL
COMMUNITY
Start: 2021-03-11

## 2023-05-11 RX ORDER — ATORVASTATIN CALCIUM 20 MG/1
20 TABLET, FILM COATED ORAL DAILY
COMMUNITY
Start: 2023-03-07

## 2023-05-11 RX ORDER — LEVOTHYROXINE SODIUM 0.03 MG/1
25 TABLET ORAL DAILY
COMMUNITY
Start: 2020-10-12

## 2023-05-11 RX ORDER — CHOLESTYRAMINE 4 G/9G
1 POWDER, FOR SUSPENSION ORAL 2 TIMES DAILY WITH MEALS
COMMUNITY
Start: 2017-08-18

## 2023-05-11 SDOH — ECONOMIC STABILITY: INCOME INSECURITY: HOW HARD IS IT FOR YOU TO PAY FOR THE VERY BASICS LIKE FOOD, HOUSING, MEDICAL CARE, AND HEATING?: NOT HARD AT ALL

## 2023-05-11 SDOH — ECONOMIC STABILITY: FOOD INSECURITY: WITHIN THE PAST 12 MONTHS, THE FOOD YOU BOUGHT JUST DIDN'T LAST AND YOU DIDN'T HAVE MONEY TO GET MORE.: NEVER TRUE

## 2023-05-11 SDOH — ECONOMIC STABILITY: HOUSING INSECURITY
IN THE LAST 12 MONTHS, WAS THERE A TIME WHEN YOU DID NOT HAVE A STEADY PLACE TO SLEEP OR SLEPT IN A SHELTER (INCLUDING NOW)?: NO

## 2023-05-11 SDOH — ECONOMIC STABILITY: FOOD INSECURITY: WITHIN THE PAST 12 MONTHS, YOU WORRIED THAT YOUR FOOD WOULD RUN OUT BEFORE YOU GOT MONEY TO BUY MORE.: NEVER TRUE

## 2023-05-11 ASSESSMENT — PATIENT HEALTH QUESTIONNAIRE - PHQ9
5. POOR APPETITE OR OVEREATING: 0
4. FEELING TIRED OR HAVING LITTLE ENERGY: 0
9. THOUGHTS THAT YOU WOULD BE BETTER OFF DEAD, OR OF HURTING YOURSELF: 0
7. TROUBLE CONCENTRATING ON THINGS, SUCH AS READING THE NEWSPAPER OR WATCHING TELEVISION: 0
SUM OF ALL RESPONSES TO PHQ QUESTIONS 1-9: 2
1. LITTLE INTEREST OR PLEASURE IN DOING THINGS: 0
10. IF YOU CHECKED OFF ANY PROBLEMS, HOW DIFFICULT HAVE THESE PROBLEMS MADE IT FOR YOU TO DO YOUR WORK, TAKE CARE OF THINGS AT HOME, OR GET ALONG WITH OTHER PEOPLE: 0
8. MOVING OR SPEAKING SO SLOWLY THAT OTHER PEOPLE COULD HAVE NOTICED. OR THE OPPOSITE, BEING SO FIGETY OR RESTLESS THAT YOU HAVE BEEN MOVING AROUND A LOT MORE THAN USUAL: 0
6. FEELING BAD ABOUT YOURSELF - OR THAT YOU ARE A FAILURE OR HAVE LET YOURSELF OR YOUR FAMILY DOWN: 0
SUM OF ALL RESPONSES TO PHQ QUESTIONS 1-9: 2
2. FEELING DOWN, DEPRESSED OR HOPELESS: 0
3. TROUBLE FALLING OR STAYING ASLEEP: 2
SUM OF ALL RESPONSES TO PHQ QUESTIONS 1-9: 2
SUM OF ALL RESPONSES TO PHQ9 QUESTIONS 1 & 2: 0
SUM OF ALL RESPONSES TO PHQ QUESTIONS 1-9: 2

## 2023-05-11 ASSESSMENT — LIFESTYLE VARIABLES
HOW OFTEN DO YOU HAVE A DRINK CONTAINING ALCOHOL: NEVER
HOW MANY STANDARD DRINKS CONTAINING ALCOHOL DO YOU HAVE ON A TYPICAL DAY: PATIENT DOES NOT DRINK

## 2023-05-11 ASSESSMENT — ANXIETY QUESTIONNAIRES
5. BEING SO RESTLESS THAT IT IS HARD TO SIT STILL: 1
3. WORRYING TOO MUCH ABOUT DIFFERENT THINGS: 0
IF YOU CHECKED OFF ANY PROBLEMS ON THIS QUESTIONNAIRE, HOW DIFFICULT HAVE THESE PROBLEMS MADE IT FOR YOU TO DO YOUR WORK, TAKE CARE OF THINGS AT HOME, OR GET ALONG WITH OTHER PEOPLE: SOMEWHAT DIFFICULT
6. BECOMING EASILY ANNOYED OR IRRITABLE: 1
1. FEELING NERVOUS, ANXIOUS, OR ON EDGE: 1
7. FEELING AFRAID AS IF SOMETHING AWFUL MIGHT HAPPEN: 0
GAD7 TOTAL SCORE: 4
2. NOT BEING ABLE TO STOP OR CONTROL WORRYING: 1
4. TROUBLE RELAXING: 0

## 2023-05-11 NOTE — PATIENT INSTRUCTIONS
[place] on Aging online. You need 1414-7630 mg of calcium and 4504-1803 IU of vitamin D per day. It is possible to meet your calcium requirement with diet alone, but a vitamin D supplement is usually necessary to meet this goal.  When exposed to the sun, use a sunscreen that protects against both UVA and UVB radiation with an SPF of 30 or greater. Reapply every 2 to 3 hours or after sweating, drying off with a towel, or swimming. Always wear a seat belt when traveling in a car. Always wear a helmet when riding a bicycle or motorcycle. Preventing Falls: Care Instructions  Overview     Getting around your home safely can be a challenge if you have injuries or health problems that make it easy for you to fall. Loose rugs and furniture in walkways are among the dangers for many older people who have problems walking or who have poor eyesight. People who have conditions such as arthritis, osteoporosis, or dementia also have to be careful not to fall. You can make your home safer with a few simple measures. Follow-up care is a key part of your treatment and safety. Be sure to make and go to all appointments, and call your doctor if you are having problems. It's also a good idea to know your test results and keep a list of the medicines you take. How can you care for yourself at home? Taking care of yourself  Exercise regularly to improve your strength, muscle tone, and balance. Walk if you can. Swimming may be a good choice if you cannot walk easily. Have your vision and hearing checked each year or any time you notice a change. If you have trouble seeing and hearing, you might not be able to avoid objects and could lose your balance. Know the side effects of the medicines you take. Ask your doctor or pharmacist whether the medicines you take can affect your balance. Sleeping pills or sedatives can affect your balance. Limit the amount of alcohol you drink.  Alcohol can impair your balance and other

## 2023-05-11 NOTE — PROGRESS NOTES
Medicare Annual Wellness Visit    Barney Begum is here for Medicare AWV and Office Visit for Anticoagulation Management    Assessment & Plan   Medicare annual wellness visit, subsequent  Long term (current) use of anticoagulants  -     AMB POC PT/INR  -     warfarin (COUMADIN) 2 MG tablet; 4mg M,W,F, and 2mg Tu, Th, Sa, Gilman, Disp-120 tablet, R-1Normal  Presence of prosthetic heart valve  -     warfarin (COUMADIN) 2 MG tablet; 4mg M,W,F, and 2mg Tu, Th, Sa, Gilman, Disp-120 tablet, R-1Normal  At high risk for falls  -     [facility]  Mild early onset Alzheimer's dementia, unspecified whether behavioral, psychotic, or mood disturbance or anxiety ([facility] [address]  -     [facility]      Recommendations for [employer] Due: see orders and patient instructions/AVS.  Recommended screening schedule for the next 5-10 years is provided to the patient in written form: see Patient Instructions/AVS.     Return in 2 weeks (on 5/25/2023) for INR and chronic medical issues. Subjective     She didn't have her meds w her today, it's hard to remember everything or this many meds. So I believe she might have made some mistake in her meds. To bring her meds w her next time. AWV id early dementia. This visit was almost 2 hrs long. Her  have to go. S/p valve replacement, stents   Denies any abnormal bleeding. She is c/o of easy bruising. INR 1.7, 2.8, 2.1, 1.6, 1.5, 1.3, 1.7, 2.5, (1/3/2023 knee procedure), 2.1, 2.6  Coumadin dose supposed to be; 4mg Mo, Wed, Fri.  2mg Tue, Thur, Sat, Sun    But this time she is taking 1mg MWF and 2mg Tu,Th,Sa,Gilman  For the past 2 months it's been 4mg Mo, Wed, Fri.  2mg Tue, Thur, Sat, Sun to resume this dose. I rewrote it and showed her on the discharge paper.      She refuse direction to stop naproxen 200mg BID it helps w her pain    Hx of anxiety  Wellbutrin XL - d/c - but still taking, I marked again to stop  Changed

## 2023-05-11 NOTE — PROGRESS NOTES
Chief Complaint   Patient presents with    Medicare [address]    Office Visit for Anticoagulation Management

## 2023-05-12 ENCOUNTER — HOME HEALTH ADMISSION (OUTPATIENT)
Dept: HOME HEALTH SERVICES | Facility: HOME HEALTH | Age: 83
End: 2023-05-12

## 2023-05-12 RX ORDER — MIRABEGRON 25 MG/1
TABLET, FILM COATED, EXTENDED RELEASE ORAL
Qty: 30 TABLET | Refills: 0 | OUTPATIENT
Start: 2023-05-12

## 2023-05-17 ENCOUNTER — TELEPHONE (OUTPATIENT)
Age: 83
End: 2023-05-17

## 2023-05-17 NOTE — TELEPHONE ENCOUNTER
----- Message from Oskar Ac LPN sent at 4/97/8539  3:01 PM EDT -----  Hello,     I spoke to the patient an d she does not want home health at this time because she cannot do it due to pain. Rebecca Nurse  [phone].

## 2023-05-23 ENCOUNTER — TELEPHONE (OUTPATIENT)
Age: 83
End: 2023-05-23

## 2023-05-23 NOTE — TELEPHONE ENCOUNTER
----- Message from [place] Record sent at 5/23/2023 12:27 PM EDT -----  Subject: Message to Provider    QUESTIONS  Information for Provider? Pt wants to know why Dr. Melissa Winchester contacted her   [facility] [employer] regarding something she needed to have done. She is frustrated with   his process. Please call her back. ---------------------------------------------------------------------------  --------------  Chris Schofield [facility]  [phone]; OK to leave message on voicemail  ---------------------------------------------------------------------------  --------------  SCRIPT ANSWERS  Relationship to Patient?  Self

## 2023-05-23 NOTE — TELEPHONE ENCOUNTER
Spoke with patient. She said that Insurance said that her doctor had called to see if procedure was covered. Informed her it might have been Ortho calling them. She said that she would check tomorrow.

## 2023-05-31 RX ORDER — DULOXETIN HYDROCHLORIDE 60 MG/1
60 CAPSULE, DELAYED RELEASE ORAL DAILY
Qty: 30 CAPSULE | Refills: 0 | Status: SHIPPED | OUTPATIENT
Start: 2023-05-31

## 2023-06-06 RX ORDER — LEVOTHYROXINE SODIUM 0.03 MG/1
TABLET ORAL
Qty: 90 TABLET | Refills: 1 | Status: SHIPPED | OUTPATIENT
Start: 2023-06-06

## 2023-06-06 RX ORDER — METOPROLOL SUCCINATE 50 MG/1
TABLET, EXTENDED RELEASE ORAL
Qty: 90 TABLET | Refills: 1 | Status: SHIPPED | OUTPATIENT
Start: 2023-06-06

## 2023-06-06 NOTE — TELEPHONE ENCOUNTER
Synthroid was sent on 3/2/23 for #90 with 1 refill. Last appointment: 5/11/23  Next appointment: 6/21/23  Previous refill encounter(s): 3/8/23 Toprol #90    Requested Prescriptions     Pending Prescriptions Disp Refills    levothyroxine (SYNTHROID) 25 MCG tablet [Pharmacy Med Name: LEVOTHYROXINE 0.025MG (25MCG) TAB] 90 tablet      Sig: TAKE 1 TABLET BY MOUTH DAILY BEFORE BREAKFAST    metoprolol succinate (TOPROL XL) 50 MG extended release tablet [Pharmacy Med Name: METOPROLOL ER SUCCINATE 50MG TABS] 90 tablet 3     Sig: TAKE 1 TABLET BY MOUTH DAILY         For Pharmacy Admin Tracking Only    Program: Medication Refill  CPA in place:    Recommendation Provided To:    Intervention Detail: New Rx: 2, reason: Patient Preference  Intervention Accepted By:   Arabella Maradiaga Closed?:    Time Spent (min): 5

## 2023-06-09 NOTE — TELEPHONE ENCOUNTER
PCP: Pérez Ward DO    Last appt: 5/13/2022  Future Appointments   Date Time Provider Toro Valderrama   7/13/2022  1:00 PM Popeye Almeida MD [facility] BS AMB   8/4/2022  1:30 PM Pérez Ward DO [facility] BS AMB   8/31/2022 10:00 AM Evangelista Still MD [facility] BS AMB       Requested Prescriptions     Pending Prescriptions Disp Refills    clorazepate (TRANXENE) 3.75 mg tablet 90 Tablet 3     Sig: Take 1 Tablet by mouth every eight (8) hours as needed for Anxiety. Max Daily Amount: 11.25 mg.    esomeprazole (NEXIUM) 20 mg capsule 90 Capsule 3     Sig: Take 1 Capsule by mouth daily.        Prior labs and Blood pressures:  BP Readings from Last 3 Encounters:   06/16/22 (!) 122/98   04/26/22 121/63   03/29/22 131/73     Lab Results   Component Value Date/Time    Sodium 137 06/16/2022 04:49 AM    Potassium 3.5 06/16/2022 04:49 AM    Chloride 100 06/16/2022 04:49 AM    CO2 31 06/16/2022 04:49 AM    Anion gap 6 06/16/2022 04:49 AM    Glucose 143 (H) 06/16/2022 04:49 AM    BUN 16 06/16/2022 04:49 AM    Creatinine 0.56 06/16/2022 04:49 AM    BUN/Creatinine ratio 29 (H) 06/16/2022 04:49 AM    GFR est AA >60 06/16/2022 04:49 AM    GFR est non-AA >60 06/16/2022 04:49 AM    Calcium 8.6 06/16/2022 04:49 AM     Lab Results   Component Value Date/Time    Hemoglobin A1c 7.3 (H) 06/25/2021 11:52 AM    Hemoglobin A1c (POC) 7.6 (A) 03/29/2022 01:24 PM     Lab Results   Component Value Date/Time    Cholesterol, total 107 06/25/2021 11:52 AM    HDL Cholesterol 52 06/25/2021 11:52 AM    LDL, calculated 39 06/25/2021 11:52 AM    VLDL, calculated 16 06/25/2021 11:52 AM    Triglyceride 80 06/25/2021 11:52 AM    CHOL/HDL Ratio 2.1 06/25/2021 11:52 AM     No results found for: ELISSA Recinos    Lab Results   Component Value Date/Time    TSH 1.04 06/09/2022 10:55 AM
Patient states she needs refills done thru [facility]/Dorinda French [facility] on file/[facility]. Please call if any questions. Thank you      Patient reminded of [address] turn around.
[id number]

## 2023-06-14 RX ORDER — ONDANSETRON 4 MG/1
4 TABLET, FILM COATED ORAL
Qty: 15 TABLET | Refills: 0 | Status: SHIPPED | OUTPATIENT
Start: 2023-06-14

## 2023-06-14 RX ORDER — ONDANSETRON 4 MG/1
TABLET, FILM COATED ORAL
Qty: 30 TABLET | Refills: 0 | OUTPATIENT
Start: 2023-06-14

## 2023-06-20 ENCOUNTER — APPOINTMENT (OUTPATIENT)
Facility: HOSPITAL | Age: 83
End: 2023-06-20
Attending: PHYSICAL MEDICINE & REHABILITATION
Payer: MEDICARE

## 2023-06-20 ENCOUNTER — TELEPHONE (OUTPATIENT)
Age: 83
End: 2023-06-20

## 2023-06-20 ENCOUNTER — HOSPITAL ENCOUNTER (OUTPATIENT)
Facility: HOSPITAL | Age: 83
Discharge: HOME | End: 2023-06-20
Attending: PHYSICAL MEDICINE & REHABILITATION | Admitting: PHYSICAL MEDICINE & REHABILITATION
Payer: MEDICARE

## 2023-06-20 VITALS
TEMPERATURE: 98.1 F | HEART RATE: 68 BPM | BODY MASS INDEX: 23.98 KG/M2 | WEIGHT: 127 LBS | HEIGHT: 61 IN | RESPIRATION RATE: 17 BRPM | SYSTOLIC BLOOD PRESSURE: 113 MMHG | DIASTOLIC BLOOD PRESSURE: 69 MMHG | OXYGEN SATURATION: 93 %

## 2023-06-20 LAB
GLUCOSE BLD STRIP.AUTO-MCNC: 203 MG/DL (ref 65–117)
INR BLD: 2.1
SERVICE CMNT-IMP: ABNORMAL

## 2023-06-20 PROCEDURE — 2709999900 HC NON-CHARGEABLE SUPPLY: Performed by: PHYSICAL MEDICINE & REHABILITATION

## 2023-06-20 PROCEDURE — 82962 GLUCOSE BLOOD TEST: CPT

## 2023-06-20 PROCEDURE — 3600000002 HC SURGERY LEVEL 2 BASE: Performed by: PHYSICAL MEDICINE & REHABILITATION

## 2023-06-20 PROCEDURE — 2500000003 HC RX 250 WO HCPCS: Performed by: PHYSICAL MEDICINE & REHABILITATION

## 2023-06-20 PROCEDURE — 3600000012 HC SURGERY LEVEL 2 ADDTL 15MIN: Performed by: PHYSICAL MEDICINE & REHABILITATION

## 2023-06-20 PROCEDURE — 6360000002 HC RX W HCPCS: Performed by: PHYSICAL MEDICINE & REHABILITATION

## 2023-06-20 PROCEDURE — 7100000010 HC PHASE II RECOVERY - FIRST 15 MIN: Performed by: PHYSICAL MEDICINE & REHABILITATION

## 2023-06-20 PROCEDURE — 85610 PROTHROMBIN TIME: CPT

## 2023-06-20 RX ORDER — LIDOCAINE HYDROCHLORIDE 20 MG/ML
10 INJECTION, SOLUTION EPIDURAL; INFILTRATION; INTRACAUDAL; PERINEURAL ONCE
Status: COMPLETED | OUTPATIENT
Start: 2023-06-20 | End: 2023-06-20

## 2023-06-20 RX ORDER — BUPIVACAINE HYDROCHLORIDE 5 MG/ML
10 INJECTION, SOLUTION EPIDURAL; INTRACAUDAL ONCE
Status: COMPLETED | OUTPATIENT
Start: 2023-06-20 | End: 2023-06-20

## 2023-06-20 RX ORDER — DEXAMETHASONE SODIUM PHOSPHATE 10 MG/ML
10 INJECTION INTRAMUSCULAR; INTRAVENOUS ONCE
Status: COMPLETED | OUTPATIENT
Start: 2023-06-20 | End: 2023-06-20

## 2023-06-20 RX ORDER — OXYBUTYNIN CHLORIDE 10 MG/1
TABLET, EXTENDED RELEASE ORAL
Qty: 90 TABLET | OUTPATIENT
Start: 2023-06-20

## 2023-06-20 ASSESSMENT — PAIN - FUNCTIONAL ASSESSMENT
PAIN_FUNCTIONAL_ASSESSMENT: 0-10
PAIN_FUNCTIONAL_ASSESSMENT: NONE - DENIES PAIN

## 2023-06-20 NOTE — TELEPHONE ENCOUNTER
Pls call patient about her refill request     Oxybutin   States she was up all night and having to go to the bathroom     And she just had back surgery       Best number to reach her is [phone]

## 2023-06-20 NOTE — TELEPHONE ENCOUNTER
Chronic noncompliant. Refuse all meds. She's not f/up for her meds and INR monitoring. It's dangerous. Needs apt before refill.      Tomer Lambert MD  09/18/23

## 2023-06-20 NOTE — DISCHARGE INSTRUCTIONS
Radiofrequency Ablation  Discharge Instructions    You had a radiofrequency ablation today. You will probably have some numbness in your lower back area for the next 6-8 hours. You should begin feeling better after a few days, but it may take up to 2 weeks to notice the difference. The benefit you get from your injection will last a variable amount of time, depending on the severity of your spine problem. Pain:  Most people do not have any increase in pain after this injection. However, you might experience some soreness a few days at the site of the injection. If this happens, putting an ice pack over the sore area will help. Bandage: You have a small bandage covering the site of the injection. You may remove it when you get home. Restrictions:  Some one should drive you home after the injection. After that, you have no restrictions. You may resume your normal level of activity. You may take a shower or bath, and you may eat normally. You should continue your current exercise and/or therapy routine. Medications:  Continue your current medications as prescribed. If you pain decreases, you may reduce the amount of your pain medications. If you stopped taking anticoagulants or blood-thinners before the injection, start them tomorrow. Call Dr. Mitzie Bloch at [phone] if you experience:    Fever (101 degrees Fahrenheit or greater)  Nausea or vomiting  Headache unrelieved by your normal pain medication  Redness or swelling at the injection site that lasts more than 1 day  New numbness, tingling, weakness, or pain that you didn't have before the injection      If still having pain in 1-2 weeks, call office at [phone] for a follow up appointment.         DISCHARGE SUMMARY from Nurse    The following personal items collected during your admission are returned to you:   Dental Appliance:    Vision:    Hearing Aid:    Jewelry:    Clothing:    Other Valuables:    Valuables sent to safe:

## 2023-06-20 NOTE — PERIOP NOTE
Bruna Quiet  1940  [id number]    Situation:  Verbal report given from: Joellen Ruffin RN  Procedure: Procedure(s):  LEFT L4 - L5, L5 - S1 RADIO FREQUENCY ABLATION    Background:    Preoperative diagnosis: Lumbar spondylosis [M47.816]  DDD (degenerative disc disease), lumbar [M51.36]  Spondylolisthesis of lumbar region [M43.16]  Other spondylosis with radiculopathy, lumbar region [M47.26]    Postoperative diagnosis: * No post-op diagnosis entered *    :  Dr. Aleah Galindo): Circulator: Rodríguez Altman RN; Darryle Palms, RN  Radiology Technologist: Vicente Palacios RT  Relief Scrub: Andrew Mckeon  Scrub Person First: Taras Smalls    Specimens: * No specimens in log *    Assessment:  Intra-procedure medications         Anesthesia gave intra-procedure sedation and medications, see anesthesia flow sheet     Intravenous fluids: LR@ KVO     Vital signs stable

## 2023-06-20 NOTE — OP NOTE
Operative Note      Patient: Julieta Shanks  YOB: 1940  MRN: [id number]    Date of Procedure: 6/20/2023    PROCEDURES PERFORMED:   Radiofrequency neuroablation of the medial branches Left L3, L4 and L5    PREPROCEDURE DIAGNOSIS: lumbar spondylosis with chronic back pain. POST PROCEDURE DIAGNOSIS: lumbar spondylosis with chronic back pain. SURGEON: Marielena Owusu M.D. HISTORY OF PRESENT ILLNESS AND INDICATIONS FOR THE PROCEDURE: This patient was previously given diagnostic successful blocks of the facet joints innervated by the aforementioned medial branches and is here today for ablation and neurolysis of those branches. She has previously failed conservative management which has proceeded to injection therapy. FINDINGS AND PROCEDURES:  Adequate informed consent was obtained and the patient was taken to the procedure room and placed in the prone position on the procedure table. A time out was held for patient verification. The patient had monitoring throughout the procedure at cycled one minute blood pressure, pulse, and pulse oximetry. The skin was prepped and draped in the normal sterile fashion. Using fluoroscopic guidance in anteroposterior, oblique, and lateral views, the appropriate superior articular processes and pedicles were identified. After identification 1 percent lidocaine skin anesthesia was administered at each site. Using a radiofrequency ablation needle, a 22 gauge 10 millimeter active tip was placed at the medial branch nerves, LeftL3, L4 and L5. The initial impedance in Ohms was within acceptable limits. Each level was also tested at 2 Hertz with no distal motor movement or referred pain pattern. The lesion site was injected with 0.5 milliliters of 0.5 percent bupivacaine anesthesia of the nerve branch. Next a lesion was applied for 90 seconds at 80 degrees to the Left L4 and L5 medial branches.  The patient did not tolerate the left L3 RFA despite multiple doses

## 2023-06-20 NOTE — H&P
Procedural Case Note    6/20/2023    (2:32 PM)    Ramos Yung    1940   (78 y.o.)    [id number]    CC:  pain    ROS:   Complete ROS obtained, no CP, no SOB, no N or V    PMH:     Past Medical History:   Diagnosis Date    Anxiety     Aortic valve stenosis 07/2022    Arthritis     Asthma     CAD (coronary artery disease)     stent    Chronic anticoagulation 11/23/2022    Chronic obstructive pulmonary disease (HCC)     Chronic pain     Depression with anxiety 11/23/2022    Diabetes ([facility]     GERD (gastroesophageal reflux disease)     H/O aortic valve replacement 07/2022    Hepatitis B     1960's    History of blood transfusion     06/22    History of DVT (deep vein thrombosis)     History of recurrent UTIs     Hx of seasonal allergies     Hypercholesterolemia     Irritable bowel syndrome (IBS)     Migraine     Noncompliance 01/18/2023    Oxygen dependent     PRN at 2LPM NC    Psychiatric disorder     anxiety    Thromboembolus ([facility]     left leg    Thyroid disease     pt denies- not tx for it    Urinary urgency        ALLERGIES:     Allergies   Allergen Reactions    Bee Venom Shortness Of Breath           Iodinated Contrast Media Anaphylaxis and Other (See Comments)     Passes out    Penicillins Anaphylaxis and Shortness Of Breath     Other reaction(s): anaphylaxis/angioedema        Sulfa Antibiotics Anaphylaxis, Shortness Of Breath and Rash     Other reaction(s): anaphylaxis/angioedema        Ranitidine Hcl Nausea Only    Iodine     Montelukast Hallucinations           Tree Extract Itching     Cat dander, grass        Cefdinir Rash     Other reaction(s): mild rash/itching        Codeine Itching     Other reaction(s): other/intolerance  Dizziness. Pt states they take Benadryl to offset the reaction. Famotidine Nausea And Vomiting    Morphine Itching and Other (See Comments)     Agitation,hallucinations         MEDS:     No current facility-administered medications for this encounter.         There were no

## 2023-06-20 NOTE — PERIOP NOTE
Patient received to PACU, VSS. Patient awake and alert with no complaints of pain. Injection site intact. Neuro:  Push/Pull assessment:     LLE Response: strong push/pull   RLE Response: strong push/pull    Discharge instructions given. Patient verbalized understanding of instructions and follow up.     1532: Patient oxygen sats 91-92% on room air. Patient states she has pulse ox and portable oxygen at home, agrees to recheck upon arrival to house. Patient states ready for discharge - patient discharged at this time by wheelchair with all belongings. Friend [employer] to provide transportation home.

## 2023-06-20 NOTE — PERIOP NOTE
Neuro:  Push/Pull assessment:     LUE Response: strong    LLE Response: strong    RUE Response: strong    RLE Response: strong         Pt 80 -85%  on RA placed on 4 L O2 saturation up to 95%     [facility] to schedule an appointment. PT has H/o COPD and has not established care with Pulmonary since moving to [facility].

## 2023-06-21 ENCOUNTER — TELEMEDICINE (OUTPATIENT)
Age: 83
End: 2023-06-21
Payer: MEDICARE

## 2023-06-21 DIAGNOSIS — G89.29 CHRONIC PAIN OF MULTIPLE JOINTS: ICD-10-CM

## 2023-06-21 DIAGNOSIS — Z91.89 AT RISK FOR POLYPHARMACY: ICD-10-CM

## 2023-06-21 DIAGNOSIS — N32.89 IRRITABLE BLADDER: Primary | ICD-10-CM

## 2023-06-21 DIAGNOSIS — M25.50 CHRONIC PAIN OF MULTIPLE JOINTS: ICD-10-CM

## 2023-06-21 DIAGNOSIS — N32.89 IRRITABLE BLADDER: ICD-10-CM

## 2023-06-21 DIAGNOSIS — Z79.01 LONG TERM (CURRENT) USE OF ANTICOAGULANTS: ICD-10-CM

## 2023-06-21 PROCEDURE — G8420 CALC BMI NORM PARAMETERS: HCPCS | Performed by: FAMILY MEDICINE

## 2023-06-21 PROCEDURE — G8428 CUR MEDS NOT DOCUMENT: HCPCS | Performed by: FAMILY MEDICINE

## 2023-06-21 PROCEDURE — 1036F TOBACCO NON-USER: CPT | Performed by: FAMILY MEDICINE

## 2023-06-21 PROCEDURE — 1090F PRES/ABSN URINE INCON ASSESS: CPT | Performed by: FAMILY MEDICINE

## 2023-06-21 PROCEDURE — 99215 OFFICE O/P EST HI 40 MIN: CPT | Performed by: FAMILY MEDICINE

## 2023-06-21 PROCEDURE — G8400 PT W/DXA NO RESULTS DOC: HCPCS | Performed by: FAMILY MEDICINE

## 2023-06-21 PROCEDURE — 1123F ACP DISCUSS/DSCN MKR DOCD: CPT | Performed by: FAMILY MEDICINE

## 2023-06-21 RX ORDER — OXYBUTYNIN CHLORIDE 5 MG/1
5 TABLET, EXTENDED RELEASE ORAL DAILY
Qty: 90 TABLET | OUTPATIENT
Start: 2023-06-21

## 2023-06-21 RX ORDER — MIRABEGRON 25 MG/1
TABLET, FILM COATED, EXTENDED RELEASE ORAL
Qty: 30 TABLET | Refills: 0 | OUTPATIENT
Start: 2023-06-21

## 2023-06-21 RX ORDER — OXYBUTYNIN CHLORIDE 5 MG/1
5 TABLET, EXTENDED RELEASE ORAL DAILY
Qty: 30 TABLET | Refills: 0 | Status: SHIPPED | OUTPATIENT
Start: 2023-06-21

## 2023-06-22 DIAGNOSIS — N32.89 IRRITABLE BLADDER: ICD-10-CM

## 2023-06-22 RX ORDER — MIRABEGRON 25 MG/1
TABLET, FILM COATED, EXTENDED RELEASE ORAL
Qty: 30 TABLET | Refills: 0 | OUTPATIENT
Start: 2023-06-22

## 2023-06-22 NOTE — TELEPHONE ENCOUNTER
Duplicate      For Pharmacy Admin Tracking Only    Program: Medication Refill  CPA in place:    Recommendation Provided To:    Intervention Detail: Discontinued Rx: 1, reason: Duplicate Therapy  Intervention Accepted By:   Yohan Burks Closed?:    Time Spent (min): 5

## 2023-07-03 ENCOUNTER — OFFICE VISIT (OUTPATIENT)
Age: 83
End: 2023-07-03
Payer: MEDICARE

## 2023-07-03 VITALS
OXYGEN SATURATION: 91 % | RESPIRATION RATE: 18 BRPM | HEIGHT: 61 IN | DIASTOLIC BLOOD PRESSURE: 72 MMHG | BODY MASS INDEX: 25.49 KG/M2 | WEIGHT: 135 LBS | HEART RATE: 76 BPM | SYSTOLIC BLOOD PRESSURE: 137 MMHG | TEMPERATURE: 97.7 F

## 2023-07-03 DIAGNOSIS — Z79.01 LONG TERM (CURRENT) USE OF ANTICOAGULANTS: Primary | ICD-10-CM

## 2023-07-03 DIAGNOSIS — G89.29 CHRONIC PAIN OF MULTIPLE JOINTS: ICD-10-CM

## 2023-07-03 DIAGNOSIS — N32.89 IRRITABLE BLADDER: ICD-10-CM

## 2023-07-03 DIAGNOSIS — M25.50 CHRONIC PAIN OF MULTIPLE JOINTS: ICD-10-CM

## 2023-07-03 DIAGNOSIS — N32.81 OAB (OVERACTIVE BLADDER): ICD-10-CM

## 2023-07-03 DIAGNOSIS — G43.909 MIGRAINE WITHOUT STATUS MIGRAINOSUS, NOT INTRACTABLE, UNSPECIFIED MIGRAINE TYPE: ICD-10-CM

## 2023-07-03 DIAGNOSIS — E78.5 HYPERLIPIDEMIA ASSOCIATED WITH TYPE 2 DIABETES MELLITUS (HCC): ICD-10-CM

## 2023-07-03 DIAGNOSIS — F41.8 DEPRESSION WITH ANXIETY: ICD-10-CM

## 2023-07-03 DIAGNOSIS — G47.01 INSOMNIA DUE TO MEDICAL CONDITION: ICD-10-CM

## 2023-07-03 DIAGNOSIS — Z95.2 H/O AORTIC VALVE REPLACEMENT: ICD-10-CM

## 2023-07-03 DIAGNOSIS — E11.69 HYPERLIPIDEMIA ASSOCIATED WITH TYPE 2 DIABETES MELLITUS (HCC): ICD-10-CM

## 2023-07-03 DIAGNOSIS — E11.9 TYPE 2 DIABETES MELLITUS WITHOUT COMPLICATION, WITHOUT LONG-TERM CURRENT USE OF INSULIN (HCC): ICD-10-CM

## 2023-07-03 LAB
POC INR: 3.1
PROTHROMBIN TIME, POC: ABNORMAL

## 2023-07-03 PROCEDURE — G8428 CUR MEDS NOT DOCUMENT: HCPCS | Performed by: FAMILY MEDICINE

## 2023-07-03 PROCEDURE — G8400 PT W/DXA NO RESULTS DOC: HCPCS | Performed by: FAMILY MEDICINE

## 2023-07-03 PROCEDURE — 3051F HG A1C>EQUAL 7.0%<8.0%: CPT | Performed by: FAMILY MEDICINE

## 2023-07-03 PROCEDURE — PBSHW AMB POC PT/INR: Performed by: FAMILY MEDICINE

## 2023-07-03 PROCEDURE — 1036F TOBACCO NON-USER: CPT | Performed by: FAMILY MEDICINE

## 2023-07-03 PROCEDURE — 1090F PRES/ABSN URINE INCON ASSESS: CPT | Performed by: FAMILY MEDICINE

## 2023-07-03 PROCEDURE — 1123F ACP DISCUSS/DSCN MKR DOCD: CPT | Performed by: FAMILY MEDICINE

## 2023-07-03 PROCEDURE — 99214 OFFICE O/P EST MOD 30 MIN: CPT | Performed by: FAMILY MEDICINE

## 2023-07-03 PROCEDURE — 85610 PROTHROMBIN TIME: CPT | Performed by: FAMILY MEDICINE

## 2023-07-03 PROCEDURE — G8419 CALC BMI OUT NRM PARAM NOF/U: HCPCS | Performed by: FAMILY MEDICINE

## 2023-07-03 RX ORDER — LEVETIRACETAM 500 MG/1
500 TABLET ORAL 2 TIMES DAILY
Qty: 180 TABLET | Refills: 0 | Status: SHIPPED | OUTPATIENT
Start: 2023-07-03

## 2023-07-03 RX ORDER — METFORMIN HYDROCHLORIDE 500 MG/1
500 TABLET, EXTENDED RELEASE ORAL
Qty: 90 TABLET | Refills: 1 | Status: SHIPPED | OUTPATIENT
Start: 2023-07-03

## 2023-07-03 RX ORDER — QUETIAPINE FUMARATE 50 MG/1
50 TABLET, FILM COATED ORAL NIGHTLY
Qty: 90 TABLET | Refills: 0 | Status: SHIPPED | OUTPATIENT
Start: 2023-07-03

## 2023-07-03 RX ORDER — DULOXETIN HYDROCHLORIDE 60 MG/1
60 CAPSULE, DELAYED RELEASE ORAL DAILY
Qty: 90 CAPSULE | Refills: 1 | Status: SHIPPED | OUTPATIENT
Start: 2023-07-03

## 2023-07-03 RX ORDER — LEVETIRACETAM 500 MG/1
500 TABLET ORAL EVERY MORNING
Qty: 90 TABLET | OUTPATIENT
Start: 2023-07-03

## 2023-07-03 RX ORDER — LEVETIRACETAM 500 MG/1
500 TABLET ORAL EVERY MORNING
Qty: 90 TABLET | Refills: 0 | Status: CANCELLED | OUTPATIENT
Start: 2023-07-03

## 2023-07-03 NOTE — PROGRESS NOTES
Bayron Desai is a 78 y.o. female    She is noncompliant to f/up, INR monitoring. Asked her to bring all her meds, she didn't again. She's on many meds, concerns for mixed up.     has a past medical history of Anxiety, Aortic valve stenosis, Arthritis, Asthma, CAD (coronary artery disease), Chronic anticoagulation, Chronic obstructive pulmonary disease ([address]), Chronic pain, Depression with anxiety, Diabetes ([address]), GERD (gastroesophageal reflux disease), H/O aortic valve replacement, Hepatitis B, History of blood transfusion, History of DVT (deep vein thrombosis), History of recurrent UTIs, Hx of seasonal allergies, Hypercholesterolemia, Irritable bowel syndrome (IBS), Migraine, Noncompliance, Oxygen dependent, Psychiatric disorder, Thromboembolus ([address]), Thyroid disease, and Urinary urgency. Assessment/Plans:    Douglas was seen today for office visit for anticoagulation management and follow-up chronic condition. Diagnoses and all orders for this visit:    Long term (current) use of anticoagulants  -     AMB POC PT/INR    H/O aortic valve replacement  -     AMB POC PT/INR    Hyperlipidemia associated with type 2 diabetes mellitus ([address])    Type 2 diabetes mellitus without complication, without long-term current use of insulin (HCC)  -     metFORMIN (GLUCOPHAGE-XR) 500 MG extended release tablet; Take 1 tablet by mouth daily (with breakfast)    OAB (overactive bladder)    Depression with anxiety  -     DULoxetine (CYMBALTA) 60 MG extended release capsule; Take 1 capsule by mouth daily    Chronic pain of multiple joints  -     DULoxetine (CYMBALTA) 60 MG extended release capsule; Take 1 capsule by mouth daily    Irritable bladder  -     mirabegron (MYRBETRIQ) 25 MG TB24; Take 1 tablet by mouth daily    Insomnia due to medical condition  -     QUEtiapine (SEROQUEL) 50 MG tablet;  Take 1 tablet by mouth nightly    Migraine without status migrainosus, not intractable, unspecified migraine type  -

## 2023-07-03 NOTE — PROGRESS NOTES
Chief Complaint   Patient presents with    Office Visit for Anticoagulation Management    Follow-up Chronic Condition     Check meds    1. Have you been to the ER, urgent care clinic since your last visit? Hospitalized since your last visit? No    2. Have you seen or consulted any other health care providers outside of the [address] [place] [address] since your last visit? Include any pap smears or colon screening.  Yes

## 2023-07-06 DIAGNOSIS — G47.01 INSOMNIA DUE TO MEDICAL CONDITION: ICD-10-CM

## 2023-07-06 RX ORDER — QUETIAPINE FUMARATE 50 MG/1
TABLET, FILM COATED ORAL
Qty: 90 TABLET | Refills: 0 | OUTPATIENT
Start: 2023-07-06

## 2023-07-14 DIAGNOSIS — N32.89 IRRITABLE BLADDER: ICD-10-CM

## 2023-07-17 RX ORDER — OXYBUTYNIN CHLORIDE 5 MG/1
5 TABLET, EXTENDED RELEASE ORAL DAILY
Qty: 90 TABLET | OUTPATIENT
Start: 2023-07-17

## 2023-07-18 ENCOUNTER — OFFICE VISIT (OUTPATIENT)
Age: 83
End: 2023-07-18
Payer: MEDICARE

## 2023-07-18 VITALS
OXYGEN SATURATION: 90 % | TEMPERATURE: 98.2 F | WEIGHT: 135.4 LBS | HEART RATE: 87 BPM | DIASTOLIC BLOOD PRESSURE: 56 MMHG | BODY MASS INDEX: 25.57 KG/M2 | RESPIRATION RATE: 20 BRPM | SYSTOLIC BLOOD PRESSURE: 109 MMHG | HEIGHT: 61 IN

## 2023-07-18 DIAGNOSIS — Z79.01 LONG TERM (CURRENT) USE OF ANTICOAGULANTS: Primary | ICD-10-CM

## 2023-07-18 DIAGNOSIS — Z79.899 POLYPHARMACY: ICD-10-CM

## 2023-07-18 DIAGNOSIS — G43.909 MIGRAINE WITHOUT STATUS MIGRAINOSUS, NOT INTRACTABLE, UNSPECIFIED MIGRAINE TYPE: ICD-10-CM

## 2023-07-18 DIAGNOSIS — N32.89 IRRITABLE BLADDER: ICD-10-CM

## 2023-07-18 LAB
POC INR: 2.6
PROTHROMBIN TIME, POC: NORMAL

## 2023-07-18 PROCEDURE — G8400 PT W/DXA NO RESULTS DOC: HCPCS | Performed by: FAMILY MEDICINE

## 2023-07-18 PROCEDURE — 1123F ACP DISCUSS/DSCN MKR DOCD: CPT | Performed by: FAMILY MEDICINE

## 2023-07-18 PROCEDURE — 99214 OFFICE O/P EST MOD 30 MIN: CPT | Performed by: FAMILY MEDICINE

## 2023-07-18 PROCEDURE — G8427 DOCREV CUR MEDS BY ELIG CLIN: HCPCS | Performed by: FAMILY MEDICINE

## 2023-07-18 PROCEDURE — 1090F PRES/ABSN URINE INCON ASSESS: CPT | Performed by: FAMILY MEDICINE

## 2023-07-18 PROCEDURE — 85610 PROTHROMBIN TIME: CPT | Performed by: FAMILY MEDICINE

## 2023-07-18 PROCEDURE — 1036F TOBACCO NON-USER: CPT | Performed by: FAMILY MEDICINE

## 2023-07-18 PROCEDURE — G8419 CALC BMI OUT NRM PARAM NOF/U: HCPCS | Performed by: FAMILY MEDICINE

## 2023-07-18 PROCEDURE — PBSHW AMB POC PT/INR: Performed by: FAMILY MEDICINE

## 2023-07-18 RX ORDER — LEVETIRACETAM 750 MG/1
750 TABLET ORAL EVERY EVENING
Qty: 90 TABLET | Refills: 0 | Status: SHIPPED | OUTPATIENT
Start: 2023-07-18

## 2023-07-18 RX ORDER — OXYBUTYNIN CHLORIDE 5 MG/1
5 TABLET, EXTENDED RELEASE ORAL DAILY
Qty: 90 TABLET | Refills: 1 | Status: SHIPPED | OUTPATIENT
Start: 2023-07-18

## 2023-07-18 RX ORDER — BUTALBITAL, ACETAMINOPHEN AND CAFFEINE 50; 325; 40 MG/1; MG/1; MG/1
1 TABLET ORAL EVERY 6 HOURS PRN
Qty: 15 TABLET | Refills: 0 | Status: SHIPPED | OUTPATIENT
Start: 2023-07-18

## 2023-07-18 RX ORDER — LEVETIRACETAM 500 MG/1
TABLET ORAL
Qty: 90 TABLET | Refills: 0
Start: 2023-07-18

## 2023-07-18 NOTE — PROGRESS NOTES
Chief Complaint   Patient presents with    Office Visit for Anticoagulation Management       1. Have you been to the ER, urgent care clinic since your last visit? Hospitalized since your last visit? No    2. Have you seen or consulted any other health care providers outside of the [address] [place] [address] Avenue since your last visit? Include any pap smears or colon screening.  No
Codeine Itching     Other reaction(s): other/intolerance  Dizziness. Pt states they take Benadryl to offset the reaction.     Famotidine Nausea And Vomiting    Morphine Itching and Other (See Comments)     Agitation,hallucinations       Current Outpatient Medications on File Prior to Visit   Medication Sig Dispense Refill    DULoxetine (CYMBALTA) 60 MG extended release capsule Take 1 capsule by mouth daily 90 capsule 1    metFORMIN (GLUCOPHAGE-XR) 500 MG extended release tablet Take 1 tablet by mouth daily (with breakfast) 90 tablet 1    mirabegron (MYRBETRIQ) 25 MG TB24 Take 1 tablet by mouth daily 90 tablet 1    QUEtiapine (SEROQUEL) 50 MG tablet Take 1 tablet by mouth nightly 90 tablet 0    ondansetron (ZOFRAN) 4 MG tablet Take 1 tablet by mouth every 48 hours as needed for Nausea or Vomiting 15 tablet 0    metoprolol succinate (TOPROL XL) 50 MG extended release tablet TAKE 1 TABLET BY MOUTH DAILY 90 tablet 1    acetaminophen (TYLENOL) 325 MG tablet Take 2 tablets by mouth every 6 hours as needed      albuterol sulfate HFA (PROVENTIL;VENTOLIN;PROAIR) 108 (90 Base) MCG/ACT inhaler Inhale 2 puffs into the lungs daily as needed      atorvastatin (LIPITOR) 20 MG tablet Take 1 tablet by mouth daily      benzonatate (TESSALON) 100 MG capsule TAKE 1 CAPSULE BY MOUTH THREE TIMES DAILY AS NEEDED FOR COUGH      Biotin 2.5 MG CAPS Take 1 capsule by mouth daily      busPIRone (BUSPAR) 15 MG tablet Take 15 mg by mouth 2 times daily as needed      celecoxib (CELEBREX) 200 MG capsule TAKE 1 CAPSULE BY MOUTH EVERY 12 HOURS AS NEEDED FOR PAIN      clopidogrel (PLAVIX) 75 MG tablet Take 1 tablet by mouth daily      colestipol (COLESTID) 1 g tablet Take 1 tablet by mouth daily      diclofenac sodium (VOLTAREN) 1 % GEL Apply topically      dicyclomine (BENTYL) 10 MG capsule       escitalopram (LEXAPRO) 20 MG tablet Take 1 tablet by mouth every morning      esomeprazole (NEXIUM) 20 MG delayed release capsule 1 capsule every morning

## 2023-07-22 ENCOUNTER — HOSPITAL ENCOUNTER (EMERGENCY)
Facility: HOSPITAL | Age: 83
Discharge: HOME | End: 2023-07-22
Attending: EMERGENCY MEDICINE
Payer: COMMERCIAL

## 2023-07-22 ENCOUNTER — APPOINTMENT (OUTPATIENT)
Facility: HOSPITAL | Age: 83
End: 2023-07-22
Payer: COMMERCIAL

## 2023-07-22 VITALS
HEART RATE: 96 BPM | WEIGHT: 135.14 LBS | BODY MASS INDEX: 25.52 KG/M2 | TEMPERATURE: 98.5 F | DIASTOLIC BLOOD PRESSURE: 88 MMHG | OXYGEN SATURATION: 94 % | RESPIRATION RATE: 13 BRPM | HEIGHT: 61 IN | SYSTOLIC BLOOD PRESSURE: 126 MMHG

## 2023-07-22 DIAGNOSIS — S09.90XA INJURY OF HEAD, INITIAL ENCOUNTER: Primary | ICD-10-CM

## 2023-07-22 DIAGNOSIS — S70.02XA CONTUSION OF LEFT HIP, INITIAL ENCOUNTER: ICD-10-CM

## 2023-07-22 LAB
ALBUMIN SERPL-MCNC: 3.1 G/DL (ref 3.5–5)
ALBUMIN/GLOB SERPL: 0.9 (ref 1.1–2.2)
ALP SERPL-CCNC: 85 U/L (ref 45–117)
ALT SERPL-CCNC: 19 U/L (ref 12–78)
ANION GAP SERPL CALC-SCNC: 10 MMOL/L (ref 5–15)
AST SERPL-CCNC: 16 U/L (ref 15–37)
BASOPHILS # BLD: 0.1 K/UL (ref 0–0.1)
BASOPHILS NFR BLD: 1 % (ref 0–1)
BILIRUB SERPL-MCNC: 0.2 MG/DL (ref 0.2–1)
BUN SERPL-MCNC: 25 MG/DL (ref 6–20)
BUN/CREAT SERPL: 33 (ref 12–20)
CALCIUM SERPL-MCNC: 8.4 MG/DL (ref 8.5–10.1)
CHLORIDE SERPL-SCNC: 105 MMOL/L (ref 97–108)
CO2 SERPL-SCNC: 23 MMOL/L (ref 21–32)
CREAT SERPL-MCNC: 0.75 MG/DL (ref 0.55–1.02)
DIFFERENTIAL METHOD BLD: ABNORMAL
EOSINOPHIL # BLD: 0.6 K/UL (ref 0–0.4)
EOSINOPHIL NFR BLD: 5 % (ref 0–7)
ERYTHROCYTE [DISTWIDTH] IN BLOOD BY AUTOMATED COUNT: 19 % (ref 11.5–14.5)
GLOBULIN SER CALC-MCNC: 3.6 G/DL (ref 2–4)
GLUCOSE SERPL-MCNC: 185 MG/DL (ref 65–100)
HCT VFR BLD AUTO: 29.4 % (ref 35–47)
HGB BLD-MCNC: 8.3 G/DL (ref 11.5–16)
IMM GRANULOCYTES # BLD AUTO: 0.1 K/UL (ref 0–0.04)
IMM GRANULOCYTES NFR BLD AUTO: 1 % (ref 0–0.5)
INR PPP: 2.5 (ref 0.9–1.1)
LYMPHOCYTES # BLD: 0.9 K/UL (ref 0.8–3.5)
LYMPHOCYTES NFR BLD: 8 % (ref 12–49)
MCH RBC QN AUTO: 18.7 PG (ref 26–34)
MCHC RBC AUTO-ENTMCNC: 28.2 G/DL (ref 30–36.5)
MCV RBC AUTO: 66.2 FL (ref 80–99)
MONOCYTES # BLD: 1.1 K/UL (ref 0–1)
MONOCYTES NFR BLD: 9 % (ref 5–13)
NEUTS SEG # BLD: 8.9 K/UL (ref 1.8–8)
NEUTS SEG NFR BLD: 76 % (ref 32–75)
NRBC # BLD: 0 K/UL (ref 0–0.01)
NRBC BLD-RTO: 0 PER 100 WBC
PLATELET # BLD AUTO: 215 K/UL (ref 150–400)
PLATELET COMMENT: ABNORMAL
PMV BLD AUTO: ABNORMAL FL (ref 8.9–12.9)
POTASSIUM SERPL-SCNC: 4.3 MMOL/L (ref 3.5–5.1)
PROT SERPL-MCNC: 6.7 G/DL (ref 6.4–8.2)
PROTHROMBIN TIME: 24 SEC (ref 9–11.1)
RBC # BLD AUTO: 4.44 M/UL (ref 3.8–5.2)
RBC MORPH BLD: ABNORMAL
SODIUM SERPL-SCNC: 138 MMOL/L (ref 136–145)
WBC # BLD AUTO: 11.7 K/UL (ref 3.6–11)

## 2023-07-22 PROCEDURE — 72192 CT PELVIS W/O DYE: CPT

## 2023-07-22 PROCEDURE — 70450 CT HEAD/BRAIN W/O DYE: CPT

## 2023-07-22 PROCEDURE — 85025 COMPLETE CBC W/AUTO DIFF WBC: CPT

## 2023-07-22 PROCEDURE — 99284 EMERGENCY DEPT VISIT MOD MDM: CPT

## 2023-07-22 PROCEDURE — 36415 COLL VENOUS BLD VENIPUNCTURE: CPT

## 2023-07-22 PROCEDURE — 96375 TX/PRO/DX INJ NEW DRUG ADDON: CPT

## 2023-07-22 PROCEDURE — 6360000002 HC RX W HCPCS: Performed by: EMERGENCY MEDICINE

## 2023-07-22 PROCEDURE — 85610 PROTHROMBIN TIME: CPT

## 2023-07-22 PROCEDURE — 96374 THER/PROPH/DIAG INJ IV PUSH: CPT

## 2023-07-22 PROCEDURE — 80053 COMPREHEN METABOLIC PANEL: CPT

## 2023-07-22 RX ORDER — FENTANYL CITRATE 50 UG/ML
25 INJECTION, SOLUTION INTRAMUSCULAR; INTRAVENOUS
Status: COMPLETED | OUTPATIENT
Start: 2023-07-22 | End: 2023-07-22

## 2023-07-22 RX ORDER — ONDANSETRON 2 MG/ML
4 INJECTION INTRAMUSCULAR; INTRAVENOUS ONCE
Status: COMPLETED | OUTPATIENT
Start: 2023-07-22 | End: 2023-07-22

## 2023-07-22 RX ADMIN — ONDANSETRON 4 MG: 2 INJECTION INTRAMUSCULAR; INTRAVENOUS at 03:34

## 2023-07-22 RX ADMIN — FENTANYL CITRATE 25 MCG: 50 INJECTION, SOLUTION INTRAMUSCULAR; INTRAVENOUS at 03:34

## 2023-07-22 ASSESSMENT — PAIN DESCRIPTION - ORIENTATION: ORIENTATION: LEFT

## 2023-07-22 ASSESSMENT — PAIN - FUNCTIONAL ASSESSMENT
PAIN_FUNCTIONAL_ASSESSMENT: 0-10
PAIN_FUNCTIONAL_ASSESSMENT: PREVENTS OR INTERFERES WITH ALL ACTIVE AND SOME PASSIVE ACTIVITIES

## 2023-07-22 ASSESSMENT — PAIN SCALES - GENERAL: PAINLEVEL_OUTOF10: 10

## 2023-07-22 ASSESSMENT — ENCOUNTER SYMPTOMS
VOMITING: 0
COUGH: 0
COLOR CHANGE: 0
DIARRHEA: 0
ABDOMINAL PAIN: 0

## 2023-07-22 ASSESSMENT — PAIN DESCRIPTION - FREQUENCY: FREQUENCY: CONTINUOUS

## 2023-07-22 ASSESSMENT — PAIN DESCRIPTION - DIRECTION: RADIATING_TOWARDS: LEG

## 2023-07-22 ASSESSMENT — PAIN DESCRIPTION - DESCRIPTORS: DESCRIPTORS: SHARP

## 2023-07-22 ASSESSMENT — PAIN DESCRIPTION - LOCATION: LOCATION: HIP

## 2023-07-22 ASSESSMENT — PAIN DESCRIPTION - PAIN TYPE: TYPE: ACUTE PAIN

## 2023-07-22 ASSESSMENT — PAIN DESCRIPTION - ONSET: ONSET: SUDDEN

## 2023-07-22 NOTE — ED NOTES
Mini LINCOLN reviewed discharge instructions with the patient. The patient verbalized understanding. All questions and concerns were addressed. The patient is discharged by wheelchair with instructions and prescriptions in hand. Pt is alert and oriented x 4. Respirations are clear and unlabored.        Lindsey Stiles RN  07/22/23 7104

## 2023-07-22 NOTE — ED PROVIDER NOTES
[facility] EMERGENCY DEPT  EMERGENCY DEPARTMENT ENCOUNTER       Pt Name: Adelina Kamara  MRN: [id number]  [address] [facility] 1940  Date of evaluation: 7/22/2023  Provider: Katelin Mesa MD   PCP: Aracelis Salinas MD     [address]       Chief Complaint   Patient presents with    Fall     10/10 L hip pain following GLF at 0130, patient ambulates with walker at baseline. Pt fell when standing up to ambulate, walker beside patient at time of fall. HISTORY OF PRESENT ILLNESS: 1 or more elements      History From: Patient and EMS     Adelina Kamara is a 78 y.o. female who presents via EMS for left hip pain. Patient states she was going to get out of bed to get something out of her closet when she tripped over her feet and fell onto the left side. She states she did hit her head but she denies any head pain, neck pain as well as any back pain, rib pain and abdominal pain. She does complain of pain to the left hip. She states it feels similar to when she broke her right hip. She was transported by EMS without event and there were no medications provided for pain control     Nursing Notes were all reviewed and agreed with or any disagreements were addressed in the HPI. REVIEW OF SYSTEMS      Review of Systems   Constitutional:  Negative for activity change, appetite change, chills, fatigue and fever. HENT:  Negative for congestion. Eyes:  Negative for visual disturbance. Respiratory:  Negative for cough. Cardiovascular:  Negative for chest pain and leg swelling. Gastrointestinal:  Negative for abdominal pain, diarrhea and vomiting. Genitourinary:  Negative for dysuria. Musculoskeletal:  Positive for arthralgias and gait problem. Skin:  Negative for color change and rash. Neurological:  Negative for dizziness and weakness. Positives and Pertinent negatives as per HPI.     PAST HISTORY     Past Medical History:  Past Medical History:   Diagnosis Date    Anxiety     Aortic valve

## 2023-07-22 NOTE — ED NOTES
Ambulated with patient in hallway using walker. Patient endorses pain related to injury, able to ambulate without assistance using walker; walker used at baseline. Patient verbalizes feeling steady while walking and denies any dizziness. Patient returned to [facility].      Maria Luisa Hogan RN  07/22/23 8623

## 2023-07-28 ENCOUNTER — TELEPHONE (OUTPATIENT)
Age: 83
End: 2023-07-28

## 2023-07-28 NOTE — TELEPHONE ENCOUNTER
----- Message from Ursula [place] sent at 7/27/2023 10:47 AM EDT -----  Subject: Message to Provider    QUESTIONS  Information for Provider? PT is currently at the hospital, she fell and   pulled her muscle, after hospital she's going straight to the rehab. Will   call the doctor once she's out of rehab. contact# [phone]  ---------------------------------------------------------------------------  --------------  [address] [place]  [phone]; OK to leave message on voicemail  ---------------------------------------------------------------------------  --------------  SCRIPT ANSWERS  Relationship to Patient?  Self

## 2023-07-28 NOTE — TELEPHONE ENCOUNTER
Spoke with patient. She fell Saturday, seen in  [address]. She is now in rehab. Jakob Loweryt scheduled for 8/10/23.

## 2023-08-08 ENCOUNTER — TELEPHONE (OUTPATIENT)
Age: 83
End: 2023-08-08

## 2023-08-09 ENCOUNTER — HOME HEALTH ADMISSION (OUTPATIENT)
Dept: HOME HEALTH SERVICES | Facility: HOME HEALTH | Age: 83
End: 2023-08-09
Payer: MEDICARE

## 2023-08-10 ENCOUNTER — TELEPHONE (OUTPATIENT)
Age: 83
End: 2023-08-10

## 2023-08-10 NOTE — TELEPHONE ENCOUNTER
----- Message from Flaquita Cota sent at 8/10/2023  4:09 PM EDT -----  Subject: Hospital Follow Up    QUESTIONS  What hospital was the Patient Discharged from? [facility]  Date of Discharge? 2023-08-11  Discharge Location? Reason for hospitalization as patient stated? call melvi from [facility] discharge planning at [phone] to make fu appt for   this pt  What question does the patient have, if applicable?   ---------------------------------------------------------------------------  --------------  CALL BACK INFO  What is the best way for the office to contact you? OK to leave message on   voicemail  Preferred Call Back Phone Number? [phone]  ---------------------------------------------------------------------------  --------------  SCRIPT ANSWERS  Relationship to Patient? Covered Entity  Covered Entity Type? Physical Therapy Office?   Representative Name? Lacie Gagnon from [facility]

## 2023-08-11 ENCOUNTER — TELEPHONE (OUTPATIENT)
Age: 83
End: 2023-08-11

## 2023-08-11 ENCOUNTER — HOME CARE VISIT (OUTPATIENT)
Facility: HOME HEALTH | Age: 83
End: 2023-08-11

## 2023-08-11 VITALS
DIASTOLIC BLOOD PRESSURE: 70 MMHG | HEART RATE: 68 BPM | TEMPERATURE: 98 F | SYSTOLIC BLOOD PRESSURE: 118 MMHG | RESPIRATION RATE: 16 BRPM

## 2023-08-11 PROCEDURE — 0221000100 HH NO PAY CLAIM PROCEDURE

## 2023-08-11 PROCEDURE — G0299 HHS/HOSPICE OF RN EA 15 MIN: HCPCS

## 2023-08-11 ASSESSMENT — ENCOUNTER SYMPTOMS
STOOL DESCRIPTION: LOOSE
ORTHOPNEA: 1
DYSPNEA ACTIVITY LEVEL: AT REST

## 2023-08-11 NOTE — TELEPHONE ENCOUNTER
Marcos Potts from [facility] called,  stating patient is home from [facility]    She has orders for PT  &  OT and nursing will be going there twice a week    Recommending a med review when she comes to see you on Wednesday    She is on a significant amount of anti depressant medications   5-6    She had INR this morning   it was 2.9    There are orders for them to do INR on Mondays or Tuesdays   do you want to change this     Phone  [phone]    Marcos Potts is aware Dr Susanna Mcbride is not in the office today

## 2023-08-12 NOTE — HOME HEALTH
need for ongoing skilled care): Patient is at risk for rehospitalization due to shortness of breath at rest, fall risk and need for medication education. Patient requires SN education and respiratory assessment and PT and OT evaluations to promote safety and strength     Written Teaching Material Utilized: N/A    Specific plan for next visit: Instruct caregiver/patient in medication education, INR assessment on monday or tuesday    Plan of care and admission to home health status called to attending physician. The following practitioner has agreed to sign the ongoing [place], MD, and was notified of the following: visit frequency of 2 week 9, PT and OT to eval and medication concerns as follows: multiple anti anxiety and anti depressant medications    Interdisciplinary communication with: Santa Macias RN for the purpose of POC collaboration and medication concerns    PCP: Ashly Sullivan MD  Next scheduled doctor appointment: 8/16/23  Patient/Caregiver instructed to keep follow up appointment because lack of follow through with physician appointments could result in discontinuation of home care services for non-compliance. Patient/Caregiver verbalize knowledge of above through teach back with 100 percent accuracy. Emergency Preparedness: Patient/Caregiver instructed in the following:  Have one gallon of water per person for at least 3 days on hand. Have non-perishable food for at least 3 days that do not need to be cooked. Have flashlights and batteries. Charge your cell phones and any back up lithium batteries for your cell phones. Have 3+ days of back up oxygen in your home. Have a phone in your home that is hard wired and does not require power. Have medication for a week in your home. Make sure you have a caregiver in the home to provide care in case your home health nurse cannot get to your house.   Make sure you have all of your paperwork i.e. written emergency preparedness plan, Identification,

## 2023-08-14 ENCOUNTER — HOME CARE VISIT (OUTPATIENT)
Facility: HOME HEALTH | Age: 83
End: 2023-08-14

## 2023-08-14 VITALS
HEART RATE: 75 BPM | DIASTOLIC BLOOD PRESSURE: 69 MMHG | TEMPERATURE: 98.1 F | RESPIRATION RATE: 17 BRPM | OXYGEN SATURATION: 97 % | SYSTOLIC BLOOD PRESSURE: 105 MMHG

## 2023-08-14 VITALS
HEART RATE: 74 BPM | TEMPERATURE: 98.5 F | OXYGEN SATURATION: 97 % | WEIGHT: 127 LBS | SYSTOLIC BLOOD PRESSURE: 120 MMHG | BODY MASS INDEX: 24 KG/M2 | DIASTOLIC BLOOD PRESSURE: 75 MMHG | RESPIRATION RATE: 16 BRPM

## 2023-08-14 PROCEDURE — G0151 HHCP-SERV OF PT,EA 15 MIN: HCPCS

## 2023-08-14 PROCEDURE — G0152 HHCP-SERV OF OT,EA 15 MIN: HCPCS

## 2023-08-14 ASSESSMENT — ENCOUNTER SYMPTOMS
DIARRHEA: 1
PAIN LOCATION - PAIN QUALITY: ACHING
PAIN LOCATION - PAIN QUALITY: ACHE, THROB
DYSPNEA ACTIVITY LEVEL: AFTER AMBULATING 10 - 20 FT
BOWEL INCONTINENCE: 1

## 2023-08-14 NOTE — CASE COMMUNICATION
[facility]. Our plan is for 2w4 and 3 PRN visits for post fall assessment. I am concerned because she states she is only wearing her oxygen PRN and was not wearing it at the time of her visit. At rest, her oxygen was 97% but with minimal walking, her oxygen dropped to 84%. I've instructed her to start wearing it at all times.      -The following medications were not on her med list, which she is taking:  - oxybutynin 5mg, 1 tab by mouth daily  -mirabegron 25 mg, 1 tab by mouth daily  -celecoxib 200 mg, 1 capsule by mouth every 12 hours as needed for pain  -trazodone 100 mg, take 1 tablet by mouth every night as needed   -but/acetaminophen/caff, take 1 tablet by mouth every 6 hours as needed for headache  -blink tears 0.25%, 1-2 drops in eyes as needed  -biofreeze 4%, thin film topical up to 4 times daily    -The quetiapine is a 25 mg tabl et in her home, not a 50 mg tablet as her record shows.  -Also, patient states she is taking the trazodone and quetiapine to sleep at night because the quetiapine doesn't work.       Thank you,  Clement Newman, PT

## 2023-08-15 NOTE — HOME HEALTH
during this evaluation. Patient would benefit from skilled [facility] OT to increase independence and reduce risk of falls with with ADLs, IADLs and functional transfers along with strength, balance deficits, DME/AE training and fall prevention training to return to highest level of independent functioning. Patient to benefit from skilled OT services for 1w1, 1d1 and 2w3 to address the above deficits with patient verbalizing understanding and in agreement with POC. Written Teaching Material Utilized: NA   Specific plan for next visit: Fall prevention with ADLs and functional transfers   Interdisciplinary communication with: Jenni Lay PT for POC collaboration     PCP: Suresh Lamb MD  Next scheduled doctor appointment: 8/16/2023 with PCP   Patient/Caregiver instructed to keep follow up appointment because lack of follow through with physician appointments could result in discontinuation of home care services for non-compliance. Patient/Caregiver verbalize knowledge of above through teach back with 100 percent accuracy. Emergency Preparedness: Patient/Caregiver instructed in the following:   Have one gallon of water per person for at least 3 days on hand. Have non-perishable food for at least 3 days that do not need to be cooked. Have flashlights and batteries. Charge your cell phones and any back up lithium batteries for your cell phones. Have 3+ days of back up oxygen in your home if applicable. Have a phone in your home that is hard wired and does not require power. Have medication for a week in your home. Make sure you have a caregiver in the home to provide care in case your home health nurse cannot get to your house. Make sure you have all of your paperwork (i.e. Identification, insurance cards, DME phone number, physician and pharmacy phone number, agency phone number, and your medications) in one place for easy access and in a zip lock bag to protect them.      If you have to relocate due to an

## 2023-08-16 ENCOUNTER — HOME CARE VISIT (OUTPATIENT)
Facility: HOME HEALTH | Age: 83
End: 2023-08-16

## 2023-08-16 PROCEDURE — G0299 HHS/HOSPICE OF RN EA 15 MIN: HCPCS

## 2023-08-17 ENCOUNTER — TELEPHONE (OUTPATIENT)
Age: 83
End: 2023-08-17

## 2023-08-17 ENCOUNTER — HOME CARE VISIT (OUTPATIENT)
Facility: HOME HEALTH | Age: 83
End: 2023-08-17

## 2023-08-17 VITALS
SYSTOLIC BLOOD PRESSURE: 129 MMHG | DIASTOLIC BLOOD PRESSURE: 74 MMHG | TEMPERATURE: 97.5 F | OXYGEN SATURATION: 91 % | HEART RATE: 78 BPM | RESPIRATION RATE: 16 BRPM

## 2023-08-17 VITALS
HEART RATE: 77 BPM | DIASTOLIC BLOOD PRESSURE: 72 MMHG | TEMPERATURE: 98.3 F | SYSTOLIC BLOOD PRESSURE: 131 MMHG | OXYGEN SATURATION: 94 %

## 2023-08-17 PROCEDURE — G0152 HHCP-SERV OF OT,EA 15 MIN: HCPCS

## 2023-08-17 ASSESSMENT — ENCOUNTER SYMPTOMS
PAIN LOCATION - PAIN QUALITY: ACHING, SHARP
STOOL DESCRIPTION: LOOSE

## 2023-08-17 NOTE — HOME HEALTH
Subjective: Patient states that her knees hurt sometimes  Falls since last visit No(if yes complete the Fall Tracking Form and include bsrifallreport):   Caregiver involvement changes: no  Home health supplies by type and quantity ordered/delivered this visit include: no    Clinician asked if patient has had any physician contact since last home care visit and patient states: N/A  Clinician asked if patient has any new or changed medications and patient states:  N/A   If Yes, were medications reconciled? N/A   Was the certifying physician notified of changes in medications? N/A     Clinical assessment (what this visit means for the patient overall and need for ongoing skilled care) and progress or lack of progress towards SPECIFIC goals: Patient is alert and has no respiratory distress at this time. Lungs are clear but wheezing in the bases. Upon returning to pt house after going to my car for supplies she was sittingin the floor. I ask if she fell and she stated that she slid off of her chair inthe floor when she was transferring to the couch. Patient denied and pain and refused to go get checked or have dispatch health come and check on her. RN assessed and couldnt find anything obvious and then helped her to the couch. Pt states that she feels very weak and always uses her walker when walking but thought she could just get over on the couch without it . SN needed for more education and treatment upon evaluation. Written Teaching Material Utilized: N/A    Interdisciplinary communication with: N/A    Discharge planning as follows:  Will discharge when the patient has reached their maximum functional potential and maximum safety in their home    Specific plan for next visit: Educate in s/s of infection and when to call [facility], MD or 169

## 2023-08-17 NOTE — TELEPHONE ENCOUNTER
----- Message from Nicolette Castro MD sent at 8/17/2023  1:04 PM EDT -----  Regarding: FW: Home Health Referral  She needs an earlier apt    Thx    Nicolette Csatro MD  8/17/2023    ----- Message -----  From: Jamari Lai LPN  Sent: 3/1/7070   2:57 PM EDT  To: Nicolette Castro MD, Josy Barnett LPN  Subject: Home Health Referral                             Good afternoon,      Mrs. Mendel Baxter is being treated at [facility] and [facility]. The patient has received a order for Nursing, PT, and OT services. Would you be willing to follow patient for home health services? Patient is scheduled for discharge on 8/11/23. Please feel free to reply to this message. Thank you,      Tristian Timmons.   [address]  P: [phone] Option #2

## 2023-08-17 NOTE — TELEPHONE ENCOUNTER
----- Message from Gema Starkey sent at 8/15/2023 11:30 AM EDT -----  Subject: Message to Provider    QUESTIONS  Information for Provider? [address] is trying to fax over   some paperwork orders and they are having some issues with the Fax. Please   call [phone] [place]   ---------------------------------------------------------------------------  --------------  Thierry Syed INFO  [phone]; OK to leave message on voicemail  ---------------------------------------------------------------------------  --------------  SCRIPT ANSWERS  Relationship to Patient? Covered Entity  Covered Entity Type? Home Health Care? Representative Name?  [address]

## 2023-08-17 NOTE — TELEPHONE ENCOUNTER
Returned call. Patient has appt 8/21/23. Per Dr. Rangel Hernandez patient needs to be seen prior to signing orders.

## 2023-08-18 ENCOUNTER — HOME CARE VISIT (OUTPATIENT)
Facility: HOME HEALTH | Age: 83
End: 2023-08-18

## 2023-08-18 ENCOUNTER — HOME CARE VISIT (OUTPATIENT)
Dept: HOME HEALTH SERVICES | Facility: HOME HEALTH | Age: 83
End: 2023-08-18
Payer: MEDICARE

## 2023-08-18 VITALS
SYSTOLIC BLOOD PRESSURE: 125 MMHG | OXYGEN SATURATION: 93 % | TEMPERATURE: 97.9 F | RESPIRATION RATE: 16 BRPM | DIASTOLIC BLOOD PRESSURE: 80 MMHG | HEART RATE: 75 BPM

## 2023-08-18 PROCEDURE — G0151 HHCP-SERV OF PT,EA 15 MIN: HCPCS

## 2023-08-18 ASSESSMENT — ENCOUNTER SYMPTOMS: PAIN LOCATION - PAIN QUALITY: ACHE

## 2023-08-18 NOTE — HOME HEALTH
Subjective: \"you never told me you were coming this morning. I'm not doing well. \"  (Therapist had spoken to patient on 3 instaces the day before informing of her appointment time as well as to patient's son, who was present to assist with waking patient up. \"  Falls since last visit Yes(if yes complete the Fall Tracking Form and include bsrifallreport): RN and OT reported a fall on Wednesday. Caregiver involvement changes: no change  Home health supplies by type and quantity ordered/delivered this visit include: n/a    Clinician asked if patient has had any physician contact since last home care visit and patient states: NO  Clinician asked if patient has any new or changed medications and patient states:  NO    If Yes, were medications reconciled? N/A    Was the certifying physician notified of changes in medications? N/A      Clinical assessment (what this visit means for the patient overall and need for ongoing skilled care) and progress or lack of progress towards SPECIFIC goals: Patient demonstrates very poor safety judgement and even with repeated education on importance of wearing her oxygen, she tried to take it off before walking with therapist during session. She is very impulsive with her movements and resistant to following education on safety for hand placement for transfers. Further re-inforcement and re-education is needed. She maintained good balance when walking with rollator today and oxygen levels stayed normal while she was wearing her oxygen. Written Teaching Material Utilized: calendar updated with PT appointments and name/phone number for Michelle Magaña TURNER    Interdisciplinary communication with: interdisciplinary communication between nursing supervisory staff, OT, and RN, to instruct patient on keeping her appointment with Dr. Cristóbal Streeter on Monday so that he will sign Home Health orders.  Patient's son was present during visit today and he states that he will be taking patient to this

## 2023-08-18 NOTE — HOME HEALTH
Subjective: \"I had a fall yesterday. Thank Noah Carbajal came and helped me up. My hip is hurting as I went down on my side. \"   Falls since last visit: (if yes complete the Fall Tracking Form and include bsrifallreport): Yes on 3/82/65 reported by Sukh Gilliam as she assisted patient from floor on arrival   Caregiver involvement changes: none   Home health supplies by type and quantity ordered/delivered this visit include: None     Clinician asked if patient has had any physician contact since last home care visit and patient states: No   Clinician asked if patient has any new or changed medications and patient states:  No   If Yes, were medications reconciled? N/A  Was the certifying physician notified of changes in medications? N/A     Clinical assessment (what this visit means for the patient overall and need for ongoing skilled care) and progress or lack of progress towards SPECIFIC goals:  Patient demonstrated the ability to complete toilet transfers along with toileting tasks with therapist providing verbal cueing for fall prevention techniques implementing at a mod I level. She remains at continued risk of falls with shower transfers, dressing and bathing tasks requiring additional instruction from skilled [facility] [employer] OT to achieve higher level of functional independence and reduce falls. Patient met mod I level goals set for toilet and transfers along with toileting tasks. Steady progrees towards independent level goal set for shower transfers. Written Teaching Material Utilized: None   Interdisciplinary communication with: Francisco Hernandez PT for POC colloboration   Discharge planning as follows: Discharge from [facility] [employer] OT when goals are met or maximum level of function is achieved.      Specific plan for next visit:  Focus on increasing independence and reducing risk of falls with dressing and bathing tasks

## 2023-08-21 ENCOUNTER — OFFICE VISIT (OUTPATIENT)
Age: 83
End: 2023-08-21
Payer: MEDICARE

## 2023-08-21 VITALS
BODY MASS INDEX: 24.55 KG/M2 | HEART RATE: 86 BPM | OXYGEN SATURATION: 94 % | HEIGHT: 61 IN | RESPIRATION RATE: 18 BRPM | TEMPERATURE: 98.7 F | SYSTOLIC BLOOD PRESSURE: 138 MMHG | WEIGHT: 130 LBS | DIASTOLIC BLOOD PRESSURE: 64 MMHG

## 2023-08-21 DIAGNOSIS — D64.9 ANEMIA, UNSPECIFIED TYPE: ICD-10-CM

## 2023-08-21 DIAGNOSIS — R09.02 HYPOXIA: ICD-10-CM

## 2023-08-21 DIAGNOSIS — R35.0 URINARY FREQUENCY: ICD-10-CM

## 2023-08-21 DIAGNOSIS — G43.909 MIGRAINE WITHOUT STATUS MIGRAINOSUS, NOT INTRACTABLE, UNSPECIFIED MIGRAINE TYPE: ICD-10-CM
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
## 2023-08-21 DIAGNOSIS — R09.81 CHRONIC NASAL CONGESTION: ICD-10-CM

## 2023-08-21 DIAGNOSIS — Z79.01 LONG TERM (CURRENT) USE OF ANTICOAGULANTS: Primary | ICD-10-CM

## 2023-08-21 DIAGNOSIS — R79.1 SUPRATHERAPEUTIC INR: ICD-10-CM

## 2023-08-21 DIAGNOSIS — Z09 HOSPITAL DISCHARGE FOLLOW-UP: ICD-10-CM

## 2023-08-21 LAB
POC INR: 3.9
PROTHROMBIN TIME, POC: ABNORMAL

## 2023-08-21 PROCEDURE — 1123F ACP DISCUSS/DSCN MKR DOCD: CPT | Performed by: FAMILY MEDICINE

## 2023-08-21 PROCEDURE — 85610 PROTHROMBIN TIME: CPT | Performed by: FAMILY MEDICINE

## 2023-08-21 PROCEDURE — G8427 DOCREV CUR MEDS BY ELIG CLIN: HCPCS | Performed by: FAMILY MEDICINE

## 2023-08-21 PROCEDURE — G8400 PT W/DXA NO RESULTS DOC: HCPCS | Performed by: FAMILY MEDICINE

## 2023-08-21 PROCEDURE — PBSHW AMB POC PT/INR: Performed by: FAMILY MEDICINE

## 2023-08-21 PROCEDURE — G8420 CALC BMI NORM PARAMETERS: HCPCS | Performed by: FAMILY MEDICINE

## 2023-08-21 PROCEDURE — 99215 OFFICE O/P EST HI 40 MIN: CPT | Performed by: FAMILY MEDICINE

## 2023-08-21 PROCEDURE — 1036F TOBACCO NON-USER: CPT | Performed by: FAMILY MEDICINE

## 2023-08-21 PROCEDURE — 1090F PRES/ABSN URINE INCON ASSESS: CPT | Performed by: FAMILY MEDICINE

## 2023-08-21 RX ORDER — ALBUTEROL SULFATE 90 UG/1
2 AEROSOL, METERED RESPIRATORY (INHALATION) DAILY PRN
Qty: 18 G | Refills: 2 | Status: CANCELLED | OUTPATIENT
Start: 2023-08-21

## 2023-08-21 RX ORDER — BUTALBITAL, ACETAMINOPHEN AND CAFFEINE 50; 325; 40 MG/1; MG/1; MG/1
1 TABLET ORAL EVERY 6 HOURS PRN
Qty: 15 TABLET | Refills: 0 | Status: CANCELLED | OUTPATIENT
Start: 2023-08-21

## 2023-08-21 RX ORDER — FLUTICASONE PROPIONATE 50 MCG
2 SPRAY, SUSPENSION (ML) NASAL DAILY PRN
Qty: 16 G | Refills: 2 | Status: SHIPPED | OUTPATIENT
Start: 2023-08-21

## 2023-08-22 ENCOUNTER — HOME CARE VISIT (OUTPATIENT)
Facility: HOME HEALTH | Age: 83
End: 2023-08-22
Payer: MEDICARE

## 2023-08-22 ENCOUNTER — TELEPHONE (OUTPATIENT)
Age: 83
End: 2023-08-22

## 2023-08-22 VITALS
SYSTOLIC BLOOD PRESSURE: 113 MMHG | TEMPERATURE: 98 F | HEART RATE: 72 BPM | OXYGEN SATURATION: 95 % | DIASTOLIC BLOOD PRESSURE: 78 MMHG

## 2023-08-22 PROCEDURE — G0300 HHS/HOSPICE OF LPN EA 15 MIN: HCPCS

## 2023-08-22 PROCEDURE — G0152 HHCP-SERV OF OT,EA 15 MIN: HCPCS

## 2023-08-22 ASSESSMENT — ENCOUNTER SYMPTOMS: PAIN LOCATION - PAIN QUALITY: SHARP

## 2023-08-22 NOTE — TELEPHONE ENCOUNTER
Patricia zhao 36     [facility] patient records    Plgwen call annalise at [phone] for assistance thank you

## 2023-08-23 ENCOUNTER — HOME CARE VISIT (OUTPATIENT)
Dept: HOME HEALTH SERVICES | Facility: HOME HEALTH | Age: 83
End: 2023-08-23
Payer: MEDICARE

## 2023-08-23 ENCOUNTER — HOME CARE VISIT (OUTPATIENT)
Facility: HOME HEALTH | Age: 83
End: 2023-08-23
Payer: MEDICARE

## 2023-08-23 VITALS
SYSTOLIC BLOOD PRESSURE: 136 MMHG | HEART RATE: 71 BPM | OXYGEN SATURATION: 94 % | DIASTOLIC BLOOD PRESSURE: 74 MMHG | TEMPERATURE: 98.3 F

## 2023-08-23 PROCEDURE — G0152 HHCP-SERV OF OT,EA 15 MIN: HCPCS

## 2023-08-23 ASSESSMENT — ENCOUNTER SYMPTOMS: PAIN LOCATION - PAIN QUALITY: ACHING SHARP

## 2023-08-23 NOTE — HOME HEALTH
Subjective: \"I fell again yesterday. \"   Falls since last visit: (if yes complete the Fall Tracking Form and include bsrifallreport): Yes   Caregiver involvement changes: none   Home health supplies by type and quantity ordered/delivered this visit include: Theraband    Clinician asked if patient has had any physician contact since last home care visit and patient states: No  Clinician asked if patient has any new or changed medications and patient states:  No   If Yes, were medications reconciled? N/A  Was the certifying physician notified of changes in medications? N/A     Clinical assessment (what this visit means for the patient overall and need for ongoing skilled care) and progress or lack of progress towards SPECIFIC goals:  Patient continues to demonstrate decreased strength needed for increased independence and reducing risk of falls ADLs, IADLs and functional transfers requiring additional instruction from skilled [address] [place] [address] [address] OT to achieve higher level of functional independence and reduce falls. Patient making steady progress to mod I level goals set for UE HEP. Written Teaching Material Utilized: UE HEP handout consisting of theraband exercises   Interdisciplinary communication with: SN Darío for [address] [place] [address]    Discharge planning as follows: Discharge from [address] [place] [address] [address] OT when goals are met or maximum level of function is achieved.      Specific plan for next visit:  Focus on increasing independence and reducing risk of falls with bathing and dressing tasks

## 2023-08-23 NOTE — HOME HEALTH
Subjective: \"I'm not wearing my oxgyen all the time only if it gets below 90. \"   Falls since last visit: (if yes complete the Fall Tracking Form and include bsrifallreport): No   Caregiver involvement changes: none   Home health supplies by type and quantity ordered/delivered this visit include: None     Clinician asked if patient has had any physician contact since last home care visit and patient states: No   Clinician asked if patient has any new or changed medications and patient states:  No   If Yes, were medications reconciled? N/A  Was the certifying physician notified of changes in medications? N/A     Clinical assessment (what this visit means for the patient overall and need for ongoing skilled care) and progress or lack of progress towards SPECIFIC goals:  Patient continues to demonstrate an inability to complete shower transfers, dressing and bathing tasks without therapist providing verbal cueing for fall prevention techniques requiring additional instruction from skilled [address] [place] [address] [address] OT to achieve higher level of functional independence and reduce falls. Patient making steady progress towards mod I level goals set for dressing and bathing along with shower transfers. Written Teaching Material Utilized: None   Interdisciplinary communication with: SN Darío for [address] [place] [address]   Discharge planning as follows: Discharge from [address] [place] [address],[facility] [address] OT when goals are met or maximum level of function is achieved.      Specific plan for next visit:  Focus on increasing independence and reducing risk of falls with dressing and bathing tasks

## 2023-08-24 ENCOUNTER — HOME CARE VISIT (OUTPATIENT)
Facility: HOME HEALTH | Age: 83
End: 2023-08-24
Payer: MEDICARE

## 2023-08-24 VITALS
DIASTOLIC BLOOD PRESSURE: 72 MMHG | SYSTOLIC BLOOD PRESSURE: 113 MMHG | OXYGEN SATURATION: 95 % | TEMPERATURE: 98 F | RESPIRATION RATE: 18 BRPM | HEART RATE: 72 BPM

## 2023-08-24 VITALS
OXYGEN SATURATION: 96 % | DIASTOLIC BLOOD PRESSURE: 68 MMHG | HEART RATE: 69 BPM | TEMPERATURE: 98.1 F | SYSTOLIC BLOOD PRESSURE: 122 MMHG | RESPIRATION RATE: 18 BRPM

## 2023-08-24 PROCEDURE — G0493 RN CARE EA 15 MIN HH/HOSPICE: HCPCS

## 2023-08-24 PROCEDURE — G0157 HHC PT ASSISTANT EA 15: HCPCS

## 2023-08-24 ASSESSMENT — ENCOUNTER SYMPTOMS
HEMOPTYSIS: 0
HEMOPTYSIS: 0

## 2023-08-24 NOTE — HOME HEALTH
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 NO   Was the certifying physician notified of changes in medications? NO     Clinical assessment (what this visit means for the patient overall and need for ongoing skilled care) and progress or lack of progress towards SPECIFIC goals: Pt continues to require SN for education of medication and diagnosis     Written Teaching Material Utilized: N/A    Interdisciplinary communication with: N/A for the purpose of NA    Discharge planning as follows:  Is no longer homebound    Specific plan for next visit: Continue with education

## 2023-08-24 NOTE — HOME HEALTH
Subjective: Pt states she has a tiny bump from fall on Monday, Pt also states that her phone alerts her son when she falls and he calls her to make sure she is ok   Falls since last visit Yes(if yes complete the Fall Tracking Form and include bsrifallreport):   Caregiver involvement changes: Kaden Love is primary caregiver   Home health supplies by type and quantity ordered/delivered this visit include: none     Clinician asked if patient has had any physician contact since last home care visit and patient states: NO  Clinician asked if patient has any new or changed medications and patient states:  NO   If Yes, were medications reconciled? NO   Was the certifying physician notified of changes in medications? NO     Clinical assessment (what this visit means for the patient overall and need for ongoing skilled care) and progress or lack of progress towards SPECIFIC goals: Pt continues to require SN for medication and diagnosis education     Written Teaching Material Utilized: N/A    Interdisciplinary communication with: N/A for the purpose of NA     Discharge planning as follows:  Is no longer homebound    Specific plan for next visit: Continue with education

## 2023-08-25 ENCOUNTER — TELEPHONE (OUTPATIENT)
Age: 83
End: 2023-08-25

## 2023-08-25 VITALS
OXYGEN SATURATION: 94 % | RESPIRATION RATE: 20 BRPM | TEMPERATURE: 97.8 F | SYSTOLIC BLOOD PRESSURE: 120 MMHG | HEART RATE: 70 BPM | DIASTOLIC BLOOD PRESSURE: 71 MMHG

## 2023-08-25 NOTE — TELEPHONE ENCOUNTER
----- Message from [employer] sent at 8/25/2023 11:22 AM EDT -----  Subject: Message to Provider    QUESTIONS  Information for Provider? Pts son would like to know if appt on 08/30 can   be canceled and pts INR checked at appt that is scheduled on 08/29? Pt has   conflicting appts on 76/20 and would like to cancel that appt if INR can   be done on 08/29 instead. Please call pts Apryl christianson" Vidya.   ---------------------------------------------------------------------------  --------------  Celso PENN  [phone]; OK to leave message on voicemail  ---------------------------------------------------------------------------  --------------  SCRIPT ANSWERS  Relationship to Patient? Other/Third Party  Representative Name? Santi Bundy  Is the representative on the Communication Release of Information (MYKE)   form in Epic?  Yes

## 2023-08-25 NOTE — HOME HEALTH
Subjective: Pt reported checking her 02 sat 3 times a day and uses prescribed 2 lit 02 PRN . Falls since last visit No(if yes complete the Fall Tracking Form and include bsrifallreport):   Caregiver involvement changes: none. Home health supplies by type and quantity ordered/delivered this visit include: none. Clinician asked if patient has had any physician contact since last home care visit and patient states: NO  Clinician asked if patient has any new or changed medications and patient states:  NO   If Yes, were medications reconciled? N/A   Was the certifying physician notified of changes in medications? N/A     Clinical assessment (what this visit means for the patient overall and need for ongoing skilled care) and progress or lack of progress towards SPECIFIC goals: Todays visit allowed PTA to examine 02 sat during ex B LE cycling with restorator bike and during gait training to decrease fall risk. Gait and B LE strength goals have not been met. Written Teaching Material Utilized: N/A    Interdisciplinary communication with: N/A     Discharge planning as follows: When goals are met    Specific plan for next visit: Instruct caregiver/patient in gait training.

## 2023-08-29 ENCOUNTER — HOME CARE VISIT (OUTPATIENT)
Facility: HOME HEALTH | Age: 83
End: 2023-08-29
Payer: MEDICARE

## 2023-08-29 ENCOUNTER — HOME CARE VISIT (OUTPATIENT)
Dept: HOME HEALTH SERVICES | Facility: HOME HEALTH | Age: 83
End: 2023-08-29
Payer: MEDICARE

## 2023-08-29 VITALS
WEIGHT: 127 LBS | HEART RATE: 88 BPM | TEMPERATURE: 98.4 F | SYSTOLIC BLOOD PRESSURE: 132 MMHG | BODY MASS INDEX: 24 KG/M2 | DIASTOLIC BLOOD PRESSURE: 69 MMHG | OXYGEN SATURATION: 92 %

## 2023-08-29 PROCEDURE — G0152 HHCP-SERV OF OT,EA 15 MIN: HCPCS

## 2023-08-29 ASSESSMENT — ENCOUNTER SYMPTOMS: PAIN LOCATION - PAIN QUALITY: SHARP

## 2023-08-29 NOTE — HOME HEALTH
Subjective: \"I work up late. My alarm didn't go off this morning. I need to eat before my son picks me up around 1 for my doctors appointment. \"   Falls since last visit: (if yes complete the Fall Tracking Form and include bsrifallreport): No   Caregiver involvement changes: none   Home health supplies by type and quantity ordered/delivered this visit include: None     Clinician asked if patient has had any physician contact since last home care visit and patient states: No   Clinician asked if patient has any new or changed medications and patient states:  No   If Yes, were medications reconciled? N/A  Was the certifying physician notified of changes in medications? N/A     Clinical assessment (what this visit means for the patient overall and need for ongoing skilled care) and progress or lack of progress towards SPECIFIC goals:  Patient continues to demonstrate an inability to complete meal prep tasks without therapist providing verbal cueing for fall prevention techniques requiring additional instruction from skilled [facility] OT to achieve higher level of functional independence and reduce falls as she lives alone. Patient making steady progress towards mod I level goals set for meal prep tasks. Written Teaching Material Utilized: None   Interdisciplinary communication with: SN Darío for [address] [place] [address]   Discharge planning as follows: Discharge from [facility] OT when goals are met or maximum level of function is achieved.      Specific plan for next visit:  Focus on increasing independence and reducing risk of falls with dressing and bathing tasks

## 2023-08-30 ENCOUNTER — HOME CARE VISIT (OUTPATIENT)
Dept: HOME HEALTH SERVICES | Facility: HOME HEALTH | Age: 83
End: 2023-08-30
Payer: MEDICARE

## 2023-08-30 ENCOUNTER — HOME CARE VISIT (OUTPATIENT)
Facility: HOME HEALTH | Age: 83
End: 2023-08-30
Payer: MEDICARE

## 2023-08-30 PROCEDURE — G0300 HHS/HOSPICE OF LPN EA 15 MIN: HCPCS

## 2023-08-31 ENCOUNTER — HOME CARE VISIT (OUTPATIENT)
Facility: HOME HEALTH | Age: 83
End: 2023-08-31
Payer: MEDICARE

## 2023-08-31 VITALS
OXYGEN SATURATION: 92 % | DIASTOLIC BLOOD PRESSURE: 77 MMHG | HEART RATE: 73 BPM | TEMPERATURE: 97.8 F | SYSTOLIC BLOOD PRESSURE: 134 MMHG

## 2023-08-31 VITALS
DIASTOLIC BLOOD PRESSURE: 60 MMHG | OXYGEN SATURATION: 97 % | SYSTOLIC BLOOD PRESSURE: 138 MMHG | TEMPERATURE: 98.3 F | RESPIRATION RATE: 16 BRPM | HEART RATE: 80 BPM

## 2023-08-31 PROCEDURE — G0151 HHCP-SERV OF PT,EA 15 MIN: HCPCS

## 2023-08-31 PROCEDURE — G0152 HHCP-SERV OF OT,EA 15 MIN: HCPCS

## 2023-08-31 ASSESSMENT — ENCOUNTER SYMPTOMS: PAIN LOCATION - PAIN QUALITY: SHARP, ACHING

## 2023-09-01 NOTE — HOME HEALTH
Subjective: \" I am tired after doing OT earlier today\"  Falls since last visit : no falls  Caregiver involvement changes: NA  Home health supplies by type and quantity ordered/delivered this visit include: NA    Clinician asked if patient has had any physician contact since last home care visit and patient states: {yes  Clinician asked if patient has any new or changed medications and patient states: no  If Yes, were medications reconciled? yes  Was the certifying physician notified of changes in medications? NA    Clinical assessment (what this visit means for the patient overall and need for ongoing skilled care) and progress or lack of progress towards SPECIFIC goals: The pt is doing well and feeling more settled now that she will have the same PT moving forward instead of having three different PT's . She was instructed on new standing HEP today which was written down for her on a cue sheet as well. She compleated each exercise after the PT demonstrated each and gave VC and TC for proper form and speed of exercise encouraging the pt to move through as much of the ROM of the joint  as possible with each exercise. The Pt presents with decreased balance as well and PT instructed the pt in side stepping with UE support along the counter to work on some functional balance at this time. The PT will continue to address strengthening, balance and progressive gait moving forward over next couple of weeks    Written Teaching Material Utilized: HEP Cue sheet    Interdisciplinary communication with: Ese Diamond OT for POC collaboration    Discharge planning as follows:  Is no longer homebound, Per physician order, Will discharge when the patient has reached their maximum functional potential and maximum safety in their home and When goals are met    Specific plan for next visit: Re-instruct patient/caregiver on HEP, gait, transfers, Educate in s/s of COPD exacerbation and when to call [facility] [place],[address], [place] or 471 and Instruct in safety

## 2023-09-01 NOTE — HOME HEALTH
Subjective: \"I have some questions about the exercises you gave me. \"   Falls since last visit: (if yes complete the Fall Tracking Form and include bsrifallreport): No   Caregiver involvement changes: none   Home health supplies by type and quantity ordered/delivered this visit include: None     Clinician asked if patient has had any physician contact since last home care visit and patient states: Yes. Pulmonologist.   Clinician asked if patient has any new or changed medications and patient states:  No   If Yes, were medications reconciled? N/A  Was the certifying physician notified of changes in medications? N/A     Clinical assessment (what this visit means for the patient overall and need for ongoing skilled care) and progress or lack of progress towards SPECIFIC goals:  Patient continues to demonstrate an inability to complete shower transfers without therapist providing verbal cueing for fall prevention techniques requiring additional instruction from skilled [address] [place] [address] [address] OT to achieve higher level of functional independence and reduce falls. Patient making steady progress towards mod I level goals set for shower transfers and independence with completing UE HEP. Written Teaching Material Utilized: UE HEP using theraband   Interdisciplinary communication with: Rainer Jose PT  for [address] [place] [address]   Discharge planning as follows: Discharge from [address] [place] [address] [address] OT when goals are met or maximum level of function is achieved.      Specific plan for next visit:  Focus on increasing independence and reducing risk of falls with dressing and bathing tasks

## 2023-09-05 ENCOUNTER — HOME CARE VISIT (OUTPATIENT)
Dept: HOME HEALTH SERVICES | Facility: HOME HEALTH | Age: 83
End: 2023-09-05
Payer: MEDICARE

## 2023-09-05 ENCOUNTER — HOME CARE VISIT (OUTPATIENT)
Facility: HOME HEALTH | Age: 83
End: 2023-09-05
Payer: MEDICARE

## 2023-09-05 VITALS
SYSTOLIC BLOOD PRESSURE: 120 MMHG | HEART RATE: 74 BPM | DIASTOLIC BLOOD PRESSURE: 68 MMHG | RESPIRATION RATE: 18 BRPM | TEMPERATURE: 98.4 F | OXYGEN SATURATION: 98 %

## 2023-09-05 ASSESSMENT — ENCOUNTER SYMPTOMS
HEMOPTYSIS: 0
PAIN LOCATION - PAIN QUALITY: ACHING

## 2023-09-05 NOTE — HOME HEALTH
Subjective: Pt states she has to get to cardiology appointment in just a little bit  Falls since last visit No(if yes complete the Fall Tracking Form and include bsrifallreport):   Caregiver involvement changes: Kaden hancock is primary caregiver   Home health supplies by type and quantity ordered/delivered this visit include: none     Clinician asked if patient has had any physician contact since last home care visit and patient states: NO  Clinician asked if patient has any new or changed medications and patient states:  NO   If Yes, were medications reconciled? NO   Was the certifying physician notified of changes in medications? NO     Clinical assessment (what this visit means for the patient overall and need for ongoing skilled care) and progress or lack of progress towards SPECIFIC goals: Pt continues to require SN for medication and diagnosis education     Written Teaching Material Utilized: N/A    Interdisciplinary communication with: N/A for the purpose of NA     Discharge planning as follows:  When wound is 100% healed    Specific plan for next visit: Education assess for fluid overload

## 2023-09-07 ENCOUNTER — HOME CARE VISIT (OUTPATIENT)
Facility: HOME HEALTH | Age: 83
End: 2023-09-07
Payer: MEDICARE

## 2023-09-07 VITALS
OXYGEN SATURATION: 93 % | HEART RATE: 74 BPM | DIASTOLIC BLOOD PRESSURE: 82 MMHG | SYSTOLIC BLOOD PRESSURE: 132 MMHG | TEMPERATURE: 98.4 F | WEIGHT: 128 LBS | BODY MASS INDEX: 24.19 KG/M2

## 2023-09-07 VITALS
BODY MASS INDEX: 24 KG/M2 | HEART RATE: 98 BPM | RESPIRATION RATE: 18 BRPM | WEIGHT: 127 LBS | SYSTOLIC BLOOD PRESSURE: 124 MMHG | DIASTOLIC BLOOD PRESSURE: 66 MMHG | OXYGEN SATURATION: 94 % | TEMPERATURE: 97.5 F

## 2023-09-07 PROCEDURE — G0152 HHCP-SERV OF OT,EA 15 MIN: HCPCS

## 2023-09-07 PROCEDURE — G0299 HHS/HOSPICE OF RN EA 15 MIN: HCPCS

## 2023-09-07 ASSESSMENT — ENCOUNTER SYMPTOMS: PAIN LOCATION - PAIN QUALITY: SHARP

## 2023-09-07 NOTE — HOME HEALTH
Subjective: \"I had cortisone shots in my knees this morning. I hope it helps with the pain. \"   Falls since last visit: (if yes complete the Fall Tracking Form and include bsrifallreport): No   Caregiver involvement changes: none   Home health supplies by type and quantity ordered/delivered this visit include: None     Clinician asked if patient has had any physician contact since last home care visit and patient states: Yes   Clinician asked if patient has any new or changed medications and patient states: No   If Yes, were medications reconciled? NA  Was the certifying physician notified of changes in medications? NA     Clinical assessment (what this visit means for the patient overall and need for ongoing skilled care) and progress or lack of progress towards SPECIFIC goals: Patient is making good steady progress with the following functional gains going from min A level with toilet transfers and toileting tasks to mod I level. Patient will continue to benefit from skilled [facility] OT over the next 4 weeks to address independence and safety with dressing, bathing, tub/shower transfers and IADLs focusing on fall prevention training to return to highest level of independent functioning. Patient to benefit from skilled OT services for 2w4 to address the above deficits with patient verbalizing understanding and in agreement with POC. Written Teaching Material Utilized: None   Interdisciplinary communication with: Mee Hood for [address] [place] [address] collaboration   Discharge planning as follows: Discharge from [facility] OT when goals are met or maximum level of function is achieved.      Specific plan for next visit:  Increasing independence and reducing risk of falls with dressing and bathing tasks

## 2023-09-08 ENCOUNTER — HOME CARE VISIT (OUTPATIENT)
Facility: HOME HEALTH | Age: 83
End: 2023-09-08
Payer: MEDICARE

## 2023-09-08 VITALS
HEART RATE: 71 BPM | RESPIRATION RATE: 17 BRPM | OXYGEN SATURATION: 94 % | TEMPERATURE: 98.3 F | DIASTOLIC BLOOD PRESSURE: 66 MMHG | SYSTOLIC BLOOD PRESSURE: 120 MMHG

## 2023-09-08 PROCEDURE — G0151 HHCP-SERV OF PT,EA 15 MIN: HCPCS

## 2023-09-08 ASSESSMENT — ENCOUNTER SYMPTOMS: PAIN LOCATION - PAIN QUALITY: ACHE

## 2023-09-08 NOTE — HOME HEALTH
Subjective: Hips were hurting earlier but not now. Falls since last visit No(if yes complete the Fall Tracking Form and include bsrifallreport):   Caregiver involvement changes: no  Home health supplies by type and quantity ordered/delivered this visit include: no    Clinician asked if patient has had any physician contact since last home care visit and patient states: N/A  Clinician asked if patient has any new or changed medications and patient states:  N/A   If Yes, were medications reconciled? N/A   Was the certifying physician notified of changes in medications? N/A     Clinical assessment (what this visit means for the patient overall and need for ongoing skilled care) and progress or lack of progress towards SPECIFIC goals: Patient is well aware of her medications. Patient had cortisone injections in her knees today. SN needed for furthereval and treatment. Written Teaching Material Utilized: N/A    Interdisciplinary communication with: N/A     Discharge planning as follows:  Will discharge when the patient has reached their maximum functional potential and maximum safety in their home    Specific plan for next visit: Tiffanie Montgomery in safety issues regarding Tripping hazards

## 2023-09-11 ENCOUNTER — HOME CARE VISIT (OUTPATIENT)
Facility: HOME HEALTH | Age: 83
End: 2023-09-11
Payer: MEDICARE

## 2023-09-11 ENCOUNTER — HOME CARE VISIT (OUTPATIENT)
Dept: HOME HEALTH SERVICES | Facility: HOME HEALTH | Age: 83
End: 2023-09-11
Payer: MEDICARE

## 2023-09-11 ENCOUNTER — TELEPHONE (OUTPATIENT)
Age: 83
End: 2023-09-11

## 2023-09-11 VITALS
TEMPERATURE: 98 F | WEIGHT: 128 LBS | DIASTOLIC BLOOD PRESSURE: 60 MMHG | OXYGEN SATURATION: 94 % | HEART RATE: 70 BPM | SYSTOLIC BLOOD PRESSURE: 120 MMHG | BODY MASS INDEX: 24.19 KG/M2

## 2023-09-11 PROCEDURE — G0152 HHCP-SERV OF OT,EA 15 MIN: HCPCS

## 2023-09-11 ASSESSMENT — ENCOUNTER SYMPTOMS: PAIN LOCATION - PAIN QUALITY: ACHING, SHARP

## 2023-09-11 NOTE — HOME HEALTH
Subjective: \"I've been doing my exercises. \"   Falls since last visit: (if yes complete the Fall Tracking Form and include bsrifallreport): No   Caregiver involvement changes: none   Home health supplies by type and quantity ordered/delivered this visit include: None     Clinician asked if patient has had any physician contact since last home care visit and patient states: No   Clinician asked if patient has any new or changed medications and patient states:  No   If Yes, were medications reconciled? N/A  Was the certifying physician notified of changes in medications? N/A     Clinical assessment (what this visit means for the patient overall and need for ongoing skilled care) and progress or lack of progress towards SPECIFIC goals:  Patient demonstrated the ability to complete shower transfers along with dressing and bathing tasks with therapist providing verbal cueing for fall prevention techniques implementing at a mod I level. She remains at continued risk of falls with meal prep tasks requiring additional instruction from skilled [address] [place] [address],[address] OT to achieve higher level of functional independence and reduce falls. Patient met mod I level goals set for shower transfers, dressing and bathing tasks. Written Teaching Material Utilized: None   Interdisciplinary communication with: Chun Valderrama, [facility], PT for POC colloboration   Discharge planning as follows: Discharge from [address] [place] [address],[facility] [address] OT when goals are met or maximum level of function is achieved.      Specific plan for next visit:  Focus on increasing independence and reducing risk of falls with meal prep tasks

## 2023-09-11 NOTE — TELEPHONE ENCOUNTER
Almaz w/[facility]     Would like order to extend PT     2 times a week for 4 weeks     Best number to reach her is [phone]

## 2023-09-12 ENCOUNTER — HOME CARE VISIT (OUTPATIENT)
Facility: HOME HEALTH | Age: 83
End: 2023-09-12
Payer: MEDICARE

## 2023-09-12 VITALS
DIASTOLIC BLOOD PRESSURE: 64 MMHG | BODY MASS INDEX: 23.81 KG/M2 | RESPIRATION RATE: 16 BRPM | HEART RATE: 71 BPM | WEIGHT: 126 LBS | TEMPERATURE: 98.2 F | OXYGEN SATURATION: 92 % | SYSTOLIC BLOOD PRESSURE: 122 MMHG

## 2023-09-12 PROCEDURE — G0299 HHS/HOSPICE OF RN EA 15 MIN: HCPCS

## 2023-09-12 NOTE — TELEPHONE ENCOUNTER
Spoke with patient. She needs to follow up with Dr. Param Christensen and bring all meds for review. She said that her son will call to schedule appt. Kyle Tillman

## 2023-09-12 NOTE — TELEPHONE ENCOUNTER
LM for Almaz. Per Dr. Antonieta Key ok to extend PT.  Also to remind patient that she needs to follow up with Dr. Antonieta Key.

## 2023-09-13 ENCOUNTER — HOME CARE VISIT (OUTPATIENT)
Facility: HOME HEALTH | Age: 83
End: 2023-09-13
Payer: MEDICARE

## 2023-09-13 ENCOUNTER — TELEPHONE (OUTPATIENT)
Facility: CLINIC | Age: 83
End: 2023-09-13

## 2023-09-13 ENCOUNTER — TELEPHONE (OUTPATIENT)
Age: 83
End: 2023-09-13

## 2023-09-13 VITALS
SYSTOLIC BLOOD PRESSURE: 130 MMHG | HEART RATE: 84 BPM | TEMPERATURE: 98.3 F | OXYGEN SATURATION: 97 % | WEIGHT: 123 LBS | BODY MASS INDEX: 23.24 KG/M2 | DIASTOLIC BLOOD PRESSURE: 68 MMHG

## 2023-09-13 PROCEDURE — G0151 HHCP-SERV OF PT,EA 15 MIN: HCPCS

## 2023-09-13 PROCEDURE — G0152 HHCP-SERV OF OT,EA 15 MIN: HCPCS

## 2023-09-13 ASSESSMENT — ENCOUNTER SYMPTOMS
PAIN LOCATION - PAIN QUALITY: ACHE
PAIN LOCATION - PAIN QUALITY: ACHING, SHARP

## 2023-09-13 NOTE — TELEPHONE ENCOUNTER
Spoke with Sivan Carrasco, [facility] Nurse. INR 2.2 on 9/12/23. Per Sivan Carrasco nurse no longer going out to see patient. She explained to patient that she will need to come see PCP for INR.

## 2023-09-13 NOTE — HOME HEALTH
Subjective: Patient states that when she first gets up her knees hurt. Falls since last visit No(if yes complete the Fall Tracking Form and include bsrifallreport):   Caregiver involvement changes: no  Home health supplies by type and quantity ordered/delivered this visit include: no    Clinician asked if patient has had any physician contact since last home care visit and patient states: N/A  Clinician asked if patient has any new or changed medications and patient states:  N/A   If Yes, were medications reconciled? N/A   Was the certifying physician notified of changes in medications? N/A     Clinical assessment (what this visit means for the patient overall and need for ongoing skilled care) and progress or lack of progress towards SPECIFIC goals: Patient has reach goal for SN, she has been instructed to go to her PCP to have INR checked. Patient voiced understanding. Written Teaching Material Utilized: N/A    Interdisciplinary communication with: N/A     Discharge planning as follows:  When goals are met    Specific plan for next visit: Progress patient to discharge

## 2023-09-13 NOTE — HOME HEALTH
Subjective: \" I am frustrated because my hips hurt and my doctor's office was very rude to me on the phone yesterday\"  Falls since last visit no  Caregiver involvement changes: NA  Home health supplies by type and quantity ordered/delivered this visit include: NA    Clinician asked if patient has had any physician contact since last home care visit and patient states: no  Clinician asked if patient has any new or changed medications and patient states: no  If Yes, were medications reconciled? yes  Was the certifying physician notified of changes in medications? NA    Clinical assessment (what this visit means for the patient overall and need for ongoing skilled care) and progress or lack of progress towards SPECIFIC goals: The Pt is struggling right now with her hip pain and they are going to do a proceedure on 10/10/23 but until then she has pain in hr hips that affects her ability to ambuate longer distances such as to the elevators. The Pt has been instructed to get up and move more to include two sets of standing HEP daily and two walks in the roper daily. THe Pt states she is too tired and the PT stated that unless she gets up and gets moving her energy level will continue to decrease. The Pt gave the pt VC for posture, proper use of AD and B LE clearance during gait and similar instructions for gait. Written Teaching Material Utilized: NICK    Interdisciplinary communication with: Noemí Roberts RN about POC    Discharge planning as follows:  Is no longer homebound, Per physician order, Will discharge when the patient has reached their maximum functional potential and maximum safety in their home and When goals are met    Specific plan for next visit: Re-instruct patient/caregiver on HEP, gait, Educate in s/s of worsening condition and when to call [facility], [place] or 911 and Instruct in safety issues regarding Proper use of assistive device

## 2023-09-13 NOTE — TELEPHONE ENCOUNTER
[facility]   (formerly Home Based [address])  [address] [facility], [address] [place] [address]  Phone:  [phone]        Fax:  [phone]    Patient:  Yony Richter  YOB: 1940    Incoming call from patient who is inquiring about Primary Care at Home services. She states that Daryn Lomeli PT with [facility] [place] recommended that she call. Patient says she moved to [facility] several years ago from [facility] to be closer to her son. She states that she is a retired . Preliminary Intake Note:     Address:   [address] [place] [phone], [address]     Does patient live within 15 miles of [facility] at address listed above? Yes       Distance from [facility] office/Travel Time:   via HDmessaging/[place] 11.6 miles/ 28   Region:   [address]:   [employer]- PPO Medicare primary, [facility] Retired    Does patient qualify based on address and insurance?     yes    Date of Referral:  9/13/23    Reason for Referral:  difficulty getting out to medical offices due to fatigue, impaired balance, weakness     Diagnosis:   CAD (s/p stent placement), aortic stenosis (s/p aortic valve replacement), chronic systolic CHF, hx of DVT, chronic anticoagulation,  hyperlipidemia, COPD (on oxygen prn), acquired hypothyroidism, DM type 2 with CKD, GERD, IBS, age-related osteoporosis, lumbar spondylosis, DDD, osteoarthritis of spine w/ radiculopathy, primary osteoarthritis of both knees, chronic back pain with sciatica, overactive bladder, recurrent UTI's, chronic pain, depression with anxiety, mild dementia    Last PCP visit:   Dr. Mabel Madden, office visit 8/21/23    Last Hospitalization:   7/22/23-7/27/23  [facility] (anemia, acute on chronic respiratory failure w/ hypoxia); discharged to [facility] 7/27/23-8/11/23    Primary Caregiver:   Dahiana Rodríguez   Relation to

## 2023-09-13 NOTE — HOME HEALTH
Subjective: \"I've been doing my exercises. \"   Falls since last visit: (if yes complete the Fall Tracking Form and include bsrifallreport): No   Caregiver involvement changes: none   Home health supplies by type and quantity ordered/delivered this visit include: None     Clinician asked if patient has had any physician contact since last home care visit and patient states: No   Clinician asked if patient has any new or changed medications and patient states:  No   If Yes, were medications reconciled? N/A  Was the certifying physician notified of changes in medications? N/A     Clinical assessment (what this visit means for the patient overall and need for ongoing skilled care) and progress or lack of progress towards SPECIFIC goals:  Patient demonstrated the ability to complete meal prep tasks with therapist providing verbal cueing for fall prevention techniques implementing at a mod I level. She remains at continued risk of falls with laundry tasks requiring additional instruction from skilled [facility] OT to achieve higher level of functional independence and reduce falls. Patient met mod I level goals set for meal prep tasks. Written Teaching Material Utilized: None   Interdisciplinary communication with: Tania Marquez, PT for [address] [place] [address]   Discharge planning as follows: Discharge from [facility] OT when goals are met or maximum level of function is achieved.      Specific plan for next visit:  Focus on increasing independence and reducing risk of falls with laundry tasks

## 2023-09-13 NOTE — TELEPHONE ENCOUNTER
The patient called and said that she was referred to [facility] by [facility].   Her CB# [phone]

## 2023-09-15 ENCOUNTER — HOME CARE VISIT (OUTPATIENT)
Facility: HOME HEALTH | Age: 83
End: 2023-09-15
Payer: MEDICARE

## 2023-09-15 VITALS
OXYGEN SATURATION: 85 % | TEMPERATURE: 97.5 F | RESPIRATION RATE: 17 BRPM | HEART RATE: 59 BPM | DIASTOLIC BLOOD PRESSURE: 87 MMHG | SYSTOLIC BLOOD PRESSURE: 110 MMHG

## 2023-09-15 PROCEDURE — G0151 HHCP-SERV OF PT,EA 15 MIN: HCPCS

## 2023-09-15 ASSESSMENT — ENCOUNTER SYMPTOMS: PAIN LOCATION - PAIN QUALITY: ACHE

## 2023-09-16 NOTE — HOME HEALTH
Subjective: \" I was sound asleep as I have been in pain the last two days in the L hip area as PT and OT on the same day is too  much for me I think. \"  Falls since last visit: no falls  Caregiver involvement changes: NA  Home health supplies by type and quantity ordered/delivered this visit include: NA    Clinician asked if patient has had any physician contact since last home care visit and patient states:no  Clinician asked if patient has any new or changed medications and patient states:  no  If Yes, were medications reconciled? yes  Was the certifying physician notified of changes in medications? NA  Clinical assessment (what this visit means for the patient overall and need for ongoing skilled care) and progress or lack of progress towards SPECIFIC goals: The Pt is having some pain in the hip from overdoing it on tuesday. She is feeling better today but still has a little bit of tightness and soreness in L hip and low back area. PT and pt decided to arrange calendar so that PT and OT do not come on the same day and the PT has encouraged the pt to keep moving because she is using muscles that she has not used in a long time and she is going to have some sorenss and tightness and continuing to move prevents tightness from setting in. The Pt was instructed on seating HEP with incstructions to complete seated HEP through weekend and then on monday resume standing HEP and monitor how the pt's hip feels     Written Teaching Material Utilized: none    Interdisciplinary communication with:NICK  Discharge planning as follows:  Is no longer homebound, Per physician order, Will discharge when the patient has reached their maximum functional potential and maximum safety in their home and When goals are met    Specific plan for next visit: Re-instruct patient/caregiver on HEP, gait balance, Educate in s/s of infection, worsening condition and when to call [facility], [place] or 911 and Instruct in safety issues regarding Proper use of

## 2023-09-19 ENCOUNTER — TELEPHONE (OUTPATIENT)
Age: 83
End: 2023-09-19

## 2023-09-19 ENCOUNTER — HOME CARE VISIT (OUTPATIENT)
Facility: HOME HEALTH | Age: 83
End: 2023-09-19
Payer: MEDICARE

## 2023-09-19 VITALS
DIASTOLIC BLOOD PRESSURE: 77 MMHG | OXYGEN SATURATION: 95 % | TEMPERATURE: 97.6 F | HEART RATE: 64 BPM | SYSTOLIC BLOOD PRESSURE: 132 MMHG

## 2023-09-19 PROCEDURE — G0152 HHCP-SERV OF OT,EA 15 MIN: HCPCS

## 2023-09-19 ASSESSMENT — ENCOUNTER SYMPTOMS: PAIN LOCATION - PAIN QUALITY: ACHING, SHARP

## 2023-09-19 NOTE — HOME HEALTH
Subjective: \"I've been doing my exercises. \"   Falls since last visit: (if yes complete the Fall Tracking Form and include bsrifallreport): No   Caregiver involvement changes: none   Home health supplies by type and quantity ordered/delivered this visit include: None     Clinician asked if patient has had any physician contact since last home care visit and patient states: Yes. Urologist on 9/18  Clinician asked if patient has any new or changed medications and patient states:  yes but not in the home yet during this visit. If Yes, were medications reconciled? N/A  Was the certifying physician notified of changes in medications? N/A     Clinical assessment (what this visit means for the patient overall and need for ongoing skilled care) and progress or lack of progress towards SPECIFIC goals:  Patient demonstrated the ability to complete laundry tasks with therapist providing verbal cueing for fall prevention techniques implementing at a mod I level. She remains at continued risk of falls with bed making tasks requiring additional instruction from skilled [address] [place] [address],[address] OT to achieve higher level of functional independence and reduce falls. Patient met mod I level goals set for laundry tasks. Written Teaching Material Utilized: None   Interdisciplinary communication with: Coco Epperson, PT for [address] [place] [address]   Discharge planning as follows: Discharge from [address] [place] [address],[address] OT when goals are met or maximum level of function is achieved.      Specific plan for next visit:  Focus on increasing independence and reducing risk of falls with bed making tasks

## 2023-09-19 NOTE — TELEPHONE ENCOUNTER
TVT Registry 1 year follow up:    KCCQ-12 completed over the phone with patient. Forms scanned into EMR. NYHA Class: I  Attempted to review Medications listed in EMR. Patient states she takes 26 pills a day, but did not answer questions regarding specific medications. Hospitalizations over the past year: 1 hospitalization after a fall. Upcoming TTE: in December with Dr. Kirt Stoddard  SBE reviewed with the patient.

## 2023-09-20 ENCOUNTER — HOME CARE VISIT (OUTPATIENT)
Facility: HOME HEALTH | Age: 83
End: 2023-09-20
Payer: MEDICARE

## 2023-09-20 VITALS
DIASTOLIC BLOOD PRESSURE: 60 MMHG | SYSTOLIC BLOOD PRESSURE: 100 MMHG | RESPIRATION RATE: 17 BRPM | TEMPERATURE: 98.3 F | HEART RATE: 70 BPM | OXYGEN SATURATION: 95 %

## 2023-09-20 PROCEDURE — G0151 HHCP-SERV OF PT,EA 15 MIN: HCPCS

## 2023-09-20 ASSESSMENT — ENCOUNTER SYMPTOMS: PAIN LOCATION - PAIN QUALITY: ACHE

## 2023-09-21 ENCOUNTER — HOME CARE VISIT (OUTPATIENT)
Facility: HOME HEALTH | Age: 83
End: 2023-09-21
Payer: MEDICARE

## 2023-09-21 VITALS
HEART RATE: 67 BPM | OXYGEN SATURATION: 94 % | WEIGHT: 126 LBS | BODY MASS INDEX: 23.81 KG/M2 | DIASTOLIC BLOOD PRESSURE: 62 MMHG | TEMPERATURE: 97.8 F | SYSTOLIC BLOOD PRESSURE: 127 MMHG

## 2023-09-21 PROCEDURE — G0152 HHCP-SERV OF OT,EA 15 MIN: HCPCS

## 2023-09-21 ASSESSMENT — ENCOUNTER SYMPTOMS: PAIN LOCATION - PAIN QUALITY: ACHING

## 2023-09-21 NOTE — HOME HEALTH
Subjective: \" I am not sleeping well and yesterday had diarrhea for no reason\"  Falls since last visit : no falls  Caregiver involvement changes: NA   Home health supplies by type and quantity ordered/delivered this visit include: NA    Clinician asked if patient has had any physician contact since last home care visit and patient states: Yes   Clinician asked if patient has any new or changed medications and patient states:  yes but medication not in the house yet  If Yes, were medications reconciled? yes  Was the certifying physician notified of changes in medications? NA    Clinical assessment (what this visit means for the patient overall and need for ongoing skilled care) and progress or lack of progress towards SPECIFIC goals: The Pt is really struggling with her memory and understanding how a  variety things are connected. She does not understand what her medications are for and what she should be taking and what she does not need to continue to take. PT spent over half the session organizing the pt's medical papers to get all the proper medication lists with the proper visit summaries so that PT could figure out which medication list was the most up to date and it turns out the Pt saw her PCP 8/23/23 and there was a more updated medication list than the rehab list and so the PT called the cardiologist Dr. Jordan Dunn to clarify that pt is taking plavix and warfarin and the nurse stated that she was on her list but she would have Dr. Jordan Dunn call the pt back to clarify. The PT then went over each medication with the pt, updated the medication profile and uploaded a picture of the list to [facility]. The PT educated the pt on what the medications were for and reviewed how to take each one. The PT educated the pt that she cannot skip doses and should take with plenty of water and possibly with food unless otherwise directed.   The Pt was also very confused when the PT informed her she had been accepted to home based

## 2023-09-22 ENCOUNTER — HOME CARE VISIT (OUTPATIENT)
Facility: HOME HEALTH | Age: 83
End: 2023-09-22
Payer: MEDICARE

## 2023-09-22 PROCEDURE — G0151 HHCP-SERV OF PT,EA 15 MIN: HCPCS

## 2023-09-22 NOTE — HOME HEALTH
Subjective: \"I've had some changes in my medications. \"   Falls since last visit: (if yes complete the Fall Tracking Form and include bsrifallreport): No  Caregiver involvement changes: none   Home health supplies by type and quantity ordered/delivered this visit include: Theraputty     Clinician asked if patient has had any physician contact since last home care visit and patient states: No  Clinician asked if patient has any new or changed medications and patient states:  Yes  If Yes, were medications reconciled? Yes  Was the certifying physician notified of changes in medications? Yes    Clinical assessment (what this visit means for the patient overall and need for ongoing skilled care) and progress or lack of progress towards SPECIFIC goals:  Patient continues to demonstrate decreased strength and dexerity needed for increased independence and reducing risk of falls ADLs, IADLs and functional transfers requiring additional instruction from skilled [facility] OT to achieve higher level of functional independence and reduce falls. Patient making steady progress to mod I level goals set for UE HEP. Written Teaching Material Utilized: UE HEP handout consisting of theraputty exercises   Interdisciplinary communication with: Slade Phelps PT for [address] [place] [address]    Discharge planning as follows: Discharge from [facility] OT when goals are met or maximum level of function is achieved.      Specific plan for next visit:  Focus on increasing independence and reducing risk of falls with bed making tasks

## 2023-09-23 VITALS
OXYGEN SATURATION: 97 % | TEMPERATURE: 98.5 F | RESPIRATION RATE: 16 BRPM | DIASTOLIC BLOOD PRESSURE: 64 MMHG | SYSTOLIC BLOOD PRESSURE: 108 MMHG | HEART RATE: 56 BPM

## 2023-09-23 ASSESSMENT — ENCOUNTER SYMPTOMS: PAIN LOCATION - PAIN QUALITY: ACHE

## 2023-09-25 NOTE — TELEPHONE ENCOUNTER
Last appointment: 8/21/23  Next appointment: 10/3/23  Previous refill encounter(s): 8/22/22 Nexium #90 with 3 refills, 9/5/22 Nystatin #60 with 2 refills    Requested Prescriptions     Pending Prescriptions Disp Refills    nystatin (MYCOSTATIN) 637748 UNIT/ML suspension [Pharmacy Med Name: [address] 905188Z/ML 60ML] 60 mL 0     Sig: SWISH WITH 5 ML FOR 30 SECONDS AND SWALLOW FOUR TIMES DAILY AS NEEDED FOR MOUTH PAIN    esomeprazole (NEXIUM) 20 MG delayed release capsule [Pharmacy Med Name: ESOMEPRAZOLE MAGNESIUM 20MG DR CAPS] 90 capsule 0     Sig: TAKE 1 CAPSULE BY MOUTH DAILY BEFORE BREAKFAST. DO NOT OPEN CAPSULE         For Pharmacy Admin Tracking Only    Program: Medication Refill  CPA in place:    Recommendation Provided To:    Intervention Detail: New Rx: 2, reason: Patient Preference  Intervention Accepted By:   Harinder Adan Closed?:    Time Spent (min): 5

## 2023-09-26 ENCOUNTER — HOME CARE VISIT (OUTPATIENT)
Facility: HOME HEALTH | Age: 83
End: 2023-09-26
Payer: MEDICARE

## 2023-09-26 VITALS
OXYGEN SATURATION: 93 % | HEART RATE: 69 BPM | BODY MASS INDEX: 23.81 KG/M2 | SYSTOLIC BLOOD PRESSURE: 120 MMHG | WEIGHT: 126 LBS | TEMPERATURE: 98 F | DIASTOLIC BLOOD PRESSURE: 68 MMHG

## 2023-09-26 PROCEDURE — G0152 HHCP-SERV OF OT,EA 15 MIN: HCPCS

## 2023-09-26 ASSESSMENT — ENCOUNTER SYMPTOMS: PAIN LOCATION - PAIN QUALITY: ACHING

## 2023-09-26 NOTE — HOME HEALTH
Subjective: \"I'm still having pain in my knees so the shots didn't do much. \"   Falls since last visit: (if yes complete the Fall Tracking Form and include bsrifallreport): No   Caregiver involvement changes: none   Home health supplies by type and quantity ordered/delivered this visit include: None     Clinician asked if patient has had any physician contact since last home care visit and patient states: No   Clinician asked if patient has any new or changed medications and patient states:  No   If Yes, were medications reconciled? N/A  Was the certifying physician notified of changes in medications? N/A     Clinical assessment (what this visit means for the patient overall and need for ongoing skilled care) and progress or lack of progress towards SPECIFIC goals:  Patient continues to demonstrate decreased strength and coordination needed for reducing falls with ADLs and IADLs. Patient making steady progress towards independence with completing UE HEP using theraputty. Goal met for UE HEP using theraband. Written Teaching Material Utilized: UE HEP using theraband   Interdisciplinary communication with: Destiny Wayne PT  for [address] [place] [address]   Discharge planning as follows: Discharge from [facility] OT when goals are met or maximum level of function is achieved. Specific plan for next visit:  Focus on increasing independence and reducing risk of falls with bed making tasks.

## 2023-09-28 ENCOUNTER — HOME CARE VISIT (OUTPATIENT)
Facility: HOME HEALTH | Age: 83
End: 2023-09-28
Payer: MEDICARE

## 2023-09-28 VITALS
OXYGEN SATURATION: 94 % | SYSTOLIC BLOOD PRESSURE: 123 MMHG | TEMPERATURE: 97.8 F | BODY MASS INDEX: 23.81 KG/M2 | HEART RATE: 73 BPM | DIASTOLIC BLOOD PRESSURE: 60 MMHG | WEIGHT: 126 LBS

## 2023-09-28 PROCEDURE — G0152 HHCP-SERV OF OT,EA 15 MIN: HCPCS

## 2023-09-28 ASSESSMENT — ENCOUNTER SYMPTOMS: PAIN LOCATION - PAIN QUALITY: ACHING

## 2023-09-29 ENCOUNTER — HOME CARE VISIT (OUTPATIENT)
Facility: HOME HEALTH | Age: 83
End: 2023-09-29
Payer: MEDICARE

## 2023-09-29 PROCEDURE — G0151 HHCP-SERV OF PT,EA 15 MIN: HCPCS

## 2023-09-29 NOTE — HOME HEALTH
Subjective: \"I'm doing pretty well doing my exercises. \"   Falls since last visit: (if yes complete the Fall Tracking Form and include bsrifallreport): No   Caregiver involvement changes: none   Home health supplies by type and quantity ordered/delivered this visit include: None     Clinician asked if patient has had any physician contact since last home care visit and patient states: No   Clinician asked if patient has any new or changed medications and patient states:  No   If Yes, were medications reconciled? N/A  Was the certifying physician notified of changes in medications? N/A     Clinical assessment (what this visit means for the patient overall and need for ongoing skilled care) and progress or lack of progress towards SPECIFIC goals:  Patient demonstrated the ability to complete UE HEP using theraputty this visit following handout for improved strengh and coordination needed for reducing falls with ADLs and IADLs. Patient met independent level goal set for UE HEP's. She remains at a continued risk for falls with bed making tasks requiring skilled instruction during next visit. Written Teaching Material Utilized: UE HEP using theraputty   Interdisciplinary communication with: Ihsan Guajardo, PT  for [address] [place] [address]   Discharge planning as follows: Discharge from [facility] OT when goals are met or maximum level of function is achieved.      Specific plan for next visit:  Focus on increasing independence and reducing risk of falls with bed making tasks

## 2023-10-01 VITALS
SYSTOLIC BLOOD PRESSURE: 90 MMHG | RESPIRATION RATE: 17 BRPM | OXYGEN SATURATION: 90 % | HEART RATE: 85 BPM | TEMPERATURE: 97.5 F | DIASTOLIC BLOOD PRESSURE: 60 MMHG

## 2023-10-01 ASSESSMENT — ENCOUNTER SYMPTOMS: PAIN LOCATION - PAIN QUALITY: ACHE

## 2023-10-03 ENCOUNTER — HOME CARE VISIT (OUTPATIENT)
Facility: HOME HEALTH | Age: 83
End: 2023-10-03
Payer: MEDICARE

## 2023-10-03 ENCOUNTER — OFFICE VISIT (OUTPATIENT)
Age: 83
End: 2023-10-03
Payer: MEDICARE

## 2023-10-03 VITALS
OXYGEN SATURATION: 92 % | BODY MASS INDEX: 24.73 KG/M2 | TEMPERATURE: 98.1 F | HEIGHT: 61 IN | RESPIRATION RATE: 18 BRPM | DIASTOLIC BLOOD PRESSURE: 61 MMHG | SYSTOLIC BLOOD PRESSURE: 123 MMHG | WEIGHT: 131 LBS | HEART RATE: 74 BPM

## 2023-10-03 VITALS
WEIGHT: 126 LBS | BODY MASS INDEX: 23.81 KG/M2 | TEMPERATURE: 97.8 F | SYSTOLIC BLOOD PRESSURE: 102 MMHG | HEART RATE: 72 BPM | OXYGEN SATURATION: 92 % | DIASTOLIC BLOOD PRESSURE: 63 MMHG

## 2023-10-03 DIAGNOSIS — Z95.2 PRESENCE OF PROSTHETIC HEART VALVE: ICD-10-CM

## 2023-10-03 DIAGNOSIS — Z79.01 LONG TERM (CURRENT) USE OF ANTICOAGULANTS: Primary | ICD-10-CM

## 2023-10-03 DIAGNOSIS — G89.29 CHRONIC NONINTRACTABLE HEADACHE, UNSPECIFIED HEADACHE TYPE: ICD-10-CM

## 2023-10-03 DIAGNOSIS — R51.9 CHRONIC NONINTRACTABLE HEADACHE, UNSPECIFIED HEADACHE TYPE: ICD-10-CM

## 2023-10-03 DIAGNOSIS — E11.22 TYPE 2 DIABETES MELLITUS WITH STAGE 3A CHRONIC KIDNEY DISEASE, WITHOUT LONG-TERM CURRENT USE OF INSULIN (HCC): ICD-10-CM

## 2023-10-03 DIAGNOSIS — G47.01 INSOMNIA DUE TO MEDICAL CONDITION: ICD-10-CM

## 2023-10-03 DIAGNOSIS — E78.5 HYPERLIPIDEMIA, UNSPECIFIED HYPERLIPIDEMIA TYPE: ICD-10-CM

## 2023-10-03 DIAGNOSIS — I10 PRIMARY HYPERTENSION: ICD-10-CM

## 2023-10-03 DIAGNOSIS — Z91.199 NONCOMPLIANCE: ICD-10-CM

## 2023-10-03 DIAGNOSIS — K21.9 GASTROESOPHAGEAL REFLUX DISEASE, UNSPECIFIED WHETHER ESOPHAGITIS PRESENT: ICD-10-CM

## 2023-10-03 DIAGNOSIS — D64.9 ANEMIA, UNSPECIFIED TYPE: ICD-10-CM

## 2023-10-03 DIAGNOSIS — N18.31 TYPE 2 DIABETES MELLITUS WITH STAGE 3A CHRONIC KIDNEY DISEASE, WITHOUT LONG-TERM CURRENT USE OF INSULIN (HCC): ICD-10-CM

## 2023-10-03 DIAGNOSIS — R79.1 SUPRATHERAPEUTIC INR: ICD-10-CM

## 2023-10-03 DIAGNOSIS — F03.A0 MILD DEMENTIA WITHOUT BEHAVIORAL DISTURBANCE, PSYCHOTIC DISTURBANCE, MOOD DISTURBANCE, OR ANXIETY, UNSPECIFIED DEMENTIA TYPE (HCC): ICD-10-CM

## 2023-10-03 DIAGNOSIS — Z79.899 ENCOUNTER FOR LONG-TERM (CURRENT) USE OF MEDICATIONS: ICD-10-CM

## 2023-10-03 DIAGNOSIS — E03.8 SUBCLINICAL HYPOTHYROIDISM: ICD-10-CM

## 2023-10-03 PROBLEM — E11.69 HYPERLIPIDEMIA ASSOCIATED WITH TYPE 2 DIABETES MELLITUS (HCC): Status: RESOLVED | Noted: 2019-10-17 | Resolved: 2023-10-03

## 2023-10-03 PROBLEM — E03.9 ACQUIRED HYPOTHYROIDISM: Status: RESOLVED | Noted: 2019-10-17 | Resolved: 2023-10-03

## 2023-10-03 LAB
POC INR: 5.6
PROTHROMBIN TIME, POC: ABNORMAL

## 2023-10-03 PROCEDURE — 99215 OFFICE O/P EST HI 40 MIN: CPT | Performed by: FAMILY MEDICINE

## 2023-10-03 PROCEDURE — 85610 PROTHROMBIN TIME: CPT | Performed by: FAMILY MEDICINE

## 2023-10-03 PROCEDURE — 1036F TOBACCO NON-USER: CPT | Performed by: FAMILY MEDICINE

## 2023-10-03 PROCEDURE — PBSHW AMB POC PT/INR: Performed by: FAMILY MEDICINE

## 2023-10-03 PROCEDURE — G8400 PT W/DXA NO RESULTS DOC: HCPCS | Performed by: FAMILY MEDICINE

## 2023-10-03 PROCEDURE — G8427 DOCREV CUR MEDS BY ELIG CLIN: HCPCS | Performed by: FAMILY MEDICINE

## 2023-10-03 PROCEDURE — G0152 HHCP-SERV OF OT,EA 15 MIN: HCPCS

## 2023-10-03 PROCEDURE — 1123F ACP DISCUSS/DSCN MKR DOCD: CPT | Performed by: FAMILY MEDICINE

## 2023-10-03 PROCEDURE — G8484 FLU IMMUNIZE NO ADMIN: HCPCS | Performed by: FAMILY MEDICINE

## 2023-10-03 PROCEDURE — 3074F SYST BP LT 130 MM HG: CPT | Performed by: FAMILY MEDICINE

## 2023-10-03 PROCEDURE — 1090F PRES/ABSN URINE INCON ASSESS: CPT | Performed by: FAMILY MEDICINE

## 2023-10-03 PROCEDURE — 3078F DIAST BP <80 MM HG: CPT | Performed by: FAMILY MEDICINE

## 2023-10-03 PROCEDURE — 3051F HG A1C>EQUAL 7.0%<8.0%: CPT | Performed by: FAMILY MEDICINE

## 2023-10-03 PROCEDURE — G8420 CALC BMI NORM PARAMETERS: HCPCS | Performed by: FAMILY MEDICINE

## 2023-10-03 RX ORDER — OMEPRAZOLE 20 MG/1
20 CAPSULE, DELAYED RELEASE ORAL
Qty: 90 CAPSULE | Refills: 0 | Status: SHIPPED | OUTPATIENT
Start: 2023-10-03

## 2023-10-03 RX ORDER — WARFARIN SODIUM 2 MG/1
TABLET ORAL
Qty: 100 TABLET | Refills: 1 | Status: SHIPPED | OUTPATIENT
Start: 2023-10-03

## 2023-10-03 RX ORDER — METOPROLOL SUCCINATE 50 MG/1
50 TABLET, EXTENDED RELEASE ORAL DAILY
Qty: 90 TABLET | Refills: 1 | Status: SHIPPED | OUTPATIENT
Start: 2023-10-03

## 2023-10-03 RX ORDER — EZETIMIBE 10 MG/1
10 TABLET ORAL DAILY
Qty: 90 TABLET | Refills: 1 | Status: SHIPPED | OUTPATIENT
Start: 2023-10-03

## 2023-10-03 RX ORDER — ATORVASTATIN CALCIUM 20 MG/1
20 TABLET, FILM COATED ORAL DAILY
Qty: 90 TABLET | Refills: 1 | Status: SHIPPED | OUTPATIENT
Start: 2023-10-03

## 2023-10-03 RX ORDER — QUETIAPINE FUMARATE 50 MG/1
50 TABLET, FILM COATED ORAL NIGHTLY
Qty: 90 TABLET | Refills: 1 | Status: SHIPPED | OUTPATIENT
Start: 2023-10-03

## 2023-10-03 ASSESSMENT — ENCOUNTER SYMPTOMS: PAIN LOCATION - PAIN QUALITY: ACHING

## 2023-10-03 NOTE — PROGRESS NOTES
Chief Complaint   Patient presents with    Anticoagulation         1. Have you been to the ER, urgent care clinic since your last visit? Hospitalized since your last visit? yes Hospital    2. Have you seen or consulted any other health care providers outside of the [address] [place] [address] since your last visit? Include any pap smears or colon screening.  Yes
by mouth every day as directed      fluticasone (FLONASE) 50 MCG/ACT nasal spray 2 sprays by Nasal route daily as needed for Rhinitis 16 g 2    Polyethylene Glycol 400 0.25 % SOLN Place 1 drop into both eyes as needed (dry eyes) plast 1 or 2 drops in the eye as needed for dryness      Menthol, Topical Analgesic, 4 % GEL Apply 1 g topically 4 times daily rub a thin film over the affected area not more than 4 times daily      ferrous sulfate (IRON 325) 325 (65 Fe) MG tablet Take 1 tablet by mouth every other day      oxybutynin (DITROPAN XL) 5 MG extended release tablet Take 1 tablet by mouth daily 90 tablet 1    butalbital-acetaminophen-caffeine (FIORICET, ESGIC) -40 MG per tablet Take 1 tablet by mouth every 6 hours as needed for Headaches 15 tablet 0    levETIRAcetam (KEPPRA) 500 MG tablet 500mg in morning (Patient taking differently: Take 1 tablet by mouth every morning (before breakfast) 500mg in morning) 90 tablet 0    levETIRAcetam (KEPPRA) 750 MG tablet Take 1 tablet by mouth every evening 90 tablet 0    DULoxetine (CYMBALTA) 60 MG extended release capsule Take 1 capsule by mouth daily 90 capsule 1    metFORMIN (GLUCOPHAGE-XR) 500 MG extended release tablet Take 1 tablet by mouth daily (with breakfast) 90 tablet 1    acetaminophen (TYLENOL) 325 MG tablet Take 1 tablet by mouth every 6 hours as needed for Pain take 2 tablets by mouth every 6 hours as needed for pain      albuterol sulfate HFA (PROVENTIL;VENTOLIN;PROAIR) 108 (90 Base) MCG/ACT inhaler Inhale 2 puffs into the lungs daily as needed      celecoxib (CELEBREX) 200 MG capsule TAKE 1 CAPSULE BY MOUTH EVERY 12 HOURS AS NEEDED FOR MODERATE PAIN      blood glucose test strips (ONETOUCH VERIO) strip       levocetirizine (XYZAL) 5 MG tablet Take 1 tablet by mouth daily      loperamide (IMODIUM) 2 MG capsule TAKE 1 CAPSULE BY MOUTH FOUR TIMES DAILY AS NEEDED FOR DIARRHEA      nystatin (MYCOSTATIN) 965896 UNIT/ML suspension       pregabalin (LYRICA) 50

## 2023-10-04 ENCOUNTER — HOME CARE VISIT (OUTPATIENT)
Facility: HOME HEALTH | Age: 83
End: 2023-10-04
Payer: MEDICARE

## 2023-10-04 PROCEDURE — G0151 HHCP-SERV OF PT,EA 15 MIN: HCPCS

## 2023-10-04 NOTE — HOME HEALTH
Subjective: \"I've been doing my exercises. \"   Falls since last visit: (if yes complete the Fall Tracking Form and include bsrifallreport): No   Caregiver involvement changes: none   Home health supplies by type and quantity ordered/delivered this visit include: None     Clinician asked if patient has had any physician contact since last home care visit and patient states: No   Clinician asked if patient has any new or changed medications and patient states:  No   If Yes, were medications reconciled? N/A  Was the certifying physician notified of changes in medications? N/A     Clinical assessment (what this visit means for the patient overall and need for ongoing skilled care) and progress or lack of progress towards SPECIFIC goals: Patient demonstrated the ability to complete bed making tasks with therapist providing verbal cueing for fall prevention techniques implementing at a mod I level. She remains at a continued risk for falls with trash removal tasks requiring additional instruction from skilled [facility] OT to achieve higher level of functional independence and reduce falls as caregiver not able to provide this needed level of assistance safely. Patient met mod I level goals set for bed making tasks. Written Teaching Material Utilized: NA   Interdisciplinary communication with: Faustino Dudley, PT for [address] [place] [address]   Discharge planning as follows: Discharge from [facility] OT when goals are met or maximum level of function is achieved.      Specific plan for next visit:  Focus on increasing independence and reducing risk of falls with trash removal tasks

## 2023-10-06 ENCOUNTER — HOME CARE VISIT (OUTPATIENT)
Facility: HOME HEALTH | Age: 83
End: 2023-10-06
Payer: MEDICARE

## 2023-10-06 ENCOUNTER — HOME CARE VISIT (OUTPATIENT)
Facility: HOME HEALTH | Age: 83
End: 2023-10-06

## 2023-10-06 ENCOUNTER — TELEPHONE (OUTPATIENT)
Age: 83
End: 2023-10-06

## 2023-10-06 VITALS
TEMPERATURE: 98 F | RESPIRATION RATE: 17 BRPM | DIASTOLIC BLOOD PRESSURE: 63 MMHG | WEIGHT: 126 LBS | HEART RATE: 86 BPM | BODY MASS INDEX: 23.81 KG/M2 | OXYGEN SATURATION: 95 % | SYSTOLIC BLOOD PRESSURE: 110 MMHG

## 2023-10-06 PROCEDURE — G0151 HHCP-SERV OF PT,EA 15 MIN: HCPCS

## 2023-10-06 PROCEDURE — G0152 HHCP-SERV OF OT,EA 15 MIN: HCPCS

## 2023-10-06 ASSESSMENT — ENCOUNTER SYMPTOMS: PAIN LOCATION - PAIN QUALITY: ACHING

## 2023-10-06 NOTE — TELEPHONE ENCOUNTER
Latoya Alonso from [facility] called, stating, they would like to extend physical therapy to twice a week for 6 weeks    Please call  [phone]

## 2023-10-06 NOTE — HOME HEALTH
Subjective: \"I wish the pain would get better. \"   Falls since last visit: (if yes complete the Fall Tracking Form and include bsrifallreport): No   Caregiver involvement changes: none   Home health supplies by type and quantity ordered/delivered this visit include: None     Clinician asked if patient has had any physician contact since last home care visit and patient states: Yes   Clinician asked if patient has any new or changed medications and patient states: No  If Yes, were medications reconciled? N/A   Was the certifying physician notified of changes in medications? N/A     Clinical assessment (what this visit means for the patient overall and need for ongoing skilled care) and progress or lack of progress towards SPECIFIC goals: Patient has made good steady progress during [facility] OT services the following functional gains going from min A level for bathing, dressing, toileting tasks, shower transfers and toilet transfers along with meal prep and laundry tasks to mod I level using rollator. The Barthel index assessment score at initial evaluation was 55/100 indicating partially dependent improving to 90/100 indicating total independence with self care tasks. MACH-10 assessment score was 9/10 at initial evaluation improving to 8/10 continuing to indicate patient is at risk for falls as instruction on fall prevention was provided throughout episode of care. Patient has met all goals set for ADLs, IADLs and functional transfers implementing fall prevention techniques along with completing UE HEP with independence. Written Teaching Material Utilized: None   Interdisciplinary communication with: Rajat Walker for [address] [place] [address] collaboration   Discharge planning as follows: Discharge from [facility] OT services. Specific plan for next visit: Patient discharge from [facility] OT services with goals met at this time with patient verbalizing understanding of discharge. OT discharge instructions provided.

## 2023-10-06 NOTE — PROGRESS NOTES
Functionality Assessment/Goals Worksheet     On a scale of 0 (Does not Interfere) to 10 (Completely Interferes)     1. Which number describes how during the past week pain has interfered with           the following:  A. General Activity:  5  B. Mood: 5  C. Walking Ability:  0  D. Normal Work (Includes both work outside the home and housework):  3  E. Relations with Other People:   3  F. Sleep:   6  G. Enjoyment of Life:   5    2. Patient Prefers to Take their Pain Medications:     []  On a regular basis   [x]  Only when necessary    []  Does not take pain medications    3. What are the Patient's Goals/Expectations for Visiting Pain Management?      []  Learn about my pain    []  Receive Medication   []  Physical Therapy     []  Treat Depression   [x]  Receive Injections    []  Treat Sleep   []  Deal with Anxiety and Stress   []  Treat Opoid Dependence/Addiction   []  Other: Patient: Bekah Daigle   Age: 78 y.o. Patient Care Team:  Jacklyn Cheatham DO as PCP - General (Internal Medicine Physician)  Jacklyn Cheatham DO as PCP - [facility]  Alexandra Sorensen MD (Cardio Vascular Surgery)  Gaudencio Iglesias MD (Cardiothoracic Surgery)    PCP: Jacklyn Cheatham DO    Cardiologist: Eleonora Castro    Diagnosis/Reason for Consultation: The primary encounter diagnosis was S/P TAVR (transcatheter aortic valve replacement). A diagnosis of Nonrheumatic aortic valve stenosis was also pertinent to this visit. 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 y.o. female  S/P TAVR with a Hermilo 3 via R Femoral Artery approach on 7/20/22 for severe and symptomatic Aortic Stenosis. There were no intra-op complications. She did develop a pseudoaneurysm post op that required IR to embolize. Bekah Diagle was discharged to Home on POD2.  she  is here today for her  1 month Post Op Appointment. PMHx of AS, CAD, HTN, HLD, protein C deficiency and previous DVT on chronic coumadin, carotid stenosis, PAD, varicose veins, DJD, chronic tremor, hypothyroidism, asthma, IBS, DM, contrast allergy    She arrived in a wheelchair but is able to ambulate with a walker. She recently fell and hit her head. When she went to the ER she had a hematoma.  She had a mechanical fall, denies dizziness/syncope. She is mildly SOB with exertion. She is working on increasing her activity. She has home O2 but does not have portable O2 so she came to the office off of O2 with sats 95%. Last Dental Visit:  none recent   Any dental pain/concerns: none has dentures    NYHA Classification: Class II   Class I (Mild): No limitation of physical activity. Ordinary physical activity does not cause undue fatigue, palpitation, or dyspnea. Class II (Mild): Slight limitation of physical activity. Comfortable at rest, but ordinary physical activity results in fatigue, palpitation, or dyspnea. Class III (Moderate): Marked limitation of physical activity. Comfortable at rest, but less than ordinary activity causes fatigue, palpitation, or dyspnea   Class IV (Severe): Unable to carry out any physical activity without discomfort. Symptoms  of cardiac insufficiency at rest.  If any physical activity is undertaken, discomfort is increased.     Angina Classification: Class 0   Class 0: No symptoms   Class 1: Angina with strenuous exercise   Class 2: Angina with moderate exercise   Class 3: Angina with mild exertion   Walking 1-2 level blocks at normal pace; Climbing 1 flight of stairs at normal pace  Class 4: Angina at any level of physical exertion       Past Medical History:   Diagnosis Date    Anxiety     Arthritis     Asthma     CAD (coronary artery disease)     stent    Chronic obstructive pulmonary disease (HCC)     Chronic pain     Diabetes (HCC)     GERD (gastroesophageal reflux disease)     History of blood transfusion     06/22    History of DVT (deep vein thrombosis)     History of recurrent UTIs     Hx of seasonal allergies     Hypercholesterolemia     Irritable bowel syndrome (IBS)     Migraine     Psychiatric disorder     anxiety    Thromboembolus (HCC)     left leg    Thyroid disease     Urinary urgency          Past Surgical History:   Procedure Laterality Date    HX APPENDECTOMY      HX CATARACT REMOVAL Bilateral     HX HEART CATHETERIZATION      stent    HX HEENT      dental extraction    HX HYSTERECTOMY      HX OOPHORECTOMY      HX OPEN REDUCTION INTERNAL FIXATION Left     humerus     HX OPEN REDUCTION INTERNAL FIXATION Right     hip    HX ORTHOPAEDIC Left     humerus    HX TONSILLECTOMY      HX TUBAL LIGATION      [place] TOTAL HIP ARTHROPLASTY Right       Social History     Tobacco Use    Smoking status: Former     Packs/day: 0.25     Years: 20.00     Pack years: 5.00     Types: Cigarettes     Quit date:      Years since quittin.6    Smokeless tobacco: Never   Substance Use Topics    Alcohol use: No      Family History   Problem Relation Age of Onset    Diabetes Mother     Stroke Mother      Prior to Admission medications    Medication Sig Start Date End Date Taking? Authorizing Provider   esomeprazole (NEXIUM) 20 mg capsule TAKE 1 CAPSULE BY MOUTH DAILY 22  Yes Bill Wills III, DO   potassium chloride SR (KLOR-CON 10) 10 mEq tablet TAKE 1 TABLET BY MOUTH DAILY 22  Yes Bill Wills III, DO   metoprolol succinate (TOPROL-XL) 50 mg XL tablet TAKE 1 TABLET BY MOUTH DAILY 22  Yes Bill Wills III, DO   ezetimibe (ZETIA) 10 mg tablet TAKE 1 TABLET BY MOUTH DAILY 22  Yes Bill Wills III, DO   levETIRAcetam (KEPPRA) 500 mg tablet TAKE 1 TABLET BY MOUTH EVERY MORNING AND 1 AND 1/2 TABLET BY MOUTH EVERY EVENING FOR MIGRAINES 8/3/22  Yes Bill Wills III, DO   QUEtiapine (SEROquel) 25 mg tablet Take 1 Tablet by mouth nightly. 8/3/22  Yes Bill Wills III, DO   escitalopram oxalate (LEXAPRO) 20 mg tablet Take 1 Tablet by mouth in the morning. 8/3/22  Yes Bill Wills III, DO   flash glucose sensor (Bar SaintStyle Tomasa 2 Sensor) kit 1 Each by Does Not Apply route See Mason Godfrey. Apply and replace sensor every 14 days. Use to scan sensor daily.  22  Yes Bill Wills III, DO   flash glucose scanning reader (FreeStyle Tomasa 2 [place]) misc 1 Each by Does Not Apply route See Admin Instructions. Use to scan sensor daily 8/2/22  Yes Bill Wills III, DO   celecoxib (CELEBREX) 200 mg capsule Take 200 mg by mouth two (2) times a day. Yes Provider, Historical   atorvastatin (LIPITOR) 20 mg tablet TAKE 1 TABLET BY MOUTH EVERY DAY 7/25/22  Yes Bill Wills III, DO   clopidogreL (PLAVIX) 75 mg tab Take 1 Tablet by mouth in the morning. 7/22/22  Yes Rianna Galan MD   docusate sodium (Colace) 100 mg capsule Take 1 Capsule by mouth two (2) times a day. 7/21/22  Yes Rianna Galan MD   traMADoL (ULTRAM) 50 mg tablet Take 50 mg by mouth every six (6) hours as needed for Pain. Yes Provider, Historical   warfarin (COUMADIN) 1 mg tablet Take 1 Tablet by mouth daily. 5/4/22  Yes Bill Wills III, DO   Bifidobacterium Infantis (Align) 4 mg cap Take 1 Capsule by mouth daily as needed for Diarrhea. Indications: ALIGN OTC 4/15/22  Yes Bill Wills III, DO   buPROPion XL (WELLBUTRIN XL) 150 mg tablet Take 1 Tablet by mouth daily. 2/23/22  Yes Bill Wills III, DO   oxybutynin chloride XL (DITROPAN XL) 10 mg CR tablet TAKE 1 TABLET BY MOUTH EVERY DAY 2/15/22  Yes Bill Wills III, DO   amitriptyline (ELAVIL) 10 mg tablet TAKE 1 TABLET BY MOUTH EVERY NIGHT 2/14/22  Yes Bill Wills III, DO   traZODone (DESYREL) 100 mg tablet Take 100 mg by mouth nightly. Yes Provider, Historical   levothyroxine (SYNTHROID) 25 mcg tablet Take 2 Tablets by mouth Daily (before breakfast). 1/25/22  Yes Bill Wills III, DO   albuterol (PROVENTIL HFA, VENTOLIN HFA, PROAIR HFA) 90 mcg/actuation inhaler Take 2 Puffs by inhalation every six (6) hours as needed for Wheezing.  1/12/22  Yes Czajkowski, Bill B III, DO   nystatin (MYCOSTATIN) 100,000 unit/mL suspension SWISH AND SWALLOW 5 ML BY MOUTH FOR 30 SECONDS 4 TIMES A DAY AS NEEDED FOR MOUTH PAIN 11/4/21  Yes Ford Mei, DO ondansetron hcl (ZOFRAN) 4 mg tablet TAKE 1 TABLET BY MOUTH EVERY 8 HOURS AS NEEDED FOR NAUSEA OR VOMITING 9/9/21  Yes Bill Wills III, DO   dicyclomine (BENTYL) 10 mg capsule TAKE ONE CAPSULE BY MOUTH FOUR TIMES DAILY AS NEEDED 8/16/21  Yes Bill Wills III, DO   diclofenac (VOLTAREN) 1 % gel Apply  to affected area every twelve (12) hours as needed for Pain. 6/25/21  Yes Bill Wills III,    ferrous sulfate 325 mg (65 mg iron) tablet Take 1 Tablet by mouth daily (with breakfast). Patient not taking: Reported on 9/1/2022 7/22/22   Carine Jacob MD   levocetirizine (XYZAL) 5 mg tablet TAKE 1 TABLET BY MOUTH EVERY DAY  Patient not taking: No sig reported 6/26/22   Mary Young B III, DO   furosemide (LASIX) 20 mg tablet Take 1 Tablet by mouth daily. 6/16/22   Carine Jacob MD   loperamide (IMODIUM) 2 mg capsule Take 1 Capsule by mouth four (4) times daily as needed for Diarrhea. Patient not taking: No sig reported 5/13/22   Mary Hannah B III, DO   benzonatate (TESSALON) 100 mg capsule Take 1 Capsule by mouth three (3) times daily as needed for Cough. Patient not taking: No sig reported 3/29/22   Mary Hannah B III, DO   Wixela Inhub 250-50 mcg/dose diskus inhaler INHALE 1 PUFF AND INTO THE LUNGS EVERY 12 HOURS. RINSE MOUTH AFTER USE  Patient not taking: No sig reported 12/5/21   Mary Hannah B III, DO   ipratropium (ATROVENT) 42 mcg (0.06 %) nasal spray 2 Sprays by Both Nostrils route four (4) times daily.   Patient not taking: Reported on 9/1/2022 12/3/21   Mary Hannah B III, DO       Allergies   Allergen Reactions    Iodinated Contrast Media Other (comments)     Passes out    Morphine Other (comments)     Agitation,hallucinations    Pcn [Penicillins] Shortness of Breath    Pepcid [Famotidine] Nausea and Vomiting    Sulfa (Sulfonamide Antibiotics) Shortness of Breath       Current Medications:   Current Outpatient Medications   Medication Sig Dispense Refill    esomeprazole (NEXIUM) 20 mg capsule TAKE 1 CAPSULE BY MOUTH DAILY 90 Capsule 3    potassium chloride SR (KLOR-CON 10) 10 mEq tablet TAKE 1 TABLET BY MOUTH DAILY 90 Tablet 3    metoprolol succinate (TOPROL-XL) 50 mg XL tablet TAKE 1 TABLET BY MOUTH DAILY 90 Tablet 3    ezetimibe (ZETIA) 10 mg tablet TAKE 1 TABLET BY MOUTH DAILY 90 Tablet 3    levETIRAcetam (KEPPRA) 500 mg tablet TAKE 1 TABLET BY MOUTH EVERY MORNING AND 1 AND 1/2 TABLET BY MOUTH EVERY EVENING FOR MIGRAINES 225 Tablet 3    QUEtiapine (SEROquel) 25 mg tablet Take 1 Tablet by mouth nightly. 90 Tablet 3    escitalopram oxalate (LEXAPRO) 20 mg tablet Take 1 Tablet by mouth in the morning. 90 Tablet 3    flash glucose sensor (FreeStyle Tomasa 2 Sensor) kit 1 Each by Does Not Apply route See Mason Godfrey. Apply and replace sensor every 14 days. Use to scan sensor daily. 2 Kit 11    flash glucose scanning reader (FreeStyle Tomasa 2 [place]) misc 1 Each by Does Not Apply route See Admin Instructions. Use to scan sensor daily 1 Each 0    celecoxib (CELEBREX) 200 mg capsule Take 200 mg by mouth two (2) times a day. atorvastatin (LIPITOR) 20 mg tablet TAKE 1 TABLET BY MOUTH EVERY DAY 90 Tablet 3    clopidogreL (PLAVIX) 75 mg tab Take 1 Tablet by mouth in the morning. 30 Tablet 4    docusate sodium (Colace) 100 mg capsule Take 1 Capsule by mouth two (2) times a day. 60 Capsule 6    traMADoL (ULTRAM) 50 mg tablet Take 50 mg by mouth every six (6) hours as needed for Pain.      warfarin (COUMADIN) 1 mg tablet Take 1 Tablet by mouth daily. 90 Tablet 3    Bifidobacterium Infantis (Align) 4 mg cap Take 1 Capsule by mouth daily as needed for Diarrhea. Indications: ALIGN OTC 90 Capsule 3    buPROPion XL (WELLBUTRIN XL) 150 mg tablet Take 1 Tablet by mouth daily.  90 Tablet 3    oxybutynin chloride XL (DITROPAN XL) 10 mg CR tablet TAKE 1 TABLET BY MOUTH EVERY DAY 90 Tablet 3    amitriptyline (ELAVIL) 10 mg tablet TAKE 1 TABLET BY MOUTH EVERY NIGHT 90 Tablet 3    traZODone (DESYREL) 100 mg tablet Take 100 mg by mouth nightly. levothyroxine (SYNTHROID) 25 mcg tablet Take 2 Tablets by mouth Daily (before breakfast). 180 Tablet 3    albuterol (PROVENTIL HFA, VENTOLIN HFA, PROAIR HFA) 90 mcg/actuation inhaler Take 2 Puffs by inhalation every six (6) hours as needed for Wheezing. 8 g 2    nystatin (MYCOSTATIN) 100,000 unit/mL suspension SWISH AND SWALLOW 5 ML BY MOUTH FOR 30 SECONDS 4 TIMES A DAY AS NEEDED FOR MOUTH PAIN 60 mL 2    ondansetron hcl (ZOFRAN) 4 mg tablet TAKE 1 TABLET BY MOUTH EVERY 8 HOURS AS NEEDED FOR NAUSEA OR VOMITING 30 Tablet 2    dicyclomine (BENTYL) 10 mg capsule TAKE ONE CAPSULE BY MOUTH FOUR TIMES DAILY AS NEEDED 120 Capsule 3    diclofenac (VOLTAREN) 1 % gel Apply  to affected area every twelve (12) hours as needed for Pain. 100 g 2    ferrous sulfate 325 mg (65 mg iron) tablet Take 1 Tablet by mouth daily (with breakfast). (Patient not taking: Reported on 9/1/2022) 30 Tablet 6    levocetirizine (XYZAL) 5 mg tablet TAKE 1 TABLET BY MOUTH EVERY DAY (Patient not taking: No sig reported) 90 Tablet 3    furosemide (LASIX) 20 mg tablet Take 1 Tablet by mouth daily. 90 Tablet 3    loperamide (IMODIUM) 2 mg capsule Take 1 Capsule by mouth four (4) times daily as needed for Diarrhea. (Patient not taking: No sig reported) 30 Capsule 4    benzonatate (TESSALON) 100 mg capsule Take 1 Capsule by mouth three (3) times daily as needed for Cough. (Patient not taking: No sig reported) 30 Capsule 2    Wixela Inhub 250-50 mcg/dose diskus inhaler INHALE 1 PUFF AND INTO THE LUNGS EVERY 12 HOURS. RINSE MOUTH AFTER USE (Patient not taking: No sig reported) 60 Each 12    ipratropium (ATROVENT) 42 mcg (0.06 %) nasal spray 2 Sprays by Both Nostrils route four (4) times daily. (Patient not taking: Reported on 9/1/2022) 15 mL 5       Vitals: Blood pressure 130/64, pulse 67, resp. rate 14, SpO2 95 %.        Allergies: is allergic to iodinated contrast media, morphine, pcn [penicillins], pepcid [famotidine], and sulfa (sulfonamide antibiotics). Review of Systems: Pertinent Positives per HPI   [x]Total of 13 systems reviewed as follows:  Constitutional: Negative fever, negative chills  Eyes:   Negative for amauroses fugax  ENT:   Negative sore throat,oral abscess   Endocrine Negative for thyroid replacement Rx; goiter; DM  Respiratory:  Negative chronic cough,sputum production  Cards:   Negative for palpitations, varicosities, claudication, lower extremity edema  GI:   Negative for dysphagia, bleeding, nausea, vomiting, diarrhea, and abdominal pain  Genitourinary: Negative for frequency, dysuria  Integument:  Negative for rash and pruritus  Hematologic:  Negative for easy bruising; bleeding dyscrasia   Musculoskel: Negative for muscle weakness inhibiting ambulation  Neurological:  Negative for stroke, TIA, syncope, dizziness  Behavl/Psych: Negative for feelings of anxiety, depression     Cardiovascular Testing:     EKG: NSR    TTE:  07/20/22    ECHO ADULT FOLLOW-UP OR LIMITED 07/20/2022 7/20/2022    Interpretation Summary  Formatting of this result is different from the original.      Left Ventricle: Normal left ventricular systolic function with a visually estimated EF of 55 - 60%. Left ventricle size is normal. Normal wall thickness. Normal wall motion. Normal diastolic function. Aortic Valve: Bioprosthetic valve. No regurgitation. AV mean gradient is 8 mmHg. Signed by: Tommy Patterson MD on 7/20/2022  5:47 PM       Physical Exam:  General: Well nourished well groomed female appearing stated age accompanied by home health aid  Neuro: A&OX3. GRAHAM. PERRL. Steady  assisted gait  Head:Normocephalic. Atraumatic. Symmetrical  Neck: Trachea Midline  Resp: CTA B. No Adv BS/cough/sputum/tachypnea with seated conversation  CV: S1S2 RRR. No appreciable murmur. No JVD/carotid bruits. Pink/warm/dry extremities. trace LE peripheral edema  GI:Benign ab. Soft. NT/ND. Active BS  : Voids  Integ: No obvious s/s of infection or breakdown  Musculo/Skeletal: FROM in all major joints. normal muscle tone    Clinic Evaluation:   KCCQ-12: scanned into EMR      Assessment/Plan:     Severe AS s/p TAVR 7/20/22 with 23 mm Zaldivar Hermilo S3 Ultra aortic valve. NSR. Plavix (will need Plavix until 12/14/22). Warfarin resumed by Dr. Scot Vinson (INR goal 2-3). 1 month TTE scheduled with Dr. Gallito Espinoza office  CAD s/p stents 6/14/22. On BB, statin, plavix. No ASA dt coumadin and plavix. 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 Stable, Hgb much improved. Post-op respiratory insufficiency, ? secondary to atelectasis +/- volume overload, improved. On home O2 but RA in the office today. On prn lasix. Post-op pain control stable. Continue prn Tylenol/Ultram.   Protein C deficiency/previous DVT. On coumadin  HTN: Continue metoprolol XL 25 daily. HLD: Continue statin, zetia  PAD: Pt reports claudication, ABIs 12/21 were normal. Continue statin, Zetia, Plavix. Carotid stenosis: carotids 10/21 mild stenosis bilaterally. Continue statin, zetia, Plavix . Asthma/chronic cough: On advair, PRN albuterol, tessalon pearls PRN  DM Type II: Pt had been on metformin but stopped taking, A1C 6.3. Iron deficiency anemia: Hgb improved on most recent labs  Overactive bladder: on ditropan PTA  Migraines: firoicet PRN, keppra BID PTA  Chronic back pain: tramadol PRN. Anxiety and depression: Continue PTA regimen of Wellbutrin, elavil, trazadone, lexapro, Seroquel  IBS: takes prn immodium, bentyl PRN  Hypothyroid: cont synthroid  GERD: On Nexium PTA;     SBE prophylax reviewed.     May begin Cardiac Rehab     F/U with primary cardiologist

## 2023-10-07 VITALS
TEMPERATURE: 98.1 F | SYSTOLIC BLOOD PRESSURE: 90 MMHG | RESPIRATION RATE: 17 BRPM | OXYGEN SATURATION: 93 % | DIASTOLIC BLOOD PRESSURE: 60 MMHG | HEART RATE: 85 BPM

## 2023-10-07 ASSESSMENT — ENCOUNTER SYMPTOMS
DIARRHEA: 1
DYSPNEA ACTIVITY LEVEL: AFTER AMBULATING LESS THAN 10 FT
CONTUSION: 1
PAIN LOCATION - PAIN QUALITY: ACHE

## 2023-10-09 DIAGNOSIS — K21.9 GASTROESOPHAGEAL REFLUX DISEASE, UNSPECIFIED WHETHER ESOPHAGITIS PRESENT: Primary | ICD-10-CM

## 2023-10-09 RX ORDER — AMITRIPTYLINE HYDROCHLORIDE 10 MG/1
TABLET, FILM COATED ORAL
Qty: 90 TABLET | Refills: 0 | Status: SHIPPED | OUTPATIENT
Start: 2023-10-09

## 2023-10-09 NOTE — TELEPHONE ENCOUNTER
Henok Yogesh  36 [phone] am allegic to the prilosic and I need something else called into the pharmacy [facility]  Plz send in esomeprazole magessem 20 mg

## 2023-10-09 NOTE — TELEPHONE ENCOUNTER
I show this was d/c on 10/3/23 and given Omeprazole. For Pharmacy Admin Tracking Only    Program: Medication Refill  CPA in place:    Recommendation Provided To:    Intervention Detail: Discontinued Rx: 1, reason: Therapy Complete  Intervention Accepted By:   Jyotsna Romero Closed?:    Time Spent (min): 5

## 2023-10-10 ENCOUNTER — HOSPITAL ENCOUNTER (OUTPATIENT)
Facility: HOSPITAL | Age: 83
Setting detail: OUTPATIENT SURGERY
Discharge: HOME | End: 2023-10-10
Attending: PHYSICAL MEDICINE & REHABILITATION | Admitting: PHYSICAL MEDICINE & REHABILITATION
Payer: MEDICARE

## 2023-10-10 ENCOUNTER — APPOINTMENT (OUTPATIENT)
Facility: HOSPITAL | Age: 83
End: 2023-10-10
Attending: PHYSICAL MEDICINE & REHABILITATION
Payer: MEDICARE

## 2023-10-10 VITALS
OXYGEN SATURATION: 92 % | DIASTOLIC BLOOD PRESSURE: 92 MMHG | SYSTOLIC BLOOD PRESSURE: 104 MMHG | RESPIRATION RATE: 17 BRPM | TEMPERATURE: 98 F | BODY MASS INDEX: 23.79 KG/M2 | HEART RATE: 77 BPM | HEIGHT: 61 IN | WEIGHT: 126 LBS

## 2023-10-10 LAB
GLUCOSE BLD STRIP.AUTO-MCNC: 129 MG/DL (ref 65–117)
INR BLD: 2.6
SERVICE CMNT-IMP: ABNORMAL

## 2023-10-10 PROCEDURE — 3600000002 HC SURGERY LEVEL 2 BASE: Performed by: PHYSICAL MEDICINE & REHABILITATION

## 2023-10-10 PROCEDURE — 2709999900 HC NON-CHARGEABLE SUPPLY: Performed by: PHYSICAL MEDICINE & REHABILITATION

## 2023-10-10 PROCEDURE — 2500000003 HC RX 250 WO HCPCS: Performed by: PHYSICAL MEDICINE & REHABILITATION

## 2023-10-10 PROCEDURE — 6360000002 HC RX W HCPCS: Performed by: PHYSICAL MEDICINE & REHABILITATION

## 2023-10-10 PROCEDURE — 85610 PROTHROMBIN TIME: CPT

## 2023-10-10 PROCEDURE — 82962 GLUCOSE BLOOD TEST: CPT

## 2023-10-10 PROCEDURE — 7100000010 HC PHASE II RECOVERY - FIRST 15 MIN: Performed by: PHYSICAL MEDICINE & REHABILITATION

## 2023-10-10 PROCEDURE — 3600000012 HC SURGERY LEVEL 2 ADDTL 15MIN: Performed by: PHYSICAL MEDICINE & REHABILITATION

## 2023-10-10 RX ORDER — LIDOCAINE HYDROCHLORIDE 20 MG/ML
10 INJECTION, SOLUTION EPIDURAL; INFILTRATION; INTRACAUDAL; PERINEURAL ONCE
Status: COMPLETED | OUTPATIENT
Start: 2023-10-10 | End: 2023-10-10

## 2023-10-10 RX ORDER — DEXAMETHASONE SODIUM PHOSPHATE 4 MG/ML
20 INJECTION, SOLUTION INTRA-ARTICULAR; INTRALESIONAL; INTRAMUSCULAR; INTRAVENOUS; SOFT TISSUE ONCE
Status: COMPLETED | OUTPATIENT
Start: 2023-10-10 | End: 2023-10-10

## 2023-10-10 RX ORDER — BUPIVACAINE HYDROCHLORIDE 5 MG/ML
10 INJECTION, SOLUTION EPIDURAL; INTRACAUDAL ONCE
Status: DISCONTINUED | OUTPATIENT
Start: 2023-10-10 | End: 2023-10-10 | Stop reason: HOSPADM

## 2023-10-10 ASSESSMENT — PAIN - FUNCTIONAL ASSESSMENT: PAIN_FUNCTIONAL_ASSESSMENT: 0-10

## 2023-10-10 NOTE — H&P
Procedural Case Note    10/10/2023    (1:14 PM)    Hossein Low    1940   (78 y.o.)    [id number]    CC:  pain    ROS:   Complete ROS obtained, no CP, no SOB, no N or V    PMH:     Past Medical History:   Diagnosis Date    Anxiety     Aortic valve stenosis 07/2022    Arthritis     Asthma     CAD (coronary artery disease)     stent    Chronic anticoagulation 11/23/2022    Chronic obstructive pulmonary disease (HCC)     Chronic pain     Depression with anxiety 11/23/2022    Diabetes ([address])     GERD (gastroesophageal reflux disease)     H/O aortic valve replacement 07/2022    Hepatitis B     1960's    History of blood transfusion     06/22    History of DVT (deep vein thrombosis)     History of recurrent UTIs     Hx of seasonal allergies     Hypercholesterolemia     Irritable bowel syndrome (IBS)     Migraine     Noncompliance 01/18/2023    Oxygen dependent     PRN at 2LPM [place]    Psychiatric disorder     anxiety    Thromboembolus ([address])     left leg    Thyroid disease     pt denies- not tx for it    Urinary urgency        ALLERGIES:     Allergies   Allergen Reactions    Bee Venom Shortness Of Breath           Iodinated Contrast Media Anaphylaxis and Other (See Comments)     Passes out    Penicillins Anaphylaxis and Shortness Of Breath     Other reaction(s): anaphylaxis/angioedema        Sulfa Antibiotics Anaphylaxis, Shortness Of Breath and Rash     Other reaction(s): anaphylaxis/angioedema        Ranitidine Hcl Nausea Only    Iodine     Montelukast Hallucinations           Tree Extract Itching     Cat dander, grass        Cefdinir Rash     Other reaction(s): mild rash/itching        Codeine Itching     Other reaction(s): other/intolerance  Dizziness. Pt states they take Benadryl to offset the reaction.     Famotidine Nausea And Vomiting    Jardiance [Empagliflozin] Rash    Morphine Itching and Other (See Comments)     Agitation,hallucinations         MEDS:     Current Facility-Administered Medications

## 2023-10-10 NOTE — PERIOP NOTE
.equal
Asuncion Colunga  1940  [id number]    Situation:  Verbal report given from: Trisha Schultz RN  Procedure: Procedure(s):  BILATERAL L4 TRANSFORAMINAL EPIDURAL STEROID INJECTION WITH LOCAL (NO CONTRAST)    Background:    Preoperative diagnosis: Osteoarthritis of spine with radiculopathy, lumbar region [M47.26]  Lumbar disc disease with radiculopathy [M51.16]    Postoperative diagnosis: * No post-op diagnosis entered *    :  Dr. Gavino Jaeger    Assistant(s): Circulator: Frank Servin RN  Surgical Assistant: Manuela Mckinney  Scrub Person First: Angel Bain  Circulator Assist: Ariel Villanueva RN    Specimens: * No specimens in log *    Assessment:  Intra-procedure medications         Anesthesia gave intra-procedure sedation and medications, see anesthesia flow sheet     Intravenous fluids: LR@ KVO     Vital signs stable
Patient received to PACU, VSS. Patient awake and alert with no complaints of pain. Injection site intact. Neuro:  Push/Pull assessment:     LLE Response: strong push/pull   RLE Response: strong push/pull    Discharge instructions given. Patient verbalized understanding of instructions and follow up. Patient states ready for discharge - patient discharged at this time by wheelchair with all belongings.  [employer] to provide transportation home.
deodorants after showering. 7. Wear comfortable clothes. Wear glasses instead of contacts. Do not bring any jewelry or money (other than copays or fees as instructed). Do not wear make-up, particularly mascara, the morning of your procedure. Wear your hair loose or down, no ponytails, buns, may pins or clips. All body piercings must be removed. 8. You should understand that if you do not follow these instructions your procedure may be cancelled. If your physical condition changes (i.e. fever, cold or flu) please contact your surgeon as soon as possible. 9. It is important that you be on time. If a situation occurs where you may be late, or if you have any questions or problems, please call [phone].    10. Your procedure time may be subject to change. You will receive a phone call the day prior to confirm your arrival time. I understand a pre-operative phone call will be made to verify my procedure time. In the event that I am not available, I give permission for a message to be left on my answering service and/or with another person?       yes         ___________________      ___________________      ___________________  (Signature of Patient)          (Witness)                   (Date and Time)

## 2023-10-10 NOTE — OP NOTE
Operative Note      Patient: Francisco Dodge  YOB: 1940  MRN: [id number]    Date of Procedure: 10/10/2023  Epidural Steroid Injection Operative Report    Indications: This is a 78 y.o. female who presents with low back pain and radiculopathy. She was positive for LS DDD with radiculopathy. The patient was admitted for spinal intervention as conservative measures have failed. Preoperative Diagnosis: LS DDD with Radiculopathy    Postoperative Diagnosis: LS DDD with Radiculopathy    Surgeon(s) and Role:     * Lorena Randolph MD - Primary     Procedure:  Procedure(s):  BILATERAL L4 TRANSFORAMINAL EPIDURAL STEROID INJECTION WITH LOCAL (NO CONTRAST)    Procedure in Detail:  After appropriate informed consent was obtained, the patient was taken to the operating suite and placed in the prone position on the operating table on appropriate padding. The LS region was prepped and draped in the usual sterile fashion. Intraoperative fluoroscopy was used to localize the LS spine. The skin was infiltrated with 2% lidocaine. A 25-g needle was advanced into the Perez L4 neuroforamen under fluoroscopic guidance. A small amount of contrast was injected into the epidural space, confirming appropriate needle placement on fluoroscopy. Permanent fluoroscopic images were saved in the patient's record. Next, 2 ml of 2% lidocaine and 20 mg of Dexamethasone were injected. The needle was removed from the patient. The patient was then turned back into the supine position on the stretcher and was taken to the Recovery Room in stable condition.     Estimated Blood Loss:  none     Specimens: None       Drains: None          Complications:  None    Signed By: Scooter Reyes MD                        October 10, 2023        Electronically signed by Scooter Reyes MD on 10/10/2023 at 1:39 PM

## 2023-10-10 NOTE — DISCHARGE INSTRUCTIONS
Dr. Will Hernandez Discharge Instructions  Transforaminal Epidural Steroid Injection/ Selective Nerve Block    You had a transforaminal epidural steroid injection/ selective nerve block today. You will probably have some numbness, and possibly weakness, in your leg for the next 6 to 8 hours. The steroids will slowly become effective, reducing your pain, over the next 2 weeks. You should begin feeling better after a few days, but it may take up to 2 weeks to notice the difference. The benefit you get from your injection will last a variable amount of time, depending on the severity of your lumbar spine problem. Pain: Most people do not have any increase in pain after this injection. However, you might experience some soreness in your low back. If this happens, putting an ice pack over the sore area will help. Bandage: You will have a small bandage covering the site of the injection. You may remove it once you get home. Restrictions: Someone should drive you home after the injection. After that, you have no restrictions. You need to be careful while walking, as you may still have some numbness or weakness in your leg. You may resume your normal level of activity. You may take a shower or bath, and you may eat normally. You should continue your current exercises and/or therapy routine. Medications: Continue your current medications as prescribed. If your pain decreases, you may reduce the amount of your pain medicines. If you stopped taking anticoagulants or blood-thinners before the injection, start them tomorrow. If you have diabetes, your blood sugar may be elevated for a few days. Call your primary doctor with any questions.   Call Dr. Will Hernandez at [phone] if you experience:  Fever (101 degrees Fahrenheit or greater)  Nausea or vomiting  Headache unrelieved by your normal pain medicine  Redness or swelling at the injection site that lasts more than 1 day  New numbness, tingling, weakness, or pain that you

## 2023-10-11 ENCOUNTER — TELEPHONE (OUTPATIENT)
Facility: CLINIC | Age: 83
End: 2023-10-11

## 2023-10-11 NOTE — TELEPHONE ENCOUNTER
JAYW called pt to inform of [facility] NP appointment for Start of Care on 11/2/23. No answer. LCSW left voicemail message, provided contact information, and encouraged a call back at her convenience.      STEVEN Garcia, [facility]  Licensed Clinical   [address] [place] [address] [facility] [place]  (Y) [phone]  (X) [phone]

## 2023-10-14 ENCOUNTER — HOME CARE VISIT (OUTPATIENT)
Dept: HOME HEALTH SERVICES | Facility: HOME HEALTH | Age: 83
End: 2023-10-14
Payer: MEDICARE

## 2023-10-16 ENCOUNTER — HOME CARE VISIT (OUTPATIENT)
Facility: HOME HEALTH | Age: 83
End: 2023-10-16
Payer: MEDICARE

## 2023-10-16 VITALS
SYSTOLIC BLOOD PRESSURE: 110 MMHG | DIASTOLIC BLOOD PRESSURE: 56 MMHG | HEART RATE: 65 BPM | OXYGEN SATURATION: 95 % | TEMPERATURE: 98.5 F | RESPIRATION RATE: 16 BRPM

## 2023-10-16 PROCEDURE — G0151 HHCP-SERV OF PT,EA 15 MIN: HCPCS

## 2023-10-16 ASSESSMENT — ENCOUNTER SYMPTOMS: PAIN LOCATION - PAIN QUALITY: DEEP ACHE

## 2023-10-17 ENCOUNTER — OFFICE VISIT (OUTPATIENT)
Age: 83
End: 2023-10-17
Payer: MEDICARE

## 2023-10-17 VITALS
HEIGHT: 61 IN | DIASTOLIC BLOOD PRESSURE: 63 MMHG | HEART RATE: 63 BPM | SYSTOLIC BLOOD PRESSURE: 136 MMHG | TEMPERATURE: 98.2 F | RESPIRATION RATE: 18 BRPM | BODY MASS INDEX: 25.45 KG/M2 | OXYGEN SATURATION: 94 % | WEIGHT: 134.8 LBS

## 2023-10-17 DIAGNOSIS — Z95.2 PRESENCE OF PROSTHETIC HEART VALVE: ICD-10-CM

## 2023-10-17 DIAGNOSIS — N18.31 TYPE 2 DIABETES MELLITUS WITH STAGE 3A CHRONIC KIDNEY DISEASE, WITHOUT LONG-TERM CURRENT USE OF INSULIN (HCC): ICD-10-CM

## 2023-10-17 DIAGNOSIS — E78.5 HYPERLIPIDEMIA, UNSPECIFIED HYPERLIPIDEMIA TYPE: ICD-10-CM

## 2023-10-17 DIAGNOSIS — F11.99 OPIOID USE, UNSPECIFIED WITH UNSPECIFIED OPIOID-INDUCED DISORDER (HCC): ICD-10-CM

## 2023-10-17 DIAGNOSIS — D64.9 ANEMIA, UNSPECIFIED TYPE: ICD-10-CM

## 2023-10-17 DIAGNOSIS — Z79.899 LONG-TERM USE OF HIGH-RISK MEDICATION: ICD-10-CM

## 2023-10-17 DIAGNOSIS — D68.9 COAGULATION DEFECT, UNSPECIFIED (HCC): ICD-10-CM

## 2023-10-17 DIAGNOSIS — E11.22 TYPE 2 DIABETES MELLITUS WITH STAGE 3A CHRONIC KIDNEY DISEASE, WITHOUT LONG-TERM CURRENT USE OF INSULIN (HCC): ICD-10-CM

## 2023-10-17 DIAGNOSIS — R19.7 DIARRHEA OF PRESUMED INFECTIOUS ORIGIN: ICD-10-CM

## 2023-10-17 DIAGNOSIS — J41.0 SIMPLE CHRONIC BRONCHITIS (HCC): ICD-10-CM

## 2023-10-17 DIAGNOSIS — Z79.01 LONG TERM (CURRENT) USE OF ANTICOAGULANTS: Primary | ICD-10-CM

## 2023-10-17 LAB
POC INR: 3.1
PROTHROMBIN TIME, POC: ABNORMAL

## 2023-10-17 PROCEDURE — 3023F SPIROM DOC REV: CPT | Performed by: FAMILY MEDICINE

## 2023-10-17 PROCEDURE — G8484 FLU IMMUNIZE NO ADMIN: HCPCS | Performed by: FAMILY MEDICINE

## 2023-10-17 PROCEDURE — 85610 PROTHROMBIN TIME: CPT | Performed by: FAMILY MEDICINE

## 2023-10-17 PROCEDURE — 1090F PRES/ABSN URINE INCON ASSESS: CPT | Performed by: FAMILY MEDICINE

## 2023-10-17 PROCEDURE — G8400 PT W/DXA NO RESULTS DOC: HCPCS | Performed by: FAMILY MEDICINE

## 2023-10-17 PROCEDURE — G8419 CALC BMI OUT NRM PARAM NOF/U: HCPCS | Performed by: FAMILY MEDICINE

## 2023-10-17 PROCEDURE — 3051F HG A1C>EQUAL 7.0%<8.0%: CPT | Performed by: FAMILY MEDICINE

## 2023-10-17 PROCEDURE — 3075F SYST BP GE 130 - 139MM HG: CPT | Performed by: FAMILY MEDICINE

## 2023-10-17 PROCEDURE — 1123F ACP DISCUSS/DSCN MKR DOCD: CPT | Performed by: FAMILY MEDICINE

## 2023-10-17 PROCEDURE — 3078F DIAST BP <80 MM HG: CPT | Performed by: FAMILY MEDICINE

## 2023-10-17 PROCEDURE — 99215 OFFICE O/P EST HI 40 MIN: CPT | Performed by: FAMILY MEDICINE

## 2023-10-17 PROCEDURE — 1036F TOBACCO NON-USER: CPT | Performed by: FAMILY MEDICINE

## 2023-10-17 PROCEDURE — G8427 DOCREV CUR MEDS BY ELIG CLIN: HCPCS | Performed by: FAMILY MEDICINE

## 2023-10-17 PROCEDURE — PBSHW AMB POC PT/INR: Performed by: FAMILY MEDICINE

## 2023-10-17 RX ORDER — WARFARIN SODIUM 2 MG/1
TABLET ORAL
Qty: 100 TABLET | Refills: 1
Start: 2023-10-17

## 2023-10-17 NOTE — TELEPHONE ENCOUNTER
Last appointment: today  Next appointment: 11/7/23  Previous refill encounter(s): 9/5/22 #60 with 2 refills    Requested Prescriptions     Pending Prescriptions Disp Refills    nystatin (MYCOSTATIN) [id number] UNIT/ML suspension 60 mL 2     Sig: SWISH 5ML FOR 30 SECONDS AND SWALLOW FOUR TIMES DAILY AS NEEDED FOR MOUTH PAIN         For Pharmacy Admin Tracking Only    Program: Medication Refill  CPA in place:    Recommendation Provided To:    Intervention Detail: New Rx: 1, reason: Patient Preference  Intervention Accepted By:   Jonnathan De La Rosa Closed?:    Time Spent (min): 5

## 2023-10-17 NOTE — PROGRESS NOTES
Chief Complaint   Patient presents with    Anticoagulation       1. Have you been to the ER, urgent care clinic since your last visit? Hospitalized since your last visit? No    2. Have you seen or consulted any other health care providers outside of the [address] [place] [address] Avenue since your last visit? Include any pap smears or colon screening.  No

## 2023-10-17 NOTE — PROGRESS NOTES
Eileen Pulido is a 78 y.o. female    Chronic noncompliant to f/up, INR monitoring. Have a lot of NO Shows w me. She have made apt to a different PCP    She finally bought her meds here   I've tried to cut down her unnecessary meds but she's resistant to stopping meds. She haven't done my labs since 02/2023       has a past medical history of Anxiety, Aortic valve stenosis, Arthritis, Asthma, CAD (coronary artery disease), Chronic anticoagulation, Chronic obstructive pulmonary disease ([address]), Chronic pain, Depression with anxiety, Diabetes ([address]), GERD (gastroesophageal reflux disease), H/O aortic valve replacement, Hepatitis B, History of blood transfusion, History of DVT (deep vein thrombosis), History of recurrent UTIs, Hx of seasonal allergies, Hypercholesterolemia, Irritable bowel syndrome (IBS), Migraine, Noncompliance, Oxygen dependent, Psychiatric disorder, Thromboembolus ([address]), Thyroid disease, and Urinary urgency. Assessment/Plans:    [employer] was seen today for anticoagulation. Diagnoses and all orders for this visit:    Long term (current) use of anticoagulants  -     AMB POC PT/INR  -     warfarin (COUMADIN) 2 MG tablet; 2mg every day  -     CBC; Future  -     Comprehensive Metabolic Panel; Future  -     TSH; Future  -     Lipid Panel; Future  -     Hemoglobin A1C; Future  -     Microalbumin / Creatinine Urine Ratio; Future  -     Urinalysis with Reflex to Culture; Future    Presence of prosthetic heart valve  -     warfarin (COUMADIN) 2 MG tablet; 2mg every day    Simple chronic bronchitis (HCC)    Opioid use, unspecified with unspecified opioid-induced disorder (HCC)    Coagulation defect, unspecified ([address])    Type 2 diabetes mellitus with stage 3a chronic kidney disease, without long-term current use of insulin (HCC)  -     CBC; Future  -     Comprehensive Metabolic Panel; Future  -     TSH; Future  -     Lipid Panel;  Future  -     Hemoglobin A1C; Future  -     Microalbumin / Creatinine

## 2023-10-17 NOTE — HOME HEALTH
Subjective: \" I am struggling because my back is hurting a lot\"  Falls since last visit no falls  Caregiver involvement changes: NA  Home health supplies by type and quantity ordered/delivered this visit include: NA    Clinician asked if patient has had any physician contact since last home care visit and patient states: yes  Clinician asked if patient has any new or changed medications and patient states:  no  If Yes, were medications reconciled? yes  Was the certifying physician notified of changes in medications? NA    Clinical assessment (what this visit means for the patient overall and need for ongoing skilled care) and progress or lack of progress towards SPECIFIC goals: The Pt had her back proceedure last wednesday and she was not up for PT last week and the PT saw her today and she stated that her back is really hurting and they told her it could take up two weeks for the proceedure to take effect. The pt struggled with gait today because of that and she also was a bit more SOB today. She did not have any associated ankle swelling or weight gain so it could be due to fatigue because she was very tired over the weekend. The PT is going to progress the pt to resistance band training for B LE strengthening and is going to work on stairs in the hallway as well for emergency when the elevators cannot be used. The Pt is concerned about the pt's persistent diarrhea and the headaches which have become more ferquent. PT tried to call the MD office to discuss with PCP but the phone rang and no one ever answered and there is no voicemail available. Written Teaching Material Utilized: CUe Sheet for HEP  Interdisciplinary communication with: Attempted to call Dr. Mary Barkley to get help with diarrhea    Discharge planning as follows:  Is no longer homebound, Per physician order, Will discharge when the patient has reached their maximum functional potential and maximum safety in their home and When goals are met    Specific

## 2023-10-18 ENCOUNTER — HOME CARE VISIT (OUTPATIENT)
Facility: HOME HEALTH | Age: 83
End: 2023-10-18
Payer: MEDICARE

## 2023-10-18 VITALS
TEMPERATURE: 98.2 F | SYSTOLIC BLOOD PRESSURE: 118 MMHG | RESPIRATION RATE: 17 BRPM | OXYGEN SATURATION: 95 % | HEART RATE: 77 BPM | DIASTOLIC BLOOD PRESSURE: 70 MMHG

## 2023-10-18 DIAGNOSIS — N32.89 IRRITABLE BLADDER: ICD-10-CM

## 2023-10-18 PROCEDURE — G0151 HHCP-SERV OF PT,EA 15 MIN: HCPCS

## 2023-10-18 ASSESSMENT — ENCOUNTER SYMPTOMS: PAIN LOCATION - PAIN QUALITY: ACHE, SHARP

## 2023-10-18 NOTE — TELEPHONE ENCOUNTER
Patient and [facility] OT want to know if patient should stop Potassium, not on med list.

## 2023-10-19 ENCOUNTER — HOME CARE VISIT (OUTPATIENT)
Dept: HOME HEALTH SERVICES | Facility: HOME HEALTH | Age: 83
End: 2023-10-19
Payer: MEDICARE

## 2023-10-19 ENCOUNTER — TELEPHONE (OUTPATIENT)
Age: 83
End: 2023-10-19

## 2023-10-19 RX ORDER — OXYBUTYNIN CHLORIDE 5 MG/1
5 TABLET, EXTENDED RELEASE ORAL DAILY
Qty: 90 TABLET | Refills: 1 | OUTPATIENT
Start: 2023-10-19

## 2023-10-19 NOTE — TELEPHONE ENCOUNTER
She saw urologist recently. For her to call them. She haven't done labs for me since 02/2023  I can't answer about potassium. If not on med list then should not take.      amrit Garces MD    10/19/2023

## 2023-10-19 NOTE — CASE COMMUNICATION
Dr. Nicole Becker,  I called your office today and asked to speak with the nurse about some concerning medication interactions on MRs. Dasilva's medication list. They are all marked as major and are listed as follows: Warfarin and aspirin  nystatin and warfarin  escitalopram and amitriptyline  duloxetine and escitalopram  Buspirone, duloxetine, amitrptyline and escitalopram  warfarin and celebrex  warfarin and Butalbital-acetaminophen-caffeine   duloxetine and amitriptyline  all of these are listed as MAJOR interactions. We are required to notify the PCP when there are major drug interactions noted on the patient's medication list. The pt is very concerned about taking so many medications that interact with each other. Please let us know how you would like to handle this situation.  Thank you so much  Nazanin Mendosa DPT

## 2023-10-19 NOTE — TELEPHONE ENCOUNTER
[facility]       Reports more than one medication has interactions with patients other medications    Pls advise   Best number to reach her is [phone]

## 2023-10-19 NOTE — HOME HEALTH
Subjective: \" I am so frustrated about being sick all the time and taking so much medication\"  Falls since last visit : no falls  Caregiver involvement changes: NA  Home health supplies by type and quantity ordered/delivered this visit include: NA    Clinician asked if patient has had any physician contact since last home care visit and patient states: yes  Clinician asked if patient has any new or changed medications and patient states:  yes  If Yes, were medications reconciled? yes  Was the certifying physician notified of changes in medications? YEs PT called the PCP about medication concerns and doses but has not yet gotten a call about medication interactions    Clinical assessment (what this visit means for the patient overall and need for ongoing skilled care) and progress or lack of progress towards SPECIFIC goals: The Pt saw her PCP yesterday and her INR is coming down and is in the 3's. she was to skip last night's dose and then 2 mg of coumadin daily. The Pt was taken of metformin and put on januvia. The Pt was able to work with the PT on balance drills today both static and dynamic with A needed from PT To maintain balance with  posterior perturbations. PT will be sending a message to PCP about concerns related to interactions between some of her medications that could lead to serotonin sndrome. PT will continue to address balance and gait moving  forward and progress to restistance band training as the pt builds up her tolerance to body weight exercises. Written Teaching Material Utilized: AVS from PCP with new medications list    Interdisciplinary communication with: with PCPas aboave    Discharge planning as follows:  Is no longer homebound, Per physician order, Will discharge when the patient has reached their maximum functional potential and maximum safety in their home and When goals are met    Specific plan for next visit: Re-instruct patient/caregiver on HEP, gait, transfers, balance

## 2023-10-23 ENCOUNTER — HOME CARE VISIT (OUTPATIENT)
Facility: HOME HEALTH | Age: 83
End: 2023-10-23
Payer: MEDICARE

## 2023-10-23 PROCEDURE — G0151 HHCP-SERV OF PT,EA 15 MIN: HCPCS

## 2023-10-23 ASSESSMENT — ENCOUNTER SYMPTOMS: PAIN LOCATION - PAIN QUALITY: ACHE

## 2023-10-23 NOTE — TELEPHONE ENCOUNTER
Spoke with Demian Peña. She wanted to make sure CC messages received by Dr. Mery Marrero and if any changes needed.

## 2023-10-25 ENCOUNTER — HOME CARE VISIT (OUTPATIENT)
Facility: HOME HEALTH | Age: 83
End: 2023-10-25
Payer: MEDICARE

## 2023-10-25 VITALS
HEART RATE: 68 BPM | TEMPERATURE: 97.8 F | OXYGEN SATURATION: 94 % | SYSTOLIC BLOOD PRESSURE: 112 MMHG | DIASTOLIC BLOOD PRESSURE: 60 MMHG | RESPIRATION RATE: 17 BRPM

## 2023-10-25 DIAGNOSIS — G43.909 MIGRAINE WITHOUT STATUS MIGRAINOSUS, NOT INTRACTABLE, UNSPECIFIED MIGRAINE TYPE: ICD-10-CM

## 2023-10-25 PROCEDURE — G0151 HHCP-SERV OF PT,EA 15 MIN: HCPCS

## 2023-10-25 ASSESSMENT — ENCOUNTER SYMPTOMS: PAIN LOCATION - PAIN QUALITY: ACHE/SORE

## 2023-10-26 RX ORDER — LEVETIRACETAM 500 MG/1
500 TABLET ORAL EVERY EVENING
Qty: 90 TABLET | Refills: 0 | Status: SHIPPED | OUTPATIENT
Start: 2023-10-26

## 2023-10-26 NOTE — TELEPHONE ENCOUNTER
Per pharmacy, patient is requesting a 90 d/s    Requested Prescriptions     Pending Prescriptions Disp Refills    levETIRAcetam (KEPPRA) 500 MG tablet [Pharmacy Med Name: LEVETIRACETAM 500MG TABLETS] 90 tablet 0     Sig: TAKE 1 TABLET BY MOUTH EVERY EVENING         For Pharmacy Admin Tracking Only    Program: Medication Refill  CPA in place:    Recommendation Provided To:    Intervention Detail: New Rx: 1, reason: Patient Preference  Intervention Accepted By:   Ethel Roa Closed?:    Time Spent (min): 5

## 2023-10-26 NOTE — HOME HEALTH
Subjective: \" I am struggling with this back pain\"  Falls since last visit : No  Caregiver involvement changes: NA  Home health supplies by type and quantity ordered/delivered this visit include: NA    Clinician asked if patient has had any physician contact since last home care visit and patient states: no  Clinician asked if patient has any new or changed medications and patient states:  no  If Yes, were medications reconciled? yes  Was the certifying physician notified of changes in medications? NA    Clinical assessment (what this visit means for the patient overall and need for ongoing skilled care) and progress or lack of progress towards SPECIFIC goals: The Pt is still struggling with back pain as the proceedure did no seem to work. The PT instructed the cg she had spone with carlos manuel the nurse in galilea's office about the pt taking so many medications for depression and also the severe interaction on the mdication list that the PT sent the MD in CC last week and she said that he was trying to wean her off some of those medications but had not yet initiated that and pt was not to make any changes at this time. The PT did instruct the pt that she cannot just stop those medications cold turkey and needs to be weaned off. The Pt is walking with slightly improved posture and less cues for posture but still needs to get that walker closer to her. The pt needs more rest breaks today because her back hurts. The PT suggested tshe call the orthopedic and talk to them  Written Teaching Material Utilized:HEP Cue sheet    Interdisciplinary communication with: Shama Alcala in PCP office about medication interactions listed as major or severe    Discharge planning as follows:  Is no longer homebound, Per physician order, Will discharge when the patient has reached their maximum functional potential and maximum safety in their home and When goals are met    Specific plan for next visit: Re-instruct patient/caregiver on HEP, gait,

## 2023-10-30 ENCOUNTER — SOCIAL WORK (OUTPATIENT)
Facility: CLINIC | Age: 83
End: 2023-10-30

## 2023-10-30 ENCOUNTER — OFFICE VISIT (OUTPATIENT)
Facility: CLINIC | Age: 83
End: 2023-10-30

## 2023-10-30 ENCOUNTER — TELEPHONE (OUTPATIENT)
Facility: CLINIC | Age: 83
End: 2023-10-30

## 2023-10-30 DIAGNOSIS — Z76.89 ENCOUNTER FOR SOCIAL WORK INTERVENTION: Primary | ICD-10-CM

## 2023-10-30 NOTE — PROGRESS NOTES
psychosocial needs and social determinants of health completed. SW introduced Home Based Primary Care and Supportive Services and reviewed Emergency Action Plan booklet. Pt is a retired . 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 She obtained her MSW when she was 46years old. Pt was  to her , Hemanth Balbuena, for 54 years. She states that he was a  and covered the [employer]. She reminisced about parties and social gatherings she was able to attend with Presidents in attendance. She talked about having a conversation with Padmaja Solo, and spilling a drink on [address]. Her   in 2020. They have 3 children- daughter Kimberly lives in [place], son José Miguel Leonard lives in [facility], and son Vicky Stuart lives in [address] [place] [address]. Pt has 5 grandchildren, one with special needs. Pt shared that she worked from the age of [age] [facility] [employer] [facility], until she retired at age 78. She said she enjoyed working, and had several different jobs/careers. When she retired from [employer], she became a . 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 Pt says she feels socially isolated. She had a strong support system in her community in [place] [facility] She said she is on \"3 depression meds\" but does not know if she needs to be on the medications. She is looking forward to meeting [facility] team on Thursday, and having her medications looked at and changed if necessary. LCSW to remain available for support and resources as needed. LCSW will follow up with pt in 1 month.      Plan:   [x] Establish therapeutic relationship through in home visits and telephonic touch points  [] Assist in optimizing levels of psychosocial function  [] Assess safety concerns and address as appropriate  [] Assess for caregiver burden and intervene as psychosocially indicated  [] Assess patient for caregiver needs to optimize psychosocial function and safety  []

## 2023-10-30 NOTE — TELEPHONE ENCOUNTER
Called patient to confirm their in home visit with Pearl Davidson on 11/2/23, arrival between 9-1. Spoke with son, they confirmed the appointment and answered the covid screen questions.  Does anyone in the household have covid no  Have there been any changes to insurance no or location no

## 2023-10-31 ENCOUNTER — HOME CARE VISIT (OUTPATIENT)
Facility: HOME HEALTH | Age: 83
End: 2023-10-31
Payer: MEDICARE

## 2023-10-31 VITALS
SYSTOLIC BLOOD PRESSURE: 105 MMHG | TEMPERATURE: 98 F | DIASTOLIC BLOOD PRESSURE: 65 MMHG | HEART RATE: 89 BPM | RESPIRATION RATE: 18 BRPM | OXYGEN SATURATION: 93 %

## 2023-10-31 PROCEDURE — G0151 HHCP-SERV OF PT,EA 15 MIN: HCPCS

## 2023-10-31 SDOH — ECONOMIC STABILITY: TRANSPORTATION INSECURITY
IN THE PAST 12 MONTHS, HAS LACK OF TRANSPORTATION KEPT YOU FROM MEETINGS, WORK, OR FROM GETTING THINGS NEEDED FOR DAILY LIVING?: NO

## 2023-10-31 SDOH — ECONOMIC STABILITY: FOOD INSECURITY: WITHIN THE PAST 12 MONTHS, YOU WORRIED THAT YOUR FOOD WOULD RUN OUT BEFORE YOU GOT MONEY TO BUY MORE.: NEVER TRUE

## 2023-10-31 SDOH — ECONOMIC STABILITY: FOOD INSECURITY: WITHIN THE PAST 12 MONTHS, THE FOOD YOU BOUGHT JUST DIDN'T LAST AND YOU DIDN'T HAVE MONEY TO GET MORE.: NEVER TRUE

## 2023-10-31 SDOH — ECONOMIC STABILITY: INCOME INSECURITY: IN THE LAST 12 MONTHS, WAS THERE A TIME WHEN YOU WERE NOT ABLE TO PAY THE MORTGAGE OR RENT ON TIME?: NO

## 2023-10-31 SDOH — HEALTH STABILITY: PHYSICAL HEALTH: ON AVERAGE, HOW MANY DAYS PER WEEK DO YOU ENGAGE IN MODERATE TO STRENUOUS EXERCISE (LIKE A BRISK WALK)?: 0 DAYS

## 2023-10-31 SDOH — HEALTH STABILITY: PHYSICAL HEALTH: ON AVERAGE, HOW MANY MINUTES DO YOU ENGAGE IN EXERCISE AT THIS LEVEL?: 0 MIN

## 2023-10-31 SDOH — ECONOMIC STABILITY: HOUSING INSECURITY: IN THE LAST 12 MONTHS, HOW MANY PLACES HAVE YOU LIVED?: 2

## 2023-10-31 SDOH — ECONOMIC STABILITY: INCOME INSECURITY: HOW HARD IS IT FOR YOU TO PAY FOR THE VERY BASICS LIKE FOOD, HOUSING, MEDICAL CARE, AND HEATING?: NOT HARD AT ALL

## 2023-10-31 SDOH — ECONOMIC STABILITY: TRANSPORTATION INSECURITY
IN THE PAST 12 MONTHS, HAS THE LACK OF TRANSPORTATION KEPT YOU FROM MEDICAL APPOINTMENTS OR FROM GETTING MEDICATIONS?: NO

## 2023-10-31 NOTE — HOME HEALTH
portable concentrator at the end of the session. Discharge planning as follows: Will discharge when the patient has reached their maximum functional potential and maximum safety in their home    Specific plan for next visit: Progress Gait training, balance training, and strengthening for plan of care to improve overall safety.

## 2023-11-01 DIAGNOSIS — N18.31 TYPE 2 DIABETES MELLITUS WITH STAGE 3A CHRONIC KIDNEY DISEASE, WITHOUT LONG-TERM CURRENT USE OF INSULIN (HCC): ICD-10-CM

## 2023-11-01 DIAGNOSIS — E78.5 HYPERLIPIDEMIA, UNSPECIFIED HYPERLIPIDEMIA TYPE: ICD-10-CM

## 2023-11-01 DIAGNOSIS — I10 PRIMARY HYPERTENSION: ICD-10-CM

## 2023-11-01 DIAGNOSIS — I10 PRIMARY HYPERTENSION: Primary | ICD-10-CM

## 2023-11-01 DIAGNOSIS — E11.22 TYPE 2 DIABETES MELLITUS WITH STAGE 3A CHRONIC KIDNEY DISEASE, WITHOUT LONG-TERM CURRENT USE OF INSULIN (HCC): ICD-10-CM

## 2023-11-02 ENCOUNTER — OFFICE VISIT (OUTPATIENT)
Facility: CLINIC | Age: 83
End: 2023-11-02

## 2023-11-02 ENCOUNTER — TELEPHONE (OUTPATIENT)
Facility: CLINIC | Age: 83
End: 2023-11-02

## 2023-11-02 VITALS
SYSTOLIC BLOOD PRESSURE: 112 MMHG | OXYGEN SATURATION: 90 % | RESPIRATION RATE: 18 BRPM | DIASTOLIC BLOOD PRESSURE: 70 MMHG | HEART RATE: 94 BPM

## 2023-11-02 DIAGNOSIS — E03.8 SUBCLINICAL HYPOTHYROIDISM: ICD-10-CM

## 2023-11-02 DIAGNOSIS — G43.109 MIGRAINE WITH AURA AND WITHOUT STATUS MIGRAINOSUS, NOT INTRACTABLE: ICD-10-CM

## 2023-11-02 DIAGNOSIS — N32.81 OAB (OVERACTIVE BLADDER): ICD-10-CM

## 2023-11-02 DIAGNOSIS — Z76.89 ENCOUNTER TO ESTABLISH CARE: Primary | ICD-10-CM

## 2023-11-02 DIAGNOSIS — I10 PRIMARY HYPERTENSION: ICD-10-CM

## 2023-11-02 DIAGNOSIS — J84.10 PULMONARY FIBROSIS (HCC): ICD-10-CM

## 2023-11-02 DIAGNOSIS — E11.42 TYPE 2 DIABETES MELLITUS WITH DIABETIC POLYNEUROPATHY, WITHOUT LONG-TERM CURRENT USE OF INSULIN (HCC): ICD-10-CM

## 2023-11-02 DIAGNOSIS — Z86.718 HISTORY OF DVT (DEEP VEIN THROMBOSIS): ICD-10-CM

## 2023-11-02 DIAGNOSIS — Z95.2 H/O AORTIC VALVE REPLACEMENT: ICD-10-CM

## 2023-11-02 DIAGNOSIS — D50.8 OTHER IRON DEFICIENCY ANEMIA: ICD-10-CM

## 2023-11-02 DIAGNOSIS — F41.8 DEPRESSION WITH ANXIETY: ICD-10-CM

## 2023-11-02 DIAGNOSIS — I25.10 CORONARY ARTERY DISEASE INVOLVING NATIVE CORONARY ARTERY OF NATIVE HEART WITHOUT ANGINA PECTORIS: ICD-10-CM

## 2023-11-02 DIAGNOSIS — K21.9 GASTROESOPHAGEAL REFLUX DISEASE WITHOUT ESOPHAGITIS: ICD-10-CM

## 2023-11-02 DIAGNOSIS — F03.A0 MILD DEMENTIA WITHOUT BEHAVIORAL DISTURBANCE, PSYCHOTIC DISTURBANCE, MOOD DISTURBANCE, OR ANXIETY, UNSPECIFIED DEMENTIA TYPE (HCC): ICD-10-CM

## 2023-11-02 DIAGNOSIS — G47.01 INSOMNIA DUE TO MEDICAL CONDITION: ICD-10-CM

## 2023-11-02 PROBLEM — E03.9 HYPOTHYROIDISM: Status: ACTIVE | Noted: 2023-11-02

## 2023-11-02 PROBLEM — E03.9 HYPOTHYROIDISM: Status: RESOLVED | Noted: 2023-11-02 | Resolved: 2023-11-02

## 2023-11-02 PROBLEM — J44.9 COPD (CHRONIC OBSTRUCTIVE PULMONARY DISEASE) (HCC): Status: ACTIVE | Noted: 2017-06-07

## 2023-11-02 PROBLEM — I50.32 CHRONIC HEART FAILURE WITH PRESERVED EJECTION FRACTION (HCC): Status: ACTIVE | Noted: 2022-11-10

## 2023-11-02 PROBLEM — J96.11 CHRONIC RESPIRATORY FAILURE WITH HYPOXIA (HCC): Status: ACTIVE | Noted: 2023-11-02

## 2023-11-02 PROBLEM — G89.29 CHRONIC NONINTRACTABLE HEADACHE: Status: RESOLVED | Noted: 2023-10-03 | Resolved: 2023-11-02

## 2023-11-02 PROBLEM — Z91.199 NONCOMPLIANCE: Status: RESOLVED | Noted: 2023-01-18 | Resolved: 2023-11-02

## 2023-11-02 PROBLEM — M81.0 OSTEOPOROSIS: Status: RESOLVED | Noted: 2021-08-12 | Resolved: 2023-11-02

## 2023-11-02 PROBLEM — J84.9 INTERSTITIAL LUNG DISEASE (HCC): Status: ACTIVE | Noted: 2023-11-02

## 2023-11-02 PROBLEM — F33.9 RECURRENT MAJOR DEPRESSIVE DISORDER (HCC): Status: RESOLVED | Noted: 2023-08-10 | Resolved: 2023-11-02

## 2023-11-02 PROBLEM — E78.5 DYSLIPIDEMIA: Status: ACTIVE | Noted: 2023-11-02

## 2023-11-02 PROBLEM — I73.9 PVD (PERIPHERAL VASCULAR DISEASE) (HCC): Status: ACTIVE | Noted: 2023-11-02

## 2023-11-02 PROBLEM — D50.9 IRON DEFICIENCY ANEMIA: Status: ACTIVE | Noted: 2020-10-08

## 2023-11-02 PROBLEM — M81.0 OSTEOPOROSIS: Status: ACTIVE | Noted: 2021-08-12

## 2023-11-02 PROBLEM — F33.9 RECURRENT MAJOR DEPRESSIVE DISORDER (HCC): Status: ACTIVE | Noted: 2023-08-10

## 2023-11-02 PROBLEM — R33.9 RETENTION OF URINE: Status: ACTIVE | Noted: 2023-07-31

## 2023-11-02 PROBLEM — M19.90 OSTEOARTHROSIS: Status: ACTIVE | Noted: 2021-01-15

## 2023-11-02 PROBLEM — F11.99 OPIOID USE, UNSPECIFIED WITH UNSPECIFIED OPIOID-INDUCED DISORDER (HCC): Status: RESOLVED | Noted: 2021-12-03 | Resolved: 2023-11-02

## 2023-11-02 PROBLEM — R51.9 CHRONIC NONINTRACTABLE HEADACHE: Status: RESOLVED | Noted: 2023-10-03 | Resolved: 2023-11-02

## 2023-11-02 PROBLEM — D68.59 PROTEIN C DEFICIENCY (HCC): Status: ACTIVE | Noted: 2020-10-07

## 2023-11-02 RX ORDER — LEVOCETIRIZINE DIHYDROCHLORIDE 5 MG/1
5 TABLET, FILM COATED ORAL DAILY
Qty: 30 TABLET | Refills: 5 | Status: SHIPPED | OUTPATIENT
Start: 2023-11-02

## 2023-11-02 RX ORDER — EZETIMIBE 10 MG/1
10 TABLET ORAL DAILY
Qty: 90 TABLET | Refills: 1 | Status: SHIPPED | OUTPATIENT
Start: 2023-11-02

## 2023-11-02 RX ORDER — NEOMYCIN SULFATE, POLYMYXIN B SULFATE, AND DEXAMETHASONE 3.5; 10000; 1 MG/G; [USP'U]/G; MG/G
OINTMENT OPHTHALMIC
COMMUNITY
Start: 2023-10-25

## 2023-11-02 RX ORDER — BUSPIRONE HYDROCHLORIDE 15 MG/1
30 TABLET ORAL 2 TIMES DAILY PRN
Qty: 30 TABLET | Refills: 1 | Status: SHIPPED | OUTPATIENT
Start: 2023-11-02

## 2023-11-02 RX ORDER — WARFARIN SODIUM 2 MG/1
TABLET ORAL
Qty: 100 TABLET | Refills: 1 | Status: SHIPPED | OUTPATIENT
Start: 2023-11-02

## 2023-11-02 RX ORDER — METFORMIN HYDROCHLORIDE 500 MG/1
500 TABLET, EXTENDED RELEASE ORAL
COMMUNITY

## 2023-11-02 RX ORDER — TRAZODONE HYDROCHLORIDE 100 MG/1
100 TABLET ORAL NIGHTLY
Qty: 90 TABLET | Refills: 1 | Status: SHIPPED | OUTPATIENT
Start: 2023-11-02

## 2023-11-02 RX ORDER — FLUTICASONE PROPIONATE 50 MCG
2 SPRAY, SUSPENSION (ML) NASAL DAILY PRN
Qty: 16 G | Refills: 2 | Status: SHIPPED | OUTPATIENT
Start: 2023-11-02

## 2023-11-02 RX ORDER — LEVETIRACETAM 500 MG/1
500 TABLET ORAL
Qty: 90 TABLET | Refills: 1 | Status: SHIPPED | OUTPATIENT
Start: 2023-11-02

## 2023-11-02 RX ORDER — QUETIAPINE FUMARATE 50 MG/1
50 TABLET, FILM COATED ORAL NIGHTLY
Qty: 90 TABLET | Refills: 1 | Status: SHIPPED | OUTPATIENT
Start: 2023-11-02

## 2023-11-02 RX ORDER — BUTALBITAL, ACETAMINOPHEN AND CAFFEINE 50; 325; 40 MG/1; MG/1; MG/1
1 TABLET ORAL EVERY 6 HOURS PRN
Qty: 15 TABLET | Refills: 0 | Status: SHIPPED | OUTPATIENT
Start: 2023-11-02

## 2023-11-02 RX ORDER — ONDANSETRON 4 MG/1
4 TABLET, FILM COATED ORAL EVERY 6 HOURS PRN
Qty: 30 TABLET | Refills: 0 | Status: CANCELLED | OUTPATIENT
Start: 2023-11-02

## 2023-11-02 RX ORDER — AMITRIPTYLINE HYDROCHLORIDE 10 MG/1
10 TABLET, FILM COATED ORAL NIGHTLY
Qty: 90 TABLET | Refills: 1 | Status: SHIPPED | OUTPATIENT
Start: 2023-11-02

## 2023-11-02 RX ORDER — CELECOXIB 200 MG/1
CAPSULE ORAL
Qty: 60 CAPSULE | Refills: 5 | Status: CANCELLED | OUTPATIENT
Start: 2023-11-02

## 2023-11-02 RX ORDER — LEVETIRACETAM 750 MG/1
750 TABLET ORAL EVERY EVENING
Qty: 90 TABLET | Refills: 1 | Status: SHIPPED | OUTPATIENT
Start: 2023-11-02

## 2023-11-02 RX ORDER — ATORVASTATIN CALCIUM 20 MG/1
20 TABLET, FILM COATED ORAL DAILY
Qty: 90 TABLET | Refills: 1 | Status: SHIPPED | OUTPATIENT
Start: 2023-11-02

## 2023-11-02 RX ORDER — OXYBUTYNIN CHLORIDE 5 MG/1
5 TABLET, EXTENDED RELEASE ORAL DAILY
Qty: 90 TABLET | Refills: 1 | Status: CANCELLED | OUTPATIENT
Start: 2023-11-02

## 2023-11-02 RX ORDER — DULOXETIN HYDROCHLORIDE 60 MG/1
60 CAPSULE, DELAYED RELEASE ORAL DAILY
Qty: 90 CAPSULE | Refills: 1 | Status: SHIPPED | OUTPATIENT
Start: 2023-11-02

## 2023-11-02 RX ORDER — ESCITALOPRAM OXALATE 20 MG/1
20 TABLET ORAL EVERY MORNING
Qty: 90 TABLET | Refills: 1 | Status: SHIPPED | OUTPATIENT
Start: 2023-11-02

## 2023-11-02 RX ORDER — MENTHOL 40 MG/ML
GEL TOPICAL
COMMUNITY

## 2023-11-02 RX ORDER — DICYCLOMINE HYDROCHLORIDE 10 MG/1
10 CAPSULE ORAL 2 TIMES DAILY PRN
Qty: 120 CAPSULE | Refills: 1 | Status: SHIPPED | OUTPATIENT
Start: 2023-11-02

## 2023-11-02 RX ORDER — ACETAMINOPHEN 500 MG
1 TABLET ORAL DAILY
Qty: 90 TABLET | Refills: 1 | Status: SHIPPED | OUTPATIENT
Start: 2023-11-02

## 2023-11-02 RX ORDER — METOPROLOL SUCCINATE 50 MG/1
50 TABLET, EXTENDED RELEASE ORAL DAILY
Qty: 90 TABLET | Refills: 1 | Status: SHIPPED | OUTPATIENT
Start: 2023-11-02

## 2023-11-02 RX ORDER — ALBUTEROL SULFATE 90 UG/1
2 AEROSOL, METERED RESPIRATORY (INHALATION) DAILY PRN
Qty: 18 G | Refills: 2 | Status: SHIPPED | OUTPATIENT
Start: 2023-11-02

## 2023-11-02 RX ORDER — VIBEGRON 75 MG/1
75 TABLET, FILM COATED ORAL
Qty: 30 TABLET | Refills: 5 | Status: CANCELLED | OUTPATIENT
Start: 2023-11-02

## 2023-11-02 RX ORDER — TRAZODONE HYDROCHLORIDE 100 MG/1
100 TABLET ORAL NIGHTLY
COMMUNITY
End: 2023-11-02 | Stop reason: SDUPTHER

## 2023-11-02 RX ORDER — ONDANSETRON 4 MG/1
4 TABLET, FILM COATED ORAL EVERY 6 HOURS PRN
COMMUNITY

## 2023-11-02 RX ORDER — FLUTICASONE PROPIONATE AND SALMETEROL 100; 50 UG/1; UG/1
1 POWDER RESPIRATORY (INHALATION) EVERY 12 HOURS
COMMUNITY

## 2023-11-02 NOTE — ASSESSMENT & PLAN NOTE
A1C 7.3% on 7/3/23. Per note review recently discontinued Januvia in favor of restarting Metformin 500mg daily, but not yet obtained. New Rx sent today, discussed potential GI side-effects.   Currently taking Lyrica 50mg BID and Duloxetine 60mg daily for neuropathy

## 2023-11-02 NOTE — ASSESSMENT & PLAN NOTE
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 Currently managed on warfarin and aspirin 81m, also with history of DVT and protein C deficiency. Followed by Cardiologist Dr. Yi Gil (will request records). INR obtained today.

## 2023-11-02 NOTE — TELEPHONE ENCOUNTER
The patient called to find out about how to move her scripts over to Yarely Casey, JONY.  I let her know that the NP will send in all of the patient's scripts.  The patient said that she needs refills on:    Loperamide, 2 mgs 1 cap as needed    Bentyl (dicyclomine, 10 mgs 4x/per day    The patient also said that she would call her pharmacy to let them know not to call her former PCP for the refills.  The patient's CB# if needed is [phone]

## 2023-11-02 NOTE — ASSESSMENT & PLAN NOTE
Previously followed by neurology,but has not re-established since moving to [facility]. Rreviously treated with Midrin. Currently managed on Keppra 500mg morning, 750mg evening. Has also used PRN Fioricet.

## 2023-11-02 NOTE — ASSESSMENT & PLAN NOTE
Per med list, is taking Lexapro 20 mg daily but this was not found in home. Primarily endorses anxiety at this time- currently managed on amitriptyline 10 mg at bedtime, BuSpar 15mg twice daily prn and Seroquel 50 mg at bedtime.   Has been on Seroquel long term- hx of hallucinations only with hospital delirium or s/t medications

## 2023-11-02 NOTE — ASSESSMENT & PLAN NOTE
Previously managed with oxybutynin and Myrbetriq. Recently established urologist Dr. Eva Graham with last visit 9/18/23. Per note, Myrbetriq was too expensive so started trial of Gemtesa, continued oxybutynin. Recommended f/u for possible Botox or InterStim discussion.  Denies UTI symptoms, retention but endorses ongoing urinary frequency

## 2023-11-02 NOTE — ASSESSMENT & PLAN NOTE
Patient and son endorse memory issues and known dementia, but she has not done extensive cognitive testing. Discussed re-establishing with neurologist for her history of migraines and for further evaluation, but she has some negative associations due to a history with her  and dementia. Has some difficulty relaying medications, health history, and is very distractible during visit.   Will complete MMSE at next visit and discuss neurology recommendations

## 2023-11-02 NOTE — ASSESSMENT & PLAN NOTE
With chronic resp failure on 2L PRN at baseline, using Luli. Hospitalization 7/2023 for respiratory failure. Reports being on Advair/Wixela but none in home. She is using PRN Albuterol. Followed by PAR with Dr Crystal Bacon (will request records). 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

## 2023-11-02 NOTE — ASSESSMENT & PLAN NOTE
s/p PCI to the LCX in June 2022- Echo 7/2023 with EF 60-65%. Managed on coumadin for aortic valve replacement and history of DVT. Managed on atorvastatin and zetia for HLD with LDL goal <70.   Followed by Dr. Debbie Lucas, cardiologist, with annual visits- last 12/2022

## 2023-11-02 NOTE — PROGRESS NOTES
[address] [facility]  [phone]      Date of Current Visit: 11/2/2023     Víctor Ledbetter is a 78 y.o. female seen in the home today due to taxing effort to seek primary care services in the community s/t cognitive impairment, frailty, mobility limitations    Patient/Family present for Current Visit:  patient, son Adan Washington     1. Encounter to establish care    2. Coronary artery disease involving native coronary artery of native heart without angina pectoris    3. Primary hypertension    4. Pulmonary fibrosis ([address])    5. Type 2 diabetes mellitus with diabetic polyneuropathy, without long-term current use of insulin ([address])    6. Migraine with aura and without status migrainosus, not intractable    7. Insomnia due to medical condition    8. Depression with anxiety    9. Other iron deficiency anemia    10. History of DVT (deep vein thrombosis)    11. Subclinical hypothyroidism    12. Gastroesophageal reflux disease without esophagitis    13. Mild dementia without behavioral disturbance, psychotic disturbance, mood disturbance, or anxiety, unspecified dementia type ([address])    14. OAB (overactive bladder)    15. H/O aortic valve replacement       1. Encounter to establish care  2. Coronary artery disease involving native coronary artery of native heart without angina pectoris  s/p PCI to the LCX in June 2022- Echo 7/2023 with EF 60-65%. Managed on coumadin for history of DVT, atorvastatin and zetia for HLD. 3. Primary hypertension   BP at goal on current regimen of metoprolol and Verapamil. CBC, CMP repeated today   4. Pulmonary fibrosis ([address])  With chronic resp failure on 2L PRN at baseline, using Luli. Hospitalization 7/2023 for respiratory failure. Previously on Advair/Wixela but none found in home? She is using albuterol as needed. Followed by PAR with Dr Nicolas Tompkins (will request records).    Chest CT 7/23 significant for minimal bibasilar atelectasis,

## 2023-11-02 NOTE — ASSESSMENT & PLAN NOTE
Severe iron deficiency 7/2023-Iron 11, ferritin 15, Hgb 7.9. Received IV iron x 3 during hospitalizaiton and Hgb improved to 8.5 by 7/28. Discharged on ferrous sulfate 325 but has stopped due to GI side-effects. Discussed Slo-Iron and she agrees to trial, sent to pharmacy today. Denies recent bleeding events. CBC, Iron, Ferritin repeated today. Referred to hematology 2/2021 but has not yet gone, referred to GI several times most recently 10/2023 but has not yet gone.

## 2023-11-02 NOTE — ASSESSMENT & PLAN NOTE
Endorses daily heartburn symptoms. Currently managed on esomeprazole- endorses taking PRN and often after meals, reviewed best efficacy with use prior to any food.

## 2023-11-03 ENCOUNTER — TELEPHONE (OUTPATIENT)
Facility: CLINIC | Age: 83
End: 2023-11-03

## 2023-11-03 ENCOUNTER — HOME CARE VISIT (OUTPATIENT)
Facility: HOME HEALTH | Age: 83
End: 2023-11-03
Payer: MEDICARE

## 2023-11-03 ENCOUNTER — CLINICAL DOCUMENTATION (OUTPATIENT)
Facility: CLINIC | Age: 83
End: 2023-11-03

## 2023-11-03 VITALS
RESPIRATION RATE: 16 BRPM | BODY MASS INDEX: 23.81 KG/M2 | OXYGEN SATURATION: 97 % | HEART RATE: 91 BPM | SYSTOLIC BLOOD PRESSURE: 118 MMHG | TEMPERATURE: 97.2 F | DIASTOLIC BLOOD PRESSURE: 20 MMHG | WEIGHT: 126 LBS

## 2023-11-03 DIAGNOSIS — D68.9 COAGULOPATHY (HCC): Primary | ICD-10-CM

## 2023-11-03 DIAGNOSIS — D68.9 COAGULOPATHY (HCC): ICD-10-CM

## 2023-11-03 LAB
ALBUMIN SERPL-MCNC: 3.5 G/DL (ref 3.5–5)
ALBUMIN/GLOB SERPL: 1.1 (ref 1.1–2.2)
ALP SERPL-CCNC: 108 U/L (ref 45–117)
ALT SERPL-CCNC: 50 U/L (ref 12–78)
ANION GAP SERPL CALC-SCNC: 7 MMOL/L (ref 5–15)
AST SERPL-CCNC: 18 U/L (ref 15–37)
BILIRUB SERPL-MCNC: 0.4 MG/DL (ref 0.2–1)
BUN SERPL-MCNC: 20 MG/DL (ref 6–20)
BUN/CREAT SERPL: 29 (ref 12–20)
CALCIUM SERPL-MCNC: 8.6 MG/DL (ref 8.5–10.1)
CHLORIDE SERPL-SCNC: 106 MMOL/L (ref 97–108)
CHOLEST SERPL-MCNC: 103 MG/DL
CO2 SERPL-SCNC: 25 MMOL/L (ref 21–32)
CREAT SERPL-MCNC: 0.7 MG/DL (ref 0.55–1.02)
ERYTHROCYTE [DISTWIDTH] IN BLOOD BY AUTOMATED COUNT: 17.2 % (ref 11.5–14.5)
EST. AVERAGE GLUCOSE BLD GHB EST-MCNC: 157 MG/DL
GLOBULIN SER CALC-MCNC: 3.3 G/DL (ref 2–4)
GLUCOSE SERPL-MCNC: 152 MG/DL (ref 65–100)
HBA1C MFR BLD: 7.1 % (ref 4–5.6)
HCT VFR BLD AUTO: 43.9 % (ref 35–47)
HDLC SERPL-MCNC: 49 MG/DL
HDLC SERPL: 2.1 (ref 0–5)
HGB BLD-MCNC: 13.3 G/DL (ref 11.5–16)
IRON SATN MFR SERPL: 6 % (ref 20–50)
IRON SERPL-MCNC: 27 UG/DL (ref 35–150)
LDLC SERPL CALC-MCNC: 36 MG/DL (ref 0–100)
MCH RBC QN AUTO: 26 PG (ref 26–34)
MCHC RBC AUTO-ENTMCNC: 30.3 G/DL (ref 30–36.5)
MCV RBC AUTO: 85.9 FL (ref 80–99)
NRBC # BLD: 0 K/UL (ref 0–0.01)
NRBC BLD-RTO: 0 PER 100 WBC
PLATELET # BLD AUTO: 127 K/UL (ref 150–400)
POTASSIUM SERPL-SCNC: 4.1 MMOL/L (ref 3.5–5.1)
PROT SERPL-MCNC: 6.8 G/DL (ref 6.4–8.2)
RBC # BLD AUTO: 5.11 M/UL (ref 3.8–5.2)
SODIUM SERPL-SCNC: 138 MMOL/L (ref 136–145)
T4 FREE SERPL-MCNC: 0.8 NG/DL (ref 0.8–1.5)
TIBC SERPL-MCNC: 436 UG/DL (ref 250–450)
TRIGL SERPL-MCNC: 90 MG/DL
TSH SERPL DL<=0.05 MIU/L-ACNC: 4.36 UIU/ML (ref 0.36–3.74)
VLDLC SERPL CALC-MCNC: 18 MG/DL
WBC # BLD AUTO: 6.6 K/UL (ref 3.6–11)

## 2023-11-03 PROCEDURE — G0157 HHC PT ASSISTANT EA 15: HCPCS

## 2023-11-03 RX ORDER — FLUTICASONE PROPIONATE AND SALMETEROL 250; 50 UG/1; UG/1
1 POWDER RESPIRATORY (INHALATION) DAILY
Qty: 60 EACH | Refills: 3 | Status: CANCELLED | OUTPATIENT
Start: 2023-11-03

## 2023-11-03 RX ORDER — BLOOD-GLUCOSE SENSOR
EACH MISCELLANEOUS
Qty: 6 EACH | Refills: 1 | Status: SHIPPED | OUTPATIENT
Start: 2023-11-03

## 2023-11-03 ASSESSMENT — ENCOUNTER SYMPTOMS: PAIN LOCATION - PAIN QUALITY: STIFFNESS

## 2023-11-03 NOTE — TELEPHONE ENCOUNTER
Telephone call to patient to advise that lab called and INR tube did not have enough blood. Appt made for this nurse to visit pt on Tue 11/7 for repeat INR. Discussed that provider is ok with ordering MD/INR machine for patient if that is what she wants. Pt states that she has done INR monitoring for  3 years ago and feels she can do a weekly fingerstick for INR. Pt requests that this nurse call son Chu De La Garza and discuss with him. Telephone call to Chu De La Garza, described that pt could either go to [facility] for INR to be drawn or we can order an INR machine for home use. Advised that pt would like an INR machine but wanted son to participate in this decision as he may need to back her up with fingerstick/ testing. Discussed that the INR company will send someone to teach pt how to use devise but son would need to be present for that instruction as a back up to patient. He is in agreement with INR machine and willing to be present for training in INR machine use.

## 2023-11-03 NOTE — PROGRESS NOTES
Joint home visit with Hiram Cowden NP to establish PCP relationship. Met with patient and son Cyndee Llanos. S - I have pain in my lumbar area level 8 out of 10 just about constantly. O - Pt was asleep when this nurse arrived and took a long time to respond to the door. After ambulation she was not SOB. She has moderate hearing impairment and wears hearing aids. She ambulates steadily with rollator. Pt described an \"event\" that happened last evening in which she had \"super\" dizziness and her heart was pounding. Son describes this as panic attack. Pt states she took her anxiety medication and it slowly improved. Pt acknowledges being under severe financial pressures due to insurance mixup. Venipuncture x 1 in right wrist, blood sample obtained for CBC, CMP, INR, iron, TSH, T4, lipid panel and A1c. Pt tolerated procedure well. Labs taken to [facility]. Pt complained of allergy to Prilosec which caused nausea and vomiting. She has been on Nexium for quite a while with no ill effects. She uses a Gunjan to monitor her BS daily, BS this AM is 156 fasting. Pt has oxygen concentrator in home and uses O2 at 2 LPM via nasal cannula PRN SOB. A - /70 P 94 R 18  Pulse ox 90%    P - Med refills pended to provider. Oxybutynin and Gemtesa is prescribed by urologist Dr. Neeta Garrido, Dr. Chata Peraza prescribes Reyna Gambino (generic advair).

## 2023-11-03 NOTE — HOME HEALTH
Subjective:  I have a new concentrator. My old one broke and they came and replaced it. Falls since last visit No(if yes complete the Fall Tracking Form and include bsrifallreport):   Caregiver involvement changes: none  Home health supplies by type and quantity ordered/delivered this visit include: na    Clinician asked if patient has had any physician contact since last home care visit and patient states: NO  Clinician asked if patient has any new or changed medications and patient states:  NO   If Yes, were medications reconciled? N/A reviewed  Was the certifying physician notified of changes in medications? N/A     Clinical assessment (what this visit means for the patient overall and need for ongoing skilled care) and progress or lack of progress towards SPECIFIC goals: Pt with acute and chronic resp. failure with SpO2 monitored and activity and at rest. Pt with sats remaining wnl at all times. INstructed in breathing and energy conservation. Pt with gait training and strengthening to improve safety with mobility. HEP in written form in place. Continued treatment required to progress. Written Teaching Material Utilized: Pt with HEP with written copy in place with pt able to demonstrate home program with minimal cueing for safety and posture. Cues for forward gaze and breathing and to rests as needed. Interdisciplinary communication with:  PT for the purpose of POC collaboration    Discharge planning as follows: Is no longer homebound, Per physician order, Will discharge when the patient has reached their maximum functional potential and maximum safety in their home and When goals are met    Specific plan for next visit: PT to follow.

## 2023-11-03 NOTE — PROGRESS NOTES
chewable tablet Take 1 tablet by mouth daily Take one tablet daily    vibegron (GEMTESA) 75 MG TABS tablet Take 1 tablet by mouth daily Take 1 tablet by mouth every day as directed    oxybutynin (DITROPAN XL) 5 MG extended release tablet Take 1 tablet by mouth daily    acetaminophen (TYLENOL) 325 MG tablet Take 1-2 tablets by mouth every 6 hours as needed for Pain take for pain 1-5/10 on pain scale    loperamide (IMODIUM) 2 MG capsule TAKE 1 CAPSULE BY MOUTH FOUR TIMES DAILY AS NEEDED FOR DIARRHEA    pregabalin (LYRICA) 50 MG capsule Take 1 capsule by mouth 2 times daily. No current facility-administered medications for this visit. PLAN OF CARE    The Plan of Care is initiated within 15 days of the initial provider visit and updated at least every 60 days or sooner, based on patient's changing condition. Plan of Care Orders / Action Items:  Refer to most recent provider visit note for specifics re: plan of care.       Estimated Visit Frequency:  Monthly   visits PRN

## 2023-11-06 ENCOUNTER — HOME CARE VISIT (OUTPATIENT)
Facility: HOME HEALTH | Age: 83
End: 2023-11-06
Payer: MEDICARE

## 2023-11-06 ENCOUNTER — TELEPHONE (OUTPATIENT)
Facility: CLINIC | Age: 83
End: 2023-11-06

## 2023-11-06 PROCEDURE — G0151 HHCP-SERV OF PT,EA 15 MIN: HCPCS

## 2023-11-06 NOTE — TELEPHONE ENCOUNTER
Malia Monzon is a PT with [facility]. She called to find out if there have been any medication changes for the patient. Her CB# [phone].

## 2023-11-07 ENCOUNTER — OFFICE VISIT (OUTPATIENT)
Facility: CLINIC | Age: 83
End: 2023-11-07

## 2023-11-07 VITALS
SYSTOLIC BLOOD PRESSURE: 106 MMHG | OXYGEN SATURATION: 94 % | HEART RATE: 78 BPM | DIASTOLIC BLOOD PRESSURE: 70 MMHG | TEMPERATURE: 98.3 F | RESPIRATION RATE: 17 BRPM

## 2023-11-07 DIAGNOSIS — D68.9 COAGULOPATHY (HCC): Primary | ICD-10-CM

## 2023-11-07 LAB
INR PPP: 1.5 (ref 0.9–1.1)
PROTHROMBIN TIME: 15.5 SEC (ref 9–11.1)

## 2023-11-07 ASSESSMENT — ENCOUNTER SYMPTOMS: PAIN LOCATION - PAIN QUALITY: ACHE

## 2023-11-07 NOTE — TELEPHONE ENCOUNTER
Telephone call returned to [facility] PT with KARLA SAAVEDRA [facility]. Advised that medication list in Epic is as accurate as we were able to make it on the first visit with patient. [facility] says she will try to help pt fill a pill box tomorrow with her visit. Advised that provider plans to begin to de-prescribe some of her meds on the next planned visit 12/1.

## 2023-11-07 NOTE — HOME HEALTH
Subjective: \" I amd doing fair. I am not sleeping between getting up to urinate and coughing all the time\"  Falls since last visit : no falls  Caregiver involvement changes:NA  Home health supplies by type and quantity ordered/delivered this visit include: NA    Clinician asked if patient has had any physician contact since last home care visit and patient states: yes  Clinician asked if patient has any new or changed medications and patient states:no  If Yes, were medications reconciled? yes  Was the certifying physician notified of changes in medications? NA    Clinical assessment (what this visit means for the patient overall and need for ongoing skilled care) and progress or lack of progress towards SPECIFIC goals: The Pt is struggling with sleep for the last few nights due to congestion and the need to urinate all the time. THe Pt was able to complete her standing HEP as well as balance drills of side stepping along the counter. The Pt has been doing her HEP and the PT will be moving the pt to resistance band training at the next visit to further increase the pt's strength. The PT has instructed the pt to get up and walk more than she has been doing and continue her HEP as well. The PT posted a note on the pt's fridge to remind her to walk daily and do her HEP 2x/day minimum. She stated her understanding    Written Teaching Material Utilized: reminder notes on fridge    Interdisciplinary communication with:PT called the nurse April to go over the medications and any changes   Discharge planning as follows:  Is no longer homebound, Per physician order, Will discharge when the patient has reached their maximum functional potential and maximum safety in their home and When goals are met    Specific plan for next visit: Re-instruct patient/caregiver on HEP, gait , Educate in s/s of worsening condition/SOB and when to call [facility], MD or 911 and Instruct in safety issues regarding Fire hazards with oxygen and Proper use

## 2023-11-08 ENCOUNTER — HOME CARE VISIT (OUTPATIENT)
Facility: HOME HEALTH | Age: 83
End: 2023-11-08
Payer: MEDICARE

## 2023-11-08 ENCOUNTER — TELEPHONE (OUTPATIENT)
Facility: CLINIC | Age: 83
End: 2023-11-08

## 2023-11-08 VITALS — OXYGEN SATURATION: 84 %

## 2023-11-08 PROCEDURE — G0151 HHCP-SERV OF PT,EA 15 MIN: HCPCS

## 2023-11-08 NOTE — TELEPHONE ENCOUNTER
----- Message from VALORIE Crawford NP sent at 11/8/2023 10:02 AM EST -----  INR is below goal of 2-3, please increase to 4mg on Sun and Wed, continue 2mg on all other days.

## 2023-11-08 NOTE — TELEPHONE ENCOUNTER
Patient returned call and was advised that her INR is 1.5 and provider would like for her to increase her coumadin to 4 mg on Wednesdays and Sundays, take 2 mg all other days and repeat INR in two weeks. Advised that [facility] INR machine was ordered today and to make sure to answer call from that company. If machine is still not in place in 2 weeks this nurse may be able to help her once more with INR draw. Requested that she keep us updated on status of machine and training visit. Pt wrote down instructions for her new coumadin dosage and was able to read back to this nurse correctly. She states she found her hearing aid in her bed covers yesterday. She also stated that son Sonal Gutierrez was present during this conversation. Pt reports that ortho injection has not worked for her level 10 LS pain. She is requesting rx for tramadol from Mathieu Loredo NP. Advised that it would be best for pt to make her request to ortho as they are working with her on this pain problem. Pt states that ortho will not give her tramadol and it is the only thing that helps.   Advised I will let Isabel Mendoza know of her request.

## 2023-11-08 NOTE — PROGRESS NOTES
Home visit for repeat INR. S - I have lost my left hearing aid, I have been crawling around on the floor for the last 2 hours looking for it. O - Pt alert and oriented x 4. However she does not recall a conversation she had with this nurse last week about ordering a MD/INR machine and calling her son Kim Romo to discuss him being a back up for her with machine. Pt is in agreement with MD/INR machine for home use, advised that I spoke with son who is willing to attend the education on the machine and be her backup for using it. Pt requested that this nurse change her DexCom Sensor. She says it is due to be changed today and Kim Romo will not be over to see her before the weekend. Dex Com changed to right deltoid area. Old site also on right arm was without redness or tenderness. Venipuncture x 1 right wrist, blood sample obtained for INR. Pt tolerated well. Pt denies blood in urine or stool. A - Pulse ox 84% after pt has been crawling around on floor for 2 hours looking for her hearing aid. No SOB or wheezing noted. Assisted pt to apply nasal O2 at 2 LPM and encouraged her to stop search. P - Update provider.

## 2023-11-09 ENCOUNTER — TELEPHONE (OUTPATIENT)
Facility: CLINIC | Age: 83
End: 2023-11-09

## 2023-11-09 VITALS — DIASTOLIC BLOOD PRESSURE: 70 MMHG | HEART RATE: 76 BPM | OXYGEN SATURATION: 96 % | SYSTOLIC BLOOD PRESSURE: 102 MMHG

## 2023-11-09 RX ORDER — WARFARIN SODIUM 2 MG/1
2 TABLET ORAL DAILY
Qty: 100 TABLET | Refills: 1 | Status: SHIPPED | OUTPATIENT
Start: 2023-11-09

## 2023-11-09 ASSESSMENT — ENCOUNTER SYMPTOMS
DYSPNEA ACTIVITY LEVEL: AFTER AMBULATING 10 - 20 FT
PAIN LOCATION - PAIN QUALITY: ACHE

## 2023-11-09 NOTE — HOME HEALTH
her O2. She wears her O2 at night but does not wear it consistently during the day unless she feels SOB. The PT will need to continue to address balance, strengthening, endurance with the pt moving forward to make her safer at home and decrease risk for falls    Written Teaching Material Utilized: HEP cue sheet, medication list    Interdisciplinary communication with: JUDE Rapp for home based primary care for medication review and POC  Discharge planning as follows:  Is no longer homebound, Per physician order, Will discharge when the patient has reached their maximum functional potential and maximum safety in their home and When goals are met    Specific plan for next visit: Re-instruct patient/caregiver on HEP gait, endurance training, Educate in s/s of worsening condition, SOB and when to call [facility], MD or 911 and Instruct in safety issues regarding Fire hazards with oxygen and Proper use of assistive device

## 2023-11-09 NOTE — TELEPHONE ENCOUNTER
Ursula called from [facility] to update Yarely Casey NP that she received the patient's order and will updat on results of order.  Ursula expressed thanks.  Her callback # [phone], ext [phone], if needed.

## 2023-11-10 NOTE — TELEPHONE ENCOUNTER
Call returned to patient at 4:02PM.  Discussed concerns with Tramadol as she already has several high-risk medications and has a history of recent falls including two fractures. She endorses falling often. Discussed that best recommendation would be to reach out to ortho for alternative options even if this does not include Tramadol. She stated understanding. Discussed that her medications and symptoms will be an ongoing discussion that we will work together to address. She is in agreement.      She also noted that she has been coughing for about two weeks and has a virtual follow-up with Dr. Rm Mccain tomorrow

## 2023-11-13 ENCOUNTER — HOME CARE VISIT (OUTPATIENT)
Facility: HOME HEALTH | Age: 83
End: 2023-11-13
Payer: MEDICARE

## 2023-11-13 PROCEDURE — G0151 HHCP-SERV OF PT,EA 15 MIN: HCPCS

## 2023-11-14 VITALS
OXYGEN SATURATION: 93 % | DIASTOLIC BLOOD PRESSURE: 77 MMHG | RESPIRATION RATE: 16 BRPM | HEART RATE: 63 BPM | TEMPERATURE: 98.6 F | SYSTOLIC BLOOD PRESSURE: 134 MMHG

## 2023-11-14 ASSESSMENT — ENCOUNTER SYMPTOMS: PAIN LOCATION - PAIN QUALITY: ACHE, THROBBING

## 2023-11-14 NOTE — HOME HEALTH
Subjective: \" I am having a lot of pain in my back today\"  Falls since last visit no falls  Caregiver involvement changes: NA  Home health supplies by type and quantity ordered/delivered this visit include: NA    Clinician asked if patient has had any physician contact since last home care visit and patient states: no  Clinician asked if patient has any new or changed medications and patient states:  no  If Yes, were medications reconciled? yes  Was the certifying physician notified of changes in medications? NA    Clinical assessment (what this visit means for the patient overall and need for ongoing skilled care) and progress or lack of progress towards SPECIFIC goals: The Pt is working on her HEP but her back pain has been getting worse and she has more trouble with the HEP due to the pain. The Pt is working hard on trying to be more mobile in the apartment and when family is present walking down the roper. The PT has instructed the pt that she needs to be checking her O2 sats before walking and then pause at least once to check the O2 sats during walking and if it is dropping then sit down on the rollator to rest and if it is still above 92% she can continue walking back to her home and then check again when she sits down inside the house. The pt can work on walking inside the apartment but the same rules apply that she needs to be checking her O2 saturation levels. The pt is making slow progress on ambulation endurance because her O2 levels are dropping when she is active. PT and pt will be checking th oxygen saturation more closely during HEP and  gait as low oxygen saturation levels increase her chance of falling  Written Teaching Material Utilized:NICK    Interdisciplinary communication with: NICK    Discharge planning as follows:  Is no longer homebound, Per physician order, Will discharge when the patient has reached their maximum functional potential and maximum safety in their home and When goals are

## 2023-11-15 ENCOUNTER — HOME CARE VISIT (OUTPATIENT)
Facility: HOME HEALTH | Age: 83
End: 2023-11-15
Payer: MEDICARE

## 2023-11-15 VITALS
DIASTOLIC BLOOD PRESSURE: 70 MMHG | OXYGEN SATURATION: 82 % | HEART RATE: 77 BPM | RESPIRATION RATE: 17 BRPM | TEMPERATURE: 97.5 F | SYSTOLIC BLOOD PRESSURE: 104 MMHG

## 2023-11-15 PROCEDURE — G0151 HHCP-SERV OF PT,EA 15 MIN: HCPCS

## 2023-11-15 ASSESSMENT — ENCOUNTER SYMPTOMS: PAIN LOCATION - PAIN QUALITY: ACHE

## 2023-11-15 NOTE — HOME HEALTH
Subjective: \" My vision is giving me trouble and I am going to the eye doctor on friday\"  Falls since last visit: No falls  Caregiver involvement changes: NA  Home health supplies by type and quantity ordered/delivered this visit include: NA  Clinician asked if patient has had any physician contact since last home care visit and patient states: no  Clinician asked if patient has any new or changed medications and patient states:  {no  If Yes, were medications reconciled? yes  Was the certifying physician notified of changes in medications? NA    Clinical assessment (what this visit means for the patient overall and need for ongoing skilled care) and progress or lack of progress towards SPECIFIC goals:   THe Pt is experiencing some black spots in her vision the last week or so and has an appt with eye doctor on friday. The pt also had her MD call in an antibiotic for all the coughing at night but the pt's son could not get it and so she asked [facility] to deliver but they use [employer] and so it will not be here for another two days. The PT offered to pick it up but whitney stated [employer] had already gotten it. The pt was instructed to keep her O2 on and monitor saturation levels until she gets her antibiotic and if she feels SOB to go to the ER for further evaluation. The pt was having issues with O2 saturations dropping even with O2 on so the PT did not walk with the pt in the roper or complete stair training today. She completed her HEP in sitting and standing with rest breaks and close checks on O2 . Balance drills were also addressed now that the pt is using O2 more and she needs to be aware of the cord and how to navigat that with the walker so as not to get tangled up and fall. The PT Worked with the pt on stepping over it, rolling over it with walker, holding it up so that she would not trip when walking or turning.  PT suggested she call the O2 company and get a green cord so it will be easier to see on the

## 2023-11-20 ENCOUNTER — TELEPHONE (OUTPATIENT)
Facility: CLINIC | Age: 83
End: 2023-11-20

## 2023-11-20 ENCOUNTER — HOME CARE VISIT (OUTPATIENT)
Facility: HOME HEALTH | Age: 83
End: 2023-11-20
Payer: MEDICARE

## 2023-11-20 NOTE — TELEPHONE ENCOUNTER
Telephone call returned by patient. She reports that she heard from [facility] several days ago telling her machine has been ordered and that Claudia Iqbal will be in touch with her to arrange a visit to teach her to use the machine but she has not heard from anyone since then. Discussed that she needs another INR until machine can be obtained.   Appt made for this nurse to visit pt for venipuncture for INR on 11/29 at 10 AM.

## 2023-11-21 ENCOUNTER — HOME CARE VISIT (OUTPATIENT)
Facility: HOME HEALTH | Age: 83
End: 2023-11-21
Payer: MEDICARE

## 2023-11-21 DIAGNOSIS — I10 PRIMARY HYPERTENSION: Primary | ICD-10-CM

## 2023-11-21 DIAGNOSIS — D68.9 COAGULOPATHY (HCC): Primary | ICD-10-CM

## 2023-11-21 DIAGNOSIS — D68.9 COAGULOPATHY (HCC): ICD-10-CM

## 2023-11-21 PROCEDURE — G0151 HHCP-SERV OF PT,EA 15 MIN: HCPCS

## 2023-11-22 ENCOUNTER — TELEPHONE (OUTPATIENT)
Facility: CLINIC | Age: 83
End: 2023-11-22

## 2023-11-22 VITALS
SYSTOLIC BLOOD PRESSURE: 96 MMHG | RESPIRATION RATE: 17 BRPM | HEART RATE: 71 BPM | DIASTOLIC BLOOD PRESSURE: 60 MMHG | TEMPERATURE: 98.2 F | OXYGEN SATURATION: 90 %

## 2023-11-22 RX ORDER — DOXYCYCLINE HYCLATE 100 MG/1
100 CAPSULE ORAL EVERY 12 HOURS
COMMUNITY
Start: 2023-11-13

## 2023-11-22 RX ORDER — BENZONATATE 100 MG/1
100 CAPSULE ORAL 3 TIMES DAILY PRN
COMMUNITY
Start: 2023-11-13

## 2023-11-22 ASSESSMENT — ENCOUNTER SYMPTOMS
PAIN LOCATION - PAIN QUALITY: ACHE/THROBBING
SPUTUM PRODUCTION: 1
SPUTUM COLOR: CLEAR
COUGH CHARACTERISTICS: PRODUCTIVE
DIARRHEA: 1
CONTUSION: 1
COUGH: 1
SPUTUM AMOUNT: MODERATE

## 2023-11-22 NOTE — TELEPHONE ENCOUNTER
Message received from [employer] [employer] that patient has cancelled an appointment due to being up all night coughing. She has complained of cough on more than one PT session. Called patient today to inquire about her symptoms and see if she needed acute visit. Patient states she scheduled virtual visit with Dr. Rm Mccain and was given little yellow pills and a D medication (per med rec is benzonatate and Doxycycline). She feels her symptoms are improving and she has a follow-up with him scheduled this month. She is aware of our upcoming appointment 12/1 and agrees to reach out if symptoms worsen or she develops fever, SOB.

## 2023-11-24 ENCOUNTER — TELEPHONE (OUTPATIENT)
Facility: CLINIC | Age: 83
End: 2023-11-24

## 2023-11-24 NOTE — TELEPHONE ENCOUNTER
I called patient to remind of their in home visit w/ Nicholas Mak on 12/1/23, arrival between 12-4. I reached patient VM and left a message to please call the [address] [place] [address] office to confirm the visit.

## 2023-11-24 NOTE — TELEPHONE ENCOUNTER
Patient called back to confirm their in home visit with [facility] on 12/1/23, arrival between 12-4. Spoke with patient, they confirmed the appointment and answered the covid screen questions.  Does anyone in the household have covid no  Have there been any changes to insurance no or location no

## 2023-11-28 ENCOUNTER — TELEPHONE (OUTPATIENT)
Age: 83
End: 2023-11-28

## 2023-11-28 NOTE — TELEPHONE ENCOUNTER
----- Message from Manjeet Fish sent at 11/3/2023  1:08 PM EDT -----  Subject: Message to Provider    QUESTIONS  Information for Provider? Mariluz Lugo will be changing Primary Care as   of 11/02/2023. She is home bound would like to thank you for your   services. This will be better for her. If any question give her a call.  ---------------------------------------------------------------------------  --------------  Arron Horvath [facility] Center[facility]HANAEL  [phone]; OK to leave message on voicemail  ---------------------------------------------------------------------------  --------------  SCRIPT ANSWERS  Relationship to Patient?  Self

## 2023-11-29 ENCOUNTER — OFFICE VISIT (OUTPATIENT)
Facility: CLINIC | Age: 83
End: 2023-11-29
Payer: MEDICARE

## 2023-11-29 ENCOUNTER — HOME CARE VISIT (OUTPATIENT)
Facility: HOME HEALTH | Age: 83
End: 2023-11-29
Payer: MEDICARE

## 2023-11-29 ENCOUNTER — TELEPHONE (OUTPATIENT)
Facility: CLINIC | Age: 83
End: 2023-11-29

## 2023-11-29 VITALS
TEMPERATURE: 98 F | DIASTOLIC BLOOD PRESSURE: 72 MMHG | HEART RATE: 67 BPM | OXYGEN SATURATION: 91 % | SYSTOLIC BLOOD PRESSURE: 114 MMHG | RESPIRATION RATE: 18 BRPM

## 2023-11-29 DIAGNOSIS — J84.9 INTERSTITIAL LUNG DISEASE (HCC): ICD-10-CM

## 2023-11-29 DIAGNOSIS — Z79.01 CHRONIC ANTICOAGULATION: ICD-10-CM

## 2023-11-29 DIAGNOSIS — J84.10 PULMONARY FIBROSIS (HCC): ICD-10-CM

## 2023-11-29 DIAGNOSIS — E11.42 TYPE 2 DIABETES MELLITUS WITH DIABETIC POLYNEUROPATHY, WITHOUT LONG-TERM CURRENT USE OF INSULIN (HCC): ICD-10-CM

## 2023-11-29 DIAGNOSIS — R29.6 FREQUENT FALLS: Primary | ICD-10-CM

## 2023-11-29 DIAGNOSIS — F41.8 DEPRESSION WITH ANXIETY: ICD-10-CM

## 2023-11-29 DIAGNOSIS — G47.01 INSOMNIA DUE TO MEDICAL CONDITION: ICD-10-CM

## 2023-11-29 DIAGNOSIS — Z95.2 H/O AORTIC VALVE REPLACEMENT: ICD-10-CM

## 2023-11-29 DIAGNOSIS — J96.11 CHRONIC RESPIRATORY FAILURE WITH HYPOXIA (HCC): ICD-10-CM

## 2023-11-29 DIAGNOSIS — Z79.899 POLYPHARMACY: ICD-10-CM

## 2023-11-29 DIAGNOSIS — F03.A0 MILD DEMENTIA WITHOUT BEHAVIORAL DISTURBANCE, PSYCHOTIC DISTURBANCE, MOOD DISTURBANCE, OR ANXIETY, UNSPECIFIED DEMENTIA TYPE (HCC): ICD-10-CM

## 2023-11-29 PROCEDURE — 1123F ACP DISCUSS/DSCN MKR DOCD: CPT | Performed by: NURSE PRACTITIONER

## 2023-11-29 PROCEDURE — 3051F HG A1C>EQUAL 7.0%<8.0%: CPT | Performed by: NURSE PRACTITIONER

## 2023-11-29 PROCEDURE — 3074F SYST BP LT 130 MM HG: CPT | Performed by: NURSE PRACTITIONER

## 2023-11-29 PROCEDURE — 99350 HOME/RES VST EST HIGH MDM 60: CPT | Performed by: NURSE PRACTITIONER

## 2023-11-29 PROCEDURE — G8420 CALC BMI NORM PARAMETERS: HCPCS | Performed by: NURSE PRACTITIONER

## 2023-11-29 PROCEDURE — G2212 PROLONG OUTPT/OFFICE VIS: HCPCS | Performed by: NURSE PRACTITIONER

## 2023-11-29 PROCEDURE — 3078F DIAST BP <80 MM HG: CPT | Performed by: NURSE PRACTITIONER

## 2023-11-29 PROCEDURE — G8484 FLU IMMUNIZE NO ADMIN: HCPCS | Performed by: NURSE PRACTITIONER

## 2023-11-29 PROCEDURE — 1090F PRES/ABSN URINE INCON ASSESS: CPT | Performed by: NURSE PRACTITIONER

## 2023-11-29 PROCEDURE — 1036F TOBACCO NON-USER: CPT | Performed by: NURSE PRACTITIONER

## 2023-11-29 RX ORDER — DULOXETIN HYDROCHLORIDE 30 MG/1
30 CAPSULE, DELAYED RELEASE ORAL DAILY
Qty: 30 CAPSULE | Refills: 0 | Status: SHIPPED | OUTPATIENT
Start: 2023-11-29 | End: 2023-11-29

## 2023-11-29 RX ORDER — FLUTICASONE PROPIONATE AND SALMETEROL 100; 50 UG/1; UG/1
1 POWDER RESPIRATORY (INHALATION) EVERY 12 HOURS
Qty: 60 EACH | Refills: 1 | Status: SHIPPED | OUTPATIENT
Start: 2023-11-29 | End: 2023-11-30

## 2023-11-29 RX ORDER — DULOXETIN HYDROCHLORIDE 20 MG/1
20 CAPSULE, DELAYED RELEASE ORAL DAILY
Qty: 30 CAPSULE | Refills: 2 | Status: SHIPPED | OUTPATIENT
Start: 2023-11-29

## 2023-11-29 RX ORDER — DULOXETIN HYDROCHLORIDE 30 MG/1
30 CAPSULE, DELAYED RELEASE ORAL DAILY
Qty: 30 CAPSULE | Refills: 0 | Status: SHIPPED | OUTPATIENT
Start: 2023-11-29

## 2023-11-29 RX ORDER — DULOXETIN HYDROCHLORIDE 20 MG/1
20 CAPSULE, DELAYED RELEASE ORAL DAILY
Qty: 30 CAPSULE | Refills: 2 | Status: SHIPPED | OUTPATIENT
Start: 2023-11-29 | End: 2023-11-29

## 2023-11-29 ASSESSMENT — PATIENT HEALTH QUESTIONNAIRE - PHQ9
9. THOUGHTS THAT YOU WOULD BE BETTER OFF DEAD, OR OF HURTING YOURSELF: 0
SUM OF ALL RESPONSES TO PHQ9 QUESTIONS 1 & 2: 2
7. TROUBLE CONCENTRATING ON THINGS, SUCH AS READING THE NEWSPAPER OR WATCHING TELEVISION: 1
6. FEELING BAD ABOUT YOURSELF - OR THAT YOU ARE A FAILURE OR HAVE LET YOURSELF OR YOUR FAMILY DOWN: 0
8. MOVING OR SPEAKING SO SLOWLY THAT OTHER PEOPLE COULD HAVE NOTICED. OR THE OPPOSITE, BEING SO FIGETY OR RESTLESS THAT YOU HAVE BEEN MOVING AROUND A LOT MORE THAN USUAL: 0
SUM OF ALL RESPONSES TO PHQ QUESTIONS 1-9: 7
SUM OF ALL RESPONSES TO PHQ QUESTIONS 1-9: 7
10. IF YOU CHECKED OFF ANY PROBLEMS, HOW DIFFICULT HAVE THESE PROBLEMS MADE IT FOR YOU TO DO YOUR WORK, TAKE CARE OF THINGS AT HOME, OR GET ALONG WITH OTHER PEOPLE: 1
SUM OF ALL RESPONSES TO PHQ QUESTIONS 1-9: 7
SUM OF ALL RESPONSES TO PHQ QUESTIONS 1-9: 7
5. POOR APPETITE OR OVEREATING: 1
2. FEELING DOWN, DEPRESSED OR HOPELESS: 1
1. LITTLE INTEREST OR PLEASURE IN DOING THINGS: 1
3. TROUBLE FALLING OR STAYING ASLEEP: 2
4. FEELING TIRED OR HAVING LITTLE ENERGY: 1

## 2023-11-29 NOTE — ASSESSMENT & PLAN NOTE
Patient is high risk for adverse effects from multiple medications, particularly in setting of dementia with some demonstrated inability to appropriately recall. Per notes from prior PCP, attempted to deprescribe some medications but this was complicated by inability to review pill bottles and she has seen several other physicians during hospital and rehab visits. Discussed and already d/c:   Metformin- was not taking, A1C at goal  Dicyclomine- is not taking, side-effect profile  Celebrex- risk of GIB with Coumadin  Seroquel- has not been taking and did not find helpful\  Xyzal- has not been taking, no clear indication  Will discuss oxybutinin d/c if not continued by urology  Cymbalta- patient has not found this effective in pain or mood and is already on SSRI. Will need slow taper (will decrease by 10mg at this time (sent 30mg tab +20mg tab to total 50mg tab daily), will reduce to 40mg at next visit in 3 weeks if she is tolerating.

## 2023-11-29 NOTE — ASSESSMENT & PLAN NOTE
With chronic resp failure on 2L PRN at baseline, using Luli. Hospitalization 7/2023 for respiratory failure. Reports being on Advair/Wixela but none in home (Refill sent today). She is using PRN Albuterol. Followed by PAR with Dr Nicolas Tompkins, last visit 8/30/23, follow up 12/28/23. 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

## 2023-11-29 NOTE — ASSESSMENT & PLAN NOTE
A1C 7.1% on 11/2/23. Per note review recently discontinued Januvia in favor of restarting Metformin 500mg daily, but not yet obtained.   Currently taking Lyrica 50mg BID and Duloxetine 60mg daily for neuropathy

## 2023-11-29 NOTE — PROGRESS NOTES
family via phone, and care coordination on the day of the visit. Prolonged service was provided for  [x]30 min   []60 min in face to face time in the presence of the patient, spent as noted above.   Time Start: 10:30  Time End:  12:10  VALORIE Zamorano NP

## 2023-11-29 NOTE — TELEPHONE ENCOUNTER
Telephone call to [address] [place] [address] at [phone] to advise that we are patient's new PCP and will be signing for her future orders of [facility].   Contact info and NPI given for Jenn Schaeffer NP.

## 2023-11-29 NOTE — ASSESSMENT & PLAN NOTE
Reports insomnia that is worsened by anxiety and urinary frequency. Currently taking trazodone 100mg which she finds effective. Recently started on Seroquel which was not helpful, will take this off of med list (She has not taken in some time, so do not need to now wean).   Has f/u with urology for recommendations on nocturia 11/30

## 2023-11-29 NOTE — ASSESSMENT & PLAN NOTE
Using 2L PRN with Luli. Followed by Dr. Guilherme Vergara, pulmonology with last visit 8/30/23- per note likely COPD but plans for PFTs at follow-up visit. SpO2 91% on room air during visit today, per patient she uses oxygen if below 90%.

## 2023-11-29 NOTE — ASSESSMENT & PLAN NOTE
Patient and son endorse memory issues and known mild dementia, but she has not done extensive cognitive testing. Discussed re-establishing with neurologist for her history of migraines and for further evaluation, but she has some negative associations due to a history with her  and dementia. Has some difficulty relaying medications and is very distractible during visit. MMSE completed today with score 29/30.

## 2023-11-29 NOTE — ASSESSMENT & PLAN NOTE
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 Currently managed on warfarin and aspirin 81m, also with history of DVT and protein C deficiency. Followed by Cardiologist Dr. Amando Cunningham (will request records- next visit 12/20/23).   INR obtained today via venipuncture

## 2023-11-29 NOTE — ASSESSMENT & PLAN NOTE
Fall on 11/26/23 with impact to R hip, no impact to head. Reports she trips over her own feet, does not think she was dizzy but could not be sure. No loss of consciousness. Given coumadin, she has large area of bruising. Apt with ortho completed- per son no fracture, will request full records. Ambulating with rolling walker, reviewed today importance of regular use.   Working with HHPT currently

## 2023-11-29 NOTE — ASSESSMENT & PLAN NOTE
Patient endorses history of multiple medications that were not effective. PHQ score 7 today. She has some grief from loss of her  and isolation due to her living situation. Per med list, is taking Lexapro 20 mg, Cymbalta 60mg,  amitriptyline 10 mg at bedtime, BuSpar 30mg twice daily prn. Has not taken Seroquel in some time and reports it is not effective for mood or sleep (given in setting of hospital delirium). Does not find Cymbalta effective in sleep or pain, will wean at this time with plan to discontinue for another agent.

## 2023-11-30 ENCOUNTER — TELEPHONE (OUTPATIENT)
Facility: CLINIC | Age: 83
End: 2023-11-30

## 2023-11-30 RX ORDER — LOPERAMIDE HYDROCHLORIDE 2 MG/1
CAPSULE ORAL
Qty: 90 CAPSULE | Refills: 1 | Status: SHIPPED | OUTPATIENT
Start: 2023-11-30

## 2023-11-30 RX ORDER — FLUTICASONE PROPIONATE AND SALMETEROL 100; 50 UG/1; UG/1
POWDER RESPIRATORY (INHALATION)
Qty: 180 EACH | Refills: 1 | Status: SHIPPED | OUTPATIENT
Start: 2023-11-30

## 2023-11-30 NOTE — TELEPHONE ENCOUNTER
Message to Franki Duke RN nurse navigator with [facility]. Verbal order provided to add SN for med administration teaching and establishing accurate medication box with son to fill it.

## 2023-11-30 NOTE — TELEPHONE ENCOUNTER
Called patient to notify that INR was unable to be processed as there was not enough blood in the tube. She stated understanding. INR machine delivery and training is planned for 12/4- will call patient for INR and adjust as indicated at that time. Discussed dose change of Cymbalta:   She is taking 60mg currently but we are hoping to slowly decrease. I have sent prescription for 30mg and 20mg tab to equal 50mg daily. Notified patient and she started speaking of other mediations including Clopidogrel. Notified patient she should NOT be taking thsi medication while she is on Coumadin and she confirms she is not. Patient then asked which medication to stop taking and reinforced importance of not stopping Cymbalta suddenly. She relayed urology visit and notified stopping Myrbetriq and starting Trospium. Called son, Félix Lo, for concern that patient did not understand the medication directions. Relayed to Félix Lo importance of not stopping Cymbalta abruptly, rather taking away the 60mg caps and starting the (20+30)mg caps. He stated understanding, has not yet picked up from pharmacy but understood and states will help with this change.

## 2023-12-01 ENCOUNTER — HOME CARE VISIT (OUTPATIENT)
Facility: HOME HEALTH | Age: 83
End: 2023-12-01
Payer: MEDICARE

## 2023-12-04 ENCOUNTER — TELEPHONE (OUTPATIENT)
Facility: CLINIC | Age: 83
End: 2023-12-04

## 2023-12-04 NOTE — TELEPHONE ENCOUNTER
Fall - The patient called to update Cathie Foley NP that she fell around 3:30am this morning. She said that EMS was not called and she was able to get up on her own after a while. Medication - the patient said that her orthopedic doctor recommended that the patient speak with the NP about her medication, it may be causing her to fall. Phone calls - The patient has called multiple times today. Twice she was talking with someone else in the background, but did not respond when I spoke to her.     The patient asks for a callback at [phone]

## 2023-12-04 NOTE — TELEPHONE ENCOUNTER
Yes, many of her medications can contribute to falls, which is why we have discussed slow deprescribing- we are actively tapering at this time. Can you please follow-up with her and ask about her INR? Monitor should have been delivered today. Thank you!

## 2023-12-05 ENCOUNTER — HOME CARE VISIT (OUTPATIENT)
Facility: HOME HEALTH | Age: 83
End: 2023-12-05

## 2023-12-05 ENCOUNTER — TELEPHONE (OUTPATIENT)
Facility: CLINIC | Age: 83
End: 2023-12-05

## 2023-12-05 PROCEDURE — G0151 HHCP-SERV OF PT,EA 15 MIN: HCPCS

## 2023-12-05 NOTE — TELEPHONE ENCOUNTER
Telephone call returned by patient, advised that provider Chrissy Maharaj NP attempted to reach her yesterday to discuss her medication concerns. She states that Ortho doctor suggested that she needs to talk with PCP about getting off some of her meds - he feels they may be causing falls. Advised pt that provider is aware and is working with pt/son to de-prescribe medications which is why provider left bottles of meds on the counter in her home and asked meghan Rios to remove them from the home. Advised that Herb Mary will continue to work with her to taper off many un-necessary medications at the time of each visit. Pt reports that she was also seen by [facility] and they called to let her know that she has a UTI and started her on an antibiotic. Pt advised that she had two more falls over the weekend. We discussed that UTI can also cause falls. Discussed with pt her MD/INR machine training yesterday and that we got her INR results of 2.2. Advised that INR is in target range and she should continue to take Coumadin 4 mg on Wed and Sun, and Coumadin 2 mg all other days. Instructed to repeat INR in one week. Inquired if meghan Rios participated in the education on INR machine and she states he was not present for training.

## 2023-12-06 VITALS
DIASTOLIC BLOOD PRESSURE: 68 MMHG | OXYGEN SATURATION: 85 % | HEART RATE: 78 BPM | SYSTOLIC BLOOD PRESSURE: 114 MMHG | TEMPERATURE: 97.6 F | RESPIRATION RATE: 17 BRPM

## 2023-12-06 ASSESSMENT — ENCOUNTER SYMPTOMS
DIARRHEA: 1
PAIN LOCATION - PAIN QUALITY: ACHE
CONSTIPATION: 1
CONTUSION: 1
DYSPNEA ACTIVITY LEVEL: AT REST

## 2023-12-07 ENCOUNTER — HOME CARE VISIT (OUTPATIENT)
Facility: HOME HEALTH | Age: 83
End: 2023-12-07
Payer: MEDICARE

## 2023-12-07 VITALS
WEIGHT: 126 LBS | TEMPERATURE: 97.5 F | HEART RATE: 87 BPM | RESPIRATION RATE: 16 BRPM | BODY MASS INDEX: 23.81 KG/M2 | DIASTOLIC BLOOD PRESSURE: 72 MMHG | OXYGEN SATURATION: 93 % | SYSTOLIC BLOOD PRESSURE: 128 MMHG

## 2023-12-07 PROCEDURE — G0299 HHS/HOSPICE OF RN EA 15 MIN: HCPCS

## 2023-12-07 ASSESSMENT — ENCOUNTER SYMPTOMS: PAIN LOCATION - PAIN QUALITY: ACHING

## 2023-12-07 NOTE — HOME HEALTH
Patient is very unhappy about being here in [place].    Primary care at home manages INR PT/INR while on Coumadin. Written Teaching Material Utilized: Admission handbook in home, medication list     Interdisciplinary communication with: Alirio Bower NP, Calos Henry PT for the purpose of [address] [place] [address]    Discharge planning as follows: When goals are met    Specific plan for next visit: Assessment, education on medication management, go over med list, continue to organize meds and set up pill pack service.

## 2023-12-08 ENCOUNTER — HOME CARE VISIT (OUTPATIENT)
Facility: HOME HEALTH | Age: 83
End: 2023-12-08
Payer: MEDICARE

## 2023-12-08 ENCOUNTER — TELEPHONE (OUTPATIENT)
Facility: CLINIC | Age: 83
End: 2023-12-08

## 2023-12-08 PROCEDURE — G0157 HHC PT ASSISTANT EA 15: HCPCS

## 2023-12-08 NOTE — TELEPHONE ENCOUNTER
Called patient to confirm their in home visit with Herb Mary on 12/15/23, arrival between 9-1. Spoke with patient, they confirmed the appointment and answered the covid screen questions.  Does anyone in the household have covid no  Have there been any changes to insurance no or location no

## 2023-12-10 VITALS
DIASTOLIC BLOOD PRESSURE: 78 MMHG | SYSTOLIC BLOOD PRESSURE: 120 MMHG | TEMPERATURE: 98.1 F | OXYGEN SATURATION: 92 % | HEART RATE: 105 BPM

## 2023-12-10 ASSESSMENT — ENCOUNTER SYMPTOMS: PAIN LOCATION - PAIN QUALITY: ACHING

## 2023-12-12 ENCOUNTER — TELEPHONE (OUTPATIENT)
Facility: CLINIC | Age: 83
End: 2023-12-12

## 2023-12-12 NOTE — TELEPHONE ENCOUNTER
Telephone call from [address] with [facility] requesting verbal order for continuation of PT for 2 x week x 6 weeks. Verbal order provided for same.

## 2023-12-13 ENCOUNTER — HOME CARE VISIT (OUTPATIENT)
Facility: HOME HEALTH | Age: 83
End: 2023-12-13
Payer: MEDICARE

## 2023-12-13 VITALS
HEART RATE: 70 BPM | SYSTOLIC BLOOD PRESSURE: 98 MMHG | OXYGEN SATURATION: 90 % | RESPIRATION RATE: 16 BRPM | TEMPERATURE: 98.1 F | DIASTOLIC BLOOD PRESSURE: 65 MMHG

## 2023-12-13 PROCEDURE — G0299 HHS/HOSPICE OF RN EA 15 MIN: HCPCS

## 2023-12-13 PROCEDURE — G0151 HHCP-SERV OF PT,EA 15 MIN: HCPCS

## 2023-12-14 ENCOUNTER — TELEPHONE (OUTPATIENT)
Facility: CLINIC | Age: 83
End: 2023-12-14

## 2023-12-14 VITALS
DIASTOLIC BLOOD PRESSURE: 70 MMHG | RESPIRATION RATE: 16 BRPM | HEART RATE: 72 BPM | SYSTOLIC BLOOD PRESSURE: 120 MMHG | OXYGEN SATURATION: 92 % | TEMPERATURE: 98.2 F

## 2023-12-14 NOTE — HOME HEALTH
Subjective: \"I have been in severe pain for the last 2 days, been in bed\"  Falls since last visit: No (if yes complete the Fall Tracking Form and include bsrifallreport):   Caregiver involvement changes: n/a  Home health supplies by type and quantity ordered/delivered this visit include: n/a    Clinician asked if patient has had any physician contact since last home care visit and patient states: NO  Clinician asked if patient has any new or changed medications and patient states:  NO   If Yes, were medications reconciled? N/A   Was the certifying physician notified of changes in medications? N/A     Clinical assessment (what this visit means for the patient overall and need for ongoing skilled care) and progress or lack of progress towards SPECIFIC goals: Patient with multiple falls, medication non-compliance requiring SN services for assessment, education and teaching for medication adherence and management to reduce risk for more falls rehospitalization. Patient progressing towards educational goals but not yet met. Applied freestyle yamini sensor today for patient and educated patient on how to check her PT/INR at home with Coag-Sense machine. Written Teaching Material Utilized: N/A    Interdisciplinary communication with: Jocelyn Calvillo NP for the purpose of POC collaboration    Discharge planning as follows:  When goals are met    Specific plan for next visit: Assessment, education, medication box check, set up pill packs

## 2023-12-14 NOTE — TELEPHONE ENCOUNTER
Telephone call to [address] and spoke with  Kristen Rock. Advised that this nurse called them on 11/29 to advise of our provider being new PCP for pt and willing to sign orders for patient's [employer] supplies. Royer Cody that this info is not showing in their system. Provided name of practice, provider name and NPI, office address, office phone and fax. She will need us to fax last two visit notes to her with signed order that she will send for provider signature. Rahel Mangle that pt is out of sensors and needs this quickly. Kristen Rock states they are able to accept docusign. Mary will docusign to provider when fax arrives.

## 2023-12-14 NOTE — TELEPHONE ENCOUNTER
PCP - The patient called and stated that [facility] told the patient that they need the name and title of her current PCP. The patient's current order is under her former PCP and they were not able to locate Jersey Scott NP in their database. [facility] - The patient is trying to order [facility]. She states that the supply company asked who the MD is for the patient. The patient says that Medicare will pay for the supplies but [facility] must have the NP/MD name. [facility] [phone]  Pt.  # [id number]

## 2023-12-15 ENCOUNTER — OFFICE VISIT (OUTPATIENT)
Facility: CLINIC | Age: 83
End: 2023-12-15

## 2023-12-15 ENCOUNTER — HOME CARE VISIT (OUTPATIENT)
Facility: HOME HEALTH | Age: 83
End: 2023-12-15
Payer: MEDICARE

## 2023-12-15 VITALS
OXYGEN SATURATION: 92 % | HEART RATE: 70 BPM | TEMPERATURE: 98.1 F | RESPIRATION RATE: 18 BRPM | SYSTOLIC BLOOD PRESSURE: 118 MMHG | DIASTOLIC BLOOD PRESSURE: 60 MMHG

## 2023-12-15 DIAGNOSIS — Z95.2 H/O AORTIC VALVE REPLACEMENT: ICD-10-CM

## 2023-12-15 DIAGNOSIS — G43.109 MIGRAINE WITH AURA AND WITHOUT STATUS MIGRAINOSUS, NOT INTRACTABLE: ICD-10-CM

## 2023-12-15 DIAGNOSIS — J84.9 INTERSTITIAL LUNG DISEASE (HCC): ICD-10-CM

## 2023-12-15 DIAGNOSIS — Z79.899 POLYPHARMACY: ICD-10-CM

## 2023-12-15 DIAGNOSIS — J44.9 CHRONIC OBSTRUCTIVE PULMONARY DISEASE, UNSPECIFIED COPD TYPE (HCC): ICD-10-CM

## 2023-12-15 DIAGNOSIS — I10 PRIMARY HYPERTENSION: Primary | ICD-10-CM

## 2023-12-15 DIAGNOSIS — E11.42 TYPE 2 DIABETES MELLITUS WITH DIABETIC POLYNEUROPATHY, WITHOUT LONG-TERM CURRENT USE OF INSULIN (HCC): ICD-10-CM

## 2023-12-15 DIAGNOSIS — D68.9 COAGULOPATHY (HCC): ICD-10-CM

## 2023-12-15 DIAGNOSIS — Z74.2 CAREGIVER NOT READILY AVAILABLE: ICD-10-CM

## 2023-12-15 DIAGNOSIS — F03.A0 MILD DEMENTIA WITHOUT BEHAVIORAL DISTURBANCE, PSYCHOTIC DISTURBANCE, MOOD DISTURBANCE, OR ANXIETY, UNSPECIFIED DEMENTIA TYPE (HCC): ICD-10-CM

## 2023-12-15 DIAGNOSIS — N32.81 OAB (OVERACTIVE BLADDER): ICD-10-CM

## 2023-12-15 DIAGNOSIS — J96.11 CHRONIC RESPIRATORY FAILURE WITH HYPOXIA (HCC): ICD-10-CM

## 2023-12-15 PROCEDURE — G0151 HHCP-SERV OF PT,EA 15 MIN: HCPCS

## 2023-12-15 RX ORDER — BUSPIRONE HYDROCHLORIDE 15 MG/1
15 TABLET ORAL DAILY PRN
COMMUNITY

## 2023-12-15 RX ORDER — DULOXETIN HYDROCHLORIDE 20 MG/1
40 CAPSULE, DELAYED RELEASE ORAL DAILY
COMMUNITY

## 2023-12-15 RX ORDER — ASPIRIN 81 MG/1
81 TABLET ORAL DAILY
COMMUNITY

## 2023-12-15 NOTE — ASSESSMENT & PLAN NOTE
Using 2L PRN with Luli. Followed by Dr. Jessica Rock, pulmonology with next visit 12/27/23. CO2 WNL with labs 11/2/23, SpO2 92% on room air during visit today. Endorses dyspnea with moderate exertion and per PT desaturation often occurs with session.

## 2023-12-15 NOTE — ASSESSMENT & PLAN NOTE
Recently established urologist Dr. Farooq Sanchez on 9/18/23. She was started on trial of Gemtesa, continued on Myrbetriq but these were not effective. Recommended f/u for possible Botox or InterStim discussion. Follow-up visit on 11/30 and was started on Trostium, stopped Myrbetriq.  Denies UTI symptoms, retention but endorses ongoing urinary frequency

## 2023-12-15 NOTE — ASSESSMENT & PLAN NOTE
Using 2L PRN with Luli. Followed by Dr. Denis Cline, pulmonology with visit planned 12/27/23. Per note review likely COPD but plans for PFTs at follow-up visit. SpO2 92% on room air during visit today, per patient she uses oxygen if below 90% or SOB. One exacerbation of COPD since last visit treated with doxycycline, benzonatate with some improvement.

## 2023-12-15 NOTE — ASSESSMENT & PLAN NOTE
Patient is high risk for adverse effects from multiple medications, particularly in setting of dementia with some demonstrated inability to appropriately recall. Per notes from prior PCP, attempted to deprescribe some medications but this was complicated by inability to review pill bottles and she has seen several other physicians during hospital and rehab visits. Discussed and already d/c:   Metformin- was not taking, A1C at goal  Celebrex- risk of GIB with Coumadin  Seroquel- has not been taking and did not find helpful  Xyzal- has not been taking, no clear indication  Myrbetriq discontinued by urology  Cymbalta- patient has not found this effective in pain or mood and is already on SSRI- slowly tapering and currently at 40mg daily, will further reduce at next visit.   Typed medication list provided today, [facility] nurse planning to facilitate pre-packaged medications through pharmacy

## 2023-12-15 NOTE — ASSESSMENT & PLAN NOTE
Patient and son endorse memory issues and known mild dementia, but she has not done extensive cognitive testing. Discussed re-establishing with neurologist for her history of migraines and for further evaluation, but she has some negative associations due to a history with her  and dementia. She is aware of the need and is ultimately agreeable at visit today. Has some difficulty relaying medications and is very distractible during visit. MMSE completed Nov 2023 with score 29/30.   SLP recommended by HHPT and initiated as this will hopefully provide cognitive exercises and additional clarity on her memory

## 2023-12-15 NOTE — PROGRESS NOTES
Botox or InterStim discussion. Follow-up visit on 11/30 and was started on Trostium, stopped Myrbetriq. Denies UTI symptoms, retention but endorses ongoing urinary frequency  8. Coagulopathy (HCC)/ 9. H/O aortic valve replacement  Assessment & Plan:  s/p TAVR in June 2022. Currently managed on warfarin and aspirin 81m, also with history of DVT and protein C deficiency. Followed by Cardiologist Dr. Daniel Pineda (will request records after next visit 12/20/23). INR obtained today using home meter with result of 3.7 (Wed 12/13/23 result was 1.2). Unclear compliance with warfarin. Gave instruction to HOLD today, continue 2mg tomorrow and daily until nurse recheck Wed 12/20. 10. Polypharmacy  Assessment & Plan:  Patient is high risk for adverse effects from multiple medications, particularly in setting of dementia with some demonstrated inability to appropriately recall. Per notes from prior PCP, attempted to deprescribe some medications but this was complicated by inability to review pill bottles and she has seen several other physicians during hospital and rehab visits. Discussed and already d/c:   Metformin- was not taking, A1C at goal  Celebrex- risk of GIB with Coumadin  Seroquel- has not been taking and did not find helpful  Xyzal- has not been taking, no clear indication  Myrbetriq discontinued by urology  Cymbalta- patient has not found this effective in pain or mood and is already on SSRI- slowly tapering and currently at 40mg daily, will further reduce at next visit. Typed medication list provided today, [facility] nurse planning to facilitate pre-packaged medications through pharmacy  11. Caregiver not readily available   Son readily accessible by phone but works during the day so is not able to be full-time caregiver. Patient with frequent falls and demonstrates difficulty recalling information at appointments, managing medications.   Patient is aware of this problem but reports she can not afford

## 2023-12-15 NOTE — ASSESSMENT & PLAN NOTE
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 Currently managed on warfarin and aspirin 81m, also with history of DVT and protein C deficiency. Followed by Cardiologist Dr. Poly Heart (will request records after next visit 12/20/23). INR obtained today using home meter with result of 3.7 (Wed 12/13/23 result was 1.2). Unclear compliance with warfarin. Gave instruction to HOLD today, continue 2mg tomorrow and daily until nurse recheck Wed 12/20.

## 2023-12-15 NOTE — ASSESSMENT & PLAN NOTE
BP at goal on current regimen of metoprolol. Some SBP in 90s reported by [facility] in setting of high fall risk patient. Patient has follow-up with cardiologist Dr. Debbie Lucas 12/20/23, will request records after visit and adjust medications as indicated or recommended.   CBC, CMP obtained 11/2/23 and stable

## 2023-12-15 NOTE — ASSESSMENT & PLAN NOTE
A1C 7.1% on 11/2/23. Recently discontinued Januvia and Metformin. Uses Gunjan meter with reading today at 146. Patient does not know of hypoglycemic episodes, but states her meter will alarm but she does not know message, she \"throws it at the wall. \"  No s/s of hypoglycemia/hyperglycemia today. She does require assistance with changing the sensor, HHSN, team nurse, and this provider have exchanged.

## 2023-12-15 NOTE — ASSESSMENT & PLAN NOTE
Previously followed by neurology,but has not re-established since moving to [facility]. Referral placed today. Rreviously treated with Midrin. Currently managed on Keppra 500mg morning, 750mg evening. Has also used PRN Fioricet.

## 2023-12-17 VITALS
RESPIRATION RATE: 17 BRPM | HEART RATE: 70 BPM | DIASTOLIC BLOOD PRESSURE: 60 MMHG | TEMPERATURE: 98.1 F | SYSTOLIC BLOOD PRESSURE: 118 MMHG | OXYGEN SATURATION: 92 %

## 2023-12-26 ENCOUNTER — CLINICAL DOCUMENTATION (OUTPATIENT)
Facility: CLINIC | Age: 83
End: 2023-12-26

## 2023-12-26 ENCOUNTER — HOME CARE VISIT (OUTPATIENT)
Dept: HOME HEALTH SERVICES | Facility: HOME HEALTH | Age: 83
End: 2023-12-26
Payer: MEDICARE

## 2023-12-26 ENCOUNTER — TELEPHONE (OUTPATIENT)
Facility: CLINIC | Age: 83
End: 2023-12-26

## 2023-12-26 NOTE — TELEPHONE ENCOUNTER
Pt left vm inquiring about whether or not she should continue taking warfarin; also stated she is on her last sensor and needs more ordered.

## 2023-12-26 NOTE — PROGRESS NOTES
[employer] [facility] at [address] [facility] [address], [address] [facility], [address] [place] [address] Team Members: Ya Valdez MD; Jatin Harris NP; Leisa Flores NP; Michael Bates, JUDE; Oli Camilo, JUDE;  Tamela Rees RN; Lanie Garcia, [address]  1940 / [id number]  female    Date of Initial Visit (Start of Care): 11/2/23    Diagnoses  Patient Active Problem List   Diagnosis    Age-related osteoporosis without current pathological fracture    Hyperlipidemia    Coronary artery disease involving native coronary artery of native heart without angina pectoris    Type 2 diabetes mellitus with diabetic polyneuropathy, without long-term current use of insulin ([facility])    OAB (overactive bladder)    AS (aortic stenosis)    Chronic heart failure with preserved ejection fraction ([facility])    Type 2 diabetes mellitus with chronic kidney disease ([address])    H/O aortic valve replacement    Chronic anticoagulation    Depression with anxiety    Simple chronic bronchitis ([facility])    Coagulopathy ([facility])    Mild dementia without behavioral disturbance, psychotic disturbance, mood disturbance, or anxiety ([address])    Primary hypertension    Gastroesophageal reflux disease    Insomnia due to medical condition    COPD (chronic obstructive pulmonary disease) ([facility])    Dyslipidemia    Iron deficiency anemia    Migraine with aura and without status migrainosus, not intractable    Osteoarthrosis    Protein C deficiency ([address])    Retention of urine    Pulmonary fibrosis (HCC)    Interstitial lung disease ([facility])    Chronic respiratory failure with hypoxia ([facility])    History of DVT (deep vein thrombosis)    PVD (peripheral vascular disease) ([address])    Frequent falls    Polypharmacy       Advance Care Planning:    Code Status: Full Code        Primary Decision MakerRocio Chacon Child - [phone]       12/26/2023    10:04 AM   Demographics   Marital Status        DME/Supplies:  Rolling walker     Allergies   Allergen

## 2023-12-26 NOTE — TELEPHONE ENCOUNTER
Medication Refill Request- the patient called to request refill on 4 scripts. She did not say the names of the medications, but says that she is out of the medications as of last week. Not able to get date and time of VM, patient did not hang up her phone after she left her message. Also during the message the TV was turned up loud and it was hard to hear what the patient was saying.     The patient's callback if needed is [phone]

## 2023-12-26 NOTE — TELEPHONE ENCOUNTER
Telephone call returned to patient to determine which medications she needs refilled. Pt advises that she needs duloxetine refills for 2 different strengths. Advised pt that provider Franicne Hogan NP has discontinued her duloxetine - pt asks what this med was for. Advised that it can be used for depression and/or pain. Pt also requests refill of ezetimbe. Advised that we will send refill for this med after speaking with Mikel Jefferson - as she is working on getting meds prefilled by pharmacy.

## 2023-12-27 NOTE — TELEPHONE ENCOUNTER
Telephone call to patient to advise that provider Mary Vigil NP recommends that she continue to take her warfarin daily as prescribed until she is seen by a hematologist and that specialist can offer an opinion. Also advised that this nurse spoke with [facility] on the phone and refaxed them the order for [place] sensors that they say they did not receive.

## 2023-12-27 NOTE — TELEPHONE ENCOUNTER
Call returned to patient who wants to know if she should continue to take her Warfarin. States she say cardiologist a couple of weeks ago and he said she should stop taking warfarin. Advised that this nurse will follow up with provider Janet Matos NP and let her know. Additionally she states she needs more Gunjan sensors ordered. Advised that we faxed signed order to [facility] for these on 12/20. She states she just spoke with [facility] yesterday and they do not have the order. Telephone call to [facility], on hold for more than 20 minutes, spoke with  Chari Palm who states they do not have the order. She provided this nurse with emergency fax of [phone]. Docusigned order faxed to this number.

## 2023-12-28 ENCOUNTER — HOME CARE VISIT (OUTPATIENT)
Facility: HOME HEALTH | Age: 83
End: 2023-12-28
Payer: MEDICARE

## 2023-12-28 VITALS
DIASTOLIC BLOOD PRESSURE: 70 MMHG | RESPIRATION RATE: 17 BRPM | OXYGEN SATURATION: 93 % | SYSTOLIC BLOOD PRESSURE: 114 MMHG | TEMPERATURE: 97.6 F | HEART RATE: 104 BPM

## 2023-12-28 PROCEDURE — G0151 HHCP-SERV OF PT,EA 15 MIN: HCPCS

## 2023-12-29 ENCOUNTER — HOME CARE VISIT (OUTPATIENT)
Facility: HOME HEALTH | Age: 83
End: 2023-12-29
Payer: MEDICARE

## 2023-12-29 VITALS
HEART RATE: 82 BPM | OXYGEN SATURATION: 92 % | TEMPERATURE: 98 F | DIASTOLIC BLOOD PRESSURE: 73 MMHG | SYSTOLIC BLOOD PRESSURE: 129 MMHG

## 2023-12-29 PROCEDURE — G0299 HHS/HOSPICE OF RN EA 15 MIN: HCPCS

## 2023-12-29 PROCEDURE — G0153 HHCP-SVS OF S/L PATH,EA 15MN: HCPCS

## 2023-12-29 NOTE — HOME HEALTH
Subjective: \" I am so frustrated with my INR machine and with the way April speaks to me when she calls me from [address] primary care\"  Falls since last visit: no falls   Caregiver involvement changes: NA  Home health supplies by type and quantity ordered/delivered this visit include: NA    Clinician asked if patient has had any physician contact since last home care visit and patient states: yes  Clinician asked if patient has any new or changed medications and patient states: yes tramadol  If Yes, were medications reconciled? yes but the MD is bringing a new medication list for the pt as there have been some changes  Was the certifying physician notified of changes in medications? NA    Clinical assessment (what this visit means for the patient overall and need for ongoing skilled care) and progress or lack of progress towards SPECIFIC goals: The Pt still struggling with pain in back, elbow, hip and tailbone. She had x-rays and no fractures were noted. The Instructions state the pt is to take tylenol and tramadol for pain and if pain is not decreasing in 7-10 days follow up with orthopedics to get repeeat x-rays. The Pt is clear on these instructions. The PT confirmed with PCP That Duloxetine has been DC from med list. The Pt is clear on this as well. The pt did not want to do any HEP today due to pain in back and tailbone, but did want to try walking in the roper and she was able to complete this without her O2 and oxygen sats did not drop below 90% and the pt was not SOB. Written Teaching Material Utilized: updated med sheet, HEP cue sheet    Interdisciplinary communication with Mary Vigil NP about pt medications, Daniel Leblanc RN for pt needing help with INR    Discharge planning as follows:  Is no longer homebound, Per physician order, Will discharge when the patient has reached their maximum functional potential and maximum safety in their home and When goals are met    Specific plan for next visit:

## 2023-12-30 VITALS
HEART RATE: 82 BPM | OXYGEN SATURATION: 92 % | SYSTOLIC BLOOD PRESSURE: 129 MMHG | DIASTOLIC BLOOD PRESSURE: 73 MMHG | TEMPERATURE: 98 F

## 2023-12-30 NOTE — HOME HEALTH
Subjective: \"I'm 83 life is tough. I need a nursing home but I can't afford it.\"  Falls since last visit Yes(if yes complete the Fall Tracking Form and include bsrifallreport):   Caregiver involvement changes: no changes  Home health supplies by type and quantity ordered/delivered this visit include: none    Clinician asked if patient has had any physician contact since last home care visit and patient states: NO  Clinician asked if patient has any new or changed medications and patient states:  NO   If Yes, were medications reconciled? N/A   Was the certifying physician notified of changes in medications? N/A     Clinical assessment (what this visit means for the patient overall and need for ongoing skilled care) and progress or lack of progress towards SPECIFIC goals: Skilled speech therapy provided with patient feeling somewhat frustrated and distraught with situation and family concerns. Education and instruction on cognitive strategies in place with patient compliance variable. Encouraged patient to utilize techniques for safety and independence in her home environment. Spoke to son about all interventions and that patient would benefit from assistance in the home due to frequent falls and difficulty caring for herself. SOn in agreement, patient difficult to convince. Pt has met max potential for goals at this time.       Written Teaching Material Utilized: N/A    Interdisciplinary communication with:  RN and  LPN for the purpose of POC collaboration, home safety issues and fall risk concerns    Discharge planning as follows: Will discharge when the patient has reached their maximum functional potential and maximum safety in their home    Specific plan for next visit: Instruct in safety issues regarding Proper use of assistive device, Tripping hazards and Diet

## 2024-01-02 ENCOUNTER — CLINICAL DOCUMENTATION (OUTPATIENT)
Facility: CLINIC | Age: 84
End: 2024-01-02

## 2024-01-02 NOTE — PROGRESS NOTES
Home Health Certification approval     Initial certification: 8/9/23     Certification period: 12/9/23-2/9/24     CCN: [id number]     Company: [employer]       Diagnosis code:  I10 - Essential Hypertension  J44.9- COPD, unspecified  J84.9- Interstitial Lung Disease  F03.9- mild dementia  F29.6- frequent falls     I have reviewed and agree with plan of care.  PT:  Twice weekly for 6 weeks for gait training, strengthening  3 visits PRN for post fall assessment      VALORIE HDZ - NP

## 2024-01-03 ENCOUNTER — TELEPHONE (OUTPATIENT)
Facility: CLINIC | Age: 84
End: 2024-01-03

## 2024-01-03 NOTE — TELEPHONE ENCOUNTER
Called patient to confirm their in home visit with Rosina Casey on 1/9/2024, arrival between 12-4.  Spoke with patient, they confirmed the appointment and answered the covid screen questions. Does anyone in the household have covid no  Have there been any changes to insurance no or location no

## 2024-01-04 ENCOUNTER — HOME CARE VISIT (OUTPATIENT)
Facility: HOME HEALTH | Age: 84
End: 2024-01-04
Payer: MEDICARE

## 2024-01-04 VITALS
RESPIRATION RATE: 16 BRPM | SYSTOLIC BLOOD PRESSURE: 108 MMHG | OXYGEN SATURATION: 90 % | DIASTOLIC BLOOD PRESSURE: 64 MMHG | HEART RATE: 98 BPM | TEMPERATURE: 97.3 F

## 2024-01-04 PROCEDURE — G0299 HHS/HOSPICE OF RN EA 15 MIN: HCPCS

## 2024-01-04 ASSESSMENT — ENCOUNTER SYMPTOMS
COUGH CHARACTERISTICS: NON-PRODUCTIVE
COUGH: 1

## 2024-01-04 NOTE — HOME HEALTH
Subjective: \"I have a MD appt today with ortho. I can move around so much better when my pain is under control\"  Falls since last visit: No (if yes complete the Fall Tracking Form and include bsrifallreport):   Caregiver involvement changes: n/a  Home health supplies by type and quantity ordered/delivered this visit include: n/a    Clinician asked if patient has had any physician contact since last home care visit and patient states: NO  Clinician asked if patient has any new or changed medications and patient states:  NO   If Yes, were medications reconciled? N/A   Was the certifying physician notified of changes in medications? N/A     Clinical assessment (what this visit means for the patient overall and need for ongoing skilled care) and progress or lack of progress towards SPECIFIC goals: Patient with recent falls and polypharmacy requiring SN services for assessment, education and med mgmt to reduce risk for med errors rehospitalization. Patient progressing towards educational goals but not yet met.  Patient requires assistane with at home PT/INR machine. Today's INR was 1.3. Patient states she has been taking 2mg Warfarin QD. Per patient's med box, it appears patient is taking meds appropriately at this time. Patient has f/u appt with ALEX Casey next week. SN plans to set up pill packs at local pharmacy.     Written Teaching Material Utilized: N/A    Interdisciplinary communication with: Kathie Khan PT, Yarely Casey NP for the purpose of POC collaboration    Discharge planning as follows: When goals are met    Specific plan for next visit: Assessment, at home PT/INR teaching, med teaching

## 2024-01-05 ENCOUNTER — HOME CARE VISIT (OUTPATIENT)
Facility: HOME HEALTH | Age: 84
End: 2024-01-05
Payer: MEDICARE

## 2024-01-05 ENCOUNTER — TELEPHONE (OUTPATIENT)
Facility: CLINIC | Age: 84
End: 2024-01-05

## 2024-01-05 PROCEDURE — G0151 HHCP-SERV OF PT,EA 15 MIN: HCPCS

## 2024-01-05 NOTE — TELEPHONE ENCOUNTER
Janeen with [facility] left a message on [employer] 1/4/24 at 5:29 pm. The patient had an out of range INR of 1.3.  # [phone]

## 2024-01-05 NOTE — TELEPHONE ENCOUNTER
Spoke with provider Rosina Casey NP, order received for pt to increase her Coumadin to 4 mg on Tuesdays and Sundays and to take 2 mg all other days.  Telephone call to patient to advise of this increase, she was able to write down this information and read it back to this nurse.  She then asked to speak to provider, advised that Rosina Casey NP is out of office today and asked what I might help her with.  She stated she would speak to Rosina on 1/9 at time of her scheduled visit.  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  I advised pt that I was not trying to treat her like a [age] year old but advised that I would not accept her rudeness as I was not being rude to her.  She stated that she would speak to Rosina about it and ended the call.

## 2024-01-07 VITALS
DIASTOLIC BLOOD PRESSURE: 70 MMHG | OXYGEN SATURATION: 95 % | HEART RATE: 91 BPM | RESPIRATION RATE: 16 BRPM | SYSTOLIC BLOOD PRESSURE: 134 MMHG | TEMPERATURE: 97.7 F

## 2024-01-07 ASSESSMENT — ENCOUNTER SYMPTOMS
CONTUSION: 1
PAIN LOCATION - PAIN QUALITY: ACHE
DYSPNEA ACTIVITY LEVEL: AFTER AMBULATING LESS THAN 10 FT

## 2024-01-09 ENCOUNTER — OFFICE VISIT (OUTPATIENT)
Facility: CLINIC | Age: 84
End: 2024-01-09
Payer: MEDICARE

## 2024-01-09 VITALS
OXYGEN SATURATION: 88 % | HEART RATE: 88 BPM | RESPIRATION RATE: 18 BRPM | TEMPERATURE: 97.9 F | BODY MASS INDEX: 23.62 KG/M2 | WEIGHT: 125 LBS | DIASTOLIC BLOOD PRESSURE: 66 MMHG | SYSTOLIC BLOOD PRESSURE: 122 MMHG

## 2024-01-09 DIAGNOSIS — D68.59 PROTEIN C DEFICIENCY (HCC): ICD-10-CM

## 2024-01-09 DIAGNOSIS — K21.9 GASTROESOPHAGEAL REFLUX DISEASE WITHOUT ESOPHAGITIS: ICD-10-CM

## 2024-01-09 DIAGNOSIS — F03.A0 MILD DEMENTIA WITHOUT BEHAVIORAL DISTURBANCE, PSYCHOTIC DISTURBANCE, MOOD DISTURBANCE, OR ANXIETY, UNSPECIFIED DEMENTIA TYPE (HCC): Primary | ICD-10-CM

## 2024-01-09 DIAGNOSIS — Z79.899 POLYPHARMACY: ICD-10-CM

## 2024-01-09 DIAGNOSIS — Z95.2 H/O AORTIC VALVE REPLACEMENT: ICD-10-CM

## 2024-01-09 DIAGNOSIS — M47.9 ADVANCED OSTEOARTHRITIS OF SPINE: ICD-10-CM

## 2024-01-09 DIAGNOSIS — J84.9 INTERSTITIAL LUNG DISEASE (HCC): ICD-10-CM

## 2024-01-09 DIAGNOSIS — E11.42 TYPE 2 DIABETES MELLITUS WITH DIABETIC POLYNEUROPATHY, WITHOUT LONG-TERM CURRENT USE OF INSULIN (HCC): ICD-10-CM

## 2024-01-09 DIAGNOSIS — K52.9 CHRONIC DIARRHEA: ICD-10-CM

## 2024-01-09 DIAGNOSIS — J44.9 CHRONIC OBSTRUCTIVE PULMONARY DISEASE, UNSPECIFIED COPD TYPE (HCC): ICD-10-CM

## 2024-01-09 DIAGNOSIS — D68.9 COAGULOPATHY (HCC): ICD-10-CM

## 2024-01-09 DIAGNOSIS — J84.10 PULMONARY FIBROSIS (HCC): ICD-10-CM

## 2024-01-09 PROBLEM — E11.22 TYPE 2 DIABETES MELLITUS WITH STAGE 3A CHRONIC KIDNEY DISEASE, WITHOUT LONG-TERM CURRENT USE OF INSULIN (HCC): Status: RESOLVED | Noted: 2022-11-10 | Resolved: 2024-01-09

## 2024-01-09 PROBLEM — I73.9 PVD (PERIPHERAL VASCULAR DISEASE) (HCC): Status: RESOLVED | Noted: 2023-11-02 | Resolved: 2024-01-09

## 2024-01-09 PROBLEM — M81.0 AGE-RELATED OSTEOPOROSIS WITHOUT CURRENT PATHOLOGICAL FRACTURE: Status: RESOLVED | Noted: 2019-10-17 | Resolved: 2024-01-09

## 2024-01-09 PROBLEM — N18.31 TYPE 2 DIABETES MELLITUS WITH STAGE 3A CHRONIC KIDNEY DISEASE, WITHOUT LONG-TERM CURRENT USE OF INSULIN (HCC): Status: RESOLVED | Noted: 2022-11-10 | Resolved: 2024-01-09

## 2024-01-09 PROBLEM — N18.31 TYPE 2 DIABETES MELLITUS WITH STAGE 3A CHRONIC KIDNEY DISEASE, WITHOUT LONG-TERM CURRENT USE OF INSULIN (HCC): Status: ACTIVE | Noted: 2022-11-10

## 2024-01-09 PROBLEM — E11.22 TYPE 2 DIABETES MELLITUS WITH STAGE 3A CHRONIC KIDNEY DISEASE, WITHOUT LONG-TERM CURRENT USE OF INSULIN (HCC): Status: ACTIVE | Noted: 2022-11-10

## 2024-01-09 PROCEDURE — 1090F PRES/ABSN URINE INCON ASSESS: CPT | Performed by: NURSE PRACTITIONER

## 2024-01-09 PROCEDURE — 3078F DIAST BP <80 MM HG: CPT | Performed by: NURSE PRACTITIONER

## 2024-01-09 PROCEDURE — G8484 FLU IMMUNIZE NO ADMIN: HCPCS | Performed by: NURSE PRACTITIONER

## 2024-01-09 PROCEDURE — 99350 HOME/RES VST EST HIGH MDM 60: CPT | Performed by: NURSE PRACTITIONER

## 2024-01-09 PROCEDURE — 1123F ACP DISCUSS/DSCN MKR DOCD: CPT | Performed by: NURSE PRACTITIONER

## 2024-01-09 PROCEDURE — G8420 CALC BMI NORM PARAMETERS: HCPCS | Performed by: NURSE PRACTITIONER

## 2024-01-09 PROCEDURE — 1036F TOBACCO NON-USER: CPT | Performed by: NURSE PRACTITIONER

## 2024-01-09 PROCEDURE — 3074F SYST BP LT 130 MM HG: CPT | Performed by: NURSE PRACTITIONER

## 2024-01-09 RX ORDER — TRAMADOL HYDROCHLORIDE 50 MG/1
50 TABLET ORAL DAILY PRN
COMMUNITY
Start: 2023-12-21 | End: 2024-01-09

## 2024-01-09 RX ORDER — TRAMADOL HYDROCHLORIDE 50 MG/1
50 TABLET ORAL DAILY PRN
Qty: 28 TABLET | Refills: 0 | Status: SHIPPED | OUTPATIENT
Start: 2024-01-09 | End: 2024-02-08

## 2024-01-09 RX ORDER — MONTELUKAST SODIUM 4 MG/1
1 TABLET, CHEWABLE ORAL DAILY
Qty: 60 TABLET | Refills: 3 | Status: SHIPPED | OUTPATIENT
Start: 2024-01-09

## 2024-01-09 NOTE — ASSESSMENT & PLAN NOTE
BP at goal on current regimen of metoprolol.  Patient followed by cardiologist Dr. Fernandez with recent visit 12/20/23. CBC, CMP obtained 11/2/23 and stable

## 2024-01-09 NOTE — ASSESSMENT & PLAN NOTE
Patient and son endorse memory issues and known mild dementia, but she has not done extensive cognitive testing.  Discussed re-establishing with neurologist for her history of migraines and for further evaluation, but she has some negative associations due to a history with her  and dementia.  She is aware of the need and was ultimately agreeable at last visit, referral placed and not yet scheduled.  Has some difficulty relaying medications and is very distractible during visit.  MMSE completed Nov 2023 with score 29/30.  SLP visited but no significant potential for improvement

## 2024-01-09 NOTE — ASSESSMENT & PLAN NOTE
Using 2L PRN with Luli.  Followed by Dr. Edgar, pulmonology with recent visit 12/27/23 (note reviewed prior to visit today). Per note review likely COPD with 30 PY history, PFTs completed but no results yet.  SpO2 88% on room air during visit today, declines to wear oxygen as she is experiencing no SOB.  She does wear oxygen periodically during the day for SOB and overnight.  One exacerbation of COPD Oct 2023 treated with doxycycline, benzonatate with some improvement.

## 2024-01-09 NOTE — PROGRESS NOTES
Anaphylaxis and Other (See Comments)     Passes out    Penicillins Anaphylaxis and Shortness Of Breath     Other reaction(s): anaphylaxis/angioedema        Sulfa Antibiotics Anaphylaxis, Shortness Of Breath and Rash     Other reaction(s): anaphylaxis/angioedema        Omeprazole Other (See Comments) and Dizziness or Vertigo    Ranitidine Hcl Nausea Only    Iodine     Montelukast Hallucinations           Seasonal     Tree Extract Itching     Cat dander, grass        Cefdinir Rash     Other reaction(s): mild rash/itching        Codeine Itching     Other reaction(s): other/intolerance  Dizziness. Pt states they take Benadryl to offset the reaction.    Famotidine Nausea And Vomiting    Jardiance [Empagliflozin] Rash    Morphine Itching and Other (See Comments)     Agitation,hallucinations        Current Outpatient Medications   Medication Sig    traMADol (ULTRAM) 50 MG tablet Take 1 tablet by mouth daily as needed. Max Daily Amount: 50 mg    busPIRone (BUSPAR) 15 MG tablet Take 15 mg by mouth daily as needed (severe anxiety)    aspirin 81 MG EC tablet Take 1 tablet by mouth daily    trospium (SANCTURA) 60 MG CP24 extended release capsule Take 1 capsule by mouth daily    dicyclomine (BENTYL) 10 MG capsule Take 1 capsule by mouth daily as needed (abdominal cramps).    fluticasone-salmeterol (ADVAIR) 100-50 MCG/ACT AEPB diskus inhaler INHALE 1 PUFF INTO THE LUNGS IN THE MORNING AND IN THE EVENING    loperamide (IMODIUM) 2 MG capsule TAKE 1 CAPSULE BY MOUTH EVERY 6 HOURS AS NEEDED FOR DIARRHEA (Patient taking differently: Take 2 capsules by mouthwith first loose stool and 2 capsule with any other loose stool - no more than 4 capsules daily)    warfarin (COUMADIN) 2 MG tablet Take 1 tablet by mouth daily 2 mg every day except 4 mg on Wednesdays (Patient taking differently: Take 1 tablet by mouth daily 2 mg every day)    Continuous Blood Gluc Sensor (Wholesome PetsSTYLE JEY 3 SENSOR) [facility] [place] change Jey sensor weekly.

## 2024-01-09 NOTE — ASSESSMENT & PLAN NOTE
A1C 7.1% on 11/2/23.  Recently discontinued Januvia and Metformin.  Uses Gunjan meter with readings in 130s. Denies hypoglycemic episodes, she does report Gunjan does alarm, she does not look at it. No s/s of hypoglycemia/hyperglycemia today.  She does require assistance with changing the sensor, HHSN, team nurse, and I have exchanged during visits (exchanged today)

## 2024-01-09 NOTE — ASSESSMENT & PLAN NOTE
Patient is high risk for adverse effects from multiple medications, particularly in setting of dementia with some demonstrated inability to appropriately recall.  Per notes from prior PCP, attempted to deprescribe some medications but this was complicated by inability to review pill bottles and she has seen several other physicians during hospital and rehab visits.  Discussed and already d/c:   Metformin- was not taking, A1C at goal  Celebrex- risk of GIB with Coumadin  Seroquel- has not been taking and did not find helpful  Xyzal- has not been taking, no clear indication  Myrbetriq discontinued by urology  Cymbalta- no benefit, multiple serotonin agents   Working to decrease dicyclomine if Colestid improves diarrhea  Revised hard copy of medication list provided today,  facilitating pre-packaged medications from pharmacy

## 2024-01-09 NOTE — ASSESSMENT & PLAN NOTE
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  Currently managed on warfarin and aspirin 81m, also with history of DVT and protein C deficiency.   Followed by Cardiologist Dr. Daniels with last visit 12/17/23 (note reviewed prior to visit today).  Per note, referred to hematology with goal to discontinue warfarin.  INR has been subtherapeutic with multiple adjustments and unclear compliance with instructions.

## 2024-01-09 NOTE — ASSESSMENT & PLAN NOTE
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  Continue esomeprazole daily, endorses intermittent heartburn symptoms depending on dietary choices

## 2024-01-09 NOTE — ASSESSMENT & PLAN NOTE
With chronic resp failure on 2L PRN at baseline, using Luli.  Hospitalization 7/2023 for respiratory failure. Reports being on Advair/Wixela but none in home (Refill sent today).  She is using PRN Albuterol.  Followed by PAR with Dr Edgar, last visit 8/30/23, follow up 12/28/23.  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

## 2024-01-10 ENCOUNTER — HOME CARE VISIT (OUTPATIENT)
Facility: HOME HEALTH | Age: 84
End: 2024-01-10
Payer: MEDICARE

## 2024-01-10 ENCOUNTER — TELEPHONE (OUTPATIENT)
Facility: CLINIC | Age: 84
End: 2024-01-10

## 2024-01-10 PROCEDURE — G0299 HHS/HOSPICE OF RN EA 15 MIN: HCPCS

## 2024-01-10 NOTE — ASSESSMENT & PLAN NOTE
Endorses watery and explosive episodes most days, has been chronic for at least 10 years.  Reports full work-up at [facility] with colonoscopy with no findings. She attempts to modify food choices, but this is limited by availability as she is not able to shop for her own groceries and also her diabetic, limited Vit K diet.  Currently using imodium daily and Bentyl about once daily.  She has tried Colestid in the past and does not recall a bad reaction but it was discontinued at some point.  Will restart at this time with goal to improve QOL, reduce fall risk, and decrease need for Bentyl.

## 2024-01-10 NOTE — TELEPHONE ENCOUNTER
Called patient regarding INR results of 2.6- continue same coumadin dose of 4mg Tues and Sunday, 2mg all other days.  She read back results correctly.     Also notified appointment has been scheduled with hematologist Dr. Steele with [facility] for Feb 21 @ 2PM     Patient already received her Tramadol and Colestid (picked up by her son) and was very appreciative.      Call placed to son to relay new information from visit - these medications, scheduling of appointments, plan for pre-packaged pills through [facility] arranged by home health RN Isidra.  He stated understanding and expressed appreciation

## 2024-01-10 NOTE — ASSESSMENT & PLAN NOTE
Currently followed by Dr. Robertson with last visit 11/29/23.  Planning for spinal injections 3/12/24.  She reports these bring some temporary relief. Pain has not responded to Tylenol, lidocaine patches, Cymbalta. Some relief with Lyrica prescribed by Dr. Robertson but pain is still severe, interfering with ADLs and daily life, per patietn increases her fall risk as she has to hunch and bend to ambulate due to severe pain, pain has limited PT progress. NSAIDs not an option given coumadin and aspirin.  Has intermittently used Tramadol with good response.  Will continue this prescription at this time with goal to improve function and QOL.  Recent reduction in serotonin agents (Cymbalta), but discussed risks of hyponatremia, constipation, drowsiness, falls and she states understanding.

## 2024-01-11 ENCOUNTER — HOME CARE VISIT (OUTPATIENT)
Facility: HOME HEALTH | Age: 84
End: 2024-01-11
Payer: MEDICARE

## 2024-01-11 VITALS
RESPIRATION RATE: 17 BRPM | TEMPERATURE: 98.6 F | OXYGEN SATURATION: 95 % | SYSTOLIC BLOOD PRESSURE: 124 MMHG | HEART RATE: 88 BPM | DIASTOLIC BLOOD PRESSURE: 76 MMHG

## 2024-01-11 VITALS
DIASTOLIC BLOOD PRESSURE: 68 MMHG | OXYGEN SATURATION: 96 % | TEMPERATURE: 98 F | SYSTOLIC BLOOD PRESSURE: 130 MMHG | HEART RATE: 80 BPM | RESPIRATION RATE: 16 BRPM

## 2024-01-11 PROCEDURE — G0151 HHCP-SERV OF PT,EA 15 MIN: HCPCS

## 2024-01-11 RX ORDER — WARFARIN SODIUM 2 MG/1
2 TABLET ORAL SEE ADMIN INSTRUCTIONS
Qty: 100 TABLET | Refills: 1 | Status: SHIPPED | OUTPATIENT
Start: 2024-01-11

## 2024-01-11 ASSESSMENT — ENCOUNTER SYMPTOMS
PAIN LOCATION - PAIN QUALITY: ACHE
PAIN LOCATION - PAIN QUALITY: ACHING, SHOOTING

## 2024-01-11 NOTE — HOME HEALTH
Subjective: \"I am in so much pain today I cannot even move. \"  Falls since last visit: No (if yes complete the Fall Tracking Form and include bsrifallreport):   Caregiver involvement changes: n/a  Home health supplies by type and quantity ordered/delivered this visit include: n/a    Clinician asked if patient has had any physician contact since last home care visit and patient states: NO  Clinician asked if patient has any new or changed medications and patient states:  NO   If Yes, were medications reconciled? N/A   Was the certifying physician notified of changes in medications? N/A     Clinical assessment (what this visit means for the patient overall and need for ongoing skilled care) and progress or lack of progress towards SPECIFIC goals: Patient with mobility and cognitive deficit and frequent falls and med mgmt issues requiring SN services for assessment, education and med mgmt to reduce risk for med errors  rehospitalization. Patient progressing towards educational  goals but not yet met.      Written Teaching Material Utilized: N/A    Interdisciplinary communication with: Yarely Casey NP for the purpose of POC collaboration    Discharge planning as follows: When goals are met    Specific plan for next visit: Assessment, med mgmt education

## 2024-01-12 ENCOUNTER — TELEPHONE (OUTPATIENT)
Facility: CLINIC | Age: 84
End: 2024-01-12

## 2024-01-12 ENCOUNTER — HOME CARE VISIT (OUTPATIENT)
Dept: HOME HEALTH SERVICES | Facility: HOME HEALTH | Age: 84
End: 2024-01-12
Payer: MEDICARE

## 2024-01-12 NOTE — HOME HEALTH
Subjective: \" I am in so much pain I can barely walk\"  Falls since last visit : no falls  Caregiver involvement changes: NA  Home health supplies by type and quantity ordered/delivered this visit include: NA    Clinician asked if patient has had any physician contact since last home care visit and patient states: yes  Clinician asked if patient has any new or changed medications and patient states: yes tramadol and colestipol  If Yes, were medications reconciled? yes  Was the certifying physician notified of changes in medications? NA    Clinical assessment (what this visit means for the patient overall and need for ongoing skilled care) and progress or lack of progress towards SPECIFIC goals:   The PT worked with the pt today on the new medication profile because the pt was confused as to which one was the newest list so the PT contacts SN and the PCP and the PCP confirmed which med list was correct and the two medication added after list was printed which the PT corrected and showed it to the pt. The pt was c/o severe R sided sciatic pain that came out of no where when she woke yesterday and has gotten worse. and is not responded to the tramadol. The Pt completed her seated HEP but refused to walk or do standing HEP due to the pain. The MD was notified and the Pt to call the ortho doc to see if they can do the injections earlier.     Written Teaching Material Utilized: medication list    Interdisciplinary communication with: Isidra Hartmann RN about medication profile and PCP Rosina Godwin about the correct medication list  Discharge planning as follows: Is no longer homebound, Per physician order, Will discharge when the patient has reached their maximum functional potential and maximum safety in their home and When goals are met    Specific plan for next visit: Re-instruct patient/caregiver on HEP, transfers, Educate in s/s of infection or worsening pain and when to call MD KEVIN or 911 and Instruct in safety

## 2024-01-12 NOTE — TELEPHONE ENCOUNTER
Thank you for letting me know.  This is from her orthopedic doctor and I have seen no intention of it being discontinued.  Perhaps she is notifying due to the recent script for Tramadol, but it was my understanding she would take both.      Thank you!

## 2024-01-12 NOTE — TELEPHONE ENCOUNTER
Call returned to patient.  Patient states that she is in a lot of \"sciatic\" pain. She verbalized that she had 2 refills of lyrica at the pharmacy and she requested the dispense of the medication. But she didn't want to take it without informing Rosina Casey NP.    I responded that I will provide the information to NP. Patient verbalized appreciation for the call back.

## 2024-01-12 NOTE — TELEPHONE ENCOUNTER
Medication problem - The patient called to update Yarely Casey NP that her ortho doctor, Dr. Gilliam (sounded like) prescribed lyrica which, she said is pregabalin.  She said that the orthopedist cannot do the procedure until 3/12/24.    The patient's callback is [phone]

## 2024-01-15 DIAGNOSIS — N32.81 OAB (OVERACTIVE BLADDER): Primary | ICD-10-CM

## 2024-01-15 DIAGNOSIS — J84.10 PULMONARY FIBROSIS (HCC): ICD-10-CM

## 2024-01-15 DIAGNOSIS — K52.9 CHRONIC DIARRHEA: ICD-10-CM

## 2024-01-15 DIAGNOSIS — I10 PRIMARY HYPERTENSION: ICD-10-CM

## 2024-01-15 DIAGNOSIS — M47.9 ADVANCED OSTEOARTHRITIS OF SPINE: ICD-10-CM

## 2024-01-15 DIAGNOSIS — G43.109 MIGRAINE WITH AURA AND WITHOUT STATUS MIGRAINOSUS, NOT INTRACTABLE: ICD-10-CM

## 2024-01-15 DIAGNOSIS — F41.8 DEPRESSION WITH ANXIETY: ICD-10-CM

## 2024-01-15 DIAGNOSIS — J44.9 CHRONIC OBSTRUCTIVE PULMONARY DISEASE, UNSPECIFIED COPD TYPE (HCC): ICD-10-CM

## 2024-01-15 DIAGNOSIS — I25.10 CORONARY ARTERY DISEASE INVOLVING NATIVE CORONARY ARTERY OF NATIVE HEART WITHOUT ANGINA PECTORIS: ICD-10-CM

## 2024-01-15 DIAGNOSIS — Z86.718 HISTORY OF DVT (DEEP VEIN THROMBOSIS): ICD-10-CM

## 2024-01-15 DIAGNOSIS — K21.9 GASTROESOPHAGEAL REFLUX DISEASE WITHOUT ESOPHAGITIS: ICD-10-CM

## 2024-01-15 RX ORDER — LEVETIRACETAM 500 MG/1
500 TABLET ORAL
Qty: 90 TABLET | Refills: 1 | Status: SHIPPED | OUTPATIENT
Start: 2024-01-15

## 2024-01-15 RX ORDER — EZETIMIBE 10 MG/1
10 TABLET ORAL DAILY
Qty: 90 TABLET | Refills: 1 | Status: SHIPPED | OUTPATIENT
Start: 2024-01-15

## 2024-01-15 RX ORDER — TROSPIUM CHLORIDE ER 60 MG/1
60 CAPSULE ORAL DAILY
Qty: 90 CAPSULE | Refills: 1 | Status: SHIPPED | OUTPATIENT
Start: 2024-01-15 | End: 2024-07-13

## 2024-01-15 RX ORDER — ESCITALOPRAM OXALATE 10 MG/1
10 TABLET ORAL DAILY
Qty: 90 TABLET | Refills: 1 | Status: SHIPPED | OUTPATIENT
Start: 2024-01-15

## 2024-01-15 RX ORDER — ASPIRIN 81 MG/1
81 TABLET ORAL DAILY
Qty: 90 TABLET | Refills: 1 | Status: SHIPPED | OUTPATIENT
Start: 2024-01-15 | End: 2024-07-13

## 2024-01-15 RX ORDER — TRAMADOL HYDROCHLORIDE 50 MG/1
50 TABLET ORAL DAILY PRN
Qty: 28 TABLET | Refills: 0 | Status: CANCELLED | OUTPATIENT
Start: 2024-01-15 | End: 2024-02-14

## 2024-01-15 RX ORDER — LOPERAMIDE HYDROCHLORIDE 2 MG/1
CAPSULE ORAL
Qty: 90 CAPSULE | Refills: 1 | Status: SHIPPED | OUTPATIENT
Start: 2024-01-15

## 2024-01-15 RX ORDER — BUTALBITAL, ACETAMINOPHEN AND CAFFEINE 50; 325; 40 MG/1; MG/1; MG/1
1 TABLET ORAL DAILY PRN
Qty: 30 TABLET | Refills: 3 | Status: CANCELLED | OUTPATIENT
Start: 2024-01-15 | End: 2024-05-14

## 2024-01-15 RX ORDER — FLUTICASONE PROPIONATE AND SALMETEROL 100; 50 UG/1; UG/1
POWDER RESPIRATORY (INHALATION)
Qty: 180 EACH | Refills: 1 | Status: SHIPPED | OUTPATIENT
Start: 2024-01-15

## 2024-01-15 RX ORDER — ONDANSETRON 4 MG/1
4 TABLET, FILM COATED ORAL EVERY 8 HOURS PRN
Qty: 30 TABLET | Refills: 2 | Status: SHIPPED | OUTPATIENT
Start: 2024-01-15 | End: 2024-02-14

## 2024-01-15 RX ORDER — MONTELUKAST SODIUM 4 MG/1
1 TABLET, CHEWABLE ORAL DAILY
Qty: 60 TABLET | Refills: 1 | Status: SHIPPED | OUTPATIENT
Start: 2024-01-15 | End: 2024-05-14

## 2024-01-15 RX ORDER — METOPROLOL SUCCINATE 50 MG/1
50 TABLET, EXTENDED RELEASE ORAL DAILY
Qty: 90 TABLET | Refills: 1 | Status: SHIPPED | OUTPATIENT
Start: 2024-01-15

## 2024-01-15 RX ORDER — ATORVASTATIN CALCIUM 20 MG/1
20 TABLET, FILM COATED ORAL EVERY EVENING
Qty: 90 TABLET | Refills: 1 | Status: SHIPPED | OUTPATIENT
Start: 2024-01-15 | End: 2024-01-16 | Stop reason: SDUPTHER

## 2024-01-15 RX ORDER — ALBUTEROL SULFATE 90 UG/1
2 AEROSOL, METERED RESPIRATORY (INHALATION) EVERY 6 HOURS PRN
Qty: 1 EACH | Refills: 2 | Status: SHIPPED | OUTPATIENT
Start: 2024-01-15

## 2024-01-15 RX ORDER — TRAZODONE HYDROCHLORIDE 100 MG/1
100 TABLET ORAL NIGHTLY
Qty: 90 TABLET | Refills: 1 | Status: SHIPPED | OUTPATIENT
Start: 2024-01-15

## 2024-01-15 RX ORDER — DICYCLOMINE HYDROCHLORIDE 10 MG/1
10 CAPSULE ORAL DAILY PRN
Qty: 30 CAPSULE | Refills: 2 | Status: SHIPPED | OUTPATIENT
Start: 2024-01-15 | End: 2024-04-14

## 2024-01-15 RX ORDER — ESCITALOPRAM OXALATE 20 MG/1
20 TABLET ORAL EVERY MORNING
Qty: 90 TABLET | Refills: 1 | Status: CANCELLED | OUTPATIENT
Start: 2024-01-15

## 2024-01-15 RX ORDER — MONTELUKAST SODIUM 4 MG/1
1 TABLET, CHEWABLE ORAL DAILY
Qty: 60 TABLET | Refills: 3 | Status: CANCELLED | OUTPATIENT
Start: 2024-01-15

## 2024-01-15 RX ORDER — AMITRIPTYLINE HYDROCHLORIDE 10 MG/1
10 TABLET, FILM COATED ORAL EVERY MORNING
Qty: 90 TABLET | Refills: 1 | Status: SHIPPED | OUTPATIENT
Start: 2024-01-15 | End: 2024-01-17 | Stop reason: SDUPTHER

## 2024-01-15 RX ORDER — LEVETIRACETAM 750 MG/1
750 TABLET ORAL EVERY EVENING
Qty: 90 TABLET | Refills: 1 | Status: SHIPPED | OUTPATIENT
Start: 2024-01-15

## 2024-01-15 RX ORDER — FLUTICASONE PROPIONATE 50 MCG
2 SPRAY, SUSPENSION (ML) NASAL DAILY PRN
Qty: 16 G | Refills: 2 | Status: SHIPPED | OUTPATIENT
Start: 2024-01-15

## 2024-01-15 RX ORDER — WARFARIN SODIUM 2 MG/1
2 TABLET ORAL SEE ADMIN INSTRUCTIONS
Qty: 100 TABLET | Refills: 1 | Status: SHIPPED | OUTPATIENT
Start: 2024-01-15

## 2024-01-15 NOTE — TELEPHONE ENCOUNTER
Medications reviewed and signed as appropriate   (Did not sent PRN meds that were recently refilled such as Tramadol and Fioricet)

## 2024-01-15 NOTE — TELEPHONE ENCOUNTER
Call received from Esther, pharmacist from [facility] pharmacy - she states that the prescriptions that were transferred form previous pharmacy ([facility]) were all out of refills.  List of medications reviewed with Esther.   Rosina Casey and home Health nurses are working to obtain pre-packed doses.   Rx sent for NP's review.

## 2024-01-16 ENCOUNTER — HOME CARE VISIT (OUTPATIENT)
Facility: HOME HEALTH | Age: 84
End: 2024-01-16
Payer: MEDICARE

## 2024-01-16 VITALS
OXYGEN SATURATION: 94 % | DIASTOLIC BLOOD PRESSURE: 74 MMHG | TEMPERATURE: 97.5 F | RESPIRATION RATE: 16 BRPM | HEART RATE: 88 BPM | SYSTOLIC BLOOD PRESSURE: 124 MMHG

## 2024-01-16 DIAGNOSIS — I25.10 CORONARY ARTERY DISEASE INVOLVING NATIVE CORONARY ARTERY OF NATIVE HEART WITHOUT ANGINA PECTORIS: ICD-10-CM

## 2024-01-16 DIAGNOSIS — M47.9 ADVANCED OSTEOARTHRITIS OF SPINE: Primary | ICD-10-CM

## 2024-01-16 PROCEDURE — G0299 HHS/HOSPICE OF RN EA 15 MIN: HCPCS

## 2024-01-16 RX ORDER — PREGABALIN 50 MG/1
50 CAPSULE ORAL 2 TIMES DAILY
Qty: 30 CAPSULE | Refills: 0 | Status: SHIPPED | OUTPATIENT
Start: 2024-01-16 | End: 2024-01-31

## 2024-01-16 RX ORDER — ATORVASTATIN CALCIUM 20 MG/1
20 TABLET, FILM COATED ORAL DAILY
Qty: 90 TABLET | Refills: 1 | OUTPATIENT
Start: 2024-01-16

## 2024-01-16 RX ORDER — ATORVASTATIN CALCIUM 20 MG/1
20 TABLET, FILM COATED ORAL EVERY EVENING
Qty: 30 TABLET | Refills: 0 | Status: SHIPPED | OUTPATIENT
Start: 2024-01-16 | End: 2024-01-17

## 2024-01-16 ASSESSMENT — ENCOUNTER SYMPTOMS
DYSPNEA ACTIVITY LEVEL: AFTER AMBULATING LESS THAN 10 FT
DIARRHEA: 1
PAIN LOCATION - PAIN QUALITY: SHOOTING

## 2024-01-16 NOTE — HOME HEALTH
Subjective: \"I am hurting so bad. My sciatic nerve now is shotting\"  Falls since last visit: No (if yes complete the Fall Tracking Form and include bsrifallreport):   Caregiver involvement changes: n/a  Home health supplies by type and quantity ordered/delivered this visit include: n/a    Clinician asked if patient has had any physician contact since last home care visit and patient states: NO  Clinician asked if patient has any new or changed medications and patient states:  NO   If Yes, were medications reconciled? N/A   Was the certifying physician notified of changes in medications? N/A =    Clinical assessment (what this visit means for the patient overall and need for ongoing skilled care) and progress or lack of progress towards SPECIFIC goals: Patient with frequent falls and med mgmt concerns requiring SN services for assessment, education and teaching to reduce risk for falls and med errors and rehospitalization. Patient progressing towards educational goals but not yet met.      Written Teaching Material Utilized: N/A    Interdisciplinary communication with: care team for the purpose of POC collaboration    Discharge planning as follows: When goals are met    Specific plan for next visit: Assessment, med teaching, confirm [place] pharmacy pill packs status/

## 2024-01-16 NOTE — PROGRESS NOTES
Patient is in process of transitioning meds to [facility] so they can be pre-packaged.  However, she has only a few tabs left of atorvastatin and pregabalin.  PDMP reviewed with last refill of pregabalin 12/18/24 for 30-day supply.  This is typically prescribed by Dr. Robertson but she does not feel she has time to contact and get this refilled prior to running out, would also need to clarify the pharmacy changes so requests this provider refill temporarily.      Sent Rx for pregabalin for 15-day supply. she is arranging follow-up with Dr. Robertson and will request full refill at that time.  Rx for atorvastatin also sent to [facility] while [facility] is preparing her pre-packaged medications.      Also notified of INR results 2.2, continue same dose of warfarin 4mg on Tues, Sundays and 2mg all other days.

## 2024-01-17 ENCOUNTER — SOCIAL WORK (OUTPATIENT)
Facility: CLINIC | Age: 84
End: 2024-01-17

## 2024-01-17 RX ORDER — AMITRIPTYLINE HYDROCHLORIDE 10 MG/1
10 TABLET, FILM COATED ORAL NIGHTLY
Qty: 90 TABLET | Refills: 1 | Status: SHIPPED | OUTPATIENT
Start: 2024-01-17

## 2024-01-17 RX ORDER — ATORVASTATIN CALCIUM 20 MG/1
20 TABLET, FILM COATED ORAL EVERY EVENING
Qty: 30 TABLET | Refills: 0 | Status: SHIPPED | OUTPATIENT
Start: 2024-01-17 | End: 2024-07-15

## 2024-01-17 SDOH — ECONOMIC STABILITY: FOOD INSECURITY: WITHIN THE PAST 12 MONTHS, YOU WORRIED THAT YOUR FOOD WOULD RUN OUT BEFORE YOU GOT MONEY TO BUY MORE.: NEVER TRUE

## 2024-01-17 SDOH — ECONOMIC STABILITY: FOOD INSECURITY: WITHIN THE PAST 12 MONTHS, THE FOOD YOU BOUGHT JUST DIDN'T LAST AND YOU DIDN'T HAVE MONEY TO GET MORE.: NEVER TRUE

## 2024-01-17 SDOH — HEALTH STABILITY: PHYSICAL HEALTH: ON AVERAGE, HOW MANY DAYS PER WEEK DO YOU ENGAGE IN MODERATE TO STRENUOUS EXERCISE (LIKE A BRISK WALK)?: 0 DAYS

## 2024-01-17 SDOH — HEALTH STABILITY: PHYSICAL HEALTH: ON AVERAGE, HOW MANY MINUTES DO YOU ENGAGE IN EXERCISE AT THIS LEVEL?: 0 MIN

## 2024-01-17 SDOH — ECONOMIC STABILITY: INCOME INSECURITY: HOW HARD IS IT FOR YOU TO PAY FOR THE VERY BASICS LIKE FOOD, HOUSING, MEDICAL CARE, AND HEATING?: NOT HARD AT ALL

## 2024-01-17 SDOH — ECONOMIC STABILITY: INCOME INSECURITY: IN THE LAST 12 MONTHS, WAS THERE A TIME WHEN YOU WERE NOT ABLE TO PAY THE MORTGAGE OR RENT ON TIME?: NO

## 2024-01-17 SDOH — ECONOMIC STABILITY: HOUSING INSECURITY: IN THE LAST 12 MONTHS, HOW MANY PLACES HAVE YOU LIVED?: 1

## 2024-01-17 NOTE — PROGRESS NOTES
Social Determinants of Health screening updated.     STEVEN Paul, LCSW  Licensed Clinical   [facility]  (O) [phone]  (C) [phone]

## 2024-01-18 ENCOUNTER — HOME CARE VISIT (OUTPATIENT)
Facility: HOME HEALTH | Age: 84
End: 2024-01-18
Payer: MEDICARE

## 2024-01-18 ENCOUNTER — TELEPHONE (OUTPATIENT)
Facility: CLINIC | Age: 84
End: 2024-01-18

## 2024-01-18 VITALS
RESPIRATION RATE: 17 BRPM | OXYGEN SATURATION: 92 % | HEART RATE: 75 BPM | SYSTOLIC BLOOD PRESSURE: 118 MMHG | DIASTOLIC BLOOD PRESSURE: 66 MMHG | TEMPERATURE: 98.3 F

## 2024-01-18 PROCEDURE — G0151 HHCP-SERV OF PT,EA 15 MIN: HCPCS

## 2024-01-18 NOTE — TELEPHONE ENCOUNTER
Medication Refill Questions - Esther called from [facility] to ask for clarification on the following medications for the patient.  She said that she has not received scripts for them, but they are on the patient's medication list.    Kepra - 750 mgs & 500 mgs (levetiracetam)    Pregabalin - 50 mgs    Warfarin - Clarification on when to be taken.  The current script says 2 mgs every morning, but Moses was sent 2 tabs on Sunday and Tuesday, 1 tab on M, W, Th, F, Sat.    Esther's callback is [phone]

## 2024-01-18 NOTE — TELEPHONE ENCOUNTER
Call discussed with Rosina Casey NP.    Call returned to [facility] - I spoke with pharmacist. She was able to locate the prescriptions for Keppra (both). Clarification about Warfarin provided - take 4 mg on Sundays and Tuesdays and 2 mg on the other days of the week.   I also informed that Lyrica is prescribed by Dr. Robertson, from [facility]. Pharmacist stated that they will contact MD.

## 2024-01-19 ASSESSMENT — ENCOUNTER SYMPTOMS
DYSPNEA ACTIVITY LEVEL: AFTER AMBULATING 10 - 20 FT
DIARRHEA: 1

## 2024-01-19 NOTE — HOME HEALTH
Subjective: \" I am in so much pain all the time\"   Falls since last visit: no falls  Caregiver involvement changes: NA  Home health supplies by type and quantity ordered/delivered this visit include: NA    Clinician asked if patient has had any physician contact since last home care visit and patient states:no  Clinician asked if patient has any new or changed medications and patient states:  no  If Yes, were medications reconciled?yes  Was the certifying physician notified of changes in medications? NA    Clinical assessment (what this visit means for the patient overall and need for ongoing skilled care) and progress or lack of progress towards SPECIFIC goals:   The Pt was making good progress and then suddenly started having severe back pain down the legs on both sides that is affecting her function and severely impacting her mobility. She is not able to walk more than just a short distance without intense pain. the medications she is taking for pain are not taking much of an edge off at this time. She is finding it very difficult to do her laundry or fix her meals and spends most of the day lying on a heating pad for some relief. She is not longer able to tolerate standing HEP with thePT or balance drills or walk down the roper to build endurance. PT and the pt had a discussion and decided to end PT Today as she cannot fully participate and revisit PT with the PCP after the pt has a back proceedure in March for epidural placement. The Pt has significant spinal arthritis that cannot be corrected with surgery as she is not a candidate so the epidureal is the best available option at this time. PT is DC into the care of her family, PCP and medical staff at this time    Written Teaching Material Utilized: Discharge instructions    Interdisciplinary communication with: PT let Linda Jonas know that she is the last discipline out  Discharge planning as follows: DC today    Specific plan for next visit: NA

## 2024-01-22 ENCOUNTER — TELEPHONE (OUTPATIENT)
Facility: CLINIC | Age: 84
End: 2024-01-22

## 2024-01-22 DIAGNOSIS — G43.109 MIGRAINE WITH AURA AND WITHOUT STATUS MIGRAINOSUS, NOT INTRACTABLE: Primary | ICD-10-CM

## 2024-01-22 RX ORDER — BUTALBITAL, ACETAMINOPHEN AND CAFFEINE 50; 325; 40 MG/1; MG/1; MG/1
1 TABLET ORAL EVERY 6 HOURS PRN
Qty: 12 TABLET | Refills: 0 | Status: SHIPPED | OUTPATIENT
Start: 2024-01-22

## 2024-01-22 NOTE — TELEPHONE ENCOUNTER
Call returned to pharmacy - I was informed that patient is requesting the refill of the medication. Per chart review, Rosina Casey NP sent Rx for 15 tabs in November.  NP did not refill the prescription when she sent all of patient's prescriptions to the pharmacy. NP is off today, I asked Yoli Drummond NP for advise about this.

## 2024-01-22 NOTE — TELEPHONE ENCOUNTER
Medication Refill - Esther called from the [facility] stating that they did not receive a refill request for the patient's Fioricet script, taken for headaches.  Esther asks for a callback to clarify if the patient is still taking this medication.    Esther's callback is [phone]

## 2024-01-23 ENCOUNTER — HOME CARE VISIT (OUTPATIENT)
Facility: HOME HEALTH | Age: 84
End: 2024-01-23
Payer: MEDICARE

## 2024-01-23 ENCOUNTER — TELEPHONE (OUTPATIENT)
Facility: CLINIC | Age: 84
End: 2024-01-23

## 2024-01-23 VITALS
HEART RATE: 86 BPM | RESPIRATION RATE: 16 BRPM | TEMPERATURE: 98.3 F | SYSTOLIC BLOOD PRESSURE: 124 MMHG | OXYGEN SATURATION: 93 % | DIASTOLIC BLOOD PRESSURE: 68 MMHG

## 2024-01-23 PROCEDURE — G0299 HHS/HOSPICE OF RN EA 15 MIN: HCPCS

## 2024-01-23 NOTE — TELEPHONE ENCOUNTER
INR result discussed with Rosina Casey, NP - Order received to hold warfarin until Re-checked.     Call placed to JUDE Mckeon with [facility] - I provided NP's order, and asked if patient would have another SN visit this week. Linda verbalized that she can go back on Thursday to re-check INR.  RN stated that she will call patient immediately to prevent her from taking medication today, and hold until her visit on Thursday.

## 2024-01-23 NOTE — TELEPHONE ENCOUNTER
INR - 6.2    OTHER - Esperanza called from [facility] to update Yarely Casey NP that the patient had an out of range INR reading of 6.2    Esperanza's callback if needed is [phone]

## 2024-01-23 NOTE — TELEPHONE ENCOUNTER
INR - 6.2    Other - Linda Flood is an RN with [facility].  She called to update Yarely Casey NP that the patient's INR reading today was 6.2.  She said that the patient was out of strips for her home machine so Linda took the reading today.  She states that the patient has ordered more strips, but they have not arrived yet.    Linda's callback  if needed is [phone]

## 2024-01-24 NOTE — HOME HEALTH
Subjective: Pt states when sitting she has no pain  Falls since last visit No(if yes complete the Fall Tracking Form and include bsrifallreport):   Caregiver involvement changes: no  Home health supplies by type and quantity ordered/delivered this visit include: no    Clinician asked if patient has had any physician contact since last home care visit and patient states: N/A  Clinician asked if patient has any new or changed medications and patient states:  N/A   If Yes, were medications reconciled? N/A   Was the certifying physician notified of changes in medications? N/A     Clinical assessment (what this visit means for the patient overall and need for ongoing skilled care) and progress or lack of progress towards SPECIFIC goals: Patient ask me to check her INR due to she is out of strips. INR was 6.2 and Patients provider Felicity Casey was notified and ask pt to stop coumadin and SN to recheck on thursday pt INR. sn NEEDED FOR FURTHER EVAL AND TREATMENT.    Written Teaching Material Utilized: N/A    Interdisciplinary communication with: N/A     Discharge planning as follows: When goals are met    Specific plan for next visit: Instruct in safety issues regarding clutter

## 2024-01-25 ENCOUNTER — TELEPHONE (OUTPATIENT)
Facility: CLINIC | Age: 84
End: 2024-01-25

## 2024-01-25 ENCOUNTER — HOME CARE VISIT (OUTPATIENT)
Facility: HOME HEALTH | Age: 84
End: 2024-01-25
Payer: MEDICARE

## 2024-01-25 PROCEDURE — G0299 HHS/HOSPICE OF RN EA 15 MIN: HCPCS

## 2024-01-25 ASSESSMENT — ENCOUNTER SYMPTOMS: PAIN LOCATION - PAIN QUALITY: SQUEEZING

## 2024-01-25 NOTE — TELEPHONE ENCOUNTER
I called patient to remind of their in home visit w/ Rosina Casey on 1/30/2024, arrival between 12-4.  I reached patient's VM and left a message to please call the [facility] office to confirm the visit.

## 2024-01-25 NOTE — TELEPHONE ENCOUNTER
INR Reading -     Linda from [facility] called to update Yarely Casey NP that the patient's INR reading was 2.2 on her home machine.    IBF - The patient asks that the NP please call her about her medication for IBF.

## 2024-01-25 NOTE — HOME HEALTH
Subjective: Patient stqtes that she is having constipation  Falls since last visit No(if yes complete the Fall Tracking Form and include bsrifallreport):   Caregiver involvement changes: no  Home health supplies by type and quantity ordered/delivered this visit include: no    Clinician asked if patient has had any physician contact since last home care visit and patient states: N/A  Clinician asked if patient has any new or changed medications and patient states:  N/A   If Yes, were medications reconciled? N/A   Was the certifying physician notified of changes in medications? N/A     Clinical assessment (what this visit means for the patient overall and need for ongoing skilled care) and progress or lack of progress towards SPECIFIC goals: Rechecked pt INR and her results are 2.2 today and Felicity Casey is aware. SN needed for further eval and treatment.    Written Teaching Material Utilized: N/A    Interdisciplinary communication with: N/A     Discharge planning as follows: When goals are met    Specific plan for next visit: Instruct in safety issues regarding clutter

## 2024-01-26 ENCOUNTER — TELEPHONE (OUTPATIENT)
Facility: CLINIC | Age: 84
End: 2024-01-26

## 2024-01-26 NOTE — TELEPHONE ENCOUNTER
Call returned to patient.  Advised to restart warfarin at 2mg daily (one pill per day).  We will recheck INR together on Tuesday 1/30/24.  She stated understanding.      Re: IBS/constipation.  She reports the medication worked \"beautifully\" but after taking it daily for a few weeks, she has now gone 4 days with no BM.  This morning it finally \"broke loose\" with a laxative.  She would still want to use the medication as it is helpful.  Advised to reduce to every other day (if no BM on 2nd day can reduce to once every 3rd day).  Will continue to discuss at her follow-up on Tuesday.      She states appreciates, will write down any additional questions for upcoming follow-up

## 2024-01-26 NOTE — TELEPHONE ENCOUNTER
Notified patient that this medication had been called to [facility], may be cost as it has already been sent to [facility].  She stated understanding, plans to call [facility] to see when medications will be sent

## 2024-01-26 NOTE — TELEPHONE ENCOUNTER
Medication Problem - The patient called earlier to request a refill, during the call she said that she is having a problem with another medication - colestipol.  She states that the medication was prescribed to stop the patient's diarrhea, but she is now constipated and has stopped taking the medication.  She reports feeling abdominal pressure and asks about dosage.    INR - The patient stated that her INR was 2.2 and asks what the next step is.    The patient's callback is [phone]

## 2024-01-26 NOTE — TELEPHONE ENCOUNTER
Call placed to [facility] - I spoke with pharmacistJuan - Verbal order provided for Nexium (Esomeprazole) 20 mg Daily. Per chart review AND pharmacy history, patient does NOT have 40 mg tabs orders.     Pharmacist also advised that if the medication was already charged by [facility], her insurance might not cover the cost.  I requested only 30 tabs. This might need to be paid out of pocket!

## 2024-01-26 NOTE — TELEPHONE ENCOUNTER
Patient Name: Adenike Dasilva  YOB: 1940    Medication Refill Request Triage    Requested by:    [x]  Patient  []  Support person:      Last [facility] Visit:  1/9/2024    Next Home Visit: 1/30/2024    Preferred Pharmacy: *[facility]:  *    Medications requested:  *esomeprazole, 40 mgs for heartburn    Is patient completely out?  []   YES  [x]   NO  If NO, how many pills does patient have left?  _2_    Other pertinent information shared:   The patient stated that she is changing to pill packs and [facility] has sent all medication refills to another company to be filled.  She said that she has no way to get refills and needs the NP to \"phone in a new prescriptions to [facility]\".

## 2024-01-30 ENCOUNTER — OFFICE VISIT (OUTPATIENT)
Facility: CLINIC | Age: 84
End: 2024-01-30
Payer: MEDICARE

## 2024-01-30 VITALS
SYSTOLIC BLOOD PRESSURE: 122 MMHG | DIASTOLIC BLOOD PRESSURE: 70 MMHG | OXYGEN SATURATION: 93 % | TEMPERATURE: 97.7 F | RESPIRATION RATE: 18 BRPM | HEART RATE: 97 BPM

## 2024-01-30 DIAGNOSIS — F03.A0 MILD DEMENTIA WITHOUT BEHAVIORAL DISTURBANCE, PSYCHOTIC DISTURBANCE, MOOD DISTURBANCE, OR ANXIETY, UNSPECIFIED DEMENTIA TYPE (HCC): ICD-10-CM

## 2024-01-30 DIAGNOSIS — Z95.2 H/O AORTIC VALVE REPLACEMENT: ICD-10-CM

## 2024-01-30 DIAGNOSIS — D68.9 COAGULOPATHY (HCC): ICD-10-CM

## 2024-01-30 DIAGNOSIS — D68.59 PROTEIN C DEFICIENCY (HCC): ICD-10-CM

## 2024-01-30 DIAGNOSIS — J84.10 PULMONARY FIBROSIS (HCC): ICD-10-CM

## 2024-01-30 DIAGNOSIS — J44.9 CHRONIC OBSTRUCTIVE PULMONARY DISEASE, UNSPECIFIED COPD TYPE (HCC): ICD-10-CM

## 2024-01-30 DIAGNOSIS — J96.11 CHRONIC RESPIRATORY FAILURE WITH HYPOXIA (HCC): ICD-10-CM

## 2024-01-30 DIAGNOSIS — R29.6 FREQUENT FALLS: ICD-10-CM

## 2024-01-30 DIAGNOSIS — J84.9 INTERSTITIAL LUNG DISEASE (HCC): ICD-10-CM

## 2024-01-30 DIAGNOSIS — Z00.00 ENCOUNTER FOR ANNUAL WELLNESS VISIT (AWV) IN MEDICARE PATIENT: Primary | ICD-10-CM

## 2024-01-30 DIAGNOSIS — K52.9 CHRONIC DIARRHEA: ICD-10-CM

## 2024-01-30 DIAGNOSIS — Z79.899 POLYPHARMACY: ICD-10-CM

## 2024-01-30 DIAGNOSIS — Z71.89 COUNSELING REGARDING ADVANCE CARE PLANNING AND GOALS OF CARE: ICD-10-CM

## 2024-01-30 PROCEDURE — G0439 PPPS, SUBSEQ VISIT: HCPCS | Performed by: NURSE PRACTITIONER

## 2024-01-30 PROCEDURE — 99350 HOME/RES VST EST HIGH MDM 60: CPT | Performed by: NURSE PRACTITIONER

## 2024-01-30 PROCEDURE — 3074F SYST BP LT 130 MM HG: CPT | Performed by: NURSE PRACTITIONER

## 2024-01-30 PROCEDURE — 1036F TOBACCO NON-USER: CPT | Performed by: NURSE PRACTITIONER

## 2024-01-30 PROCEDURE — G8484 FLU IMMUNIZE NO ADMIN: HCPCS | Performed by: NURSE PRACTITIONER

## 2024-01-30 PROCEDURE — 93793 ANTICOAG MGMT PT WARFARIN: CPT | Performed by: NURSE PRACTITIONER

## 2024-01-30 PROCEDURE — 3078F DIAST BP <80 MM HG: CPT | Performed by: NURSE PRACTITIONER

## 2024-01-30 PROCEDURE — G8420 CALC BMI NORM PARAMETERS: HCPCS | Performed by: NURSE PRACTITIONER

## 2024-01-30 PROCEDURE — 1123F ACP DISCUSS/DSCN MKR DOCD: CPT | Performed by: NURSE PRACTITIONER

## 2024-01-30 PROCEDURE — 1090F PRES/ABSN URINE INCON ASSESS: CPT | Performed by: NURSE PRACTITIONER

## 2024-01-30 ASSESSMENT — PATIENT HEALTH QUESTIONNAIRE - PHQ9
SUM OF ALL RESPONSES TO PHQ QUESTIONS 1-9: 3
8. MOVING OR SPEAKING SO SLOWLY THAT OTHER PEOPLE COULD HAVE NOTICED. OR THE OPPOSITE, BEING SO FIGETY OR RESTLESS THAT YOU HAVE BEEN MOVING AROUND A LOT MORE THAN USUAL: 0
SUM OF ALL RESPONSES TO PHQ QUESTIONS 1-9: 3
10. IF YOU CHECKED OFF ANY PROBLEMS, HOW DIFFICULT HAVE THESE PROBLEMS MADE IT FOR YOU TO DO YOUR WORK, TAKE CARE OF THINGS AT HOME, OR GET ALONG WITH OTHER PEOPLE: 1
1. LITTLE INTEREST OR PLEASURE IN DOING THINGS: 1
SUM OF ALL RESPONSES TO PHQ9 QUESTIONS 1 & 2: 2
9. THOUGHTS THAT YOU WOULD BE BETTER OFF DEAD, OR OF HURTING YOURSELF: 0
7. TROUBLE CONCENTRATING ON THINGS, SUCH AS READING THE NEWSPAPER OR WATCHING TELEVISION: 1
5. POOR APPETITE OR OVEREATING: 0
6. FEELING BAD ABOUT YOURSELF - OR THAT YOU ARE A FAILURE OR HAVE LET YOURSELF OR YOUR FAMILY DOWN: 0
SUM OF ALL RESPONSES TO PHQ QUESTIONS 1-9: 3
3. TROUBLE FALLING OR STAYING ASLEEP: 0
4. FEELING TIRED OR HAVING LITTLE ENERGY: 0
2. FEELING DOWN, DEPRESSED OR HOPELESS: 1
SUM OF ALL RESPONSES TO PHQ QUESTIONS 1-9: 3

## 2024-01-30 NOTE — ASSESSMENT & PLAN NOTE
-Using 2L PRN with Luli  -Followed by Dr. Edgar, pulmonology with recent visit 1/12/24 with NP  -recent PFTs felt not accurate but no evidence of obstruction  -Per note review likely COPD with 30 PY history  -One exacerbation of COPD Oct 2023 treated with doxycycline, benzonatate with some improvement  -currently managed on Wixela, Sanctura (not found in home), and PRN albuterol (reports not currently using, but will start Wixela)

## 2024-01-30 NOTE — ASSESSMENT & PLAN NOTE
-Several falls with injury in past year  -Fall on 11/26/23 with R hip impact, saw ortho and X-ray neg for fracture  -Ambulates with rolling walker but balance is very poor  -continues with home health PT and feels is improving  -polypharmacy contributing to falls- discussed and slowly weaning high risk medicaitons   Instructions: This plan will send the code FBSD to the PM system.  DO NOT or CHANGE the price. Price (Do Not Change): 0.00 Detail Level: Simple

## 2024-01-30 NOTE — ASSESSMENT & PLAN NOTE
-Using 2L overnight and PRN with Luli  -CO2 WNL with labs 11/2/23  -Endorses dyspnea with moderate exertion and per PT desaturation often occurs with session

## 2024-01-30 NOTE — PROGRESS NOTES
[facility]  [phone]      Date of Current Visit: 1/30/2024     SUMMARY     Adenike Dasilva is a 83 y.o. female seen in the home today due to taxing effort to seek primary care services in the community s/t cognitive impairment, frailty, mobility limitations    Patient/Family present for Current Visit:  patient  Goals of Care: Avoid hospitalizations, improve function and prevent accidents, continue living in the home    ASSESSMENT AND PLAN     1. Encounter for annual wellness visit (AWV) in Medicare patient    2. Chronic obstructive pulmonary disease, unspecified COPD type (HCC)    3. Interstitial lung disease (HCC)    4. Pulmonary fibrosis (HCC)    5. Chronic respiratory failure with hypoxia (HCC)    6. Chronic diarrhea    7. Coagulopathy (HCC)    8. Protein C deficiency (HCC)    9. H/O aortic valve replacement    10. Frequent falls    11. Polypharmacy    12. Mild dementia without behavioral disturbance, psychotic disturbance, mood disturbance, or anxiety, unspecified dementia type (HCC)    13. Counseling regarding advance care planning and goals of care       1. Encounter for annual wellness visit (AWV) in Medicare patient   Completed today (see separate note for details)  2. Chronic obstructive pulmonary disease, unspecified COPD type (HCC)  Assessment & Plan:  -Using 2L PRN with Luli  -Followed by Dr. Edgar, pulmonology with recent visit 1/12/24 with NP  -recent PFTs felt not accurate but no evidence of obstruction  -Per note review likely COPD with 30 PY history  -One exacerbation of COPD Oct 2023 treated with doxycycline, benzonatate with some improvement  -currently managed on Wixela, Sanctura (not found in home), and PRN albuterol (reports not currently using, but will start Wixela)  3. Interstitial lung disease (HCC)/ 4. Pulmonary fibrosis (HCC)  Assessment & Plan:  -Chest CT 7/23 significant for minimal bibasilar atelectasis, Interstitial fibrosis characterized by

## 2024-01-30 NOTE — ASSESSMENT & PLAN NOTE
-Patient and son endorse memory issues and known mild dementia, but she has not done extensive cognitive testing.   -Initially hesitant about neurology as her  had a bad experience with dementia  -ultimately did feel she would want to proceed with neurology- referral placed with Dr. Caraballo for August 19, on wait list for sooner apt   -MMSE completed Nov 2023 with score 29/30. Mini Cog today 5/5   -SLP visited but no significant potential for improvement

## 2024-01-30 NOTE — ASSESSMENT & PLAN NOTE
-Endorsed watery and explosive episodes most days, has been chronic for at least 10 years  -Reports full work-up at [facility] with colonoscopy with no findings  -Attempts to modify food choices, but this is limited by availability as she is not able to shop for her own groceries   -started on Colestipol at last visit and has greatly reduced diarrhea, previously taking daily and now reduced to weekly  -Currently using imodium daily and Bentyl about once daily, goal to wean Bentyl

## 2024-01-30 NOTE — ASSESSMENT & PLAN NOTE
-Several high risk meds, difficulty with compliance given complicated regimen   -Per notes from prior PCP, attempted to deprescribe some medications but this was complicated by inability to review pill bottles and she has seen several other physicians during hospital and rehab visits. Discussed and already d/c:   Metformin- A1C at goal without, contributes to diarrhea   Celebrex- risk of GIB with Coumadin  Seroquel- has not been taking and did not find helpful  Xyzal- has not been taking, no clear indication  Myrbetriq discontinued by urology  Cymbalta- already on SSRI and no improvement in neuropathy  Typed medication list provided today,  nurse initiated pre-packaged pills through [facility] pharmacy and they are in process

## 2024-01-30 NOTE — ASSESSMENT & PLAN NOTE
-s/p TAVR in June 2022  -Currently managed on warfarin and aspirin 81m, also with history of DVT and protein C deficiency.     -Followed by Cardiologist Dr. Daniels with last visit 12/17/23  -Referred to hematology (apt Feb 21) with goal to discontinue warfarin given difficulty with compliance and frequent falls.    -INR today 1.8, was supratherapeutic last week with ?compliance.  Will continue 2mg daily and recheck 1 week

## 2024-01-31 ENCOUNTER — HOME CARE VISIT (OUTPATIENT)
Facility: HOME HEALTH | Age: 84
End: 2024-01-31
Payer: MEDICARE

## 2024-01-31 VITALS
HEART RATE: 87 BPM | SYSTOLIC BLOOD PRESSURE: 118 MMHG | OXYGEN SATURATION: 93 % | DIASTOLIC BLOOD PRESSURE: 62 MMHG | RESPIRATION RATE: 16 BRPM | TEMPERATURE: 98 F

## 2024-01-31 PROCEDURE — G0299 HHS/HOSPICE OF RN EA 15 MIN: HCPCS

## 2024-01-31 ASSESSMENT — ENCOUNTER SYMPTOMS: PAIN LOCATION - PAIN QUALITY: ACHING

## 2024-01-31 NOTE — HOME HEALTH
Subjective: Patient states alot of pain from her sciatic nerve  Falls since last visit No(if yes complete the Fall Tracking Form and include bsrifallreport):   Caregiver involvement changes: no  Home health supplies by type and quantity ordered/delivered this visit include: no    Clinician asked if patient has had any physician contact since last home care visit and patient states: N/A  Clinician asked if patient has any new or changed medications and patient states:  N/A   If Yes, were medications reconciled? N/A   Was the certifying physician notified of changes in medications? N/A     Clinical assessment (what this visit means for the patient overall and need for ongoing skilled care) and progress or lack of progress towards SPECIFIC goals: Patient has been discharged from nursing and has met all goals set forth by nursing.     Written Teaching Material Utilized: N/A    Interdisciplinary communication with: N/A     Discharge planning as follows: When goals are met    Specific plan for next visit: Progress patient to discharge

## 2024-01-31 NOTE — PROGRESS NOTES
Advance Care Planning     General Advance Care Planning (ACP) Conversation    Date of Conversation: 1/30/2024  Conducted with: Patient with Decision Making Capacity    Healthcare Decision Maker:    Primary Decision Maker (Active): Abhilash Dasilva - Mamie - [phone]  Click here to complete Healthcare Decision Makers including selection of the Healthcare Decision Maker Relationship (ie \"Primary\").   Today we documented Decision Maker(s) consistent with Legal Next of Kin hierarchy.    Content/Action Overview:  Has ACP document(s) on file - reflects the patient's care preferences  Reviewed DNR/DNI and patient confirms current DNR status - completed forms on file (place new order if needed)  treatment goals, benefit/burden of treatment options, hospitalization preferences, and resuscitation preferences      Length of Voluntary ACP Conversation in minutes:  <16 minutes (Non-Billable)    VALORIE HDZ - NP

## 2024-01-31 NOTE — PROGRESS NOTES
This is the Subsequent Medicare Annual Wellness Exam, performed 12 months or more after the Initial AWV or the last Subsequent AWV    I have reviewed the patient's medical history in detail and updated the computerized patient record.       Assessment/Plan   Education and counseling provided:  End-of-Life planning    1. Encounter for annual wellness visit (AWV) in Medicare patient  -     DO NOT RESUSCITATE (DNR)  2. Chronic obstructive pulmonary disease, unspecified COPD type (HCC)  Assessment & Plan:  -Using 2L PRN with Luli  -Followed by Dr. Edgar, pulmonology with recent visit 1/12/24 with NP  -recent PFTs felt not accurate but no evidence of obstruction  -Per note review likely COPD with 30 PY history  -One exacerbation of COPD Oct 2023 treated with doxycycline, benzonatate with some improvement  -currently managed on Wixela, Sanctura (not found in home), and PRN albuterol (reports not currently using, but will start Wixela)  3. Interstitial lung disease (HCC)  4. Pulmonary fibrosis (HCC)  Assessment & Plan:  -Chest CT 7/23 significant for minimal bibasilar atelectasis, Interstitial fibrosis characterized by honeycombing and traction bronchiectasis involving multiple lung segments  -followed by Dr. Edgar, pulmonologist  5. Chronic respiratory failure with hypoxia (HCC)  Assessment & Plan:  -Using 2L overnight and PRN with Luli  -CO2 WNL with labs 11/2/23  -Endorses dyspnea with moderate exertion and per PT desaturation often occurs with session  6. Chronic diarrhea  Assessment & Plan:  -Endorsed watery and explosive episodes most days, has been chronic for at least 10 years  -Reports full work-up at [facility] with colonoscopy with no findings  -Attempts to modify food choices, but this is limited by availability as she is not able to shop for her own groceries   -started on Colestipol at last visit and has greatly reduced diarrhea, previously taking daily and now reduced to weekly  -Currently using

## 2024-01-31 NOTE — CASE COMMUNICATION
Hi This is Linda RN with home health. Speaking with Mrs. Dasilva and we have been discussing in house rehab to see f that may help with her weakness and sciatic nerve pain and she ask me to reach out to you about it. [facility] Possibly after her surgery in march for her surgery? I am discharging her from Home Health today because she doesnt need nursing service.

## 2024-02-01 ENCOUNTER — HOME CARE VISIT (OUTPATIENT)
Dept: HOME HEALTH SERVICES | Facility: HOME HEALTH | Age: 84
End: 2024-02-01
Payer: MEDICARE

## 2024-02-01 NOTE — CASE COMMUNICATION
Well it would need to be in house after her surgery, she has to have a 3 night stay I do believe and then she would go there and stay without transportation issues. It is a short stay maybe only a couple of weeks and she sounded very interested in that yesterday.

## 2024-02-05 ENCOUNTER — TELEPHONE (OUTPATIENT)
Facility: CLINIC | Age: 84
End: 2024-02-05

## 2024-02-05 DIAGNOSIS — Z86.718 HISTORY OF DVT (DEEP VEIN THROMBOSIS): ICD-10-CM

## 2024-02-05 DIAGNOSIS — I25.10 CORONARY ARTERY DISEASE INVOLVING NATIVE CORONARY ARTERY OF NATIVE HEART WITHOUT ANGINA PECTORIS: ICD-10-CM

## 2024-02-05 NOTE — TELEPHONE ENCOUNTER
Medication Question - The patient called to find out why she is no longer being prescribed Metformin.  She states that she is diabetic and was taking Metformin prior to coming on board at [facility].      Blood Sugar - She said that her blood sugar is high, in the 150's.    The patient asks for a callback at [phone]

## 2024-02-06 ENCOUNTER — TELEPHONE (OUTPATIENT)
Facility: CLINIC | Age: 84
End: 2024-02-06

## 2024-02-06 RX ORDER — WARFARIN SODIUM 2 MG/1
2 TABLET ORAL SEE ADMIN INSTRUCTIONS
Qty: 90 TABLET | Refills: 1 | Status: SHIPPED | OUTPATIENT
Start: 2024-02-06 | End: 2024-02-06

## 2024-02-06 RX ORDER — METFORMIN HYDROCHLORIDE 500 MG/1
500 TABLET, EXTENDED RELEASE ORAL
Qty: 90 TABLET | Refills: 1 | Status: SHIPPED | OUTPATIENT
Start: 2024-02-06

## 2024-02-06 RX ORDER — METFORMIN HYDROCHLORIDE 500 MG/1
500 TABLET, EXTENDED RELEASE ORAL
Qty: 90 TABLET | Refills: 1 | Status: SHIPPED | OUTPATIENT
Start: 2024-02-06 | End: 2024-02-06

## 2024-02-06 RX ORDER — WARFARIN SODIUM 2 MG/1
2 TABLET ORAL SEE ADMIN INSTRUCTIONS
Qty: 90 TABLET | Refills: 1 | Status: SHIPPED | OUTPATIENT
Start: 2024-02-06

## 2024-02-06 NOTE — TELEPHONE ENCOUNTER
Called patient about concerns of blood sugars and request for restarting Metformin.  Received voicemail and left message.  Notified that I have received the message about these concerns, will call her this afternoon to discuss an ongoing plan.

## 2024-02-06 NOTE — TELEPHONE ENCOUNTER
Call returned to patient.      She reports her blood sugar this morning was 190, has gotten above 200.  She tries to limit high sugar and carbs, but her dietary options are somewhat limited by the available groceries.  She is very worried about her high sugars, her mother had diabetes and several negative outcomes.  She is very motivated to avoid insulin and requests restarting her Metformin.  Discussed potential side-effect of diarrhea, she states she tolerated it without GI upset initially and diarrhea has been well-controlled.  She has a bottle of Metformin 500mg ER in the home- she read pill bottle and is not .  Advised to start once daily WITH BREAKFAST and will send refill at this time.       She also requests refill of warfarin sent to [facility].      Metformin and warfarin sent to [facility]

## 2024-02-07 ENCOUNTER — TELEPHONE (OUTPATIENT)
Facility: CLINIC | Age: 84
End: 2024-02-07

## 2024-02-07 NOTE — TELEPHONE ENCOUNTER
Medication refill question -  10:49am.  The patient called and left a VM to verify that her scripts were sent to [facility] and not [facility].  There was a lot of loud background noise in the message and it was hard to hear the patient.    The patient did not leave a callback number.  Her contact noted in epic is [phone]

## 2024-02-07 NOTE — TELEPHONE ENCOUNTER
The patient called to update Yarely Casey NP that she needs to speak with her.  She states it is urgent.    INR - the patient said that her INR machine is not working for her and she needs home health to take her INR reading.    Home Health/Aid - The patient said that \"she needs a nurse, needs help with something and she doesn't know who to call\".  She said that she has been doing her own INR test and HH has d/c her.

## 2024-02-07 NOTE — TELEPHONE ENCOUNTER
Call returned to patient. I informed Ms. Dasilva that both prescriptions sent yesterday went to [facility]. Warfarin and Metformin. Patient verbalized understanding.

## 2024-02-08 ENCOUNTER — TELEPHONE (OUTPATIENT)
Facility: CLINIC | Age: 84
End: 2024-02-08

## 2024-02-08 NOTE — TELEPHONE ENCOUNTER
Called patient to follow-up on INR machine difficulties.  She states she tried every finger and had difficulty and was not able to get the machine to work.  Based off of our visits, the machine has been very difficult to navigate and required two people to use.  She is able to verbalize every step, but not simultaneously prick her finger and use the pipette to get blood sample.  Home health is not able to extend her services as this is not a skilled need for SN.  Asked if son could possibly come help her with the machine because she is able to give instructions (she walked me through the use of machine), and he could help get the sample.  She does not think he can come right now as he is too busy and has health concerns of his own.     She has called the INR company and they said they will send someone out to trouble shoot the machine.  The company has called her several times because she has not gotten her INR results.  She said they are coming to fix the machine and visit her in the home.  Asked if they do not come within the next few days to please call office back and we will figure out an alternate plan

## 2024-02-08 NOTE — TELEPHONE ENCOUNTER
Returning call - Esther said she was returning a call to Yarely Casey NP from Monday, 2/5/2024.  Esther also said that the patient may be confused about the prescription short fills until [facility] can get everything ready for her pill packs, but expressed that she is not sure if that was the reason for the call.    Esther's callback is [phone]

## 2024-02-08 NOTE — PROGRESS NOTES
Transition of Care Plan:    RUR:12  Disposition: Home with [facility] and personal care  Follow up appointments: PCP  DME needed: Pt has access to cane,   Transportation at Discharge: Family will arrange   [place] or means to access home:      Yes   Medicare Letter: Will provide upon d/c   Is patient a BCPI-A Bundle: NA          If yes, was Bundle Letter given?:  NA  Is patient a [place] and connected with the [place]? NA              If yes, was [place] transfer form completed and [facility] notified? NA  Caregiver Contact: Jose Maria Tee- Son [phone]  Discharge Caregiver contacted prior to discharge? Yes  Care Conference needed?:     No    Reason for Admission:  Chest Pain                     RUR Score:  12                   Plan for utilizing home health:      Yes    PCP: First and Last name:  Kong Venegas DO     Name of Practice:    Are you a current patient: Yes/No: Yes   Approximate date of last visit: 3/29/2022   Can you participate in a virtual visit with your PCP: Yes                    Current Advanced Directive/Advance Care Plan: DNR      Healthcare Decision Maker:   Click here to complete [address] including selection of the Healthcare Decision Maker Relationship (ie \"Primary\")             Primary Decision Maker: Keo Dunlap - Child - [phone]                  Transition of Care Plan:      Home with [address] Management Interventions  PCP Verified by CM:  Yes  Mode of Transport at Discharge: Self  Transition of Care Consult (CM Consult): [address]: Yes  Discharge Durable Medical Equipment:  (Pt has acess to cane for mobility)  Support Systems: Child(vinicio) (Pt lives alone in a ground level appartment, son lives 6 minitues away from pt)  Confirm Follow Up Transport: Family  Discharge Location  Patient Expects to be Discharged to[de-identified] Home with home health    [place] 13 (ACP) Conversation      Date of Conversation: 5/31/2022  Conducted with: Patient with Decision Making Capacity    Healthcare Decision Maker:     Primary Decision Maker: Ar Kruse - Sharri - [phone]  Click here to complete [address] including selection of the Healthcare Decision Maker Relationship (ie \"Primary\")        Content/Action Overview: Has ACP document(s) on file - reflects the patient's care preferences  Reviewed DNR/DNI and patient confirms current DNR status - completed forms on file (place new order if needed)     Length of Voluntary ACP Conversation in minutes:  16 minutes    Bobbi Cantu                          CM spoke to pt's son and completed cm initial assessment. So n reported that pt lives alone and she is getting weak . Pt had previous experience with rehab and HH , unable to recall provider informations. Son lives 6 minitues away from pt and he reported that they and leaving town tomorrow for one week and worried about pt's care upon d/c. CM email private duty provider list to son at [email or web address] and provided Emma's contact number from care advantage. Pt may benefit from PT/OT assessment for final disposition plan. Palliative and psychiatry is consulted Floor Cm will follow up.     Pharmacy- [address]    Bobbi Cantu MS  ED Case Manager   Ext -[phone] Pt with possible syncopal episode. Pt does not recall what happened. Plan: labs, CT

## 2024-02-13 ENCOUNTER — TELEPHONE (OUTPATIENT)
Facility: CLINIC | Age: 84
End: 2024-02-13

## 2024-02-13 DIAGNOSIS — I25.10 CORONARY ARTERY DISEASE INVOLVING NATIVE CORONARY ARTERY OF NATIVE HEART WITHOUT ANGINA PECTORIS: ICD-10-CM

## 2024-02-13 DIAGNOSIS — Z86.718 HISTORY OF DVT (DEEP VEIN THROMBOSIS): ICD-10-CM

## 2024-02-13 RX ORDER — WARFARIN SODIUM 2 MG/1
2 TABLET ORAL DAILY
Qty: 7 TABLET | Refills: 0 | Status: SHIPPED | OUTPATIENT
Start: 2024-02-13 | End: 2024-02-20

## 2024-02-13 NOTE — TELEPHONE ENCOUNTER
INR Results - The patient left a  at 10:50 am stating that her INR was done yesterday and the result was 2.2    The patient's callback is [phone]

## 2024-02-13 NOTE — TELEPHONE ENCOUNTER
Requests call - the patient left a  at 12:52 pm requesting a callback from Yarely Casey NP.  She states that she is \"kind of disturbed and needs to speak to Rosina\".  She said that when she has an issue she has no one to call or talk to about it.  The patient said that she called earlier and asked for Rosina to call her.  She has grave issues she needs to discuss asap.    The patient's callback is [phone]

## 2024-02-13 NOTE — TELEPHONE ENCOUNTER
Call returned to patient - patient verbalizes frustration with the change in pharmacy. She states that Rosina Casey NP sent warfarin Rx on 2/6 to [facility] and she still doesn't have the medication, she is concerned she will run out of medication (she has 1 tablet left). Patient said that she called [facility], and was told that the medication was mailed. I asked if she could check her mailbox. Pt responded that she lives on the 4th floor and is not able to check. She will call her son to ask him to check for her. She expresses frustration with the lack of communication from pharmacy, and states that [facility] she always had them ready. She states that she doesn't like this system.  I offered to send a 7 day prescription to [facility], just in case the medication is not there. Patient accepted that suggestion.    Patient also expressed frustration for not knowing \"who to call\", and that nobody is available. I explained that we (NP and RN's) are not able to  the calls, but we do return the call within a few hours.  I responded that I was calling her back less than 2 hours after her call. She responded as if she was not sure of who I was, again, I explained that I am the RN who works with Rosina Casey NP. Then patient stated \"but you close, what time do you close?\".  I informed her that we leave the office at 5 PM, but there are on-call providers after that time. If she has an urgent concern, she could call. She stated that she had a fever 99~100 F and was throwing up a few days ago, but she didn't know who to call. Patient states she is feeling better today.      Patient called back and informed that her INR was 2.2 yesterday. She stated that the \"company person\" came and showed her what she was doing wrong with the machine.

## 2024-02-14 RX ORDER — MIRABEGRON 25 MG/1
25 TABLET, FILM COATED, EXTENDED RELEASE ORAL DAILY
COMMUNITY
Start: 2024-02-06

## 2024-02-14 NOTE — TELEPHONE ENCOUNTER
Patient stated she was told by [facility] that her pre-packaged meds would not be filled until April.  She is having ongoing issues with this pharmacy, receiving bottles without notifications that meds are ready.  She feels that she has a good partnership with [facility] and that this situation is just not worth it.  She states that home health was very thoughtful to try to set it up, but it has not been worth it and she is not willing to wait until April.  She has had several calls with [facility] and the situation has not gotten any clearer.      She also received generic jantoven (warfarin) and wanted to ensure that this was OK to take.  Notified that this is safe to take and she should continue 2mg daily (INR was 2.2)

## 2024-02-19 ENCOUNTER — TELEPHONE (OUTPATIENT)
Facility: CLINIC | Age: 84
End: 2024-02-19

## 2024-02-19 NOTE — TELEPHONE ENCOUNTER
LCSW called and spoke with pt to offer in-home appointment for supportive counseling. Pt is open to visit. LCSW to visit with pt on Friday 2/23/24 @ 11:00am.     STEVEN Paul, LCSW  Licensed Clinical   [facility]  (O) [phone]  (C) [phone]

## 2024-02-20 ENCOUNTER — CLINICAL DOCUMENTATION (OUTPATIENT)
Facility: CLINIC | Age: 84
End: 2024-02-20

## 2024-02-20 NOTE — PROGRESS NOTES
Breath. PRN via nasal cannula to maintain O2 Saturation over 92%    acetaminophen (TYLENOL) 325 MG tablet Take 1-2 tablets by mouth every 6 hours as needed for Pain take for pain 1-5/10 on pain scale     No current facility-administered medications for this visit.       PLAN OF CARE    The Plan of Care is initiated within 15 days of the initial provider visit and updated at least every 60 days or sooner, based on patient's changing condition.  Plan of Care Orders / Action Items:  Refer to most recent provider visit note for specifics re: plan of care.      Estimated Visit Frequency:  Monthly  SW visits PRN

## 2024-02-21 ENCOUNTER — TELEPHONE (OUTPATIENT)
Facility: CLINIC | Age: 84
End: 2024-02-21

## 2024-02-21 NOTE — TELEPHONE ENCOUNTER
Medication Change - The patient called to update Yarely Casey NP that her hematologist has taken her off of coumadin for 4 weeks, then she will go back for blood work.    INR - the patient states that she will not have an INR reading today and going forward until after she meets with her hematologist in 4 weeks.    The patient's callback is [phone].  I let the patient know that it may be sometime tomorrow before the provider is able to respond.  She acknowledged

## 2024-02-22 ENCOUNTER — TELEPHONE (OUTPATIENT)
Facility: CLINIC | Age: 84
End: 2024-02-22

## 2024-02-22 NOTE — TELEPHONE ENCOUNTER
Appointment confirmed - the patient returned an appointment reminder call to confirm her in home visit with Yarely Casey NP, Monday, 2/26/24, arrival between 12-4pm.  No changes to insurance or location.  No Covid in the household.

## 2024-02-22 NOTE — TELEPHONE ENCOUNTER
I called patient to remind of their in home visit w/ Rosina Casey on 2/26/2024, arrival between 12-4.  I reached home VM and left a message to please call the \[facility]\"" office to confirm the visit.

## 2024-02-23 ENCOUNTER — SOCIAL WORK (OUTPATIENT)
Facility: CLINIC | Age: 84
End: 2024-02-23

## 2024-02-23 ENCOUNTER — OFFICE VISIT (OUTPATIENT)
Facility: CLINIC | Age: 84
End: 2024-02-23

## 2024-02-23 ENCOUNTER — TELEPHONE (OUTPATIENT)
Facility: CLINIC | Age: 84
End: 2024-02-23

## 2024-02-23 DIAGNOSIS — Z76.89 ENCOUNTER FOR SOCIAL WORK INTERVENTION: Primary | ICD-10-CM

## 2024-02-23 SDOH — HEALTH STABILITY: PHYSICAL HEALTH: ON AVERAGE, HOW MANY MINUTES DO YOU ENGAGE IN EXERCISE AT THIS LEVEL?: 0 MIN

## 2024-02-23 SDOH — HEALTH STABILITY: MENTAL HEALTH: HOW MANY STANDARD DRINKS CONTAINING ALCOHOL DO YOU HAVE ON A TYPICAL DAY?: PATIENT DOES NOT DRINK

## 2024-02-23 SDOH — SOCIAL STABILITY: SOCIAL NETWORK
DO YOU BELONG TO ANY CLUBS OR ORGANIZATIONS SUCH AS CHURCH GROUPS UNIONS, FRATERNAL OR ATHLETIC GROUPS, OR SCHOOL GROUPS?: NO

## 2024-02-23 SDOH — ECONOMIC STABILITY: FOOD INSECURITY: WITHIN THE PAST 12 MONTHS, YOU WORRIED THAT YOUR FOOD WOULD RUN OUT BEFORE YOU GOT MONEY TO BUY MORE.: NEVER TRUE

## 2024-02-23 SDOH — ECONOMIC STABILITY: INCOME INSECURITY: IN THE LAST 12 MONTHS, WAS THERE A TIME WHEN YOU WERE NOT ABLE TO PAY THE MORTGAGE OR RENT ON TIME?: NO

## 2024-02-23 SDOH — SOCIAL STABILITY: SOCIAL NETWORK: IN A TYPICAL WEEK, HOW MANY TIMES DO YOU TALK ON THE PHONE WITH FAMILY, FRIENDS, OR NEIGHBORS?: THREE TIMES A WEEK

## 2024-02-23 SDOH — SOCIAL STABILITY: SOCIAL NETWORK: ARE YOU MARRIED, WIDOWED, DIVORCED, SEPARATED, NEVER MARRIED, OR LIVING WITH A PARTNER?: WIDOWED

## 2024-02-23 SDOH — ECONOMIC STABILITY: HOUSING INSECURITY: IN THE LAST 12 MONTHS, HOW MANY PLACES HAVE YOU LIVED?: 1

## 2024-02-23 SDOH — HEALTH STABILITY: MENTAL HEALTH
STRESS IS WHEN SOMEONE FEELS TENSE, NERVOUS, ANXIOUS, OR CAN'T SLEEP AT NIGHT BECAUSE THEIR MIND IS TROUBLED. HOW STRESSED ARE YOU?: TO SOME EXTENT

## 2024-02-23 SDOH — SOCIAL STABILITY: SOCIAL NETWORK: HOW OFTEN DO YOU ATTENT MEETINGS OF THE CLUB OR ORGANIZATION YOU BELONG TO?: NEVER

## 2024-02-23 SDOH — SOCIAL STABILITY: SOCIAL NETWORK: HOW OFTEN DO YOU ATTEND CHURCH OR RELIGIOUS SERVICES?: NEVER

## 2024-02-23 SDOH — HEALTH STABILITY: MENTAL HEALTH: HOW OFTEN DO YOU HAVE A DRINK CONTAINING ALCOHOL?: NEVER

## 2024-02-23 SDOH — ECONOMIC STABILITY: FOOD INSECURITY: WITHIN THE PAST 12 MONTHS, THE FOOD YOU BOUGHT JUST DIDN'T LAST AND YOU DIDN'T HAVE MONEY TO GET MORE.: NEVER TRUE

## 2024-02-23 SDOH — SOCIAL STABILITY: SOCIAL NETWORK: HOW OFTEN DO YOU GET TOGETHER WITH FRIENDS OR RELATIVES?: THREE TIMES A WEEK

## 2024-02-23 SDOH — ECONOMIC STABILITY: INCOME INSECURITY: HOW HARD IS IT FOR YOU TO PAY FOR THE VERY BASICS LIKE FOOD, HOUSING, MEDICAL CARE, AND HEATING?: NOT HARD AT ALL

## 2024-02-23 SDOH — HEALTH STABILITY: PHYSICAL HEALTH: ON AVERAGE, HOW MANY DAYS PER WEEK DO YOU ENGAGE IN MODERATE TO STRENUOUS EXERCISE (LIKE A BRISK WALK)?: 0 DAYS

## 2024-02-23 NOTE — PROGRESS NOTES
[facility]  Social Work Assessment    Patient name: Adenike \"Bettye\" Vidya    Date of visit: 2/22/24    Session today completed with: Bettye Dasilva    Relationship to patient: Self    Purpose of visit: Supportive counseling    Visit narrative: LCSW visited with pt in the apartment where she lives alone. She had requested visit from [facility] for supportive counseling. LCSW sat with pt and provided space for her to share her feelings, talk about concerns, vent emotions.     Pt reports that she has been feeling \"down in the dumps\" recently. She says she gets lonely living by herself, sharing that it can be \"boring\". She states that she does not feel like she has anything to look forward to, no reason for getting up in the morning. She denied suicidal ideation. 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 She was volunteered with various projects and with the [facility] [employer] when she was not working. Now, she spends much of her time in pain from sciatic nerve, unable to physically engage in activities due to pain, weakness.     LCSW talked with pt about idea of moving to a senior living community, where she may have access to socialization and feel less isolated. 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 She understands that they are quite expensive, and would not consider independent of assisted living as far as an alternative living option. She said her plan is to stay in her apartment as long as she can, and/if her care needs become too much to manage independently, she will consider moving to a long term care facility.     At this time, pt is independent with ADLs and IADLs. She has groceries delivered via [employer] from [employer] and/or [employer]. She

## 2024-02-23 NOTE — TELEPHONE ENCOUNTER
LCSW called patient in attempt to confirm this morning's appointment. No answer.     STEVEN Paul, JAYW  Licensed Clinical   [facility]  (O) [phone] (C) [phone]

## 2024-02-26 ENCOUNTER — OFFICE VISIT (OUTPATIENT)
Facility: CLINIC | Age: 84
End: 2024-02-26
Payer: MEDICARE

## 2024-02-26 VITALS
HEART RATE: 68 BPM | DIASTOLIC BLOOD PRESSURE: 62 MMHG | RESPIRATION RATE: 18 BRPM | TEMPERATURE: 97.9 F | SYSTOLIC BLOOD PRESSURE: 104 MMHG | OXYGEN SATURATION: 90 %

## 2024-02-26 DIAGNOSIS — D68.9 COAGULOPATHY (HCC): ICD-10-CM

## 2024-02-26 DIAGNOSIS — I10 PRIMARY HYPERTENSION: Primary | ICD-10-CM

## 2024-02-26 DIAGNOSIS — J41.0 SIMPLE CHRONIC BRONCHITIS (HCC): ICD-10-CM

## 2024-02-26 DIAGNOSIS — I35.0 NONRHEUMATIC AORTIC VALVE STENOSIS: ICD-10-CM

## 2024-02-26 DIAGNOSIS — M47.9 ADVANCED OSTEOARTHRITIS OF SPINE: ICD-10-CM

## 2024-02-26 DIAGNOSIS — K52.9 CHRONIC DIARRHEA: ICD-10-CM

## 2024-02-26 DIAGNOSIS — G47.01 INSOMNIA DUE TO MEDICAL CONDITION: ICD-10-CM

## 2024-02-26 DIAGNOSIS — J84.9 INTERSTITIAL LUNG DISEASE (HCC): ICD-10-CM

## 2024-02-26 DIAGNOSIS — F41.8 DEPRESSION WITH ANXIETY: ICD-10-CM

## 2024-02-26 DIAGNOSIS — I25.10 CORONARY ARTERY DISEASE INVOLVING NATIVE CORONARY ARTERY OF NATIVE HEART WITHOUT ANGINA PECTORIS: ICD-10-CM

## 2024-02-26 DIAGNOSIS — E11.42 TYPE 2 DIABETES MELLITUS WITH DIABETIC POLYNEUROPATHY, WITHOUT LONG-TERM CURRENT USE OF INSULIN (HCC): ICD-10-CM

## 2024-02-26 DIAGNOSIS — J84.10 PULMONARY FIBROSIS (HCC): ICD-10-CM

## 2024-02-26 PROBLEM — I50.32 CHRONIC HEART FAILURE WITH PRESERVED EJECTION FRACTION (HCC): Status: RESOLVED | Noted: 2022-11-10 | Resolved: 2024-02-26

## 2024-02-26 LAB
COMMENT:: NORMAL
SPECIMEN HOLD: NORMAL

## 2024-02-26 PROCEDURE — 99350 HOME/RES VST EST HIGH MDM 60: CPT | Performed by: NURSE PRACTITIONER

## 2024-02-26 PROCEDURE — 3074F SYST BP LT 130 MM HG: CPT | Performed by: NURSE PRACTITIONER

## 2024-02-26 PROCEDURE — 1036F TOBACCO NON-USER: CPT | Performed by: NURSE PRACTITIONER

## 2024-02-26 PROCEDURE — 3078F DIAST BP <80 MM HG: CPT | Performed by: NURSE PRACTITIONER

## 2024-02-26 PROCEDURE — 1123F ACP DISCUSS/DSCN MKR DOCD: CPT | Performed by: NURSE PRACTITIONER

## 2024-02-26 PROCEDURE — G8420 CALC BMI NORM PARAMETERS: HCPCS | Performed by: NURSE PRACTITIONER

## 2024-02-26 PROCEDURE — 1090F PRES/ABSN URINE INCON ASSESS: CPT | Performed by: NURSE PRACTITIONER

## 2024-02-26 PROCEDURE — G8484 FLU IMMUNIZE NO ADMIN: HCPCS | Performed by: NURSE PRACTITIONER

## 2024-02-26 NOTE — PROGRESS NOTES
Social Determinants of Health screening updated.     STEVEN Paul, LCSW  Licensed Clinical   [facility]  (O) [phone]  (C) [phone]

## 2024-02-26 NOTE — ASSESSMENT & PLAN NOTE
-A1C 7.1% on 11/2/23  -Uses Gunjan meter with readings in 150s  -Denies hypoglycemic episodes  -She does require assistance with changing the sensor  -continue Metformin (recently resumed in setting of elevated glucose readings, tolerating well)

## 2024-02-26 NOTE — ASSESSMENT & PLAN NOTE
-chronic, pain is 0/10 today while seated but reports 10/10 at times with ambulation  -has not responded to Cymbalta (d/c due to lack of effect and multiple other serotonergic agents)  -pain severe, interfering with ADLs and daily life, per patietn increases her fall risk as she has to hunch and bend to ambulate due to severe pain, pain has limited PT progress   -continue Tylenol, lidocaine patches, Lyrica, Tramadol PRN  -plans for spinal injections with Dr. Robertson 3/12/24  -Will continue Tramadol with goal to improve function and QOL. Recent reduction in serotonin agents, but discussed risks of hyponatremia, constipation, drowsiness, falls and she states understanding.    -UDS and CSC completed today (2/26/24)

## 2024-02-26 NOTE — ASSESSMENT & PLAN NOTE
-s/p PCI to the [facility] in June 2022  -Echo 7/2023 with EF 60-65%  -Managed on aspirin (coumadin held due to risk v benefit   -Managed on atorvastatin and zetia for HLD with LDL goal <70    -Followed by Dr. Fernandez, cardiologist, with annual visits- last 12/2023    -denies CP, worsening SOB, continue current plan

## 2024-02-26 NOTE — ASSESSMENT & PLAN NOTE
-Using 2L PRN with Luli  -Followed by Dr. Edgar, pulmonology with recent visit 1/12/24 with NP  -recent PFTs felt not accurate but no evidence of obstruction  -Per note review likely COPD with 30 PY history  -One exacerbation Oct 2023 treated with doxycycline, benzonatate with some improvement  -currently managed on Wixela, Sanctura (not found in home), and PRN albuterol (reports not currently using, but will start Wixela)

## 2024-02-26 NOTE — ASSESSMENT & PLAN NOTE
-Endorsed watery and explosive episodes most days, has been chronic for at least 10 years  -Reports full work-up at [facility] and colonoscopy with no findings  -Attempts to modify food choices, but this is limited by availability as she is not able to shop for her own groceries   -started on Colestipol and has greatly reduced diarrhea but causes constipation, currently taking PRN about weekly  -Currently using imodium daily and Bentyl about once daily, goal to wean Bentyl

## 2024-02-26 NOTE — ASSESSMENT & PLAN NOTE
-history of multiple medication trials that were not effective  -She has some grief from loss of her  and isolation due to her living situation  -currently managed on lexapro 10mg, amitriptyline 10 mg at bedtime (also for migraines), BuSpar 30mg twice daily prn  -continue visits with team SW for supportive counseling

## 2024-02-26 NOTE — ASSESSMENT & PLAN NOTE
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 Edgar, pulmonologist  -no worsening SOB, continue current plan

## 2024-02-26 NOTE — ASSESSMENT & PLAN NOTE
-BP at goal on current regimen of metoprolol  -Patient followed by cardiologist Dr. Fernandez with recent visit 12/20/23  -CBC, CMP obtained 11/2/23 and stable  -denies HA, no recent falls, continue current plan

## 2024-02-26 NOTE — ASSESSMENT & PLAN NOTE
-worsened by anxiety and urinary frequency  -Currently taking trazodone 100mg which she finds effective  -continue f/u with urology for urinary frequency  -continue anxiety management as above

## 2024-02-26 NOTE — ASSESSMENT & PLAN NOTE
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     -Referred to hematology with goal to discontinue warfarin given difficulty with med compliance, diet, and INR monitoring, hx of frequent falls with injury  -saw Dr. Steele at [facility] 2/21 and coumadin currently held  -will follow her next hematology visit (1 month)   -continue aspirin 81mg

## 2024-02-27 ENCOUNTER — TELEPHONE (OUTPATIENT)
Facility: CLINIC | Age: 84
End: 2024-02-27

## 2024-02-27 DIAGNOSIS — M47.20 OSTEOARTHRITIS OF SPINE WITH RADICULOPATHY, UNSPECIFIED SPINAL REGION: Primary | ICD-10-CM

## 2024-02-27 RX ORDER — TRAMADOL HYDROCHLORIDE 50 MG/1
50 TABLET ORAL DAILY PRN
Qty: 30 TABLET | Refills: 0 | Status: SHIPPED | OUTPATIENT
Start: 2024-02-27 | End: 2024-03-28

## 2024-02-27 NOTE — TELEPHONE ENCOUNTER
Diet - The patient called to update Yarely Casey NP that she found a website with a list of foods containing iron and has ordered the foods.    The patient's callback if needed is [phone]

## 2024-02-27 NOTE — TELEPHONE ENCOUNTER
[facility]  [phone]    Patient Name: Adenike Dasilva  YOB: 1940    Medication Refill Request Triage    Requested by:    [x]  Patient  []  Support person:      Last [facility] Visit:  2/26/2024    Next [facility] Visit: 3/27/2024    Preferred Pharmacy: *[facility]  Backup Pharmacy:  *    Medications requested:  *Tramadol    Is patient completely out?  []   YES  [x]   NO  If NO, how many pills does patient have left?  _4_    Other pertinent information shared:   The patient called to remind Yarely Casey NP to refill her Tramadol script.  The patient's callback is [phone]   Spoke with patient 11/26/2021. She reports that another provider told her she could not be seen there that day due to report of suicidal ideation. Patient wonders if I put anything about that in her chart. I reviewed my office note, as well as the nursing notes, and reassured her that there is nothing stating she was suicidal in either of those records. She specifically told me she did not have suicidal ideation, and I included that in her office note.

## 2024-02-27 NOTE — PROGRESS NOTES
changing the sensor  -continue Metformin (recently resumed in setting of elevated glucose readings, tolerating well)  10. 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 Advanced osteoarthritis of spine  Assessment & Plan:  -chronic, pain is 0/10 today while seated but reports 10/10 at times with ambulation  -has not responded to Cymbalta (d/c due to lack of effect and multiple other serotonergic agents)  -pain severe, interfering with ADLs and daily life, per patietn increases her fall risk as she has to hunch and bend to ambulate due to severe pain, pain has limited PT progress   -continue Tylenol, lidocaine patches, Lyrica, Tramadol PRN  -plans for spinal injections with Dr. Robertson 3/12/24  -Will continue Tramadol with goal to improve function and QOL. Recent reduction in serotonin agents, but discussed risks of hyponatremia, constipation, drowsiness, falls and she states understanding.    -UDS and CSC completed today (2/26/24)    Orders:  -     [employer] 13; Future    -Labs: CBC, CMP, TSH, T4, Lipid Panel, A1C 11/2/23- INR repeated PRN  -AMD: has POA in chart appointing son Amrik Dasilva  -Code status: DNR (has DDNR)  -Follow up: in 1 month (3/27/24) for follow up, sooner if needed.  Will need AMD discussion    Notes reviewed today:  Cristina Martin NP with PAR   Upcoming appointments:  Dr. Robertson 3/12/24, Dr. eVlez

## 2024-02-27 NOTE — TELEPHONE ENCOUNTER
PDMP reviewed with last refill 1/9/24.  Patient visit yesterday and she is using sparingly, finding effective.  She has had no falls since initiation.  CSC and UDS completed 2/26/24.  Refill appropriate, sent as requested.

## 2024-02-28 LAB — DRUGS UR: NORMAL

## 2024-03-04 ENCOUNTER — TELEPHONE (OUTPATIENT)
Facility: CLINIC | Age: 84
End: 2024-03-04

## 2024-03-04 NOTE — TELEPHONE ENCOUNTER
[facility]    Patient Name: Adenike Dasilva  YOB: 1940    Medication Refill Request Triage    Requested by:    [x]  Patient  []  Support person:      Last [facility] Visit:  2/26/2024    Next [facility] Visit: 3/27/2024    Preferred Pharmacy: *[facility]  Backup Pharmacy:  *    Medications requested:  *Nystatin    Is patient completely out?  []   YES  []   NO  If NO, how many pills does patient have left?  unknown__    Other pertinent information shared:   The patient left a  at 2:16 pm requesting a refill of her Nystatin script.  The rest of the message was inaudible, muffled speaking.  The patient's callback is [phone]

## 2024-03-06 ENCOUNTER — TELEPHONE (OUTPATIENT)
Facility: CLINIC | Age: 84
End: 2024-03-06

## 2024-03-06 NOTE — TELEPHONE ENCOUNTER
Referral Request - the patient called to update Yarely Casey NP that she is having a procedure done on Tuesday, 3/12/24 at [facility] .  She said that they do not do inpatient referrals for Rehab and told her to call her PCP for a referral to [facility].    The patient's callback if needed is [phone]

## 2024-03-07 RX ORDER — PREGABALIN 50 MG/1
50 CAPSULE ORAL 2 TIMES DAILY
COMMUNITY

## 2024-03-07 NOTE — TELEPHONE ENCOUNTER
Call placed to [facility] surgery - I was directed to call Dr. Robertson's Office [phone].    Call placed to [facility] - Voice message left for Bre at MD's office explaining that patient is requesting a referral for outpatient Rehab requesting a call back with additional information related to that/and the surgery scheduled for 3/12.

## 2024-03-11 ENCOUNTER — TELEPHONE (OUTPATIENT)
Facility: CLINIC | Age: 84
End: 2024-03-11

## 2024-03-11 NOTE — TELEPHONE ENCOUNTER
The patient states she is having surgery tomorrow. She is giving up on going to rehab. She will do it at home with [facility]. She is also calling to get results of test for UTI.  # [phone]

## 2024-03-11 NOTE — TELEPHONE ENCOUNTER
Called patient to notify that the messages have been received.  He states that rehab is \"not going to work out\" and would like to do home health after the procedure.  Discussed will follow-up after her procedure to discuss referral (may need another F2F visit).  She states she will call Wednesday to request a visit if things go well.

## 2024-03-11 NOTE — TELEPHONE ENCOUNTER
Call returned to patient - pt states she is having surgery tomorrow, and she needs to have Physical Therapy after that, asked several times about Rosina Caesy, NP contacting [facility].  I inform the patient that I would provide this information to NP.     I also informed Ms. Dasilva that the purpose for her urine test was to detect the presence of her pain medication. Pt asked if \"everything was ok\", I responded that yes, everything is as expected.

## 2024-03-12 ENCOUNTER — HOSPITAL ENCOUNTER (OUTPATIENT)
Facility: HOSPITAL | Age: 84
Setting detail: OUTPATIENT SURGERY
Discharge: HOME | End: 2024-03-12
Attending: PHYSICAL MEDICINE & REHABILITATION | Admitting: PHYSICAL MEDICINE & REHABILITATION
Payer: MEDICARE

## 2024-03-12 ENCOUNTER — APPOINTMENT (OUTPATIENT)
Facility: HOSPITAL | Age: 84
End: 2024-03-12
Attending: PHYSICAL MEDICINE & REHABILITATION
Payer: MEDICARE

## 2024-03-12 VITALS
HEIGHT: 61 IN | OXYGEN SATURATION: 91 % | BODY MASS INDEX: 23.03 KG/M2 | DIASTOLIC BLOOD PRESSURE: 53 MMHG | SYSTOLIC BLOOD PRESSURE: 109 MMHG | WEIGHT: 122 LBS | RESPIRATION RATE: 16 BRPM | TEMPERATURE: 97.3 F | HEART RATE: 82 BPM

## 2024-03-12 DIAGNOSIS — I25.10 CORONARY ARTERY DISEASE INVOLVING NATIVE CORONARY ARTERY OF NATIVE HEART WITHOUT ANGINA PECTORIS: ICD-10-CM

## 2024-03-12 LAB
GLUCOSE BLD STRIP.AUTO-MCNC: 176 MG/DL (ref 65–117)
INR BLD: 1.2
SERVICE CMNT-IMP: ABNORMAL

## 2024-03-12 PROCEDURE — 3600000002 HC SURGERY LEVEL 2 BASE: Performed by: PHYSICAL MEDICINE & REHABILITATION

## 2024-03-12 PROCEDURE — 7100000010 HC PHASE II RECOVERY - FIRST 15 MIN: Performed by: PHYSICAL MEDICINE & REHABILITATION

## 2024-03-12 PROCEDURE — 2709999900 HC NON-CHARGEABLE SUPPLY: Performed by: PHYSICAL MEDICINE & REHABILITATION

## 2024-03-12 PROCEDURE — 82962 GLUCOSE BLOOD TEST: CPT

## 2024-03-12 PROCEDURE — 2500000003 HC RX 250 WO HCPCS: Performed by: PHYSICAL MEDICINE & REHABILITATION

## 2024-03-12 PROCEDURE — 6360000002 HC RX W HCPCS: Performed by: PHYSICAL MEDICINE & REHABILITATION

## 2024-03-12 PROCEDURE — 85610 PROTHROMBIN TIME: CPT

## 2024-03-12 RX ORDER — BUPIVACAINE HYDROCHLORIDE 5 MG/ML
10 INJECTION, SOLUTION EPIDURAL; INTRACAUDAL ONCE
Status: DISCONTINUED | OUTPATIENT
Start: 2024-03-12 | End: 2024-03-12 | Stop reason: HOSPADM

## 2024-03-12 RX ORDER — LIDOCAINE HYDROCHLORIDE 20 MG/ML
10 INJECTION, SOLUTION EPIDURAL; INFILTRATION; INTRACAUDAL; PERINEURAL ONCE
Status: COMPLETED | OUTPATIENT
Start: 2024-03-12 | End: 2024-03-12

## 2024-03-12 RX ORDER — ESCITALOPRAM OXALATE 10 MG/1
10 TABLET ORAL DAILY
Qty: 90 TABLET | Refills: 1 | Status: SHIPPED | OUTPATIENT
Start: 2024-03-12

## 2024-03-12 RX ORDER — LEVETIRACETAM 500 MG/1
500 TABLET ORAL
Qty: 90 TABLET | Refills: 1 | Status: SHIPPED | OUTPATIENT
Start: 2024-03-12

## 2024-03-12 RX ORDER — ATORVASTATIN CALCIUM 20 MG/1
20 TABLET, FILM COATED ORAL EVERY EVENING
Qty: 90 TABLET | Refills: 1 | Status: SHIPPED | OUTPATIENT
Start: 2024-03-12 | End: 2024-09-08

## 2024-03-12 RX ORDER — DEXAMETHASONE SODIUM PHOSPHATE 10 MG/ML
20 INJECTION, SOLUTION INTRAMUSCULAR; INTRAVENOUS ONCE
Status: COMPLETED | OUTPATIENT
Start: 2024-03-12 | End: 2024-03-12

## 2024-03-12 ASSESSMENT — PAIN - FUNCTIONAL ASSESSMENT
PAIN_FUNCTIONAL_ASSESSMENT: NONE - DENIES PAIN
PAIN_FUNCTIONAL_ASSESSMENT: 0-10

## 2024-03-12 NOTE — OP NOTE
Operative Note      Patient: Adenike Dasilva  YOB: 1940  MRN: [id number]    Date of Procedure: 3/12/2024    Pre-Op Diagnosis Codes:     * Lumbar disc disease with radiculopathy [M51.16]    Post-Op Diagnosis: Same       Procedure(s):  BILATERAL L4 TRANSFORAMINAL EPIDURAL STEROID INJECTION    Surgeon(s):  Giovanny Robertson MD    Assistant:   * No surgical staff found *    Anesthesia: Local    Estimated Blood Loss (mL): Minimal    Complications: None    Specimens:   * No specimens in log *    Implants:  * No implants in log *  Epidural Steroid Injection Operative Report    Indications: This is a 83 y.o. female who presents with low back pain and radiculopathy. She was positive for LS DDD with radiculopathy.  The patient was admitted for spinal intervention as conservative measures have failed.      Preoperative Diagnosis: LS DDD with Radiculopathy    Postoperative Diagnosis: LS DDD with Radiculopathy    Surgeon(s) and Role:     * Giovanny Robertson MD - Primary     Procedure:  Procedure(s):  BILATERAL L4 TRANSFORAMINAL EPIDURAL STEROID INJECTION    Procedure in Detail:  After appropriate informed consent was obtained, the patient was taken to the operating suite and placed in the prone position on the operating table on appropriate padding.  The LS region was prepped and draped in the usual sterile fashion. Intraoperative fluoroscopy was used to localize the LS spine. The skin was infiltrated with 2% lidocaine. A 25-g needle was advanced into the Perez L4 neuroforamen under fluoroscopic guidance.   No contrast used due to allergy.  Permanent fluoroscopic images were saved in the patient's record.    Next, 2 ml of 2% lidocaine and 20 mg of Dexamethasone were injected. The needle was removed from the patient. The patient was then turned back into the supine position on the stretcher and was taken to the Recovery Room in stable condition.    Estimated Blood Loss:  none     Specimens: None

## 2024-03-12 NOTE — H&P
Agitation,hallucinations         MEDS:     No current facility-administered medications for this encounter.          Vitals:    03/12/24 1007   BP: 128/68   Pulse: 81   Resp: 16   Temp: 98 °F (36.7 °C)   SpO2: 95%     PE:  Gen: NAD  Head: normocephalic  Heart: RRR  Lungs: CTA renee  Abd: NT, ND, soft  Neuro: awake and alert  Skin: intact    IMPRESSION:   Lumbar radiculopathy    Note:  The clinical status was discussed in detail with the patient.  The procedure was again discussed and described in detail.  All understand and accept the planned procedure and risks; reject other forms of treatment.  All questions are answered.    Giovanny Robertson MD

## 2024-03-12 NOTE — DISCHARGE INSTRUCTIONS
didn’t have before the injection    Follow-up appointment:   If still having pain in 1-2 weeks, call office at [phone] for a follow up appointment.      DISCHARGE SUMMARY from Nurse    The following personal items collected during your admission are returned to you:   Dental Appliance:    Vision:    Hearing Aid:    Jewelry:    Clothing:    Other Valuables:    Valuables sent to safe:        If you were given prescriptions, please review the written information on prescribed medications.     You will receive a Post Operative Call from one of the Recovery Room Nurses on the day after your surgery to check on you. It is very important for us to know how you are recovering after your surgery.  You may receive an e-mail or letter in the mail from [facility] regarding your experience with us in the Ambulatory Surgery Unit. Your feedback is valuable to us and we appreciate your participation in the survey.       If you have not had your influenza or pneumococcal vaccines, please follow up with your primary care physician.    The discharge information has been reviewed with the patient.  The patient verbalized understanding.

## 2024-03-12 NOTE — PERIOP NOTE
Adenike Dasilva  1940  [id number]    Situation:  Verbal report given from: Linda ROQUE  Procedure: Procedure(s):  BILATERAL L4 TRANSFORAMINAL EPIDURAL STEROID INJECTION    Background:    Preoperative diagnosis: Lumbar disc disease with radiculopathy [M51.16]    Postoperative diagnosis: * No post-op diagnosis entered *    :  Dr. Robertson    Assessment:  Intra-procedure medications       Vital signs stable      Recommendation:    Discharged to home after instructions reviewed with patient. Recommend rest until local anesthetic has worn off.   
LUE,LLE slightly weaker than RUE, RLE with push,pull  
Patient received to PACU, VSS. Patient awake and alert with no complaints of pain. Injection site intact.     Neuro:  Push/Pull assessment:       LLE Response: strong but sligthly weaker than right   RLE Response: strong    Discharge instructions given. Patient verbalized understanding of instructions and follow up.     Patient states ready for discharge - patient discharged at this time by wheelchair with all belongings (silver bracelet, pair earrings, gold necklace, 5 silver rings). to provide transportation home.   
Pt does have silver bracelet on right arm, pair earrings, gold necklace,5 silver rings, watch. Pt states she \"thinks\" she had a gold bracelet on but unable to find it. Pt states she may have left it at home.  
Spoke to patient, stated she is feeling okay. On Oxygen 2 Liters per nasal cannula as needed. Stated that her Coumadin is on hold and last time she took it was last week per doctor's order.  Patient is calling transport service  for 3/12 procedure ( pls. Do not change arrival time of 9:15am).  
Do not bring any jewelry or money (other than copays or fees as instructed). Do not wear make-up, particularly mascara, the morning of your procedure. Wear your hair loose or down, no ponytails, buns, may pins or clips. All body piercings must be removed.      8. You should understand that if you do not follow these instructions your procedure may be cancelled. If your physical condition changes (i.e. fever, cold or flu) please contact your surgeon as soon as possible.    9. It is important that you be on time. If a situation occurs where you may be late, or if you have any questions or problems, please call [phone].    10. Your procedure time may be subject to change. You will receive a phone call the day prior to confirm your arrival time.    I understand a pre-operative phone call will be made to verify my procedure time. In the event that I am not available, I give permission for a message to be left on my answering service and/or with another person?      Yes    Reviewed instructions with patient, able to verbalized understanding.         ___________________      ___________________      ___________________  (Signature of Patient)          (Witness)                   (Date and Time)

## 2024-03-13 ENCOUNTER — TELEPHONE (OUTPATIENT)
Facility: CLINIC | Age: 84
End: 2024-03-13

## 2024-03-13 DIAGNOSIS — K52.9 CHRONIC DIARRHEA: ICD-10-CM

## 2024-03-13 RX ORDER — DICYCLOMINE HYDROCHLORIDE 10 MG/1
10 CAPSULE ORAL DAILY PRN
Qty: 30 CAPSULE | Refills: 2 | Status: SHIPPED | OUTPATIENT
Start: 2024-03-13 | End: 2024-06-11

## 2024-03-13 NOTE — TELEPHONE ENCOUNTER
Rx sent to pharmacy as requested - 30 day supply as noted on NP's note that the goal is to wean this medication.

## 2024-03-13 NOTE — TELEPHONE ENCOUNTER
Call discussed with Rosina Casey NP and returned to patient - Patient reports feeling \"loopy\" when she came back home, but she spoke with the \"recovery nurse\" who told her that this was expected after her procedure. Patient also verbalized that her Blood Glucose went \"rebekah-high\" because of the steroid injection, but it is better today.   She asked if she could start PT next week. I informed Ms. Dasilva that in order to place order for Home Health, NP would need to assess her. I offered a visit this Friday afternoon, between 12-3:30 specifically for evaluation for the need for PT. Patient accepted the visit.     Visit scheduled in [facility].

## 2024-03-13 NOTE — TELEPHONE ENCOUNTER
[facility]  [phone]    Patient Name: Adenike Dasilva  YOB: 1940    Medication Refill Request Triage    Requested by:    [x]  Patient  []  Support person:      Last [facility] Visit:  2/26/2024    Next [facility] Visit: 3/27/2024    Preferred Pharmacy: *[facility]  Backup Pharmacy:  *    Medications requested:  *dicyclomine (Bentyl)    Is patient completely out?  [x]   YES  []   NO  If NO, how many pills does patient have left?  __    Other pertinent information shared: The patient states that the medication helps with her stomach pain.

## 2024-03-13 NOTE — TELEPHONE ENCOUNTER
Back Procedure - The patient left a  at 9:40 am to update Yarely Casey NP that her back procedure yesterday went well, except that her legs are still weak and the steroids caused a lack of focus.  The patient states that she is ready for PT.    The patient's callback is [phone]

## 2024-03-15 ENCOUNTER — OFFICE VISIT (OUTPATIENT)
Facility: CLINIC | Age: 84
End: 2024-03-15
Payer: MEDICARE

## 2024-03-15 VITALS
HEART RATE: 78 BPM | RESPIRATION RATE: 18 BRPM | OXYGEN SATURATION: 92 % | SYSTOLIC BLOOD PRESSURE: 104 MMHG | DIASTOLIC BLOOD PRESSURE: 62 MMHG | TEMPERATURE: 97.6 F

## 2024-03-15 DIAGNOSIS — E11.42 TYPE 2 DIABETES MELLITUS WITH DIABETIC POLYNEUROPATHY, WITHOUT LONG-TERM CURRENT USE OF INSULIN (HCC): ICD-10-CM

## 2024-03-15 DIAGNOSIS — J96.11 CHRONIC RESPIRATORY FAILURE WITH HYPOXIA (HCC): Primary | ICD-10-CM

## 2024-03-15 DIAGNOSIS — R29.6 FREQUENT FALLS: ICD-10-CM

## 2024-03-15 DIAGNOSIS — M47.9 ADVANCED OSTEOARTHRITIS OF SPINE: ICD-10-CM

## 2024-03-15 PROCEDURE — G8420 CALC BMI NORM PARAMETERS: HCPCS | Performed by: NURSE PRACTITIONER

## 2024-03-15 PROCEDURE — 1123F ACP DISCUSS/DSCN MKR DOCD: CPT | Performed by: NURSE PRACTITIONER

## 2024-03-15 PROCEDURE — 99349 HOME/RES VST EST MOD MDM 40: CPT | Performed by: NURSE PRACTITIONER

## 2024-03-15 PROCEDURE — 1090F PRES/ABSN URINE INCON ASSESS: CPT | Performed by: NURSE PRACTITIONER

## 2024-03-15 PROCEDURE — 1036F TOBACCO NON-USER: CPT | Performed by: NURSE PRACTITIONER

## 2024-03-15 PROCEDURE — 3074F SYST BP LT 130 MM HG: CPT | Performed by: NURSE PRACTITIONER

## 2024-03-15 PROCEDURE — G8484 FLU IMMUNIZE NO ADMIN: HCPCS | Performed by: NURSE PRACTITIONER

## 2024-03-15 PROCEDURE — 3078F DIAST BP <80 MM HG: CPT | Performed by: NURSE PRACTITIONER

## 2024-03-16 NOTE — ASSESSMENT & PLAN NOTE
-Several falls with injury in past year  -Fall on 11/26/23 with R hip impact, saw ortho and X-ray neg for fracture  -Ambulates with rolling walker but balance is very poor  -PT discharged due to lack of progress in setting of worsening back pain  -she had spinal injections 3/12/24 and would like to resume PT   -polypharmacy contributing to falls- discussed and slowly weaning high risk medicaitons

## 2024-03-16 NOTE — ASSESSMENT & PLAN NOTE
-Using 2L overnight and PRN with Luli  -SpO2 was 84% on room air during visit, with oxygen on was 92%  -CO2 WNL with labs 11/2/23  -Endorses dyspnea with moderate exertion and per PT desaturation often occurs with session  -encouraged oxygen use with any activity unless saturation improves

## 2024-03-16 NOTE — ASSESSMENT & PLAN NOTE
-A1C 7.1% on 11/2/23  -Uses Gunjan meter with readings in 150s-200s recently   -discussed potential elevation in setting of recent steroid injections for back  -Denies hypoglycemic episodes  -She does require assistance with changing the sensor  -continue Metformin (recently resumed in setting of elevated glucose readings, tolerating well)

## 2024-03-16 NOTE — PROGRESS NOTES
Results   Component Value Date/Time    IRON 27 11/02/2023 11:55 AM    TIBC 436 11/02/2023 11:55 AM              VALORIE HDZ NP

## 2024-03-16 NOTE — ASSESSMENT & PLAN NOTE
-chronic, pain is 5/10 today while seated but reports 10/10 at times with ambulation  -has not responded to Cymbalta (d/c due to lack of effect and multiple other serotonergic agents)  -pain severe, interfering with ADLs and daily life, per patient increases her fall risk as she has to hunch and bend to ambulate due to severe pain, pain has limited PT progress   -completed spinal injections with Dr. Robertson 3/12/24  -continue Tylenol, lidocaine patches, Lyrica  -Will continue Tramadol PRN with goal to improve function and QOL. Recent reduction in serotonin agents, but discussed risks of hyponatremia, constipation, drowsiness, falls and she states understanding.    -UDS and CSC completed (2/26/24)  -pain slightly improved s/p spinal injections, requests to resume PT

## 2024-03-17 ENCOUNTER — HOME HEALTH ADMISSION (OUTPATIENT)
Dept: HOME HEALTH SERVICES | Facility: HOME HEALTH | Age: 84
End: 2024-03-17
Payer: MEDICARE

## 2024-03-20 ENCOUNTER — TELEPHONE (OUTPATIENT)
Facility: CLINIC | Age: 84
End: 2024-03-20

## 2024-03-20 NOTE — TELEPHONE ENCOUNTER
Appointment reschedule - The patient called to reschedule her next in home visit appointment with Yarely Casey NP.  She states that she can only do morning on Wednesday, 3/27/2024.  The patient said that 3/24 or 3/28 are open on her schedule as alternate dates.  The patient asks for a callback at [phone]

## 2024-03-25 ENCOUNTER — TELEPHONE (OUTPATIENT)
Facility: CLINIC | Age: 84
End: 2024-03-25

## 2024-03-25 NOTE — TELEPHONE ENCOUNTER
Called patient to confirm their in home visit with Rosina Casey on 3/28/2024, arrival between 12-4.  Spoke with patient, they confirmed the appointment and answered the covid screen questions. Does anyone in the household have covid no  Have there been any changes to insurance no or location no

## 2024-03-26 ENCOUNTER — HOME CARE VISIT (OUTPATIENT)
Facility: HOME HEALTH | Age: 84
End: 2024-03-26

## 2024-03-26 VITALS
OXYGEN SATURATION: 92 % | TEMPERATURE: 97.3 F | DIASTOLIC BLOOD PRESSURE: 64 MMHG | SYSTOLIC BLOOD PRESSURE: 120 MMHG | RESPIRATION RATE: 16 BRPM | HEART RATE: 80 BPM

## 2024-03-26 PROCEDURE — 0221000100 HH NO PAY CLAIM PROCEDURE

## 2024-03-26 PROCEDURE — G0151 HHCP-SERV OF PT,EA 15 MIN: HCPCS

## 2024-03-26 ASSESSMENT — ENCOUNTER SYMPTOMS
CONSTIPATION: 1
DYSPNEA ACTIVITY LEVEL: AFTER AMBULATING 10 - 20 FT
DIARRHEA: 1

## 2024-03-26 NOTE — HOME HEALTH
Reason for referral, PMH SUMMARY of clinical condition: Mrs. Dasilva is an 84 yo female who is referred for  PT due to recent steroid injection in low back. She was seen by PT last year from 8/18/23-1/18/24 and received 31 PT visits and missed 13. She was d/c'd in January due to inability to tolerate PT due to low back pain. She received the steroid injection 3/12/24 and requested to resume home PT. PMHX includes mild dementia, COPD, pulm fibrosis on 2L O2 prn to keep sats >92%, HF, PVD, DVT, prior falls, hypothyroidism, depression/anxiety, AS, CAD, opioid use, DM2, HTN. She lives alone in a handicapped apartment and functions at a mod I level in the home with rollator at baseline. Her sons provide intermittent support and she has a hired transportation service for appointments and hires a person to get her mail and packages from the [employer]. She presents today with no pain and reports back pain is the worst in the morning when getting OOB and her goal for PT is reduce pain. Of concern is her lack of awareness of the need to use supplemental O2 appropriately. At rest on RA her sats are 89% and with short ambulation to bedroom they drop to 85%. She would not have worn O2 during the visit except for PT insisting and educating her that her sats should be kept >92% for optimal brain health and organ function. She has a portable concentrator but she says she doesn't take it to appointments. She has a well established HEP from PT last year but hasn't been doing her exercises. PT educated her on the fundamental importance of regular bouts of ambulation to manage chronic pain. Due to extensive prior PT skilled PT is recommended 2w3 to re-establish HEP, for O2 use education, fall prevention and to establish a regular walking routine even if it is just within her apartment for optimal health and wellness.   Subjective (statement from pt/cg that is relative to why you are there): my oxygen never goes up to 92%    Caregiver:

## 2024-03-28 ENCOUNTER — OFFICE VISIT (OUTPATIENT)
Facility: CLINIC | Age: 84
End: 2024-03-28
Payer: MEDICARE

## 2024-03-28 VITALS
DIASTOLIC BLOOD PRESSURE: 66 MMHG | RESPIRATION RATE: 16 BRPM | SYSTOLIC BLOOD PRESSURE: 116 MMHG | HEART RATE: 83 BPM | OXYGEN SATURATION: 92 % | TEMPERATURE: 98 F

## 2024-03-28 DIAGNOSIS — G47.01 INSOMNIA DUE TO MEDICAL CONDITION: ICD-10-CM

## 2024-03-28 DIAGNOSIS — R21 RASH: ICD-10-CM

## 2024-03-28 DIAGNOSIS — M47.27 OSTEOARTHRITIS OF SPINE WITH RADICULOPATHY, LUMBOSACRAL REGION: ICD-10-CM

## 2024-03-28 DIAGNOSIS — D68.9 COAGULOPATHY (HCC): ICD-10-CM

## 2024-03-28 DIAGNOSIS — G43.109 MIGRAINE WITH AURA AND WITHOUT STATUS MIGRAINOSUS, NOT INTRACTABLE: ICD-10-CM

## 2024-03-28 DIAGNOSIS — J41.0 SIMPLE CHRONIC BRONCHITIS (HCC): ICD-10-CM

## 2024-03-28 DIAGNOSIS — F03.A0 MILD DEMENTIA WITHOUT BEHAVIORAL DISTURBANCE, PSYCHOTIC DISTURBANCE, MOOD DISTURBANCE, OR ANXIETY, UNSPECIFIED DEMENTIA TYPE (HCC): ICD-10-CM

## 2024-03-28 DIAGNOSIS — I10 PRIMARY HYPERTENSION: ICD-10-CM

## 2024-03-28 DIAGNOSIS — I35.0 NONRHEUMATIC AORTIC VALVE STENOSIS: ICD-10-CM

## 2024-03-28 DIAGNOSIS — E11.42 TYPE 2 DIABETES MELLITUS WITH DIABETIC POLYNEUROPATHY, WITHOUT LONG-TERM CURRENT USE OF INSULIN (HCC): ICD-10-CM

## 2024-03-28 DIAGNOSIS — K21.9 GASTROESOPHAGEAL REFLUX DISEASE WITHOUT ESOPHAGITIS: ICD-10-CM

## 2024-03-28 DIAGNOSIS — J84.10 PULMONARY FIBROSIS (HCC): ICD-10-CM

## 2024-03-28 DIAGNOSIS — Z79.899 POLYPHARMACY: ICD-10-CM

## 2024-03-28 DIAGNOSIS — K58.0 IRRITABLE BOWEL SYNDROME WITH DIARRHEA: ICD-10-CM

## 2024-03-28 DIAGNOSIS — I25.10 CORONARY ARTERY DISEASE INVOLVING NATIVE CORONARY ARTERY OF NATIVE HEART WITHOUT ANGINA PECTORIS: Primary | ICD-10-CM

## 2024-03-28 PROCEDURE — G8484 FLU IMMUNIZE NO ADMIN: HCPCS | Performed by: NURSE PRACTITIONER

## 2024-03-28 PROCEDURE — 3074F SYST BP LT 130 MM HG: CPT | Performed by: NURSE PRACTITIONER

## 2024-03-28 PROCEDURE — 1123F ACP DISCUSS/DSCN MKR DOCD: CPT | Performed by: NURSE PRACTITIONER

## 2024-03-28 PROCEDURE — 1090F PRES/ABSN URINE INCON ASSESS: CPT | Performed by: NURSE PRACTITIONER

## 2024-03-28 PROCEDURE — 1036F TOBACCO NON-USER: CPT | Performed by: NURSE PRACTITIONER

## 2024-03-28 PROCEDURE — G8420 CALC BMI NORM PARAMETERS: HCPCS | Performed by: NURSE PRACTITIONER

## 2024-03-28 PROCEDURE — 3078F DIAST BP <80 MM HG: CPT | Performed by: NURSE PRACTITIONER

## 2024-03-28 PROCEDURE — 99350 HOME/RES VST EST HIGH MDM 60: CPT | Performed by: NURSE PRACTITIONER

## 2024-03-28 RX ORDER — TRAMADOL HYDROCHLORIDE 50 MG/1
50 TABLET ORAL DAILY PRN
Qty: 30 TABLET | Refills: 0 | Status: SHIPPED | OUTPATIENT
Start: 2024-03-28 | End: 2024-04-27

## 2024-03-28 RX ORDER — TRIAMCINOLONE ACETONIDE 1 MG/G
OINTMENT TOPICAL 2 TIMES DAILY PRN
Qty: 30 G | Refills: 0 | Status: SHIPPED | OUTPATIENT
Start: 2024-03-28 | End: 2024-04-11

## 2024-03-28 RX ORDER — AMITRIPTYLINE HYDROCHLORIDE 10 MG/1
10 TABLET, FILM COATED ORAL NIGHTLY
Qty: 90 TABLET | Refills: 1 | Status: SHIPPED | OUTPATIENT
Start: 2024-03-28

## 2024-03-28 NOTE — PROGRESS NOTES
feels is helping.      Her last hospitalization was in 7/22-7/27/23 for ground level fall with hypoxia at [facility].  She completed pulmonary workup with CT scan significant for ILD, possible COPD given smoking history but PFTs unclear. Once stable she was discharged to [facility] until 8/11/23.  She follows with urology for frequency and nocturia, currently stable on Myrbetriq and previously tried  Gemtesa and oxybutynin.  She uses oxygen at 2L via nasal cannula as needed via [employer].  She is using albuterol inhaler as needed and Wixela scheduled. She has dyspnea with exertion and has chronic cough.  She does use incentive spirometer periodically.      She had a hospitalization 7/2022 for aortic valve replacement and stent placement.  She continues to follow-up annually with Dr. Fernandez, cardiologist, with echocardiogram monitoring.  She was managed on warfarin (on hold per hematologist due to risk vs benefit, pending hypercoagulable workup results).  She denies recent episodes of bleeding, last Hgb on 11/2/23 was 13.3. She has had no recent chest pain but does endorse occasional palpitations , feels more associated with anxiety as she had other symptoms and impvoed with her anxiety medication. Diabetes is managed with Metformin at this time.  She was previously managed on Januvia for her diabetes but stopped this for unclear reason.  A1C obtained 11/2/23 at 7.1%. She monitors blood sugars with Gunjan meter, no recent hypoglycemic episodes.     She does struggle with periods of anxiety worsened by her limited independence. She is managed on escitalopram, amitryptyline, and PRN Buspar.  She is taking trazodone 100mg at bedtime for sleep.  Per son, she has mild dementia and he is helping her periodically, but she is primarily her own health historian.  She was previously followed by neurologist for migraines (currently managed on Keppra, amitriptyline, and PRN Fioricet), but has not re-established with neurology

## 2024-03-28 NOTE — ASSESSMENT & PLAN NOTE
-Patient and son endorse memory issues and known mild dementia, but she has not done extensive cognitive testing.   -Initially hesitant about neurology as her  had a bad experience with dementia  -ultimately did feel she would want to proceed with neurology- referral placed with Dr. Caraballo for August 19, on wait list for sooner apt   -MMSE completed Nov 2023 with score 29/30  -SLP visited but no significant potential for improvement  -continue supportive measures, encourage routine check-ins with family and use of pill dividers

## 2024-03-28 NOTE — ASSESSMENT & PLAN NOTE
-Using 2L PRN with Luli (but admits to occasionally not using like she should)  -Followed by Dr. Edgar, pulmonology with recent visit 1/12/24 with NP  -recent PFTs felt not accurate but no evidence of obstruction  -Per note review likely COPD with 30 PY history  -One exacerbation Oct 2023 treated with doxycycline, benzonatate with some improvement  -continue Wixela and PRN albuterol

## 2024-03-28 NOTE — ASSESSMENT & PLAN NOTE
-worsened by anxiety and urinary frequency  -Currently taking trazodone 100mg which she finds effective and is strongly again weaning/discontinuing  -continue f/u with urology for urinary frequency  -continue anxiety management as above

## 2024-03-28 NOTE — ASSESSMENT & PLAN NOTE
-A1C 7.1% on 11/2/23  -Uses Gunjan meter with readings in 100s recently, 148 this morning  -Denies hypoglycemic episodes  -She does require assistance with changing the sensor  -continue Metformin 500mg daily (recently resumed in setting of elevated glucose readings, tolerating well)

## 2024-03-28 NOTE — ASSESSMENT & PLAN NOTE
-Previously followed by neurology, but has not re-established since moving to [place]  -Referral placed today and she is scheduled with neurology in August  -Previously treated with Midrin  -Currently managed on Keppra 500mg morning, 750mg evening and daily amitriptyline  -Also using PRN Fioricet about once every 1-2 weeks

## 2024-03-28 NOTE — ASSESSMENT & PLAN NOTE
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

## 2024-03-28 NOTE — ASSESSMENT & PLAN NOTE
-Several high risk meds, difficulty with compliance given complicated regimen  Discussed and already d/c:   Celebrex- risk of GIB with Coumadin  Seroquel- has not been taking and did not find helpful  Xyzal- has not been taking, no clear indication  trospium discontinued by urology  Cymbalta- already on SSRI and no improvement in neuropathy  -Typed medication list provided for patient and son (updated Jan 2024)  - nurse initiated pre-packaged pills through [facility] pharmacy but this actually added to confusion so she changed back to [facility]

## 2024-03-28 NOTE — ASSESSMENT & PLAN NOTE
-s/p TAVR in June 2022  -also with protein C deficiency, remote history of DVT     -Referred to hematology with goal to discontinue warfarin given difficulty with med compliance, diet, and INR monitoring, hx of frequent falls with injury  -saw Dr. Steele at [facility] 2/21 and coumadin currently held  -had labs drawn for hypercoagulable workup 3/27/24, pending results   -per hematology note if abnormal plan is to discuss Eliquis  -continue aspirin 81mg

## 2024-03-28 NOTE — ASSESSMENT & PLAN NOTE
-Endorsed daily symptoms, has been chronic for at least 10 years  -Reports full work-up at [facility] and colonoscopy with no findings  -Attempts to modify food choices, but this is limited by availability as she is not able to shop for her own groceries   -started on Colestipol and has greatly reduced diarrhea but caused constipation so she is no longer using  -symptoms were not improved by holding Metformin  -Currently using imodium daily and Bentyl about once daily, goal to wean Bentyl

## 2024-03-28 NOTE — ASSESSMENT & PLAN NOTE
-s/p PCI to the LCX in June 2022  -Echo 7/2023 with EF 60-65%  -Managed on aspirin (coumadin currently held pending hematology f/u)  -Managed on atorvastatin and zetia for HLD with LDL goal <70    -Followed by Dr. Fernandez, cardiologist, with annual visits- last 12/2023    -denies CP, worsening SOB, continue current plan

## 2024-03-28 NOTE — ASSESSMENT & PLAN NOTE
-only visible left medial ankle but patient reports has also been on left wrist  -itching worse at bedtime (bed and linens inspected today and also by apartment)  -no changes to soaps, lotions  -she had steroid injection and feels this is contributing, but also has known allergies to trees and grass  -not improved by Eucerin, Cortisone, or Sarna  -will send triamcinolone today, discussed short-term use and only on localized rash to ankle and wrist

## 2024-03-29 ENCOUNTER — HOME CARE VISIT (OUTPATIENT)
Dept: HOME HEALTH SERVICES | Facility: HOME HEALTH | Age: 84
End: 2024-03-29

## 2024-03-30 DIAGNOSIS — M47.9 ADVANCED OSTEOARTHRITIS OF SPINE: Primary | ICD-10-CM

## 2024-04-01 RX ORDER — PREGABALIN 50 MG/1
CAPSULE ORAL
Qty: 30 CAPSULE | Refills: 3 | Status: SHIPPED | OUTPATIENT
Start: 2024-04-01 | End: 2024-05-01

## 2024-04-02 ENCOUNTER — TELEPHONE (OUTPATIENT)
Facility: CLINIC | Age: 84
End: 2024-04-02

## 2024-04-02 ENCOUNTER — HOME CARE VISIT (OUTPATIENT)
Facility: HOME HEALTH | Age: 84
End: 2024-04-02
Payer: MEDICARE

## 2024-04-02 DIAGNOSIS — J41.0 SIMPLE CHRONIC BRONCHITIS (HCC): Primary | ICD-10-CM

## 2024-04-02 DIAGNOSIS — J84.10 PULMONARY FIBROSIS (HCC): ICD-10-CM

## 2024-04-02 DIAGNOSIS — J84.9 INTERSTITIAL LUNG DISEASE (HCC): ICD-10-CM

## 2024-04-02 PROCEDURE — G0151 HHCP-SERV OF PT,EA 15 MIN: HCPCS

## 2024-04-02 NOTE — TELEPHONE ENCOUNTER
Pt left  stating she needs a referral sent to [facility] for a new oxygen machine, requested callback.

## 2024-04-03 ENCOUNTER — TELEPHONE (OUTPATIENT)
Facility: CLINIC | Age: 84
End: 2024-04-03

## 2024-04-03 NOTE — TELEPHONE ENCOUNTER
Portable Oxygen - The patient called to ask if [facility] has ordered her portable oxygen.  She states that it is covered by [employer].    The patient's callback is [phone]

## 2024-04-03 NOTE — TELEPHONE ENCOUNTER
Call returned to patient to inform that I have placed the order for the portable concentrator. She verbalized appreciation for the call.

## 2024-04-03 NOTE — TELEPHONE ENCOUNTER
Call returned to patient - patient states that her current oxygen concentrator is old, and heavy and she needs a new and lighter concentrator that she can carry around and take out. Patient verbalized that PT told her that she should have one and she has spoken with her insurance and \"all they needed\" was approval and documentation from her PCP. I tried to explain to Ms. Dasilva that the process starts by ordering the equipment from a DME company. We would provide the documentation to them and they would work with her insurance for the payments. Patient insisted that I needed to call her insurance.  She finally gave me the name of the company that provides her oxygen - [employer]   Call placed to [employer] [phone] - I spoke with Debra - she confirmed that they do supply oxygen tanks to patient and also a home concentrator. I asked if they could provide a portable concentrator. She responded that they can. [facility] would need an order to cancel the oxygen tanks (gashes) and another order for the Portable oxygen Concentrator. Patient uses 2L O2 continuously. Noted that portable concentrator would only go up to 4L.     Information above discussed with Rosina Casey NP - and NP agreed to order portable concentrator. I also reached out to Almaz Khan PT for her input as well before I start this process, as I think that Ms. Dasilva has the belief that she can use the portable concentrator in the home and carry it around. That is not the case, this concentrator should be for outings. She should continue to use her current home concentrator while in the home.

## 2024-04-03 NOTE — TELEPHONE ENCOUNTER
LCSW called pt to schedule routine in-home appointment for social work assessment and supportive counseling. Appointment scheduled for 4/11/24 @ 2:30pm.     STEVEN Paul, JAYW  Licensed Clinical   [facility]  (O) [phone] (C) [phone]

## 2024-04-03 NOTE — TELEPHONE ENCOUNTER
Rx to [facility] [employer] [employer]  Rx for Portable Concentrator   Visit Note     Faxed to [phone] [facility] - Confirmation uploaded to Media.

## 2024-04-04 VITALS
OXYGEN SATURATION: 91 % | HEART RATE: 70 BPM | RESPIRATION RATE: 17 BRPM | SYSTOLIC BLOOD PRESSURE: 125 MMHG | DIASTOLIC BLOOD PRESSURE: 76 MMHG | TEMPERATURE: 98.2 F

## 2024-04-04 ASSESSMENT — ENCOUNTER SYMPTOMS: PAIN LOCATION - PAIN QUALITY: ACHE

## 2024-04-05 ENCOUNTER — HOME CARE VISIT (OUTPATIENT)
Facility: HOME HEALTH | Age: 84
End: 2024-04-05
Payer: MEDICARE

## 2024-04-05 VITALS
OXYGEN SATURATION: 91 % | TEMPERATURE: 97.6 F | DIASTOLIC BLOOD PRESSURE: 65 MMHG | HEART RATE: 87 BPM | RESPIRATION RATE: 17 BRPM | SYSTOLIC BLOOD PRESSURE: 126 MMHG

## 2024-04-05 PROCEDURE — G0151 HHCP-SERV OF PT,EA 15 MIN: HCPCS

## 2024-04-05 NOTE — HOME HEALTH
Subjective: \" I am doing ok. The back proceedure worked semi well\"  Falls since last visit : no falls  Caregiver involvement changes: NA  Home health supplies by type and quantity ordered/delivered this visit include: NA    Clinician asked if patient has had any physician contact since last home care visit and patient states: {no  Clinician asked if patient has any new or changed medications and patient states: no  If Yes, were medications reconciled? yes  Was the certifying physician notified of changes in medications? NA    Clinical assessment (what this visit means for the patient overall and need for ongoing skilled care) and progress or lack of progress towards SPECIFIC goals:   The Pt needs continued education on use of exygen during activity because her oxygen saturation rates drop with extensive activity such as walking in the house. The pt is able to walk using Rollator in the roper and although her O2 drops she is not sympotmatic. Pt does not want to use portable machine because is is heavy but the PT explained that she can rest it on the seat of rollator or in the basket and will not have to carry it. The PT educated the pt on standing HEP as well making sure she is using proper form and techniques The PT will continue to work with the pt on fall prevention, posture and standing HEP with deep breathing techniques to prevent any falls    Written Teaching Material Utilized: HEP cue sheet    Interdisciplinary communication with: NICK  Discharge planning as follows: Is no longer homebound, Per physician order, Will discharge when the patient has reached their maximum functional potential and maximum safety in their home and When goals are met    Specific plan for next visit: Re-instruct patient/caregiver on HEP, gait training, balance training, Educate in s/s of worsening condition and when to call MD KEVIN or 911 and Instruct in safety issues regarding Proper use of assistive device

## 2024-04-07 DIAGNOSIS — R21 RASH: ICD-10-CM

## 2024-04-08 ENCOUNTER — HOME CARE VISIT (OUTPATIENT)
Facility: HOME HEALTH | Age: 84
End: 2024-04-08
Payer: MEDICARE

## 2024-04-08 VITALS
OXYGEN SATURATION: 87 % | RESPIRATION RATE: 17 BRPM | TEMPERATURE: 98.3 F | SYSTOLIC BLOOD PRESSURE: 110 MMHG | HEART RATE: 85 BPM | DIASTOLIC BLOOD PRESSURE: 64 MMHG

## 2024-04-08 PROCEDURE — G0151 HHCP-SERV OF PT,EA 15 MIN: HCPCS

## 2024-04-08 RX ORDER — TRIAMCINOLONE ACETONIDE 1 MG/G
OINTMENT TOPICAL
Qty: 30 G | Refills: 0 | Status: SHIPPED | OUTPATIENT
Start: 2024-04-08

## 2024-04-08 ASSESSMENT — ENCOUNTER SYMPTOMS: PAIN LOCATION - PAIN QUALITY: ACHE

## 2024-04-09 ENCOUNTER — TELEPHONE (OUTPATIENT)
Facility: CLINIC | Age: 84
End: 2024-04-09

## 2024-04-09 NOTE — HOME HEALTH
Subjective: \" I am itching like crazy and it will not stop no matter what I do\"  Falls since last visit: no falls  Caregiver involvement changes: NICK  Home health supplies by type and quantity ordered/delivered this visit include: NA    Clinician asked if patient has had any physician contact since last home care visit and patient states: no  Clinician asked if patient has any new or changed medications and patient states:  no  If Yes, were medications reconciled? yes  Was the certifying physician notified of changes in medications? A    Clinical assessment (what this visit means for the patient overall and need for ongoing skilled care) and progress or lack of progress towards SPECIFIC goals:   The Pt is not sleeping well because she is itching all the time. The PCP told her it was dry skin and called in a prescription for medicated cream that did not provide any relief. The Pt's family checked the bed for bed bugs and there are none at thsi time. The Pt cannot stop itching and the PT did contact the PCP to let her know the pt is still struggling with gait due weakness in  B LE . The PT instructed the pt on the safe use RW and the fact the pt cannot wlk in the roper again until she gets her new portable concentrator so that she can work on walking longer distances out in the roper. The PT noted today that the pt did not have any spare oxygen tanks and when asked about it said no one had ever instructed her that she needed any. PT offered to call [facility] to get temporary tanks but she declined . PT explained that if she loses power she has not back up oxygen  Written Teaching Material Utilized: NICK    Interdisciplinary communication with: Rosina Casey NP about the pt's itch still making it hard for her to sleep  Discharge planning as follows: Is no longer homebound, Per physician order, Will discharge when the patient has reached their maximum functional potential and maximum safety in their home and When

## 2024-04-09 NOTE — TELEPHONE ENCOUNTER
JAYW rec'd call from pt, requesting to reschedule appointment as she is having portable oxygen delivered this Thursday 4/11/24 and she does not know what time they will be coming. They will be teaching her how to use the equipment as well. LCSW offered Friday 4/12/24 or Friday 4/19/24. She preferred Friday 4/19/24 @ 3:00pm.     STEVEN Paul, [facility]  Licensed Clinical   [facility]  (O) [phone]  (C) [phone]

## 2024-04-09 NOTE — TELEPHONE ENCOUNTER
PT Kathie notified this provider that patient was c/o severe itching.  Called patient to follow-up.     Patient reports she has \"brown spots\" and has not been a minute without itching.  No vesicles or visible rash, rather these brown spots.  Bed was checked at last visit and by her apartment building.  She has had some recent exposure for pollen.  She does feel that vasline helps but the other creams- Sarna, hydrocortisone,     She does not have a way to get to a dermatologist at this time as her son is sick and unable to give her a ride.      She denies any changes to lotion, shampoo, laundry detergents.  Rash did not start when she restarted the Metformin (does not know exactly but she does not think rash started then).      Discussed allergy medications- Claritin vs Zyrtec.  She states she will add Claritin to her grocery order.  She has also ordered more cortisone 10 which she feels was the most helpful for the pruritus.      Also discussed her next visit will be with Dr. Alex in May and she stated understanding.      Discussed labs for underlying causes, liver enzymes, TSH, BUN/Cr but these were normal in Nov and she has no other associated symptoms.

## 2024-04-10 ENCOUNTER — HOME CARE VISIT (OUTPATIENT)
Facility: HOME HEALTH | Age: 84
End: 2024-04-10
Payer: MEDICARE

## 2024-04-10 VITALS
RESPIRATION RATE: 17 BRPM | OXYGEN SATURATION: 87 % | HEART RATE: 88 BPM | TEMPERATURE: 98.4 F | DIASTOLIC BLOOD PRESSURE: 60 MMHG | SYSTOLIC BLOOD PRESSURE: 108 MMHG

## 2024-04-10 DIAGNOSIS — I25.10 CORONARY ARTERY DISEASE INVOLVING NATIVE CORONARY ARTERY OF NATIVE HEART WITHOUT ANGINA PECTORIS: ICD-10-CM

## 2024-04-10 PROCEDURE — G0151 HHCP-SERV OF PT,EA 15 MIN: HCPCS

## 2024-04-10 RX ORDER — EZETIMIBE 10 MG/1
10 TABLET ORAL DAILY
Qty: 90 TABLET | Refills: 1 | Status: SHIPPED | OUTPATIENT
Start: 2024-04-10

## 2024-04-10 ASSESSMENT — ENCOUNTER SYMPTOMS: PAIN LOCATION - PAIN QUALITY: ACHE

## 2024-04-13 ASSESSMENT — ENCOUNTER SYMPTOMS: DYSPNEA ACTIVITY LEVEL: AT REST

## 2024-04-19 ENCOUNTER — OFFICE VISIT (OUTPATIENT)
Facility: CLINIC | Age: 84
End: 2024-04-19

## 2024-04-19 ENCOUNTER — TELEPHONE (OUTPATIENT)
Facility: CLINIC | Age: 84
End: 2024-04-19

## 2024-04-19 DIAGNOSIS — Z76.89 ENCOUNTER FOR SOCIAL WORK INTERVENTION: Primary | ICD-10-CM

## 2024-04-19 NOTE — TELEPHONE ENCOUNTER
Appointment accepted - I called the patient to offer an in home visit with Dr. Dav Alex, Wednesday, 5/15/24, arrival between 12-4pm.  The patient accepted the offer and asked if she should call [facility] with questions or script refills.  She asked if all of her prescriptions were now under Dr. Alex. I let her know to call the pharmacy with any refill requests and to call [facility] with any questions.  She acknowledged

## 2024-04-19 NOTE — PROGRESS NOTES
[facility]  Social Work Assessment    Patient name: Adenike Dasilva    Date of visit: 4/19/24    Session today completed with: Adenike Dasilva    Relationship to patient: self    Purpose of visit: Supportive counseling    Visit narrative: LCSW visited with pt in the apartment where she resides alone. She was alert and oriented. She was wearing oxygen, which she said is PRN but she has found herself needing to wear it more regularly now. She becomes short of breath easily without having it on, and she measures her O2 sats which she said \"needs to be 90 or above\".     Pt was quite talkative today, shared many stories about her life. She shared that she feels significant loneliness, as she is use to being active and involved in many programs and community projects, but now that she is confined to her apartment due to advancing age and illness, she feels a sense of unrest, uneasiness, and loneliness.     Her son, Abhilash, came by quickly today to bring pt medication. He was pleasant, but had to run back to his house to get back to work (he works remotely). She said that he comes by multiple times per week and is her main source of support. He manages her finances. She shared a story of how she came very close to being scammed by a man she met on Facebook who had told her he worked on an oil rig in another country. She had been talking to him for over a month when he had asked her to send him a \"birthday card\" (gift card) from [employer]. Her son, Abhilash, intercepted this scam when she had asked Abhilash to purchase the gift card from [employer] for her to give to a man she was talking to on Facebook. 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 LCSW validated that these types of scams are unfortunately not uncommon, and her financial outcome could have been much worse if she had continued to speak to this man and give him

## 2024-04-22 ENCOUNTER — TELEPHONE (OUTPATIENT)
Facility: CLINIC | Age: 84
End: 2024-04-22

## 2024-04-22 NOTE — TELEPHONE ENCOUNTER
The patient left a VM at approx 2:35 pm.  She states in her VM that Dr. Alex needs to call [facility] about the machine to discontinue the service.  She said her PIN is [id number].  She asks that he please call them.  The patient did not hang up the phone, the message conitnued on, I was not able to get the exact time and checked caller ID for the info.

## 2024-04-22 NOTE — TELEPHONE ENCOUNTER
The patient called to update Dr. Alex that after her last echo her cardiologist said that she could discontinue her coumadin with the hematologists approval.  She said that her hematologist, Dr. Bautista, [facility] ([phone]), contacted her and stated that there is no need for her to stay on the coumadin.  The patient's callback is [phone]

## 2024-04-23 NOTE — TELEPHONE ENCOUNTER
I spoke with Dr. Alex, who was agreeable to discontinuing coumadin and the INR checks.     Message sent to Ursula Marinelli at [facility] requesting device returning details.

## 2024-04-23 NOTE — TELEPHONE ENCOUNTER
Call placed to [facility] - Patient was seen by Hematology yesterday, 4/22/24. I requested records from the visit, that should include the approval to d/c coumadin.

## 2024-04-24 ENCOUNTER — TELEPHONE (OUTPATIENT)
Facility: CLINIC | Age: 84
End: 2024-04-24

## 2024-04-24 NOTE — TELEPHONE ENCOUNTER
Call placed to patient - Voice message left informing that I have placed a \" request\" with [facility], and they will be reaching out for equipment  arrangements.     I also mentioned on the message, that prior to placing the request mentioned above, I have requested and received records from [facility] (visit note with Dr. Funmilayo Velez), confirming that patient's tests indicated that she could safely stop taking coumadin.  Shared them with Dr. Alex, who is in agreement to stop the medication and INR checks.    Call back encouraged if needed.

## 2024-04-24 NOTE — TELEPHONE ENCOUNTER
I spoke with Bisi at [facility] ([employer]), and placed a \" request\".   Patient will be contacted by [facility] for further arrangements about  meter return and closing the account.

## 2024-04-25 ENCOUNTER — HOME CARE VISIT (OUTPATIENT)
Dept: HOME HEALTH SERVICES | Facility: HOME HEALTH | Age: 84
End: 2024-04-25

## 2024-05-01 ENCOUNTER — HOME CARE VISIT (OUTPATIENT)
Facility: HOME HEALTH | Age: 84
End: 2024-05-01
Payer: MEDICARE

## 2024-05-01 PROCEDURE — G0157 HHC PT ASSISTANT EA 15: HCPCS

## 2024-05-02 VITALS
RESPIRATION RATE: 16 BRPM | OXYGEN SATURATION: 93 % | SYSTOLIC BLOOD PRESSURE: 106 MMHG | DIASTOLIC BLOOD PRESSURE: 58 MMHG | TEMPERATURE: 97.9 F | HEART RATE: 80 BPM

## 2024-05-02 ASSESSMENT — ENCOUNTER SYMPTOMS: PAIN LOCATION - PAIN QUALITY: ACHE

## 2024-05-02 NOTE — HOME HEALTH
Subjective:  Patient denies pain at rest but complained of 3/10 chronic low back and bilateral knee OA pain with all standing activities today.  Patient states that increasing pain and poor endurance limited ambulation distance and standing tolerance.  Patient states that she is only using O2 as needed.  Falls since last visit: NO  Caregiver involvement changes: NO  Home health supplies by type and quantity ordered/delivered this visit include: NO    Clinician asked if patient has had any physician contact since last home care visit and patient states: NO  Clinician asked if patient has any new or changed medications and patient states: NO  If Yes, were medications reconciled? N/A  Was the certifying physician notified of changes in medications? N/A    Clinical assessment (what this visit means for the patient overall and need for ongoing skilled care) and progress or lack of progress towards SPECIFIC goals:  Patient was only briefly able to improve gait pattern with cueing for posture/Rollator placement, to decrease MARY and increase BLE step length with Rollator gait training in building today.  Patient was limited in ambulation distance d/t fatigue and increasing pain today.  Patient was unable to perform therapeutic strength exercises w/o frequent prompting and cueing for technique and will require additional training.  Patient's cognitive deficits; poor safety awareness with household ambulation; and compromised BLE strength and overall balance make patient a high risk for falls.      Written Teaching Material Utilized: Patient/CG received written/pictorial HEP for all sitting and supported standing therapeutic strength exercises performed today.      Interdisciplinary communication with:  GINGER Vázquez re:  PT POC    Discharge planning as follows:  Discharge when all PT goals are met or maximum benefit achieved with PT.    Specific plan for next visit: Gait, transfer, strength, balance, pain management and fall

## 2024-05-03 ENCOUNTER — HOME CARE VISIT (OUTPATIENT)
Facility: HOME HEALTH | Age: 84
End: 2024-05-03
Payer: MEDICARE

## 2024-05-03 NOTE — PERIOP NOTE
[facility]  Ambulatory Surgery Unit  Pre-operative Instructions    Procedure Date  5/14/2024           Tentative Arrival Time TBD      1. On the day of your procedure, please report to the Ambulatory Surgery Unit Registration Desk and sign in at your designated time. The Ambulatory Surgery Unit is located in [facility] on the [facility] side of the [facility] across from the [facility]. Please have all of your health insurance cards, copayment, and a photo ID.    **TWO adults may accompany you the day of the procedure.  We have limited seating available.  If our waiting room is at capacity, your ride may be asked to remain in their vehicle.  No one under 15 is allowed in the waiting room.       2. You must have someone with you to drive you home as directed by your surgeon.    3. You may have a light breakfast and take normal morning medications.    4. We recommend you do not drink any alcoholic beverages for 24 hours before and after your procedure.    5. Contact your surgeon’s office for instructions on the following medications: non-steroidal anti-inflammatory drugs (i.e. Advil, Aleve), vitamins, and supplements. (Some surgeon’s will want you to stop these medications prior to surgery and others may allow you to take them)   **If you are currently taking Plavix, Coumadin, Aspirin and/or other blood-thinning agents, contact your surgeon for instructions.** Your surgeon will partner with the physician prescribing these medications to determine if it is safe to stop or if you need to continue taking. Please do not stop taking these medications without instructions from your surgeon.    6. In an effort to help prevent surgical site infection, we ask that you shower with an anti-bacterial soap (i.e. Dial or Safeguard) on the morning of your procedure. Do not apply any lotions, powders, or deodorants after showering.     7. Wear comfortable clothes. Wear glasses instead of contacts. Do

## 2024-05-06 ENCOUNTER — HOME CARE VISIT (OUTPATIENT)
Facility: HOME HEALTH | Age: 84
End: 2024-05-06
Payer: MEDICARE

## 2024-05-06 VITALS
OXYGEN SATURATION: 94 % | HEART RATE: 84 BPM | TEMPERATURE: 97.9 F | RESPIRATION RATE: 16 BRPM | SYSTOLIC BLOOD PRESSURE: 110 MMHG | DIASTOLIC BLOOD PRESSURE: 58 MMHG

## 2024-05-06 PROCEDURE — G0157 HHC PT ASSISTANT EA 15: HCPCS

## 2024-05-06 NOTE — HOME HEALTH
Subjective:  Patient reports that she received cortizone shots to bilateral knees last Friday and denies bilateral knee pain with today's PT activities.  Patient notes limited f/u with HEP and Amb Program since last PT visit.  Falls since last visit: NO  Caregiver involvement changes: NO  Home health supplies by type and quantity ordered/delivered this visit include: NO    Clinician asked if patient has had any physician contact since last home care visit and patient states: NO  Clinician asked if patient has any new or changed medications and patient states: NO  If Yes, were medications reconciled? N/A  Was the certifying physician notified of changes in medications? N/A    Clinical assessment (what this visit means for the patient overall and need for ongoing skilled care) and progress or lack of progress towards SPECIFIC goals:  Patient was able to increase ambulation distance with encouragement during Rollator gait training in hallway of [facility] today.  Gait pattern remains slightly kyphotic with decreased BLE step length and decreased gato.  Patient's chronic low back pain, decreased safety awareness with household ambulation; and compromised BLE strength and overall balance make patient a high risk for falls.    Written Teaching Material Utilized: Patient/CG received written/pictorial HEP for all sitting and supported standing therapeutic strength exercises and balance exercises performed today.    Interdisciplinary communication with: N/A    Discharge planning as follows:  Discharge when all PT goals are met or maximum benefit achieved with PT.    Specific plan for next visit: Gait, transfer, strength, balance, pain management and fall prevention training as tolerated next session.

## 2024-05-07 ENCOUNTER — TELEPHONE (OUTPATIENT)
Facility: CLINIC | Age: 84
End: 2024-05-07

## 2024-05-07 NOTE — TELEPHONE ENCOUNTER
Per Dr. Alex I called the patient to ask if they could move to the earlier arrival window of 9am-1pm on Wed, 5/15/24.  The patient agreed to the appointment time change.

## 2024-05-08 ENCOUNTER — TELEPHONE (OUTPATIENT)
Facility: CLINIC | Age: 84
End: 2024-05-08

## 2024-05-08 NOTE — TELEPHONE ENCOUNTER
Called patient to confirm their in home visit with Dr. Alex on 5/15/2024, arrival between 9-1.  Spoke with patient, they confirmed the appointment and answered the covid screen questions. Does anyone in the household have covid no  Have there been any changes to insurance no or location no

## 2024-05-08 NOTE — PERIOP NOTE
[facility]  Ambulatory Surgery Unit  Pre-operative Instructions    Procedure Date  5/14/2024           Tentative Arrival Time 1015am      1. On the day of your procedure, please report to the Ambulatory Surgery Unit Registration Desk and sign in at your designated time. The Ambulatory Surgery Unit is located in [facility] on the [facility] side of the [facility] across from the [facility]. Please have all of your health insurance cards, copayment, and a photo ID.    **TWO adults may accompany you the day of the procedure.  We have limited seating available.  If our waiting room is at capacity, your ride may be asked to remain in their vehicle.  No one under 15 is allowed in the waiting room.       2. You must have someone with you to drive you home as directed by your surgeon.    3. You may have a light breakfast and take normal morning medications.    4. We recommend you do not drink any alcoholic beverages for 24 hours before and after your procedure.    5. Contact your surgeon’s office for instructions on the following medications: non-steroidal anti-inflammatory drugs (i.e. Advil, Aleve), vitamins, and supplements. (Some surgeon’s will want you to stop these medications prior to surgery and others may allow you to take them)   **If you are currently taking Plavix, Coumadin, Aspirin and/or other blood-thinning agents, contact your surgeon for instructions.** Your surgeon will partner with the physician prescribing these medications to determine if it is safe to stop or if you need to continue taking. Please do not stop taking these medications without instructions from your surgeon.    6. In an effort to help prevent surgical site infection, we ask that you shower with an anti-bacterial soap (i.e. Dial or Safeguard) on the morning of your procedure. Do not apply any lotions, powders, or deodorants after showering.     7. Wear comfortable clothes. Wear glasses instead of contacts.  Do not bring any jewelry or money (other than copays or fees as instructed). Do not wear make-up, particularly mascara, the morning of your procedure. Wear your hair loose or down, no ponytails, buns, may pins or clips. All body piercings must be removed.      8. You should understand that if you do not follow these instructions your procedure may be cancelled. If your physical condition changes (i.e. fever, cold or flu) please contact your surgeon as soon as possible.    9. It is important that you be on time. If a situation occurs where you may be late, or if you have any questions or problems, please call [phone].    10. Your procedure time may be subject to change. You will receive a phone call the day prior to confirm your arrival time.    I understand a pre-operative phone call will be made to verify my procedure time. In the event that I am not available, I give permission for a message to be left on my answering service and/or with another person?      Yes      Reviewed instructions with patient, able to verbalized understanding.         ___________________      ___________________      ___________________  (Signature of Patient)          (Witness)                   (Date and Time)

## 2024-05-09 ENCOUNTER — HOME CARE VISIT (OUTPATIENT)
Facility: HOME HEALTH | Age: 84
End: 2024-05-09
Payer: MEDICARE

## 2024-05-09 PROCEDURE — G0157 HHC PT ASSISTANT EA 15: HCPCS

## 2024-05-10 VITALS
OXYGEN SATURATION: 96 % | RESPIRATION RATE: 16 BRPM | HEART RATE: 81 BPM | SYSTOLIC BLOOD PRESSURE: 112 MMHG | TEMPERATURE: 98.1 F | DIASTOLIC BLOOD PRESSURE: 58 MMHG

## 2024-05-11 ASSESSMENT — ENCOUNTER SYMPTOMS: PAIN LOCATION - PAIN QUALITY: ACHE

## 2024-05-11 NOTE — HOME HEALTH
Subjective:  Patient reports increased low back pain with all standing activities today.  Patient notes limited f/u with HEP and Amb Program since last PT visit.  Falls since last visit: NO  Caregiver involvement changes: NO  Home health supplies by type and quantity ordered/delivered this visit include: NO    Clinician asked if patient has had any physician contact since last home care visit and patient states: NO  Clinician asked if patient has any new or changed medications and patient states: NO  If Yes, were medications reconciled? N/A  Was the certifying physician notified of changes in medications? N/A    Clinical assessment (what this visit means for the patient overall and need for ongoing skilled care) and progress or lack of progress towards SPECIFIC goals:  Patient refused performance of supported standing ther strength ex's and balance ex's today d/t increased low back pain.  Patient's significant low back pain; dyspnea; and compromised BLE strength and overall balance make patient a high risk for falls.    Written Teaching Material Utilized: Patient/CG received written/pictorial HEP for all sitting therapeutic strength exercises performed today.    Interdisciplinary communication with:  N/A    Discharge planning as follows:  Discharge when all PT goals are met or maximum benefit achieved with PT.    Specific plan for next visit: Gait, transfer, strength, balance, pain management and fall prevention training as tolerated next session.

## 2024-05-13 ENCOUNTER — HOME CARE VISIT (OUTPATIENT)
Facility: HOME HEALTH | Age: 84
End: 2024-05-13
Payer: MEDICARE

## 2024-05-13 PROCEDURE — G0157 HHC PT ASSISTANT EA 15: HCPCS

## 2024-05-14 ENCOUNTER — APPOINTMENT (OUTPATIENT)
Facility: HOSPITAL | Age: 84
End: 2024-05-14
Attending: PHYSICAL MEDICINE & REHABILITATION
Payer: MEDICARE

## 2024-05-14 ENCOUNTER — HOSPITAL ENCOUNTER (OUTPATIENT)
Facility: HOSPITAL | Age: 84
Setting detail: OUTPATIENT SURGERY
Discharge: HOME | End: 2024-05-14
Attending: PHYSICAL MEDICINE & REHABILITATION | Admitting: PHYSICAL MEDICINE & REHABILITATION
Payer: MEDICARE

## 2024-05-14 VITALS
SYSTOLIC BLOOD PRESSURE: 150 MMHG | OXYGEN SATURATION: 99 % | RESPIRATION RATE: 20 BRPM | WEIGHT: 137 LBS | BODY MASS INDEX: 25.86 KG/M2 | HEART RATE: 57 BPM | TEMPERATURE: 97.5 F | DIASTOLIC BLOOD PRESSURE: 90 MMHG | HEIGHT: 61 IN

## 2024-05-14 VITALS
TEMPERATURE: 98.1 F | RESPIRATION RATE: 16 BRPM | HEART RATE: 82 BPM | SYSTOLIC BLOOD PRESSURE: 106 MMHG | DIASTOLIC BLOOD PRESSURE: 61 MMHG | OXYGEN SATURATION: 90 %

## 2024-05-14 LAB
GLUCOSE BLD STRIP.AUTO-MCNC: 139 MG/DL (ref 65–117)
SERVICE CMNT-IMP: ABNORMAL

## 2024-05-14 PROCEDURE — 82962 GLUCOSE BLOOD TEST: CPT

## 2024-05-14 PROCEDURE — 3600000012 HC SURGERY LEVEL 2 ADDTL 15MIN: Performed by: PHYSICAL MEDICINE & REHABILITATION

## 2024-05-14 PROCEDURE — 2500000003 HC RX 250 WO HCPCS: Performed by: PHYSICAL MEDICINE & REHABILITATION

## 2024-05-14 PROCEDURE — 6360000002 HC RX W HCPCS: Performed by: PHYSICAL MEDICINE & REHABILITATION

## 2024-05-14 PROCEDURE — 7100000010 HC PHASE II RECOVERY - FIRST 15 MIN: Performed by: PHYSICAL MEDICINE & REHABILITATION

## 2024-05-14 PROCEDURE — 3600000002 HC SURGERY LEVEL 2 BASE: Performed by: PHYSICAL MEDICINE & REHABILITATION

## 2024-05-14 PROCEDURE — 2709999900 HC NON-CHARGEABLE SUPPLY: Performed by: PHYSICAL MEDICINE & REHABILITATION

## 2024-05-14 RX ORDER — LIDOCAINE HYDROCHLORIDE 20 MG/ML
10 INJECTION, SOLUTION EPIDURAL; INFILTRATION; INTRACAUDAL; PERINEURAL ONCE
Status: COMPLETED | OUTPATIENT
Start: 2024-05-14 | End: 2024-05-14

## 2024-05-14 RX ORDER — BUPIVACAINE HYDROCHLORIDE 5 MG/ML
10 INJECTION, SOLUTION EPIDURAL; INTRACAUDAL ONCE
Status: DISCONTINUED | OUTPATIENT
Start: 2024-05-14 | End: 2024-05-14 | Stop reason: HOSPADM

## 2024-05-14 RX ORDER — DEXAMETHASONE SODIUM PHOSPHATE 4 MG/ML
10 INJECTION, SOLUTION INTRA-ARTICULAR; INTRALESIONAL; INTRAMUSCULAR; INTRAVENOUS; SOFT TISSUE ONCE
Status: COMPLETED | OUTPATIENT
Start: 2024-05-14 | End: 2024-05-14

## 2024-05-14 ASSESSMENT — ENCOUNTER SYMPTOMS: PAIN LOCATION - PAIN QUALITY: ACHE

## 2024-05-14 ASSESSMENT — PAIN - FUNCTIONAL ASSESSMENT
PAIN_FUNCTIONAL_ASSESSMENT: NONE - DENIES PAIN
PAIN_FUNCTIONAL_ASSESSMENT: NONE - DENIES PAIN

## 2024-05-14 NOTE — DISCHARGE INSTRUCTIONS
Dr. Robertson Discharge Instructions  Transforaminal Epidural Steroid Injection/ Selective Nerve Block    You had a transforaminal epidural steroid injection/ selective nerve block today. You will probably have some numbness, and possibly weakness, in your leg for the next 6 to 8 hours. The steroids will slowly become effective, reducing your pain, over the next 2 weeks. You should begin feeling better after a few days, but it may take up to 2 weeks to notice the difference. The benefit you get from your injection will last a variable amount of time, depending on the severity of your lumbar spine problem.  Pain: Most people do not have any increase in pain after this injection. However, you might experience some soreness in your low back. If this happens, putting an ice pack over the sore area will help.  Bandage:  You will have a small bandage covering the site of the injection. You may remove it once you get home.  Restrictions: Someone should drive you home after the injection.  After that, you have no restrictions. You need to be careful while walking, as you may still have some numbness or weakness in your leg. You may resume your normal level of activity. You may take a shower or bath, and you may eat normally. You should continue your current exercises and/or therapy routine.   Medications: Continue your current medications as prescribed. If your pain decreases, you may reduce the amount of your pain medicines.  If you stopped taking anticoagulants or blood-thinners before the injection, start them tomorrow. If you have diabetes, your blood sugar may be elevated for a few days. Call your primary doctor with any questions.  Call Dr. Robertson at [phone] if you experience:  Fever (101 degrees Fahrenheit or greater)  Nausea or vomiting  Headache unrelieved by your normal pain medicine  Redness or swelling at the injection site that lasts more than 1 day  New numbness, tingling, weakness, or pain that you

## 2024-05-14 NOTE — PERIOP NOTE
Neuro:  Push/Pull assessment:     LUE Response: strong    LLE Response: strong push/pull   RUE Response: strong    RLE Response: strong push/pull      Patient on oxygen nasal cannula 2 liters as needed at home. Oxygen sat on admission was 90-91% on room air. Pt placed on 2 liters at this time, sats 93%.

## 2024-05-14 NOTE — H&P
Procedural Case Note    5/14/2024    (10:42 AM)    Adenike Dasilva    1940   (83 y.o.)    [id number]    CC:  pain    ROS:   Complete ROS obtained, no CP, no SOB, no N or V    PMH:     Past Medical History:   Diagnosis Date    Anxiety     Aortic valve stenosis 07/2022    Arthritis     Asthma     CAD (coronary artery disease)     stent    Chronic anticoagulation 11/23/2022    Chronic obstructive pulmonary disease (HCC)     Chronic pain     Depression with anxiety 11/23/2022    Diabetes (HCC)     GERD (gastroesophageal reflux disease)     H/O aortic valve replacement 07/2022    Hepatitis B     1960's    History of blood transfusion     06/22    History of DVT (deep vein thrombosis)     History of recurrent UTIs     Hx of seasonal allergies     Hypercholesterolemia     Irritable bowel syndrome (IBS)     Migraine     Noncompliance 01/18/2023    Oxygen dependent     PRN at 2LPM [place]    Psychiatric disorder     anxiety    Thromboembolus (HCC)     left leg    Thyroid disease     pt denies- not tx for it    Urinary urgency        ALLERGIES:     Allergies   Allergen Reactions    Bee Venom Shortness Of Breath           Iodinated Contrast Media Anaphylaxis and Other (See Comments)     Passes out    Penicillins Anaphylaxis and Shortness Of Breath     Other reaction(s): anaphylaxis/angioedema        Sulfa Antibiotics Anaphylaxis, Shortness Of Breath and Rash     Other reaction(s): anaphylaxis/angioedema        Omeprazole Other (See Comments) and Dizziness or Vertigo    Ranitidine Hcl Nausea Only    Iodine     Montelukast Hallucinations           Seasonal     Tree Extract Itching     Cat dander, grass        Cefdinir Rash     Other reaction(s): mild rash/itching        Codeine Itching     Other reaction(s): other/intolerance  Dizziness. Pt states they take Benadryl to offset the reaction.    Famotidine Nausea And Vomiting    Jardiance [Empagliflozin] Rash    Morphine Itching and Other (See Comments)

## 2024-05-14 NOTE — PERIOP NOTE
Patient received to PACU, VSS. Patient awake and alert with no complaints of pain. Injection site intact.     Neuro:  Push/Pull assessment:       LLE Response: push/pull strong   RLE Response: push/pull strong    Discharge instructions given. Patient verbalized understanding of instructions and follow up.     Patient states ready for discharge - patient discharged at this time by wheelchair with all belongings. [facility] to provide transportation home.

## 2024-05-14 NOTE — HOME HEALTH
Subjective:  Patient reports increased low back pain today.  Patient notes MD appointment with orthopedist tomorrow to get spinal injections at low back.  Patient notes limited f/u with HEP and Amb Program since last PT visit.  Falls since last visit: NO  Caregiver involvement changes: NO  Home health supplies by type and quantity ordered/delivered this visit include: NO    Clinician asked if patient has had any physician contact since last home care visit and patient states: NO  Clinician asked if patient has any new or changed medications and patient states: NO  If Yes, were medications reconciled? N/A  Was the certifying physician notified of changes in medications? N/A    Clinical assessment (what this visit means for the patient overall and need for ongoing skilled care) and progress or lack of progress towards SPECIFIC goals:  Patient was able to increase ambulation distance significantly with Rollator gait training in building and on uneven ground (to mailbox) today.  Patient refused performance of supported standing strength exercises and balance exercises today d/t increased low back pain.  Patient remains unable to perform therapeutic strength exercises w/o prompting and cueing for technique and will require additional training.  Patient's significant low back pain; poor safety awareness with transfers and household ambulation; and compromised BLE strength and overall balance make patient a continued high risk for falls.      Written Teaching Material Utilized: Patient/CG received written/pictorial HEP for all sitting therapeutic strength exercises performed today.    Interdisciplinary communication with:  N/A    Discharge planning as follows:  Discharge when all PT goals are met or maximum benefit achieved with PT.    Specific plan for next visit: Gait, transfer, strength, balance, pain management and fall prevention training as tolerated next session.

## 2024-05-14 NOTE — PERIOP NOTE
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 Robertson    Assessment:  Intra-procedure medications       Vital signs stable      Recommendation:    Discharged to home after instructions reviewed with patient. Recommend rest until local anesthetic has worn off.

## 2024-05-15 ENCOUNTER — OFFICE VISIT (OUTPATIENT)
Facility: CLINIC | Age: 84
End: 2024-05-15
Payer: MEDICARE

## 2024-05-15 VITALS
SYSTOLIC BLOOD PRESSURE: 120 MMHG | HEART RATE: 66 BPM | OXYGEN SATURATION: 94 % | RESPIRATION RATE: 24 BRPM | DIASTOLIC BLOOD PRESSURE: 80 MMHG

## 2024-05-15 DIAGNOSIS — M15.9 GENERALIZED OSTEOARTHRITIS: Chronic | ICD-10-CM

## 2024-05-15 DIAGNOSIS — F03.A0 MILD DEMENTIA WITHOUT BEHAVIORAL DISTURBANCE, PSYCHOTIC DISTURBANCE, MOOD DISTURBANCE, OR ANXIETY, UNSPECIFIED DEMENTIA TYPE (HCC): Chronic | ICD-10-CM

## 2024-05-15 DIAGNOSIS — R11.0 NAUSEA: ICD-10-CM

## 2024-05-15 DIAGNOSIS — T82.897D AORTIC PROSTHETIC VALVE REGURGITATION, SUBSEQUENT ENCOUNTER: ICD-10-CM

## 2024-05-15 DIAGNOSIS — Z95.2 H/O AORTIC VALVE REPLACEMENT: Chronic | ICD-10-CM

## 2024-05-15 DIAGNOSIS — M51.36 DDD (DEGENERATIVE DISC DISEASE), LUMBAR: Chronic | ICD-10-CM

## 2024-05-15 DIAGNOSIS — I35.0 NONRHEUMATIC AORTIC VALVE STENOSIS: Chronic | ICD-10-CM

## 2024-05-15 DIAGNOSIS — J96.11 CHRONIC RESPIRATORY FAILURE WITH HYPOXIA (HCC): Chronic | ICD-10-CM

## 2024-05-15 DIAGNOSIS — I34.0 MILD MITRAL REGURGITATION BY PRIOR ECHOCARDIOGRAM: ICD-10-CM

## 2024-05-15 DIAGNOSIS — I25.10 CORONARY ARTERY DISEASE INVOLVING NATIVE CORONARY ARTERY OF NATIVE HEART WITHOUT ANGINA PECTORIS: Primary | Chronic | ICD-10-CM

## 2024-05-15 DIAGNOSIS — E11.42 TYPE 2 DIABETES MELLITUS WITH DIABETIC POLYNEUROPATHY, WITHOUT LONG-TERM CURRENT USE OF INSULIN (HCC): Chronic | ICD-10-CM

## 2024-05-15 DIAGNOSIS — D68.59 PROTEIN C DEFICIENCY (HCC): Chronic | ICD-10-CM

## 2024-05-15 DIAGNOSIS — I51.7 LAE (LEFT ATRIAL ENLARGEMENT): ICD-10-CM

## 2024-05-15 DIAGNOSIS — J41.0 SIMPLE CHRONIC BRONCHITIS (HCC): Chronic | ICD-10-CM

## 2024-05-15 DIAGNOSIS — E78.2 MIXED HYPERLIPIDEMIA: Chronic | ICD-10-CM

## 2024-05-15 DIAGNOSIS — I10 BENIGN ESSENTIAL HYPERTENSION: Chronic | ICD-10-CM

## 2024-05-15 DIAGNOSIS — K21.9 GASTROESOPHAGEAL REFLUX DISEASE WITHOUT ESOPHAGITIS: Chronic | ICD-10-CM

## 2024-05-15 DIAGNOSIS — J84.10 PULMONARY FIBROSIS (HCC): Chronic | ICD-10-CM

## 2024-05-15 DIAGNOSIS — N39.46 MIXED STRESS AND URGE URINARY INCONTINENCE: Chronic | ICD-10-CM

## 2024-05-15 PROBLEM — R29.6 FREQUENT FALLS: Chronic | Status: ACTIVE | Noted: 2023-11-29

## 2024-05-15 PROBLEM — E78.5 DYSLIPIDEMIA: Status: RESOLVED | Noted: 2023-11-02 | Resolved: 2024-05-15

## 2024-05-15 PROBLEM — E78.5 HYPERLIPIDEMIA: Chronic | Status: ACTIVE | Noted: 2019-10-17

## 2024-05-15 PROBLEM — M51.369 DDD (DEGENERATIVE DISC DISEASE), LUMBAR: Chronic | Status: ACTIVE | Noted: 2024-05-15

## 2024-05-15 PROBLEM — J44.9 COPD (CHRONIC OBSTRUCTIVE PULMONARY DISEASE) (HCC): Chronic | Status: ACTIVE | Noted: 2017-06-07

## 2024-05-15 PROCEDURE — 3079F DIAST BP 80-89 MM HG: CPT | Performed by: FAMILY MEDICINE

## 2024-05-15 PROCEDURE — 1123F ACP DISCUSS/DSCN MKR DOCD: CPT | Performed by: FAMILY MEDICINE

## 2024-05-15 PROCEDURE — 1090F PRES/ABSN URINE INCON ASSESS: CPT | Performed by: FAMILY MEDICINE

## 2024-05-15 PROCEDURE — G8419 CALC BMI OUT NRM PARAM NOF/U: HCPCS | Performed by: FAMILY MEDICINE

## 2024-05-15 PROCEDURE — 3074F SYST BP LT 130 MM HG: CPT | Performed by: FAMILY MEDICINE

## 2024-05-15 PROCEDURE — 0509F URINE INCON PLAN DOCD: CPT | Performed by: FAMILY MEDICINE

## 2024-05-15 PROCEDURE — 1036F TOBACCO NON-USER: CPT | Performed by: FAMILY MEDICINE

## 2024-05-15 PROCEDURE — 99350 HOME/RES VST EST HIGH MDM 60: CPT | Performed by: FAMILY MEDICINE

## 2024-05-15 NOTE — PROGRESS NOTES
[facility]      PRIMARY CARE AT HOME - HOME VISIT      NAME: Adenike Dasilva    ADDRESS: [address] [place] [address]    :  1940  MRN:  [id number]        ASSESSMENT:     Diagnosis Orders   1. Coronary artery disease involving native coronary artery of native heart without angina pectoris        2. Nonrheumatic aortic valve stenosis        3. H/O aortic valve replacement        4. Aortic prosthetic valve regurgitation, subsequent encounter        5. Mild mitral regurgitation by prior echocardiogram        6. LAE (left atrial enlargement)        7. Benign essential hypertension        8. Mixed hyperlipidemia        9. Simple chronic bronchitis (HCC)        10. Pulmonary fibrosis (HCC)        11. Chronic respiratory failure with hypoxia (HCC)        12. Type 2 diabetes mellitus with diabetic polyneuropathy, without long-term current use of insulin (HCC)        13. Gastroesophageal reflux disease without esophagitis        14. Protein C deficiency (HCC)        15. Mixed stress and urge urinary incontinence        16. Generalized osteoarthritis        17. DDD (degenerative disc disease), lumbar        18. Mild dementia without behavioral disturbance, psychotic disturbance, mood disturbance, or anxiety, unspecified dementia type (HCC)              PLAN:    CAD/ VHD/ DIASTOLIC DYSFUNCTION/ LAE/ HYPERTENSION: This problem is stable. Current treatment was continued as noted above.     HYPERLIPIDEMIA: This problem is stable. Will continue current treatment including diet, exercise and medication.    COPD: This problem has deteriorated. Will continue supplemental Oxygen.    DM: This problem is stable. Will continue close surveillance.    GERD: This problem is stable. Will continue close surveillance.    PROTEIN C DEFICIENCY: This problem is stable. Will continue close surveillance.    INCONTINENCE: This problem has deteriorated. Discussed additional options.     OA/ DDD: This

## 2024-05-16 ENCOUNTER — HOME CARE VISIT (OUTPATIENT)
Dept: HOME HEALTH SERVICES | Facility: HOME HEALTH | Age: 84
End: 2024-05-16
Payer: MEDICARE

## 2024-05-16 RX ORDER — ONDANSETRON 4 MG/1
8 TABLET, ORALLY DISINTEGRATING ORAL EVERY 8 HOURS PRN
Qty: 30 TABLET | Refills: 5 | Status: SHIPPED | OUTPATIENT
Start: 2024-05-16

## 2024-05-17 ENCOUNTER — HOME CARE VISIT (OUTPATIENT)
Facility: HOME HEALTH | Age: 84
End: 2024-05-17
Payer: MEDICARE

## 2024-05-17 PROCEDURE — G0157 HHC PT ASSISTANT EA 15: HCPCS

## 2024-05-18 VITALS
RESPIRATION RATE: 16 BRPM | DIASTOLIC BLOOD PRESSURE: 64 MMHG | OXYGEN SATURATION: 91 % | TEMPERATURE: 98.1 F | SYSTOLIC BLOOD PRESSURE: 129 MMHG | HEART RATE: 96 BPM

## 2024-05-20 RX ORDER — LEVETIRACETAM 750 MG/1
750 TABLET ORAL EVERY EVENING
Qty: 90 TABLET | Refills: 1 | Status: SHIPPED | OUTPATIENT
Start: 2024-05-20

## 2024-05-20 NOTE — TELEPHONE ENCOUNTER
Medication Refill - Keppra 750    The patient called to request that a refill on her Keppra 750 be sent to the [facility] at [facility].    The patient's callback if needed is [phone]

## 2024-05-20 NOTE — TELEPHONE ENCOUNTER
Appropriate for refill per chart review  \"levETIRAcetam (KEPPRA) 750 MG tablet Take 1 tablet by mouth every evening  \"     Rx Pended for Dr. Alex's approval.

## 2024-05-21 ENCOUNTER — HOME CARE VISIT (OUTPATIENT)
Facility: HOME HEALTH | Age: 84
End: 2024-05-21
Payer: MEDICARE

## 2024-05-21 PROCEDURE — G0157 HHC PT ASSISTANT EA 15: HCPCS

## 2024-05-22 VITALS
TEMPERATURE: 98.1 F | SYSTOLIC BLOOD PRESSURE: 101 MMHG | HEART RATE: 105 BPM | DIASTOLIC BLOOD PRESSURE: 63 MMHG | OXYGEN SATURATION: 98 % | RESPIRATION RATE: 16 BRPM

## 2024-05-22 NOTE — HOME HEALTH
Subjective:  Patient notes no change in chronic low back or bilateral OA knee pain with gait training and performance of balance ex's today.  Patient states that portable O2 tank is not charged up and will not be able to use with gait training in building today.  Patient notes limited f/u with HEP and Amb Program d/t back pain.  Falls since last visit: NO  Caregiver involvement changes: NO  Home health supplies by type and quantity ordered/delivered this visit include: NO    Clinician asked if patient has had any physician contact since last home care visit and patient states: NO  Clinician asked if patient has any new or changed medications and patient states: NO  If Yes, were medications reconciled? N/A  Was the certifying physician notified of changes in medications? N/A    Clinical assessment (what this visit means for the patient overall and need for ongoing skilled care) and progress or lack of progress towards SPECIFIC goals:  Patient is only briefly able to improve gait pattern with cueing for posture, to increase BLE step length and gato with Rollator gait training today.  Patient remains limited in ambulation distance by fatigue, dyspnea and increasing low back pain.  Patient's history of falls; dyspnea; significant low back pain; and compromised BLE strength and overall balance make patient a continued high risk for falls.    Written Teaching Material Utilized: Patient/CG received written/pictorial HEP for all sitting therapeutic strength exercises performed today.    Interdisciplinary communication with:  GINGER Vázquez re:  Extending PT to continue to work on increasing strength, balance, endurance and safety awareness/fall prevention.    Discharge planning as follows:  Discharge when all PT goals are met or maximum benefit achieved with PT.    Specific plan for next visit: Gait, transfer, strength, balance, pain management and fall prevention training as tolerated next session.

## 2024-05-23 ENCOUNTER — HOME CARE VISIT (OUTPATIENT)
Facility: HOME HEALTH | Age: 84
End: 2024-05-23

## 2024-05-23 ENCOUNTER — TELEPHONE (OUTPATIENT)
Facility: CLINIC | Age: 84
End: 2024-05-23

## 2024-05-23 VITALS
SYSTOLIC BLOOD PRESSURE: 116 MMHG | TEMPERATURE: 98.5 F | OXYGEN SATURATION: 98 % | DIASTOLIC BLOOD PRESSURE: 66 MMHG | RESPIRATION RATE: 17 BRPM | HEART RATE: 69 BPM

## 2024-05-23 PROCEDURE — G0151 HHCP-SERV OF PT,EA 15 MIN: HCPCS

## 2024-05-23 ASSESSMENT — ENCOUNTER SYMPTOMS
DIARRHEA: 1
DYSPNEA ACTIVITY LEVEL: AT REST
PAIN LOCATION - PAIN QUALITY: ACHE
CONTUSION: 1

## 2024-05-23 NOTE — TELEPHONE ENCOUNTER
Call received from Kathie Khan PT with [facility] - seeking verbal order for re-certification Home Health benefit for additional 4-6 weeks, two times per week.     Verbal order provided.

## 2024-05-23 NOTE — HOME HEALTH
Justification for continued intermittent care: patient with fall with injury and pt in need of education and training related to balance and gait on uneven surfaces and fall prevention     Suleiman on behal of Dr. Freed approved of the following: the need for continued Physical Therapy home health services and plan of care for  2w6  Physical Therapy for: additional assessment and education on falls, balance, gait, strength, endurance, oxygen safety and management  Subjective: \" I am really hurting today but got approval for the pain doc to perform a new proceedure that should help with pain\"    Clinical assessment (what this visit means for the patient overall and need for ongoing skilled care) and progress or lack of progress towards SPECIFIC goals:   The Pt is making very slow and steady gains but has been very limited by severe back pain that sometimes radiates into the leg and affects the leg greatly decreasing her mobility and increasing her risk for falls. The Pt is also not compliant with appropriate use of oxygen during exertion in the apartment despite having new portable concentrator and will need continued education and practice using the portable machine. The Pt needs to continue to work on balance drills with gait as she has a significant history for falls with injury and repated falls .     Falls since last visit No(if yes complete the Fall Tracking Form and include bsrifallreport): none    Written Teaching Material Utilized: HEP cue sheet    Interdisciplinary communication with: Jose Manuel MCBRIDE for the purpose of POC collaboration and fall risk concerns    Medications reconciled and all medications are available in the home this visit. A list of reconciled medications has been given to the patient/caregiver     Patient/caregiver instructed on plan of care and are agreeable to plan of care at this time.      Discharge planning as follows: Is no longer homebound, Per physician order, Will discharge when

## 2024-05-28 ENCOUNTER — TELEPHONE (OUTPATIENT)
Facility: CLINIC | Age: 84
End: 2024-05-28

## 2024-05-28 ENCOUNTER — HOME CARE VISIT (OUTPATIENT)
Facility: HOME HEALTH | Age: 84
End: 2024-05-28

## 2024-05-28 NOTE — TELEPHONE ENCOUNTER
Patient's symptoms discussed with Dr. Alex.     Call returned to patient - voice message left offering a visit with MD tomorrow or suggestion to call [facility] in case she would like to be seen today.  Call back requested.

## 2024-05-28 NOTE — TELEPHONE ENCOUNTER
Call back received from patient - she reported that her symptoms started last Wednesday, 5/22. Patient was not tested for COVID (she stated that she has had all the vaccines). Patient also verbalizes she has \"a lot of phlegm\" and she is having difficulty swallowing food, as she chokes on the phlegm.  Her fever \"comes and goes\" and ranges between 99.8-100 F.  Patient has taken so far, Mucinex, Tylenol and Claritin. I encouraged her to continue taking those medications. I offered a visit with Dr. Alex for tomorrow morning. Patient accepted it.

## 2024-05-28 NOTE — TELEPHONE ENCOUNTER
The patient called and stated that she is sick.  She says that she has tried all of the over the counter meds and they aren't working.  She has a cough with a lot of mucus, a low grade fever (100 degrees) and a sore throat.  The patient asks for a callback at [phone]

## 2024-05-29 ENCOUNTER — OFFICE VISIT (OUTPATIENT)
Facility: CLINIC | Age: 84
End: 2024-05-29
Payer: MEDICARE

## 2024-05-29 VITALS
HEART RATE: 76 BPM | DIASTOLIC BLOOD PRESSURE: 76 MMHG | SYSTOLIC BLOOD PRESSURE: 132 MMHG | RESPIRATION RATE: 22 BRPM | OXYGEN SATURATION: 95 %

## 2024-05-29 DIAGNOSIS — J96.11 CHRONIC RESPIRATORY FAILURE WITH HYPOXIA (HCC): Chronic | ICD-10-CM

## 2024-05-29 DIAGNOSIS — K58.0 IRRITABLE BOWEL SYNDROME WITH DIARRHEA: ICD-10-CM

## 2024-05-29 DIAGNOSIS — Z95.2 H/O AORTIC VALVE REPLACEMENT: Chronic | ICD-10-CM

## 2024-05-29 DIAGNOSIS — J44.1 COPD WITH ACUTE EXACERBATION (HCC): ICD-10-CM

## 2024-05-29 DIAGNOSIS — I35.0 NONRHEUMATIC AORTIC VALVE STENOSIS: Chronic | ICD-10-CM

## 2024-05-29 DIAGNOSIS — J84.10 PULMONARY FIBROSIS (HCC): Chronic | ICD-10-CM

## 2024-05-29 DIAGNOSIS — I10 BENIGN ESSENTIAL HYPERTENSION: Chronic | ICD-10-CM

## 2024-05-29 DIAGNOSIS — I25.10 CORONARY ARTERY DISEASE INVOLVING NATIVE CORONARY ARTERY OF NATIVE HEART WITHOUT ANGINA PECTORIS: Primary | Chronic | ICD-10-CM

## 2024-05-29 DIAGNOSIS — F03.A0 MILD DEMENTIA WITHOUT BEHAVIORAL DISTURBANCE, PSYCHOTIC DISTURBANCE, MOOD DISTURBANCE, OR ANXIETY, UNSPECIFIED DEMENTIA TYPE (HCC): Chronic | ICD-10-CM

## 2024-05-29 DIAGNOSIS — J41.0 SIMPLE CHRONIC BRONCHITIS (HCC): Chronic | ICD-10-CM

## 2024-05-29 DIAGNOSIS — E11.42 TYPE 2 DIABETES MELLITUS WITH DIABETIC POLYNEUROPATHY, WITHOUT LONG-TERM CURRENT USE OF INSULIN (HCC): Chronic | ICD-10-CM

## 2024-05-29 PROBLEM — J84.9 INTERSTITIAL LUNG DISEASE (HCC): Chronic | Status: ACTIVE | Noted: 2023-11-02

## 2024-05-29 PROCEDURE — 1090F PRES/ABSN URINE INCON ASSESS: CPT | Performed by: FAMILY MEDICINE

## 2024-05-29 PROCEDURE — 3075F SYST BP GE 130 - 139MM HG: CPT | Performed by: FAMILY MEDICINE

## 2024-05-29 PROCEDURE — 99349 HOME/RES VST EST MOD MDM 40: CPT | Performed by: FAMILY MEDICINE

## 2024-05-29 PROCEDURE — 1036F TOBACCO NON-USER: CPT | Performed by: FAMILY MEDICINE

## 2024-05-29 PROCEDURE — G8419 CALC BMI OUT NRM PARAM NOF/U: HCPCS | Performed by: FAMILY MEDICINE

## 2024-05-29 PROCEDURE — 1123F ACP DISCUSS/DSCN MKR DOCD: CPT | Performed by: FAMILY MEDICINE

## 2024-05-29 PROCEDURE — 3078F DIAST BP <80 MM HG: CPT | Performed by: FAMILY MEDICINE

## 2024-05-29 RX ORDER — BISMUTH SUBSALICYLATE 262 MG/1
524 TABLET, CHEWABLE ORAL 2 TIMES DAILY
Qty: 180 TABLET | Refills: 4 | Status: SHIPPED | OUTPATIENT
Start: 2024-05-29 | End: 2025-01-09

## 2024-05-29 RX ORDER — DOXYCYCLINE HYCLATE 100 MG
100 TABLET ORAL 2 TIMES DAILY
Qty: 14 TABLET | Refills: 0 | Status: SHIPPED | OUTPATIENT
Start: 2024-05-29 | End: 2024-06-05

## 2024-05-29 RX ORDER — METFORMIN HYDROCHLORIDE 500 MG/1
500 TABLET, EXTENDED RELEASE ORAL
Qty: 30 TABLET | Refills: 11 | Status: SHIPPED | OUTPATIENT
Start: 2024-05-29 | End: 2025-05-29

## 2024-05-30 NOTE — OP NOTE
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 female who presents with low back pain and radiculopathy. She was positive for LS DDD with radiculopathy.  The patient was admitted for spinal intervention as conservative measures have failed.      Preoperative Diagnosis: LS DDD with Radiculopathy    Postoperative Diagnosis: LS DDD with Radiculopathy    Surgeon(s) and Role:     * Giovanny Robertson MD - Primary     Procedure:  Procedure(s):  BILATERAL L4 TRANSFORAMINAL EPIDURAL STEROID INJECTION    Procedure in Detail:  After appropriate informed consent was obtained, the patient was taken to the operating suite and placed in the prone position on the operating table on appropriate padding.  The LS region was prepped and draped in the usual sterile fashion. Intraoperative fluoroscopy was used to localize the LS spine. The skin was infiltrated with 2% lidocaine. A 25-g needle was advanced into the Perez L4 neuroforamen under fluoroscopic guidance.  No contrast used due to allergy.     Permanent fluoroscopic images were saved in the patient's record.    Next, 2 ml of 2% lidocaine and 20 mg of Dexamethasone were injected. The needle was removed from the patient. The patient was then turned back into the supine position on the stretcher and was taken to the Recovery Room in stable condition.    Estimated Blood Loss:  none     Specimens: None       Drains: None          Complications:  None    Signed By: Giovanny Robertson MD                        May 30, 2024          Electronically signed by Giovanny Robertson MD on 5/30/2024 at 1:14 PM

## 2024-05-30 NOTE — PROGRESS NOTES
[facility] SERVICES    PRIMARY CARE AT HOME    NAME: Adenike Dasilva    ROOM: H    YOB: 1940  MRN: [id number]        ASSESSMENT:     Diagnosis Orders   1. Coronary artery disease involving native coronary artery of native heart without angina pectoris        2. Nonrheumatic aortic valve stenosis        3. H/O aortic valve replacement        4. Benign essential hypertension        5. Type 2 diabetes mellitus with diabetic polyneuropathy, without long-term current use of insulin (HCC)  metFORMIN (GLUCOPHAGE-XR) 500 MG extended release tablet      6. Simple chronic bronchitis (HCC)        7. COPD with acute exacerbation (HCC)  doxycycline hyclate (VIBRA-TABS) 100 MG tablet      8. Pulmonary fibrosis (HCC)        9. Chronic respiratory failure with hypoxia (HCC)        10. Irritable bowel syndrome with diarrhea        11. Mild dementia without behavioral disturbance, psychotic disturbance, mood disturbance, or anxiety, unspecified dementia type ([facility])              PLAN:    CAD/ VHD/ CHF: This problem is stable. Will continue close surveillance.    DM: This problem is stable. Will continue close surveillance.    COPD WITH EXACERBATION: Will add Doxy. Conservative measures and precautions were reviewed with the patient.    IBS: Discussed options. Will add Pepto Bismol. Will recheck in 8 weeks.     DEMENTIA: This problem has deteriorated. Will continue close surveillance.    The patient is admitted to the SNF for rehabilitation for multiple medical issues as listed above.  will be consulted for discharge planning.       SUBJECTIVE:    Chief Complaint:  Chief Complaint   Patient presents with    Other     HOME VISIT/ COPD       History of Present Illness:    Adenike Dasilva is a 83 y.o. female who is seen at home with a one week hx of cough and congestion. She denies fever or chills. No one else has been sick around her.    She has a number of chronic health

## 2024-05-31 ENCOUNTER — HOME CARE VISIT (OUTPATIENT)
Facility: HOME HEALTH | Age: 84
End: 2024-05-31

## 2024-05-31 DIAGNOSIS — I10 PRIMARY HYPERTENSION: ICD-10-CM

## 2024-06-01 RX ORDER — METOPROLOL SUCCINATE 50 MG/1
50 TABLET, EXTENDED RELEASE ORAL DAILY
Qty: 90 TABLET | Refills: 3 | Status: SHIPPED | OUTPATIENT
Start: 2024-06-01

## 2024-06-01 NOTE — CASE COMMUNICATION
Visit missed due to:  Patient refused home health PT visit today d/t illness.  Patient states that she was diagnosed with Bronchitis yesterday.  MD notified via this case communication.

## 2024-06-04 ENCOUNTER — TELEPHONE (OUTPATIENT)
Facility: CLINIC | Age: 84
End: 2024-06-04

## 2024-06-04 ENCOUNTER — HOME CARE VISIT (OUTPATIENT)
Facility: HOME HEALTH | Age: 84
End: 2024-06-04
Payer: MEDICARE

## 2024-06-04 VITALS
RESPIRATION RATE: 16 BRPM | TEMPERATURE: 98.1 F | DIASTOLIC BLOOD PRESSURE: 62 MMHG | OXYGEN SATURATION: 98 % | HEART RATE: 56 BPM | SYSTOLIC BLOOD PRESSURE: 103 MMHG

## 2024-06-04 PROCEDURE — G0157 HHC PT ASSISTANT EA 15: HCPCS

## 2024-06-04 NOTE — TELEPHONE ENCOUNTER
[facility] ([facility])   (formerly [facility])   Phone:  [phone]      Fax:  [phone] [address], [address]  [place] [address]    Name:  Adenike Dasilva  YOB: 1940    Incoming call from patient who is requesting a refill on her nystatin 532429 unit/ml suspension.  She says that her gums are irritated and swollen from new dentures and this is what she has used successfully in the past when this happens.  Patient says she does no have any white patches on her tongue or cheeks. Last prescription was on 3/4/24 by Yarely Casey NP with one refill.    This nurse called [facility], [phone] to see if patient has used the one refill. Spoke with pharmacy tech, patient still has a refill on the medication. They will fill it and have it ready for patient this afternoon.    This nurse called patient and informed her of above.      Makayla Alex RN, Gerontological Nursing-BC, [facility]

## 2024-06-05 ASSESSMENT — ENCOUNTER SYMPTOMS: PAIN LOCATION - PAIN QUALITY: ACHE

## 2024-06-05 NOTE — HOME HEALTH
Subjective:  Patient reports that she continues to have a sore throat and remains weak from bronchitis episode last week.  Patient notes no f/u with HEP or Amb Program since last PT visit d/t illness.  Patient notes no change in chronic low back pain today.  Falls since last visit: NO  Caregiver involvement changes: NO  Home health supplies by type and quantity ordered/delivered this visit include: NO    Clinician asked if patient has had any physician contact since last home care visit and patient states: NO  Clinician asked if patient has any new or changed medications and patient states: NO  If Yes, were medications reconciled? N/A  Was the certifying physician notified of changes in medications? N/A    Clinical assessment (what this visit means for the patient overall and need for ongoing skilled care) and progress or lack of progress towards SPECIFIC goals:  PTA instructed patient to resume performance of sitting and supported standing BLE ther strength ex's 2x/day and to walk several times a day to regain overall strength.  Patient was quickly fatigued and SOB with gait training today.  Patient remains unable to perform therapeutic strength exercises w/o prompting and cueing for technique and will require additional training.  Patient's dyspnea; significant chronic low back pain; and compromised BLE strength and overall balance make patient a continued high risk for falls.    Written Teaching Material Utilized: Patient/CG received written/pictorial HEP for all sitting and supported standing therapeutic strength exercises performed today.    Interdisciplinary communication with:  GINGER Vázquez re:  Patient diagnosed with bronchitis last week.    Discharge planning as follows:  Discharge when all PT goals are met or maximum benefit achieved with PT.    Specific plan for next visit: Gait, transfer, strength, balance, pain management and fall prevention training as tolerated next session.

## 2024-06-06 ENCOUNTER — HOME CARE VISIT (OUTPATIENT)
Facility: HOME HEALTH | Age: 84
End: 2024-06-06
Payer: MEDICARE

## 2024-06-06 VITALS
TEMPERATURE: 97.9 F | HEART RATE: 83 BPM | DIASTOLIC BLOOD PRESSURE: 67 MMHG | OXYGEN SATURATION: 91 % | SYSTOLIC BLOOD PRESSURE: 103 MMHG | RESPIRATION RATE: 16 BRPM

## 2024-06-06 PROCEDURE — G0157 HHC PT ASSISTANT EA 15: HCPCS

## 2024-06-07 ENCOUNTER — TELEPHONE (OUTPATIENT)
Facility: CLINIC | Age: 84
End: 2024-06-07

## 2024-06-07 NOTE — TELEPHONE ENCOUNTER
The patient called to update Dr. Alex that she was coughing all night long, and there is so much congestion plus phlegm.  She said that she has bronchitis and also that she has taken her last antibiotic pill, but still is not better.  She also tried Mucinex but that did not help.  She asks for a callback at [phone].

## 2024-06-08 ENCOUNTER — OFFICE VISIT (OUTPATIENT)
Facility: CLINIC | Age: 84
End: 2024-06-08

## 2024-06-08 DIAGNOSIS — J84.10 PULMONARY FIBROSIS (HCC): Chronic | ICD-10-CM

## 2024-06-08 DIAGNOSIS — I25.10 CORONARY ARTERY DISEASE INVOLVING NATIVE CORONARY ARTERY OF NATIVE HEART WITHOUT ANGINA PECTORIS: Chronic | ICD-10-CM

## 2024-06-08 DIAGNOSIS — J41.0 SIMPLE CHRONIC BRONCHITIS (HCC): Primary | Chronic | ICD-10-CM

## 2024-06-08 DIAGNOSIS — Z95.2 H/O AORTIC VALVE REPLACEMENT: Chronic | ICD-10-CM

## 2024-06-08 DIAGNOSIS — I35.0 NONRHEUMATIC AORTIC VALVE STENOSIS: Chronic | ICD-10-CM

## 2024-06-08 DIAGNOSIS — I10 BENIGN ESSENTIAL HYPERTENSION: Chronic | ICD-10-CM

## 2024-06-08 DIAGNOSIS — N30.00 ACUTE CYSTITIS WITHOUT HEMATURIA: ICD-10-CM

## 2024-06-08 RX ORDER — NITROFURANTOIN 25; 75 MG/1; MG/1
100 CAPSULE ORAL 2 TIMES DAILY
Qty: 10 CAPSULE | Refills: 0 | Status: SHIPPED | OUTPATIENT
Start: 2024-06-08 | End: 2024-06-13

## 2024-06-09 VITALS
DIASTOLIC BLOOD PRESSURE: 70 MMHG | OXYGEN SATURATION: 91 % | HEART RATE: 68 BPM | SYSTOLIC BLOOD PRESSURE: 135 MMHG | RESPIRATION RATE: 16 BRPM

## 2024-06-10 ASSESSMENT — ENCOUNTER SYMPTOMS: PAIN LOCATION - PAIN QUALITY: ACHE

## 2024-06-10 NOTE — HOME HEALTH
Subjective:  Patient reports limited f/u with HEP and Amb Program since last PT visit.  Patient notes only mild low back pain today.  Falls since last visit: NO  Caregiver involvement changes: NO  Home health supplies by type and quantity ordered/delivered this visit include: NO    Clinician asked if patient has had any physician contact since last home care visit and patient states: NO  Clinician asked if patient has any new or changed medications and patient states: NO  If Yes, were medications reconciled? N/A  Was the certifying physician notified of changes in medications? N/A    Clinical assessment (what this visit means for the patient overall and need for ongoing skilled care) and progress or lack of progress towards SPECIFIC goals: Patient remains unable to perform therapeutic strength exercises w/o prompting and cueing for technique and will require additional training. Patient's dyspnea/poor endurance; and compromised BLE strength and overall balance make patient a high risk for falls.    Written Teaching Material Utilized: Patient/CG received written/pictorial HEP for all sitting and supported standing therapeutic strength exercises and balance exercises performed today.    Interdisciplinary communication with:  N/A    Discharge planning as follows:  Discharge when all PT goals are met or maximum benefit achieved with PT.    Specific plan for next visit: Gait, transfer, strength, balance, pain management and fall prevention training as tolerated next session.

## 2024-06-11 ENCOUNTER — HOME CARE VISIT (OUTPATIENT)
Facility: HOME HEALTH | Age: 84
End: 2024-06-11
Payer: MEDICARE

## 2024-06-11 PROCEDURE — G0157 HHC PT ASSISTANT EA 15: HCPCS

## 2024-06-12 ENCOUNTER — HOME CARE VISIT (OUTPATIENT)
Facility: HOME HEALTH | Age: 84
End: 2024-06-12
Payer: MEDICARE

## 2024-06-12 VITALS
TEMPERATURE: 98.1 F | OXYGEN SATURATION: 92 % | SYSTOLIC BLOOD PRESSURE: 112 MMHG | RESPIRATION RATE: 18 BRPM | DIASTOLIC BLOOD PRESSURE: 64 MMHG | HEART RATE: 87 BPM

## 2024-06-12 VITALS
TEMPERATURE: 98.3 F | DIASTOLIC BLOOD PRESSURE: 70 MMHG | OXYGEN SATURATION: 94 % | RESPIRATION RATE: 18 BRPM | SYSTOLIC BLOOD PRESSURE: 126 MMHG | HEART RATE: 67 BPM

## 2024-06-12 PROCEDURE — G0151 HHCP-SERV OF PT,EA 15 MIN: HCPCS

## 2024-06-12 ASSESSMENT — ENCOUNTER SYMPTOMS
DIARRHEA: 1
PAIN LOCATION - PAIN QUALITY: ACHE
DYSPNEA ACTIVITY LEVEL: AFTER AMBULATING LESS THAN 10 FT
CONTUSION: 1

## 2024-06-12 NOTE — Clinical Note
Flower Beckwith was seen and treated in our emergency department on 6/12/2024.  She may return to work on 06/13/2024.       If you have any questions or concerns, please don't hesitate to call.      Mpofu, Prudence, CNP History and physical documented and up to date, allergies reviewed, lab results reviewed, pre-procedure education provided, patient verbalized understanding of procedure, procedural consent signed and patient is NPO.

## 2024-06-13 NOTE — HOME HEALTH
Subjective:  Patient reports that she was diagnosed with a UTI over the weekend.  Patient states that she beagn taking antibiotics for UTI on Sunday.  Patient notes limited f/u with HEP and Amb Program since last PT visit.  Falls since last visit: NO  Caregiver involvement changes: NO  Home health supplies by type and quantity ordered/delivered this visit include: NO    Clinician asked if patient has had any physician contact since last home care visit and patient states:  Yes, PCP.  Clinician asked if patient has any new or changed medications and patient states:  Yes, antibiotic for UTI.  If Yes, were medications reconciled?  Yes.  Was the certifying physician notified of changes in medications?  Yes    Clinical assessment (what this visit means for the patient overall and need for ongoing skilled care) and progress or lack of progress towards SPECIFIC goals:  Patient remains unable to perform therapeutic strength exercises w/o occasional prompting and cueing for technique and will require additional training.  Gait pattern remains kyphotic with decreased BLE step length and gato.  Patient's history of falls; and compromised BLE strength and overall balance make patient a continued high risk for falls.    Written Teaching Material Utilized: Patient/CG received written/pictorial HEP for all sitting and supported standing therapeutic strength exercises and balance exercises performed today.    Interdisciplinary communication with:  Kathie PT re:  New UTI antibiotic and slow progress with PT.    Discharge planning as follows:  Discharge when all PT goals are met or maximum benefit achieved with PT.    Specific plan for next visit: Gait, transfer, strength, balance, pain management and fall prevention training as tolerated next session.

## 2024-06-13 NOTE — HOME HEALTH
met    Specific plan for next visit: Re-instruct patient/caregiver on HEP, bed mobility, Educate in s/s of worsening condition and when to call MD KEVIN or 911 and Instruct in safety issues regarding Fire hazards with oxygen, Proper use of assistive device, Tripping hazards and Diet

## 2024-06-14 ENCOUNTER — TELEPHONE (OUTPATIENT)
Facility: CLINIC | Age: 84
End: 2024-06-14

## 2024-06-14 NOTE — TELEPHONE ENCOUNTER
The patient called to update Dr. Alex that she has finished all of the antibiotic but the problem is still severe.  She states that she is in a lot of pain.    Medication refill - The patient requests refill on her benzonatate script, 100 mgs, she uses [facility].      The patient's callback is [phone]

## 2024-06-14 NOTE — TELEPHONE ENCOUNTER
LCSW called pt for routine social work assessment. Pt answered the phone, stated that she has been having significant pain and called the office this morning to inform of this, and to request medication. She said she has been feeling like life has been \"one little thing after another\". She denies feeling depressed, denies anxiety. She says it is helpful to have Home Health come to see her because it can be a distraction from her discomfort. She denied needing resources or additional support from LCSW at this time. LCSW to remain available as needed.     Kristine Ware, STEVEN, LCSW     [facility]  Primary Care at [facility] Medical Office Building   [address]   [place] [address] (C) [phone](O) [phone]  [email or web address]

## 2024-06-16 DIAGNOSIS — M47.9 ADVANCED OSTEOARTHRITIS OF SPINE: ICD-10-CM

## 2024-06-17 ENCOUNTER — TELEPHONE (OUTPATIENT)
Age: 84
End: 2024-06-17

## 2024-06-17 DIAGNOSIS — R05.9 COUGH IN ADULT: Primary | ICD-10-CM

## 2024-06-17 RX ORDER — BENZONATATE 100 MG/1
100 CAPSULE ORAL 3 TIMES DAILY PRN
Qty: 30 CAPSULE | Refills: 3 | Status: SHIPPED | OUTPATIENT
Start: 2024-06-17 | End: 2024-07-27

## 2024-06-17 RX ORDER — PREGABALIN 50 MG/1
CAPSULE ORAL
Qty: 60 CAPSULE | Refills: 0 | Status: SHIPPED | OUTPATIENT
Start: 2024-06-17 | End: 2024-07-17

## 2024-06-17 NOTE — TELEPHONE ENCOUNTER
Patient is calling to speak with Suleiman. Wanted to inform him and Dr. Alex that patient has finished her antibiotics but still has a UTI. Please give her a call back [phone]

## 2024-06-17 NOTE — TELEPHONE ENCOUNTER
[facility] ([facility])   (formerly [facility])   Phone:  [phone]      Fax:  [phone] [address], [address]  [place] [id number]    Name:  Adenike Dasilva  YOB: 1940    This nurse returned call to patient.  She states that she completed the 5 day course of Macrobid on 6/13/24 and now once again she is experiencing burning when urinating.  Patient says she is also experiencing urinary frequency but she says that she always urinates frequently, even when she does not have a UTI.  Patient reports that burning is usually the main symptom that she experiences when she has a UTI.   She denies fever, abdominal pain, or blood in urine.      Patient also requests a refill on benzonatate (tessalon) 100mg capsule, take 3 times daily as needed for cough.  Patient is not sure when this was last prescribed, patient cannot find the medication bottle.  It was listed as a historical med when patient was admitted to [facility] on 11/2/23.  Patient states that she takes it occasionally because it helps with the intermittent productive cough that she as due to her COPD.      This nurse explained that she will relay this information to Dr. Alex and will call patient back.  She voices understanding.    This nurse spoke with Dr. Alex and he ordered the benzonatate 100mg capsules, take 3 times daily as needed for cough.  He says to hold off on antibiotics for now.    This nurse called patient back and informed her that Dr. Alex is ordering the benzonatate and that he wants to hold off on further antibiotics for now.  This nurse instructed patient to drink plenty of water and to call us back if the burning worsens or if she develops any other symptoms.  Patient voices understanding.    Makayla Alex, RN, Gerontological Nursing-BC, [facility]

## 2024-06-18 ENCOUNTER — HOME CARE VISIT (OUTPATIENT)
Facility: HOME HEALTH | Age: 84
End: 2024-06-18
Payer: MEDICARE

## 2024-06-18 PROCEDURE — G0157 HHC PT ASSISTANT EA 15: HCPCS

## 2024-06-19 VITALS
OXYGEN SATURATION: 96 % | SYSTOLIC BLOOD PRESSURE: 101 MMHG | RESPIRATION RATE: 16 BRPM | TEMPERATURE: 97.8 F | HEART RATE: 75 BPM | DIASTOLIC BLOOD PRESSURE: 57 MMHG

## 2024-06-19 ASSESSMENT — ENCOUNTER SYMPTOMS: PAIN LOCATION - PAIN QUALITY: ACHE

## 2024-06-19 NOTE — HOME HEALTH
Subjective:  Patient c/o significant low back and bilateral knee pain again today.  Patient reports limited f/u with HEP and Amb Program since last PT visit.  Patient states that she is still afraid of ambulating in building by herself.  Falls since last visit: NO  Caregiver involvement changes: NO  Home health supplies by type and quantity ordered/delivered this visit include: NO    Clinician asked if patient has had any physician contact since last home care visit and patient states: NO  Clinician asked if patient has any new or changed medications and patient states: NO  If Yes, were medications reconciled? N/A  Was the certifying physician notified of changes in medications? N/A    Clinical assessment (what this visit means for the patient overall and need for ongoing skilled care) and progress or lack of progress towards SPECIFIC goals:  Patient only briefly able to improve gait pattern with cueing for posture/Rollator placement and to increase BLE step length during Rollator gait training today.  Patient remains unable to perform therapeutic strength exercises w/o frequent prompting and cueing for technique and will require additional training.  Patient's significant low back and bilateral knee pain; as well as compromised BLE strength and overall balance make patient a continued high risk for falls.    Written Teaching Material Utilized: Patient/CG received written/pictorial HEP for all sitting and supported standing therapeutic strength exercises and balance exercises performed today.    Interdisciplinary communication with: N/A    Discharge planning as follows:  Discharge when all PT goals are met or maximum benefit achieved with PT.    Specific plan for next visit: Gait, transfer, strength, balance, pain management and fall prevention training as tolerated next session.

## 2024-06-21 ENCOUNTER — HOME CARE VISIT (OUTPATIENT)
Facility: HOME HEALTH | Age: 84
End: 2024-06-21
Payer: MEDICARE

## 2024-06-21 PROCEDURE — G0157 HHC PT ASSISTANT EA 15: HCPCS

## 2024-06-23 VITALS
OXYGEN SATURATION: 96 % | TEMPERATURE: 98 F | SYSTOLIC BLOOD PRESSURE: 104 MMHG | RESPIRATION RATE: 16 BRPM | HEART RATE: 67 BPM | DIASTOLIC BLOOD PRESSURE: 57 MMHG

## 2024-06-24 NOTE — HOME HEALTH
Subjective:  Patient reports that her low back pain has increased significantly lately.  Patient notes 10/10 low back pain upon arrival but reported 5/10 low back pain during Rollator gait training today.  Patient notes no f/u with HEP and limited f/u with Amb Program since last PT visit d/t increased low back pain.  Falls since last visit: NO  Caregiver involvement changes: NO  Home health supplies by type and quantity ordered/delivered this visit include: NO    Clinician asked if patient has had any physician contact since last home care visit and patient states: NO  Clinician asked if patient has any new or changed medications and patient states: NO  If Yes, were medications reconciled? N/A  Was the certifying physician notified of changes in medications? N/A    Clinical assessment (what this visit means for the patient overall and need for ongoing skilled care) and progress or lack of progress towards SPECIFIC goals:  Gait pattern remains slightly kyphotic with decreased BLE step length and gato with Rollator gait training in hallway of building today.  Patient remains unable to perform therapeutic strength exercises w/o prompting and cueing for technique and will require additional training.  Patient's significant bilateral knee and low back pain; dyspnea; poor gait pattern; and compromised BLE strength and overall balance make patient a continued high risk for falls.    Written Teaching Material Utilized: Patient/CG received written/pictorial HEP for all sitting and supported standing therapeutic strength exercises performed today.    Interdisciplinary communication with:  N/A    Discharge planning as follows:  Discharge when all PT goals are met or maximum benefit achieved with PT.    Specific plan for next visit: Gait, bed mobility, strength, balance, pain management and fall prevention training as tolerated next session.

## 2024-06-25 ENCOUNTER — HOME CARE VISIT (OUTPATIENT)
Facility: HOME HEALTH | Age: 84
End: 2024-06-25
Payer: MEDICARE

## 2024-06-25 PROCEDURE — G0157 HHC PT ASSISTANT EA 15: HCPCS

## 2024-06-25 ASSESSMENT — ENCOUNTER SYMPTOMS: PAIN LOCATION - PAIN QUALITY: ACHE

## 2024-06-25 NOTE — HOME HEALTH
Subjective:  Patient reports that she is having severe low back pain mornings whenshe first gets up.  Patient notes no back pain at rest in a sitting position but c/o 7/10 pain with Rollator gait training today.  Patient reports limited f/u with HEP and Amb Program since last PT visit d/t increased low back pain.   Falls since last visit: NO  Caregiver involvement changes: NO  Home health supplies by type and quantity ordered/delivered this visit include: NO    Clinician asked if patient has had any physician contact since last home care visit and patient states: NO  Clinician asked if patient has any new or changed medications and patient states: NO  If Yes, were medications reconciled? N/A  Was the certifying physician notified of changes in medications? N/A    Clinical assessment (what this visit means for the patient overall and need for ongoing skilled care) and progress or lack of progress towards SPECIFIC goals:  Patient presents with increased low back pain today.  Patient refused performance of supported standing therapeutic strength ex's and balance ex's d/t increased low back pain following gait training today.  Patient's cognitive deficits; poor safety awareness with transfers and household ambulation; and compromised BLE strength and overall balance make patient a high risk for falls.    Written Teaching Material Utilized: Patient/CG received written/pictorial HEP for all supine and sitting and supported standing therapeutic strength exercises and balance exercises performed today.    Interdisciplinary communication with:  N/A    Discharge planning as follows:  Discharge when all PT goals are met or maximum benefit achieved with PT.    Specific plan for next visit: Gait, transfer, strength, balance, pain management and fall prevention training as tolerated next session.

## 2024-06-26 ENCOUNTER — TELEPHONE (OUTPATIENT)
Facility: CLINIC | Age: 84
End: 2024-06-26

## 2024-06-26 VITALS
RESPIRATION RATE: 16 BRPM | OXYGEN SATURATION: 100 % | TEMPERATURE: 98.1 F | DIASTOLIC BLOOD PRESSURE: 60 MMHG | HEART RATE: 75 BPM | SYSTOLIC BLOOD PRESSURE: 112 MMHG

## 2024-06-26 RX ORDER — POLYETHYLENE GLYCOL 400 2.5 MG/ML
1-2 SOLUTION/ DROPS OPHTHALMIC AS NEEDED
COMMUNITY

## 2024-06-26 NOTE — PERIOP NOTE
[facility]  Ambulatory Surgery Unit  Pre-operative Instructions    Procedure Date  Tuesday July 9            Tentative Arrival Time TBD      1. On the day of your procedure, please report to the Ambulatory Surgery Unit Registration Desk and sign in at your designated time. The Ambulatory Surgery Unit is located in [facility] on the [facility] side of the [facility] across from the [facility]. Please have all of your health insurance cards, copayment, and a photo ID.    **TWO adults may accompany you the day of the procedure.  We have limited seating available.  If our waiting room is at capacity, your ride may be asked to remain in their vehicle.  No one under 15 is allowed in the waiting room.   Masks, fully covering the mouth and nose, are required in the waiting room.    2. You must have someone with you to drive you home as directed by your surgeon.    3. You may have a light breakfast and take normal morning medications.    4. We recommend you do not drink any alcoholic beverages for 24 hours before and after your procedure.    5. Contact your surgeon’s office for instructions on the following medications: non-steroidal anti-inflammatory drugs (i.e. Advil, Aleve), vitamins, and supplements. (Some surgeon’s will want you to stop these medications prior to surgery and others may allow you to take them)   **If you are currently taking Plavix, Coumadin, Aspirin and/or other blood-thinning agents, contact your surgeon for instructions.** Your surgeon will partner with the physician prescribing these medications to determine if it is safe to stop or if you need to continue taking. Please do not stop taking these medications without instructions from your surgeon.    6. In an effort to help prevent surgical site infection, we ask that you shower with an anti-bacterial soap (i.e. Dial or Safeguard) on the morning of your procedure. Do not apply any lotions, powders, or deodorants after

## 2024-06-26 NOTE — TELEPHONE ENCOUNTER
VM - 10:32 am - The patient called to find out when she will have her next in [facility] visit with Dr. Dav Alex.  Her callback is [phone]

## 2024-06-26 NOTE — PERIOP NOTE
112 - called and spoke to Genesis (OR Supervisor) about patient needing time change, per Genesis, reach out to Dr Robertson's office and have them see if they can change time    1114 - call to Bre at Dr Robertson's office, per Bre she will call scheduling and move pt to later time, once that is done asu pat will rc to patient to make aware of time of arrival.     1138 - patient called back, given new arrival time of 0930 for procedure on 7/9/2024, patient verbalized understanding

## 2024-06-28 ENCOUNTER — HOME CARE VISIT (OUTPATIENT)
Facility: HOME HEALTH | Age: 84
End: 2024-06-28
Payer: MEDICARE

## 2024-06-28 PROCEDURE — G0157 HHC PT ASSISTANT EA 15: HCPCS

## 2024-06-30 VITALS
OXYGEN SATURATION: 93 % | HEART RATE: 84 BPM | SYSTOLIC BLOOD PRESSURE: 130 MMHG | RESPIRATION RATE: 18 BRPM | DIASTOLIC BLOOD PRESSURE: 70 MMHG | TEMPERATURE: 98.1 F

## 2024-07-01 ENCOUNTER — HOME CARE VISIT (OUTPATIENT)
Dept: HOME HEALTH SERVICES | Facility: HOME HEALTH | Age: 84
End: 2024-07-01
Payer: MEDICARE

## 2024-07-01 ENCOUNTER — OFFICE VISIT (OUTPATIENT)
Facility: CLINIC | Age: 84
End: 2024-07-01

## 2024-07-01 DIAGNOSIS — M51.36 DDD (DEGENERATIVE DISC DISEASE), LUMBAR: Chronic | ICD-10-CM

## 2024-07-01 DIAGNOSIS — J41.0 SIMPLE CHRONIC BRONCHITIS (HCC): Chronic | ICD-10-CM

## 2024-07-01 DIAGNOSIS — E78.2 MIXED HYPERLIPIDEMIA: Chronic | ICD-10-CM

## 2024-07-01 DIAGNOSIS — E11.42 TYPE 2 DIABETES MELLITUS WITH DIABETIC POLYNEUROPATHY, WITHOUT LONG-TERM CURRENT USE OF INSULIN (HCC): Chronic | ICD-10-CM

## 2024-07-01 DIAGNOSIS — I10 BENIGN ESSENTIAL HYPERTENSION: Chronic | ICD-10-CM

## 2024-07-01 DIAGNOSIS — M15.9 GENERALIZED OSTEOARTHRITIS: Chronic | ICD-10-CM

## 2024-07-01 DIAGNOSIS — Z95.2 H/O AORTIC VALVE REPLACEMENT: Chronic | ICD-10-CM

## 2024-07-01 DIAGNOSIS — I25.10 CORONARY ARTERY DISEASE INVOLVING NATIVE CORONARY ARTERY OF NATIVE HEART WITHOUT ANGINA PECTORIS: Chronic | ICD-10-CM

## 2024-07-01 DIAGNOSIS — J84.9 INTERSTITIAL LUNG DISEASE (HCC): Primary | Chronic | ICD-10-CM

## 2024-07-01 DIAGNOSIS — I35.0 NONRHEUMATIC AORTIC VALVE STENOSIS: Chronic | ICD-10-CM

## 2024-07-01 DIAGNOSIS — J96.11 CHRONIC RESPIRATORY FAILURE WITH HYPOXIA (HCC): Chronic | ICD-10-CM

## 2024-07-01 NOTE — HOME HEALTH
Subjective:  Patient reports that low back pain is becoming progressively worse with severe pain in the mornings.  Patient notes limited f/u with HEP and Amb Program d/t increasing low back pain.  Patient states that her son will install half rail for bed this weekend.  Falls since last visit: NO  Caregiver involvement changes: NO  Home health supplies by type and quantity ordered/delivered this visit include: NO    Clinician asked if patient has had any physician contact since last home care visit and patient states: NO  Clinician asked if patient has any new or changed medications and patient states: NO  If Yes, were medications reconciled? N/A  Was the certifying physician notified of changes in medications? N/A    Clinical assessment (what this visit means for the patient overall and need for ongoing skilled care) and progress or lack of progress towards SPECIFIC goals:  Patient had difficulty and significantly increased low back pain with sit <> supine transfers today.  Patient refused performance of supported standing BLE strength exercises and balance exercises today d/t back pain.  Patient's dyspnea; significant low back pain and compromised BLE strength and overall balance make patient a high risk for falls.      Written Teaching Material Utilized: Patient/CG received written/pictorial HEP for all sitting therapeutic strength exercises and balance exercises performed today.    Interdisciplinary communication with: N/A    Discharge planning as follows:  Discharge when all PT goals are met or maximum benefit achieved with PT.    Specific plan for next visit: Gait, transfer, strength, balance, pain management and fall prevention training as tolerated next session.

## 2024-07-02 ENCOUNTER — HOME CARE VISIT (OUTPATIENT)
Facility: HOME HEALTH | Age: 84
End: 2024-07-02
Payer: MEDICARE

## 2024-07-02 VITALS
DIASTOLIC BLOOD PRESSURE: 76 MMHG | HEART RATE: 76 BPM | OXYGEN SATURATION: 92 % | TEMPERATURE: 97.3 F | SYSTOLIC BLOOD PRESSURE: 132 MMHG | RESPIRATION RATE: 20 BRPM

## 2024-07-02 PROBLEM — N32.81 OAB (OVERACTIVE BLADDER): Chronic | Status: ACTIVE | Noted: 2019-01-04

## 2024-07-02 PROBLEM — F41.8 DEPRESSION WITH ANXIETY: Chronic | Status: ACTIVE | Noted: 2022-11-23

## 2024-07-02 PROCEDURE — G0151 HHCP-SERV OF PT,EA 15 MIN: HCPCS

## 2024-07-02 RX ORDER — PREGABALIN 75 MG/1
75 CAPSULE ORAL 3 TIMES DAILY
Qty: 90 CAPSULE | Refills: 0 | Status: SHIPPED | OUTPATIENT
Start: 2024-07-02 | End: 2025-07-01

## 2024-07-02 NOTE — PROGRESS NOTES
Mother     Stroke Mother        Allergies:  Allergies   Allergen Reactions    Bee Venom Shortness Of Breath           Iodinated Contrast Media Anaphylaxis and Other (See Comments)     Passes out    Penicillins Anaphylaxis and Shortness Of Breath     Other reaction(s): anaphylaxis/angioedema        Sulfa Antibiotics Anaphylaxis, Shortness Of Breath and Rash     Other reaction(s): anaphylaxis/angioedema        Omeprazole Dizziness or Vertigo and Other (See Comments)    Ranitidine Hcl Nausea Only    Iodine     Montelukast Hallucinations           Seasonal     Tree Extract Itching     Cat dander, grass        Cefdinir Rash     Other reaction(s): mild rash/itching        Codeine Itching     Other reaction(s): other/intolerance  Dizziness. Pt states they take Benadryl to offset the reaction.    Famotidine Nausea And Vomiting    Jardiance [Empagliflozin] Rash    Morphine Itching and Other (See Comments)     Agitation,hallucinations         Social History:  Social History     Tobacco Use    Smoking status: Former     Current packs/day: 0.00     Average packs/day: 0.3 packs/day for 20.0 years (5.0 ttl pk-yrs)     Types: Cigarettes     Start date: 1960     Quit date: 1980     Years since quittin.5    Smokeless tobacco: Never   Vaping Use    Vaping Use: Never used   Substance Use Topics    Alcohol use: No    Drug use: No       Current Medications:  Current Outpatient Medications on File Prior to Visit   Medication Sig Dispense Refill    benzonatate (TESSALON) 100 MG capsule Take 1 capsule by mouth 3 times daily as needed for Cough 30 capsule 3    metoprolol succinate (TOPROL XL) 50 MG extended release tablet TAKE 1 TABLET BY MOUTH EVERY DAY 90 tablet 3    metFORMIN (GLUCOPHAGE-XR) 500 MG extended release tablet Take 1 tablet by mouth daily (with breakfast) Indications: Type 2 Diabetes 30 tablet 11    bismuth subsalicylate (PEPTO-BISMOL) 262 MG chewable tablet Take 2 tablets by mouth 2 times daily Indications:

## 2024-07-03 VITALS
OXYGEN SATURATION: 97 % | DIASTOLIC BLOOD PRESSURE: 60 MMHG | SYSTOLIC BLOOD PRESSURE: 106 MMHG | RESPIRATION RATE: 17 BRPM | HEART RATE: 68 BPM | TEMPERATURE: 97.5 F

## 2024-07-03 ASSESSMENT — ENCOUNTER SYMPTOMS
DYSPNEA ACTIVITY LEVEL: AFTER AMBULATING 10 - 20 FT
DIARRHEA: 1
CONTUSION: 1
PAIN LOCATION - PAIN QUALITY: ACHE

## 2024-07-08 ENCOUNTER — HOME CARE VISIT (OUTPATIENT)
Facility: HOME HEALTH | Age: 84
End: 2024-07-08
Payer: MEDICARE

## 2024-07-08 ENCOUNTER — TELEPHONE (OUTPATIENT)
Age: 84
End: 2024-07-08

## 2024-07-08 ENCOUNTER — TELEPHONE (OUTPATIENT)
Facility: CLINIC | Age: 84
End: 2024-07-08

## 2024-07-08 VITALS
RESPIRATION RATE: 17 BRPM | SYSTOLIC BLOOD PRESSURE: 106 MMHG | DIASTOLIC BLOOD PRESSURE: 60 MMHG | HEART RATE: 92 BPM | TEMPERATURE: 98.2 F | OXYGEN SATURATION: 95 %

## 2024-07-08 DIAGNOSIS — M15.9 GENERALIZED OSTEOARTHRITIS: Primary | ICD-10-CM

## 2024-07-08 PROCEDURE — G0151 HHCP-SERV OF PT,EA 15 MIN: HCPCS

## 2024-07-08 RX ORDER — TRAMADOL HYDROCHLORIDE 50 MG/1
50 TABLET ORAL EVERY 6 HOURS PRN
Qty: 30 TABLET | Refills: 0 | Status: SHIPPED | OUTPATIENT
Start: 2024-07-08 | End: 2024-08-07

## 2024-07-08 ASSESSMENT — ENCOUNTER SYMPTOMS: PAIN LOCATION - PAIN QUALITY: ACHE, THROBBING

## 2024-07-08 NOTE — TELEPHONE ENCOUNTER
VM - 9:28 am - The patient called to request a callback from Suleiman/Dr. Alex, and stated that her tramadol script still needs to be sent to the pharmacy for refill.  # [phone].

## 2024-07-08 NOTE — TELEPHONE ENCOUNTER
Call placed to patient to inform that I can see that the Rx was transmitted this morning to the pharmacy.  I encouraged Ms. Dasilva to call the pharmacy this afternoon to confirm the medication is ready for .  Patient verbalized appreciation for the call.

## 2024-07-09 ENCOUNTER — HOSPITAL ENCOUNTER (OUTPATIENT)
Facility: HOSPITAL | Age: 84
Discharge: HOME | End: 2024-07-12

## 2024-07-09 ENCOUNTER — HOSPITAL ENCOUNTER (OUTPATIENT)
Facility: HOSPITAL | Age: 84
Setting detail: OUTPATIENT SURGERY
Discharge: HOME | End: 2024-07-09
Attending: PHYSICAL MEDICINE & REHABILITATION | Admitting: PHYSICAL MEDICINE & REHABILITATION
Payer: MEDICARE

## 2024-07-09 VITALS
TEMPERATURE: 98.1 F | OXYGEN SATURATION: 93 % | HEIGHT: 61 IN | RESPIRATION RATE: 18 BRPM | SYSTOLIC BLOOD PRESSURE: 133 MMHG | WEIGHT: 126 LBS | DIASTOLIC BLOOD PRESSURE: 72 MMHG | HEART RATE: 97 BPM | BODY MASS INDEX: 23.79 KG/M2

## 2024-07-09 PROCEDURE — 6360000002 HC RX W HCPCS: Performed by: PHYSICAL MEDICINE & REHABILITATION

## 2024-07-09 PROCEDURE — 3600000012 HC SURGERY LEVEL 2 ADDTL 15MIN: Performed by: PHYSICAL MEDICINE & REHABILITATION

## 2024-07-09 PROCEDURE — 7100000010 HC PHASE II RECOVERY - FIRST 15 MIN: Performed by: PHYSICAL MEDICINE & REHABILITATION

## 2024-07-09 PROCEDURE — 2709999900 HC NON-CHARGEABLE SUPPLY: Performed by: PHYSICAL MEDICINE & REHABILITATION

## 2024-07-09 PROCEDURE — 3600000002 HC SURGERY LEVEL 2 BASE: Performed by: PHYSICAL MEDICINE & REHABILITATION

## 2024-07-09 PROCEDURE — 2500000003 HC RX 250 WO HCPCS: Performed by: PHYSICAL MEDICINE & REHABILITATION

## 2024-07-09 RX ORDER — LIDOCAINE HYDROCHLORIDE 20 MG/ML
10 INJECTION, SOLUTION EPIDURAL; INFILTRATION; INTRACAUDAL; PERINEURAL ONCE
Status: COMPLETED | OUTPATIENT
Start: 2024-07-09 | End: 2024-07-09

## 2024-07-09 RX ORDER — DEXAMETHASONE SODIUM PHOSPHATE 10 MG/ML
10 INJECTION INTRAMUSCULAR; INTRAVENOUS ONCE
Status: COMPLETED | OUTPATIENT
Start: 2024-07-09 | End: 2024-07-09

## 2024-07-09 RX ORDER — BUPIVACAINE HYDROCHLORIDE 5 MG/ML
10 INJECTION, SOLUTION EPIDURAL; INTRACAUDAL ONCE
Status: COMPLETED | OUTPATIENT
Start: 2024-07-09 | End: 2024-07-09

## 2024-07-09 ASSESSMENT — PAIN - FUNCTIONAL ASSESSMENT
PAIN_FUNCTIONAL_ASSESSMENT: 0-10
PAIN_FUNCTIONAL_ASSESSMENT: 0-10
PAIN_FUNCTIONAL_ASSESSMENT: PREVENTS OR INTERFERES SOME ACTIVE ACTIVITIES AND ADLS

## 2024-07-09 ASSESSMENT — PAIN DESCRIPTION - ORIENTATION: ORIENTATION: LEFT

## 2024-07-09 ASSESSMENT — PAIN DESCRIPTION - DESCRIPTORS
DESCRIPTORS: ACHING
DESCRIPTORS: JABBING;NAGGING

## 2024-07-09 ASSESSMENT — PAIN SCALES - GENERAL: PAINLEVEL_OUTOF10: 8

## 2024-07-09 ASSESSMENT — PAIN DESCRIPTION - LOCATION: LOCATION: KNEE

## 2024-07-09 NOTE — PERIOP NOTE
Patient received to PACU, VSS. Patient awake and alert with no complaints of pain. Injection site intact.     Neuro:  Push/Pull assessment:       LLE Response: moderate push/pull   RLE Response: moderate push/pull    Discharge instructions given. Patient verbalized understanding of instructions and follow up.     Patient states ready for discharge - patient discharged at this time by wheelchair with all belongings. Son to provide transportation home.

## 2024-07-09 NOTE — DISCHARGE INSTRUCTIONS
Radiofrequency Ablation  Discharge Instructions    You had a radiofrequency ablation today.  You will probably have some numbness in your lower back area for the next 6-8 hours.  You should begin feeling better after a few days, but it may take up to 2 weeks to notice the difference.  The benefit you get from your injection will last a variable amount of time, depending on the severity of your spine problem.    Pain:  Most people do not have any increase in pain after this injection.  However, you might experience some soreness a few days at the site of the injection.  If this happens, putting an ice pack over the sore area will help.    Bandage:  You have a small bandage covering the site of the injection.  You may remove it when you get home.    Restrictions:  Some one should drive you home after the injection.  After that, you have no restrictions.  You may resume your normal level of activity.  You may take a shower or bath, and you may eat normally.  You should continue your current exercise and/or therapy routine.    Medications:  Continue your current medications as prescribed.  If you pain decreases, you may reduce the amount of your pain medications.  If you stopped taking anticoagulants or blood-thinners before the injection, start them tomorrow.  Call Dr. Robertson at [phone] if you experience:    Fever (101 degrees Fahrenheit or greater)  Nausea or vomiting  Headache unrelieved by your normal pain medication  Redness or swelling at the injection site that lasts more than 1 day  New numbness, tingling, weakness, or pain that you didn't have before the injection      If still having pain in 1-2 weeks, call office at [phone] for a follow up appointment.        DISCHARGE SUMMARY from Nurse    The following personal items collected during your admission are returned to you:   Dental Appliance:    Vision:    Hearing Aid:    Jewelry:    Clothing:    Other Valuables:    Valuables sent to safe:

## 2024-07-09 NOTE — H&P
Procedural Case Note    7/9/2024    (9:19 AM)    Adenike Dasilva    1940   (83 y.o.)    [id number]    CC:  pain    ROS:   Complete ROS obtained, no CP, no SOB, no N or V    PMH:     Past Medical History:   Diagnosis Date    Anxiety     Aortic valve stenosis 07/2022    Arthritis     Asthma     Bronchitis     CAD (coronary artery disease)     stent    Chronic anticoagulation 11/23/2022    Chronic obstructive pulmonary disease (HCC)     Chronic pain     Depression with anxiety 11/23/2022    Diabetes (HCC)     GERD (gastroesophageal reflux disease)     H/O aortic valve replacement 07/2022    Hepatitis B     1960's    History of blood transfusion     06/22    History of DVT (deep vein thrombosis)     History of recurrent UTIs     Hx of seasonal allergies     Hypercholesterolemia     Irritable bowel syndrome (IBS)     Migraine     Noncompliance 01/18/2023    Oxygen dependent     PRN at [facility]    Psychiatric disorder     anxiety    Thromboembolus (HCC)     left leg    Thyroid disease     pt denies- not tx for it    Urinary urgency        ALLERGIES:     Allergies   Allergen Reactions    Bee Venom Shortness Of Breath           Iodinated Contrast Media Anaphylaxis and Other (See Comments)     Passes out    Penicillins Anaphylaxis and Shortness Of Breath     Other reaction(s): anaphylaxis/angioedema        Sulfa Antibiotics Anaphylaxis, Shortness Of Breath and Rash     Other reaction(s): anaphylaxis/angioedema        Omeprazole Dizziness or Vertigo and Other (See Comments)    Ranitidine Hcl Nausea Only    Iodine     Montelukast Hallucinations           Seasonal     Tree Extract Itching     Cat dander, grass        Cefdinir Rash     Other reaction(s): mild rash/itching        Codeine Itching     Other reaction(s): other/intolerance  Dizziness. Pt states they take Benadryl to offset the reaction.    Famotidine Nausea And Vomiting    Jardiance [Empagliflozin] Rash    Morphine Itching and Other (See Comments)

## 2024-07-09 NOTE — PROGRESS NOTES
Permission received to review discharge instructions and discuss private health information with SonAbhilash.    Patient states family/friend will be with them for 24 hours following procedure.       Neuro:  Push/Pull assessment:     LUE Response: strong   LLE Response: strong   RUE Response: strong   RLE Response: strong

## 2024-07-09 NOTE — HOME HEALTH
Subjective: \" I am really struggling because I hurt my R shoulder trying to use the bedrail to get up\"  Falls since last visit : no  Caregiver involvement changes: NA  Home health supplies by type and quantity ordered/delivered this visit include: NICK    Clinician asked if patient has had any physician contact since last home care visit and patient states: no  Clinician asked if patient has any new or changed medications and patient states:  no  If Yes, were medications reconciled? yes  Was the certifying physician notified of changes in medications? NA    Clinical assessment (what this visit means for the patient overall and need for ongoing skilled care) and progress or lack of progress towards SPECIFIC goals:   The Pt is struggling because she hurt her R shoulder trying to pull herself up on the bedside rail which is not how she was taught to use the bedrail at the previous visit, she was to roll to her side and use the rail to pull herself onto her side and then push up on the rail to get to sitting and then to standing. Somehow the pt used her R arm to pull herself up and had her arm in an akward angle because the bedrail is on her R side anyway. She now has pain in her R shoulder not when raising the arm but when lowering it so the PT showed her how to use the L hand to support the R UE when she brings it back down to her lap. She needs to do overhead reaching with L UE at this time and use ice and heat on the R UE to prevent swelling and control pain     Written Teaching Material Utilized: NICK    Interdisciplinary communication with: NICK    Discharge planning as follows: Is no longer homebound, Per physician order, Will discharge when the patient has reached their maximum functional potential and maximum safety in their home and When goals are met    Specific plan for next visit: Re-instruct patient/caregiver on HEP, gait, transfers, bed mobility, Educate in s/s of worsening condition and when to call MD KEVIN or

## 2024-07-09 NOTE — PERIOP NOTE
Adenike GARCIA Dasilva  1940  [id number]    Situation:  Verbal report given from: Delphine Holland RN  Procedure: Procedure(s):  BILATERAL L4, L5-S1 RADIOFREQUENCY ABLATION    Background:    Preoperative diagnosis: Spondylolisthesis of lumbar region [M43.16]  DDD (degenerative disc disease), lumbar [M51.36]    Postoperative diagnosis: * No post-op diagnosis entered *    :  Dr. Robertson    Assessment:  Intra-procedure medications       Vital signs stable      Recommendation:    Discharged to home after instructions reviewed with patient. Recommend rest until local anesthetic has worn off.

## 2024-07-11 ENCOUNTER — HOME CARE VISIT (OUTPATIENT)
Facility: HOME HEALTH | Age: 84
End: 2024-07-11
Payer: MEDICARE

## 2024-07-11 VITALS
TEMPERATURE: 98.2 F | RESPIRATION RATE: 18 BRPM | DIASTOLIC BLOOD PRESSURE: 70 MMHG | HEART RATE: 76 BPM | SYSTOLIC BLOOD PRESSURE: 110 MMHG | OXYGEN SATURATION: 93 %

## 2024-07-11 PROCEDURE — G0151 HHCP-SERV OF PT,EA 15 MIN: HCPCS

## 2024-07-11 ASSESSMENT — ENCOUNTER SYMPTOMS: PAIN LOCATION - PAIN QUALITY: ACHE

## 2024-07-12 ENCOUNTER — TELEPHONE (OUTPATIENT)
Age: 84
End: 2024-07-12

## 2024-07-12 NOTE — TELEPHONE ENCOUNTER
3rd call attempt - no answer - I left vm informing that patient could either call the office today and explain the reason for her call, and I would try to get back (or work on that), otherwise, I would call her on Monday morning.

## 2024-07-12 NOTE — HOME HEALTH
Subjective: \" I feel better but do not trust walking down to mailbox by myself\"  Falls since last visit no  Caregiver involvement changes: NA  Home health supplies by type and quantity ordered/delivered this visit include: NA    Clinician asked if patient has had any physician contact since last home care visit and patient states: yes  Clinician asked if patient has any new or changed medications and patient states: no  If Yes, were medications reconciled? yes  Was the certifying physician notified of changes in medications? NA    Clinical assessment (what this visit means for the patient overall and need for ongoing skilled care) and progress or lack of progress towards SPECIFIC goals: The pt had her back ablation which she states did not work very well but PT has noticed a huge improvement. The Pt was up and dressed and was alert and bright and not writhing in pain like she was the last visit. The pt needed to go check the mail for an important document but was too scared to go downstairs on her own. The PT encouraged the pt to use her portable O2 but she declined. Her oxygen was 93% at start and 91% at the end after walking to the elevator, down to first floor  out front door to mailbox and back to the building up elevator and down the roper  twice before back to room. She was not short of breath and did not have back pain while walking. The Pt has been encouraged to try and do this more often on her own with walker because there is always someone in the office who can keep an eye out for her. PT will continue to progress the pt's ambulation distance and mobility as able to back pain without aggrevating the issue    Written Teaching Material Utilized: NICK    Interdisciplinary communication with:NICK  Discharge planning as follows: Is no longer homebound, Per physician order, Will discharge when the patient has reached their maximum functional potential and maximum safety in their home and When goals are

## 2024-07-12 NOTE — TELEPHONE ENCOUNTER
Call returned to patient x2 - voice messages left. I explained that I'm working remotely today and encouraged patient to return the call to the office and provide rationale for the call, and I would try to help.  Otherwise, I will attempt to return the call by 4 PM today.

## 2024-07-12 NOTE — TELEPHONE ENCOUNTER
Pt is calling to speak with Suleiman, she has a couple of questions. Pt did not specify as to what it is regarding # [phone]

## 2024-07-15 ENCOUNTER — HOME CARE VISIT (OUTPATIENT)
Facility: HOME HEALTH | Age: 84
End: 2024-07-15
Payer: MEDICARE

## 2024-07-15 ENCOUNTER — TELEPHONE (OUTPATIENT)
Facility: CLINIC | Age: 84
End: 2024-07-15

## 2024-07-15 PROCEDURE — G0151 HHCP-SERV OF PT,EA 15 MIN: HCPCS

## 2024-07-15 NOTE — TELEPHONE ENCOUNTER
Patient returned the call - Ms. Dasilva reports she has IBS and she has been worse for the past 2 weeks. Patient reports losing weight because she is \"afraid\" of eating. She states that after she eats, within 1 hour she would be back in the bathroom, but she is forces herself to eat. Pt describes that at times, stool is loose, and sometimes it is formed.   Patient states that she is taking Bentyl and Rosina Casey, NP had prescribed another medication that she was afraid to use, but she is now taking it everyday. Patient could not recall the name of the medication.  I asked if it could be Colestipol and she responded \"that's it\".     Patient asks for help managing this issue. I informed her that I would bring her concerns to Dr. Alex and would return her call in the next 24 hours. I explained that she might need to have an appointment with MD for an assessment and discuss options. Patient verbalized being agreeable to that.

## 2024-07-15 NOTE — TELEPHONE ENCOUNTER
Call placed to patient - Voice message left informing that I was returning her call from last week and encouraging patient to call [facility] if needed.

## 2024-07-16 VITALS
SYSTOLIC BLOOD PRESSURE: 116 MMHG | OXYGEN SATURATION: 92 % | RESPIRATION RATE: 16 BRPM | HEART RATE: 85 BPM | DIASTOLIC BLOOD PRESSURE: 68 MMHG | TEMPERATURE: 98.4 F

## 2024-07-16 NOTE — TELEPHONE ENCOUNTER
Call returned to patient - I provided Dr. Alex's recommendation to add Metamucil, over the counter fiber supplement for the IBS exacerbation. I also mentioned Pepto bismol, but encouraged her to use Metamucil first and monitor for changes.    Patient was able to write the name of the product and stated that she will get it.

## 2024-07-16 NOTE — HOME HEALTH
Subjective: \" I am doing ok but I am not sleeping well\"  Falls since last visit : none   Caregiver involvement changes: NA  Home health supplies by type and quantity ordered/delivered this visit include: NA    Clinician asked if patient has had any physician contact since last home care visit and patient states: no  Clinician asked if patient has any new or changed medications and patient states:  no  If Yes, were medications reconciled? yes  Was the certifying physician notified of changes in medications? NA    Clinical assessment (what this visit means for the patient overall and need for ongoing skilled care) and progress or lack of progress towards SPECIFIC goals: THe he pt is strying to continue to move now that she has had some minor relief from the spinal proceedure but she is still having pain that is 7/10 and sometimes 10/10 but she is overall able to move with less agony. She wants to be able to walk outside on the sidewalk to the mailbox and back with Rollator but without A from CG. The pt is too nervous to practice this at this time but she has walked in the roper 3 times  in the last 2 weeks on her own. She is afraid she will fall and no one will see her and the PT Explained that she should have cell phone in her pocket so she can call 911 if she falls. The PT willcontinue to push the pt on her balance drills as well as walking outside with her walker safety and reduce ss    Written Teaching Material Utilized: HEP cue sheets  Interdisciplinary communication with: NICK    Discharge planning as follows: Is no longer homebound, Per physician order, Will discharge when the patient has reached their maximum functional potential and maximum safety in their home and When goals are met    Specific plan for next visit: Re-instruct patient/caregiver on HEP, gait over longer distances, Educate in s/s of worsening condition and when to call MD KEVIN or 911 and Instruct in safety issues regarding Fire hazards with oxygen

## 2024-07-19 ENCOUNTER — HOME CARE VISIT (OUTPATIENT)
Facility: HOME HEALTH | Age: 84
End: 2024-07-19

## 2024-07-19 VITALS
OXYGEN SATURATION: 95 % | RESPIRATION RATE: 17 BRPM | HEART RATE: 84 BPM | TEMPERATURE: 98.2 F | DIASTOLIC BLOOD PRESSURE: 70 MMHG | SYSTOLIC BLOOD PRESSURE: 109 MMHG

## 2024-07-19 PROCEDURE — G0151 HHCP-SERV OF PT,EA 15 MIN: HCPCS

## 2024-07-19 ASSESSMENT — ENCOUNTER SYMPTOMS
DIARRHEA: 1
DYSPNEA ACTIVITY LEVEL: AFTER AMBULATING LESS THAN 10 FT
PAIN LOCATION - PAIN QUALITY: ACHE

## 2024-07-22 ENCOUNTER — APPOINTMENT (OUTPATIENT)
Facility: HOSPITAL | Age: 84
End: 2024-07-22
Payer: MEDICARE

## 2024-07-22 ENCOUNTER — HOME CARE VISIT (OUTPATIENT)
Dept: HOME HEALTH SERVICES | Facility: HOME HEALTH | Age: 84
End: 2024-07-22
Payer: MEDICARE

## 2024-07-22 ENCOUNTER — HOSPITAL ENCOUNTER (INPATIENT)
Facility: HOSPITAL | Age: 84
LOS: 4 days | Discharge: NURSING, REHAB OR LONG-TERM CARE | End: 2024-07-26
Admitting: INTERNAL MEDICINE
Payer: MEDICARE

## 2024-07-22 DIAGNOSIS — M15.9 GENERALIZED OSTEOARTHRITIS: ICD-10-CM

## 2024-07-22 DIAGNOSIS — F41.8 DEPRESSION WITH ANXIETY: ICD-10-CM

## 2024-07-22 DIAGNOSIS — R65.20 SEPSIS WITH ACUTE ORGAN DYSFUNCTION WITHOUT SEPTIC SHOCK, DUE TO UNSPECIFIED ORGANISM, UNSPECIFIED ORGAN DYSFUNCTION TYPE (HCC): ICD-10-CM

## 2024-07-22 DIAGNOSIS — A41.9 SEPSIS WITH ACUTE ORGAN DYSFUNCTION WITHOUT SEPTIC SHOCK, DUE TO UNSPECIFIED ORGANISM, UNSPECIFIED ORGAN DYSFUNCTION TYPE (HCC): ICD-10-CM
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
## 2024-07-22 PROBLEM — R57.9 SHOCK (HCC): Status: ACTIVE | Noted: 2024-07-22

## 2024-07-22 LAB
ALBUMIN SERPL-MCNC: 3.3 G/DL (ref 3.5–5)
ALBUMIN/GLOB SERPL: 0.8 (ref 1.1–2.2)
ALP SERPL-CCNC: 75 U/L (ref 45–117)
ALT SERPL-CCNC: 35 U/L (ref 12–78)
ANION GAP BLD CALC-SCNC: 11 (ref 10–20)
ANION GAP SERPL CALC-SCNC: 7 MMOL/L (ref 5–15)
APPEARANCE UR: CLEAR
AST SERPL-CCNC: 45 U/L (ref 15–37)
BACTERIA URNS QL MICRO: NEGATIVE /HPF
BASE DEFICIT BLD-SCNC: 2.2 MMOL/L
BASOPHILS # BLD: 0 K/UL (ref 0–0.1)
BASOPHILS NFR BLD: 0 % (ref 0–1)
BILIRUB SERPL-MCNC: 0.7 MG/DL (ref 0.2–1)
BILIRUB UR QL: NEGATIVE
BUN SERPL-MCNC: 22 MG/DL (ref 6–20)
BUN/CREAT SERPL: 23 (ref 12–20)
CA-I BLD-MCNC: 1.24 MMOL/L (ref 1.12–1.32)
CALCIUM SERPL-MCNC: 8.8 MG/DL (ref 8.5–10.1)
CHLORIDE BLD-SCNC: 102 MMOL/L (ref 100–108)
CHLORIDE SERPL-SCNC: 104 MMOL/L (ref 97–108)
CK SERPL-CCNC: 100 U/L (ref 26–192)
CO2 BLD-SCNC: 25 MMOL/L (ref 19–24)
CO2 SERPL-SCNC: 25 MMOL/L (ref 21–32)
COLOR UR: NORMAL
COMMENT:: NORMAL
CREAT SERPL-MCNC: 0.97 MG/DL (ref 0.55–1.02)
CREAT UR-MCNC: 0.8 MG/DL (ref 0.6–1.3)
DIFFERENTIAL METHOD BLD: ABNORMAL
EOSINOPHIL # BLD: 0 K/UL (ref 0–0.4)
EOSINOPHIL NFR BLD: 0 % (ref 0–7)
EPITH CASTS URNS QL MICRO: NORMAL /LPF
ERYTHROCYTE [DISTWIDTH] IN BLOOD BY AUTOMATED COUNT: 17.2 % (ref 11.5–14.5)
GLOBULIN SER CALC-MCNC: 3.9 G/DL (ref 2–4)
GLUCOSE BLD STRIP.AUTO-MCNC: 188 MG/DL (ref 65–117)
GLUCOSE BLD STRIP.AUTO-MCNC: 250 MG/DL (ref 74–99)
GLUCOSE BLD STRIP.AUTO-MCNC: 289 MG/DL (ref 65–117)
GLUCOSE SERPL-MCNC: 235 MG/DL (ref 65–100)
GLUCOSE UR STRIP.AUTO-MCNC: NEGATIVE MG/DL
HCO3 BLDA-SCNC: 25 MMOL/L
HCT VFR BLD AUTO: 37 % (ref 35–47)
HGB BLD-MCNC: 10.7 G/DL (ref 11.5–16)
HGB UR QL STRIP: NEGATIVE
HYALINE CASTS URNS QL MICRO: NORMAL /LPF (ref 0–2)
IMM GRANULOCYTES # BLD AUTO: 0.3 K/UL (ref 0–0.04)
IMM GRANULOCYTES NFR BLD AUTO: 1 % (ref 0–0.5)
KETONES UR QL STRIP.AUTO: NEGATIVE MG/DL
LACTATE BLD-SCNC: 2.21 MMOL/L (ref 0.4–2)
LACTATE BLD-SCNC: 2.75 MMOL/L (ref 0.4–2)
LACTATE BLD-SCNC: 3.23 MMOL/L (ref 0.4–2)
LEUKOCYTE ESTERASE UR QL STRIP.AUTO: NEGATIVE
LYMPHOCYTES # BLD: 0.6 K/UL (ref 0.8–3.5)
LYMPHOCYTES NFR BLD: 2 % (ref 12–49)
MAGNESIUM SERPL-MCNC: 1.8 MG/DL (ref 1.6–2.4)
MCH RBC QN AUTO: 21.7 PG (ref 26–34)
MCHC RBC AUTO-ENTMCNC: 28.9 G/DL (ref 30–36.5)
MCV RBC AUTO: 75.1 FL (ref 80–99)
MONOCYTES # BLD: 1.4 K/UL (ref 0–1)
MONOCYTES NFR BLD: 5 % (ref 5–13)
NEUTS SEG # BLD: 25.2 K/UL (ref 1.8–8)
NEUTS SEG NFR BLD: 92 % (ref 32–75)
NITRITE UR QL STRIP.AUTO: NEGATIVE
NRBC # BLD: 0 K/UL (ref 0–0.01)
NRBC BLD-RTO: 0 PER 100 WBC
NT PRO BNP: 1332 PG/ML
PCO2 BLDV: 51.6 MMHG (ref 41–51)
PH BLDV: 7.29 (ref 7.32–7.42)
PH UR STRIP: 5.5 (ref 5–8)
PLATELET # BLD AUTO: 155 K/UL (ref 150–400)
PMV BLD AUTO: 11.3 FL (ref 8.9–12.9)
PO2 BLDV: <27 MMHG (ref 25–40)
POTASSIUM BLD-SCNC: 4.4 MMOL/L (ref 3.5–5.5)
POTASSIUM SERPL-SCNC: 4.3 MMOL/L (ref 3.5–5.1)
PROT SERPL-MCNC: 7.2 G/DL (ref 6.4–8.2)
PROT UR STRIP-MCNC: NEGATIVE MG/DL
RBC # BLD AUTO: 4.93 M/UL (ref 3.8–5.2)
RBC #/AREA URNS HPF: NORMAL /HPF (ref 0–5)
RBC MORPH BLD: ABNORMAL
SERVICE CMNT-IMP: ABNORMAL
SODIUM BLD-SCNC: 138 MMOL/L (ref 136–145)
SODIUM SERPL-SCNC: 136 MMOL/L (ref 136–145)
SP GR UR REFRACTOMETRY: 1.01
SPECIMEN HOLD: NORMAL
SPECIMEN SITE: ABNORMAL
TROPONIN I SERPL HS-MCNC: 34 NG/L (ref 0–51)
URINE CULTURE IF INDICATED: NORMAL
UROBILINOGEN UR QL STRIP.AUTO: 0.2 EU/DL (ref 0.2–1)
WBC # BLD AUTO: 27.5 K/UL (ref 3.6–11)
WBC URNS QL MICRO: NORMAL /HPF (ref 0–4)

## 2024-07-22 PROCEDURE — 6360000002 HC RX W HCPCS: Performed by: INTERNAL MEDICINE

## 2024-07-22 PROCEDURE — 2580000003 HC RX 258: Performed by: INTERNAL MEDICINE

## 2024-07-22 PROCEDURE — 82803 BLOOD GASES ANY COMBINATION: CPT

## 2024-07-22 PROCEDURE — 82550 ASSAY OF CK (CPK): CPT

## 2024-07-22 PROCEDURE — 82947 ASSAY GLUCOSE BLOOD QUANT: CPT

## 2024-07-22 PROCEDURE — 99285 EMERGENCY DEPT VISIT HI MDM: CPT

## 2024-07-22 PROCEDURE — 83605 ASSAY OF LACTIC ACID: CPT

## 2024-07-22 PROCEDURE — 70450 CT HEAD/BRAIN W/O DYE: CPT

## 2024-07-22 PROCEDURE — 84484 ASSAY OF TROPONIN QUANT: CPT

## 2024-07-22 PROCEDURE — 96365 THER/PROPH/DIAG IV INF INIT: CPT

## 2024-07-22 PROCEDURE — 85025 COMPLETE CBC W/AUTO DIFF WBC: CPT

## 2024-07-22 PROCEDURE — 80053 COMPREHEN METABOLIC PANEL: CPT

## 2024-07-22 PROCEDURE — 71250 CT THORAX DX C-: CPT

## 2024-07-22 PROCEDURE — 6370000000 HC RX 637 (ALT 250 FOR IP): Performed by: INTERNAL MEDICINE

## 2024-07-22 PROCEDURE — 2000000000 HC ICU R&B

## 2024-07-22 PROCEDURE — 93005 ELECTROCARDIOGRAM TRACING: CPT

## 2024-07-22 PROCEDURE — 36415 COLL VENOUS BLD VENIPUNCTURE: CPT

## 2024-07-22 PROCEDURE — 94762 N-INVAS EAR/PLS OXIMTRY CONT: CPT

## 2024-07-22 PROCEDURE — 3E033XZ INTRODUCTION OF VASOPRESSOR INTO PERIPHERAL VEIN, PERCUTANEOUS APPROACH: ICD-10-PCS | Performed by: INTERNAL MEDICINE

## 2024-07-22 PROCEDURE — 84295 ASSAY OF SERUM SODIUM: CPT

## 2024-07-22 PROCEDURE — 72125 CT NECK SPINE W/O DYE: CPT

## 2024-07-22 PROCEDURE — 87040 BLOOD CULTURE FOR BACTERIA: CPT

## 2024-07-22 PROCEDURE — 82330 ASSAY OF CALCIUM: CPT

## 2024-07-22 PROCEDURE — 83880 ASSAY OF NATRIURETIC PEPTIDE: CPT

## 2024-07-22 PROCEDURE — 6360000002 HC RX W HCPCS

## 2024-07-22 PROCEDURE — 2580000003 HC RX 258

## 2024-07-22 PROCEDURE — 2700000000 HC OXYGEN THERAPY PER DAY

## 2024-07-22 PROCEDURE — 96361 HYDRATE IV INFUSION ADD-ON: CPT

## 2024-07-22 PROCEDURE — 82962 GLUCOSE BLOOD TEST: CPT

## 2024-07-22 PROCEDURE — 84132 ASSAY OF SERUM POTASSIUM: CPT

## 2024-07-22 PROCEDURE — 83735 ASSAY OF MAGNESIUM: CPT

## 2024-07-22 PROCEDURE — 2500000003 HC RX 250 WO HCPCS

## 2024-07-22 PROCEDURE — 81001 URINALYSIS AUTO W/SCOPE: CPT

## 2024-07-22 PROCEDURE — 6370000000 HC RX 637 (ALT 250 FOR IP): Performed by: NURSE PRACTITIONER

## 2024-07-22 RX ORDER — FLUTICASONE PROPIONATE AND SALMETEROL 100; 50 UG/1; UG/1
1 POWDER RESPIRATORY (INHALATION) EVERY 12 HOURS PRN
COMMUNITY

## 2024-07-22 RX ORDER — SODIUM CHLORIDE, SODIUM LACTATE, POTASSIUM CHLORIDE, AND CALCIUM CHLORIDE .6; .31; .03; .02 G/100ML; G/100ML; G/100ML; G/100ML
500 INJECTION, SOLUTION INTRAVENOUS ONCE
Status: COMPLETED | OUTPATIENT
Start: 2024-07-22 | End: 2024-07-22

## 2024-07-22 RX ORDER — INSULIN LISPRO 100 [IU]/ML
0-4 INJECTION, SOLUTION INTRAVENOUS; SUBCUTANEOUS
Status: DISCONTINUED | OUTPATIENT
Start: 2024-07-22 | End: 2024-07-22

## 2024-07-22 RX ORDER — ENOXAPARIN SODIUM 100 MG/ML
40 INJECTION SUBCUTANEOUS DAILY
Status: DISCONTINUED | OUTPATIENT
Start: 2024-07-22 | End: 2024-07-24

## 2024-07-22 RX ORDER — INSULIN LISPRO 100 [IU]/ML
0-4 INJECTION, SOLUTION INTRAVENOUS; SUBCUTANEOUS NIGHTLY
Status: DISCONTINUED | OUTPATIENT
Start: 2024-07-22 | End: 2024-07-22

## 2024-07-22 RX ORDER — ALBUTEROL SULFATE 2.5 MG/3ML
2.5 SOLUTION RESPIRATORY (INHALATION) EVERY 6 HOURS PRN
Status: DISCONTINUED | OUTPATIENT
Start: 2024-07-22 | End: 2024-07-26 | Stop reason: HOSPADM

## 2024-07-22 RX ORDER — ALBUTEROL SULFATE 90 UG/1
2 AEROSOL, METERED RESPIRATORY (INHALATION) EVERY 6 HOURS PRN
Status: DISCONTINUED | OUTPATIENT
Start: 2024-07-22 | End: 2024-07-22 | Stop reason: CLARIF

## 2024-07-22 RX ORDER — DICYCLOMINE HYDROCHLORIDE 10 MG/1
10 CAPSULE ORAL DAILY
COMMUNITY

## 2024-07-22 RX ORDER — SODIUM CHLORIDE, SODIUM LACTATE, POTASSIUM CHLORIDE, CALCIUM CHLORIDE 600; 310; 30; 20 MG/100ML; MG/100ML; MG/100ML; MG/100ML
INJECTION, SOLUTION INTRAVENOUS CONTINUOUS
Status: DISCONTINUED | OUTPATIENT
Start: 2024-07-22 | End: 2024-07-23

## 2024-07-22 RX ORDER — LEVOFLOXACIN 5 MG/ML
750 INJECTION, SOLUTION INTRAVENOUS ONCE
Status: COMPLETED | OUTPATIENT
Start: 2024-07-22 | End: 2024-07-22

## 2024-07-22 RX ORDER — SODIUM CHLORIDE, SODIUM LACTATE, POTASSIUM CHLORIDE, AND CALCIUM CHLORIDE .6; .31; .03; .02 G/100ML; G/100ML; G/100ML; G/100ML
1000 INJECTION, SOLUTION INTRAVENOUS
Status: COMPLETED | OUTPATIENT
Start: 2024-07-22 | End: 2024-07-22

## 2024-07-22 RX ORDER — ESCITALOPRAM OXALATE 10 MG/1
10 TABLET ORAL DAILY
COMMUNITY

## 2024-07-22 RX ORDER — POLYETHYLENE GLYCOL 3350 17 G/17G
17 POWDER, FOR SOLUTION ORAL DAILY PRN
Status: DISCONTINUED | OUTPATIENT
Start: 2024-07-22 | End: 2024-07-26 | Stop reason: HOSPADM

## 2024-07-22 RX ORDER — LOPERAMIDE HYDROCHLORIDE 2 MG/1
2 CAPSULE ORAL EVERY 6 HOURS PRN
COMMUNITY

## 2024-07-22 RX ORDER — TRAZODONE HYDROCHLORIDE 100 MG/1
100 TABLET ORAL NIGHTLY
Status: DISCONTINUED | OUTPATIENT
Start: 2024-07-22 | End: 2024-07-26 | Stop reason: HOSPADM

## 2024-07-22 RX ORDER — ACETAMINOPHEN 650 MG/1
650 SUPPOSITORY RECTAL EVERY 6 HOURS PRN
Status: DISCONTINUED | OUTPATIENT
Start: 2024-07-22 | End: 2024-07-26 | Stop reason: HOSPADM

## 2024-07-22 RX ORDER — LEVOFLOXACIN 5 MG/ML
750 INJECTION, SOLUTION INTRAVENOUS
Status: DISCONTINUED | OUTPATIENT
Start: 2024-07-24 | End: 2024-07-23 | Stop reason: ALTCHOICE

## 2024-07-22 RX ORDER — SODIUM CHLORIDE 9 MG/ML
INJECTION, SOLUTION INTRAVENOUS PRN
Status: DISCONTINUED | OUTPATIENT
Start: 2024-07-22 | End: 2024-07-26 | Stop reason: HOSPADM

## 2024-07-22 RX ORDER — ACETAMINOPHEN 325 MG/1
650 TABLET ORAL EVERY 6 HOURS PRN
Status: DISCONTINUED | OUTPATIENT
Start: 2024-07-22 | End: 2024-07-26 | Stop reason: HOSPADM

## 2024-07-22 RX ORDER — ASPIRIN 81 MG/1
81 TABLET ORAL DAILY
Status: DISCONTINUED | OUTPATIENT
Start: 2024-07-23 | End: 2024-07-26 | Stop reason: HOSPADM

## 2024-07-22 RX ORDER — SODIUM CHLORIDE 0.9 % (FLUSH) 0.9 %
5-40 SYRINGE (ML) INJECTION PRN
Status: DISCONTINUED | OUTPATIENT
Start: 2024-07-22 | End: 2024-07-26 | Stop reason: HOSPADM

## 2024-07-22 RX ORDER — SODIUM CHLORIDE 0.9 % (FLUSH) 0.9 %
5-40 SYRINGE (ML) INJECTION EVERY 12 HOURS SCHEDULED
Status: DISCONTINUED | OUTPATIENT
Start: 2024-07-22 | End: 2024-07-26 | Stop reason: HOSPADM

## 2024-07-22 RX ORDER — ONDANSETRON 2 MG/ML
4 INJECTION INTRAMUSCULAR; INTRAVENOUS EVERY 6 HOURS PRN
Status: DISCONTINUED | OUTPATIENT
Start: 2024-07-22 | End: 2024-07-26 | Stop reason: HOSPADM

## 2024-07-22 RX ORDER — ESOMEPRAZOLE MAGNESIUM 20 MG/1
20 TABLET, DELAYED RELEASE ORAL DAILY PRN
COMMUNITY

## 2024-07-22 RX ORDER — ONDANSETRON 4 MG/1
4 TABLET, ORALLY DISINTEGRATING ORAL EVERY 8 HOURS PRN
Status: DISCONTINUED | OUTPATIENT
Start: 2024-07-22 | End: 2024-07-26 | Stop reason: HOSPADM

## 2024-07-22 RX ORDER — ASPIRIN 81 MG/1
81 TABLET ORAL DAILY
COMMUNITY

## 2024-07-22 RX ORDER — INSULIN LISPRO 100 [IU]/ML
0-4 INJECTION, SOLUTION INTRAVENOUS; SUBCUTANEOUS NIGHTLY
Status: DISCONTINUED | OUTPATIENT
Start: 2024-07-22 | End: 2024-07-26 | Stop reason: HOSPADM

## 2024-07-22 RX ORDER — NOREPINEPHRINE BITARTRATE 0.06 MG/ML
1-100 INJECTION, SOLUTION INTRAVENOUS CONTINUOUS
Status: DISCONTINUED | OUTPATIENT
Start: 2024-07-22 | End: 2024-07-23

## 2024-07-22 RX ORDER — POTASSIUM CHLORIDE 7.45 MG/ML
10 INJECTION INTRAVENOUS PRN
Status: DISCONTINUED | OUTPATIENT
Start: 2024-07-22 | End: 2024-07-22

## 2024-07-22 RX ORDER — MAGNESIUM SULFATE IN WATER 40 MG/ML
2000 INJECTION, SOLUTION INTRAVENOUS PRN
Status: DISCONTINUED | OUTPATIENT
Start: 2024-07-22 | End: 2024-07-22

## 2024-07-22 RX ORDER — INSULIN LISPRO 100 [IU]/ML
0-8 INJECTION, SOLUTION INTRAVENOUS; SUBCUTANEOUS
Status: DISCONTINUED | OUTPATIENT
Start: 2024-07-23 | End: 2024-07-26 | Stop reason: HOSPADM

## 2024-07-22 RX ORDER — LEVETIRACETAM 500 MG/1
500 TABLET ORAL
Status: DISCONTINUED | OUTPATIENT
Start: 2024-07-23 | End: 2024-07-26 | Stop reason: HOSPADM

## 2024-07-22 RX ORDER — POTASSIUM CHLORIDE 29.8 MG/ML
20 INJECTION INTRAVENOUS PRN
Status: DISCONTINUED | OUTPATIENT
Start: 2024-07-22 | End: 2024-07-22

## 2024-07-22 RX ORDER — PREGABALIN 75 MG/1
75 CAPSULE ORAL 2 TIMES DAILY
Status: ON HOLD | COMMUNITY
End: 2024-07-26 | Stop reason: HOSPADM

## 2024-07-22 RX ADMIN — HYDROCORTISONE SODIUM SUCCINATE 50 MG: 100 INJECTION, POWDER, FOR SOLUTION INTRAMUSCULAR; INTRAVENOUS at 17:13

## 2024-07-22 RX ADMIN — SODIUM CHLORIDE, POTASSIUM CHLORIDE, SODIUM LACTATE AND CALCIUM CHLORIDE: 600; 310; 30; 20 INJECTION, SOLUTION INTRAVENOUS at 17:10

## 2024-07-22 RX ADMIN — LEVETIRACETAM 750 MG: 500 TABLET, FILM COATED ORAL at 22:33

## 2024-07-22 RX ADMIN — SODIUM CHLORIDE, POTASSIUM CHLORIDE, SODIUM LACTATE AND CALCIUM CHLORIDE: 600; 310; 30; 20 INJECTION, SOLUTION INTRAVENOUS at 22:44

## 2024-07-22 RX ADMIN — TRAZODONE HYDROCHLORIDE 100 MG: 100 TABLET ORAL at 22:33

## 2024-07-22 RX ADMIN — SODIUM CHLORIDE, POTASSIUM CHLORIDE, SODIUM LACTATE AND CALCIUM CHLORIDE 500 ML: 600; 310; 30; 20 INJECTION, SOLUTION INTRAVENOUS at 17:10

## 2024-07-22 RX ADMIN — SODIUM CHLORIDE, POTASSIUM CHLORIDE, SODIUM LACTATE AND CALCIUM CHLORIDE 1000 ML: 600; 310; 30; 20 INJECTION, SOLUTION INTRAVENOUS at 14:30

## 2024-07-22 RX ADMIN — SODIUM CHLORIDE, PRESERVATIVE FREE 10 ML: 5 INJECTION INTRAVENOUS at 21:00

## 2024-07-22 RX ADMIN — Medication 1 AMPULE: at 21:11

## 2024-07-22 RX ADMIN — ENOXAPARIN SODIUM 40 MG: 100 INJECTION SUBCUTANEOUS at 17:11

## 2024-07-22 RX ADMIN — LEVOFLOXACIN 750 MG: 750 INJECTION, SOLUTION INTRAVENOUS at 15:25

## 2024-07-22 RX ADMIN — SODIUM CHLORIDE, POTASSIUM CHLORIDE, SODIUM LACTATE AND CALCIUM CHLORIDE 1000 ML: 600; 310; 30; 20 INJECTION, SOLUTION INTRAVENOUS at 15:41

## 2024-07-22 RX ADMIN — SODIUM CHLORIDE 5 MCG/MIN: 9 INJECTION, SOLUTION INTRAVENOUS at 15:45

## 2024-07-22 RX ADMIN — HYDROCORTISONE SODIUM SUCCINATE 50 MG: 100 INJECTION, POWDER, FOR SOLUTION INTRAMUSCULAR; INTRAVENOUS at 22:35

## 2024-07-22 NOTE — PROGRESS NOTES
Pharmacy Medication Reconciliation     Recommendations/Findings:   The following amendments were made to the patient's active medication list on file at [facility]:   1) Additions:   - Dicyclomine     2) Deletions:   - Pepto Bismol  - Buspar  - Voltaren gel (only uses biofreeze)  - Kenalog ointment     3) Changes:   - Takes nexium prn (not scheduled)  - states she takes fluticasone-salmeterol PRN (not scheduled); last fill 11/20/23 for 90 day supply  - loperamide dose/frequency  - Biofreeze - used twice daily not PRN  - only takes metamucil PRN  - Lyrica is prescribed for her to take up to 3 tablets a day; pt only taking 1 tab BID      Pertinent Findings:   - Patient stated she stopped warfarin about 3 months ago (no recent fills in past several months); she stated cardiologist and hematologist took her off permanently and does not take any other blood thinner in its place.  She does take a baby ASA every day.      Clarified PTA med list with patient and RX query. PTA medication list was corrected to the following:     Prior to Admission Medications   Prescriptions Last Dose Informant   Biotin (BIOTIN 5000) 5 MG CAPS 7/21/2024    Sig: Take 1 capsule by mouth daily.   Esomeprazole Magnesium 20 MG TBEC     Sig: Take 20 mg by mouth daily as needed (heartburn)   MYRBETRIQ 25 MG TB24 7/21/2024    Sig: Take 1 tablet by mouth daily   Menthol, Topical Analgesic, (BIOFREEZE) 4 % GEL 7/21/2024    Sig: Apply 1 each topically in the morning and at bedtime   Metamucil Fiber CHEW     Sig: Take 1 Capful by mouth daily as needed   Polyethylene Glycol 400 (BLINK TEARS) 0.25 % SOLN     Sig: Apply 1-2 drops to eye as needed (dry eyes)   acetaminophen (TYLENOL) 325 MG tablet 7/21/2024    Sig: Take 1-2 tablets by mouth every 6 hours as needed for Pain   albuterol sulfate HFA (PROVENTIL;VENTOLIN;PROAIR) 108 (90 Base) MCG/ACT inhaler 7/21/2024    Sig: Inhale 2 puffs into the lungs every 6 hours as needed for Shortness of Breath or Wheezing

## 2024-07-22 NOTE — H&P
ICU ADMISSION H&P                                                                                                                  [facility]      Chief Complaint: Dizzy and slipped out of the bed last night.    HPI: The patient is a 83/F who we are seeing in consultation at the request of Dr. Ludwig for admission to ICU for hypotension and need for vasopressors.     Briefly, patient lives alone and fell off the bed last night. She has history of UTI in the past but she has not had any fever, chills or rigors. She denies any SOB or chest pain.     In the ED, she was found to be hypotensive, she received IVF and levophed was started for hypotension. ICU was consulted for admission.     On my arrival, son at bedside. Patient currently denies any symptoms but appears very dry on exam.     Review of Systems: All other systems have been reviewed and are negative except per HPI    Past Medical History:  Past Medical History:   Diagnosis Date    Anxiety     Aortic valve stenosis 07/2022    Arthritis     Asthma     Bronchitis     CAD (coronary artery disease)     stent    Chronic anticoagulation 11/23/2022    Chronic obstructive pulmonary disease (HCC)     Chronic pain     Depression with anxiety 11/23/2022    Diabetes (HCC)     GERD (gastroesophageal reflux disease)     H/O aortic valve replacement 07/2022    Hepatitis B     1960's    History of blood transfusion     06/22    History of DVT (deep vein thrombosis)     History of recurrent UTIs     Hx of seasonal allergies     Hypercholesterolemia     Irritable bowel syndrome (IBS)     Migraine     Noncompliance 01/18/2023    Oxygen dependent     PRN at 2LPM NC    Psychiatric disorder     anxiety    Thromboembolus (HCC)     left leg    Thyroid disease     pt denies- not tx for it    Urinary urgency        Past Surgical     Basophils Absolute 07/22/2024 0.0  0.0 - 0.1 K/UL Final    Immature Granulocytes Absolute 07/22/2024 0.3 (H)  0.00 - 0.04 K/UL Final    Differential Type 07/22/2024 SMEAR SCANNED    Final    RBC Comment 07/22/2024     Final                    Value:ANISOCYTOSIS  1+      RBC Comment 07/22/2024     Final                    Value:MICROCYTOSIS  1+      RBC Comment 07/22/2024     Final                    Value:HYPOCHROMIA  1+      Sodium 07/22/2024 136  136 - 145 mmol/L Final    Potassium 07/22/2024 4.3  3.5 - 5.1 mmol/L Final    Chloride 07/22/2024 104  97 - 108 mmol/L Final    CO2 07/22/2024 25  21 - 32 mmol/L Final    Anion Gap 07/22/2024 7  5 - 15 mmol/L Final    Glucose 07/22/2024 235 (H)  65 - 100 mg/dL Final    BUN 07/22/2024 22 (H)  6 - 20 MG/DL Final    Creatinine 07/22/2024 0.97  0.55 - 1.02 MG/DL Final    BUN/Creatinine Ratio 07/22/2024 23 (H)  12 - 20   Final    Est, Glom Filt Rate 07/22/2024 57 (L)  >60 ml/min/1.73m2 Final    Calcium 07/22/2024 8.8  8.5 - 10.1 MG/DL Final    Total Bilirubin 07/22/2024 0.7  0.2 - 1.0 MG/DL Final    ALT 07/22/2024 35  12 - 78 U/L Final    AST 07/22/2024 45 (H)  15 - 37 U/L Final    Alk Phosphatase 07/22/2024 75  45 - 117 U/L Final    Total Protein 07/22/2024 7.2  6.4 - 8.2 g/dL Final    Albumin 07/22/2024 3.3 (L)  3.5 - 5.0 g/dL Final    Globulin 07/22/2024 3.9  2.0 - 4.0 g/dL Final    Albumin/Globulin Ratio 07/22/2024 0.8 (L)  1.1 - 2.2   Final    Ventricular Rate 07/22/2024 110  BPM Preliminary    Atrial Rate 07/22/2024 110  BPM Preliminary    P-R Interval 07/22/2024 156  ms Preliminary    QRS Duration 07/22/2024 100  ms Preliminary    Q-T Interval 07/22/2024 344  ms Preliminary    QTc Calculation (Bazett) 07/22/2024 465  ms Preliminary    P Axis 07/22/2024 42  degrees Preliminary    R Axis 07/22/2024 68  degrees Preliminary    T Axis 07/22/2024 10  degrees Preliminary    Diagnosis 07/22/2024    Preliminary                    Value:Sinus tachycardia  Possible Left atrial

## 2024-07-22 NOTE — ED PROVIDER NOTES
[facility]  EMERGENCY DEPARTMENT ENCOUNTER    Patient Name: Adenike Dasilva  MRN: [id number]  YOB: 1940  Provider: Jeffrey Ludwig MD  PCP: Dav Alex MD    Time/Date of evaluation: 2:23 PM EDT on 7/22/24    History of Presenting Illness     Chief Complaint   Patient presents with    Fall    Dizziness     Dizzy and slipped out of bed last night, patient found at 1230 today by son, states she is nauseous and has neck pain, , takes ASA every day, no other thinners     History Provided by: Patient   History is limited by: Age    HISTORY (Narrative):   Adenike Dasilva is a 83 y.o. female with history listed below presenting to the ER after falling out of the bed last night found on the floor today.  Patient reports headache, nausea, neck pain as well as shortness of breath.  On arrival patient hypoxic hypotensive HPI limited secondary to acuity.      Nursing Notes were all reviewed and agreed with or any disagreements were addressed in the HPI.    Past History     PAST MEDICAL HISTORY:  Past Medical History:   Diagnosis Date    Anxiety     Aortic valve stenosis 07/2022    Arthritis     Asthma     Bronchitis     CAD (coronary artery disease)     stent    Chronic anticoagulation 11/23/2022    Chronic obstructive pulmonary disease (HCC)     Chronic pain     Depression with anxiety 11/23/2022    Diabetes (HCC)     GERD (gastroesophageal reflux disease)     H/O aortic valve replacement 07/2022    Hepatitis B     1960's    History of blood transfusion     06/22    History of DVT (deep vein thrombosis)     History of recurrent UTIs     Hx of seasonal allergies     Hypercholesterolemia     Irritable bowel syndrome (IBS)     Migraine     Noncompliance 01/18/2023    Oxygen dependent     PRN at 2LPM NC    Psychiatric disorder     anxiety    Thromboembolus (HCC)     left leg    Thyroid disease     pt denies- not tx for it    Urinary urgency        PAST SURGICAL HISTORY:  Past Surgical  capsule  Commonly known as: IMODIUM  Ask about: Which instructions should I use?     Metamucil Fiber Chew     metFORMIN 500 MG extended release tablet  Commonly known as: GLUCOPHAGE-XR  Take 1 tablet by mouth daily (with breakfast) Indications: Type 2 Diabetes     metoprolol succinate 50 MG extended release tablet  Commonly known as: TOPROL XL  TAKE 1 TABLET BY MOUTH EVERY DAY     Myrbetriq 25 MG Tb24  Generic drug: mirabegron     ondansetron 4 MG disintegrating tablet  Commonly known as: ZOFRAN-ODT  Take 2 tablets by mouth every 8 hours as needed for Nausea     pregabalin 75 MG capsule  Commonly known as: LYRICA  Ask about: Which instructions should I use?     traMADol 50 MG tablet  Commonly known as: Ultram  Take 1 tablet by mouth every 6 hours as needed for Pain for up to 30 days. Max Daily Amount: 200 mg     traZODone 100 MG tablet  Commonly known as: DESYREL  Take 1 tablet by mouth nightly           * This list has 2 medication(s) that are the same as other medications prescribed for you. Read the directions carefully, and ask your doctor or other care provider to review them with you.                  DISCONTINUED MEDICATIONS:    Current Discharge Medication List          (Please note that parts of this dictation were completed with voice recognition software. Quite often unanticipated grammatical, syntax, homophones, and other interpretive errors are inadvertently transcribed by the computer software. Please disregards these errors. Please excuse any errors that have escaped final proofreading.)    I am the Primary Clinician of Record.   MD Oziel Yañez Zachary A, MD  07/25/24 [id number]

## 2024-07-23 ENCOUNTER — TELEPHONE (OUTPATIENT)
Age: 84
End: 2024-07-23

## 2024-07-23 LAB
ANION GAP SERPL CALC-SCNC: 4 MMOL/L (ref 5–15)
BASOPHILS # BLD: 0 K/UL (ref 0–0.1)
BASOPHILS NFR BLD: 0 % (ref 0–1)
BUN SERPL-MCNC: 14 MG/DL (ref 6–20)
BUN/CREAT SERPL: 21 (ref 12–20)
CALCIUM SERPL-MCNC: 8.3 MG/DL (ref 8.5–10.1)
CHLORIDE SERPL-SCNC: 110 MMOL/L (ref 97–108)
CO2 SERPL-SCNC: 26 MMOL/L (ref 21–32)
CREAT SERPL-MCNC: 0.67 MG/DL (ref 0.55–1.02)
DIFFERENTIAL METHOD BLD: ABNORMAL
EKG ATRIAL RATE: 110 BPM
EKG DIAGNOSIS: NORMAL
EKG P AXIS: 42 DEGREES
EKG P-R INTERVAL: 156 MS
EKG Q-T INTERVAL: 344 MS
EKG QRS DURATION: 100 MS
EKG QTC CALCULATION (BAZETT): 465 MS
EKG R AXIS: 68 DEGREES
EKG T AXIS: 10 DEGREES
EKG VENTRICULAR RATE: 110 BPM
EOSINOPHIL # BLD: 0 K/UL (ref 0–0.4)
EOSINOPHIL NFR BLD: 0 % (ref 0–7)
ERYTHROCYTE [DISTWIDTH] IN BLOOD BY AUTOMATED COUNT: 17.2 % (ref 11.5–14.5)
GLUCOSE BLD STRIP.AUTO-MCNC: 190 MG/DL (ref 65–117)
GLUCOSE BLD STRIP.AUTO-MCNC: 214 MG/DL (ref 65–117)
GLUCOSE BLD STRIP.AUTO-MCNC: 244 MG/DL (ref 65–117)
GLUCOSE BLD STRIP.AUTO-MCNC: 247 MG/DL (ref 65–117)
GLUCOSE SERPL-MCNC: 212 MG/DL (ref 65–100)
HCT VFR BLD AUTO: 30 % (ref 35–47)
HGB BLD-MCNC: 8.5 G/DL (ref 11.5–16)
IMM GRANULOCYTES # BLD AUTO: 0.1 K/UL (ref 0–0.04)
IMM GRANULOCYTES NFR BLD AUTO: 1 % (ref 0–0.5)
LACTATE SERPL-SCNC: 1.3 MMOL/L (ref 0.4–2)
LYMPHOCYTES # BLD: 0.7 K/UL (ref 0.8–3.5)
LYMPHOCYTES NFR BLD: 5 % (ref 12–49)
MAGNESIUM SERPL-MCNC: 1.8 MG/DL (ref 1.6–2.4)
MCH RBC QN AUTO: 21.4 PG (ref 26–34)
MCHC RBC AUTO-ENTMCNC: 28.3 G/DL (ref 30–36.5)
MCV RBC AUTO: 75.6 FL (ref 80–99)
MONOCYTES # BLD: 0.4 K/UL (ref 0–1)
MONOCYTES NFR BLD: 3 % (ref 5–13)
NEUTS SEG # BLD: 13.2 K/UL (ref 1.8–8)
NEUTS SEG NFR BLD: 91 % (ref 32–75)
NRBC # BLD: 0 K/UL (ref 0–0.01)
NRBC BLD-RTO: 0 PER 100 WBC
PHOSPHATE SERPL-MCNC: 2.9 MG/DL (ref 2.6–4.7)
PLATELET # BLD AUTO: 111 K/UL (ref 150–400)
PMV BLD AUTO: 10.9 FL (ref 8.9–12.9)
POTASSIUM SERPL-SCNC: 4 MMOL/L (ref 3.5–5.1)
PROCALCITONIN SERPL-MCNC: 1.19 NG/ML
RBC # BLD AUTO: 3.97 M/UL (ref 3.8–5.2)
RBC MORPH BLD: ABNORMAL
SERVICE CMNT-IMP: ABNORMAL
SODIUM SERPL-SCNC: 140 MMOL/L (ref 136–145)
WBC # BLD AUTO: 14.4 K/UL (ref 3.6–11)

## 2024-07-23 PROCEDURE — 36415 COLL VENOUS BLD VENIPUNCTURE: CPT

## 2024-07-23 PROCEDURE — 83735 ASSAY OF MAGNESIUM: CPT

## 2024-07-23 PROCEDURE — 84100 ASSAY OF PHOSPHORUS: CPT

## 2024-07-23 PROCEDURE — 6370000000 HC RX 637 (ALT 250 FOR IP): Performed by: HOSPITALIST

## 2024-07-23 PROCEDURE — 6360000002 HC RX W HCPCS: Performed by: INTERNAL MEDICINE

## 2024-07-23 PROCEDURE — 2060000000 HC ICU INTERMEDIATE R&B

## 2024-07-23 PROCEDURE — 85025 COMPLETE CBC W/AUTO DIFF WBC: CPT

## 2024-07-23 PROCEDURE — 6370000000 HC RX 637 (ALT 250 FOR IP): Performed by: NURSE PRACTITIONER

## 2024-07-23 PROCEDURE — 2580000003 HC RX 258: Performed by: INTERNAL MEDICINE

## 2024-07-23 PROCEDURE — 6360000002 HC RX W HCPCS: Performed by: HOSPITALIST

## 2024-07-23 PROCEDURE — 6370000000 HC RX 637 (ALT 250 FOR IP): Performed by: INTERNAL MEDICINE

## 2024-07-23 PROCEDURE — 2700000000 HC OXYGEN THERAPY PER DAY

## 2024-07-23 PROCEDURE — 84145 PROCALCITONIN (PCT): CPT

## 2024-07-23 PROCEDURE — 82962 GLUCOSE BLOOD TEST: CPT

## 2024-07-23 PROCEDURE — 80048 BASIC METABOLIC PNL TOTAL CA: CPT

## 2024-07-23 PROCEDURE — 94640 AIRWAY INHALATION TREATMENT: CPT

## 2024-07-23 PROCEDURE — 83605 ASSAY OF LACTIC ACID: CPT

## 2024-07-23 RX ORDER — PREDNISONE 20 MG/1
20 TABLET ORAL 2 TIMES DAILY
Status: DISCONTINUED | OUTPATIENT
Start: 2024-07-23 | End: 2024-07-26 | Stop reason: HOSPADM

## 2024-07-23 RX ORDER — AMITRIPTYLINE HYDROCHLORIDE 10 MG/1
10 TABLET, FILM COATED ORAL NIGHTLY
Status: DISCONTINUED | OUTPATIENT
Start: 2024-07-23 | End: 2024-07-26 | Stop reason: HOSPADM

## 2024-07-23 RX ORDER — DEXTROSE MONOHYDRATE 100 MG/ML
INJECTION, SOLUTION INTRAVENOUS CONTINUOUS PRN
Status: DISCONTINUED | OUTPATIENT
Start: 2024-07-23 | End: 2024-07-26 | Stop reason: HOSPADM

## 2024-07-23 RX ORDER — DOXYCYCLINE HYCLATE 100 MG
100 TABLET ORAL EVERY 12 HOURS SCHEDULED
Status: DISCONTINUED | OUTPATIENT
Start: 2024-07-23 | End: 2024-07-26 | Stop reason: HOSPADM

## 2024-07-23 RX ORDER — GLUCAGON 1 MG/ML
1 KIT INJECTION PRN
Status: DISCONTINUED | OUTPATIENT
Start: 2024-07-23 | End: 2024-07-26 | Stop reason: HOSPADM

## 2024-07-23 RX ORDER — ESCITALOPRAM OXALATE 10 MG/1
10 TABLET ORAL DAILY
Status: DISCONTINUED | OUTPATIENT
Start: 2024-07-23 | End: 2024-07-26 | Stop reason: HOSPADM

## 2024-07-23 RX ORDER — DIPHENHYDRAMINE HCL 25 MG
25 CAPSULE ORAL ONCE
Status: COMPLETED | OUTPATIENT
Start: 2024-07-23 | End: 2024-07-23

## 2024-07-23 RX ORDER — IPRATROPIUM BROMIDE AND ALBUTEROL SULFATE 2.5; .5 MG/3ML; MG/3ML
1 SOLUTION RESPIRATORY (INHALATION)
Status: DISCONTINUED | OUTPATIENT
Start: 2024-07-23 | End: 2024-07-24

## 2024-07-23 RX ADMIN — AMITRIPTYLINE HYDROCHLORIDE 10 MG: 10 TABLET, FILM COATED ORAL at 21:01

## 2024-07-23 RX ADMIN — PREDNISONE 20 MG: 20 TABLET ORAL at 11:22

## 2024-07-23 RX ADMIN — INSULIN LISPRO 2 UNITS: 100 INJECTION, SOLUTION INTRAVENOUS; SUBCUTANEOUS at 11:59

## 2024-07-23 RX ADMIN — HYDROCORTISONE SODIUM SUCCINATE 50 MG: 100 INJECTION, POWDER, FOR SOLUTION INTRAMUSCULAR; INTRAVENOUS at 04:16

## 2024-07-23 RX ADMIN — SODIUM CHLORIDE, PRESERVATIVE FREE 10 ML: 5 INJECTION INTRAVENOUS at 09:11

## 2024-07-23 RX ADMIN — SODIUM CHLORIDE, POTASSIUM CHLORIDE, SODIUM LACTATE AND CALCIUM CHLORIDE: 600; 310; 30; 20 INJECTION, SOLUTION INTRAVENOUS at 08:39

## 2024-07-23 RX ADMIN — Medication 1 LOZENGE: at 01:06

## 2024-07-23 RX ADMIN — INSULIN LISPRO 2 UNITS: 100 INJECTION, SOLUTION INTRAVENOUS; SUBCUTANEOUS at 17:59

## 2024-07-23 RX ADMIN — LEVETIRACETAM 750 MG: 500 TABLET, FILM COATED ORAL at 17:57

## 2024-07-23 RX ADMIN — Medication 1 AMPULE: at 09:11

## 2024-07-23 RX ADMIN — IPRATROPIUM BROMIDE AND ALBUTEROL SULFATE 1 DOSE: .5; 3 SOLUTION RESPIRATORY (INHALATION) at 11:22

## 2024-07-23 RX ADMIN — ARFORMOTEROL TARTRATE: 15 SOLUTION RESPIRATORY (INHALATION) at 11:23

## 2024-07-23 RX ADMIN — DOXYCYCLINE HYCLATE 100 MG: 100 TABLET, COATED ORAL at 11:49

## 2024-07-23 RX ADMIN — IPRATROPIUM BROMIDE AND ALBUTEROL SULFATE 1 DOSE: .5; 3 SOLUTION RESPIRATORY (INHALATION) at 20:00

## 2024-07-23 RX ADMIN — DIPHENHYDRAMINE HYDROCHLORIDE 25 MG: 25 CAPSULE ORAL at 16:50

## 2024-07-23 RX ADMIN — DOXYCYCLINE HYCLATE 100 MG: 100 TABLET, COATED ORAL at 20:58

## 2024-07-23 RX ADMIN — LEVETIRACETAM 500 MG: 500 TABLET, FILM COATED ORAL at 09:07

## 2024-07-23 RX ADMIN — SODIUM CHLORIDE, PRESERVATIVE FREE 10 ML: 5 INJECTION INTRAVENOUS at 20:58

## 2024-07-23 RX ADMIN — ARFORMOTEROL TARTRATE: 15 SOLUTION RESPIRATORY (INHALATION) at 20:00

## 2024-07-23 RX ADMIN — ASPIRIN 81 MG: 81 TABLET, COATED ORAL at 09:06

## 2024-07-23 RX ADMIN — ENOXAPARIN SODIUM 40 MG: 100 INJECTION SUBCUTANEOUS at 09:09

## 2024-07-23 RX ADMIN — ESCITALOPRAM OXALATE 10 MG: 10 TABLET ORAL at 16:50

## 2024-07-23 RX ADMIN — Medication 1 AMPULE: at 20:57

## 2024-07-23 RX ADMIN — IPRATROPIUM BROMIDE AND ALBUTEROL SULFATE 1 DOSE: .5; 3 SOLUTION RESPIRATORY (INHALATION) at 16:12

## 2024-07-23 RX ADMIN — PREDNISONE 20 MG: 20 TABLET ORAL at 21:02

## 2024-07-23 RX ADMIN — ACETAMINOPHEN 650 MG: 325 TABLET ORAL at 20:59

## 2024-07-23 ASSESSMENT — PAIN DESCRIPTION - LOCATION
LOCATION: HEAD
LOCATION: HEAD

## 2024-07-23 ASSESSMENT — PAIN SCALES - GENERAL
PAINLEVEL_OUTOF10: 0
PAINLEVEL_OUTOF10: 3
PAINLEVEL_OUTOF10: 0
PAINLEVEL_OUTOF10: 3
PAINLEVEL_OUTOF10: 0
PAINLEVEL_OUTOF10: 0

## 2024-07-23 ASSESSMENT — PAIN - FUNCTIONAL ASSESSMENT: PAIN_FUNCTIONAL_ASSESSMENT: ACTIVITIES ARE NOT PREVENTED

## 2024-07-23 ASSESSMENT — PAIN DESCRIPTION - ORIENTATION
ORIENTATION: MID
ORIENTATION: MID

## 2024-07-23 ASSESSMENT — PAIN DESCRIPTION - DESCRIPTORS
DESCRIPTORS: ACHING
DESCRIPTORS: ACHING

## 2024-07-23 NOTE — PROGRESS NOTES
[facility] PROGRESS NOTE.      Name: Adenike Dasilva   : 1940   MRN: [id number]   Date: 2024      Chief Complaint   Patient presents with    Fall    Dizziness     Dizzy and slipped out of bed last night, patient found at 1230 today by son, states she is nauseous and has neck pain, , takes ASA every day, no other thinners       Reason for ICU admission:   Septic shock  Pneumonia     Hospital Course:     24 - Patient admitted in ICU for septic shock 2/2 Pneumonia and chest imaging has shown bilateral cystic lung disease with reticulations vs. Emphysema with suspected left sided pneumonia. Started on Levofloxacin for multiple drug allergies.     24 - patient is off the pressors and MAP above 65. Afebrile, no leukocytosis and looks in no distress. Able to eat her breakfast. Cultures reviewed, NGTD. Levofloxacin changed to doxycycline    Assessment & Plan     # Septic shock, off levophed within 24 hours of admission after fluid resuscitation  # Left sided Pneumonia  # Emphysematous lung disease   # Acute respiratory failure with hypoxemia, 2/2 PNA and underlying emphysematous disease     Plan  - Change Levo to doxycycline   - LR at 100 ml per hour for next 10 hours and assess daily for volume needs   - wean hydrocortisone to PO prednisone for total 5 days   - Duoneb nebs q4  - OK to transfer to floor     Code status: DNR  Care Plan discussed with: Patient/Family and Nurse      CRITICAL CARE CONSULTANT NOTE  I had a face to face encounter with the patient, reviewed and interpreted patient data including clinical events, labs, images, vital signs, I/O's, and examined patient.  I have discussed the case and the plan and management of the patient's care with the consulting services, the bedside nurses and the respiratory therapist.      NOTE OF PERSONAL INVOLVEMENT IN CARE   This patient has a high probability of imminent, clinically significant deterioration, which requires the  highest level of preparedness to intervene urgently. I participated in the decision-making and personally managed or directed the management of the following life and organ supporting interventions that required my frequent assessment to treat or prevent imminent deterioration.    Gerry Tesfaye MD   [facility]  [phone]  2024      Review of systems:     Review of Systems   Negative except above     Objective:     Vital Signs:  /64   Pulse 91   Temp 97.8 °F (36.6 °C) (Oral)   Resp 22   Ht 1.524 m (5')   Wt 69.7 kg (153 lb 10.6 oz)   SpO2 94%   BMI 30.01 kg/m²      Temp (24hrs), Av.9 °F (36.6 °C), Min:97.6 °F (36.4 °C), Max:98.4 °F (36.9 °C)           Intake/Output:     Intake/Output Summary (Last 24 hours) at 2024 0905  Last data filed at 2024 0700  Gross per 24 hour   Intake 3984.62 ml   Output 600 ml   Net 3384.62 ml       Physical Exam  Constitutional:       General: She is not in acute distress.     Appearance: She is not toxic-appearing.   HENT:      Mouth/Throat:      Mouth: Mucous membranes are dry.   Cardiovascular:      Rate and Rhythm: Tachycardia present.   Pulmonary:      Breath sounds: Wheezing present.   Musculoskeletal:      Right lower leg: No edema.      Left lower leg: No edema.   Neurological:      Mental Status: She is oriented to person, place, and time.           Past Medical History:      has a past medical history of Anxiety, Aortic valve stenosis, Arthritis, Asthma, Bronchitis, CAD (coronary artery disease), Chronic anticoagulation, Chronic obstructive pulmonary disease (HCC), Chronic pain, Depression with anxiety, Diabetes (HCC), GERD (gastroesophageal reflux disease), H/O aortic valve replacement, Hepatitis B, History of blood transfusion, History of DVT (deep vein thrombosis), History of recurrent UTIs, Hx of seasonal allergies, Hypercholesterolemia, Irritable bowel syndrome (IBS), Migraine, Noncompliance, Oxygen dependent, Psychiatric

## 2024-07-23 NOTE — PROGRESS NOTES
Attended Interdisciplinary rounds in CCU; patient care was discussed.    GURU Paniagua, BCC, Staff [facility] Paging Service  [phone])

## 2024-07-23 NOTE — PROGRESS NOTES
Interdisciplinary team rounds were held 7/23/2024 with the following team members: Physician, Nursing, Dietitian, Pharmacist, , , and the CCU Charge RN.    Plan of care discussed. See clinical pathway and/or care plan for interventions and desired outcomes.

## 2024-07-23 NOTE — PROGRESS NOTES
0700: Bedside shift report received from JUDE Pineda    0800: Shift assessment completed. Pt AxOx4, follows commands. On 8L midflow, diminished breath sounds. Active bowel sounds, weak pedal pulses. Redness LLE big toes, ecchymosis in ll extremities. See flow sheet/MAR for details.    1000: IDR completed. PT goals  -Start home meds  -Transfer  -End time LR 10hrs.  -Abx    1200: Reassessment completed. No changes to previous assessment. See flow sheet/MAR for details.    1600: Reassessment completed. Pt anxious, shaking. Asking for something to calm her down. Dr Garrett notified. katerine Leach ordered for effects of steroids. No changes to other previous assessment. See flow sheet/MAR for details.    1650: Benadryl, lexapro given.    1900: Bedside shift report given to JUDE Pineda

## 2024-07-23 NOTE — ADT AUTH CERT
smoking history. She has never used smokeless tobacco. She reports that she does not drink alcohol and does not use drugs.      Family History:  Family History         Family History   Problem Relation Age of Onset    Diabetes Mother      Stroke Mother              Allergies:        Allergies   Allergen Reactions    Bee Venom Shortness Of Breath               Iodinated Contrast Media Anaphylaxis and Other (See Comments)       Passes out    Penicillins Anaphylaxis and Shortness Of Breath       Other reaction(s): anaphylaxis/angioedema          Sulfa Antibiotics Anaphylaxis, Shortness Of Breath and Rash       Other reaction(s): anaphylaxis/angioedema          Omeprazole Dizziness or Vertigo and Other (See Comments)    Ranitidine Hcl Nausea Only    Iodine      Montelukast Hallucinations               Seasonal      Tree Extract Itching       Cat dander, grass          Cefdinir Rash       Other reaction(s): mild rash/itching          Codeine Itching       Other reaction(s): other/intolerance  Dizziness. Pt states they take Benadryl to offset the reaction.    Famotidine Nausea And Vomiting    Jardiance [Empagliflozin] Rash    Morphine Itching and Other (See Comments)       Agitation,hallucinations            Medications:   Current Facility-Administered Medications             Current Facility-Administered Medications   Medication Dose Route Frequency Provider Last Rate Last Admin    levoFLOXacin (LEVAQUIN) 750 MG/150ML infusion 750 mg  750 mg IntraVENous Once Jeffrey Ludwig  mL/hr at 07/22/24 1525 750 mg at 07/22/24 1525    norepinephrine (LEVOPHED) 16 mg in sodium chloride 0.9 % 250 mL infusion  1-100 mcg/min IntraVENous Continuous Jeffrey Ludwig MD 4.7 mL/hr at 07/22/24 1545 5 mcg/min at 07/22/24 1545    lactated ringers bolus 1,000 mL  1,000 mL IntraVENous NOW Jeffrey Ludwig .9 mL/hr at 07/22/24 1541 1,000 mL at 07/22/24 1541             Current Outpatient Medications   Medication Sig Dispense     P-R Interval 07/22/2024 156  ms Preliminary    QRS Duration 07/22/2024 100  ms Preliminary    Q-T Interval 07/22/2024 344  ms Preliminary    QTc Calculation (Bazett) 07/22/2024 465  ms Preliminary    P Axis 07/22/2024 42  degrees Preliminary    R Axis 07/22/2024 68  degrees Preliminary    T Axis 07/22/2024 10  degrees Preliminary    Diagnosis 07/22/2024     Preliminary                    Value:Sinus tachycardia  Possible Left atrial enlargement  Incomplete right bundle branch block  When compared with ECG of 22-JUL-2022 10:03,  Incomplete right bundle branch block is now present       Total CK 07/22/2024 100  26 - 192 U/L Final    Troponin, High Sensitivity 07/22/2024 34  0 - 51 ng/L Final    PH, VENOUS (POC) 07/22/2024 7.29 (L)  7.32 - 7.42   Final    PCO2, [place], POC 07/22/2024 51.6 (H)  41 - 51 MMHG Final    PO2, VENOUS (POC) 07/22/2024 <27  25 - 40 mmHg Final    HCO3, Arterial 07/22/2024 25  mmol/L Final    Base Deficit (POC) 07/22/2024 2.2  mmol/L Final    POC Sodium 07/22/2024 138  136 - 145 MMOL/L Final    POC Potassium 07/22/2024 4.4  3.5 - 5.5 MMOL/L Final    POC Chloride 07/22/2024 102  100 - 108 MMOL/L Final    POC TCO2 07/22/2024 25 (H)  19 - 24 MMOL/L Final    Anion Gap, POC 07/22/2024 11  10 - 20   Final    POC Glucose 07/22/2024 250 (H)  74 - 99 MG/DL Final    POC Creatinine 07/22/2024 0.8  0.6 - 1.3 MG/DL Final    eGFR, POC 07/22/2024 72  >60 ml/min/1.73m2 Final    POC Ionized Calcium 07/22/2024 1.24  1.12 - 1.32 mmol/L Final    POC Lactic Acid 07/22/2024 2.21 (HH)  0.40 - 2.00 mmol/L Final    Source 07/22/2024 VENOUS BLOOD    Final    Critical Value Read Back 07/22/2024 MARNIE    Final    Specimen HOld 07/22/2024 PHIL,RED    Final    Comment: 07/22/2024 Add-on orders for these samples will be processed based on acceptable specimen integrity and analyte stability, which may vary by analyte.    Final            Imaging:     CT CHEST WO CONTRAST   Final Result       1. Severe interstitial lung disease

## 2024-07-23 NOTE — HOME HEALTH
The Pt called the PT and left a message that she was in the hospital after she slipped off the edge of the bed last night and she has been transferred to ICU for pneumonia, UTI septic shock and dangerously low BP.

## 2024-07-23 NOTE — PROGRESS NOTES
1930- Report received from ED RN on patient coming to room 2514 at this time.    1947- Patient transferred into room 2514 at this time. Admission assessment completed. Admission database completed. Dual skin assessment completed. Patient in bed, resting comfortably and no complaints of pain. On levophed @ 5 mcg/min. See MAR and flowsheets for details.     2146- Levophed stopped at this time. See MAR for details.    2345- Reassessment completed. See flowsheets for details.    0400- Reassessment completed. See flowsheets for details.    0710- Bedside and Verbal shift change report given to JUDE Back (oncoming nurse) by JUDE Pineda (offgoing nurse). Report included the following information Nurse Handoff Report, Intake/Output, MAR, Recent Results, and Cardiac Rhythm NSR .

## 2024-07-23 NOTE — PROGRESS NOTES
-- -- --   07/23/24 0845 -- -- -- -- -- 94 % -- --   07/23/24 0830 122/64 -- -- 91 22 93 % -- --   07/23/24 0800 (!) 111/59 97.8 °F (36.6 °C) Oral 72 17 98 % -- --   07/23/24 0700 (!) 112/58 -- -- 73 16 98 % -- --   07/23/24 0600 (!) 117/58 -- -- 75 18 98 % -- --   07/23/24 0500 128/61 -- -- 82 17 98 % -- --   07/23/24 0400 113/64 97.6 °F (36.4 °C) Axillary 86 19 96 % -- --   07/23/24 0300 120/61 -- -- 87 (!) 35 97 % -- --   07/23/24 0200 (!) 120/57 -- -- 92 29 95 % -- --   07/23/24 0130 (!) 112/55 -- -- 91 18 96 % -- --   07/23/24 0100 115/67 -- -- 93 19 96 % -- --   07/23/24 0045 111/66 -- -- 93 21 96 % -- --   07/23/24 0030 121/61 -- -- 93 27 94 % -- --   07/23/24 0015 115/62 -- -- 93 20 95 % -- --   07/23/24 0000 132/60 -- -- 97 21 95 % -- --   07/22/24 2345 131/71 98 °F (36.7 °C) Oral 97 26 94 % -- --   07/22/24 2330 119/60 -- -- 92 20 94 % -- --   07/22/24 2315 106/61 -- -- 87 22 95 % -- --   07/22/24 2300 (!) 111/58 -- -- 88 22 95 % -- --   07/22/24 2245 (!) 111/92 -- -- 94 18 95 % -- --   07/22/24 2230 112/67 -- -- 94 18 96 % -- --   07/22/24 2215 (!) 111/58 -- -- 91 22 96 % -- --   07/22/24 2200 96/85 -- -- 90 23 96 % -- --   07/22/24 2125 119/64 -- -- 92 21 96 % -- --   07/22/24 2100 111/68 -- -- 94 27 93 % -- --   07/22/24 2058 -- -- -- 94 22 95 % -- --   07/22/24 2045 101/63 -- -- (!) 115 26 91 % -- --   07/22/24 2030 108/67 -- -- 96 22 90 % -- --   07/22/24 2015 102/67 -- -- 98 27 90 % 1.524 m (5') 69.7 kg (153 lb 10.6 oz)   07/22/24 2000 106/75 -- -- (!) 103 23 90 % -- --   07/22/24 1947 131/78 97.8 °F (36.6 °C) Oral (!) 101 27 92 % -- --   07/22/24 1845 101/71 -- -- 95 21 -- -- --   07/22/24 1830 95/84 -- -- 95 18 -- -- --   07/22/24 1815 (!) 120/51 -- -- 93 23 96 % -- --   07/22/24 1805 95/83 -- -- 95 22 95 % -- --   07/22/24 1715 (!) 91/59 -- -- 94 17 94 % -- --   07/22/24 1700 (!) 80/52 -- -- 98 30 95 % -- --   07/22/24 1650 101/72 -- -- 97 24 -- -- --   07/22/24 1625 (!) 100/43 -- -- 96 21 96 % --  --   07/22/24 1615 (!) 113/91 -- -- 98 28 95 % -- --   07/22/24 1610 113/65 -- -- 96 24 97 % -- --   07/22/24 1608 103/65 -- -- 97 22 98 % -- --   07/22/24 1555 (!) 97/58 -- -- 97 22 98 % -- --   07/22/24 1550 (!) 85/73 -- -- 98 23 98 % -- --   07/22/24 1545 (!) 90/53 -- -- 99 22 98 % -- --   07/22/24 1540 (!) 83/50 -- -- 99 25 -- -- --   07/22/24 1535 (!) 87/47 -- -- 99 21 95 % -- --   07/22/24 1530 (!) 75/59 -- -- (!) 101 25 97 % -- --   07/22/24 1525 (!) 92/55 -- -- (!) 101 23 96 % -- --   07/22/24 1520 (!) 102/58 -- -- (!) 101 24 96 % -- --   07/22/24 1515 (!) 92/58 -- -- 100 28 95 % -- --   07/22/24 1510 (!) 83/53 -- -- (!) 103 29 96 % -- --   07/22/24 1506 -- -- -- -- -- -- -- 65 kg (143 lb 4.8 oz)   07/22/24 1505 90/73 98.4 °F (36.9 °C) Oral (!) 101 24 96 % -- --   07/22/24 1504 -- -- -- (!) 102 23 95 % -- --   07/22/24 1503 (!) 87/54 -- -- (!) 104 24 -- -- --   07/22/24 1500 (!) 79/48 -- -- (!) 103 23 96 % -- --   07/22/24 1455 (!) 83/40 -- -- (!) 102 19 96 % -- --   07/22/24 1450 -- -- -- (!) 103 19 94 % -- --   07/22/24 1445 (!) 80/39 -- -- (!) 106 23 96 % -- --         Intake/Output Summary (Last 24 hours) at 7/23/2024 1442  Last data filed at 7/23/2024 1200  Gross per 24 hour   Intake 3984.62 ml   Output 900 ml   Net 3084.62 ml        I had a face to face encounter and independently examined this patient on 7/23/2024, as outlined below:  PHYSICAL EXAM:  General: Alert, cooperative  EENT:  EOMI. Anicteric sclerae.  Resp:  CTA bilaterally, no wheezing or rales.  No accessory muscle use  CV:  Regular  rhythm,  No edema  GI:  Soft, Non distended, Non tender.  +Bowel sounds  Neurologic:  Alert and oriented X 3, normal speech,   Psych:   Good insight. Not anxious nor agitated  Skin:  No rashes.  No jaundice    Reviewed most current lab test results and cultures  YES  Reviewed most current radiology test results   YES  Review and summation of old records today    NO  Reviewed patient's current orders and MAR

## 2024-07-24 LAB
BASOPHILS # BLD: 0 K/UL (ref 0–0.1)
BASOPHILS NFR BLD: 0 % (ref 0–1)
DIFFERENTIAL METHOD BLD: ABNORMAL
EOSINOPHIL # BLD: 0 K/UL (ref 0–0.4)
EOSINOPHIL NFR BLD: 0 % (ref 0–7)
ERYTHROCYTE [DISTWIDTH] IN BLOOD BY AUTOMATED COUNT: 17.9 % (ref 11.5–14.5)
GLUCOSE BLD STRIP.AUTO-MCNC: 232 MG/DL (ref 65–117)
GLUCOSE BLD STRIP.AUTO-MCNC: 233 MG/DL (ref 65–117)
GLUCOSE BLD STRIP.AUTO-MCNC: 257 MG/DL (ref 65–117)
HCT VFR BLD AUTO: 34 % (ref 35–47)
HGB BLD-MCNC: 9.8 G/DL (ref 11.5–16)
IMM GRANULOCYTES # BLD AUTO: 0.1 K/UL (ref 0–0.04)
IMM GRANULOCYTES NFR BLD AUTO: 1 % (ref 0–0.5)
LYMPHOCYTES # BLD: 0.5 K/UL (ref 0.8–3.5)
LYMPHOCYTES NFR BLD: 4 % (ref 12–49)
MCH RBC QN AUTO: 21.5 PG (ref 26–34)
MCHC RBC AUTO-ENTMCNC: 28.8 G/DL (ref 30–36.5)
MCV RBC AUTO: 74.7 FL (ref 80–99)
MONOCYTES # BLD: 0.4 K/UL (ref 0–1)
MONOCYTES NFR BLD: 3 % (ref 5–13)
NEUTS SEG # BLD: 12.2 K/UL (ref 1.8–8)
NEUTS SEG NFR BLD: 92 % (ref 32–75)
NRBC # BLD: 0 K/UL (ref 0–0.01)
NRBC BLD-RTO: 0 PER 100 WBC
PLATELET # BLD AUTO: 158 K/UL (ref 150–400)
PMV BLD AUTO: 11.8 FL (ref 8.9–12.9)
RBC # BLD AUTO: 4.55 M/UL (ref 3.8–5.2)
RBC MORPH BLD: ABNORMAL
SERVICE CMNT-IMP: ABNORMAL
WBC # BLD AUTO: 13.2 K/UL (ref 3.6–11)

## 2024-07-24 PROCEDURE — 82962 GLUCOSE BLOOD TEST: CPT

## 2024-07-24 PROCEDURE — 94640 AIRWAY INHALATION TREATMENT: CPT

## 2024-07-24 PROCEDURE — 6370000000 HC RX 637 (ALT 250 FOR IP): Performed by: PHYSICIAN ASSISTANT

## 2024-07-24 PROCEDURE — 6370000000 HC RX 637 (ALT 250 FOR IP): Performed by: HOSPITALIST

## 2024-07-24 PROCEDURE — 97162 PT EVAL MOD COMPLEX 30 MIN: CPT

## 2024-07-24 PROCEDURE — 97165 OT EVAL LOW COMPLEX 30 MIN: CPT | Performed by: OCCUPATIONAL THERAPIST

## 2024-07-24 PROCEDURE — 2700000000 HC OXYGEN THERAPY PER DAY

## 2024-07-24 PROCEDURE — 97116 GAIT TRAINING THERAPY: CPT

## 2024-07-24 PROCEDURE — 6360000002 HC RX W HCPCS: Performed by: HOSPITALIST

## 2024-07-24 PROCEDURE — 2580000003 HC RX 258: Performed by: INTERNAL MEDICINE

## 2024-07-24 PROCEDURE — 6370000000 HC RX 637 (ALT 250 FOR IP): Performed by: NURSE PRACTITIONER

## 2024-07-24 PROCEDURE — 97530 THERAPEUTIC ACTIVITIES: CPT | Performed by: OCCUPATIONAL THERAPIST

## 2024-07-24 PROCEDURE — 85025 COMPLETE CBC W/AUTO DIFF WBC: CPT

## 2024-07-24 PROCEDURE — 6360000002 HC RX W HCPCS: Performed by: INTERNAL MEDICINE

## 2024-07-24 PROCEDURE — 36415 COLL VENOUS BLD VENIPUNCTURE: CPT

## 2024-07-24 PROCEDURE — 2060000000 HC ICU INTERMEDIATE R&B

## 2024-07-24 RX ORDER — IPRATROPIUM BROMIDE AND ALBUTEROL SULFATE 2.5; .5 MG/3ML; MG/3ML
1 SOLUTION RESPIRATORY (INHALATION) EVERY 4 HOURS PRN
Status: DISCONTINUED | OUTPATIENT
Start: 2024-07-24 | End: 2024-07-26 | Stop reason: HOSPADM

## 2024-07-24 RX ORDER — GUAIFENESIN 600 MG/1
600 TABLET, EXTENDED RELEASE ORAL 2 TIMES DAILY
Status: DISCONTINUED | OUTPATIENT
Start: 2024-07-24 | End: 2024-07-26 | Stop reason: HOSPADM

## 2024-07-24 RX ORDER — ENOXAPARIN SODIUM 100 MG/ML
1 INJECTION SUBCUTANEOUS 2 TIMES DAILY
Status: DISCONTINUED | OUTPATIENT
Start: 2024-07-24 | End: 2024-07-26 | Stop reason: HOSPADM

## 2024-07-24 RX ADMIN — AMITRIPTYLINE HYDROCHLORIDE 10 MG: 10 TABLET, FILM COATED ORAL at 22:13

## 2024-07-24 RX ADMIN — Medication 1 AMPULE: at 22:13

## 2024-07-24 RX ADMIN — IPRATROPIUM BROMIDE AND ALBUTEROL SULFATE 1 DOSE: .5; 3 SOLUTION RESPIRATORY (INHALATION) at 08:29

## 2024-07-24 RX ADMIN — ARFORMOTEROL TARTRATE: 15 SOLUTION RESPIRATORY (INHALATION) at 08:34

## 2024-07-24 RX ADMIN — DOXYCYCLINE HYCLATE 100 MG: 100 TABLET, COATED ORAL at 22:13

## 2024-07-24 RX ADMIN — ARFORMOTEROL TARTRATE: 15 SOLUTION RESPIRATORY (INHALATION) at 21:03

## 2024-07-24 RX ADMIN — ESCITALOPRAM OXALATE 10 MG: 10 TABLET ORAL at 10:01

## 2024-07-24 RX ADMIN — ENOXAPARIN SODIUM 70 MG: 100 INJECTION SUBCUTANEOUS at 10:01

## 2024-07-24 RX ADMIN — INSULIN LISPRO 2 UNITS: 100 INJECTION, SOLUTION INTRAVENOUS; SUBCUTANEOUS at 10:55

## 2024-07-24 RX ADMIN — SODIUM CHLORIDE, PRESERVATIVE FREE 10 ML: 5 INJECTION INTRAVENOUS at 10:04

## 2024-07-24 RX ADMIN — ENOXAPARIN SODIUM 70 MG: 100 INJECTION SUBCUTANEOUS at 22:13

## 2024-07-24 RX ADMIN — INSULIN LISPRO 4 UNITS: 100 INJECTION, SOLUTION INTRAVENOUS; SUBCUTANEOUS at 17:37

## 2024-07-24 RX ADMIN — Medication 1 AMPULE: at 10:06

## 2024-07-24 RX ADMIN — TRAZODONE HYDROCHLORIDE 100 MG: 100 TABLET ORAL at 01:19

## 2024-07-24 RX ADMIN — LEVETIRACETAM 750 MG: 500 TABLET, FILM COATED ORAL at 17:27

## 2024-07-24 RX ADMIN — PREDNISONE 20 MG: 20 TABLET ORAL at 10:01

## 2024-07-24 RX ADMIN — PREDNISONE 20 MG: 20 TABLET ORAL at 22:13

## 2024-07-24 RX ADMIN — INSULIN LISPRO 2 UNITS: 100 INJECTION, SOLUTION INTRAVENOUS; SUBCUTANEOUS at 12:34

## 2024-07-24 RX ADMIN — DOXYCYCLINE HYCLATE 100 MG: 100 TABLET, COATED ORAL at 10:01

## 2024-07-24 RX ADMIN — LEVETIRACETAM 500 MG: 500 TABLET, FILM COATED ORAL at 06:39

## 2024-07-24 RX ADMIN — SODIUM CHLORIDE, PRESERVATIVE FREE 10 ML: 5 INJECTION INTRAVENOUS at 22:14

## 2024-07-24 RX ADMIN — TRAZODONE HYDROCHLORIDE 100 MG: 100 TABLET ORAL at 22:13

## 2024-07-24 RX ADMIN — ASPIRIN 81 MG: 81 TABLET, COATED ORAL at 10:01

## 2024-07-24 RX ADMIN — GUAIFENESIN 600 MG: 600 TABLET, EXTENDED RELEASE ORAL at 17:26

## 2024-07-24 ASSESSMENT — PAIN SCALES - GENERAL: PAINLEVEL_OUTOF10: 0

## 2024-07-24 NOTE — PROGRESS NOTES
TRANSFER - IN REPORT:    Verbal report received from Miriam ROQUE on Adenike Dasilva  being received from CCU for routine progression of patient care      Report consisted of patient's Situation, Background, Assessment and   Recommendations(SBAR).     Information from the following report(s) Nurse Handoff Report was reviewed with the receiving nurse.    Opportunity for questions and clarification was provided.      Assessment completed upon patient's arrival to unit and care assumed.

## 2024-07-24 NOTE — PLAN OF CARE
Problem: Physical Therapy - Adult  Goal: By Discharge: Performs mobility at highest level of function for planned discharge setting.  See evaluation for individualized goals.  Description: FUNCTIONAL STATUS PRIOR TO ADMISSION: Mod I with use of rollator walker. Wears 2L O2 PRN. History of multiple falls. Reports baseline \"balance issues.\" Currently receiving HHPT.     HOME SUPPORT PRIOR TO ADMISSION: The patient lived alone however states son lives locally and is able to assist as needed.     Physical Therapy Goals  Initiated 7/24/2024  1.  Patient will move from supine to sit and sit to supine, scoot up and down, and roll side to side in bed with modified independence within 7 day(s).    2.  Patient will perform sit to stand with modified independence within 7 day(s).  3.  Patient will transfer from bed to chair and chair to bed with modified independence using the least restrictive device within 7 day(s).  4.  Patient will ambulate with modified independence for 150 feet with the least restrictive device within 7 day(s).   Outcome: Progressing   PHYSICAL THERAPY EVALUATION    Patient: Adenike Dasilva (83 y.o. female)  Date: 7/24/2024  Primary Diagnosis: Shock ([facility]) [R57.9]  Fall, initial encounter [W19.XXXA]  Sepsis with acute organ dysfunction without septic shock, due to unspecified organism, unspecified organ dysfunction type (HCC) [A41.9, R65.20]       Precautions: Restrictions/Precautions: Bed Alarm, Fall Risk (DNR)                      ASSESSMENT :   DEFICITS/IMPAIRMENTS:   The patient is limited by impaired activity tolerance, SABILLON, generalized weakness, impaired balance, and overall impaired functional mobility. Pt received supine in bed on 4L O2, agreeable to participation with therapy. Pt mobilized with CGAx1 and use of rollator walker throughout, fatiguing quickly with mobility. Gait slow and shuffled with mild increase in trunk sway and generally unsteady. Pt experienced drop in BP upon assuming

## 2024-07-24 NOTE — CONSULTS
Pulmonary, Critical Care, and Sleep Medicine~Consult Note    Name: Adenike Dasilva MRN: [id number]   : 1940 Hospital: [facility]   Date: 2024 12:00 PM Admission: 2024     Impression Plan   Acute on chronic hypoxemic respiratory failure  Septic shock  Pneumonia  Emphysema and fibrosis noted on chest CT. Former smoker. PFTs without obstruction but poor study quality  CAD  HTN  H/o TAVR  DM  Hypothyroidism  H/o DVT  H/o falls  dementia Supp O2 for goal sats >88%, wean as tolerated.   Scheduled nebs in place of home Wixela  Doxy x 7 days  F/u BCx  Prednisone taper  Add mucinex  OOB, PT/OT  Dvt ppx: therapeutic lovenox    Thank you for the consult, will follow     Daily Progression:    Consult for ILD/pulm fibrosis with acute on chronic resp failure    HPI: 82 y/o F with PMH chronic hypoxemic respiratory failure (2L O2 prn), CAD, HTN, HLD, h/o TAVR, DM, hypothyroidism, h/o DVT, dementia, h/o falls who was initially admitted to the ICU on  for septic shock requiring vasopressors, pneumonia.Transferred out of the ICU on .      Currently requiring 3L O2 via NC.  Afebrile      Labs: bnp 1332, wbc 27.5-->14.4, procal 1.19, plt 111  VBG: pH 7.29, pCO2 51    Chest CT 24: Severe emphysematous change and interstitial opacities with subpleural  bleb formation and possible fibrosis. Confluent patchy parenchymal opacities  left perihilar possibly related to interstitial infiltrate and fibrosis    Prior chest CT at [facility] 23 report: \"Interstitial fibrosis and honeycombing which appears chronic. Evidence of   previous granulomatous disease. Postsurgical sequela. No acute process such as   pneumonia or pulmonary edema.\"   Images personally reviewed    Follows with Dr. Edgar for chronic cough, suspected COPD; last visit was with JONY Martin on 24 (phone call performed). PFTs were done prior to that visit but she had difficulty performing the test; no

## 2024-07-24 NOTE — PLAN OF CARE
Problem: Safety - Adult  Goal: Free from fall injury  Outcome: Progressing     Problem: Discharge Planning  Goal: Discharge to home or other facility with appropriate resources  Outcome: Progressing     Problem: Respiratory - Adult  Goal: Achieves optimal ventilation and oxygenation  Outcome: Progressing     Problem: Pain  Goal: Verbalizes/displays adequate comfort level or baseline comfort level  Outcome: Progressing     Problem: Skin/Tissue Integrity  Goal: Absence of new skin breakdown  Description: 1.  Monitor for areas of redness and/or skin breakdown  2.  Assess vascular access sites hourly  3.  Every 4-6 hours minimum:  Change oxygen saturation probe site  4.  Every 4-6 hours:  If on nasal continuous positive airway pressure, respiratory therapy assess nares and determine need for appliance change or resting period.  Outcome: Progressing

## 2024-07-24 NOTE — PROGRESS NOTES
End of Shift Note    Bedside shift change report given to JUDE Gunter (oncoming nurse) by Nilam Maldonado RN (offgoing nurse).  Report included the following information SBAR, MAR, Cardiac Rhythm Sinus Tachy, and Quality Measures    Shift worked:  7a-7p     Shift summary and any significant changes:     Pt worked with PT/OT, x1 assist using walker, pt complained of being dizzy after working with PT/OT while sitting in the chair.      Concerns for physician to address:       Zone phone for oncoming shift:          Activity:     Number times ambulated in hallways past shift: 0  Number of times OOB to chair past shift: 1    Cardiac:   Cardiac Monitoring: Yes           Access:  Current line(s): PIV     Genitourinary:   Urinary status: voiding and external catheter    Respiratory:      Chronic home O2 use?: YES  Incentive spirometer at bedside: NO       GI:     Current diet:  ADULT DIET; Regular; 3 carb choices (45 gm/meal)  Passing flatus: YES  Tolerating current diet: YES       Pain Management:   Patient states pain is manageable on current regimen: YES    Skin:     Interventions: increase time out of bed, PT/OT consult, and internal/external urinary devices    Patient Safety:  Fall Score:    Interventions: bed/chair alarm, assistive device (walker, cane. etc), gripper socks, pt to call before getting OOB, and stay with me (per policy)       Length of Stay:  Expected LOS: 4  Actual LOS: 2      Nilam Maldonado, RN

## 2024-07-24 NOTE — PROGRESS NOTES
Hospitalist Progress Note    NAME:   Adenike Dasilva   : 1940   MRN: [id number]     Date/Time: 2024 3:28 PM  Patient PCP: Dav Alex MD    Estimated discharge date: ?-26,  SNF likely  Barriers: Pulm consult,  PT/OT recs      Assessment / Plan:    Sepsis  Septic shock- resolved  PNA?  Pulmonary fibrosis/ILD-2L NC at baseline, currently on 5L/m  Admission with leukocytosis, elevated lactic, tachycardia, source-PNA, persistent hypotension refractory to fluid resuscitation requiring pressors.   CT chest with severe interstitial lung disease with possible fibrosis, emphysematous lung change and left perihilar nodular infiltrate.    Continue po doxycycline initiated in ICU  Continue scheduled nebs  Continue steroids- tapered to PO now  Pulm consult- pt sees Dr Edgar in office  IP Pt/Ot eval for DC planning  CM consult for DC planning- SNF per son's choice    DM  Hold home metformin  SSI  POC glucose  Hypoglycemia protocol  DM diet    Migraines  Insomnia  Dementia  Continue trazodone and keppra    H/o aortic valve replacement  CAD  HLD  HTN  Not on coumadin secondary to non-adherence  Continue ASA and statin  Resume metoprolol as tolerated    Medical Decision Making:   I personally reviewed labs: CBC, BMP  I personally reviewed imaging:  I personally reviewed EKG:  Toxic drug monitoring:   Discussed case with: Pt, RN, CM,        Code Status: DNR  DVT Prophylaxis: Lovenox   GI Prophylaxis:    Subjective:     Chief Complaint / Reason for Physician Visit  Discussed with RN events overnight.   Otherwise denies new complaints.    Awaiting PT/OT eval  Awaiting Pulm consult/recc    Objective:     VITALS:   Last 24hrs VS reviewed since prior progress note. Most recent are:  Patient Vitals for the past 24 hrs:   BP Temp Temp src Pulse Resp SpO2   24 1516 -- 98.4 °F (36.9 °C) Oral -- -- --   24 1430 (!) 157/76 -- -- (!) 117 -- 92 %   24 1429 116/70 -- -- -- -- --   24 1423 138/82

## 2024-07-24 NOTE — PLAN OF CARE
Problem: Occupational Therapy - Adult  Goal: By Discharge: Performs self-care activities at highest level of function for planned discharge setting.  See evaluation for individualized goals.  Description: FUNCTIONAL STATUS PRIOR TO ADMISSION:  doesn't perform cleaning and has a , orders groceries that are delivered, warms meals in microwave meals only, ambulated with rollator walker, on 2L NC PRN and checks her O2 3x a day and puts on her O2 if it is below 90%, sits on shower chair to bathe, performed all ADLs on her own   ,  ,  ,  ,  ,  ,  ,  ,  ,  ,       HOME SUPPORT: Patient lived alone and son is close by and works from home.    Occupational Therapy Goals:  Initiated 7/24/2024    1.  Patient will perform grooming with Modified [place] within 7 day(s).  2.  Patient will perform upper body dressing and lower body dressing with Modified [place] within 7 day(s).  3.  Patient will perform toileting with Modified [place] within 7 day(s).  4.  Patient will perform toilet transfers with Modified [place]  within 7 day(s).    Outcome: Progressing   OCCUPATIONAL THERAPY EVALUATION    Patient: Adenike Dasilva (83 y.o. female)  Date: 7/24/2024  Primary Diagnosis: Shock (HCC) [R57.9]  Fall, initial encounter [W19.XXXA]  Sepsis with acute organ dysfunction without septic shock, due to unspecified organism, unspecified organ dysfunction type (HCC) [A41.9, R65.20]         Precautions: Bed Alarm, Fall Risk (DNR)                  ASSESSMENT :  The patient is limited by decreased functional mobility, independence in ADLs, strength, endurance, posture.    Based on the impairments listed above pt was supine upon arrival and was on HFNC upon arrival.  Pt reports falls recently and was only using O2 PRN per her report.  She lives alone and was getting HHPT services 2x wk.  Currently pt is performing mobility with rollator walker CGA and ADLs at a CGA to min assist overall.  Recommend short term rehab

## 2024-07-24 NOTE — PROGRESS NOTES
1905- Bedside and Verbal shift change report given to JUDE Pineda (oncoming nurse) by JUDE Back (offgoing nurse). Report included the following information Nurse Handoff Report, Intake/Output, MAR, Recent Results, and Cardiac Rhythm NSR .      2045- Shift assessment completed. Patient in bed, resting comfortably. No complaints of pain. See flowsheets for details.    2159- Report called to [facility] RN at this time on patient going to room 2164.    2215- Patient transferred to room 2164 at this time.

## 2024-07-25 ENCOUNTER — TELEPHONE (OUTPATIENT)
Facility: CLINIC | Age: 84
End: 2024-07-25

## 2024-07-25 LAB
ANION GAP SERPL CALC-SCNC: 5 MMOL/L (ref 5–15)
BASOPHILS # BLD: 0 K/UL (ref 0–0.1)
BASOPHILS NFR BLD: 0 % (ref 0–1)
BUN SERPL-MCNC: 15 MG/DL (ref 6–20)
BUN/CREAT SERPL: 25 (ref 12–20)
CALCIUM SERPL-MCNC: 8.5 MG/DL (ref 8.5–10.1)
CHLORIDE SERPL-SCNC: 102 MMOL/L (ref 97–108)
CO2 SERPL-SCNC: 30 MMOL/L (ref 21–32)
CREAT SERPL-MCNC: 0.6 MG/DL (ref 0.55–1.02)
DIFFERENTIAL METHOD BLD: ABNORMAL
EOSINOPHIL # BLD: 0 K/UL (ref 0–0.4)
EOSINOPHIL NFR BLD: 0 % (ref 0–7)
ERYTHROCYTE [DISTWIDTH] IN BLOOD BY AUTOMATED COUNT: 17.7 % (ref 11.5–14.5)
GLUCOSE BLD STRIP.AUTO-MCNC: 139 MG/DL (ref 65–117)
GLUCOSE BLD STRIP.AUTO-MCNC: 205 MG/DL (ref 65–117)
GLUCOSE BLD STRIP.AUTO-MCNC: 257 MG/DL (ref 65–117)
GLUCOSE BLD STRIP.AUTO-MCNC: 264 MG/DL (ref 65–117)
GLUCOSE SERPL-MCNC: 246 MG/DL (ref 65–100)
HCT VFR BLD AUTO: 30.5 % (ref 35–47)
HGB BLD-MCNC: 9.1 G/DL (ref 11.5–16)
IMM GRANULOCYTES # BLD AUTO: 0.1 K/UL (ref 0–0.04)
IMM GRANULOCYTES NFR BLD AUTO: 1 % (ref 0–0.5)
LYMPHOCYTES # BLD: 0.8 K/UL (ref 0.8–3.5)
LYMPHOCYTES NFR BLD: 8 % (ref 12–49)
MCH RBC QN AUTO: 22.1 PG (ref 26–34)
MCHC RBC AUTO-ENTMCNC: 29.8 G/DL (ref 30–36.5)
MCV RBC AUTO: 74 FL (ref 80–99)
MONOCYTES # BLD: 0.3 K/UL (ref 0–1)
MONOCYTES NFR BLD: 4 % (ref 5–13)
NEUTS SEG # BLD: 8 K/UL (ref 1.8–8)
NEUTS SEG NFR BLD: 87 % (ref 32–75)
NRBC # BLD: 0 K/UL (ref 0–0.01)
NRBC BLD-RTO: 0 PER 100 WBC
PLATELET # BLD AUTO: 161 K/UL (ref 150–400)
PMV BLD AUTO: 11.2 FL (ref 8.9–12.9)
POTASSIUM SERPL-SCNC: 3.6 MMOL/L (ref 3.5–5.1)
RBC # BLD AUTO: 4.12 M/UL (ref 3.8–5.2)
SERVICE CMNT-IMP: ABNORMAL
SODIUM SERPL-SCNC: 137 MMOL/L (ref 136–145)
WBC # BLD AUTO: 9.2 K/UL (ref 3.6–11)

## 2024-07-25 PROCEDURE — 6370000000 HC RX 637 (ALT 250 FOR IP): Performed by: HOSPITALIST

## 2024-07-25 PROCEDURE — 36415 COLL VENOUS BLD VENIPUNCTURE: CPT

## 2024-07-25 PROCEDURE — 2700000000 HC OXYGEN THERAPY PER DAY

## 2024-07-25 PROCEDURE — 97530 THERAPEUTIC ACTIVITIES: CPT

## 2024-07-25 PROCEDURE — 6370000000 HC RX 637 (ALT 250 FOR IP): Performed by: PHYSICIAN ASSISTANT

## 2024-07-25 PROCEDURE — 94640 AIRWAY INHALATION TREATMENT: CPT

## 2024-07-25 PROCEDURE — 97535 SELF CARE MNGMENT TRAINING: CPT

## 2024-07-25 PROCEDURE — 6370000000 HC RX 637 (ALT 250 FOR IP): Performed by: NURSE PRACTITIONER

## 2024-07-25 PROCEDURE — 6370000000 HC RX 637 (ALT 250 FOR IP): Performed by: INTERNAL MEDICINE

## 2024-07-25 PROCEDURE — 94761 N-INVAS EAR/PLS OXIMETRY MLT: CPT

## 2024-07-25 PROCEDURE — 6360000002 HC RX W HCPCS: Performed by: INTERNAL MEDICINE

## 2024-07-25 PROCEDURE — 82962 GLUCOSE BLOOD TEST: CPT

## 2024-07-25 PROCEDURE — 2580000003 HC RX 258: Performed by: INTERNAL MEDICINE

## 2024-07-25 PROCEDURE — 6360000002 HC RX W HCPCS: Performed by: HOSPITALIST

## 2024-07-25 PROCEDURE — 2060000000 HC ICU INTERMEDIATE R&B

## 2024-07-25 PROCEDURE — 80048 BASIC METABOLIC PNL TOTAL CA: CPT

## 2024-07-25 PROCEDURE — 97116 GAIT TRAINING THERAPY: CPT

## 2024-07-25 PROCEDURE — 85025 COMPLETE CBC W/AUTO DIFF WBC: CPT

## 2024-07-25 RX ORDER — BENZONATATE 100 MG/1
200 CAPSULE ORAL 3 TIMES DAILY
Status: DISCONTINUED | OUTPATIENT
Start: 2024-07-25 | End: 2024-07-26 | Stop reason: HOSPADM

## 2024-07-25 RX ORDER — HYDROCODONE POLISTIREX AND CHLORPHENIRAMINE POLISTIREX 10; 8 MG/5ML; MG/5ML
5 SUSPENSION, EXTENDED RELEASE ORAL EVERY 12 HOURS PRN
Status: DISCONTINUED | OUTPATIENT
Start: 2024-07-25 | End: 2024-07-26 | Stop reason: HOSPADM

## 2024-07-25 RX ORDER — CYCLOBENZAPRINE HCL 10 MG
10 TABLET ORAL 3 TIMES DAILY PRN
Status: DISCONTINUED | OUTPATIENT
Start: 2024-07-25 | End: 2024-07-26 | Stop reason: HOSPADM

## 2024-07-25 RX ADMIN — DOXYCYCLINE HYCLATE 100 MG: 100 TABLET, COATED ORAL at 09:07

## 2024-07-25 RX ADMIN — Medication 1 AMPULE: at 09:08

## 2024-07-25 RX ADMIN — TRAZODONE HYDROCHLORIDE 100 MG: 100 TABLET ORAL at 22:02

## 2024-07-25 RX ADMIN — BENZONATATE 200 MG: 100 CAPSULE ORAL at 22:02

## 2024-07-25 RX ADMIN — SODIUM CHLORIDE, PRESERVATIVE FREE 10 ML: 5 INJECTION INTRAVENOUS at 09:08

## 2024-07-25 RX ADMIN — INSULIN LISPRO 2 UNITS: 100 INJECTION, SOLUTION INTRAVENOUS; SUBCUTANEOUS at 13:14

## 2024-07-25 RX ADMIN — ESCITALOPRAM OXALATE 10 MG: 10 TABLET ORAL at 09:07

## 2024-07-25 RX ADMIN — GUAIFENESIN 600 MG: 600 TABLET, EXTENDED RELEASE ORAL at 13:14

## 2024-07-25 RX ADMIN — ASPIRIN 81 MG: 81 TABLET, COATED ORAL at 09:06

## 2024-07-25 RX ADMIN — ARFORMOTEROL TARTRATE: 15 SOLUTION RESPIRATORY (INHALATION) at 08:51

## 2024-07-25 RX ADMIN — DOXYCYCLINE HYCLATE 100 MG: 100 TABLET, COATED ORAL at 22:02

## 2024-07-25 RX ADMIN — ENOXAPARIN SODIUM 70 MG: 100 INJECTION SUBCUTANEOUS at 09:06

## 2024-07-25 RX ADMIN — ENOXAPARIN SODIUM 70 MG: 100 INJECTION SUBCUTANEOUS at 22:02

## 2024-07-25 RX ADMIN — Medication 1 AMPULE: at 22:03

## 2024-07-25 RX ADMIN — SODIUM CHLORIDE, PRESERVATIVE FREE 10 ML: 5 INJECTION INTRAVENOUS at 22:02

## 2024-07-25 RX ADMIN — INSULIN LISPRO 4 UNITS: 100 INJECTION, SOLUTION INTRAVENOUS; SUBCUTANEOUS at 09:05

## 2024-07-25 RX ADMIN — GUAIFENESIN 600 MG: 600 TABLET, EXTENDED RELEASE ORAL at 05:18

## 2024-07-25 RX ADMIN — PREDNISONE 20 MG: 20 TABLET ORAL at 22:02

## 2024-07-25 RX ADMIN — LEVETIRACETAM 500 MG: 500 TABLET, FILM COATED ORAL at 06:30

## 2024-07-25 RX ADMIN — HYDROCODONE POLISTIREX AND CHLORPHENIRAMINE POLISTIREX 5 ML: 10; 8 SUSPENSION, EXTENDED RELEASE ORAL at 10:49

## 2024-07-25 RX ADMIN — INSULIN LISPRO 4 UNITS: 100 INJECTION, SOLUTION INTRAVENOUS; SUBCUTANEOUS at 17:47

## 2024-07-25 RX ADMIN — LEVETIRACETAM 750 MG: 500 TABLET, FILM COATED ORAL at 17:40

## 2024-07-25 RX ADMIN — PREDNISONE 20 MG: 20 TABLET ORAL at 09:07

## 2024-07-25 RX ADMIN — CYCLOBENZAPRINE 10 MG: 10 TABLET, FILM COATED ORAL at 10:48

## 2024-07-25 RX ADMIN — AMITRIPTYLINE HYDROCHLORIDE 10 MG: 10 TABLET, FILM COATED ORAL at 22:02

## 2024-07-25 RX ADMIN — BENZONATATE 200 MG: 100 CAPSULE ORAL at 13:14

## 2024-07-25 ASSESSMENT — PAIN DESCRIPTION - DESCRIPTORS: DESCRIPTORS: ACHING

## 2024-07-25 ASSESSMENT — PAIN DESCRIPTION - ORIENTATION: ORIENTATION: RIGHT

## 2024-07-25 ASSESSMENT — PAIN DESCRIPTION - LOCATION: LOCATION: CHEST

## 2024-07-25 ASSESSMENT — PAIN SCALES - GENERAL: PAINLEVEL_OUTOF10: 6

## 2024-07-25 NOTE — PLAN OF CARE
Problem: Physical Therapy - Adult  Goal: By Discharge: Performs mobility at highest level of function for planned discharge setting.  See evaluation for individualized goals.  Description: FUNCTIONAL STATUS PRIOR TO ADMISSION: Mod I with use of rollator walker. Wears 2L O2 PRN. History of multiple falls. Reports baseline \"balance issues.\" Currently receiving HHPT.     HOME SUPPORT PRIOR TO ADMISSION: The patient lived alone however states son lives locally and is able to assist as needed.     Physical Therapy Goals  Initiated 7/24/2024  1.  Patient will move from supine to sit and sit to supine, scoot up and down, and roll side to side in bed with modified independence within 7 day(s).    2.  Patient will perform sit to stand with modified independence within 7 day(s).  3.  Patient will transfer from bed to chair and chair to bed with modified independence using the least restrictive device within 7 day(s).  4.  Patient will ambulate with modified independence for 150 feet with the least restrictive device within 7 day(s).   Outcome: Progressing   PHYSICAL THERAPY TREATMENT    Patient: Adenike Dasilva (83 y.o. female)  Date: 7/25/2024  Diagnosis: Shock ([facility]) [R57.9]  Fall, initial encounter [W19.XXXA]  Sepsis with acute organ dysfunction without septic shock, due to unspecified organism, unspecified organ dysfunction type ([facility]) [A41.9, R65.20] Shock ([facility])      Precautions: Bed Alarm, Fall Risk (DNR)                      ASSESSMENT:  Patient continues to benefit from skilled PT services and is progressing towards goals. Patient received in bed, reports that she coughed all night and right side of chest is very sore today, but was agreeable to get up with encouragement.  Patient came to sit EOB with SBA and sitting balance was intact.  Stood to rollator and ambulated x approx 5 feet to chair with min assist, gait was mildly unsteady with increased trunk sway and slow pace.  Mobilized on 2 liters 02 with sats at  92% post activity.  Educated on gentle seated stretching and AROM for trunk and UEs to manage pain.  Left up in chair with call bell in reach, continue to recommend rehab in SNF as patient lives alone and is at high risk for falls.          PLAN:  Patient continues to benefit from skilled intervention to address the above impairments.  Continue treatment per established plan of care.    Recommend for next PT session: further progression of gait with existing device    Recommendation for discharge: (in order for the patient to meet his/her long term goals): Therapy up to 5 days/week in Skilled nursing facility    Other factors to consider for discharge: lives alone, high risk for falls, not safe to be alone, and concern for safely navigating or managing the home environment    IF patient discharges home will need the following DME: patient owns DME required for discharge       SUBJECTIVE:   Patient stated, \"I promise I won't stand up.\"    OBJECTIVE DATA SUMMARY:   Critical Behavior:          Functional Mobility Training:  Bed Mobility:  Bed Mobility Training  Rolling: Stand-by assistance  Supine to Sit: Stand-by assistance  Scooting: Stand-by assistance  Transfers:  Transfer Training  Sit to Stand: Contact-guard assistance  Stand to Sit: Contact-guard assistance  Bed to Chair: Minimum assistance;Assist X1  Balance:  Balance  Sitting: Intact  Standing: Impaired  Standing - Static: Constant support;Good  Standing - Dynamic: Constant support;Fair   Ambulation/Gait Training:     Gait  Distance (ft): 5 Feet  Assistive Device: Gait belt;Walker, rollator  Interventions: Safety awareness training;Verbal cues  Speed/Laura: Pace decreased (< 100 feet/min)  Step Length: Right shortened;Left shortened  Gait Abnormalities: Decreased step clearance        Neuro Re-Education:                      Activity Tolerance:   desaturates with activity and requires oxygen    After treatment:   Patient left in no apparent distress in bed,

## 2024-07-25 NOTE — PROGRESS NOTES
Hospitalist Progress Note    NAME:   Adenike Dasilva   : 1940   MRN: [id number]     Date/Time: 2024 9:59 AM  Patient PCP: Dav Alex MD    Estimated discharge date: ,  SNF likely  Barriers: Pulm clearance  , SNF arranged per son      Assessment / Plan:    Sepsis  Septic shock- resolved  PNA?  Pulmonary fibrosis/ILD-2L NC at baseline, currently on 2L/m today  Admission with leukocytosis, elevated lactic, tachycardia, source-PNA, persistent hypotension refractory to fluid resuscitation requiring pressors.   CT chest with severe interstitial lung disease with possible fibrosis, emphysematous lung change and left perihilar nodular infiltrate.    Continue po doxycycline initiated in ICU  Continue scheduled nebs,   Cont Mucinex,   Added Tussionex for cough& flexeril  today- causing pleuritic R sided chest pain today  Continue steroids- tapered to PO per pulm  Pulm consult appreciated-- following  IP Pt/Ot eval for DC planning- SNF recommended  CM consult for DC planning- SNF per son's choice    DM  Hold home metformin  SSI  POC glucose  Hypoglycemia protocol  DM diet    Migraines  Insomnia  Dementia  Continue trazodone and keppra    H/o aortic valve replacement  CAD  HLD  HTN  Not on coumadin secondary to non-adherence  Continue ASA and statin  Resume metoprolol as tolerated    Medical Decision Making:   I personally reviewed labs: CBC, BMP  I personally reviewed imaging:  I personally reviewed EKG:  Toxic drug monitoring:   Discussed case with: Pt, RN, CM,        Code Status: DNR  DVT Prophylaxis: Lovenox   GI Prophylaxis:    Subjective:     Chief Complaint / Reason for Physician Visit  Discussed with RN events overnight.   Otherwise denies new complaints.    Coughing causing R sided chest wall pain-- tussionex + flexeril prn added  SNF being arranged per son's choice  Pulm following    Objective:     VITALS:   Last 24hrs VS reviewed since prior progress note. Most recent are:  Patient      VITALS:   Last 24hrs VS reviewed since prior progress note. Most recent are:  Patient Vitals for the past 24 hrs:   BP Temp Temp src Pulse Resp SpO2   07/25/24 0851 -- -- -- -- -- 90 %   07/25/24 0815 (!) 156/74 98.1 °F (36.7 °C) Oral 86 21 94 %   07/25/24 0505 -- -- -- 89 20 94 %   07/25/24 0313 134/78 98.5 °F (36.9 °C) Oral 87 23 96 %   07/25/24 0125 -- -- -- 97 20 93 %   07/25/24 0014 (!) 154/72 98.4 °F (36.9 °C) Oral 98 21 92 %   07/24/24 1941 (!) 152/70 98.5 °F (36.9 °C) Oral (!) 104 22 93 %   07/24/24 1516 -- 98.4 °F (36.9 °C) Oral -- -- 95 %   07/24/24 1430 (!) 157/76 -- -- (!) 117 -- 92 %   07/24/24 1429 116/70 -- -- -- -- --   07/24/24 1423 138/82 -- -- (!) 102 -- 92 %   07/24/24 1420 137/66 -- -- -- -- 94 %   07/24/24 1011 -- 98.6 °F (37 °C) Oral -- -- --           Intake/Output Summary (Last 24 hours) at 7/25/2024 0959  Last data filed at 7/25/2024 0815  Gross per 24 hour   Intake --   Output 2400 ml   Net -2400 ml          I had a face to face encounter and independently examined this patient on 7/25/2024, as outlined below:  PHYSICAL EXAM:  General: Alert, cooperative  EENT:  EOMI. Anicteric sclerae.  Resp:  CTA bilaterally, no wheezing or rales.  No accessory muscle use  CV:  Regular  rhythm,  No edema  GI:  Soft, Non distended, Non tender.  +Bowel sounds  Neurologic:  Alert and oriented X 3, normal speech,   Psych:   Good insight. Not anxious nor agitated  Skin:  No rashes.  No jaundice    Reviewed most current lab test results and cultures  YES  Reviewed most current radiology test results   YES  Review and summation of old records today    NO  Reviewed patient's current orders and MAR    YES  PMH/SH reviewed - no change compared to H&P    Procedures: see electronic medical records for all procedures/Xrays and details which were not copied into this note but were reviewed prior to creation of Plan.      LABS:  I reviewed today's most current labs and imaging studies.  Pertinent labs

## 2024-07-25 NOTE — PLAN OF CARE
Problem: Safety - Adult  Goal: Free from fall injury  7/25/2024 1343 by Nicoel Patel RN  Outcome: Progressing  7/25/2024 0028 by Lyric Lloyd RN  Outcome: Progressing     Problem: Discharge Planning  Goal: Discharge to home or other facility with appropriate resources  Outcome: Progressing     Problem: Respiratory - Adult  Goal: Achieves optimal ventilation and oxygenation  7/25/2024 1343 by Nicole Patel RN  Outcome: Progressing  7/25/2024 0853 by Mikaela Posada, RT  Outcome: Progressing     Problem: Pain  Goal: Verbalizes/displays adequate comfort level or baseline comfort level  Outcome: Progressing     Problem: Skin/Tissue Integrity  Goal: Absence of new skin breakdown  Description: 1.  Monitor for areas of redness and/or skin breakdown  2.  Assess vascular access sites hourly  3.  Every 4-6 hours minimum:  Change oxygen saturation probe site  4.  Every 4-6 hours:  If on nasal continuous positive airway pressure, respiratory therapy assess nares and determine need for appliance change or resting period.  Outcome: Progressing     Problem: Physical Therapy - Adult  Goal: By Discharge: Performs mobility at highest level of function for planned discharge setting.  See evaluation for individualized goals.  Description: FUNCTIONAL STATUS PRIOR TO ADMISSION: Mod I with use of rollator walker. Wears 2L O2 PRN. History of multiple falls. Reports baseline \"balance issues.\" Currently receiving HHPT.     HOME SUPPORT PRIOR TO ADMISSION: The patient lived alone however states son lives locally and is able to assist as needed.     Physical Therapy Goals  Initiated 7/24/2024  1.  Patient will move from supine to sit and sit to supine, scoot up and down, and roll side to side in bed with modified independence within 7 day(s).    2.  Patient will perform sit to stand with modified independence within 7 day(s).  3.  Patient will transfer from bed to chair and chair to bed with modified independence using the

## 2024-07-25 NOTE — TELEPHONE ENCOUNTER
The patient is calling to advise she is still in the hospital and going to rehab tomorrow. She will call when she gets home to schedule a home visit with Dr. Alex

## 2024-07-25 NOTE — PROGRESS NOTES
solution   Nebulization BID RT    glucose chewable tablet 16 g  4 tablet Oral PRN    dextrose bolus 10% 125 mL  125 mL IntraVENous PRN    Or    dextrose bolus 10% 250 mL  250 mL IntraVENous PRN    glucagon injection 1 mg  1 mg SubCUTAneous PRN    dextrose 10 % infusion   IntraVENous Continuous PRN    amitriptyline (ELAVIL) tablet 10 mg  10 mg Oral Nightly    escitalopram (LEXAPRO) tablet 10 mg  10 mg Oral Daily    sodium chloride flush 0.9 % injection 5-40 mL  5-40 mL IntraVENous 2 times per day    sodium chloride flush 0.9 % injection 5-40 mL  5-40 mL IntraVENous PRN    0.9 % sodium chloride infusion   IntraVENous PRN    ondansetron (ZOFRAN-ODT) disintegrating tablet 4 mg  4 mg Oral Q8H PRN    Or    ondansetron (ZOFRAN) injection 4 mg  4 mg IntraVENous Q6H PRN    polyethylene glycol (GLYCOLAX) packet 17 g  17 g Oral Daily PRN    acetaminophen (TYLENOL) tablet 650 mg  650 mg Oral Q6H PRN    Or    acetaminophen (TYLENOL) suppository 650 mg  650 mg Rectal Q6H PRN    levETIRAcetam (KEPPRA) tablet 500 mg  500 mg Oral QAM AC    levETIRAcetam (KEPPRA) tablet 750 mg  750 mg Oral QPM    traZODone (DESYREL) tablet 100 mg  100 mg Oral Nightly    aspirin EC tablet 81 mg  81 mg Oral Daily    insulin lispro (HUMALOG,ADMELOG) injection vial 0-8 Units  0-8 Units SubCUTAneous TID WC    insulin lispro (HUMALOG,ADMELOG) injection vial 0-4 Units  0-4 Units SubCUTAneous Nightly    albuterol (PROVENTIL) (2.5 MG/3ML) 0.083% nebulizer solution 2.5 mg  2.5 mg Nebulization Q6H PRN    Benzocaine-Menthol (CEPACOL MAX SORE THROAT) lozenge 1 lozenge  1 lozenge Oral Q2H PRN       Labs:  ABG Invalid input(s): \"PHI\", \"PCO2I\", \"PO2I\", \"HCO3I\", \"SO2I\", \"FIO2I\"     CBC Recent Labs     07/23/24  0427 07/24/24  1617 07/25/24  0623   WBC 14.4* 13.2* 9.2   HGB 8.5* 9.8* 9.1*   HCT 30.0* 34.0* 30.5*   * 158 161   MCV 75.6* 74.7* 74.0*   MCH 21.4* 21.5* 22.1*          Metabolic  Panel Recent Labs     07/22/24  1424 07/22/24  1425 07/23/24  0427

## 2024-07-25 NOTE — TELEPHONE ENCOUNTER
[facility]   (formerly [facility])   Phone:  [phone]      Fax:  [phone] [address], [address]  [place] [address]    Name:  Adenike Dasilva  YOB: 1940    Called patient to inform her that her follow up visit with Dr. Dav Alex is scheduled for 8/22/24.  No answer, left message requesting patient call back to confirm this date.    Patient returned call, she is currently hospitalized at [facility], she was admitted on 7/22/24 after a fall.  Patient says she is going to rehab after she leaves the hospital.  She says she will call our office when she finds out when she will be discharged from rehab.  This nurse advised patient that we can change the 8/22/24 appointment if she is not home from rehab by then.      Makayla Alex RN, Gerontological Nursing-BC, CHPN

## 2024-07-25 NOTE — PLAN OF CARE
with self care and functional mobility; able to participate in LE ADLs with improved functional endurance, L LE at S level, R LE D to don sock after max effort. Able to don slip on shoes with increased time. Cues for pacing, PLB, energy conservation and cues for back/ribs pain management,respiratory HEP \"after coughing all night.\" Frequency increased to 4x/week with need for respiratory cues with ADLs and IADLs.              PLAN :  Patient continues to benefit from skilled intervention to address the above impairments.  Continue treatment per established plan of care to address goals.    Recommend with staff: up to chair for all meals, but not to stay in chair all day long- needs the exercise of \"  In/out of bed throughout the day\"    Recommend next OT session: HEP with O2 weaning, LE ADLs    Recommendation for discharge: (in order for the patient to meet his/her long term goals): Therapy up to 5 days/week in Skilled nursing facility    Other factors to consider for discharge: lives alone and need for O2 weaning    IF patient discharges home will need the following DME: continuing to assess with progress       SUBJECTIVE:   Patient stated “I am OK and will call for the nurse when I need to get up.”    OBJECTIVE DATA SUMMARY:   Cognitive/Behavioral Status:  Orientation  Overall Orientation Status: Within Functional Limits  Orientation Level: Oriented X4  Cognition  Overall Cognitive Status: WFL  Cognition Comment: conitnue to monitor; lives alone    Functional Mobility and Transfers for ADLs:  Bed Mobility:  Bed Mobility Training  Bed Mobility Training: Yes  Overall Level of Assistance: Supervision;Stand-by assistance  Interventions: Safety awareness training  Rolling: Stand-by assistance;Additional time;Adaptive equipment  Supine to Sit: Stand-by assistance;Additional time;Adaptive equipment  Scooting: Stand-by assistance;Additional time;Adaptive equipment     Transfers:   Transfer Training  Transfer Training:  Yes  Overall Level of Assistance: Contact-guard assistance;Additional time;Adaptive equipment  Sit to Stand: Contact-guard assistance;Additional time;Adaptive equipment  Stand to Sit: Contact-guard assistance;Additional time;Adaptive equipment  Bed to Chair: Minimum assistance;Additional time;Adaptive equipment  Toilet Transfer: Minimum assistance;Additional time;Adaptive equipment           Balance:     Balance  Sitting: Intact  Standing: Impaired  Standing - Static: Constant support;Good  Standing - Dynamic: Constant support;Fair      ADL Intervention:                                                    LE Dressing: Minimal assistance  LE Dressing Skilled Clinical Factors: able to don L sock, assist needed for R sock in supine; able to don slip on shoes w/increased time seated edge of bed                      Patient instructed and indicated understanding the benefits of maintaining activity tolerance, functional mobility, and independence with self care tasks during acute stay  to ensure safe return home and to baseline. Encouraged patient to increase frequency and duration OOB, be out of bed for all meals, perform daily ADLs (as approved by RN/MD regarding bathing etc), and performing functional mobility to/from bathroom.     Pain Ratin/10  ribs/back pain \"from coughing all night\"  Pain Intervention(s):   rest, repositioning, and pain is at a level acceptable to the patient      Activity Tolerance:   Fair , requires rest breaks, observed shortness of breath on exertion, and desaturates with activity and requires oxygen  Please refer to the flowsheet for vital signs taken during this treatment.    After treatment:   Patient left in no apparent distress sitting up in chair, Call bell within reach, Bed/ chair alarm activated, and Updated patient's board on functional status and mobility recommendations 2L O2 in use; VSS    COMMUNICATION/EDUCATION:   The patient's plan of care was discussed with: physical

## 2024-07-26 ENCOUNTER — OFFICE VISIT (OUTPATIENT)
Age: 84
End: 2024-07-26
Payer: MEDICARE

## 2024-07-26 VITALS
RESPIRATION RATE: 18 BRPM | HEIGHT: 60 IN | BODY MASS INDEX: 30.17 KG/M2 | WEIGHT: 153.66 LBS | OXYGEN SATURATION: 92 % | DIASTOLIC BLOOD PRESSURE: 88 MMHG | HEART RATE: 88 BPM | SYSTOLIC BLOOD PRESSURE: 116 MMHG | TEMPERATURE: 98 F

## 2024-07-26 DIAGNOSIS — J41.0 SIMPLE CHRONIC BRONCHITIS (HCC): Chronic | ICD-10-CM

## 2024-07-26 DIAGNOSIS — I35.0 NONRHEUMATIC AORTIC VALVE STENOSIS: Chronic | ICD-10-CM

## 2024-07-26 DIAGNOSIS — J84.9 INTERSTITIAL LUNG DISEASE (HCC): Chronic | ICD-10-CM

## 2024-07-26 DIAGNOSIS — E11.42 TYPE 2 DIABETES MELLITUS WITH DIABETIC POLYNEUROPATHY, WITHOUT LONG-TERM CURRENT USE OF INSULIN (HCC): Chronic | ICD-10-CM

## 2024-07-26 DIAGNOSIS — J84.10 PULMONARY FIBROSIS (HCC): Chronic | ICD-10-CM

## 2024-07-26 DIAGNOSIS — F41.8 DEPRESSION WITH ANXIETY: Chronic | ICD-10-CM

## 2024-07-26 DIAGNOSIS — K21.9 GASTROESOPHAGEAL REFLUX DISEASE WITHOUT ESOPHAGITIS: ICD-10-CM

## 2024-07-26 DIAGNOSIS — N39.46 MIXED STRESS AND URGE URINARY INCONTINENCE: Chronic | ICD-10-CM

## 2024-07-26 DIAGNOSIS — I25.10 CORONARY ARTERY DISEASE INVOLVING NATIVE CORONARY ARTERY OF NATIVE HEART WITHOUT ANGINA PECTORIS: Primary | Chronic | ICD-10-CM

## 2024-07-26 DIAGNOSIS — G40.89 OTHER SEIZURES (HCC): ICD-10-CM

## 2024-07-26 DIAGNOSIS — Z71.89 DNR (DO NOT RESUSCITATE) DISCUSSION: ICD-10-CM

## 2024-07-26 DIAGNOSIS — I10 BENIGN ESSENTIAL HYPERTENSION: Chronic | ICD-10-CM

## 2024-07-26 PROBLEM — R56.9 UNSPECIFIED CONVULSIONS (HCC): Status: ACTIVE | Noted: 2024-07-26

## 2024-07-26 LAB
ANION GAP SERPL CALC-SCNC: 7 MMOL/L (ref 5–15)
BASOPHILS # BLD: 0 K/UL (ref 0–0.1)
BASOPHILS NFR BLD: 0 % (ref 0–1)
BUN SERPL-MCNC: 16 MG/DL (ref 6–20)
BUN/CREAT SERPL: 26 (ref 12–20)
CALCIUM SERPL-MCNC: 8.8 MG/DL (ref 8.5–10.1)
CHLORIDE SERPL-SCNC: 99 MMOL/L (ref 97–108)
CO2 SERPL-SCNC: 31 MMOL/L (ref 21–32)
CREAT SERPL-MCNC: 0.62 MG/DL (ref 0.55–1.02)
DIFFERENTIAL METHOD BLD: ABNORMAL
EOSINOPHIL # BLD: 0 K/UL (ref 0–0.4)
EOSINOPHIL NFR BLD: 0 % (ref 0–7)
ERYTHROCYTE [DISTWIDTH] IN BLOOD BY AUTOMATED COUNT: 17.7 % (ref 11.5–14.5)
GLUCOSE BLD STRIP.AUTO-MCNC: 222 MG/DL (ref 65–117)
GLUCOSE BLD STRIP.AUTO-MCNC: 228 MG/DL (ref 65–117)
GLUCOSE BLD STRIP.AUTO-MCNC: 237 MG/DL (ref 65–117)
GLUCOSE SERPL-MCNC: 243 MG/DL (ref 65–100)
HCT VFR BLD AUTO: 34.6 % (ref 35–47)
HGB BLD-MCNC: 10.4 G/DL (ref 11.5–16)
IMM GRANULOCYTES # BLD AUTO: 0 K/UL (ref 0–0.04)
IMM GRANULOCYTES NFR BLD AUTO: 1 % (ref 0–0.5)
LYMPHOCYTES # BLD: 0.8 K/UL (ref 0.8–3.5)
LYMPHOCYTES NFR BLD: 10 % (ref 12–49)
MCH RBC QN AUTO: 22 PG (ref 26–34)
MCHC RBC AUTO-ENTMCNC: 30.1 G/DL (ref 30–36.5)
MCV RBC AUTO: 73.3 FL (ref 80–99)
MONOCYTES # BLD: 0.4 K/UL (ref 0–1)
MONOCYTES NFR BLD: 4 % (ref 5–13)
NEUTS SEG # BLD: 6.8 K/UL (ref 1.8–8)
NEUTS SEG NFR BLD: 85 % (ref 32–75)
NRBC # BLD: 0 K/UL (ref 0–0.01)
NRBC BLD-RTO: 0 PER 100 WBC
PLATELET # BLD AUTO: 184 K/UL (ref 150–400)
PMV BLD AUTO: 11.1 FL (ref 8.9–12.9)
POTASSIUM SERPL-SCNC: 3.4 MMOL/L (ref 3.5–5.1)
RBC # BLD AUTO: 4.72 M/UL (ref 3.8–5.2)
SERVICE CMNT-IMP: ABNORMAL
SODIUM SERPL-SCNC: 137 MMOL/L (ref 136–145)
WBC # BLD AUTO: 8.1 K/UL (ref 3.6–11)

## 2024-07-26 PROCEDURE — 94640 AIRWAY INHALATION TREATMENT: CPT

## 2024-07-26 PROCEDURE — 6360000002 HC RX W HCPCS: Performed by: INTERNAL MEDICINE

## 2024-07-26 PROCEDURE — 6370000000 HC RX 637 (ALT 250 FOR IP): Performed by: INTERNAL MEDICINE

## 2024-07-26 PROCEDURE — 82962 GLUCOSE BLOOD TEST: CPT

## 2024-07-26 PROCEDURE — 6360000002 HC RX W HCPCS: Performed by: HOSPITALIST

## 2024-07-26 PROCEDURE — 6370000000 HC RX 637 (ALT 250 FOR IP): Performed by: HOSPITALIST

## 2024-07-26 PROCEDURE — 2580000003 HC RX 258: Performed by: INTERNAL MEDICINE

## 2024-07-26 PROCEDURE — 36415 COLL VENOUS BLD VENIPUNCTURE: CPT

## 2024-07-26 PROCEDURE — 80048 BASIC METABOLIC PNL TOTAL CA: CPT

## 2024-07-26 PROCEDURE — 6370000000 HC RX 637 (ALT 250 FOR IP): Performed by: PHYSICIAN ASSISTANT

## 2024-07-26 PROCEDURE — 85025 COMPLETE CBC W/AUTO DIFF WBC: CPT

## 2024-07-26 PROCEDURE — 6370000000 HC RX 637 (ALT 250 FOR IP): Performed by: NURSE PRACTITIONER

## 2024-07-26 RX ORDER — TRAMADOL HYDROCHLORIDE 50 MG/1
50 TABLET ORAL EVERY 6 HOURS PRN
Qty: 30 TABLET | Refills: 0 | Status: SHIPPED | OUTPATIENT
Start: 2024-07-26 | End: 2024-08-25

## 2024-07-26 RX ORDER — GUAIFENESIN 600 MG/1
600 TABLET, EXTENDED RELEASE ORAL 2 TIMES DAILY
Qty: 14 TABLET | Refills: 0 | Status: SHIPPED
Start: 2024-07-26 | End: 2024-08-02

## 2024-07-26 RX ORDER — PREDNISONE 20 MG/1
20 TABLET ORAL 2 TIMES DAILY
Qty: 3 TABLET | Refills: 0 | Status: SHIPPED
Start: 2024-07-26 | End: 2024-07-28

## 2024-07-26 RX ORDER — TRAZODONE HYDROCHLORIDE 100 MG/1
100 TABLET ORAL NIGHTLY
Qty: 30 TABLET | Refills: 0 | Status: SHIPPED | OUTPATIENT
Start: 2024-07-26

## 2024-07-26 RX ORDER — DOXYCYCLINE HYCLATE 100 MG
100 TABLET ORAL EVERY 12 HOURS SCHEDULED
Qty: 7 TABLET | Refills: 0 | Status: SHIPPED
Start: 2024-07-26 | End: 2024-07-30

## 2024-07-26 RX ADMIN — GUAIFENESIN 600 MG: 600 TABLET, EXTENDED RELEASE ORAL at 13:44

## 2024-07-26 RX ADMIN — BENZONATATE 200 MG: 100 CAPSULE ORAL at 13:45

## 2024-07-26 RX ADMIN — ESCITALOPRAM OXALATE 10 MG: 10 TABLET ORAL at 09:01

## 2024-07-26 RX ADMIN — PREDNISONE 20 MG: 20 TABLET ORAL at 09:00

## 2024-07-26 RX ADMIN — INSULIN LISPRO 2 UNITS: 100 INJECTION, SOLUTION INTRAVENOUS; SUBCUTANEOUS at 13:45

## 2024-07-26 RX ADMIN — HYDROCODONE POLISTIREX AND CHLORPHENIRAMINE POLISTIREX 5 ML: 10; 8 SUSPENSION, EXTENDED RELEASE ORAL at 09:00

## 2024-07-26 RX ADMIN — INSULIN LISPRO 2 UNITS: 100 INJECTION, SOLUTION INTRAVENOUS; SUBCUTANEOUS at 09:00

## 2024-07-26 RX ADMIN — ENOXAPARIN SODIUM 70 MG: 100 INJECTION SUBCUTANEOUS at 09:00

## 2024-07-26 RX ADMIN — BENZONATATE 200 MG: 100 CAPSULE ORAL at 09:01

## 2024-07-26 RX ADMIN — LEVETIRACETAM 500 MG: 500 TABLET, FILM COATED ORAL at 06:54

## 2024-07-26 RX ADMIN — DOXYCYCLINE HYCLATE 100 MG: 100 TABLET, COATED ORAL at 09:01

## 2024-07-26 RX ADMIN — Medication 1 AMPULE: at 09:03

## 2024-07-26 RX ADMIN — GUAIFENESIN 600 MG: 600 TABLET, EXTENDED RELEASE ORAL at 06:54

## 2024-07-26 RX ADMIN — SODIUM CHLORIDE, PRESERVATIVE FREE 10 ML: 5 INJECTION INTRAVENOUS at 09:02

## 2024-07-26 RX ADMIN — ARFORMOTEROL TARTRATE: 15 SOLUTION RESPIRATORY (INHALATION) at 07:44

## 2024-07-26 RX ADMIN — ASPIRIN 81 MG: 81 TABLET, COATED ORAL at 09:00

## 2024-07-26 ASSESSMENT — PAIN DESCRIPTION - DESCRIPTORS: DESCRIPTORS: CRAMPING

## 2024-07-26 ASSESSMENT — PAIN SCALES - GENERAL: PAINLEVEL_OUTOF10: 4

## 2024-07-26 ASSESSMENT — PAIN DESCRIPTION - ORIENTATION: ORIENTATION: RIGHT

## 2024-07-26 ASSESSMENT — PAIN DESCRIPTION - LOCATION: LOCATION: CHEST

## 2024-07-26 NOTE — PROGRESS NOTES
2024    Skilled Nursing Facility Admission History and physical examination     [facility]    Adenike Dasilva (:  1940) is a 83 y.o. female, with history of arctic valve stenosis coronary artery disease chronic respiratory failure with hypoxemia, chronic oxygen dependent,  chronic bronchitis history of interstitial lung disease type II diabetic state last hemoglobin A1c was 7.1 in 2023 hypercholesterolemia degenerative joint disease osteoarthritis, overactive bladder, was presented to emergency room on 2024 to ICU with hypotension, and falling off the bed, patient was given IV fluid and Levophed with admission diagnosis of sepsis 6 septic shock questionable pneumonia and pulmonary fibrosis, was discharged on 2024, with a discharge diagnosis of pulmonary fibrosis diabetic state insomnia dementia coronary artery disease hypertension cholesterolemia hypertension currently DNR, with 1 year mortality rate of 72 percentile, last potassium level was 3.6 in 2024, normal kidney function test, anemia of chronic disease last hemoglobin was 9.1 MCV of 74, today pt seen and evaluated laying in bed comoftably, c/o Migraine ha for which Fioricet has been the only pain meds which helped, currently on no coumadin has een discont >3 months ago by cardiologist, no dementia noted as states in her chart, Patient was transferred to skilled nursing facility acute care post hospitalization for the continuity of care, and PT/OT/ST,  pt lives by self and , ,  w/ 3 kids,     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

## 2024-07-26 NOTE — PLAN OF CARE
Problem: Safety - Adult  Goal: Free from fall injury  7/26/2024 1222 by Nicole Patel RN  Outcome: Adequate for Discharge  7/26/2024 0240 by Maggi Skinner RN  Outcome: Progressing  Flowsheets (Taken 7/26/2024 0240)  Free From Fall Injury: Instruct family/caregiver on patient safety     Problem: Discharge Planning  Goal: Discharge to home or other facility with appropriate resources  7/26/2024 1222 by Nicole Patel RN  Outcome: Adequate for Discharge  7/26/2024 0240 by Maggi Skinner RN  Outcome: Progressing  Flowsheets (Taken 7/26/2024 0240)  Discharge to home or other facility with appropriate resources:   Identify barriers to discharge with patient and caregiver   Arrange for needed discharge resources and transportation as appropriate   Identify discharge learning needs (meds, wound care, etc)     Problem: Respiratory - Adult  Goal: Achieves optimal ventilation and oxygenation  7/26/2024 1222 by Nicole Patel RN  Outcome: Adequate for Discharge  7/26/2024 0747 by Jacqueline Cobos RCP  Outcome: Progressing  7/26/2024 0240 by Maggi Skinner RN  Outcome: Progressing  Flowsheets (Taken 7/26/2024 0240)  Achieves optimal ventilation and oxygenation:   Assess for changes in respiratory status   Assess for changes in mentation and behavior   Position to facilitate oxygenation and minimize respiratory effort   Oxygen supplementation based on oxygen saturation or arterial blood gases   Encourage broncho-pulmonary hygiene including cough, deep breathe, incentive spirometry   Assess and instruct to report shortness of breath or any respiratory difficulty   Respiratory therapy support as indicated     Problem: Pain  Goal: Verbalizes/displays adequate comfort level or baseline comfort level  7/26/2024 1222 by Nicole Patel RN  Outcome: Adequate for Discharge  7/26/2024 0240 by Maggi Skinner RN  Outcome: Progressing  Flowsheets (Taken 7/26/2024 0240)  Verbalizes/displays adequate comfort level

## 2024-07-26 NOTE — DISCHARGE INSTRUCTIONS
HOSPITALIST DISCHARGE INSTRUCTIONS    NAME: Adenike Dasilva   :  1940   MRN:  [id number]     Date/Time:  2024 12:13 PM    ADMIT DATE: 2024     DISCHARGE DATE: 2024     DISCHARGE DIAGNOSIS:  Sepsis  Septic shock- resolved, out of ICU in 1 day off pressors  PNA?  Pulmonary fibrosis/ILD-2L NC at baseline, stable now, cont prednisone taper per pulm, cont inhalers, doxycycline,  nebs, OP followup with Dr Edgar as arranged  DM  Migraines  Insomnia  Dementia  H/o aortic valve replacement  CAD  HLD  HTN  DNR    MEDICATIONS:  As per medication reconciliation  list  It is important that you take the medication exactly as they are prescribed.   Keep your medication in the bottles provided by the pharmacist and keep a list of the medication names, dosages, and times to be taken in your wallet.   Do not take other medications without consulting your doctor.     Pain Management: per above medications    What to do at Home    Recommended diet:  cardiac diet, diabetic diet, and low fat, low cholesterol diet    Recommended activity: activity as tolerated    If you have questions regarding the hospital related prescriptions or hospital related issues please call at .    If you experience any of the following symptoms then please call your primary care physician or return to the emergency room if you cannot get hold of your doctor:  Fever, chills, nausea, vomiting, diarrhea, change in mentation, falling, bleeding, shortness of breath,     Follow Up:  PCP you are to call and set up an appointment to see them in 7-10 days.  Dr Edgar ([facility]) in 2 weeks as recommended      Information obtained by :  I understand that if any problems occur once I am at home I am to contact my physician.    I understand and acknowledge receipt of the instructions indicated above.

## 2024-07-26 NOTE — PROGRESS NOTES

## 2024-07-26 NOTE — CARE COORDINATION
Transition of Care Plan:  RUR: 15%  Prior Level of Functioning: indep  Disposition: SNF - [facility] - [phone]  If SNF or IPR: Date FOC offered: 7/24/24  Date FOC received: 7/24/24  Date authorization started with reference number: 7/24/24 ref #[id number]  Date authorization received and expires: 7/25/24 expires 7/29/24  Transportation at discharge: anticipate BLS  IM/IMM Medicare/ letter given: to be provided   Caregiver Contact: karissa kendrick son [phone]   Discharge Caregiver contacted prior to discharge? To be contacted  Care Conference needed? Not at this time   Barriers to discharge: medical stability     [facility] accepted.     Insurance auth obtained:  Auth ID: [id number] - The facility can call to get the auth ID once she admits.  Thai ref#: [id number]  Service Dates: 7/25/24 - 7/29/24  CM Coordinator: Nichole Bob MD, patient not medically stable at this time, possible ERYN tomorrow pending clinical improvement.     Marilyn Woodard, BSW, CM  [phone]    
Transition of Care Plan:  RUR: 15%  Prior Level of Functioning: indep  Disposition: SNF - [facility] - call report [phone] - going to room 407B  If SNF or IPR: Date FOC offered: 7/24/24  Date FOC received: 7/24/24  Date authorization started with reference number: 7/24/24 ref #[id number]  Date authorization received and expires: 7/25/24 expires 7/29/24  Transportation at discharge: family @ 4:30pm  IM/IMM Medicare/ letter given: to be provided   Caregiver Contact: karissa dasilva son [phone]   Discharge Caregiver contacted prior to discharge? To be contacted  Care Conference needed? Not at this time   Barriers to discharge: medical stability      CM acknowledged d/c order. MRAY spoke with Ian in admissions at [facility] who confirmed they can accept patient today at any time, bed is ready. MARY has contacted patient's son Karissa Dasilva [phone] and informed him of dc plan. Son agreed and confirmed he or another family member will be able to transport patient to facility today. Son will call CM back with time as soon as possible.       Transition of Care Plan to SNF/Rehab    Communication to Patient/Family:  Met with patient and family and they are agreeable to the transition plan. The Plan for Transition of Care is related to the following treatment goals: SNF    The Patient and/or patient representative was provided with a choice of provider and agrees  with the discharge plan.      Yes [x] No []    A Freedom of choice list was provided with basic dialogue that supports the patient's individualized plan of care/goals and shares the quality data associated with the providers.       Yes [x] No []    SNF/Rehab Transition:  Patient has been accepted to [facility] SNF/Rehab and meets criteria for admission.   Patient will transported by family and expected to leave at [facility].    Communication to SNF/Rehab:  Bedside RN has been notified to update the transition plan to the facility and 
comment)  (family)   Confirm Follow Up Transport Family   Condition of Participation: Discharge Planning   The Plan for Transition of Care is related to the following treatment goals: home       GUMARO Meraz, CM  [phone]

## 2024-07-26 NOTE — DISCHARGE SUMMARY
Discharge Summary    Name: Adenike Dasilva  [id number]  YOB: 1940 (Age: 83 y.o.)   Date of Admission: 7/22/2024  Date of Discharge: 7/26/2024  Attending Physician: Madyson Prater MD    Discharge Diagnosis:   Sepsis  Septic shock- resolved, out of ICU in 1 day off pressors  PNA?  Pulmonary fibrosis/ILD-2L NC at baseline, stable now, cont prednisone taper per pulm, cont inhalers, doxycycline,  nebs, OP followup with Dr Edgar as arranged  DM  Migraines  Insomnia  Dementia  H/o aortic valve replacement  CAD  HLD  HTN  DNR      Consultations:  IP CONSULT TO PHARMACY  IP CONSULT TO PULMONOLOGY      Brief Admission History/Reason for Admission Per Elian Sims MD:   \"83/F who we are seeing in consultation at the request of Dr. Ludwig for admission to ICU for hypotension and need for vasopressors.      Briefly, patient lives alone and fell off the bed last night. She has history of UTI in the past but she has not had any fever, chills or rigors. She denies any SOB or chest pain.      In the ED, she was found to be hypotensive, she received IVF and levophed was started for hypotension. ICU was consulted for admission.      On my arrival, son at bedside. Patient currently denies any symptoms but appears very dry on exam. \"    Brief Hospital Course by Main Problems:   Sepsis  Septic shock- resolved  PNA?  Pulmonary fibrosis/ILD-2L NC at baseline, currently on 2L/m today  Admission with leukocytosis, elevated lactic, tachycardia, source-PNA, persistent hypotension refractory to fluid resuscitation requiring pressors.  7/22 CT chest with severe interstitial lung disease with possible fibrosis, emphysematous lung change and left perihilar nodular infiltrate.     Continue po doxycycline initiated in ICU  Continue scheduled nebs,   Cont Mucinex,   Added Tussionex for cough& flexeril  today- causing pleuritic R sided chest pain today  Continue steroids- tapered to PO per  Another medication with the same name was removed. Continue taking this medication, and follow the directions you see here.     escitalopram 10 MG tablet  Commonly known as: LEXAPRO  What changed: Another medication with the same name was removed. Continue taking this medication, and follow the directions you see here.     Esomeprazole Magnesium 20 MG Tbec  What changed: Another medication with the same name was removed. Continue taking this medication, and follow the directions you see here.     fluticasone-salmeterol 100-50 MCG/ACT Aepb diskus inhaler  Commonly known as: ADVAIR  What changed: Another medication with the same name was removed. Continue taking this medication, and follow the directions you see here.     loperamide 2 MG capsule  Commonly known as: IMODIUM  What changed: Another medication with the same name was removed. Continue taking this medication, and follow the directions you see here.            CONTINUE taking these medications      acetaminophen 325 MG tablet  Commonly known as: TYLENOL     albuterol sulfate  (90 Base) MCG/ACT inhaler  Commonly known as: PROVENTIL;VENTOLIN;PROAIR  Inhale 2 puffs into the lungs every 6 hours as needed for Shortness of Breath or Wheezing     amitriptyline 10 MG tablet  Commonly known as: ELAVIL  TAKE 1 TABLET BY MOUTH EVERY NIGHT AT BEDTIME     atorvastatin 20 MG tablet  Commonly known as: LIPITOR  TAKE 1 TABLET BY MOUTH EVERY EVENING     benzonatate 100 MG capsule  Commonly known as: TESSALON  Take 1 capsule by mouth 3 times daily as needed for Cough     Biofreeze 4 % Gel  Generic drug: Menthol (Topical Analgesic)     Biotin 5000 5 MG Caps  Generic drug: Biotin     Blink Tears 0.25 % Soln  Generic drug: Polyethylene Glycol 400     butalbital-acetaminophen-caffeine -40 MG per tablet  Commonly known as: FIORICET, ESGIC  Take 1 tablet by mouth every 6 hours as needed for Headaches     dicyclomine 10 MG capsule  Commonly known as: BENTYL

## 2024-07-26 NOTE — PLAN OF CARE
Problem: Safety - Adult  Goal: Free from fall injury  7/26/2024 0240 by Maggi Skinner RN  Outcome: Progressing  Flowsheets (Taken 7/26/2024 0240)  Free From Fall Injury: Instruct family/caregiver on patient safety  7/25/2024 1343 by Nicole Patel RN  Outcome: Progressing     Problem: Discharge Planning  Goal: Discharge to home or other facility with appropriate resources  7/26/2024 0240 by Maggi Skinner RN  Outcome: Progressing  Flowsheets (Taken 7/26/2024 0240)  Discharge to home or other facility with appropriate resources:   Identify barriers to discharge with patient and caregiver   Arrange for needed discharge resources and transportation as appropriate   Identify discharge learning needs (meds, wound care, etc)  7/25/2024 1343 by Nicole Patel RN  Outcome: Progressing     Problem: Respiratory - Adult  Goal: Achieves optimal ventilation and oxygenation  7/26/2024 0240 by Maggi Skinner RN  Outcome: Progressing  Flowsheets (Taken 7/26/2024 0240)  Achieves optimal ventilation and oxygenation:   Assess for changes in respiratory status   Assess for changes in mentation and behavior   Position to facilitate oxygenation and minimize respiratory effort   Oxygen supplementation based on oxygen saturation or arterial blood gases   Encourage broncho-pulmonary hygiene including cough, deep breathe, incentive spirometry   Assess and instruct to report shortness of breath or any respiratory difficulty   Respiratory therapy support as indicated  7/25/2024 1343 by Nicole Patel RN  Outcome: Progressing     Problem: Pain  Goal: Verbalizes/displays adequate comfort level or baseline comfort level  7/26/2024 0240 by Maggi Skinner RN  Outcome: Progressing  Flowsheets (Taken 7/26/2024 0240)  Verbalizes/displays adequate comfort level or baseline comfort level: Encourage patient to monitor pain and request assistance  7/25/2024 1343 by Nicole Patel RN  Outcome: Progressing     Problem:

## 2024-07-28 LAB
BACTERIA SPEC CULT: NORMAL
BACTERIA SPEC CULT: NORMAL
SERVICE CMNT-IMP: NORMAL
SERVICE CMNT-IMP: NORMAL

## 2024-07-29 ENCOUNTER — OFFICE VISIT (OUTPATIENT)
Facility: CLINIC | Age: 84
End: 2024-07-29
Payer: MEDICARE

## 2024-07-29 VITALS
SYSTOLIC BLOOD PRESSURE: 136 MMHG | OXYGEN SATURATION: 95 % | HEART RATE: 70 BPM | TEMPERATURE: 97.6 F | DIASTOLIC BLOOD PRESSURE: 72 MMHG | RESPIRATION RATE: 18 BRPM

## 2024-07-29 DIAGNOSIS — M51.36 DDD (DEGENERATIVE DISC DISEASE), LUMBAR: Chronic | ICD-10-CM

## 2024-07-29 DIAGNOSIS — J84.10 PULMONARY FIBROSIS (HCC): ICD-10-CM

## 2024-07-29 DIAGNOSIS — J84.9 ILD (INTERSTITIAL LUNG DISEASE) (HCC): Primary | ICD-10-CM

## 2024-07-29 DIAGNOSIS — K21.9 GASTROESOPHAGEAL REFLUX DISEASE WITHOUT ESOPHAGITIS: Chronic | ICD-10-CM

## 2024-07-29 DIAGNOSIS — F03.A0 MILD DEMENTIA WITHOUT BEHAVIORAL DISTURBANCE, PSYCHOTIC DISTURBANCE, MOOD DISTURBANCE, OR ANXIETY, UNSPECIFIED DEMENTIA TYPE (HCC): Chronic | ICD-10-CM

## 2024-07-29 DIAGNOSIS — I10 BENIGN ESSENTIAL HYPERTENSION: Chronic | ICD-10-CM

## 2024-07-29 DIAGNOSIS — E78.2 MIXED HYPERLIPIDEMIA: Chronic | ICD-10-CM

## 2024-07-29 DIAGNOSIS — I25.10 CORONARY ARTERY DISEASE INVOLVING NATIVE CORONARY ARTERY OF NATIVE HEART WITHOUT ANGINA PECTORIS: Chronic | ICD-10-CM

## 2024-07-29 DIAGNOSIS — R53.1 GENERALIZED WEAKNESS: ICD-10-CM

## 2024-07-29 DIAGNOSIS — E11.42 TYPE 2 DIABETES MELLITUS WITH DIABETIC POLYNEUROPATHY, WITHOUT LONG-TERM CURRENT USE OF INSULIN (HCC): Chronic | ICD-10-CM

## 2024-07-29 PROCEDURE — 99309 SBSQ NF CARE MODERATE MDM 30: CPT | Performed by: NURSE PRACTITIONER

## 2024-07-29 PROCEDURE — 1123F ACP DISCUSS/DSCN MKR DOCD: CPT | Performed by: NURSE PRACTITIONER
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
## 2024-07-29 NOTE — PROGRESS NOTES
History of DVT (deep vein thrombosis)     History of recurrent UTIs     Hx of seasonal allergies     Hypercholesterolemia     Irritable bowel syndrome (IBS)     Migraine     Noncompliance 2023    Oxygen dependent     PRN at 2LPM NC    Psychiatric disorder     anxiety    Thromboembolus (HCC)     left leg    Thyroid disease     pt denies- not tx for it    Urinary urgency          ROS:   Systems reviewed were negative unless noted below.  Only noted that when she coughs she has right-sided chest pain dissipates when she is not coughing.    Medications: Reviewed in EMR and assessment and plan    Social History:    Social History     Tobacco Use    Smoking status: Former     Current packs/day: 0.00     Average packs/day: 0.3 packs/day for 20.0 years (5.0 ttl pk-yrs)     Types: Cigarettes     Start date: 1960     Quit date: 1980     Years since quittin.6    Smokeless tobacco: Never   Vaping Use    Vaping Use: Never used   Substance Use Topics    Alcohol use: No    Drug use: No       Family History:    Family History   Problem Relation Age of Onset    Diabetes Mother     Stroke Mother             Vitals:   Vitals:    24 0843   BP: 136/72   Pulse: 70   Resp: 18   Temp: 97.6 °F (36.4 °C)   SpO2: 95%           Exam:  Constitutional: No acute distress;   Eyes: Sclera clear, PERRLA;   Ears/nose/mouth/throat:mmm, OP clear, trachea midline;  Cardiovascular: RRR,nml S1 and S2, no rubs murmurs or gallops, no edema, no cyanosis;   Respiratory: Bilateral lung sounds are diminished, symmetric, no respiratory distress; 2 L of oxygen at 95%  Gastrointestinal: Abdomen soft, NT, ND, no masses, normal bowel sounds;  Neurologic: Cranial nerves II through VII grossly intact, no speech or motor deficits A&O in time place and person  Skin: No rash, warm and dry;  Musculoskeletal: No erythema swelling or joint tenderness, extremities without cyanosis, neck supple, ROM intact spine and extremities;  Psychiatric: Not

## 2024-07-31 ENCOUNTER — OFFICE VISIT (OUTPATIENT)
Facility: CLINIC | Age: 84
End: 2024-07-31
Payer: MEDICARE

## 2024-07-31 VITALS
TEMPERATURE: 98.3 F | RESPIRATION RATE: 18 BRPM | SYSTOLIC BLOOD PRESSURE: 134 MMHG | HEART RATE: 73 BPM | OXYGEN SATURATION: 98 % | DIASTOLIC BLOOD PRESSURE: 74 MMHG | WEIGHT: 135.5 LBS | BODY MASS INDEX: 26.46 KG/M2

## 2024-07-31 DIAGNOSIS — R53.1 GENERALIZED WEAKNESS: ICD-10-CM

## 2024-07-31 DIAGNOSIS — E11.42 TYPE 2 DIABETES MELLITUS WITH DIABETIC POLYNEUROPATHY, WITHOUT LONG-TERM CURRENT USE OF INSULIN (HCC): ICD-10-CM

## 2024-07-31 DIAGNOSIS — K21.9 GASTROESOPHAGEAL REFLUX DISEASE WITHOUT ESOPHAGITIS: ICD-10-CM

## 2024-07-31 DIAGNOSIS — F03.A0 MILD DEMENTIA WITHOUT BEHAVIORAL DISTURBANCE, PSYCHOTIC DISTURBANCE, MOOD DISTURBANCE, OR ANXIETY, UNSPECIFIED DEMENTIA TYPE (HCC): ICD-10-CM

## 2024-07-31 DIAGNOSIS — M51.36 DDD (DEGENERATIVE DISC DISEASE), LUMBAR: ICD-10-CM

## 2024-07-31 DIAGNOSIS — I10 BENIGN ESSENTIAL HYPERTENSION: ICD-10-CM

## 2024-07-31 DIAGNOSIS — U07.1 COVID-19: ICD-10-CM

## 2024-07-31 DIAGNOSIS — J84.10 PULMONARY FIBROSIS (HCC): ICD-10-CM

## 2024-07-31 DIAGNOSIS — J84.9 ILD (INTERSTITIAL LUNG DISEASE) (HCC): Primary | ICD-10-CM

## 2024-07-31 DIAGNOSIS — I25.10 CORONARY ARTERY DISEASE INVOLVING NATIVE CORONARY ARTERY OF NATIVE HEART WITHOUT ANGINA PECTORIS: ICD-10-CM

## 2024-07-31 DIAGNOSIS — E78.2 MIXED HYPERLIPIDEMIA: ICD-10-CM

## 2024-07-31 PROCEDURE — 99309 SBSQ NF CARE MODERATE MDM 30: CPT | Performed by: NURSE PRACTITIONER

## 2024-07-31 PROCEDURE — 1123F ACP DISCUSS/DSCN MKR DOCD: CPT | Performed by: NURSE PRACTITIONER

## 2024-07-31 NOTE — PROGRESS NOTES
[id number]  [address] [facility] [place] [address]  Phone: [phone]  Fax: [phone]  Also available via Perfect Serve     PLACE OF SERVICE:  [facility] [address] [place] [address]    SKILLED VISIT    Chief Complaint:   Chief Complaint   Patient presents with    Follow-up         HPI : Adenike Dasilva is a 83 y.o. female here for follow up.    Previous history prior to admission:  Patient was admitted to the hospital from 7/22/2024 to 7/26/2024.  She presented to the emergency room as she lives alone she fell off the bed and was generally weak.  When she presented to the ER she was hypotensive received IV fluids was started on vasopressors and transferred to the ICU.  It was found that she was in septic shock possibly due to pneumonia she has underlying diagnosis of pulmonary fibrosis.  She wears 2 L nasal cannula as needed at baseline.  On admission she had leukocytosis elevated lactic acid persistent hypotension despite fluid station.  On 7/22/2023 she had a CT of the chest that showed severe interstitial lung disease emphysematous changes and perihilar infiltrate.  She was given IV antibiotics completed doxycycline.  Treated with scheduled nebulizer treatments, Mucinex and steroids.  It was recommended that she be discharged to SNF for strengthening and conditioning.  Patient has past medical history of diabetes mellitus type 2, recently taken off of her metformin during hospitalization but was discharged back on metformin, migraines, insomnia, dementia, history of aortic valve replacement, coronary artery disease, hyperlipidemia, hypertension.  Ultimately was transferred to [facility] and rehabilitation for strengthening and deconditioning.    Current visit:  07/29/2024-patient sitting on the side of the bed today she is awake alert very pleasant.  Vital signs reviewed blood pressures ranging 120-140 she is afebrile heart rate is

## 2024-08-01 NOTE — PROGRESS NOTES
Urine sample looks infected. Rx sent in for macrobid. refilled     [de-identified] : RUNNING TEMP. FOR 2 DAYS [FreeTextEntry6] : Fever low grade temp. Tolerating diet and making wet diapers.  No sick contacts. tolerating fluids and normal uop.

## 2024-08-02 ENCOUNTER — OFFICE VISIT (OUTPATIENT)
Facility: CLINIC | Age: 84
End: 2024-08-02

## 2024-08-02 ENCOUNTER — TELEPHONE (OUTPATIENT)
Facility: CLINIC | Age: 84
End: 2024-08-02

## 2024-08-02 VITALS
TEMPERATURE: 98.4 F | SYSTOLIC BLOOD PRESSURE: 133 MMHG | DIASTOLIC BLOOD PRESSURE: 74 MMHG | BODY MASS INDEX: 25.74 KG/M2 | HEART RATE: 58 BPM | RESPIRATION RATE: 18 BRPM | WEIGHT: 131.8 LBS | OXYGEN SATURATION: 98 %

## 2024-08-02 DIAGNOSIS — U07.1 COVID-19: ICD-10-CM

## 2024-08-02 DIAGNOSIS — I10 BENIGN ESSENTIAL HYPERTENSION: ICD-10-CM

## 2024-08-02 DIAGNOSIS — K21.9 GASTROESOPHAGEAL REFLUX DISEASE WITHOUT ESOPHAGITIS: ICD-10-CM

## 2024-08-02 DIAGNOSIS — E78.2 MIXED HYPERLIPIDEMIA: ICD-10-CM

## 2024-08-02 DIAGNOSIS — M51.36 DDD (DEGENERATIVE DISC DISEASE), LUMBAR: ICD-10-CM

## 2024-08-02 DIAGNOSIS — J84.10 PULMONARY FIBROSIS (HCC): ICD-10-CM

## 2024-08-02 DIAGNOSIS — R53.1 GENERALIZED WEAKNESS: ICD-10-CM

## 2024-08-02 DIAGNOSIS — I25.10 CORONARY ARTERY DISEASE INVOLVING NATIVE CORONARY ARTERY OF NATIVE HEART WITHOUT ANGINA PECTORIS: ICD-10-CM

## 2024-08-02 DIAGNOSIS — J84.9 ILD (INTERSTITIAL LUNG DISEASE) (HCC): Primary | ICD-10-CM

## 2024-08-02 DIAGNOSIS — E11.42 TYPE 2 DIABETES MELLITUS WITH DIABETIC POLYNEUROPATHY, WITHOUT LONG-TERM CURRENT USE OF INSULIN (HCC): ICD-10-CM

## 2024-08-02 DIAGNOSIS — F03.A0 MILD DEMENTIA WITHOUT BEHAVIORAL DISTURBANCE, PSYCHOTIC DISTURBANCE, MOOD DISTURBANCE, OR ANXIETY, UNSPECIFIED DEMENTIA TYPE (HCC): ICD-10-CM

## 2024-08-02 NOTE — TELEPHONE ENCOUNTER
This is a late entry:  The patient called and asked that the following information be given to Suleiman, stating that he would know what she was talking about:    \"Usually, I have a 10 day limit with 2 more days to go.\"    I called Suleiman earlier today, and gave him the message verbally over the phone.  He acknowledged.  The patient's callback if needed is [phone]

## 2024-08-02 NOTE — PROGRESS NOTES
[id number]  [place] [place] [address]  Phone: [phone]  Fax: [phone]  Also available via Perfect Serve     PLACE OF SERVICE:  [facility] [address] [place] [address]    SKILLED VISIT    Chief Complaint:   Chief Complaint   Patient presents with    Follow-up         HPI : Adenike Dasilva is a 83 y.o. female here for follow up.    Previous history prior to admission:  Patient was admitted to the hospital from 7/22/2024 to 7/26/2024.  She presented to the emergency room as she lives alone she fell off the bed and was generally weak.  When she presented to the ER she was hypotensive received IV fluids was started on vasopressors and transferred to the ICU.  It was found that she was in septic shock possibly due to pneumonia she has underlying diagnosis of pulmonary fibrosis.  She wears 2 L nasal cannula as needed at baseline.  On admission she had leukocytosis elevated lactic acid persistent hypotension despite fluid station.  On 7/22/2023 she had a CT of the chest that showed severe interstitial lung disease emphysematous changes and perihilar infiltrate.  She was given IV antibiotics completed doxycycline.  Treated with scheduled nebulizer treatments, Mucinex and steroids.  It was recommended that she be discharged to SNF for strengthening and conditioning.  Patient has past medical history of diabetes mellitus type 2, recently taken off of her metformin during hospitalization but was discharged back on metformin, migraines, insomnia, dementia, history of aortic valve replacement, coronary artery disease, hyperlipidemia, hypertension.  Ultimately was transferred to [facility] and rehabilitation for strengthening and deconditioning.    Current visit:  07/29/2024-patient sitting on the side of the bed today she is awake alert very pleasant.  Vital signs reviewed blood pressures ranging 120-140 she is afebrile heart rate is

## 2024-08-02 NOTE — TELEPHONE ENCOUNTER
Call returned to patient - voice message left informing that we are able to read Cristina Sosa, JONY's note who visit her and we are aware that she is being treated for Covid, but we do not have access to the test results.

## 2024-08-02 NOTE — TELEPHONE ENCOUNTER
The patient called to update Dr. Alex that she is still in rehab, and was tested for covid last week with a positive result.  She said that she was visited today by a [facility] NP but did not know the providers name.  The patient states that she was tested again for covid yesterday but can't get the test results.  She asks if Dr. Alex can get the test results for her.  The patient's callback is [phone]

## 2024-08-05 ENCOUNTER — OFFICE VISIT (OUTPATIENT)
Facility: CLINIC | Age: 84
End: 2024-08-05
Payer: MEDICARE

## 2024-08-05 DIAGNOSIS — F03.B0 MODERATE DEMENTIA WITHOUT BEHAVIORAL DISTURBANCE, PSYCHOTIC DISTURBANCE, MOOD DISTURBANCE, OR ANXIETY, UNSPECIFIED DEMENTIA TYPE (HCC): Chronic | ICD-10-CM

## 2024-08-05 DIAGNOSIS — I25.10 CORONARY ARTERY DISEASE INVOLVING NATIVE CORONARY ARTERY OF NATIVE HEART WITHOUT ANGINA PECTORIS: Primary | Chronic | ICD-10-CM

## 2024-08-05 DIAGNOSIS — J41.0 SIMPLE CHRONIC BRONCHITIS (HCC): Chronic | ICD-10-CM

## 2024-08-05 DIAGNOSIS — M15.9 GENERALIZED OSTEOARTHRITIS: Chronic | ICD-10-CM

## 2024-08-05 DIAGNOSIS — J96.11 CHRONIC RESPIRATORY FAILURE WITH HYPOXIA (HCC): Chronic | ICD-10-CM

## 2024-08-05 DIAGNOSIS — Z95.2 H/O AORTIC VALVE REPLACEMENT: Chronic | ICD-10-CM

## 2024-08-05 DIAGNOSIS — I35.0 NONRHEUMATIC AORTIC VALVE STENOSIS: Chronic | ICD-10-CM

## 2024-08-05 DIAGNOSIS — Z66 DNR (DO NOT RESUSCITATE): Chronic | ICD-10-CM

## 2024-08-05 DIAGNOSIS — E78.2 MIXED HYPERLIPIDEMIA: Chronic | ICD-10-CM

## 2024-08-05 DIAGNOSIS — I10 BENIGN ESSENTIAL HYPERTENSION: Chronic | ICD-10-CM

## 2024-08-05 DIAGNOSIS — E11.42 TYPE 2 DIABETES MELLITUS WITH DIABETIC POLYNEUROPATHY, WITHOUT LONG-TERM CURRENT USE OF INSULIN (HCC): Chronic | ICD-10-CM

## 2024-08-05 PROCEDURE — 99308 SBSQ NF CARE LOW MDM 20: CPT | Performed by: FAMILY MEDICINE

## 2024-08-05 PROCEDURE — 1123F ACP DISCUSS/DSCN MKR DOCD: CPT | Performed by: FAMILY MEDICINE

## 2024-08-06 ENCOUNTER — OFFICE VISIT (OUTPATIENT)
Facility: CLINIC | Age: 84
End: 2024-08-06

## 2024-08-06 DIAGNOSIS — Z76.89 ENCOUNTER FOR SOCIAL WORK INTERVENTION: Primary | ICD-10-CM

## 2024-08-06 NOTE — PROGRESS NOTES
Social Work Assessment    Date of referral: 24  Referral received from: Cristina Sosa NP  Reason for referral: Assess for needs    MSW met with Ms. Dasilva to introduce her to [facility] Social Work.  Ms. Dasilva was laying in bed when MSW arrived.  She was verbal and able to express herself without difficulty.     Social history    Living situation prior to SNF:   Ms. Dasilva stated that she resides alone.  She moved from [facility] when her  .  Her son resides in [place]. Patient stated that her apartment is located on the 4th floor.  There is elevator access.      Anticipated living situation following discharge: Ms. Dasilva anticipates returning to her home upon discharge.    Marital status: [] Single   []   []     []    [] Life Partner   [x] /  Ms. Dasilva was  for 55 years before her  .    Family support:  Patient stated that she moved to [place] to be closer to her son.  She also has a daughter who resides in [place] and a on who resides in [place].    Previous career:  Patient stated that she worked as a  until she was 78 years old.    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     Was assistance needed for ADLs prior to SNF?  [] Yes   [x] No  Additional notes:  Patient stated that she utilizes a rolling walker.  She also has a  to complete housework.     Spirituality/Orthodoxy affiliation:   Additional notes:  Ms. Dasilva stated she was born into the Latter day of [place] and converted to Sabianist.   Patient stated that she reads the bible every morning.     Is patient a :          [] Yes    [x]  No  Additional notes:    Advance Care Plan:          [x] Yes   [] No  Additional notes:    Housing:   Are you concerned about housing?                                  [] Yes   [x] No  Additional notes:    Food:  Can you afford nutritious meals?

## 2024-08-07 ENCOUNTER — OFFICE VISIT (OUTPATIENT)
Facility: CLINIC | Age: 84
End: 2024-08-07
Payer: MEDICARE

## 2024-08-07 VITALS
HEART RATE: 76 BPM | DIASTOLIC BLOOD PRESSURE: 70 MMHG | SYSTOLIC BLOOD PRESSURE: 152 MMHG | TEMPERATURE: 98.2 F | RESPIRATION RATE: 18 BRPM | BODY MASS INDEX: 25.74 KG/M2 | WEIGHT: 131.8 LBS | OXYGEN SATURATION: 97 %

## 2024-08-07 DIAGNOSIS — F03.A0 MILD DEMENTIA WITHOUT BEHAVIORAL DISTURBANCE, PSYCHOTIC DISTURBANCE, MOOD DISTURBANCE, OR ANXIETY, UNSPECIFIED DEMENTIA TYPE (HCC): ICD-10-CM

## 2024-08-07 DIAGNOSIS — E78.2 MIXED HYPERLIPIDEMIA: ICD-10-CM

## 2024-08-07 DIAGNOSIS — I10 BENIGN ESSENTIAL HYPERTENSION: ICD-10-CM

## 2024-08-07 DIAGNOSIS — J84.9 ILD (INTERSTITIAL LUNG DISEASE) (HCC): Primary | ICD-10-CM

## 2024-08-07 DIAGNOSIS — I25.10 CORONARY ARTERY DISEASE INVOLVING NATIVE CORONARY ARTERY OF NATIVE HEART WITHOUT ANGINA PECTORIS: ICD-10-CM

## 2024-08-07 DIAGNOSIS — M51.36 DDD (DEGENERATIVE DISC DISEASE), LUMBAR: ICD-10-CM

## 2024-08-07 DIAGNOSIS — R53.1 GENERALIZED WEAKNESS: ICD-10-CM

## 2024-08-07 DIAGNOSIS — E11.42 TYPE 2 DIABETES MELLITUS WITH DIABETIC POLYNEUROPATHY, WITHOUT LONG-TERM CURRENT USE OF INSULIN (HCC): ICD-10-CM

## 2024-08-07 DIAGNOSIS — J84.10 PULMONARY FIBROSIS (HCC): ICD-10-CM

## 2024-08-07 DIAGNOSIS — K21.9 GASTROESOPHAGEAL REFLUX DISEASE WITHOUT ESOPHAGITIS: ICD-10-CM

## 2024-08-07 DIAGNOSIS — U07.1 COVID-19: ICD-10-CM

## 2024-08-07 PROCEDURE — 1123F ACP DISCUSS/DSCN MKR DOCD: CPT | Performed by: NURSE PRACTITIONER

## 2024-08-07 PROCEDURE — 99309 SBSQ NF CARE MODERATE MDM 30: CPT | Performed by: NURSE PRACTITIONER

## 2024-08-07 NOTE — PROGRESS NOTES
[id number]  [address] [facility] [place] [address]  Phone: [phone]  Fax: [phone]  Also available via Perfect Serve     PLACE OF SERVICE:  [facility] [address] [place] [address]    SKILLED VISIT    Chief Complaint:   Chief Complaint   Patient presents with    Follow-up         HPI : Adenike Dasilva is a 83 y.o. female here for follow up.    Previous history prior to admission:  Patient was admitted to the hospital from 7/22/2024 to 7/26/2024.  She presented to the emergency room as she lives alone she fell off the bed and was generally weak.  When she presented to the ER she was hypotensive received IV fluids was started on vasopressors and transferred to the ICU.  It was found that she was in septic shock possibly due to pneumonia she has underlying diagnosis of pulmonary fibrosis.  She wears 2 L nasal cannula as needed at baseline.  On admission she had leukocytosis elevated lactic acid persistent hypotension despite fluid station.  On 7/22/2023 she had a CT of the chest that showed severe interstitial lung disease emphysematous changes and perihilar infiltrate.  She was given IV antibiotics completed doxycycline.  Treated with scheduled nebulizer treatments, Mucinex and steroids.  It was recommended that she be discharged to SNF for strengthening and conditioning.  Patient has past medical history of diabetes mellitus type 2, recently taken off of her metformin during hospitalization but was discharged back on metformin, migraines, insomnia, dementia, history of aortic valve replacement, coronary artery disease, hyperlipidemia, hypertension.  Ultimately was transferred to [facility] and rehabilitation for strengthening and deconditioning.    Current visit:  07/29/2024-patient sitting on the side of the bed today she is awake alert very pleasant.  Vital signs reviewed blood pressures ranging 120-140 she is afebrile heart rate is

## 2024-08-08 ENCOUNTER — OFFICE VISIT (OUTPATIENT)
Facility: CLINIC | Age: 84
End: 2024-08-08

## 2024-08-08 VITALS
WEIGHT: 131.8 LBS | HEART RATE: 62 BPM | BODY MASS INDEX: 25.74 KG/M2 | SYSTOLIC BLOOD PRESSURE: 134 MMHG | TEMPERATURE: 99.1 F | RESPIRATION RATE: 18 BRPM | OXYGEN SATURATION: 97 % | DIASTOLIC BLOOD PRESSURE: 68 MMHG

## 2024-08-08 DIAGNOSIS — I25.10 CORONARY ARTERY DISEASE INVOLVING NATIVE CORONARY ARTERY OF NATIVE HEART WITHOUT ANGINA PECTORIS: ICD-10-CM

## 2024-08-08 DIAGNOSIS — F03.A0 MILD DEMENTIA WITHOUT BEHAVIORAL DISTURBANCE, PSYCHOTIC DISTURBANCE, MOOD DISTURBANCE, OR ANXIETY, UNSPECIFIED DEMENTIA TYPE (HCC): ICD-10-CM

## 2024-08-08 DIAGNOSIS — R53.1 GENERALIZED WEAKNESS: ICD-10-CM

## 2024-08-08 DIAGNOSIS — U07.1 COVID-19: ICD-10-CM

## 2024-08-08 DIAGNOSIS — K21.9 GASTROESOPHAGEAL REFLUX DISEASE WITHOUT ESOPHAGITIS: ICD-10-CM

## 2024-08-08 DIAGNOSIS — J84.9 ILD (INTERSTITIAL LUNG DISEASE) (HCC): Primary | ICD-10-CM

## 2024-08-08 DIAGNOSIS — M51.36 DDD (DEGENERATIVE DISC DISEASE), LUMBAR: ICD-10-CM

## 2024-08-08 DIAGNOSIS — E11.42 TYPE 2 DIABETES MELLITUS WITH DIABETIC POLYNEUROPATHY, WITHOUT LONG-TERM CURRENT USE OF INSULIN (HCC): ICD-10-CM

## 2024-08-08 DIAGNOSIS — E78.2 MIXED HYPERLIPIDEMIA: ICD-10-CM

## 2024-08-08 DIAGNOSIS — I10 BENIGN ESSENTIAL HYPERTENSION: ICD-10-CM

## 2024-08-08 DIAGNOSIS — J84.10 PULMONARY FIBROSIS (HCC): ICD-10-CM

## 2024-08-08 NOTE — PROGRESS NOTES
[id number]  [address] [place] [address]  Phone: [phone]  Fax: [phone]  Also available via Perfect Serve     PLACE OF SERVICE:  [facility] [address] [place] [address]    SKILLED VISIT    Chief Complaint:   Chief Complaint   Patient presents with    Follow-up         HPI : Adenike Dasilva is a 83 y.o. female here for follow up.    Previous history prior to admission:  Patient was admitted to the hospital from 7/22/2024 to 7/26/2024.  She presented to the emergency room as she lives alone she fell off the bed and was generally weak.  When she presented to the ER she was hypotensive received IV fluids was started on vasopressors and transferred to the ICU.  It was found that she was in septic shock possibly due to pneumonia she has underlying diagnosis of pulmonary fibrosis.  She wears 2 L nasal cannula as needed at baseline.  On admission she had leukocytosis elevated lactic acid persistent hypotension despite fluid station.  On 7/22/2023 she had a CT of the chest that showed severe interstitial lung disease emphysematous changes and perihilar infiltrate.  She was given IV antibiotics completed doxycycline.  Treated with scheduled nebulizer treatments, Mucinex and steroids.  It was recommended that she be discharged to SNF for strengthening and conditioning.  Patient has past medical history of diabetes mellitus type 2, recently taken off of her metformin during hospitalization but was discharged back on metformin, migraines, insomnia, dementia, history of aortic valve replacement, coronary artery disease, hyperlipidemia, hypertension.  Ultimately was transferred to [facility] and rehabilitation for strengthening and deconditioning.    Current visit:  07/29/2024-patient sitting on the side of the bed today she is awake alert very pleasant.  Vital signs reviewed blood pressures ranging 120-140 she is afebrile heart rate is

## 2024-08-09 ENCOUNTER — OFFICE VISIT (OUTPATIENT)
Facility: CLINIC | Age: 84
End: 2024-08-09

## 2024-08-09 VITALS
SYSTOLIC BLOOD PRESSURE: 129 MMHG | DIASTOLIC BLOOD PRESSURE: 70 MMHG | HEART RATE: 68 BPM | WEIGHT: 131.8 LBS | RESPIRATION RATE: 18 BRPM | OXYGEN SATURATION: 98 % | BODY MASS INDEX: 25.74 KG/M2 | TEMPERATURE: 97.3 F

## 2024-08-09 DIAGNOSIS — M51.36 DDD (DEGENERATIVE DISC DISEASE), LUMBAR: ICD-10-CM

## 2024-08-09 DIAGNOSIS — R19.7 DIARRHEA, UNSPECIFIED TYPE: ICD-10-CM

## 2024-08-09 DIAGNOSIS — F03.A0 MILD DEMENTIA WITHOUT BEHAVIORAL DISTURBANCE, PSYCHOTIC DISTURBANCE, MOOD DISTURBANCE, OR ANXIETY, UNSPECIFIED DEMENTIA TYPE (HCC): ICD-10-CM

## 2024-08-09 DIAGNOSIS — E11.42 TYPE 2 DIABETES MELLITUS WITH DIABETIC POLYNEUROPATHY, WITHOUT LONG-TERM CURRENT USE OF INSULIN (HCC): ICD-10-CM

## 2024-08-09 DIAGNOSIS — J84.9 ILD (INTERSTITIAL LUNG DISEASE) (HCC): Primary | ICD-10-CM

## 2024-08-09 DIAGNOSIS — R53.1 GENERALIZED WEAKNESS: ICD-10-CM

## 2024-08-09 DIAGNOSIS — I10 BENIGN ESSENTIAL HYPERTENSION: ICD-10-CM

## 2024-08-09 DIAGNOSIS — E78.2 MIXED HYPERLIPIDEMIA: ICD-10-CM

## 2024-08-09 DIAGNOSIS — J84.10 PULMONARY FIBROSIS (HCC): ICD-10-CM

## 2024-08-09 DIAGNOSIS — I25.10 CORONARY ARTERY DISEASE INVOLVING NATIVE CORONARY ARTERY OF NATIVE HEART WITHOUT ANGINA PECTORIS: ICD-10-CM

## 2024-08-09 DIAGNOSIS — U07.1 COVID-19: ICD-10-CM

## 2024-08-09 DIAGNOSIS — K21.9 GASTROESOPHAGEAL REFLUX DISEASE WITHOUT ESOPHAGITIS: ICD-10-CM

## 2024-08-09 NOTE — PROGRESS NOTES
rate controlled.  She notes that at home she wears oxygen 2 L/min as needed.  Today she is wearing 2 L 95% on this.  She reports that she is eating and drinking fairly well.  She denies any  or GI complaints today.  She has diminished lung sounds bilaterally no cough appreciated on exam.  She does however note that she has been coughing and has right-sided chest pain with the cough.  She noted that she previously had taken Tessalon Perles and wanted her medication back as she notes this significantly helps.  She described her cough medicine as orange gelcaps.  Schedule days for 3 times a day for now and can back off as her cough improves.  She continues on metformin for diabetes.  She denies any headaches at this time.  She continues on as needed Fioricet for this.  She has a history of dementia but is alert and oriented x 3 today is able to provide history.  She continues on doxycycline twice daily till 7/30/2024.  She has a history of seizures continues on Keppra none since at facility.  She continues on fluticasone-salmeterol for history of pulmonary fibrosis.  She has as needed tramadol every 6 hours for pain she denies any acute complaints of pain today.  On metoprolol for hypertension this is controlled.  Otherwise she has no other acute complaints she is very eager to work with PT and OT and return back home.  She did note that she was followed by [facility] at home.  She reports no other acute complaints.    07/31/2024 -patient is lying in bed today appears frail weak.  She was treated for septic shock with pneumonia and treated for interstitial lung disease and COPD.  She was admitted to [facility] and now has acquired COVID-19.  Today she notes that she is really weak has the chills.  Staff have not reported any fevers.  She notes that her body is aching.  She reports having all vaccines except for most recent booster.  Up until now she has been eating and drinking 75 to 100%.  She

## 2024-08-12 ENCOUNTER — OFFICE VISIT (OUTPATIENT)
Facility: CLINIC | Age: 84
End: 2024-08-12
Payer: MEDICARE

## 2024-08-12 VITALS
BODY MASS INDEX: 25.74 KG/M2 | OXYGEN SATURATION: 92 % | DIASTOLIC BLOOD PRESSURE: 66 MMHG | HEART RATE: 80 BPM | WEIGHT: 131.8 LBS | SYSTOLIC BLOOD PRESSURE: 120 MMHG | TEMPERATURE: 97.5 F | RESPIRATION RATE: 18 BRPM

## 2024-08-12 VITALS — DIASTOLIC BLOOD PRESSURE: 80 MMHG | SYSTOLIC BLOOD PRESSURE: 130 MMHG | RESPIRATION RATE: 24 BRPM | HEART RATE: 76 BPM

## 2024-08-12 DIAGNOSIS — I25.10 CORONARY ARTERY DISEASE INVOLVING NATIVE CORONARY ARTERY OF NATIVE HEART WITHOUT ANGINA PECTORIS: ICD-10-CM

## 2024-08-12 DIAGNOSIS — F03.A0 MILD DEMENTIA WITHOUT BEHAVIORAL DISTURBANCE, PSYCHOTIC DISTURBANCE, MOOD DISTURBANCE, OR ANXIETY, UNSPECIFIED DEMENTIA TYPE (HCC): ICD-10-CM

## 2024-08-12 DIAGNOSIS — M51.36 DDD (DEGENERATIVE DISC DISEASE), LUMBAR: ICD-10-CM

## 2024-08-12 DIAGNOSIS — E11.42 TYPE 2 DIABETES MELLITUS WITH DIABETIC POLYNEUROPATHY, WITHOUT LONG-TERM CURRENT USE OF INSULIN (HCC): ICD-10-CM

## 2024-08-12 DIAGNOSIS — R19.7 DIARRHEA, UNSPECIFIED TYPE: ICD-10-CM

## 2024-08-12 DIAGNOSIS — K64.4 EXTERNAL HEMORRHOIDS: ICD-10-CM

## 2024-08-12 DIAGNOSIS — J84.10 PULMONARY FIBROSIS (HCC): ICD-10-CM

## 2024-08-12 DIAGNOSIS — I10 BENIGN ESSENTIAL HYPERTENSION: ICD-10-CM

## 2024-08-12 DIAGNOSIS — J84.9 ILD (INTERSTITIAL LUNG DISEASE) (HCC): Primary | ICD-10-CM

## 2024-08-12 DIAGNOSIS — U07.1 COVID-19: ICD-10-CM

## 2024-08-12 DIAGNOSIS — E78.2 MIXED HYPERLIPIDEMIA: ICD-10-CM

## 2024-08-12 DIAGNOSIS — K21.9 GASTROESOPHAGEAL REFLUX DISEASE WITHOUT ESOPHAGITIS: ICD-10-CM

## 2024-08-12 DIAGNOSIS — R53.1 GENERALIZED WEAKNESS: ICD-10-CM

## 2024-08-12 PROCEDURE — 1123F ACP DISCUSS/DSCN MKR DOCD: CPT | Performed by: NURSE PRACTITIONER

## 2024-08-12 PROCEDURE — 99309 SBSQ NF CARE MODERATE MDM 30: CPT | Performed by: NURSE PRACTITIONER

## 2024-08-12 RX ORDER — PYRITHIONE ZINC 1 G/ML
1 LOTION/SHAMPOO TOPICAL DAILY PRN
COMMUNITY
Start: 2024-08-12 | End: 2024-08-12

## 2024-08-12 RX ORDER — LOPERAMIDE HYDROCHLORIDE 2 MG/1
2 CAPSULE ORAL EVERY 6 HOURS PRN
COMMUNITY
Start: 2024-08-12 | End: 2024-08-12

## 2024-08-12 NOTE — PROGRESS NOTES
[facility] CARE SERVICES      SKILLED NURSING FACILITY VISIT - [facility]      Adenike Dasilva    Room: H [address]  1940  [id number]        ASSESSMENT:     Diagnosis Orders   1. Coronary artery disease involving native coronary artery of native heart without angina pectoris        2. Nonrheumatic aortic valve stenosis        3. H/O aortic valve replacement        4. Benign essential hypertension        5. Mixed hyperlipidemia        6. Type 2 diabetes mellitus with diabetic polyneuropathy, without long-term current use of insulin (HCC)        7. Simple chronic bronchitis (HCC)        8. Chronic respiratory failure with hypoxia ([facility])        9. Generalized osteoarthritis        10. Moderate dementia without behavioral disturbance, psychotic disturbance, mood disturbance, or anxiety, unspecified dementia type ([facility])        11. DNR (do not resuscitate)              PLAN:    CAD/ VHD/ HYPERTENSION: This problem is stable. Current treatment was continued as noted above.     HYPERLIPIDEMIA: This problem is stable. Will continue current treatment including diet, exercise and medication.    DM: This problem is stable. Will continue close surveillance.    COPD/ RESPIRATORY FAILURE: This problem has deteriorated. Will continue supplemental Oxygen.    OA: This problem is stable. Will continue heat, rubs, patches and medication.    ALZHEIMERS DEMENTIA: This problem has deteriorated. Will continue to provide supportive care in the absence of effective treatment.    DNR: The patient and their family have indicated that aggressive treatment measures are not desired as previously documented.      SUBJECTIVE:    Chief Complaint:  Chief Complaint   Patient presents with    Other     SNF VISIT/ CAD/ COPD       History of Present Illness:    Adenike Dasilva is a 83 y.o. female who is seen for an intermittent visit at the skilled nursing facility. She has a number of chronic medical problems including

## 2024-08-12 NOTE — PROGRESS NOTES
[id number]  [place] [place] [address]  Phone: [phone]  Fax: [phone]  Also available via Perfect Serve     PLACE OF SERVICE:  [facility] [address] [place] [address]    SKILLED VISIT    Chief Complaint:   Chief Complaint   Patient presents with    Follow-up         HPI : Adenike Dasilva is a 83 y.o. female here for follow up.    Previous history prior to admission:  Patient was admitted to the hospital from 7/22/2024 to 7/26/2024.  She presented to the emergency room as she lives alone she fell off the bed and was generally weak.  When she presented to the ER she was hypotensive received IV fluids was started on vasopressors and transferred to the ICU.  It was found that she was in septic shock possibly due to pneumonia she has underlying diagnosis of pulmonary fibrosis.  She wears 2 L nasal cannula as needed at baseline.  On admission she had leukocytosis elevated lactic acid persistent hypotension despite fluid station.  On 7/22/2023 she had a CT of the chest that showed severe interstitial lung disease emphysematous changes and perihilar infiltrate.  She was given IV antibiotics completed doxycycline.  Treated with scheduled nebulizer treatments, Mucinex and steroids.  It was recommended that she be discharged to SNF for strengthening and conditioning.  Patient has past medical history of diabetes mellitus type 2, recently taken off of her metformin during hospitalization but was discharged back on metformin, migraines, insomnia, dementia, history of aortic valve replacement, coronary artery disease, hyperlipidemia, hypertension.  Ultimately was transferred to [facility] and rehabilitation for strengthening and deconditioning.    Current visit:  07/29/2024-patient sitting on the side of the bed today she is awake alert very pleasant.  Vital signs reviewed blood pressures ranging 120-140 she is afebrile heart rate is

## 2024-08-14 ENCOUNTER — TELEPHONE (OUTPATIENT)
Facility: CLINIC | Age: 84
End: 2024-08-14

## 2024-08-14 NOTE — TELEPHONE ENCOUNTER
Call returned to [facility] - Voice message left informing that Dr. Alex would follow up with patient and would sign home health orders.

## 2024-08-14 NOTE — TELEPHONE ENCOUNTER
Call returned to [facility] - voice message left informing that Dr. Alex would see Ms. Dasilva for transition of care. I stated that we would need to know when the patient is being discharge home. Once pt is back home, we would call her within 48 hours and schedule [facility] visit directly with patient.   Call back requested with discharge date/time.

## 2024-08-14 NOTE — TELEPHONE ENCOUNTER
Gracy with [facility] is calling to confirm Dr. Alex will follow for home health.  # [phone]. Anyone that answers can take the verbal PCP confirmation.

## 2024-08-14 NOTE — TELEPHONE ENCOUNTER
Natividad with [facility] is calling to schedule a f/u after hospital visit.  # [phone] ext [phone]

## 2024-08-15 ENCOUNTER — OFFICE VISIT (OUTPATIENT)
Facility: CLINIC | Age: 84
End: 2024-08-15

## 2024-08-15 VITALS
OXYGEN SATURATION: 91 % | DIASTOLIC BLOOD PRESSURE: 65 MMHG | HEART RATE: 76 BPM | SYSTOLIC BLOOD PRESSURE: 110 MMHG | RESPIRATION RATE: 20 BRPM

## 2024-08-15 DIAGNOSIS — Z95.2 H/O AORTIC VALVE REPLACEMENT: Chronic | ICD-10-CM

## 2024-08-15 DIAGNOSIS — E11.42 TYPE 2 DIABETES MELLITUS WITH DIABETIC POLYNEUROPATHY, WITHOUT LONG-TERM CURRENT USE OF INSULIN (HCC): Chronic | ICD-10-CM

## 2024-08-15 DIAGNOSIS — E78.2 MIXED HYPERLIPIDEMIA: Chronic | ICD-10-CM

## 2024-08-15 DIAGNOSIS — F03.A0 MILD DEMENTIA WITHOUT BEHAVIORAL DISTURBANCE, PSYCHOTIC DISTURBANCE, MOOD DISTURBANCE, OR ANXIETY, UNSPECIFIED DEMENTIA TYPE (HCC): Chronic | ICD-10-CM

## 2024-08-15 DIAGNOSIS — I10 BENIGN ESSENTIAL HYPERTENSION: Chronic | ICD-10-CM

## 2024-08-15 DIAGNOSIS — I25.10 CORONARY ARTERY DISEASE INVOLVING NATIVE CORONARY ARTERY OF NATIVE HEART WITHOUT ANGINA PECTORIS: Primary | Chronic | ICD-10-CM

## 2024-08-15 DIAGNOSIS — I35.0 NONRHEUMATIC AORTIC VALVE STENOSIS: Chronic | ICD-10-CM

## 2024-08-15 DIAGNOSIS — J41.0 SIMPLE CHRONIC BRONCHITIS (HCC): Chronic | ICD-10-CM

## 2024-08-15 DIAGNOSIS — J96.11 CHRONIC RESPIRATORY FAILURE WITH HYPOXIA (HCC): Chronic | ICD-10-CM

## 2024-08-15 DIAGNOSIS — J84.9 INTERSTITIAL LUNG DISEASE (HCC): Chronic | ICD-10-CM

## 2024-08-15 NOTE — PROGRESS NOTES
Shortness Of Breath     Other reaction(s): anaphylaxis/angioedema        Sulfa Antibiotics Anaphylaxis, Shortness Of Breath and Rash     Other reaction(s): anaphylaxis/angioedema        Omeprazole Dizziness or Vertigo and Other (See Comments)    Ranitidine Hcl Nausea Only    Iodine     Montelukast Hallucinations           Seasonal     Tree Extract Itching     Cat dander, grass        Cefdinir Rash     Other reaction(s): mild rash/itching        Codeine Itching     Other reaction(s): other/intolerance  Dizziness. Pt states they take Benadryl to offset the reaction.    Famotidine Nausea And Vomiting    Jardiance [Empagliflozin] Rash    Morphine Itching and Other (See Comments)     Agitation,hallucinations         Social History:  Social History     Tobacco Use    Smoking status: Former     Current packs/day: 0.00     Average packs/day: 0.3 packs/day for 20.0 years (5.0 ttl pk-yrs)     Types: Cigarettes     Start date: 1960     Quit date: 1980     Years since quittin.6    Smokeless tobacco: Never   Vaping Use    Vaping status: Never Used   Substance Use Topics    Alcohol use: No    Drug use: No       Current Medications:  Current Outpatient Medications on File Prior to Visit   Medication Sig Dispense Refill    traMADol (ULTRAM) 50 MG tablet Take 1 tablet by mouth every 6 hours as needed for Pain for up to 30 days. Max Daily Amount: 200 mg 30 tablet 0    traZODone (DESYREL) 100 MG tablet Take 1 tablet by mouth nightly 30 tablet 0    aspirin 81 MG EC tablet Take 1 tablet by mouth daily      escitalopram (LEXAPRO) 10 MG tablet Take 1 tablet by mouth daily      Esomeprazole Magnesium 20 MG TBEC Take 20 mg by mouth daily as needed (heartburn)      fluticasone-salmeterol (ADVAIR) 100-50 MCG/ACT AEPB diskus inhaler Inhale 1 puff into the lungs every 12 hours as needed      dicyclomine (BENTYL) 10 MG capsule Take 1 capsule by mouth daily      Polyethylene Glycol 400 (BLINK TEARS) 0.25 % SOLN Apply 1-2 drops to eye

## 2024-08-16 ENCOUNTER — OFFICE VISIT (OUTPATIENT)
Facility: CLINIC | Age: 84
End: 2024-08-16

## 2024-08-16 VITALS
WEIGHT: 128.4 LBS | SYSTOLIC BLOOD PRESSURE: 115 MMHG | BODY MASS INDEX: 25.08 KG/M2 | TEMPERATURE: 97.3 F | DIASTOLIC BLOOD PRESSURE: 66 MMHG | OXYGEN SATURATION: 99 % | RESPIRATION RATE: 18 BRPM | HEART RATE: 82 BPM

## 2024-08-16 DIAGNOSIS — E78.2 MIXED HYPERLIPIDEMIA: ICD-10-CM

## 2024-08-16 DIAGNOSIS — K21.9 GASTROESOPHAGEAL REFLUX DISEASE WITHOUT ESOPHAGITIS: ICD-10-CM

## 2024-08-16 DIAGNOSIS — K58.2 IRRITABLE BOWEL SYNDROME WITH BOTH CONSTIPATION AND DIARRHEA: ICD-10-CM

## 2024-08-16 DIAGNOSIS — I10 BENIGN ESSENTIAL HYPERTENSION: ICD-10-CM

## 2024-08-16 DIAGNOSIS — J84.10 PULMONARY FIBROSIS (HCC): ICD-10-CM

## 2024-08-16 DIAGNOSIS — E11.42 TYPE 2 DIABETES MELLITUS WITH DIABETIC POLYNEUROPATHY, WITHOUT LONG-TERM CURRENT USE OF INSULIN (HCC): ICD-10-CM

## 2024-08-16 DIAGNOSIS — M51.36 DDD (DEGENERATIVE DISC DISEASE), LUMBAR: ICD-10-CM

## 2024-08-16 DIAGNOSIS — J84.9 INTERSTITIAL LUNG DISEASE (HCC): Primary | ICD-10-CM

## 2024-08-16 DIAGNOSIS — R53.1 GENERALIZED WEAKNESS: ICD-10-CM

## 2024-08-16 DIAGNOSIS — I25.10 CORONARY ARTERY DISEASE INVOLVING NATIVE CORONARY ARTERY OF NATIVE HEART WITHOUT ANGINA PECTORIS: ICD-10-CM

## 2024-08-16 DIAGNOSIS — F03.A0 MILD DEMENTIA WITHOUT BEHAVIORAL DISTURBANCE, PSYCHOTIC DISTURBANCE, MOOD DISTURBANCE, OR ANXIETY, UNSPECIFIED DEMENTIA TYPE (HCC): ICD-10-CM

## 2024-08-16 RX ORDER — METFORMIN HYDROCHLORIDE 500 MG/1
500 TABLET, EXTENDED RELEASE ORAL
COMMUNITY

## 2024-08-16 RX ORDER — METFORMIN HYDROCHLORIDE 500 MG/1
1000 TABLET, EXTENDED RELEASE ORAL
Qty: 60 TABLET | Refills: 11
Start: 2024-08-16 | End: 2024-08-16

## 2024-08-19 ENCOUNTER — CARE COORDINATION (OUTPATIENT)
Dept: CASE MANAGEMENT | Age: 84
End: 2024-08-19

## 2024-08-19 ENCOUNTER — TELEPHONE (OUTPATIENT)
Facility: CLINIC | Age: 84
End: 2024-08-19

## 2024-08-19 DIAGNOSIS — R57.9 SHOCK (HCC): Primary | ICD-10-CM

## 2024-08-19 NOTE — TELEPHONE ENCOUNTER
Patient stated that she had medications delivered to her home while she was at the rehab, and they cost more than 500 dollars, there is insulin among them and she does not take insulin.  Patient could not locate the prescriber name on the bottles (pt states they are in \"little packages\"). Looking at her chart, I informed the patient that the last medication prescribed by Dr. Alex was in , he has not sent any prescriptions recently.    I also agreed with patient that she does not take insulin. Per our patient chart and discharge documentation from [facility], insulin is not listed. I advised her NOT to take the medication.   Dr. Alex is scheduled to see her on  and I advised her to go over her medication with him if she has any questions.     Care Transitions Initial Follow Up Call    Outreach made within 2 business days of discharge: Yes    Patient: Adenike Dasilva Patient : 1940   MRN: [id number]  Reason for Admission: respiratory failure  Discharge Date: 24       Spoke with: Patient    Discharge department/facility: [facility] to Home    TCM Interactive Patient Contact:  Was patient able to fill all prescriptions: Yes  Was patient instructed to bring all medications to the follow-up visit: Yes  Is patient taking all medications as directed in the discharge summary? Yes  Does patient understand their discharge instructions: Yes  Does patient have questions or concerns that need addressed prior to 7-14 day follow up office visit: no    Additional needs identified to be addressed with provider  High priority: Medication review with patient - Insulin Rx sent to the home (per patient).              Scheduled appointment with PCP within 7-14 days    Follow Up  Future Appointments   Date Time Provider Department Center   2024 11:00 AM Dav Alex MD PC BS AMB       CYDNEY GREENBERG RN

## 2024-08-19 NOTE — CARE COORDINATION
Care Transitions Note    Initial Call - Call within 2 business days of discharge: Yes    Patient Current Location:  Home: [address]  [place] [address]    Care Transition Nurse contacted the family, Son/PO  by telephone to perform post hospital discharge assessment, verified name and  as identifiers. Provided introduction to self, and explanation of the Care Transition Nurse role.     Patient: Adenike Dasilva    Patient : 1940   MRN: [id number]    Reason for Admission: Fall/Dizziness  Discharge Date: 24  RURS: Readmission Risk Score: 14.9      Last Discharge Facility       Date Complaint Diagnosis Description Type Department Provider    24 Fall; Dizziness Fall, initial encounter ... ED to Hosp-Admission (Discharged) (ADMITTED) [address] Madyson Prater MD; LENNY Ludwig.            Was this an external facility discharge? Yes. Discharge Date:  Facility [facility]: [facility]    Additional needs identified to be addressed with provider   No needs identified             Method of communication with provider: chart routing.    Patients top risk factors for readmission: medical condition-falls, shock, respiratory symptoms    Interventions to address risk factors:   Review of patient management of conditions/medications:      Care Summary Note: Caller attempted to contact patient. Unable to reach patient. Caller left a message. Caller called son Abhilash. He states patient is weak. Patient contracted COVID while in the snf. States she is sob.  She is on 02@4L as needed. He states she is wearing it continuously while she is so weak. Denies fever, cp, cough, congestion, chills, n/v. States her appetite is good. Elimination is regular. States she monitors her BG. He was unaware of her range. States she lives alone. He has a camera to monitor. States she was currently sleeping. States she ambulates using a walker. He is unsure of what [facility] agency will be making

## 2024-08-19 NOTE — TELEPHONE ENCOUNTER
The patient was discharged from rehab yesterday. She came home to several prescriptions that she is confused about. Received insulin but is not sure she is supposed to be on it. Please give her a call back [phone].

## 2024-08-20 ENCOUNTER — TELEPHONE (OUTPATIENT)
Facility: CLINIC | Age: 84
End: 2024-08-20

## 2024-08-20 NOTE — TELEPHONE ENCOUNTER
Called patient to confirm their in home visit with Dr. Alex on 8/22/2024, arrival between 9-1.  Spoke with pt, they confirmed the appointment and answered the covid screen questions. Does anyone in the household have covid no  Have there been any changes to insurance no or location no

## 2024-08-22 ENCOUNTER — OFFICE VISIT (OUTPATIENT)
Facility: CLINIC | Age: 84
End: 2024-08-22

## 2024-08-22 DIAGNOSIS — F03.A0 MILD DEMENTIA WITHOUT BEHAVIORAL DISTURBANCE, PSYCHOTIC DISTURBANCE, MOOD DISTURBANCE, OR ANXIETY, UNSPECIFIED DEMENTIA TYPE (HCC): Chronic | ICD-10-CM

## 2024-08-22 DIAGNOSIS — I10 BENIGN ESSENTIAL HYPERTENSION: Chronic | ICD-10-CM

## 2024-08-22 DIAGNOSIS — Z86.16 HISTORY OF COVID-19: ICD-10-CM

## 2024-08-22 DIAGNOSIS — R57.9 SHOCK (HCC): ICD-10-CM

## 2024-08-22 DIAGNOSIS — I25.10 CORONARY ARTERY DISEASE INVOLVING NATIVE CORONARY ARTERY OF NATIVE HEART WITHOUT ANGINA PECTORIS: Chronic | ICD-10-CM

## 2024-08-22 DIAGNOSIS — J96.11 CHRONIC RESPIRATORY FAILURE WITH HYPOXIA (HCC): Chronic | ICD-10-CM

## 2024-08-22 DIAGNOSIS — I35.0 NONRHEUMATIC AORTIC VALVE STENOSIS: Chronic | ICD-10-CM

## 2024-08-22 DIAGNOSIS — J18.9 PNEUMONIA OF RIGHT MIDDLE LOBE DUE TO INFECTIOUS ORGANISM: Primary | ICD-10-CM

## 2024-08-22 DIAGNOSIS — R29.6 FREQUENT FALLS: Chronic | ICD-10-CM

## 2024-08-22 DIAGNOSIS — E11.42 TYPE 2 DIABETES MELLITUS WITH DIABETIC POLYNEUROPATHY, WITHOUT LONG-TERM CURRENT USE OF INSULIN (HCC): Chronic | ICD-10-CM

## 2024-08-22 DIAGNOSIS — Z95.2 H/O AORTIC VALVE REPLACEMENT: Chronic | ICD-10-CM

## 2024-08-22 DIAGNOSIS — J84.9 INTERSTITIAL LUNG DISEASE (HCC): Chronic | ICD-10-CM

## 2024-08-22 NOTE — PROGRESS NOTES
OPEN REDUCTION INTERNAL FIXATION Left     humerus     HX OPEN REDUCTION INTERNAL FIXATION Right     hip    HYSTERECTOMY (CERVIX STATUS UNKNOWN)      ORTHOPEDIC SURGERY Left     humerus    OVARY REMOVAL      PAIN MANAGEMENT PROCEDURE Right 6/13/2023    RIGHT L4-5, L5-S1 RADIO FREQUENCY ABLATION performed by Giovanny Robertson MD at [facility] AMBULATORY OR    PAIN MANAGEMENT PROCEDURE Right 6/20/2023    LEFT L4 - L5, L5 - S1 RADIO FREQUENCY ABLATION performed by Giovanny Robertson MD at [facility] AMBULATORY OR    PAIN MANAGEMENT PROCEDURE Bilateral 10/10/2023    BILATERAL L4 TRANSFORAMINAL EPIDURAL STEROID INJECTION WITH LOCAL (NO CONTRAST) performed by Giovanny Robertson MD at [facility] AMBULATORY OR    PAIN MANAGEMENT PROCEDURE N/A 3/12/2024    BILATERAL L4 TRANSFORAMINAL EPIDURAL STEROID INJECTION performed by Giovanny Robertson MD at [facility] AMBULATORY OR    PAIN MANAGEMENT PROCEDURE Bilateral 5/14/2024    BILATERAL L4 TRANSFORAMINAL EPIDURAL STEROID INJECTION performed by Giovanny Robertson MD at [facility] AMBULATORY OR    PAIN MANAGEMENT PROCEDURE N/A 7/9/2024    BILATERAL L4, L5-S1 RADIOFREQUENCY ABLATION performed by Giovanny Robertson MD at [facility] AMBULATORY OR    TONSILLECTOMY      TOTAL HIP ARTHROPLASTY Right     TUBAL LIGATION         Family History:  Family History   Problem Relation Age of Onset    Diabetes Mother     Stroke Mother        Allergies:  Allergies   Allergen Reactions    Bee Venom Shortness Of Breath           Iodinated Contrast Media Anaphylaxis and Other (See Comments)     Passes out    Penicillins Anaphylaxis and Shortness Of Breath     Other reaction(s): anaphylaxis/angioedema        Sulfa Antibiotics Anaphylaxis, Shortness Of Breath and Rash     Other reaction(s): anaphylaxis/angioedema        Omeprazole Dizziness or Vertigo and Other (See Comments)    Ranitidine Hcl Nausea Only    Iodine     Montelukast Hallucinations           Seasonal     Tree Extract Itching     Cat dander, grass        Cefdinir

## 2024-08-26 ENCOUNTER — TELEPHONE (OUTPATIENT)
Facility: CLINIC | Age: 84
End: 2024-08-26

## 2024-08-26 DIAGNOSIS — R29.6 FREQUENT FALLS: Primary | ICD-10-CM

## 2024-08-26 DIAGNOSIS — J41.0 SIMPLE CHRONIC BRONCHITIS (HCC): Chronic | ICD-10-CM

## 2024-08-26 DIAGNOSIS — J84.10 PULMONARY FIBROSIS (HCC): Chronic | ICD-10-CM

## 2024-08-27 ENCOUNTER — TELEPHONE (OUTPATIENT)
Facility: CLINIC | Age: 84
End: 2024-08-27

## 2024-08-27 NOTE — TELEPHONE ENCOUNTER
Call returned to patient - voice message left informing that the referral for PT/OT has been sent to [facility]. I called the intake dpt  and confirmed this was received.

## 2024-08-27 NOTE — TELEPHONE ENCOUNTER
The patient is requesting a new referral for PT through [facility]. It  while she was in the hospital. Please call and let her know it has been taken care of. CB # [phone].

## 2024-08-29 ENCOUNTER — TELEPHONE (OUTPATIENT)
Facility: CLINIC | Age: 84
End: 2024-08-29

## 2024-08-29 NOTE — TELEPHONE ENCOUNTER
Call placed to patient - Ms. Dasilva confirms that her O2 drops, especially when she is not at rest, and she adds that she has not been doing much, because she feels out of breath. I asked how much oxygen she was using, patient responded 2L. I explained that as long as she doesn't feel SOB, she can keep it at 2L, but it is perfectly ok to increase the flow to 3 or 4L when she is SOB or while she is doing activities.     Patient asked when she would have a follow up appointment with Dr. Alex. Per patient, MD told her that he would like to see her in \"a couple of weeks\" and \"it has been a couple of weeks\".  I informed the patient that I don't see a visit on her schedule, but I would request a visit for follow up.

## 2024-08-29 NOTE — TELEPHONE ENCOUNTER
Genesis is an OT with [facility].  She called to request that a provider call the patient to let her know that she can adjust her oxygen, that it is ok to adjust.  The patient reports to Genesis that she was told not to adjust the oxygen.  Genesis said that the patient's oxygen level was 92% then it fluctuated to 85-87%. The patient complained that sometimes it is hard to breath.    OT - Genesis said that she would like to see the patient 2 x per week for 4 weeks starting next week.  Suleiman gave the verbal order for the OT visits.

## 2024-08-30 ENCOUNTER — TELEPHONE (OUTPATIENT)
Facility: CLINIC | Age: 84
End: 2024-08-30

## 2024-08-30 NOTE — TELEPHONE ENCOUNTER
[facility] Primary [facility] at Home (PC)   (formerly [facility] Based [facility]ive [facility])   Phone:  [phone]      Fax:  [phone] [address], [address]  [place] [address]    Name:  Adenike Dasilva  YOB: 1940    Called patient to confirm if 9/6/24 will work for the 2 week follow up appointment with Dr. Alex.    No answer, nurse left message requesting call back from patient so appointment can be scheduled.    Patient returned call and asked if Dr. Alex can see her earlier in the week next week.  Appt scheduled for 9/3/24.      Makayla Alex RN, Gerontological Nursing-BC, PN

## 2024-09-03 ENCOUNTER — OFFICE VISIT (OUTPATIENT)
Facility: CLINIC | Age: 84
End: 2024-09-03
Payer: COMMERCIAL

## 2024-09-03 VITALS
HEART RATE: 74 BPM | TEMPERATURE: 97.3 F | OXYGEN SATURATION: 92 % | DIASTOLIC BLOOD PRESSURE: 67 MMHG | SYSTOLIC BLOOD PRESSURE: 131 MMHG | RESPIRATION RATE: 24 BRPM

## 2024-09-03 DIAGNOSIS — M51.36 DDD (DEGENERATIVE DISC DISEASE), LUMBAR: Chronic | ICD-10-CM

## 2024-09-03 DIAGNOSIS — E11.42 TYPE 2 DIABETES MELLITUS WITH DIABETIC POLYNEUROPATHY, WITHOUT LONG-TERM CURRENT USE OF INSULIN (HCC): Chronic | ICD-10-CM

## 2024-09-03 DIAGNOSIS — I10 BENIGN ESSENTIAL HYPERTENSION: Chronic | ICD-10-CM

## 2024-09-03 DIAGNOSIS — E78.2 MIXED HYPERLIPIDEMIA: Chronic | ICD-10-CM

## 2024-09-03 DIAGNOSIS — F03.A0 MILD DEMENTIA WITHOUT BEHAVIORAL DISTURBANCE, PSYCHOTIC DISTURBANCE, MOOD DISTURBANCE, OR ANXIETY, UNSPECIFIED DEMENTIA TYPE (HCC): Chronic | ICD-10-CM

## 2024-09-03 DIAGNOSIS — Z95.2 H/O AORTIC VALVE REPLACEMENT: Chronic | ICD-10-CM

## 2024-09-03 DIAGNOSIS — J96.11 CHRONIC RESPIRATORY FAILURE WITH HYPOXIA (HCC): Chronic | ICD-10-CM

## 2024-09-03 DIAGNOSIS — I25.10 CORONARY ARTERY DISEASE INVOLVING NATIVE CORONARY ARTERY OF NATIVE HEART WITHOUT ANGINA PECTORIS: Chronic | ICD-10-CM

## 2024-09-03 DIAGNOSIS — J41.0 SIMPLE CHRONIC BRONCHITIS (HCC): Primary | Chronic | ICD-10-CM

## 2024-09-03 DIAGNOSIS — J84.9 INTERSTITIAL LUNG DISEASE (HCC): Chronic | ICD-10-CM

## 2024-09-03 DIAGNOSIS — I35.0 NONRHEUMATIC AORTIC VALVE STENOSIS: Chronic | ICD-10-CM

## 2024-09-03 PROCEDURE — G8417 CALC BMI ABV UP PARAM F/U: HCPCS | Performed by: FAMILY MEDICINE

## 2024-09-03 PROCEDURE — 1123F ACP DISCUSS/DSCN MKR DOCD: CPT | Performed by: FAMILY MEDICINE

## 2024-09-03 PROCEDURE — 1090F PRES/ABSN URINE INCON ASSESS: CPT | Performed by: FAMILY MEDICINE

## 2024-09-03 PROCEDURE — 99349 HOME/RES VST EST MOD MDM 40: CPT | Performed by: FAMILY MEDICINE

## 2024-09-03 PROCEDURE — 3078F DIAST BP <80 MM HG: CPT | Performed by: FAMILY MEDICINE

## 2024-09-03 PROCEDURE — 1036F TOBACCO NON-USER: CPT | Performed by: FAMILY MEDICINE

## 2024-09-03 PROCEDURE — 3075F SYST BP GE 130 - 139MM HG: CPT | Performed by: FAMILY MEDICINE

## 2024-09-03 RX ORDER — PREGABALIN 50 MG/1
50 CAPSULE ORAL 2 TIMES DAILY
Qty: 60 CAPSULE | Refills: 0 | Status: SHIPPED | OUTPATIENT
Start: 2024-09-03 | End: 2024-10-03

## 2024-09-03 NOTE — PROGRESS NOTES
[facility]      PRIMARY CARE AT HOME - HOME VISIT      Name: Adenike Dasilva    Address: [address], [place] [address]    :  1940  MRN:  [id number]        ASSESSMENT:     Diagnosis Orders   1. Simple chronic bronchitis (HCC)        2. Interstitial lung disease (HCC)        3. Chronic respiratory failure with hypoxia (HCC)        4. Coronary artery disease involving native coronary artery of native heart without angina pectoris        5. Nonrheumatic aortic valve stenosis        6. H/O aortic valve replacement        7. Benign essential hypertension        8. Mixed hyperlipidemia        9. Type 2 diabetes mellitus with diabetic polyneuropathy, without long-term current use of insulin (HCC)        10. DDD (degenerative disc disease), lumbar  pregabalin (LYRICA) 50 MG capsule      11. Mild dementia without behavioral disturbance, psychotic disturbance, mood disturbance, or anxiety, unspecified dementia type (HCC)              PLAN:    COPD/ ILD/ RESPIRATORY FAILURE: This problem is stable. She continues to recover from her recent episode of Pneumonia. Will continue close surveillance. Will recheck in 4 weeks.     CAD/ VHD/ HYPERTENSION: This problem is stable. Current treatment was continued as noted above.     HYPERLIPIDEMIA/ DIABETES MELLITUS: This problem is stable. Will continue current routine including diet, exercise and medication. Will continue close observation.    DDD LUMBAR: This problem has deteriorated. Will continue current rx including Lyrica which was refilled.     DEMENTIA: This problem has deteriorated. Will continue to monitor progress. Discussed with son once again with her approval.      SUBJECTIVE:    Chief Complaint:  Chief Complaint   Patient presents with    Other     HOME VISIT: ILD, CAD, CHF, DDD LUMBAR       History of Present Illness:    Adenike Dasilva is a 83 y.o. female who is seen for an [facility] Primary Care At Home Home Visit. It

## 2024-09-05 ENCOUNTER — TELEPHONE (OUTPATIENT)
Facility: CLINIC | Age: 84
End: 2024-09-05

## 2024-09-05 DIAGNOSIS — K58.0 IRRITABLE BOWEL SYNDROME WITH DIARRHEA: Primary | Chronic | ICD-10-CM

## 2024-09-05 NOTE — TELEPHONE ENCOUNTER
The patient called to update Dr. Alex that she does not have enough of the bentyl to double up on the script as he instructed.  She asks if a new script can be sent to [facility] at [facility].  Her callback if needed is [phone]

## 2024-09-05 NOTE — TELEPHONE ENCOUNTER
Per chart review, Bentyl dose increased to 20mg on 9/3/24.     Rx for Bentyl 20mg Daily routed to Dr. Alex - Please review.

## 2024-09-07 RX ORDER — DICYCLOMINE HCL 20 MG
20 TABLET ORAL 3 TIMES DAILY PRN
Qty: 270 TABLET | Refills: 3 | Status: SHIPPED | OUTPATIENT
Start: 2024-09-07 | End: 2025-09-02

## 2024-09-12 ENCOUNTER — TELEPHONE (OUTPATIENT)
Facility: CLINIC | Age: 84
End: 2024-09-12

## 2024-09-13 ENCOUNTER — TELEPHONE (OUTPATIENT)
Facility: CLINIC | Age: 84
End: 2024-09-13

## 2024-09-16 ENCOUNTER — OFFICE VISIT (OUTPATIENT)
Facility: CLINIC | Age: 84
End: 2024-09-16
Payer: MEDICARE

## 2024-09-16 VITALS
SYSTOLIC BLOOD PRESSURE: 130 MMHG | OXYGEN SATURATION: 92 % | HEART RATE: 76 BPM | DIASTOLIC BLOOD PRESSURE: 70 MMHG | RESPIRATION RATE: 24 BRPM

## 2024-09-16 DIAGNOSIS — E11.42 TYPE 2 DIABETES MELLITUS WITH DIABETIC POLYNEUROPATHY, WITHOUT LONG-TERM CURRENT USE OF INSULIN (HCC): Chronic | ICD-10-CM

## 2024-09-16 DIAGNOSIS — Z95.2 H/O AORTIC VALVE REPLACEMENT: Chronic | ICD-10-CM

## 2024-09-16 DIAGNOSIS — I25.10 CORONARY ARTERY DISEASE INVOLVING NATIVE CORONARY ARTERY OF NATIVE HEART WITHOUT ANGINA PECTORIS: Chronic | ICD-10-CM

## 2024-09-16 DIAGNOSIS — J96.11 CHRONIC RESPIRATORY FAILURE WITH HYPOXIA (HCC): Chronic | ICD-10-CM

## 2024-09-16 DIAGNOSIS — I10 BENIGN ESSENTIAL HYPERTENSION: Chronic | ICD-10-CM

## 2024-09-16 DIAGNOSIS — J44.1 COPD WITH ACUTE EXACERBATION (HCC): Primary | ICD-10-CM

## 2024-09-16 DIAGNOSIS — M15.9 GENERALIZED OSTEOARTHRITIS: ICD-10-CM

## 2024-09-16 DIAGNOSIS — R19.7 DIARRHEA, UNSPECIFIED TYPE: ICD-10-CM

## 2024-09-16 PROCEDURE — 1123F ACP DISCUSS/DSCN MKR DOCD: CPT | Performed by: FAMILY MEDICINE

## 2024-09-16 PROCEDURE — 99349 HOME/RES VST EST MOD MDM 40: CPT | Performed by: FAMILY MEDICINE

## 2024-09-16 PROCEDURE — 1036F TOBACCO NON-USER: CPT | Performed by: FAMILY MEDICINE

## 2024-09-16 PROCEDURE — G8417 CALC BMI ABV UP PARAM F/U: HCPCS | Performed by: FAMILY MEDICINE

## 2024-09-16 PROCEDURE — 3075F SYST BP GE 130 - 139MM HG: CPT | Performed by: FAMILY MEDICINE

## 2024-09-16 PROCEDURE — 3078F DIAST BP <80 MM HG: CPT | Performed by: FAMILY MEDICINE

## 2024-09-16 PROCEDURE — 1090F PRES/ABSN URINE INCON ASSESS: CPT | Performed by: FAMILY MEDICINE

## 2024-09-16 RX ORDER — LOPERAMIDE HCL 2 MG
2 CAPSULE ORAL 4 TIMES DAILY PRN
Qty: 60 CAPSULE | Refills: 1 | Status: SHIPPED | OUTPATIENT
Start: 2024-09-16

## 2024-09-16 RX ORDER — DOXYCYCLINE HYCLATE 100 MG
100 TABLET ORAL 2 TIMES DAILY
Qty: 14 TABLET | Refills: 0 | Status: SHIPPED | OUTPATIENT
Start: 2024-09-16 | End: 2024-09-23

## 2024-09-16 RX ORDER — BISMUTH SUBSALICYLATE 262 MG/1
524 TABLET, CHEWABLE ORAL
Qty: 60 TABLET | Refills: 1 | Status: SHIPPED | OUTPATIENT
Start: 2024-09-16

## 2024-09-16 RX ORDER — TRAMADOL HYDROCHLORIDE 50 MG/1
50 TABLET ORAL EVERY 6 HOURS PRN
Qty: 30 TABLET | Refills: 0 | Status: SHIPPED | OUTPATIENT
Start: 2024-09-16 | End: 2024-10-16

## 2024-09-16 RX ORDER — PREDNISONE 5 MG/1
TABLET ORAL
Qty: 1 EACH | Refills: 0 | Status: SHIPPED | OUTPATIENT
Start: 2024-09-16

## 2024-09-18 ENCOUNTER — TELEPHONE (OUTPATIENT)
Facility: CLINIC | Age: 84
End: 2024-09-18

## 2024-09-19 ENCOUNTER — TELEPHONE (OUTPATIENT)
Facility: CLINIC | Age: 84
End: 2024-09-19

## 2024-09-20 RX ORDER — PREDNISONE 5 MG/ML
20 SOLUTION ORAL DAILY
Qty: 140 ML | Refills: 0 | Status: SHIPPED | OUTPATIENT
Start: 2024-09-20 | End: 2024-09-27

## 2024-09-23 ENCOUNTER — TELEPHONE (OUTPATIENT)
Facility: CLINIC | Age: 84
End: 2024-09-23

## 2024-09-27 ENCOUNTER — TELEPHONE (OUTPATIENT)
Facility: CLINIC | Age: 84
End: 2024-09-27

## 2024-09-30 ENCOUNTER — TELEPHONE (OUTPATIENT)
Facility: CLINIC | Age: 84
End: 2024-09-30

## 2024-09-30 NOTE — TELEPHONE ENCOUNTER
The patient called to update that she has seen [facility] for  UTI.  She said that \"it has taken a week for them to sort this out\".  The patient said that she still needs an antibiotic, and says that her condition is getting worse.  Dr. Alex prescribed doxycycline, 100 mgs for the patient and she is on her last dose today.  UTI is getting worse.  Her callback is [phone].

## 2024-10-01 ENCOUNTER — OFFICE VISIT (OUTPATIENT)
Facility: CLINIC | Age: 84
End: 2024-10-01
Payer: MEDICARE

## 2024-10-01 VITALS
SYSTOLIC BLOOD PRESSURE: 111 MMHG | HEART RATE: 72 BPM | DIASTOLIC BLOOD PRESSURE: 64 MMHG | RESPIRATION RATE: 32 BRPM | TEMPERATURE: 96.6 F | OXYGEN SATURATION: 91 %

## 2024-10-01 DIAGNOSIS — J41.0 SIMPLE CHRONIC BRONCHITIS (HCC): Chronic | ICD-10-CM

## 2024-10-01 DIAGNOSIS — F41.8 DEPRESSION WITH ANXIETY: Chronic | ICD-10-CM

## 2024-10-01 DIAGNOSIS — K21.9 GASTROESOPHAGEAL REFLUX DISEASE WITHOUT ESOPHAGITIS: Chronic | ICD-10-CM

## 2024-10-01 DIAGNOSIS — I35.0 NONRHEUMATIC AORTIC VALVE STENOSIS: Chronic | ICD-10-CM

## 2024-10-01 DIAGNOSIS — N32.81 OAB (OVERACTIVE BLADDER): Chronic | ICD-10-CM

## 2024-10-01 DIAGNOSIS — J96.11 CHRONIC RESPIRATORY FAILURE WITH HYPOXIA: Chronic | ICD-10-CM

## 2024-10-01 DIAGNOSIS — I25.10 CORONARY ARTERY DISEASE INVOLVING NATIVE CORONARY ARTERY OF NATIVE HEART WITHOUT ANGINA PECTORIS: Chronic | ICD-10-CM

## 2024-10-01 DIAGNOSIS — I10 BENIGN ESSENTIAL HYPERTENSION: Chronic | ICD-10-CM

## 2024-10-01 DIAGNOSIS — J84.9 INTERSTITIAL LUNG DISEASE (HCC): Primary | Chronic | ICD-10-CM

## 2024-10-01 DIAGNOSIS — Z95.2 H/O AORTIC VALVE REPLACEMENT: Chronic | ICD-10-CM

## 2024-10-01 DIAGNOSIS — F03.A0 MILD DEMENTIA WITHOUT BEHAVIORAL DISTURBANCE, PSYCHOTIC DISTURBANCE, MOOD DISTURBANCE, OR ANXIETY, UNSPECIFIED DEMENTIA TYPE (HCC): Chronic | ICD-10-CM

## 2024-10-01 PROCEDURE — G8484 FLU IMMUNIZE NO ADMIN: HCPCS | Performed by: FAMILY MEDICINE

## 2024-10-01 PROCEDURE — 1036F TOBACCO NON-USER: CPT | Performed by: FAMILY MEDICINE

## 2024-10-01 PROCEDURE — G8417 CALC BMI ABV UP PARAM F/U: HCPCS | Performed by: FAMILY MEDICINE

## 2024-10-01 PROCEDURE — 99350 HOME/RES VST EST HIGH MDM 60: CPT | Performed by: FAMILY MEDICINE

## 2024-10-01 PROCEDURE — 1090F PRES/ABSN URINE INCON ASSESS: CPT | Performed by: FAMILY MEDICINE

## 2024-10-01 PROCEDURE — 1123F ACP DISCUSS/DSCN MKR DOCD: CPT | Performed by: FAMILY MEDICINE

## 2024-10-01 RX ORDER — MOXIFLOXACIN HYDROCHLORIDE 400 MG/1
400 TABLET ORAL DAILY
Qty: 7 TABLET | Refills: 0 | Status: SHIPPED | OUTPATIENT
Start: 2024-10-01 | End: 2024-10-08

## 2024-10-01 RX ORDER — MIRABEGRON 25 MG/1
25 TABLET, FILM COATED, EXTENDED RELEASE ORAL DAILY
Qty: 90 TABLET | Refills: 3 | Status: SHIPPED | OUTPATIENT
Start: 2024-10-01 | End: 2025-09-30

## 2024-10-01 NOTE — PROGRESS NOTES
[facility]      Primary Care At Home - Home Visit      Name: Adenike Dasilva    Address: [address], [place]  [address]   :  1940  MRN:  [id number]        ASSESSMENT:     Diagnosis Orders   1. Interstitial lung disease (HCC)        2. Simple chronic bronchitis (HCC)        3. Chronic respiratory failure with hypoxia        4. Coronary artery disease involving native coronary artery of native heart without angina pectoris        5. Nonrheumatic aortic valve stenosis        6. H/O aortic valve replacement        7. Benign essential hypertension        8. Gastroesophageal reflux disease without esophagitis        9. OAB (overactive bladder)        10. Mild dementia without behavioral disturbance, psychotic disturbance, mood disturbance, or anxiety, unspecified dementia type (HCC)        11. Depression with anxiety              PLAN:    ILD/ COPD/ RESPIRATORY FAILURE: This problem has deteriorated. She has not felt well since having Pneumonia and Covid. Will continue treatment including supplemental Oxygen. Discussed additional options including hospitalization. Alternatively, I suggested treating her with Prednisone and Avelox. If she goes to the hospital, additional evaluation can be undertaken (including a CXR, CBC, CMP, U/A and ProBMP). I discussed this with the patient's son who will transport her to the hospital.    CAD/ VHD/ HYPERTENSION: This problem is stable. Current treatment was continued as noted above.     GERD: This problem is stable. Will continue current treatment including antiacids and acid blocker medication.     OAB: Will continue Myrbetrex.    DEMENTIA/ ANXIETY/ DEPRESSION: This problem has deteriorated. Will continue close surveillance.      SUBJECTIVE:    Chief Complaint:  Chief Complaint   Patient presents with    Other     HOME VISIT: ILD/ COPD/ RESPIRATORY FAILURE/ UTI        History of Present Illness:    Adenike Dasilva is a 84 y.o.

## 2024-10-02 ENCOUNTER — TELEPHONE (OUTPATIENT)
Facility: CLINIC | Age: 84
End: 2024-10-02

## 2024-10-02 NOTE — TELEPHONE ENCOUNTER
I called pt to remind of their in home visit w/ Dr. Alex on 10/7/2024, arrival between 10-4.  I reached pt's VM and left a message to please call the [facility] office to confirm the visit.

## 2024-10-03 ENCOUNTER — TELEPHONE (OUTPATIENT)
Facility: CLINIC | Age: 84
End: 2024-10-03

## 2024-10-03 ENCOUNTER — HOSPITAL ENCOUNTER (OUTPATIENT)
Facility: HOSPITAL | Age: 84
Discharge: HOME | End: 2024-10-06
Payer: MEDICARE

## 2024-10-03 DIAGNOSIS — R35.0 URINARY FREQUENCY: ICD-10-CM

## 2024-10-03 DIAGNOSIS — J84.9 INTERSTITIAL LUNG DISEASE (HCC): Chronic | ICD-10-CM

## 2024-10-03 DIAGNOSIS — I50.22 CHRONIC SYSTOLIC HEART FAILURE (HCC): ICD-10-CM

## 2024-10-03 DIAGNOSIS — J18.9 PNEUMONIA DUE TO INFECTIOUS ORGANISM, UNSPECIFIED LATERALITY, UNSPECIFIED PART OF LUNG: ICD-10-CM

## 2024-10-03 DIAGNOSIS — J96.11 CHRONIC RESPIRATORY FAILURE WITH HYPOXIA: Chronic | ICD-10-CM

## 2024-10-03 DIAGNOSIS — J40 BRONCHITIS: ICD-10-CM

## 2024-10-03 DIAGNOSIS — I25.10 CORONARY ARTERY DISEASE INVOLVING NATIVE CORONARY ARTERY OF NATIVE HEART WITHOUT ANGINA PECTORIS: Chronic | ICD-10-CM

## 2024-10-03 DIAGNOSIS — J40 BRONCHITIS: Primary | ICD-10-CM

## 2024-10-03 DIAGNOSIS — R30.0 DYSURIA: ICD-10-CM

## 2024-10-03 LAB
ALBUMIN SERPL-MCNC: 3.5 G/DL (ref 3.5–5)
ALBUMIN/GLOB SERPL: 0.8 (ref 1.1–2.2)
ALP SERPL-CCNC: 87 U/L (ref 45–117)
ALT SERPL-CCNC: 14 U/L (ref 12–78)
ANION GAP SERPL CALC-SCNC: 5 MMOL/L (ref 2–12)
APPEARANCE UR: CLEAR
AST SERPL-CCNC: 10 U/L (ref 15–37)
BACTERIA URNS QL MICRO: NEGATIVE /HPF
BASOPHILS # BLD: 0 K/UL (ref 0–0.1)
BASOPHILS NFR BLD: 0 % (ref 0–1)
BILIRUB SERPL-MCNC: 0.2 MG/DL (ref 0.2–1)
BILIRUB UR QL: NEGATIVE
BUN SERPL-MCNC: 26 MG/DL (ref 6–20)
BUN/CREAT SERPL: 35 (ref 12–20)
CALCIUM SERPL-MCNC: 9.4 MG/DL (ref 8.5–10.1)
CHLORIDE SERPL-SCNC: 102 MMOL/L (ref 97–108)
CO2 SERPL-SCNC: 24 MMOL/L (ref 21–32)
COLOR UR: ABNORMAL
CREAT SERPL-MCNC: 0.75 MG/DL (ref 0.55–1.02)
DIFFERENTIAL METHOD BLD: ABNORMAL
EOSINOPHIL # BLD: 0 K/UL (ref 0–0.4)
EOSINOPHIL NFR BLD: 0 % (ref 0–7)
EPITH CASTS URNS QL MICRO: ABNORMAL /LPF
ERYTHROCYTE [DISTWIDTH] IN BLOOD BY AUTOMATED COUNT: 18.9 % (ref 11.5–14.5)
GLOBULIN SER CALC-MCNC: 4.5 G/DL (ref 2–4)
GLUCOSE SERPL-MCNC: 172 MG/DL (ref 65–100)
GLUCOSE UR STRIP.AUTO-MCNC: NEGATIVE MG/DL
HCT VFR BLD AUTO: 37.2 % (ref 35–47)
HGB BLD-MCNC: 10.7 G/DL (ref 11.5–16)
HGB UR QL STRIP: NEGATIVE
HYALINE CASTS URNS QL MICRO: ABNORMAL /LPF (ref 0–5)
IMM GRANULOCYTES # BLD AUTO: 0.1 K/UL (ref 0–0.04)
IMM GRANULOCYTES NFR BLD AUTO: 1 % (ref 0–0.5)
KETONES UR QL STRIP.AUTO: NEGATIVE MG/DL
LEUKOCYTE ESTERASE UR QL STRIP.AUTO: ABNORMAL
LYMPHOCYTES # BLD: 1.4 K/UL (ref 0.8–3.5)
LYMPHOCYTES NFR BLD: 14 % (ref 12–49)
MCH RBC QN AUTO: 22.1 PG (ref 26–34)
MCHC RBC AUTO-ENTMCNC: 28.8 G/DL (ref 30–36.5)
MCV RBC AUTO: 76.9 FL (ref 80–99)
MONOCYTES # BLD: 0.3 K/UL (ref 0–1)
MONOCYTES NFR BLD: 3 % (ref 5–13)
NEUTS SEG # BLD: 8 K/UL (ref 1.8–8)
NEUTS SEG NFR BLD: 82 % (ref 32–75)
NITRITE UR QL STRIP.AUTO: NEGATIVE
NRBC # BLD: 0 K/UL (ref 0–0.01)
NRBC BLD-RTO: 0 PER 100 WBC
NT PRO BNP: 552 PG/ML
PH UR STRIP: 5 (ref 5–8)
PLATELET # BLD AUTO: 242 K/UL (ref 150–400)
PMV BLD AUTO: 11.1 FL (ref 8.9–12.9)
POTASSIUM SERPL-SCNC: 5 MMOL/L (ref 3.5–5.1)
PROT SERPL-MCNC: 8 G/DL (ref 6.4–8.2)
PROT UR STRIP-MCNC: NEGATIVE MG/DL
RBC # BLD AUTO: 4.84 M/UL (ref 3.8–5.2)
RBC #/AREA URNS HPF: ABNORMAL /HPF (ref 0–5)
RBC MORPH BLD: ABNORMAL
SODIUM SERPL-SCNC: 131 MMOL/L (ref 136–145)
SP GR UR REFRACTOMETRY: 1.01 (ref 1–1.03)
URINE CULTURE IF INDICATED: ABNORMAL
UROBILINOGEN UR QL STRIP.AUTO: 0.2 EU/DL (ref 0.2–1)
WBC # BLD AUTO: 9.8 K/UL (ref 3.6–11)
WBC URNS QL MICRO: ABNORMAL /HPF (ref 0–4)

## 2024-10-03 PROCEDURE — 71046 X-RAY EXAM CHEST 2 VIEWS: CPT

## 2024-10-03 NOTE — TELEPHONE ENCOUNTER
[facility] Primary [facility] at Home (PC)   (formerly Home Based Primary Care [facility]ive Services)   Phone:  [phone]      Fax:  [phone] [address], [address]  [place] [address]    Name:  Adenike Dasilva  YOB: 1940    Incoming call from patient who says she is at the lab at [facility] and the lab cannot find any orders in Epic for lab work.  Per Dr. Alex's note from 10/1/24, patient needs CXR, CBC, CMP, U/A and ProBMP.   got on the phone and requests that lab orders be faxed to [phone].  This was done.        Makayla Alex RN, Gerontological Nursing-BC, PN

## 2024-10-04 ENCOUNTER — TELEPHONE (OUTPATIENT)
Facility: CLINIC | Age: 84
End: 2024-10-04

## 2024-10-04 NOTE — TELEPHONE ENCOUNTER
Call received from Almaz Khan PT with [facility] to report that patient has not received notification from the pharmacy that the antibiotic was prescribed. By chart review, I informed that Dr. Alex sent the Rx to [facility] on 10/1/24 for Moxifloxacin (and also Myrbetriq). PT stated that they will call the pharmacy.     She also requested verbal order for re-certification - Order provided to continue PT 2x week.     PT asked when was the next MD appointment. I informed that I see an appointment on his schedule for 10/7/24 (morning).

## 2024-10-07 ENCOUNTER — OFFICE VISIT (OUTPATIENT)
Facility: CLINIC | Age: 84
End: 2024-10-07
Payer: MEDICARE

## 2024-10-07 VITALS
DIASTOLIC BLOOD PRESSURE: 70 MMHG | RESPIRATION RATE: 24 BRPM | SYSTOLIC BLOOD PRESSURE: 139 MMHG | HEART RATE: 72 BPM | OXYGEN SATURATION: 94 % | TEMPERATURE: 97.2 F

## 2024-10-07 DIAGNOSIS — M51.360 DEGENERATION OF INTERVERTEBRAL DISC OF LUMBAR REGION WITH DISCOGENIC BACK PAIN: Chronic | ICD-10-CM

## 2024-10-07 DIAGNOSIS — F41.8 DEPRESSION WITH ANXIETY: Chronic | ICD-10-CM

## 2024-10-07 DIAGNOSIS — U09.9 LONG COVID: Chronic | ICD-10-CM

## 2024-10-07 DIAGNOSIS — G43.109 MIGRAINE WITH AURA AND WITHOUT STATUS MIGRAINOSUS, NOT INTRACTABLE: Chronic | ICD-10-CM

## 2024-10-07 DIAGNOSIS — E78.2 MIXED HYPERLIPIDEMIA: Chronic | ICD-10-CM

## 2024-10-07 DIAGNOSIS — M15.9 GENERALIZED OSTEOARTHRITIS: Chronic | ICD-10-CM

## 2024-10-07 DIAGNOSIS — I35.0 NONRHEUMATIC AORTIC VALVE STENOSIS: Chronic | ICD-10-CM

## 2024-10-07 DIAGNOSIS — J84.9 INTERSTITIAL LUNG DISEASE (HCC): Chronic | ICD-10-CM

## 2024-10-07 DIAGNOSIS — I25.10 CORONARY ARTERY DISEASE INVOLVING NATIVE CORONARY ARTERY OF NATIVE HEART WITHOUT ANGINA PECTORIS: Primary | Chronic | ICD-10-CM

## 2024-10-07 DIAGNOSIS — N39.46 MIXED STRESS AND URGE URINARY INCONTINENCE: Chronic | ICD-10-CM

## 2024-10-07 DIAGNOSIS — E11.42 TYPE 2 DIABETES MELLITUS WITH DIABETIC POLYNEUROPATHY, WITHOUT LONG-TERM CURRENT USE OF INSULIN (HCC): Chronic | ICD-10-CM

## 2024-10-07 DIAGNOSIS — Z95.2 H/O AORTIC VALVE REPLACEMENT: Chronic | ICD-10-CM

## 2024-10-07 DIAGNOSIS — F03.A0 MILD DEMENTIA WITHOUT BEHAVIORAL DISTURBANCE, PSYCHOTIC DISTURBANCE, MOOD DISTURBANCE, OR ANXIETY, UNSPECIFIED DEMENTIA TYPE (HCC): Chronic | ICD-10-CM

## 2024-10-07 DIAGNOSIS — I10 BENIGN ESSENTIAL HYPERTENSION: Chronic | ICD-10-CM

## 2024-10-07 DIAGNOSIS — N32.81 OAB (OVERACTIVE BLADDER): Chronic | ICD-10-CM

## 2024-10-07 DIAGNOSIS — J96.11 CHRONIC RESPIRATORY FAILURE WITH HYPOXIA: Chronic | ICD-10-CM

## 2024-10-07 PROBLEM — R57.9 SHOCK: Status: RESOLVED | Noted: 2024-07-22 | Resolved: 2024-10-07

## 2024-10-07 PROBLEM — R56.9 UNSPECIFIED CONVULSIONS (HCC): Status: RESOLVED | Noted: 2024-07-26 | Resolved: 2024-10-07

## 2024-10-07 PROBLEM — G40.909 SEIZURE DISORDER (HCC): Chronic | Status: ACTIVE | Noted: 2024-07-26

## 2024-10-07 PROBLEM — G40.909 SEIZURE DISORDER (HCC): Status: ACTIVE | Noted: 2024-07-26

## 2024-10-07 PROCEDURE — 1123F ACP DISCUSS/DSCN MKR DOCD: CPT | Performed by: FAMILY MEDICINE

## 2024-10-07 PROCEDURE — G8484 FLU IMMUNIZE NO ADMIN: HCPCS | Performed by: FAMILY MEDICINE

## 2024-10-07 PROCEDURE — 0509F URINE INCON PLAN DOCD: CPT | Performed by: FAMILY MEDICINE

## 2024-10-07 PROCEDURE — G8417 CALC BMI ABV UP PARAM F/U: HCPCS | Performed by: FAMILY MEDICINE

## 2024-10-07 PROCEDURE — 99349 HOME/RES VST EST MOD MDM 40: CPT | Performed by: FAMILY MEDICINE

## 2024-10-07 PROCEDURE — 1036F TOBACCO NON-USER: CPT | Performed by: FAMILY MEDICINE

## 2024-10-07 PROCEDURE — 1090F PRES/ABSN URINE INCON ASSESS: CPT | Performed by: FAMILY MEDICINE

## 2024-10-07 RX ORDER — BUTALBITAL, ACETAMINOPHEN AND CAFFEINE 50; 325; 40 MG/1; MG/1; MG/1
1 TABLET ORAL EVERY 6 HOURS PRN
Qty: 30 TABLET | Refills: 0 | Status: SHIPPED | OUTPATIENT
Start: 2024-10-07 | End: 2025-10-07

## 2024-10-07 NOTE — PROGRESS NOTES
[facility] CARE SERVICES      [facility] Home Visit      Adenike Dasilva    Address: [address], [place]  [address]  1940  [id number]        ASSESSMENT:     Diagnosis Orders   1. Coronary artery disease involving native coronary artery of native heart without angina pectoris        2. Nonrheumatic aortic valve stenosis        3. H/O aortic valve replacement        4. Benign essential hypertension        5. Mixed hyperlipidemia        6. Long COVID        7. Interstitial lung disease (HCC)        8. Chronic respiratory failure with hypoxia        9. Type 2 diabetes mellitus with diabetic polyneuropathy, without long-term current use of insulin (HCC)        10. Degeneration of intervertebral disc of lumbar region with discogenic back pain        11. Generalized osteoarthritis        12. OAB (overactive bladder)        13. Mixed stress and urge urinary incontinence        14. Mild dementia without behavioral disturbance, psychotic disturbance, mood disturbance, or anxiety, unspecified dementia type ([facility])        15. Migraine with aura and without status migrainosus, not intractable  butalbital-acetaminophen-caffeine (FIORICET, ESGIC) -40 MG per tablet      16. Depression with anxiety              PLAN:    CAD/ VHD/ HYPERTENSION: This problem is stable. Current treatment was continued as noted above.     HYPERLIPIDEMIA: This problem is stable. Will discontinue Zetia and continue other current treatment including diet, exercise and medication.    LONG COVID/ ILD/ RESPIRATORY FAILURE: This problem is stable. Reviewed recent labs and CXR. Will continue supplemental Oxygen. Discussed expected course of Long Covid.    DIABETES MELLITUS: This problem is stable. Will continue current routine including diet, exercise and medication. Will continue close observation.    DDD/ OA: This problem has deteriorated. Will follow up with Ortho.    OAB/ INCONTINENCE: This problem

## 2024-10-11 ENCOUNTER — TELEPHONE (OUTPATIENT)
Facility: CLINIC | Age: 84
End: 2024-10-11

## 2024-10-11 NOTE — TELEPHONE ENCOUNTER
Call returned to patient - pt states that she took the Flu vaccine today at the pulmonologist. When she arrived home, she was very nauseated and had diarrhea. But she is feeling somewhat better now.  I encouraged pt to drink plenty of fluid (she said she is drinking Gatorade) and have a bland diet today (such as rice, white bread, chicken and banana). I discussed that diarrhea is not an usual side effect of the flu vaccine. But I actually found one study that mentions GI issues after the vaccine, but it would resolve within 48 hours.   Patient also verbalized that the pharmacy charged her more than $300 dollars for Myrbetriq (90 day supply). She asked if Dr. Alex would send a 30 day supply to the pharmacy or send an alternative Rx. I informed the patient that she does not need a Rx for 30 day. She can ask the pharmacy to dispense only 30 days out of the 90 day prescription.   I sent a message to Dr. Alex about a replacement Rx.

## 2024-10-11 NOTE — TELEPHONE ENCOUNTER
The patient left a  at 1:36 pm to update that she had the flu vaccine yesterday and since then she has been dizzy and had profuse diarrhea.  It sounded like the patient said that she went to a Dr. Tran office for the vaccination.  She asks for a callback at [phone].

## 2024-10-22 ENCOUNTER — TELEPHONE (OUTPATIENT)
Facility: CLINIC | Age: 84
End: 2024-10-22

## 2024-10-22 NOTE — TELEPHONE ENCOUNTER
Call returned to Almaz, PT - Voice message left informing that Dr. Alex would like to see the patient tomorrow morning. I informed that I would be calling the patient to provide this information.    Call placed to patient - she reports that she tripped and fell over the weekend, but didn't get hurt, other than getting scraped. Her life-alert did not work, but she called the company and they are sending a replacement.     Visit with Dr. Alex placed in Epic.

## 2024-10-22 NOTE — TELEPHONE ENCOUNTER
VM - 10:56 am - Kathie Suresh called to update that the patient had a fall over the weekend.  Her oxygen is staying at 91 while at rest but drops to high 50's during activity.  Kathie's callback is [phone].

## 2024-10-22 NOTE — TELEPHONE ENCOUNTER
The patient called and left a  at 11:02 am requesting a f/u visit with Dr. Alex the first week of November.  Her callback is [phone].

## 2024-10-23 ENCOUNTER — OFFICE VISIT (OUTPATIENT)
Facility: CLINIC | Age: 84
End: 2024-10-23

## 2024-10-23 VITALS
HEART RATE: 94 BPM | OXYGEN SATURATION: 91 % | DIASTOLIC BLOOD PRESSURE: 99 MMHG | RESPIRATION RATE: 24 BRPM | TEMPERATURE: 97.5 F | SYSTOLIC BLOOD PRESSURE: 133 MMHG

## 2024-10-23 DIAGNOSIS — F03.A0 MILD DEMENTIA WITHOUT BEHAVIORAL DISTURBANCE, PSYCHOTIC DISTURBANCE, MOOD DISTURBANCE, OR ANXIETY, UNSPECIFIED DEMENTIA TYPE (HCC): Chronic | ICD-10-CM

## 2024-10-23 DIAGNOSIS — J96.11 CHRONIC RESPIRATORY FAILURE WITH HYPOXIA: Chronic | ICD-10-CM

## 2024-10-23 DIAGNOSIS — J41.0 SIMPLE CHRONIC BRONCHITIS (HCC): Chronic | ICD-10-CM

## 2024-10-23 DIAGNOSIS — I10 BENIGN ESSENTIAL HYPERTENSION: Chronic | ICD-10-CM

## 2024-10-23 DIAGNOSIS — G43.109 MIGRAINE WITH AURA AND WITHOUT STATUS MIGRAINOSUS, NOT INTRACTABLE: Chronic | ICD-10-CM

## 2024-10-23 DIAGNOSIS — M15.9 GENERALIZED OSTEOARTHRITIS: Chronic | ICD-10-CM

## 2024-10-23 DIAGNOSIS — R29.6 FREQUENT FALLS: Chronic | ICD-10-CM

## 2024-10-23 DIAGNOSIS — I25.10 CORONARY ARTERY DISEASE INVOLVING NATIVE CORONARY ARTERY OF NATIVE HEART WITHOUT ANGINA PECTORIS: Chronic | ICD-10-CM

## 2024-10-23 DIAGNOSIS — S40.021A CONTUSION OF RIGHT UPPER EXTREMITY, INITIAL ENCOUNTER: Primary | ICD-10-CM

## 2024-10-23 RX ORDER — TRAMADOL HYDROCHLORIDE 50 MG/1
50 TABLET ORAL EVERY 6 HOURS PRN
Qty: 30 TABLET | Refills: 0 | OUTPATIENT
Start: 2024-10-23 | End: 2024-11-22

## 2024-10-23 RX ORDER — BUTALBITAL, ACETAMINOPHEN AND CAFFEINE 50; 325; 40 MG/1; MG/1; MG/1
1 TABLET ORAL EVERY 6 HOURS PRN
Qty: 30 TABLET | Refills: 0 | Status: SHIPPED | OUTPATIENT
Start: 2024-10-23 | End: 2025-10-23

## 2024-10-23 RX ORDER — DOXYCYCLINE HYCLATE 100 MG
100 TABLET ORAL 2 TIMES DAILY
Qty: 14 TABLET | Refills: 0 | Status: SHIPPED | OUTPATIENT
Start: 2024-10-23 | End: 2024-10-30

## 2024-10-23 RX ORDER — FLUTICASONE PROPIONATE AND SALMETEROL 100; 50 UG/1; UG/1
1 POWDER RESPIRATORY (INHALATION) EVERY 12 HOURS
Qty: 90 EACH | Refills: 3
Start: 2024-10-23 | End: 2024-10-24 | Stop reason: SDUPTHER

## 2024-10-23 NOTE — PROGRESS NOTES
(deep vein thrombosis)     History of recurrent UTIs     Hx of seasonal allergies     Hypercholesterolemia     Irritable bowel syndrome (IBS)     Migraine     Noncompliance 01/18/2023    Oxygen dependent     PRN at 2LPM [place]    Psychiatric disorder     anxiety    Thromboembolus (HCC)     left leg    Thyroid disease     pt denies- not tx for it    Urinary urgency        Past Surgical History:  Past Surgical History:   Procedure Laterality Date    AORTIC VALVE REPLACEMENT  07/13/2022    APPENDECTOMY      CARDIAC CATHETERIZATION  2022    stent    CATARACT REMOVAL Bilateral     CHOLECYSTECTOMY      COLONOSCOPY      HEENT      dental extraction    HX OPEN REDUCTION INTERNAL FIXATION Left     humerus     HX OPEN REDUCTION INTERNAL FIXATION Right     hip    HYSTERECTOMY (CERVIX STATUS UNKNOWN)      ORTHOPEDIC SURGERY Left     humerus    OVARY REMOVAL      PAIN MANAGEMENT PROCEDURE Right 6/13/2023    RIGHT L4-5, L5-S1 RADIO FREQUENCY ABLATION performed by Giovanny Robertson MD at [facility] AM[facility]UL[facility] OR    PAIN MANAGEMENT PROCEDURE Right 6/20/2023    LEFT L4 - L5, L5 - S1 RADIO FREQUENCY ABLATION performed by Giovanny Robertson MD at [facility] AMBUL[facility] OR    PAIN MANAGEMENT PROCEDURE Bilateral 10/10/2023    BILATERAL L4 TRANSFORAMINAL EPIDURAL STEROID INJECTION WITH LOCAL (NO CONTRAST) performed by Giovanny Robertson MD at [facility] AMBUL[facility]ORY OR    PAIN MANAGEMENT PROCEDURE N/A 3/12/2024    BILATERAL L4 TRANSFORAMINAL EPIDURAL STEROID INJECTION performed by Giovanny Robertson MD at [facility]UL[facility] OR    PAIN MANAGEMENT PROCEDURE Bilateral 5/14/2024    BILATERAL L4 TRANSFORAMINAL EPIDURAL STEROID INJECTION performed by Giovanny Robertson MD at [facility] AMBUL[facility] OR    PAIN MANAGEMENT PROCEDURE N/A 7/9/2024    BILATERAL L4, L5-S1 RADIOFREQUENCY ABLATION performed by Giovanny Robertson MD at [facility] AMBUL[facility]ORY OR    TONSILLECTOMY      TOTAL HIP ARTHROPLASTY Right     TUBAL LIGATION         Family History:  Family History   Problem

## 2024-10-24 ENCOUNTER — TELEPHONE (OUTPATIENT)
Facility: CLINIC | Age: 84
End: 2024-10-24

## 2024-10-24 DIAGNOSIS — J41.0 SIMPLE CHRONIC BRONCHITIS (HCC): Chronic | ICD-10-CM

## 2024-10-24 RX ORDER — FLUTICASONE PROPIONATE AND SALMETEROL 100; 50 UG/1; UG/1
1 POWDER RESPIRATORY (INHALATION) EVERY 12 HOURS
Qty: 90 EACH | Refills: 3 | Status: SHIPPED | OUTPATIENT
Start: 2024-10-24 | End: 2025-10-24

## 2024-10-24 NOTE — TELEPHONE ENCOUNTER
Per chart review, MD wrote a prescription for Wixela yesterday, but chose \"no print\" instead of \"Normal\".     Rx Re-sent to pharmacy.

## 2024-10-24 NOTE — TELEPHONE ENCOUNTER
1:16 pm - the patient called to update that Dr. Alex was at her home yesterday, they talked about ordering an inhaler and Dr. Alex was going to call in a new script to [facility], but they have not received anything from him yet.  She states that [facility] is delivering 2 other meds today and if the inhaler script is sent ASAP, they will deliver it also.  The patient said that she needs the inhaler right away.  Her callback is [phone]

## 2024-10-24 NOTE — TELEPHONE ENCOUNTER
Fax sent to office from [facility] pharmacy requesting refill of Wixela.    Per chart review, this medication was e-prescribed by Dr. Alex yesterday, 10/23/24.

## 2024-10-25 ENCOUNTER — TELEPHONE (OUTPATIENT)
Facility: CLINIC | Age: 84
End: 2024-10-25

## 2024-10-25 NOTE — TELEPHONE ENCOUNTER
Pt left vm stating she had a bad reaction to the antibiotic which Dr. Alex prescribed on 10/21, requested callback.

## 2024-10-25 NOTE — TELEPHONE ENCOUNTER
VM - 1:49 pm - the patient left a message requesting a callback from Dr. Alex and to schedule an appointment for her next visit.     She states that [facility] came to her home today to administer the flu shot, but the patient said that she already had the shot at another Drs. Office.    Antibiotic - the patient said that she had a reaction to the antibiotic prescribed by Dr. Alex and she cannot take it.    # [phone]

## 2024-10-29 ENCOUNTER — OFFICE VISIT (OUTPATIENT)
Facility: CLINIC | Age: 84
End: 2024-10-29

## 2024-10-29 ENCOUNTER — TELEPHONE (OUTPATIENT)
Facility: CLINIC | Age: 84
End: 2024-10-29

## 2024-10-29 VITALS
RESPIRATION RATE: 24 BRPM | HEART RATE: 84 BPM | SYSTOLIC BLOOD PRESSURE: 135 MMHG | OXYGEN SATURATION: 90 % | DIASTOLIC BLOOD PRESSURE: 73 MMHG

## 2024-10-29 DIAGNOSIS — F41.8 DEPRESSION WITH ANXIETY: Chronic | ICD-10-CM

## 2024-10-29 DIAGNOSIS — I10 BENIGN ESSENTIAL HYPERTENSION: Chronic | ICD-10-CM

## 2024-10-29 DIAGNOSIS — Z95.2 H/O AORTIC VALVE REPLACEMENT: Chronic | ICD-10-CM

## 2024-10-29 DIAGNOSIS — J84.9 INTERSTITIAL LUNG DISEASE (HCC): Chronic | ICD-10-CM

## 2024-10-29 DIAGNOSIS — I25.10 CORONARY ARTERY DISEASE INVOLVING NATIVE CORONARY ARTERY OF NATIVE HEART WITHOUT ANGINA PECTORIS: Chronic | ICD-10-CM

## 2024-10-29 DIAGNOSIS — J40 BRONCHITIS: Primary | ICD-10-CM

## 2024-10-29 DIAGNOSIS — J41.0 SIMPLE CHRONIC BRONCHITIS (HCC): Chronic | ICD-10-CM

## 2024-10-29 DIAGNOSIS — U09.9 LONG COVID: Chronic | ICD-10-CM

## 2024-10-29 DIAGNOSIS — F03.A0 MILD DEMENTIA WITHOUT BEHAVIORAL DISTURBANCE, PSYCHOTIC DISTURBANCE, MOOD DISTURBANCE, OR ANXIETY, UNSPECIFIED DEMENTIA TYPE (HCC): Chronic | ICD-10-CM

## 2024-10-29 DIAGNOSIS — M15.9 GENERALIZED OSTEOARTHRITIS: Chronic | ICD-10-CM

## 2024-10-29 DIAGNOSIS — J96.11 CHRONIC RESPIRATORY FAILURE WITH HYPOXIA: Chronic | ICD-10-CM

## 2024-10-29 RX ORDER — TRAMADOL HYDROCHLORIDE 50 MG/1
50 TABLET ORAL EVERY 6 HOURS PRN
Qty: 30 TABLET | Refills: 0 | Status: SHIPPED | OUTPATIENT
Start: 2024-10-29 | End: 2024-11-28

## 2024-10-29 RX ORDER — MOXIFLOXACIN HYDROCHLORIDE 400 MG/1
400 TABLET ORAL DAILY
Qty: 7 TABLET | Refills: 0 | Status: SHIPPED | OUTPATIENT
Start: 2024-10-29 | End: 2024-11-05

## 2024-10-29 RX ORDER — FLUTICASONE PROPIONATE AND SALMETEROL 100; 50 UG/1; UG/1
1 POWDER RESPIRATORY (INHALATION) EVERY 12 HOURS
Qty: 3 EACH | Refills: 3 | Status: SHIPPED | OUTPATIENT
Start: 2024-10-29 | End: 2025-10-29

## 2024-10-29 NOTE — TELEPHONE ENCOUNTER
Call received from Kathie Khan, PT with Home Health to report that patient has cancelled her appointments a few times. Today, patient reports having a fever and diarrhea since Sunday.     Call above discussed with Dr. Abi Javier MD offered to make a visit to the patient this afternoon.     Call placed to patient - Ms. Dasilva reports feeling unwell, she \"just took\" her temp and it was 100 F. Pt reports taking tylenol, and just wants to stay in bed. Ms. Dasilva accepted Dr. Alex's visit this afternoon. Appointment added to [facility].

## 2024-10-29 NOTE — PROGRESS NOTES
[facility]      Primary Care At Home - Home Visit      Name: Adenike Dasilva    Address: [address] [address]   :  1940  MRN:  [id number]        ASSESSMENT:     Diagnosis Orders   1. Bronchitis  moxifloxacin (AVELOX) 400 MG tablet      2. Simple chronic bronchitis (HCC)  fluticasone-salmeterol (WIXELA INHUB) 100-50 MCG/ACT AEPB diskus inhaler      3. Interstitial lung disease (HCC)        4. Chronic respiratory failure with hypoxia        5. Coronary artery disease involving native coronary artery of native heart without angina pectoris        6. H/O aortic valve replacement        7. Benign essential hypertension        8. Generalized osteoarthritis  traMADol (ULTRAM) 50 MG tablet      9. Long COVID        10. Mild dementia without behavioral disturbance, psychotic disturbance, mood disturbance, or anxiety, unspecified dementia type (HCC)        11. Depression with anxiety              PLAN:    BRONCHITIS/ COPD/ ILD/ RESPIRATORY FAILURE: Discussed options and will add Avelox.     CAD/ VHD/ HYPERTENSION: This problem is stable. Current treatment was continued as noted above.     OA: This problem has deteriorated. She has an appt with Ortho next week. Will continue heat, rubs, patches and medication.    LONG COVID: This problem is stable. Will continue close surveillance.    DEMENTIA: This problem has deteriorated. Will continue close surveillance.    DEPRESSION: This problem has deteriorated. Will continue current rx.       SUBJECTIVE:    Chief Complaint:  Chief Complaint   Patient presents with    Other     URGENT HOME VISIT: BRONCHITIS/ COPD/ ILD/ CAD       History of Present Illness:    Adenike Dasilva is a 84 y.o. female who is seen for an urgent [facility] Primary Care At Home Home Visit. It would be physically and emotionally difficult to take the patient from the home to seek primary care services in the community. The patient is homebound as a

## 2024-11-01 ENCOUNTER — TELEPHONE (OUTPATIENT)
Facility: CLINIC | Age: 84
End: 2024-11-01

## 2024-11-01 NOTE — TELEPHONE ENCOUNTER
I called pt to remind of their in home visit w/ Dr. Alex on 11/7/2024, arrival between 10-4.  I reached pt VM and left a message to please call the [facility] office to confirm the visit.

## 2024-11-01 NOTE — TELEPHONE ENCOUNTER
The patient called to confirm her in home visit with Dr. Alex, Thursday, 11/7/24, arrival between 10am-4pm.  No covid in the household and no changes to insurance or location.

## 2024-11-05 ENCOUNTER — TELEPHONE (OUTPATIENT)
Facility: CLINIC | Age: 84
End: 2024-11-05

## 2024-11-05 NOTE — TELEPHONE ENCOUNTER
Kathie Suresh is a PT with [facility].  She called to update that the patient has had 2 additional falls, [place] 11/3 and yesterday, 11/4.  The patient has a lump on her head, a cut on her left hand between the thumb and finger, her left hip hurts.  Both falls occurred in the bathroom.  Kathie's callback is [phone] if needed.

## 2024-11-05 NOTE — TELEPHONE ENCOUNTER
Call placed to Kathie Suresh, PT - she informed that patient told her the fall on Sunday happened because her raised toilet seat broke off. EMS came to the home, but patient refused to go to the ER.  Yesterday, patient's son saw her on the ground in one of the cameras on her apartment and came to help her. Again, she refused to go to the ER. I discussed that Dr. Alex was concerned that patient is not safe to stay at home. Kathie agrees with that, but states that patient and son have been \"fighting a lot\" about this recently. Patient states that she doesn't want to be \"a burden\" to Abhilash (son). Son agrees that it is unsafe for her to live alone, but she can't move in with him, as all of his bedrooms are on the second floor.     *Patient has a \"life alert\", it wasn't working and it is currently being replaced, but she has not received the replacement yet.  She has a smart watch that she can use to call her son or 911 in case of a fall.     Patient already has a visit with Dr. Alex scheduled for Thursday, 11/7.  GINGER Vázquez will see the patient on Friday, 11/8.     Kathie requested a verbal order for  - patient had one visit before and reported positive outcome - verbal order provided.     Kathie Suresh, PT - cell phone [phone].

## 2024-11-05 NOTE — TELEPHONE ENCOUNTER
Per Dr. Alex recommendation - I spoke with patient and moved appointment from Thursday to tomorrow, 11/6.

## 2024-11-06 ENCOUNTER — OFFICE VISIT (OUTPATIENT)
Facility: CLINIC | Age: 84
End: 2024-11-06

## 2024-11-06 ENCOUNTER — TELEPHONE (OUTPATIENT)
Facility: CLINIC | Age: 84
End: 2024-11-06

## 2024-11-06 VITALS
RESPIRATION RATE: 28 BRPM | TEMPERATURE: 97.6 F | HEART RATE: 98 BPM | DIASTOLIC BLOOD PRESSURE: 82 MMHG | SYSTOLIC BLOOD PRESSURE: 137 MMHG | OXYGEN SATURATION: 97 %

## 2024-11-06 DIAGNOSIS — Z95.2 H/O AORTIC VALVE REPLACEMENT: Chronic | ICD-10-CM

## 2024-11-06 DIAGNOSIS — J96.11 CHRONIC RESPIRATORY FAILURE WITH HYPOXIA: Chronic | ICD-10-CM

## 2024-11-06 DIAGNOSIS — K21.9 GASTROESOPHAGEAL REFLUX DISEASE WITHOUT ESOPHAGITIS: Chronic | ICD-10-CM

## 2024-11-06 DIAGNOSIS — U09.9 LONG COVID: Chronic | ICD-10-CM

## 2024-11-06 DIAGNOSIS — E11.42 TYPE 2 DIABETES MELLITUS WITH DIABETIC POLYNEUROPATHY, WITHOUT LONG-TERM CURRENT USE OF INSULIN (HCC): Chronic | ICD-10-CM

## 2024-11-06 DIAGNOSIS — I35.0 NONRHEUMATIC AORTIC VALVE STENOSIS: Chronic | ICD-10-CM

## 2024-11-06 DIAGNOSIS — I25.10 CORONARY ARTERY DISEASE INVOLVING NATIVE CORONARY ARTERY OF NATIVE HEART WITHOUT ANGINA PECTORIS: Chronic | ICD-10-CM

## 2024-11-06 DIAGNOSIS — T07.XXXA MULTIPLE CONTUSIONS: ICD-10-CM

## 2024-11-06 DIAGNOSIS — R29.6 FREQUENT FALLS: Chronic | ICD-10-CM

## 2024-11-06 DIAGNOSIS — F03.A0 MILD DEMENTIA WITHOUT BEHAVIORAL DISTURBANCE, PSYCHOTIC DISTURBANCE, MOOD DISTURBANCE, OR ANXIETY, UNSPECIFIED DEMENTIA TYPE (HCC): Chronic | ICD-10-CM

## 2024-11-06 DIAGNOSIS — I10 BENIGN ESSENTIAL HYPERTENSION: Chronic | ICD-10-CM

## 2024-11-06 DIAGNOSIS — E78.2 MIXED HYPERLIPIDEMIA: Chronic | ICD-10-CM

## 2024-11-06 DIAGNOSIS — J84.9 INTERSTITIAL LUNG DISEASE (HCC): Primary | Chronic | ICD-10-CM

## 2024-11-06 NOTE — PROGRESS NOTES
[facility]      Primary Care At Home - Home Visit      Name: Adenike Dasilva    Address: [address] [address]    :  1940  MRN:  [id number]        ASSESSMENT:     Diagnosis Orders   1. Interstitial lung disease (HCC)        2. Chronic respiratory failure with hypoxia        3. Long COVID        4. Coronary artery disease involving native coronary artery of native heart without angina pectoris        5. Nonrheumatic aortic valve stenosis        6. H/O aortic valve replacement        7. Benign essential hypertension        8. Mixed hyperlipidemia        9. Type 2 diabetes mellitus with diabetic polyneuropathy, without long-term current use of insulin (HCC)        10. Gastroesophageal reflux disease without esophagitis        11. Mild dementia without behavioral disturbance, psychotic disturbance, mood disturbance, or anxiety, unspecified dementia type (HCC)        12. Frequent falls        13. Multiple contusions              PLAN:    ILD/ RESPIRATORY FAILURE/ LONG COVID: This problem has deteriorated. Will continue current treatment including supplemental Oxygen. Will check back in a week.    CAD/ VHD/ HYPERTENSION: This problem is stable. Current treatment was continued as noted above.     DIABETES MELLITUS/ HYPERLIPIDEMIA: This problem is stable. Will continue current routine including diet, exercise and medication. Will continue close observation.    GERD: This problem is stable. Will continue current treatment including antiacids and acid blocker medication.     ALZHEIMERS DEMENTIA: This problem has deteriorated. Will continue to provide supportive care in the absence of effective treatment.    FALLS/ MULTIPLE CONTUSIONS: This problem has deteriorated. We had a lengthy discussion regarding alternative living arrangements. Her son has encouraged her to consider Assisted Living. I will ask our  to see if she can

## 2024-11-06 NOTE — TELEPHONE ENCOUNTER
The patient left a VM at 11:06 am asking if Dr. Alex would still be visiting today.  I heard a male voice in the background of the VM speaking harshly to the patient that she needed to speak into the phone to leave the VM.  Her callback is [phone]  Perfect Serve message sent to Dr. Alex.

## 2024-11-08 ENCOUNTER — TELEPHONE (OUTPATIENT)
Facility: CLINIC | Age: 84
End: 2024-11-08

## 2024-11-08 NOTE — TELEPHONE ENCOUNTER
LCSW rec'd message from [facility] office that pt had called requesting to speak with LCSW. LCSW called pt. She answered the phone, stating that she could not speak to LCSW at the moment as she was in the bathroom. LCSW offered to call her back in a few minutes, but pt stated that she was leaving to \"go somewhere\". LCSW will call pt next week to inquire about her request for conversation with LCSW.     Kristine Ware, MSW, [facility]     [facility]  Primary Care at [facility]   [address], [address]   [place] [address]  (C) [phone]  (O) [phone]  [email or web address]

## 2024-11-08 NOTE — TELEPHONE ENCOUNTER
VM - 1:37 pm-The patient called to talk to Dr. Dav Alex.  She also asked if Kristine Ware LCSW is coming to visit her or not coming.  I spoke with Kristine and she said that she was not visiting the patient today, but would call her to set up a time to talk.  #[phone]

## 2024-11-10 ENCOUNTER — APPOINTMENT (OUTPATIENT)
Facility: HOSPITAL | Age: 84
DRG: 190 | End: 2024-11-10
Payer: MEDICARE

## 2024-11-10 ENCOUNTER — HOSPITAL ENCOUNTER (INPATIENT)
Facility: HOSPITAL | Age: 84
LOS: 4 days | Discharge: NURSING, REHAB OR LONG-TERM CARE | DRG: 190 | End: 2024-11-14
Attending: EMERGENCY MEDICINE | Admitting: INTERNAL MEDICINE
Payer: MEDICARE

## 2024-11-10 DIAGNOSIS — M15.9 GENERALIZED OSTEOARTHRITIS: Chronic | ICD-10-CM

## 2024-11-10 PROBLEM — J96.21 ACUTE ON CHRONIC HYPOXIC RESPIRATORY FAILURE: Status: ACTIVE | Noted: 2024-11-10

## 2024-11-10 LAB
ALBUMIN SERPL-MCNC: 3.6 G/DL (ref 3.5–5)
ALBUMIN/GLOB SERPL: 0.8 (ref 1.1–2.2)
ALP SERPL-CCNC: 91 U/L (ref 45–117)
ALT SERPL-CCNC: 12 U/L (ref 12–78)
ANION GAP SERPL CALC-SCNC: 6 MMOL/L (ref 2–12)
AST SERPL-CCNC: 16 U/L (ref 15–37)
BASOPHILS # BLD: 0 K/UL (ref 0–0.1)
BASOPHILS NFR BLD: 0 % (ref 0–1)
BILIRUB SERPL-MCNC: 0.6 MG/DL (ref 0.2–1)
BUN SERPL-MCNC: 18 MG/DL (ref 6–20)
BUN/CREAT SERPL: 21 (ref 12–20)
CALCIUM SERPL-MCNC: 9.3 MG/DL (ref 8.5–10.1)
CHLORIDE SERPL-SCNC: 104 MMOL/L (ref 97–108)
CO2 SERPL-SCNC: 26 MMOL/L (ref 21–32)
CREAT SERPL-MCNC: 0.84 MG/DL (ref 0.55–1.02)
DIFFERENTIAL METHOD BLD: ABNORMAL
EKG ATRIAL RATE: 124 BPM
EKG DIAGNOSIS: NORMAL
EKG P AXIS: 56 DEGREES
EKG P-R INTERVAL: 170 MS
EKG Q-T INTERVAL: 304 MS
EKG QRS DURATION: 86 MS
EKG QTC CALCULATION (BAZETT): 436 MS
EKG R AXIS: 17 DEGREES
EKG T AXIS: 57 DEGREES
EKG VENTRICULAR RATE: 124 BPM
EOSINOPHIL # BLD: 0.7 K/UL (ref 0–0.4)
EOSINOPHIL NFR BLD: 5 % (ref 0–7)
ERYTHROCYTE [DISTWIDTH] IN BLOOD BY AUTOMATED COUNT: 18.4 % (ref 11.5–14.5)
FLUAV RNA SPEC QL NAA+PROBE: NOT DETECTED
FLUBV RNA SPEC QL NAA+PROBE: NOT DETECTED
GLOBULIN SER CALC-MCNC: 4.3 G/DL (ref 2–4)
GLUCOSE BLD STRIP.AUTO-MCNC: 226 MG/DL (ref 65–117)
GLUCOSE BLD STRIP.AUTO-MCNC: 289 MG/DL (ref 65–117)
GLUCOSE BLD STRIP.AUTO-MCNC: 321 MG/DL (ref 65–117)
GLUCOSE SERPL-MCNC: 201 MG/DL (ref 65–100)
HCT VFR BLD AUTO: 38.1 % (ref 35–47)
HGB BLD-MCNC: 11.2 G/DL (ref 11.5–16)
IMM GRANULOCYTES # BLD AUTO: 0.1 K/UL (ref 0–0.04)
IMM GRANULOCYTES NFR BLD AUTO: 1 % (ref 0–0.5)
LACTATE BLD-SCNC: 2.05 MMOL/L (ref 0.4–2)
LACTATE BLD-SCNC: 4.44 MMOL/L (ref 0.4–2)
LACTATE SERPL-SCNC: 4 MMOL/L (ref 0.4–2)
LYMPHOCYTES # BLD: 1 K/UL (ref 0.8–3.5)
LYMPHOCYTES NFR BLD: 7 % (ref 12–49)
MAGNESIUM SERPL-MCNC: 1.5 MG/DL (ref 1.6–2.4)
MCH RBC QN AUTO: 22.2 PG (ref 26–34)
MCHC RBC AUTO-ENTMCNC: 29.4 G/DL (ref 30–36.5)
MCV RBC AUTO: 75.6 FL (ref 80–99)
MONOCYTES # BLD: 0.7 K/UL (ref 0–1)
MONOCYTES NFR BLD: 5 % (ref 5–13)
NEUTS SEG # BLD: 11.2 K/UL (ref 1.8–8)
NEUTS SEG NFR BLD: 82 % (ref 32–75)
NRBC # BLD: 0 K/UL (ref 0–0.01)
NRBC BLD-RTO: 0 PER 100 WBC
NT PRO BNP: 283 PG/ML
PLATELET # BLD AUTO: 98 K/UL (ref 150–400)
POTASSIUM SERPL-SCNC: 4.2 MMOL/L (ref 3.5–5.1)
PROCALCITONIN SERPL-MCNC: <0.05 NG/ML
PROT SERPL-MCNC: 7.9 G/DL (ref 6.4–8.2)
RBC # BLD AUTO: 5.04 M/UL (ref 3.8–5.2)
RBC MORPH BLD: ABNORMAL
SARS-COV-2 RNA RESP QL NAA+PROBE: NOT DETECTED
SERVICE CMNT-IMP: ABNORMAL
SODIUM SERPL-SCNC: 136 MMOL/L (ref 136–145)
SOURCE: NORMAL
TROPONIN I SERPL HS-MCNC: 21 NG/L (ref 0–51)
WBC # BLD AUTO: 13.7 K/UL (ref 3.6–11)

## 2024-11-10 PROCEDURE — 36415 COLL VENOUS BLD VENIPUNCTURE: CPT

## 2024-11-10 PROCEDURE — 1100000003 HC PRIVATE W/ TELEMETRY

## 2024-11-10 PROCEDURE — 96366 THER/PROPH/DIAG IV INF ADDON: CPT

## 2024-11-10 PROCEDURE — 83735 ASSAY OF MAGNESIUM: CPT

## 2024-11-10 PROCEDURE — 84484 ASSAY OF TROPONIN QUANT: CPT

## 2024-11-10 PROCEDURE — 83605 ASSAY OF LACTIC ACID: CPT

## 2024-11-10 PROCEDURE — 93005 ELECTROCARDIOGRAM TRACING: CPT | Performed by: EMERGENCY MEDICINE

## 2024-11-10 PROCEDURE — 6370000000 HC RX 637 (ALT 250 FOR IP): Performed by: INTERNAL MEDICINE

## 2024-11-10 PROCEDURE — 85025 COMPLETE CBC W/AUTO DIFF WBC: CPT

## 2024-11-10 PROCEDURE — 94640 AIRWAY INHALATION TREATMENT: CPT

## 2024-11-10 PROCEDURE — 83880 ASSAY OF NATRIURETIC PEPTIDE: CPT

## 2024-11-10 PROCEDURE — 71045 X-RAY EXAM CHEST 1 VIEW: CPT

## 2024-11-10 PROCEDURE — 96365 THER/PROPH/DIAG IV INF INIT: CPT

## 2024-11-10 PROCEDURE — 84145 PROCALCITONIN (PCT): CPT

## 2024-11-10 PROCEDURE — 80053 COMPREHEN METABOLIC PANEL: CPT

## 2024-11-10 PROCEDURE — 6360000002 HC RX W HCPCS: Performed by: INTERNAL MEDICINE

## 2024-11-10 PROCEDURE — 96375 TX/PRO/DX INJ NEW DRUG ADDON: CPT

## 2024-11-10 PROCEDURE — 6360000002 HC RX W HCPCS: Performed by: EMERGENCY MEDICINE

## 2024-11-10 PROCEDURE — 87040 BLOOD CULTURE FOR BACTERIA: CPT

## 2024-11-10 PROCEDURE — 5A0935A ASSISTANCE WITH RESPIRATORY VENTILATION, LESS THAN 24 CONSECUTIVE HOURS, HIGH NASAL FLOW/VELOCITY: ICD-10-PCS | Performed by: INTERNAL MEDICINE

## 2024-11-10 PROCEDURE — 82962 GLUCOSE BLOOD TEST: CPT

## 2024-11-10 PROCEDURE — 6370000000 HC RX 637 (ALT 250 FOR IP): Performed by: EMERGENCY MEDICINE

## 2024-11-10 PROCEDURE — 99285 EMERGENCY DEPT VISIT HI MDM: CPT

## 2024-11-10 PROCEDURE — 87636 SARSCOV2 & INF A&B AMP PRB: CPT

## 2024-11-10 PROCEDURE — 2700000000 HC OXYGEN THERAPY PER DAY

## 2024-11-10 PROCEDURE — 2580000003 HC RX 258: Performed by: INTERNAL MEDICINE

## 2024-11-10 PROCEDURE — 2580000003 HC RX 258: Performed by: EMERGENCY MEDICINE

## 2024-11-10 PROCEDURE — 70450 CT HEAD/BRAIN W/O DYE: CPT

## 2024-11-10 RX ORDER — SODIUM CHLORIDE 9 MG/ML
INJECTION, SOLUTION INTRAVENOUS PRN
Status: DISCONTINUED | OUTPATIENT
Start: 2024-11-10 | End: 2024-11-14 | Stop reason: HOSPADM

## 2024-11-10 RX ORDER — BUSPIRONE HYDROCHLORIDE 15 MG/1
30 TABLET ORAL DAILY PRN
Status: ON HOLD | COMMUNITY
End: 2024-11-11 | Stop reason: ALTCHOICE

## 2024-11-10 RX ORDER — ONDANSETRON 2 MG/ML
4 INJECTION INTRAMUSCULAR; INTRAVENOUS EVERY 6 HOURS PRN
Status: DISCONTINUED | OUTPATIENT
Start: 2024-11-10 | End: 2024-11-14 | Stop reason: HOSPADM

## 2024-11-10 RX ORDER — ATORVASTATIN CALCIUM 20 MG/1
20 TABLET, FILM COATED ORAL EVERY EVENING
Status: DISCONTINUED | OUTPATIENT
Start: 2024-11-10 | End: 2024-11-14 | Stop reason: HOSPADM

## 2024-11-10 RX ORDER — ALBUTEROL SULFATE 0.83 MG/ML
2.5 SOLUTION RESPIRATORY (INHALATION) EVERY 6 HOURS PRN
Status: DISCONTINUED | OUTPATIENT
Start: 2024-11-10 | End: 2024-11-14 | Stop reason: HOSPADM

## 2024-11-10 RX ORDER — FAMOTIDINE 20 MG/1
10 TABLET, FILM COATED ORAL 2 TIMES DAILY
Status: DISCONTINUED | OUTPATIENT
Start: 2024-11-10 | End: 2024-11-14 | Stop reason: HOSPADM

## 2024-11-10 RX ORDER — MAGNESIUM SULFATE IN WATER 40 MG/ML
2000 INJECTION, SOLUTION INTRAVENOUS ONCE
Status: COMPLETED | OUTPATIENT
Start: 2024-11-10 | End: 2024-11-10

## 2024-11-10 RX ORDER — LEVOFLOXACIN 5 MG/ML
250 INJECTION, SOLUTION INTRAVENOUS ONCE
Status: COMPLETED | OUTPATIENT
Start: 2024-11-10 | End: 2024-11-10

## 2024-11-10 RX ORDER — ASPIRIN 81 MG/1
81 TABLET ORAL DAILY
Status: DISCONTINUED | OUTPATIENT
Start: 2024-11-11 | End: 2024-11-14 | Stop reason: HOSPADM

## 2024-11-10 RX ORDER — TRAZODONE HYDROCHLORIDE 100 MG/1
100 TABLET ORAL NIGHTLY
Status: DISCONTINUED | OUTPATIENT
Start: 2024-11-10 | End: 2024-11-14 | Stop reason: HOSPADM

## 2024-11-10 RX ORDER — ENOXAPARIN SODIUM 100 MG/ML
40 INJECTION SUBCUTANEOUS DAILY
Status: DISCONTINUED | OUTPATIENT
Start: 2024-11-11 | End: 2024-11-14 | Stop reason: HOSPADM

## 2024-11-10 RX ORDER — BUTALBITAL, ACETAMINOPHEN AND CAFFEINE 50; 325; 40 MG/1; MG/1; MG/1
1 TABLET ORAL EVERY 6 HOURS PRN
Status: DISCONTINUED | OUTPATIENT
Start: 2024-11-10 | End: 2024-11-14 | Stop reason: HOSPADM

## 2024-11-10 RX ORDER — TOPIRAMATE 25 MG/1
25 TABLET, FILM COATED ORAL DAILY
Status: ON HOLD | COMMUNITY
End: 2024-11-11 | Stop reason: ALTCHOICE

## 2024-11-10 RX ORDER — FLUTICASONE PROPIONATE 50 MCG
2 SPRAY, SUSPENSION (ML) NASAL DAILY PRN
Status: DISCONTINUED | OUTPATIENT
Start: 2024-11-10 | End: 2024-11-14 | Stop reason: HOSPADM

## 2024-11-10 RX ORDER — MONTELUKAST SODIUM 4 MG/1
1 TABLET, CHEWABLE ORAL DAILY
Status: ON HOLD | COMMUNITY
End: 2024-11-11 | Stop reason: ALTCHOICE

## 2024-11-10 RX ORDER — AMITRIPTYLINE HYDROCHLORIDE 10 MG/1
10 TABLET ORAL NIGHTLY
Status: DISCONTINUED | OUTPATIENT
Start: 2024-11-10 | End: 2024-11-14 | Stop reason: HOSPADM

## 2024-11-10 RX ORDER — ACETAMINOPHEN 650 MG/1
650 SUPPOSITORY RECTAL EVERY 6 HOURS PRN
Status: DISCONTINUED | OUTPATIENT
Start: 2024-11-10 | End: 2024-11-14 | Stop reason: HOSPADM

## 2024-11-10 RX ORDER — PREGABALIN 50 MG/1
50 CAPSULE ORAL 2 TIMES DAILY
Status: DISCONTINUED | OUTPATIENT
Start: 2024-11-10 | End: 2024-11-14 | Stop reason: HOSPADM

## 2024-11-10 RX ORDER — BISMUTH SUBSALICYLATE 262 MG/1
524 TABLET, CHEWABLE ORAL
Status: DISCONTINUED | OUTPATIENT
Start: 2024-11-10 | End: 2024-11-10

## 2024-11-10 RX ORDER — LEVOFLOXACIN 5 MG/ML
500 INJECTION, SOLUTION INTRAVENOUS ONCE
Status: COMPLETED | OUTPATIENT
Start: 2024-11-10 | End: 2024-11-10

## 2024-11-10 RX ORDER — OXYBUTYNIN CHLORIDE 10 MG/1
10 TABLET, EXTENDED RELEASE ORAL DAILY
Status: ON HOLD | COMMUNITY
End: 2024-11-11 | Stop reason: ALTCHOICE

## 2024-11-10 RX ORDER — GLUCAGON 1 MG/ML
1 KIT INJECTION PRN
Status: DISCONTINUED | OUTPATIENT
Start: 2024-11-10 | End: 2024-11-13

## 2024-11-10 RX ORDER — SODIUM CHLORIDE 0.9 % (FLUSH) 0.9 %
5-40 SYRINGE (ML) INJECTION EVERY 12 HOURS SCHEDULED
Status: DISCONTINUED | OUTPATIENT
Start: 2024-11-10 | End: 2024-11-14 | Stop reason: HOSPADM

## 2024-11-10 RX ORDER — IPRATROPIUM BROMIDE AND ALBUTEROL SULFATE 2.5; .5 MG/3ML; MG/3ML
1 SOLUTION RESPIRATORY (INHALATION)
Status: DISCONTINUED | OUTPATIENT
Start: 2024-11-10 | End: 2024-11-11

## 2024-11-10 RX ORDER — TROSPIUM CHLORIDE 20 MG/1
20 TABLET, FILM COATED ORAL NIGHTLY
Status: DISCONTINUED | OUTPATIENT
Start: 2024-11-10 | End: 2024-11-14 | Stop reason: HOSPADM

## 2024-11-10 RX ORDER — DIPHENHYDRAMINE HYDROCHLORIDE 25 MG/1
1 TABLET ORAL DAILY
Status: DISCONTINUED | OUTPATIENT
Start: 2024-11-11 | End: 2024-11-10

## 2024-11-10 RX ORDER — TOPIRAMATE 25 MG/1
25 TABLET, FILM COATED ORAL DAILY
Status: DISCONTINUED | OUTPATIENT
Start: 2024-11-11 | End: 2024-11-14 | Stop reason: HOSPADM

## 2024-11-10 RX ORDER — CHOLESTYRAMINE LIGHT 4 G/5.7G
4 POWDER, FOR SUSPENSION ORAL DAILY
Status: DISCONTINUED | OUTPATIENT
Start: 2024-11-11 | End: 2024-11-11 | Stop reason: ALTCHOICE

## 2024-11-10 RX ORDER — ACETAMINOPHEN 325 MG/1
650 TABLET ORAL EVERY 6 HOURS PRN
Status: DISCONTINUED | OUTPATIENT
Start: 2024-11-10 | End: 2024-11-14 | Stop reason: HOSPADM

## 2024-11-10 RX ORDER — LEVOFLOXACIN 5 MG/ML
750 INJECTION, SOLUTION INTRAVENOUS ONCE
Status: DISCONTINUED | OUTPATIENT
Start: 2024-11-10 | End: 2024-11-10 | Stop reason: CLARIF

## 2024-11-10 RX ORDER — IPRATROPIUM BROMIDE AND ALBUTEROL SULFATE 2.5; .5 MG/3ML; MG/3ML
3 SOLUTION RESPIRATORY (INHALATION)
Status: COMPLETED | OUTPATIENT
Start: 2024-11-10 | End: 2024-11-10

## 2024-11-10 RX ORDER — INSULIN LISPRO 100 [IU]/ML
0-4 INJECTION, SOLUTION INTRAVENOUS; SUBCUTANEOUS
Status: DISCONTINUED | OUTPATIENT
Start: 2024-11-10 | End: 2024-11-13

## 2024-11-10 RX ORDER — ALBUTEROL SULFATE 90 UG/1
2 INHALANT RESPIRATORY (INHALATION) EVERY 6 HOURS PRN
Status: DISCONTINUED | OUTPATIENT
Start: 2024-11-10 | End: 2024-11-10

## 2024-11-10 RX ORDER — METOPROLOL SUCCINATE 50 MG/1
50 TABLET, EXTENDED RELEASE ORAL DAILY
Status: DISCONTINUED | OUTPATIENT
Start: 2024-11-11 | End: 2024-11-14 | Stop reason: HOSPADM

## 2024-11-10 RX ORDER — TRAMADOL HYDROCHLORIDE 50 MG/1
50 TABLET ORAL EVERY 6 HOURS PRN
Status: DISCONTINUED | OUTPATIENT
Start: 2024-11-10 | End: 2024-11-14 | Stop reason: HOSPADM

## 2024-11-10 RX ORDER — BENZONATATE 100 MG/1
100 CAPSULE ORAL 3 TIMES DAILY PRN
Status: DISCONTINUED | OUTPATIENT
Start: 2024-11-10 | End: 2024-11-11 | Stop reason: ALTCHOICE

## 2024-11-10 RX ORDER — LEVETIRACETAM 500 MG/1
500 TABLET ORAL
Status: DISCONTINUED | OUTPATIENT
Start: 2024-11-11 | End: 2024-11-14 | Stop reason: HOSPADM

## 2024-11-10 RX ORDER — SODIUM CHLORIDE 0.9 % (FLUSH) 0.9 %
5-40 SYRINGE (ML) INJECTION PRN
Status: DISCONTINUED | OUTPATIENT
Start: 2024-11-10 | End: 2024-11-14 | Stop reason: HOSPADM

## 2024-11-10 RX ORDER — POLYETHYLENE GLYCOL 3350 17 G/17G
17 POWDER, FOR SOLUTION ORAL DAILY PRN
Status: DISCONTINUED | OUTPATIENT
Start: 2024-11-10 | End: 2024-11-14 | Stop reason: HOSPADM

## 2024-11-10 RX ORDER — LOPERAMIDE HYDROCHLORIDE 2 MG/1
2 CAPSULE ORAL 4 TIMES DAILY PRN
Status: DISCONTINUED | OUTPATIENT
Start: 2024-11-10 | End: 2024-11-14 | Stop reason: HOSPADM

## 2024-11-10 RX ORDER — BUSPIRONE HYDROCHLORIDE 10 MG/1
30 TABLET ORAL DAILY PRN
Status: DISCONTINUED | OUTPATIENT
Start: 2024-11-10 | End: 2024-11-11 | Stop reason: ALTCHOICE

## 2024-11-10 RX ORDER — LEVOFLOXACIN 750 MG/1
750 TABLET, FILM COATED ORAL
Status: DISCONTINUED | OUTPATIENT
Start: 2024-11-12 | End: 2024-11-11

## 2024-11-10 RX ORDER — SODIUM CHLORIDE, SODIUM LACTATE, POTASSIUM CHLORIDE, AND CALCIUM CHLORIDE .6; .31; .03; .02 G/100ML; G/100ML; G/100ML; G/100ML
30 INJECTION, SOLUTION INTRAVENOUS ONCE
Status: COMPLETED | OUTPATIENT
Start: 2024-11-10 | End: 2024-11-10

## 2024-11-10 RX ORDER — DEXTROSE MONOHYDRATE 100 MG/ML
INJECTION, SOLUTION INTRAVENOUS CONTINUOUS PRN
Status: DISCONTINUED | OUTPATIENT
Start: 2024-11-10 | End: 2024-11-13

## 2024-11-10 RX ORDER — ONDANSETRON 4 MG/1
4 TABLET, ORALLY DISINTEGRATING ORAL EVERY 8 HOURS PRN
Status: DISCONTINUED | OUTPATIENT
Start: 2024-11-10 | End: 2024-11-14 | Stop reason: HOSPADM

## 2024-11-10 RX ORDER — DICYCLOMINE HCL 20 MG
20 TABLET ORAL 3 TIMES DAILY PRN
Status: DISCONTINUED | OUTPATIENT
Start: 2024-11-10 | End: 2024-11-14 | Stop reason: HOSPADM

## 2024-11-10 RX ORDER — BENZONATATE 100 MG/1
1 CAPSULE ORAL 3 TIMES DAILY PRN
Status: ON HOLD | COMMUNITY
End: 2024-11-11 | Stop reason: ALTCHOICE

## 2024-11-10 RX ADMIN — METHYLPREDNISOLONE SODIUM SUCCINATE 40 MG: 40 INJECTION INTRAMUSCULAR; INTRAVENOUS at 22:39

## 2024-11-10 RX ADMIN — LEVOFLOXACIN 250 MG: 5 INJECTION, SOLUTION INTRAVENOUS at 16:26

## 2024-11-10 RX ADMIN — LEVETIRACETAM 750 MG: 500 TABLET, FILM COATED ORAL at 18:58

## 2024-11-10 RX ADMIN — WATER 125 MG: 1 INJECTION INTRAMUSCULAR; INTRAVENOUS; SUBCUTANEOUS at 13:34

## 2024-11-10 RX ADMIN — AMITRIPTYLINE HYDROCHLORIDE 10 MG: 10 TABLET, FILM COATED ORAL at 22:51

## 2024-11-10 RX ADMIN — INSULIN LISPRO 2 UNITS: 100 INJECTION, SOLUTION INTRAVENOUS; SUBCUTANEOUS at 22:51

## 2024-11-10 RX ADMIN — SODIUM CHLORIDE, PRESERVATIVE FREE 10 ML: 5 INJECTION INTRAVENOUS at 22:40

## 2024-11-10 RX ADMIN — ATORVASTATIN CALCIUM 20 MG: 20 TABLET, FILM COATED ORAL at 18:57

## 2024-11-10 RX ADMIN — FAMOTIDINE 10 MG: 20 TABLET, FILM COATED ORAL at 22:39

## 2024-11-10 RX ADMIN — TROSPIUM CHLORIDE 20 MG: 20 TABLET, FILM COATED ORAL at 22:39

## 2024-11-10 RX ADMIN — IPRATROPIUM BROMIDE AND ALBUTEROL SULFATE 3 DOSE: .5; 3 SOLUTION RESPIRATORY (INHALATION) at 13:26

## 2024-11-10 RX ADMIN — MAGNESIUM SULFATE HEPTAHYDRATE 2000 MG: 40 INJECTION, SOLUTION INTRAVENOUS at 17:23

## 2024-11-10 RX ADMIN — ACETAMINOPHEN 650 MG: 325 TABLET ORAL at 22:41

## 2024-11-10 RX ADMIN — SODIUM CHLORIDE, POTASSIUM CHLORIDE, SODIUM LACTATE AND CALCIUM CHLORIDE 1590 ML: 600; 310; 30; 20 INJECTION, SOLUTION INTRAVENOUS at 14:43

## 2024-11-10 RX ADMIN — LEVOFLOXACIN 500 MG: 500 INJECTION, SOLUTION INTRAVENOUS at 14:45

## 2024-11-10 RX ADMIN — TRAZODONE HYDROCHLORIDE 100 MG: 100 TABLET ORAL at 22:39

## 2024-11-10 RX ADMIN — PREGABALIN 50 MG: 50 CAPSULE ORAL at 22:39

## 2024-11-10 ASSESSMENT — PAIN DESCRIPTION - LOCATION: LOCATION: BACK;HEAD

## 2024-11-10 ASSESSMENT — PAIN DESCRIPTION - DESCRIPTORS: DESCRIPTORS: ACHING

## 2024-11-10 ASSESSMENT — PAIN - FUNCTIONAL ASSESSMENT
PAIN_FUNCTIONAL_ASSESSMENT: ACTIVITIES ARE NOT PREVENTED
PAIN_FUNCTIONAL_ASSESSMENT: 0-10

## 2024-11-10 ASSESSMENT — PAIN SCALES - GENERAL
PAINLEVEL_OUTOF10: 0
PAINLEVEL_OUTOF10: 7
PAINLEVEL_OUTOF10: 0
PAINLEVEL_OUTOF10: 5

## 2024-11-10 ASSESSMENT — PAIN DESCRIPTION - ORIENTATION: ORIENTATION: UPPER

## 2024-11-10 NOTE — H&P
Hospitalist Admission Note    NAME:   Adenike Dasilva   : 1940   MRN: [id number]     Date/Time: 11/10/2024 4:52 PM    Patient PCP: Dav Alex MD    ______________________________________________________________________  Given the patient's current clinical presentation, I have a high level of concern for decompensation if discharged from the emergency department.  Complex decision making was performed, which includes reviewing the patient's available past medical records, laboratory results, and x-ray films.       My assessment of this patient's clinical condition and my plan of care is as follows.    Assessment / Plan:    Acute on chronic hypoxic respiratory failure  Acute exacerbation of COPD  Pulmonary fibrosis  Interstitial lung disease  S/p solumedrol and levofloxacin in ED  Continue solumedrol 40 mg iv q6h  Continue levofloxacin for now  Continue supplemental oxygen and wean as tolerated  Will consult pulmonary  Start dual nebulization and steroid nebulization    Fall:  Likely s/s hypoxia, ? Underlying infection, ? Polypharmacy, problem with balance due to age  Will consult PT/OT    Hypomagnesemia:  Magnesium repleted in ED      Type II DM:  Hold home metformin  Start insulin sliding scale    Migraine  Insomnia  Dementia  Anxiety  Continue trazodone, keppra  Is on multiple medications- will consult pharmacy    Hx of aortic valve replacement  CAD  Hyperlipidemia  Hypertension:  Not on coumadin s/s non compliance  Continue aspirin, statin  Continue metoprolol                      Medical Decision Making:   I personally reviewed labs: cbc- elevated white count to 13.7  BMP  Lactic acid 4.44  Procalcitonin <0.05  Magnesium 1.5- repleted  I personally reviewed imaging:CXR report, CT head report  I personally reviewed EKG: interpreted by me- sinus tachycardia  Toxic drug monitoring: monitor for insulin toxicity  Monitor for hypoglycemia  Monitor for hyperglycemia while patient is on

## 2024-11-10 NOTE — ED NOTES
Verbal (telephone) report given to Anahi (oncoming nurse) by Rosina (offgoing nurse) for transfer of care to inpatient unit Report included the following information Nurse Handoff Report, ED Encounter Summary, ED SBAR, Adult Overview, Intake/Output, MAR, Recent Results, Neuro Assessment, and Event Log, outstanding orders to be completed.

## 2024-11-10 NOTE — ED PROVIDER NOTES
Past Surgical History:  Past Surgical History:   Procedure Laterality Date   • AORTIC VALVE REPLACEMENT  2022   • APPENDECTOMY     • CARDIAC CATHETERIZATION      stent   • CATARACT REMOVAL Bilateral    • CHOLECYSTECTOMY     • COLONOSCOPY     • HEENT      dental extraction   • HX OPEN REDUCTION INTERNAL FIXATION Left     humerus    • HX OPEN REDUCTION INTERNAL FIXATION Right     hip   • HYSTERECTOMY (CERVIX STATUS UNKNOWN)     • ORTHOPEDIC SURGERY Left     humerus   • OVARY REMOVAL     • PAIN MANAGEMENT PROCEDURE Right 2023    RIGHT L4-5, L5-S1 RADIO FREQUENCY ABLATION performed by Giovanny Robertson MD at [facility] AMBULATORY OR   • PAIN MANAGEMENT PROCEDURE Right 2023    LEFT L4 - L5, L5 - S1 RADIO FREQUENCY ABLATION performed by Giovanny Robertson MD at [facility] AMBULATORY OR   • PAIN MANAGEMENT PROCEDURE Bilateral 10/10/2023    BILATERAL L4 TRANSFORAMINAL EPIDURAL STEROID INJECTION WITH LOCAL (NO CONTRAST) performed by Giovanny Robertson MD at [facility] AMBULATORY OR   • PAIN MANAGEMENT PROCEDURE N/A 3/12/2024    BILATERAL L4 TRANSFORAMINAL EPIDURAL STEROID INJECTION performed by Giovanny Robertson MD at [facility] AMBUL[facility] OR   • PAIN MANAGEMENT PROCEDURE Bilateral 2024    BILATERAL L4 TRANSFORAMINAL EPIDURAL STEROID INJECTION performed by Giovanny Robertson MD at [facility] AMBULATORY OR   • PAIN MANAGEMENT PROCEDURE N/A 2024    BILATERAL L4, L5-S1 RADIOFREQUENCY ABLATION performed by Giovanny Robertson MD at [facility] AMBULATORY OR   • TONSILLECTOMY     • TOTAL HIP ARTHROPLASTY Right    • TUBAL LIGATION         Family History:  Family History   Problem Relation Age of Onset   • Diabetes Mother    • Stroke Mother        Social History:  Social History     Tobacco Use   • Smoking status: Former     Current packs/day: 0.00     Average packs/day: 0.3 packs/day for 20.0 years (5.0 ttl pk-yrs)     Types: Cigarettes     Start date: 1960     Quit date: 1980     Years since quittin.8   •

## 2024-11-10 NOTE — ED TRIAGE NOTES
Pt arrives to ED after suffering an unwitnessed fall out of bed this morning. Pt did hit her head. She denies thinners. Son found pt on the ground this morning and reports her breathing was erratic. Pt wears 4 L NC at baseline and was satting at 65% . Pt placed on NRB by ED staff. MD at bedside.

## 2024-11-11 ENCOUNTER — TELEPHONE (OUTPATIENT)
Age: 84
End: 2024-11-11

## 2024-11-11 LAB
ANION GAP SERPL CALC-SCNC: 3 MMOL/L (ref 2–12)
BASOPHILS # BLD: 0 K/UL (ref 0–0.1)
BASOPHILS NFR BLD: 0 % (ref 0–1)
BUN SERPL-MCNC: 16 MG/DL (ref 6–20)
BUN/CREAT SERPL: 30 (ref 12–20)
CALCIUM SERPL-MCNC: 8.8 MG/DL (ref 8.5–10.1)
CHLORIDE SERPL-SCNC: 109 MMOL/L (ref 97–108)
CO2 SERPL-SCNC: 26 MMOL/L (ref 21–32)
CREAT SERPL-MCNC: 0.54 MG/DL (ref 0.55–1.02)
DIFFERENTIAL METHOD BLD: ABNORMAL
EOSINOPHIL # BLD: 0 K/UL (ref 0–0.4)
EOSINOPHIL NFR BLD: 0 % (ref 0–7)
ERYTHROCYTE [DISTWIDTH] IN BLOOD BY AUTOMATED COUNT: 18 % (ref 11.5–14.5)
EST. AVERAGE GLUCOSE BLD GHB EST-MCNC: 174 MG/DL
GLUCOSE BLD STRIP.AUTO-MCNC: 189 MG/DL (ref 65–117)
GLUCOSE BLD STRIP.AUTO-MCNC: 255 MG/DL (ref 65–117)
GLUCOSE BLD STRIP.AUTO-MCNC: 299 MG/DL (ref 65–117)
GLUCOSE BLD STRIP.AUTO-MCNC: 348 MG/DL (ref 65–117)
GLUCOSE SERPL-MCNC: 194 MG/DL (ref 65–100)
HBA1C MFR BLD: 7.7 % (ref 4–5.6)
HCT VFR BLD AUTO: 32 % (ref 35–47)
HGB BLD-MCNC: 9.3 G/DL (ref 11.5–16)
IMM GRANULOCYTES # BLD AUTO: 0.1 K/UL (ref 0–0.04)
IMM GRANULOCYTES NFR BLD AUTO: 1 % (ref 0–0.5)
LACTATE SERPL-SCNC: 2 MMOL/L (ref 0.4–2)
LYMPHOCYTES # BLD: 0.7 K/UL (ref 0.8–3.5)
LYMPHOCYTES NFR BLD: 9 % (ref 12–49)
MAGNESIUM SERPL-MCNC: 2.2 MG/DL (ref 1.6–2.4)
MCH RBC QN AUTO: 21.9 PG (ref 26–34)
MCHC RBC AUTO-ENTMCNC: 29.1 G/DL (ref 30–36.5)
MCV RBC AUTO: 75.3 FL (ref 80–99)
MONOCYTES # BLD: 0.1 K/UL (ref 0–1)
MONOCYTES NFR BLD: 2 % (ref 5–13)
NEUTS SEG # BLD: 6.8 K/UL (ref 1.8–8)
NEUTS SEG NFR BLD: 89 % (ref 32–75)
NRBC # BLD: 0 K/UL (ref 0–0.01)
NRBC BLD-RTO: 0 PER 100 WBC
PLATELET # BLD AUTO: 100 K/UL (ref 150–400)
POTASSIUM SERPL-SCNC: 3.8 MMOL/L (ref 3.5–5.1)
RBC # BLD AUTO: 4.25 M/UL (ref 3.8–5.2)
SERVICE CMNT-IMP: ABNORMAL
SODIUM SERPL-SCNC: 138 MMOL/L (ref 136–145)
WBC # BLD AUTO: 7.7 K/UL (ref 3.6–11)

## 2024-11-11 PROCEDURE — 83735 ASSAY OF MAGNESIUM: CPT

## 2024-11-11 PROCEDURE — 6370000000 HC RX 637 (ALT 250 FOR IP): Performed by: INTERNAL MEDICINE

## 2024-11-11 PROCEDURE — 36415 COLL VENOUS BLD VENIPUNCTURE: CPT

## 2024-11-11 PROCEDURE — 97162 PT EVAL MOD COMPLEX 30 MIN: CPT

## 2024-11-11 PROCEDURE — 6360000002 HC RX W HCPCS: Performed by: INTERNAL MEDICINE

## 2024-11-11 PROCEDURE — 94640 AIRWAY INHALATION TREATMENT: CPT

## 2024-11-11 PROCEDURE — 85025 COMPLETE CBC W/AUTO DIFF WBC: CPT

## 2024-11-11 PROCEDURE — 1100000003 HC PRIVATE W/ TELEMETRY

## 2024-11-11 PROCEDURE — 97116 GAIT TRAINING THERAPY: CPT

## 2024-11-11 PROCEDURE — 97110 THERAPEUTIC EXERCISES: CPT

## 2024-11-11 PROCEDURE — 2580000003 HC RX 258: Performed by: NURSE PRACTITIONER

## 2024-11-11 PROCEDURE — 83036 HEMOGLOBIN GLYCOSYLATED A1C: CPT

## 2024-11-11 PROCEDURE — 83605 ASSAY OF LACTIC ACID: CPT

## 2024-11-11 PROCEDURE — 80048 BASIC METABOLIC PNL TOTAL CA: CPT

## 2024-11-11 PROCEDURE — 2580000003 HC RX 258: Performed by: INTERNAL MEDICINE

## 2024-11-11 PROCEDURE — 82962 GLUCOSE BLOOD TEST: CPT

## 2024-11-11 PROCEDURE — 2700000000 HC OXYGEN THERAPY PER DAY

## 2024-11-11 RX ORDER — SODIUM CHLORIDE 9 MG/ML
INJECTION, SOLUTION INTRAVENOUS CONTINUOUS
Status: DISCONTINUED | OUTPATIENT
Start: 2024-11-11 | End: 2024-11-11

## 2024-11-11 RX ORDER — INSULIN LISPRO 100 [IU]/ML
5 INJECTION, SOLUTION INTRAVENOUS; SUBCUTANEOUS
Status: DISCONTINUED | OUTPATIENT
Start: 2024-11-12 | End: 2024-11-13

## 2024-11-11 RX ORDER — LEVOFLOXACIN 750 MG/1
750 TABLET, FILM COATED ORAL DAILY
Status: DISCONTINUED | OUTPATIENT
Start: 2024-11-11 | End: 2024-11-12

## 2024-11-11 RX ORDER — BENZONATATE 100 MG/1
100 CAPSULE ORAL 3 TIMES DAILY PRN
Status: DISCONTINUED | OUTPATIENT
Start: 2024-11-11 | End: 2024-11-14 | Stop reason: HOSPADM

## 2024-11-11 RX ORDER — IPRATROPIUM BROMIDE AND ALBUTEROL SULFATE 2.5; .5 MG/3ML; MG/3ML
1 SOLUTION RESPIRATORY (INHALATION)
Status: DISCONTINUED | OUTPATIENT
Start: 2024-11-11 | End: 2024-11-14

## 2024-11-11 RX ADMIN — ASPIRIN 81 MG: 81 TABLET, COATED ORAL at 10:05

## 2024-11-11 RX ADMIN — IPRATROPIUM BROMIDE AND ALBUTEROL SULFATE 1 DOSE: .5; 3 SOLUTION RESPIRATORY (INHALATION) at 07:10

## 2024-11-11 RX ADMIN — TRAMADOL HYDROCHLORIDE 50 MG: 50 TABLET, COATED ORAL at 20:31

## 2024-11-11 RX ADMIN — INSULIN LISPRO 3 UNITS: 100 INJECTION, SOLUTION INTRAVENOUS; SUBCUTANEOUS at 12:50

## 2024-11-11 RX ADMIN — TOPIRAMATE 25 MG: 25 TABLET, FILM COATED ORAL at 10:06

## 2024-11-11 RX ADMIN — LEVETIRACETAM 750 MG: 500 TABLET, FILM COATED ORAL at 17:20

## 2024-11-11 RX ADMIN — SODIUM CHLORIDE, PRESERVATIVE FREE 10 ML: 5 INJECTION INTRAVENOUS at 23:01

## 2024-11-11 RX ADMIN — TRAZODONE HYDROCHLORIDE 100 MG: 100 TABLET ORAL at 20:31

## 2024-11-11 RX ADMIN — IPRATROPIUM BROMIDE AND ALBUTEROL SULFATE 1 DOSE: 2.5; .5 SOLUTION RESPIRATORY (INHALATION) at 19:45

## 2024-11-11 RX ADMIN — PREGABALIN 50 MG: 50 CAPSULE ORAL at 10:06

## 2024-11-11 RX ADMIN — INSULIN LISPRO 2 UNITS: 100 INJECTION, SOLUTION INTRAVENOUS; SUBCUTANEOUS at 20:30

## 2024-11-11 RX ADMIN — ACETAMINOPHEN 650 MG: 325 TABLET ORAL at 18:57

## 2024-11-11 RX ADMIN — METHYLPREDNISOLONE SODIUM SUCCINATE 40 MG: 40 INJECTION INTRAMUSCULAR; INTRAVENOUS at 06:27

## 2024-11-11 RX ADMIN — ATORVASTATIN CALCIUM 20 MG: 20 TABLET, FILM COATED ORAL at 17:22

## 2024-11-11 RX ADMIN — METHYLPREDNISOLONE SODIUM SUCCINATE 40 MG: 40 INJECTION INTRAMUSCULAR; INTRAVENOUS at 15:00

## 2024-11-11 RX ADMIN — INSULIN LISPRO 1 UNITS: 100 INJECTION, SOLUTION INTRAVENOUS; SUBCUTANEOUS at 10:07

## 2024-11-11 RX ADMIN — FAMOTIDINE 10 MG: 20 TABLET, FILM COATED ORAL at 20:31

## 2024-11-11 RX ADMIN — TROSPIUM CHLORIDE 20 MG: 20 TABLET, FILM COATED ORAL at 20:31

## 2024-11-11 RX ADMIN — ARFORMOTEROL TARTRATE: 15 SOLUTION RESPIRATORY (INHALATION) at 19:34

## 2024-11-11 RX ADMIN — METHYLPREDNISOLONE SODIUM SUCCINATE 40 MG: 40 INJECTION INTRAMUSCULAR; INTRAVENOUS at 23:00

## 2024-11-11 RX ADMIN — LEVETIRACETAM 500 MG: 500 TABLET, FILM COATED ORAL at 06:32

## 2024-11-11 RX ADMIN — SODIUM CHLORIDE: 9 INJECTION, SOLUTION INTRAVENOUS at 00:56

## 2024-11-11 RX ADMIN — LEVOFLOXACIN 750 MG: 750 TABLET, FILM COATED ORAL at 15:00

## 2024-11-11 RX ADMIN — AMITRIPTYLINE HYDROCHLORIDE 10 MG: 10 TABLET, FILM COATED ORAL at 20:31

## 2024-11-11 RX ADMIN — INSULIN LISPRO 2 UNITS: 100 INJECTION, SOLUTION INTRAVENOUS; SUBCUTANEOUS at 17:18

## 2024-11-11 RX ADMIN — BISMUTH SUBSALICYLATE 30 ML: 525 LIQUID ORAL at 23:03

## 2024-11-11 RX ADMIN — CHOLESTYRAMINE 4 G: 4 POWDER, FOR SUSPENSION ORAL at 10:07

## 2024-11-11 RX ADMIN — BENZONATATE 100 MG: 100 CAPSULE ORAL at 18:57

## 2024-11-11 RX ADMIN — ARFORMOTEROL TARTRATE: 15 SOLUTION RESPIRATORY (INHALATION) at 07:16

## 2024-11-11 RX ADMIN — IPRATROPIUM BROMIDE AND ALBUTEROL SULFATE 1 DOSE: 2.5; .5 SOLUTION RESPIRATORY (INHALATION) at 15:25

## 2024-11-11 RX ADMIN — ENOXAPARIN SODIUM 40 MG: 100 INJECTION SUBCUTANEOUS at 10:07

## 2024-11-11 RX ADMIN — FAMOTIDINE 10 MG: 20 TABLET, FILM COATED ORAL at 10:06

## 2024-11-11 RX ADMIN — METHYLPREDNISOLONE SODIUM SUCCINATE 40 MG: 40 INJECTION INTRAMUSCULAR; INTRAVENOUS at 10:06

## 2024-11-11 RX ADMIN — ACETAMINOPHEN 650 MG: 325 TABLET ORAL at 11:01

## 2024-11-11 RX ADMIN — METOPROLOL SUCCINATE 50 MG: 50 TABLET, EXTENDED RELEASE ORAL at 10:06

## 2024-11-11 RX ADMIN — PREGABALIN 50 MG: 50 CAPSULE ORAL at 20:31

## 2024-11-11 ASSESSMENT — PAIN SCALES - GENERAL
PAINLEVEL_OUTOF10: 4
PAINLEVEL_OUTOF10: 4
PAINLEVEL_OUTOF10: 0
PAINLEVEL_OUTOF10: 8
PAINLEVEL_OUTOF10: 0
PAINLEVEL_OUTOF10: 4

## 2024-11-11 ASSESSMENT — PAIN DESCRIPTION - LOCATION
LOCATION: HEAD
LOCATION: HEAD
LOCATION: BACK;HEAD

## 2024-11-11 ASSESSMENT — PAIN DESCRIPTION - DESCRIPTORS
DESCRIPTORS: ACHING

## 2024-11-11 ASSESSMENT — PAIN - FUNCTIONAL ASSESSMENT: PAIN_FUNCTIONAL_ASSESSMENT: ACTIVITIES ARE NOT PREVENTED

## 2024-11-11 ASSESSMENT — PAIN SCALES - WONG BAKER
WONGBAKER_NUMERICALRESPONSE: NO HURT
WONGBAKER_NUMERICALRESPONSE: HURTS LITTLE MORE

## 2024-11-11 ASSESSMENT — PAIN DESCRIPTION - ORIENTATION
ORIENTATION: RIGHT
ORIENTATION: ANTERIOR

## 2024-11-11 NOTE — CARE COORDINATION
Advance Care Planning     General Advance Care Planning (ACP) Conversation    Date of Conversation: 11/11/2024  Conducted with: Healthcare Decision Maker  Other persons present: None    Healthcare Decision Maker:   Primary Decision Maker (Active): Abhilash Dasilva - Child - [phone]       Content/Action Overview:  Has ACP document(s) on file - reflects the patient's care preferences  Reviewed DNR/DNI and patient confirms current DNR status - completed forms on file (place new order if needed)   limited code status on file        Length of Voluntary ACP Conversation in minutes:  <16 minutes (Non-Billable)    ENGLISH H HOSAY

## 2024-11-11 NOTE — CARE COORDINATION
CM Note:  Transition of Care Plan:    RUR:   Prior Level of Functioning: assistance, lives alone, falls  Disposition: possible SNF  ERYN: 11/12 or 11/13  If SNF or IPR: Date FOC offered: to email list to son on 11/11  Date FOC received:   Accepting facility:   Date authorization started with reference number:   Date authorization received and expires:   Follow up appointments: TBA  DME needed: TBD  Transportation at discharge: son to transport  IM/IMM Medicare/ letter given: sign at DC  Is patient a  and connected with VA?    If yes, was  transfer form completed and VA notified?   Caregiver Contact:     Abhilash Dasilva (Child)  [phone]     Discharge Caregiver contacted prior to discharge?   Care Conference needed?   Barriers to discharge: clinical improvement. She is on 4 lpm and normally wears 2 lpm 02    CM talked with patient about disposition. She says medicare will not pay for SNF. I discussed options with her and let her know they would. It has been over 60 days since last SNF admission as per son.  She says she is looking at [facility] after first of year. Son says he does not know if she can afford that. He has been working with SW at [facility] and she is looking into options. I gave him their number.([phone]) Her PCP is Dr. CORDOBA.  If she goes to SNF she will need auth. I will email the son options today and he will give me choices. I advised him to talk to his mother about SNF choices.    CM will continue to follow for DC needs.    English Tracey ROQUE CM   [phone]

## 2024-11-11 NOTE — CONSULTS
Pulmonary, Critical Care, and Sleep Medicine~Consult Note    Name: Adenike Dasilva MRN: [id number]   : 1940 Hospital: [facility]   Date: 2024 12:29 PM Admission: 11/10/2024     Impression Plan   Acute on chronic hypoxemic respiratory failure  COPD with acute exacerbation  Hypothermia  Lactic acidosis  Thrombocytopenia  Recurrent falls  HTN  S/p TAVR  CAD s/p PCI  DM  GERD  H/o DVT  Mild dementia  Peripheral eosinophilia Supp O2 prn goal sats >88%--on home 4L. Exercise oximetry closer to discharge  F/u Bcx  Continue IVCS  Continue arformoterol/budesonide nebs, scheduled duonebs  Levofloxacin  OOB, IS.  PT/OT evals  Dvt ppx: lovenox    Thank you very much for the consult, will follow along.     Daily Progression:    Consult for acute on chronic hypoxic respiratory failure    HPI: COPD, chronic hypoxemic respiratory failure (4L O2), HTN, hypothyroidism, s/p TAVR not on AC due to noncompliance, CAD s/p PCI, DM, HLD, depression, anxiety, chronic pain, GERD, DVT (?possible protein C deficiency), recurrent UTI, mild dementia who presented to the ED after an unwitnessed fall out of bed. On arrival to the ED she was satting 65% on 4L O2 and was subsequently placed on NRB with improvement in sats to the 90s.  Hypothermic this morning with temp 94.8  Reportedly already on antibiotics for pneumonia per her PCP    Labs: procal low, lactic acid 4.44, bnp 283, wbc 13.7, plt 98, abs eos 700    CXR 11/10/24: Similar widespread scattered interstitial fibrosis. No definite consolidative  pneumonia.    Prior chest CT 24: Severe emphysematous change and interstitial opacities with subpleural  bleb formation and possible fibrosis. Confluent patchy parenchymal opacities  left perihilar possibly related to interstitial infiltrate and fibrosis    Follows with Dr. Edgar, last visit 10/10/24. Continued on Wixela 250.  PFTs  did not show obstruction, mod to severe

## 2024-11-11 NOTE — TELEPHONE ENCOUNTER
Pt called to inform Dr. Alex and the nurse that she is currently admitted at [facility]. They are requesting Dr. Alex to send over her medication list. [phone]

## 2024-11-12 ENCOUNTER — TELEPHONE (OUTPATIENT)
Facility: CLINIC | Age: 84
End: 2024-11-12

## 2024-11-12 LAB
ANION GAP SERPL CALC-SCNC: 5 MMOL/L (ref 2–12)
BASOPHILS # BLD: 0 K/UL (ref 0–0.1)
BASOPHILS NFR BLD: 0 % (ref 0–1)
BUN SERPL-MCNC: 23 MG/DL (ref 6–20)
BUN/CREAT SERPL: 30 (ref 12–20)
CALCIUM SERPL-MCNC: 8.5 MG/DL (ref 8.5–10.1)
CHLORIDE SERPL-SCNC: 108 MMOL/L (ref 97–108)
CO2 SERPL-SCNC: 26 MMOL/L (ref 21–32)
CREAT SERPL-MCNC: 0.77 MG/DL (ref 0.55–1.02)
DIFFERENTIAL METHOD BLD: ABNORMAL
EOSINOPHIL # BLD: 0 K/UL (ref 0–0.4)
EOSINOPHIL NFR BLD: 0 % (ref 0–7)
ERYTHROCYTE [DISTWIDTH] IN BLOOD BY AUTOMATED COUNT: 18.1 % (ref 11.5–14.5)
GLUCOSE BLD STRIP.AUTO-MCNC: 172 MG/DL (ref 65–117)
GLUCOSE BLD STRIP.AUTO-MCNC: 206 MG/DL (ref 65–117)
GLUCOSE BLD STRIP.AUTO-MCNC: 238 MG/DL (ref 65–117)
GLUCOSE BLD STRIP.AUTO-MCNC: 240 MG/DL (ref 65–117)
GLUCOSE BLD STRIP.AUTO-MCNC: 249 MG/DL (ref 65–117)
GLUCOSE SERPL-MCNC: 241 MG/DL (ref 65–100)
HCT VFR BLD AUTO: 30.3 % (ref 35–47)
HGB BLD-MCNC: 9 G/DL (ref 11.5–16)
IMM GRANULOCYTES # BLD AUTO: 0.2 K/UL (ref 0–0.04)
IMM GRANULOCYTES NFR BLD AUTO: 1 % (ref 0–0.5)
LYMPHOCYTES # BLD: 0.5 K/UL (ref 0.8–3.5)
LYMPHOCYTES NFR BLD: 3 % (ref 12–49)
MAGNESIUM SERPL-MCNC: 2.2 MG/DL (ref 1.6–2.4)
MCH RBC QN AUTO: 22 PG (ref 26–34)
MCHC RBC AUTO-ENTMCNC: 29.7 G/DL (ref 30–36.5)
MCV RBC AUTO: 73.9 FL (ref 80–99)
MONOCYTES # BLD: 0.3 K/UL (ref 0–1)
MONOCYTES NFR BLD: 2 % (ref 5–13)
NEUTS SEG # BLD: 15.7 K/UL (ref 1.8–8)
NEUTS SEG NFR BLD: 94 % (ref 32–75)
NRBC # BLD: 0 K/UL (ref 0–0.01)
NRBC BLD-RTO: 0 PER 100 WBC
PLATELET # BLD AUTO: 143 K/UL (ref 150–400)
POTASSIUM SERPL-SCNC: 4 MMOL/L (ref 3.5–5.1)
RBC # BLD AUTO: 4.1 M/UL (ref 3.8–5.2)
RBC MORPH BLD: ABNORMAL
SERVICE CMNT-IMP: ABNORMAL
SODIUM SERPL-SCNC: 139 MMOL/L (ref 136–145)
WBC # BLD AUTO: 16.7 K/UL (ref 3.6–11)

## 2024-11-12 PROCEDURE — 94640 AIRWAY INHALATION TREATMENT: CPT

## 2024-11-12 PROCEDURE — 1100000003 HC PRIVATE W/ TELEMETRY

## 2024-11-12 PROCEDURE — 6370000000 HC RX 637 (ALT 250 FOR IP): Performed by: PHYSICIAN ASSISTANT

## 2024-11-12 PROCEDURE — 2580000003 HC RX 258: Performed by: INTERNAL MEDICINE

## 2024-11-12 PROCEDURE — 36415 COLL VENOUS BLD VENIPUNCTURE: CPT

## 2024-11-12 PROCEDURE — 85025 COMPLETE CBC W/AUTO DIFF WBC: CPT

## 2024-11-12 PROCEDURE — 6360000002 HC RX W HCPCS: Performed by: INTERNAL MEDICINE

## 2024-11-12 PROCEDURE — 97535 SELF CARE MNGMENT TRAINING: CPT

## 2024-11-12 PROCEDURE — 97165 OT EVAL LOW COMPLEX 30 MIN: CPT

## 2024-11-12 PROCEDURE — 6370000000 HC RX 637 (ALT 250 FOR IP): Performed by: INTERNAL MEDICINE

## 2024-11-12 PROCEDURE — 83735 ASSAY OF MAGNESIUM: CPT

## 2024-11-12 PROCEDURE — 82962 GLUCOSE BLOOD TEST: CPT

## 2024-11-12 PROCEDURE — 97530 THERAPEUTIC ACTIVITIES: CPT

## 2024-11-12 PROCEDURE — 94761 N-INVAS EAR/PLS OXIMETRY MLT: CPT

## 2024-11-12 PROCEDURE — 80048 BASIC METABOLIC PNL TOTAL CA: CPT

## 2024-11-12 RX ORDER — LEVOFLOXACIN 750 MG/1
750 TABLET, FILM COATED ORAL EVERY OTHER DAY
Status: DISCONTINUED | OUTPATIENT
Start: 2024-11-13 | End: 2024-11-14

## 2024-11-12 RX ORDER — PREDNISONE 20 MG/1
40 TABLET ORAL DAILY
Status: COMPLETED | OUTPATIENT
Start: 2024-11-12 | End: 2024-11-14

## 2024-11-12 RX ORDER — PREDNISONE 20 MG/1
20 TABLET ORAL DAILY
Status: DISCONTINUED | OUTPATIENT
Start: 2024-11-18 | End: 2024-11-14 | Stop reason: HOSPADM

## 2024-11-12 RX ORDER — PREDNISONE 5 MG/1
10 TABLET ORAL DAILY
Status: DISCONTINUED | OUTPATIENT
Start: 2024-11-21 | End: 2024-11-14 | Stop reason: HOSPADM

## 2024-11-12 RX ADMIN — PREDNISONE 40 MG: 20 TABLET ORAL at 14:27

## 2024-11-12 RX ADMIN — BISMUTH SUBSALICYLATE 30 ML: 525 LIQUID ORAL at 21:27

## 2024-11-12 RX ADMIN — ATORVASTATIN CALCIUM 20 MG: 20 TABLET, FILM COATED ORAL at 17:12

## 2024-11-12 RX ADMIN — SODIUM CHLORIDE, PRESERVATIVE FREE 10 ML: 5 INJECTION INTRAVENOUS at 21:26

## 2024-11-12 RX ADMIN — PREGABALIN 50 MG: 50 CAPSULE ORAL at 21:25

## 2024-11-12 RX ADMIN — IPRATROPIUM BROMIDE AND ALBUTEROL SULFATE 1 DOSE: 2.5; .5 SOLUTION RESPIRATORY (INHALATION) at 19:59

## 2024-11-12 RX ADMIN — INSULIN LISPRO 1 UNITS: 100 INJECTION, SOLUTION INTRAVENOUS; SUBCUTANEOUS at 09:21

## 2024-11-12 RX ADMIN — METOPROLOL SUCCINATE 50 MG: 50 TABLET, EXTENDED RELEASE ORAL at 09:18

## 2024-11-12 RX ADMIN — TROSPIUM CHLORIDE 20 MG: 20 TABLET, FILM COATED ORAL at 21:25

## 2024-11-12 RX ADMIN — ARFORMOTEROL TARTRATE: 15 SOLUTION RESPIRATORY (INHALATION) at 19:48

## 2024-11-12 RX ADMIN — INSULIN LISPRO 1 UNITS: 100 INJECTION, SOLUTION INTRAVENOUS; SUBCUTANEOUS at 21:26

## 2024-11-12 RX ADMIN — INSULIN LISPRO 5 UNITS: 100 INJECTION, SOLUTION INTRAVENOUS; SUBCUTANEOUS at 17:14

## 2024-11-12 RX ADMIN — TOPIRAMATE 25 MG: 25 TABLET, FILM COATED ORAL at 09:18

## 2024-11-12 RX ADMIN — TRAMADOL HYDROCHLORIDE 50 MG: 50 TABLET, COATED ORAL at 21:25

## 2024-11-12 RX ADMIN — AMITRIPTYLINE HYDROCHLORIDE 10 MG: 10 TABLET, FILM COATED ORAL at 21:25

## 2024-11-12 RX ADMIN — LEVETIRACETAM 750 MG: 500 TABLET, FILM COATED ORAL at 17:12

## 2024-11-12 RX ADMIN — ASPIRIN 81 MG: 81 TABLET, COATED ORAL at 09:18

## 2024-11-12 RX ADMIN — LEVETIRACETAM 500 MG: 500 TABLET, FILM COATED ORAL at 06:30

## 2024-11-12 RX ADMIN — TRAZODONE HYDROCHLORIDE 100 MG: 100 TABLET ORAL at 21:25

## 2024-11-12 RX ADMIN — PREGABALIN 50 MG: 50 CAPSULE ORAL at 09:18

## 2024-11-12 RX ADMIN — INSULIN LISPRO 5 UNITS: 100 INJECTION, SOLUTION INTRAVENOUS; SUBCUTANEOUS at 11:58

## 2024-11-12 RX ADMIN — FAMOTIDINE 10 MG: 20 TABLET, FILM COATED ORAL at 09:18

## 2024-11-12 RX ADMIN — METHYLPREDNISOLONE SODIUM SUCCINATE 40 MG: 40 INJECTION INTRAMUSCULAR; INTRAVENOUS at 04:34

## 2024-11-12 RX ADMIN — BISMUTH SUBSALICYLATE 30 ML: 525 LIQUID ORAL at 11:57

## 2024-11-12 RX ADMIN — INSULIN LISPRO 5 UNITS: 100 INJECTION, SOLUTION INTRAVENOUS; SUBCUTANEOUS at 09:21

## 2024-11-12 RX ADMIN — ACETAMINOPHEN 650 MG: 325 TABLET ORAL at 05:02

## 2024-11-12 RX ADMIN — ENOXAPARIN SODIUM 40 MG: 100 INJECTION SUBCUTANEOUS at 09:20

## 2024-11-12 RX ADMIN — INSULIN LISPRO 1 UNITS: 100 INJECTION, SOLUTION INTRAVENOUS; SUBCUTANEOUS at 11:58

## 2024-11-12 RX ADMIN — FAMOTIDINE 10 MG: 20 TABLET, FILM COATED ORAL at 21:25

## 2024-11-12 RX ADMIN — SODIUM CHLORIDE, PRESERVATIVE FREE 10 ML: 5 INJECTION INTRAVENOUS at 09:23

## 2024-11-12 RX ADMIN — IPRATROPIUM BROMIDE AND ALBUTEROL SULFATE 1 DOSE: 2.5; .5 SOLUTION RESPIRATORY (INHALATION) at 14:53

## 2024-11-12 RX ADMIN — METHYLPREDNISOLONE SODIUM SUCCINATE 40 MG: 40 INJECTION INTRAMUSCULAR; INTRAVENOUS at 09:22

## 2024-11-12 ASSESSMENT — PAIN SCALES - WONG BAKER: WONGBAKER_NUMERICALRESPONSE: NO HURT

## 2024-11-12 ASSESSMENT — PAIN DESCRIPTION - ORIENTATION
ORIENTATION: UPPER
ORIENTATION: UPPER

## 2024-11-12 ASSESSMENT — PAIN DESCRIPTION - DESCRIPTORS
DESCRIPTORS: ACHING
DESCRIPTORS: ACHING

## 2024-11-12 ASSESSMENT — PAIN SCALES - GENERAL
PAINLEVEL_OUTOF10: 3
PAINLEVEL_OUTOF10: 0
PAINLEVEL_OUTOF10: 2
PAINLEVEL_OUTOF10: 0
PAINLEVEL_OUTOF10: 7

## 2024-11-12 ASSESSMENT — PAIN DESCRIPTION - LOCATION
LOCATION: HEAD
LOCATION: HEAD

## 2024-11-12 ASSESSMENT — PAIN - FUNCTIONAL ASSESSMENT
PAIN_FUNCTIONAL_ASSESSMENT: ACTIVITIES ARE NOT PREVENTED
PAIN_FUNCTIONAL_ASSESSMENT: ACTIVITIES ARE NOT PREVENTED

## 2024-11-12 NOTE — TELEPHONE ENCOUNTER
Call received from patient - she states that she is being discharged tomorrow from the hospital to [facility] for rehab. Patient thinks that after that, she might move to a long term care facility.  She asked if Dr. Alex would see her at [facility]. I responded that this might be one of the facilities that he visits for \"Senior Care\".  Patient asked if we would send a med list to the facility. I explained that her med list will be sent from the hospital to the nursing home at her discharge.   Patient states she is very weak, and can hardly stand on her own. I expressed my wished that she will improve with therapy at the rehab.   Pt verbalized appreciation for the call.

## 2024-11-12 NOTE — CARE COORDINATION
CM Note: Submitted clinicals to [facility] for auth for this patient to go to [facility].  Reference #[id number] to be admitted 11/14/24  English Tracey ROQUE CM   [phone]

## 2024-11-12 NOTE — CARE COORDINATION
CM Note: Referrals placed for SNF from son's choices. Will follow up on acceptance and then patient will need auth started. English Tracey ROQUE CM [phone]

## 2024-11-13 ENCOUNTER — TELEPHONE (OUTPATIENT)
Facility: CLINIC | Age: 84
End: 2024-11-13

## 2024-11-13 LAB
ANION GAP SERPL CALC-SCNC: 5 MMOL/L (ref 2–12)
BASOPHILS # BLD: 0 K/UL (ref 0–0.1)
BASOPHILS NFR BLD: 0 % (ref 0–1)
BUN SERPL-MCNC: 24 MG/DL (ref 6–20)
BUN/CREAT SERPL: 34 (ref 12–20)
CALCIUM SERPL-MCNC: 8.8 MG/DL (ref 8.5–10.1)
CHLORIDE SERPL-SCNC: 107 MMOL/L (ref 97–108)
CO2 SERPL-SCNC: 26 MMOL/L (ref 21–32)
CREAT SERPL-MCNC: 0.7 MG/DL (ref 0.55–1.02)
DIFFERENTIAL METHOD BLD: ABNORMAL
EOSINOPHIL # BLD: 0 K/UL (ref 0–0.4)
EOSINOPHIL NFR BLD: 0 % (ref 0–7)
ERYTHROCYTE [DISTWIDTH] IN BLOOD BY AUTOMATED COUNT: 18.3 % (ref 11.5–14.5)
EST. AVERAGE GLUCOSE BLD GHB EST-MCNC: 174 MG/DL
GLUCOSE BLD STRIP.AUTO-MCNC: 231 MG/DL (ref 65–117)
GLUCOSE BLD STRIP.AUTO-MCNC: 253 MG/DL (ref 65–117)
GLUCOSE BLD STRIP.AUTO-MCNC: 268 MG/DL (ref 65–117)
GLUCOSE BLD STRIP.AUTO-MCNC: 281 MG/DL (ref 65–117)
GLUCOSE SERPL-MCNC: 247 MG/DL (ref 65–100)
HBA1C MFR BLD: 7.7 % (ref 4–5.6)
HCT VFR BLD AUTO: 30.9 % (ref 35–47)
HGB BLD-MCNC: 9 G/DL (ref 11.5–16)
IMM GRANULOCYTES # BLD AUTO: 0.1 K/UL (ref 0–0.04)
IMM GRANULOCYTES NFR BLD AUTO: 1 % (ref 0–0.5)
LYMPHOCYTES # BLD: 0.8 K/UL (ref 0.8–3.5)
LYMPHOCYTES NFR BLD: 6 % (ref 12–49)
MAGNESIUM SERPL-MCNC: 2.3 MG/DL (ref 1.6–2.4)
MCH RBC QN AUTO: 22 PG (ref 26–34)
MCHC RBC AUTO-ENTMCNC: 29.1 G/DL (ref 30–36.5)
MCV RBC AUTO: 75.4 FL (ref 80–99)
MONOCYTES # BLD: 0.4 K/UL (ref 0–1)
MONOCYTES NFR BLD: 3 % (ref 5–13)
NEUTS SEG # BLD: 12 K/UL (ref 1.8–8)
NEUTS SEG NFR BLD: 90 % (ref 32–75)
NRBC # BLD: 0 K/UL (ref 0–0.01)
NRBC BLD-RTO: 0 PER 100 WBC
PLATELET # BLD AUTO: 177 K/UL (ref 150–400)
PMV BLD AUTO: 11.8 FL (ref 8.9–12.9)
POTASSIUM SERPL-SCNC: 3.4 MMOL/L (ref 3.5–5.1)
RBC # BLD AUTO: 4.1 M/UL (ref 3.8–5.2)
RBC MORPH BLD: ABNORMAL
SERVICE CMNT-IMP: ABNORMAL
SODIUM SERPL-SCNC: 138 MMOL/L (ref 136–145)
WBC # BLD AUTO: 13.3 K/UL (ref 3.6–11)

## 2024-11-13 PROCEDURE — 6370000000 HC RX 637 (ALT 250 FOR IP): Performed by: INTERNAL MEDICINE

## 2024-11-13 PROCEDURE — 6370000000 HC RX 637 (ALT 250 FOR IP): Performed by: PHYSICIAN ASSISTANT

## 2024-11-13 PROCEDURE — 2700000000 HC OXYGEN THERAPY PER DAY

## 2024-11-13 PROCEDURE — 6360000002 HC RX W HCPCS: Performed by: INTERNAL MEDICINE

## 2024-11-13 PROCEDURE — 2580000003 HC RX 258: Performed by: INTERNAL MEDICINE

## 2024-11-13 PROCEDURE — 94640 AIRWAY INHALATION TREATMENT: CPT

## 2024-11-13 PROCEDURE — 83735 ASSAY OF MAGNESIUM: CPT

## 2024-11-13 PROCEDURE — 1100000003 HC PRIVATE W/ TELEMETRY

## 2024-11-13 PROCEDURE — 97116 GAIT TRAINING THERAPY: CPT | Performed by: PHYSICAL THERAPIST

## 2024-11-13 PROCEDURE — 80048 BASIC METABOLIC PNL TOTAL CA: CPT

## 2024-11-13 PROCEDURE — 97530 THERAPEUTIC ACTIVITIES: CPT | Performed by: OCCUPATIONAL THERAPIST

## 2024-11-13 PROCEDURE — 36415 COLL VENOUS BLD VENIPUNCTURE: CPT

## 2024-11-13 PROCEDURE — 83036 HEMOGLOBIN GLYCOSYLATED A1C: CPT

## 2024-11-13 PROCEDURE — 85025 COMPLETE CBC W/AUTO DIFF WBC: CPT

## 2024-11-13 PROCEDURE — 6370000000 HC RX 637 (ALT 250 FOR IP): Performed by: FAMILY MEDICINE

## 2024-11-13 PROCEDURE — 92610 EVALUATE SWALLOWING FUNCTION: CPT

## 2024-11-13 PROCEDURE — 97530 THERAPEUTIC ACTIVITIES: CPT | Performed by: PHYSICAL THERAPIST

## 2024-11-13 PROCEDURE — 82962 GLUCOSE BLOOD TEST: CPT

## 2024-11-13 RX ORDER — GLUCAGON 1 MG/ML
1 KIT INJECTION PRN
Status: DISCONTINUED | OUTPATIENT
Start: 2024-11-13 | End: 2024-11-14 | Stop reason: HOSPADM

## 2024-11-13 RX ORDER — POTASSIUM CHLORIDE 1500 MG/1
20 TABLET, EXTENDED RELEASE ORAL ONCE
Status: COMPLETED | OUTPATIENT
Start: 2024-11-13 | End: 2024-11-13

## 2024-11-13 RX ORDER — DEXTROSE MONOHYDRATE 100 MG/ML
INJECTION, SOLUTION INTRAVENOUS CONTINUOUS PRN
Status: DISCONTINUED | OUTPATIENT
Start: 2024-11-13 | End: 2024-11-14 | Stop reason: HOSPADM

## 2024-11-13 RX ORDER — INSULIN LISPRO 100 [IU]/ML
0-16 INJECTION, SOLUTION INTRAVENOUS; SUBCUTANEOUS
Status: DISCONTINUED | OUTPATIENT
Start: 2024-11-13 | End: 2024-11-14 | Stop reason: HOSPADM

## 2024-11-13 RX ADMIN — LEVETIRACETAM 750 MG: 500 TABLET, FILM COATED ORAL at 17:22

## 2024-11-13 RX ADMIN — POTASSIUM CHLORIDE 20 MEQ: 1500 TABLET, EXTENDED RELEASE ORAL at 12:15

## 2024-11-13 RX ADMIN — BISMUTH SUBSALICYLATE 30 ML: 525 LIQUID ORAL at 07:22

## 2024-11-13 RX ADMIN — BISMUTH SUBSALICYLATE 30 ML: 525 LIQUID ORAL at 21:03

## 2024-11-13 RX ADMIN — INSULIN LISPRO 8 UNITS: 100 INJECTION, SOLUTION INTRAVENOUS; SUBCUTANEOUS at 17:22

## 2024-11-13 RX ADMIN — INSULIN LISPRO 4 UNITS: 100 INJECTION, SOLUTION INTRAVENOUS; SUBCUTANEOUS at 12:19

## 2024-11-13 RX ADMIN — IPRATROPIUM BROMIDE AND ALBUTEROL SULFATE 1 DOSE: 2.5; .5 SOLUTION RESPIRATORY (INHALATION) at 07:35

## 2024-11-13 RX ADMIN — ENOXAPARIN SODIUM 40 MG: 100 INJECTION SUBCUTANEOUS at 11:09

## 2024-11-13 RX ADMIN — TRAZODONE HYDROCHLORIDE 100 MG: 100 TABLET ORAL at 22:15

## 2024-11-13 RX ADMIN — PREGABALIN 50 MG: 50 CAPSULE ORAL at 20:51

## 2024-11-13 RX ADMIN — IPRATROPIUM BROMIDE AND ALBUTEROL SULFATE 1 DOSE: 2.5; .5 SOLUTION RESPIRATORY (INHALATION) at 20:48

## 2024-11-13 RX ADMIN — PREGABALIN 50 MG: 50 CAPSULE ORAL at 10:55

## 2024-11-13 RX ADMIN — ARFORMOTEROL TARTRATE: 15 SOLUTION RESPIRATORY (INHALATION) at 07:31

## 2024-11-13 RX ADMIN — TOPIRAMATE 25 MG: 25 TABLET, FILM COATED ORAL at 10:55

## 2024-11-13 RX ADMIN — ARFORMOTEROL TARTRATE: 15 SOLUTION RESPIRATORY (INHALATION) at 20:34

## 2024-11-13 RX ADMIN — INSULIN LISPRO 8 UNITS: 100 INJECTION, SOLUTION INTRAVENOUS; SUBCUTANEOUS at 20:59

## 2024-11-13 RX ADMIN — ATORVASTATIN CALCIUM 20 MG: 20 TABLET, FILM COATED ORAL at 17:22

## 2024-11-13 RX ADMIN — FAMOTIDINE 10 MG: 20 TABLET, FILM COATED ORAL at 10:56

## 2024-11-13 RX ADMIN — METOPROLOL SUCCINATE 50 MG: 50 TABLET, EXTENDED RELEASE ORAL at 10:56

## 2024-11-13 RX ADMIN — AMITRIPTYLINE HYDROCHLORIDE 10 MG: 10 TABLET, FILM COATED ORAL at 20:52

## 2024-11-13 RX ADMIN — ASPIRIN 81 MG: 81 TABLET, COATED ORAL at 10:56

## 2024-11-13 RX ADMIN — SODIUM CHLORIDE, PRESERVATIVE FREE 10 ML: 5 INJECTION INTRAVENOUS at 11:01

## 2024-11-13 RX ADMIN — PREDNISONE 40 MG: 20 TABLET ORAL at 10:57

## 2024-11-13 RX ADMIN — LEVETIRACETAM 500 MG: 500 TABLET, FILM COATED ORAL at 07:20

## 2024-11-13 RX ADMIN — FAMOTIDINE 10 MG: 20 TABLET, FILM COATED ORAL at 20:50

## 2024-11-13 RX ADMIN — IPRATROPIUM BROMIDE AND ALBUTEROL SULFATE 1 DOSE: 2.5; .5 SOLUTION RESPIRATORY (INHALATION) at 13:26

## 2024-11-13 RX ADMIN — TROSPIUM CHLORIDE 20 MG: 20 TABLET, FILM COATED ORAL at 20:53

## 2024-11-13 RX ADMIN — SODIUM CHLORIDE, PRESERVATIVE FREE 10 ML: 5 INJECTION INTRAVENOUS at 21:04

## 2024-11-13 RX ADMIN — ACETAMINOPHEN 650 MG: 325 TABLET ORAL at 07:20

## 2024-11-13 ASSESSMENT — PAIN - FUNCTIONAL ASSESSMENT: PAIN_FUNCTIONAL_ASSESSMENT: ACTIVITIES ARE NOT PREVENTED

## 2024-11-13 ASSESSMENT — PAIN DESCRIPTION - LOCATION: LOCATION: HEAD

## 2024-11-13 ASSESSMENT — PAIN DESCRIPTION - DESCRIPTORS: DESCRIPTORS: ACHING

## 2024-11-13 ASSESSMENT — PAIN SCALES - GENERAL
PAINLEVEL_OUTOF10: 0
PAINLEVEL_OUTOF10: 3
PAINLEVEL_OUTOF10: 0

## 2024-11-13 ASSESSMENT — PAIN DESCRIPTION - ORIENTATION: ORIENTATION: LOWER;UPPER

## 2024-11-13 NOTE — TELEPHONE ENCOUNTER
YIFAN called pt to offer in-home appointment to talk about transitioning from independent apartment to a senior living community of Assisted Living facility. Pt answered the phone, informed JAYW that she is currently at [facility] and has been admitted since Saturday 11/9/24. She said last week she had 3 separate falls in her apartment. She fell on Friday or Saturday, trying to get in to her bed but \"went down the other way\". She was \"on the floor for several hours before I could get to my phone\". Her son came to the apartment, and pt went to the hospital. The plan at this time is for pt to be d/c to [facility] tomorrow, for rehab.     Pt is realistic and understands that it is no longer safe to live independently in her apartment. She said she and her son have been calling around to Senior Living Communities, but the down-payment is beyond her financial means ($200,000-500,000 up front). She said she would prefer to go to [facility] across the street from [facility] due to it's location, however the woman she spoke to on the phone at [facility] was \"very abrupt with me\" which pt did not appreciate. She said that her goal is to move to a senior living community or Assisted Living \"some time after Sonja\".     She asked for YIFAN to call [facility] and [facility], and inquire about pricing. YIFAN called [facility] and spoke with Laquita (). Starting monthly price at [facility] is $4,200/month, and then price increases depending on needs such as toileting assistance, hygiene care assistance, and other ADL/IADL care needs. Medication management is included in the base price. YIFAN talked with a chat-bot through the [facility] website, and was informed that base price for Independent Living starts at $3,295. YIFAN called pt back and provided her with this information.     She stated that she will keep [facility] up to date with her plans and progress during rehab. JAYW to

## 2024-11-13 NOTE — CARE COORDINATION
Transition of Care Plan:     RUR:   Prior Level of Functioning: assistance, lives alone, falls  Disposition: [facility] has accepted  ERYN: 11/13  If SNF or IPR: Date FOC offered: 11/11  Date FOC received: 11/12  Accepting facility: [facility]  Date authorization started with reference number: 11/12  Date authorization received and expires:   Follow up appointments: TBA  DME needed: TBD  Transportation at discharge: son to transport  IM/IMM Medicare/ letter given: sign at DC  Is patient a  and connected with VA?               If yes, was [place] transfer form completed and VA notified?   Caregiver Contact:     Abhilash Dasilva (Child)  [phone]      Discharge Caregiver contacted prior to discharge?   Care Conference needed?   Barriers to discharge: clinical improvement. She is on 4 lpm and normally wears 2 lpm 02     SW at [facility]  as been looking into options for living situation. I gave him their number.([phone]) because he did not have it.  Her PCP is Dr. CORDOBA.     CM will continue to follow for DC needs.    11:13 am I called [facility] and they should be getting back to me today on auth.    3:48 pm I got auth on this patient to go to [facility]  Auth # [id number]  Approved from [id number] for insurance is Bryan Holbrook  Reference # [id number]    Dr. Parker said DC for tomorrow. I will let [facility] know.     English Tracey ROQUE    [phone]

## 2024-11-14 VITALS
HEART RATE: 79 BPM | DIASTOLIC BLOOD PRESSURE: 73 MMHG | SYSTOLIC BLOOD PRESSURE: 114 MMHG | OXYGEN SATURATION: 98 % | TEMPERATURE: 97.7 F | WEIGHT: 116.84 LBS | HEIGHT: 60 IN | BODY MASS INDEX: 22.94 KG/M2 | RESPIRATION RATE: 17 BRPM

## 2024-11-14 LAB
BASOPHILS # BLD: 0 K/UL (ref 0–0.1)
BASOPHILS NFR BLD: 0 % (ref 0–1)
DIFFERENTIAL METHOD BLD: ABNORMAL
EOSINOPHIL # BLD: 0 K/UL (ref 0–0.4)
EOSINOPHIL NFR BLD: 0 % (ref 0–7)
ERYTHROCYTE [DISTWIDTH] IN BLOOD BY AUTOMATED COUNT: 18.7 % (ref 11.5–14.5)
EST. AVERAGE GLUCOSE BLD GHB EST-MCNC: 171 MG/DL
GLUCOSE BLD STRIP.AUTO-MCNC: 186 MG/DL (ref 65–117)
GLUCOSE BLD STRIP.AUTO-MCNC: 209 MG/DL (ref 65–117)
HBA1C MFR BLD: 7.6 % (ref 4–5.6)
HCT VFR BLD AUTO: 37.5 % (ref 35–47)
HGB BLD-MCNC: 10.7 G/DL (ref 11.5–16)
IMM GRANULOCYTES # BLD AUTO: 0.1 K/UL (ref 0–0.04)
IMM GRANULOCYTES NFR BLD AUTO: 1 % (ref 0–0.5)
LYMPHOCYTES # BLD: 1.5 K/UL (ref 0.8–3.5)
LYMPHOCYTES NFR BLD: 13 % (ref 12–49)
MCH RBC QN AUTO: 21.9 PG (ref 26–34)
MCHC RBC AUTO-ENTMCNC: 28.5 G/DL (ref 30–36.5)
MCV RBC AUTO: 76.8 FL (ref 80–99)
MONOCYTES # BLD: 0.9 K/UL (ref 0–1)
MONOCYTES NFR BLD: 8 % (ref 5–13)
NEUTS SEG # BLD: 9.2 K/UL (ref 1.8–8)
NEUTS SEG NFR BLD: 78 % (ref 32–75)
NRBC # BLD: 0 K/UL (ref 0–0.01)
NRBC BLD-RTO: 0 PER 100 WBC
PLATELET # BLD AUTO: 213 K/UL (ref 150–400)
PMV BLD AUTO: 11 FL (ref 8.9–12.9)
RBC # BLD AUTO: 4.88 M/UL (ref 3.8–5.2)
RBC MORPH BLD: ABNORMAL
S PYO DNA THROAT QL NAA+PROBE: NOT DETECTED
SARS-COV-2 RNA RESP QL NAA+PROBE: NOT DETECTED
SERVICE CMNT-IMP: ABNORMAL
SERVICE CMNT-IMP: ABNORMAL
SOURCE: NORMAL
WBC # BLD AUTO: 11.7 K/UL (ref 3.6–11)

## 2024-11-14 PROCEDURE — 2700000000 HC OXYGEN THERAPY PER DAY

## 2024-11-14 PROCEDURE — 6360000002 HC RX W HCPCS: Performed by: INTERNAL MEDICINE

## 2024-11-14 PROCEDURE — 87651 STREP A DNA AMP PROBE: CPT

## 2024-11-14 PROCEDURE — 6370000000 HC RX 637 (ALT 250 FOR IP): Performed by: INTERNAL MEDICINE

## 2024-11-14 PROCEDURE — 94640 AIRWAY INHALATION TREATMENT: CPT

## 2024-11-14 PROCEDURE — 82962 GLUCOSE BLOOD TEST: CPT

## 2024-11-14 PROCEDURE — 6370000000 HC RX 637 (ALT 250 FOR IP): Performed by: FAMILY MEDICINE

## 2024-11-14 PROCEDURE — 94761 N-INVAS EAR/PLS OXIMETRY MLT: CPT

## 2024-11-14 PROCEDURE — 87635 SARS-COV-2 COVID-19 AMP PRB: CPT

## 2024-11-14 PROCEDURE — 6370000000 HC RX 637 (ALT 250 FOR IP): Performed by: PHYSICIAN ASSISTANT

## 2024-11-14 PROCEDURE — 36415 COLL VENOUS BLD VENIPUNCTURE: CPT

## 2024-11-14 PROCEDURE — 2580000003 HC RX 258: Performed by: INTERNAL MEDICINE

## 2024-11-14 PROCEDURE — 83036 HEMOGLOBIN GLYCOSYLATED A1C: CPT

## 2024-11-14 PROCEDURE — 85025 COMPLETE CBC W/AUTO DIFF WBC: CPT

## 2024-11-14 RX ORDER — FAMOTIDINE 10 MG
10 TABLET ORAL 2 TIMES DAILY
Qty: 60 TABLET | Refills: 0 | Status: SHIPPED | OUTPATIENT
Start: 2024-11-14 | End: 2024-12-14

## 2024-11-14 RX ORDER — TRAMADOL HYDROCHLORIDE 50 MG/1
50 TABLET ORAL EVERY 6 HOURS PRN
Qty: 12 TABLET | Refills: 0 | Status: SHIPPED | OUTPATIENT
Start: 2024-11-14 | End: 2024-12-14

## 2024-11-14 RX ORDER — PREDNISONE 10 MG/1
TABLET ORAL
Qty: 18 TABLET | Refills: 0 | Status: SHIPPED | OUTPATIENT
Start: 2024-11-15 | End: 2024-11-24

## 2024-11-14 RX ORDER — TOPIRAMATE 25 MG/1
25 TABLET, FILM COATED ORAL DAILY
Qty: 30 TABLET | Refills: 0 | Status: SHIPPED | OUTPATIENT
Start: 2024-11-15 | End: 2024-12-15

## 2024-11-14 RX ORDER — LEVOFLOXACIN 750 MG/1
750 TABLET, FILM COATED ORAL ONCE
Status: COMPLETED | OUTPATIENT
Start: 2024-11-14 | End: 2024-11-14

## 2024-11-14 RX ADMIN — PREDNISONE 40 MG: 20 TABLET ORAL at 07:54

## 2024-11-14 RX ADMIN — FAMOTIDINE 10 MG: 20 TABLET, FILM COATED ORAL at 07:54

## 2024-11-14 RX ADMIN — ACETAMINOPHEN 650 MG: 325 TABLET ORAL at 07:54

## 2024-11-14 RX ADMIN — SODIUM CHLORIDE, PRESERVATIVE FREE 10 ML: 5 INJECTION INTRAVENOUS at 07:56

## 2024-11-14 RX ADMIN — BISMUTH SUBSALICYLATE 30 ML: 525 LIQUID ORAL at 07:55

## 2024-11-14 RX ADMIN — Medication 1 LOZENGE: at 09:42

## 2024-11-14 RX ADMIN — ARFORMOTEROL TARTRATE: 15 SOLUTION RESPIRATORY (INHALATION) at 08:08

## 2024-11-14 RX ADMIN — ASPIRIN 81 MG: 81 TABLET, COATED ORAL at 07:54

## 2024-11-14 RX ADMIN — LEVETIRACETAM 500 MG: 500 TABLET, FILM COATED ORAL at 06:57

## 2024-11-14 RX ADMIN — TOPIRAMATE 25 MG: 25 TABLET, FILM COATED ORAL at 07:54

## 2024-11-14 RX ADMIN — ENOXAPARIN SODIUM 40 MG: 100 INJECTION SUBCUTANEOUS at 07:55

## 2024-11-14 RX ADMIN — LEVOFLOXACIN 750 MG: 750 TABLET, FILM COATED ORAL at 09:42

## 2024-11-14 RX ADMIN — IPRATROPIUM BROMIDE AND ALBUTEROL SULFATE 1 DOSE: 2.5; .5 SOLUTION RESPIRATORY (INHALATION) at 08:03

## 2024-11-14 RX ADMIN — Medication 1 LOZENGE: at 14:12

## 2024-11-14 RX ADMIN — METOPROLOL SUCCINATE 50 MG: 50 TABLET, EXTENDED RELEASE ORAL at 07:55

## 2024-11-14 RX ADMIN — INSULIN LISPRO 4 UNITS: 100 INJECTION, SOLUTION INTRAVENOUS; SUBCUTANEOUS at 08:38

## 2024-11-14 RX ADMIN — PREGABALIN 50 MG: 50 CAPSULE ORAL at 07:54

## 2024-11-14 ASSESSMENT — PAIN SCALES - GENERAL
PAINLEVEL_OUTOF10: 2
PAINLEVEL_OUTOF10: 0
PAINLEVEL_OUTOF10: 1
PAINLEVEL_OUTOF10: 1
PAINLEVEL_OUTOF10: 3

## 2024-11-14 ASSESSMENT — PAIN DESCRIPTION - LOCATION: LOCATION: THROAT

## 2024-11-14 ASSESSMENT — PAIN SCALES - WONG BAKER: WONGBAKER_NUMERICALRESPONSE: NO HURT

## 2024-11-14 ASSESSMENT — PAIN DESCRIPTION - ONSET: ONSET: GRADUAL

## 2024-11-14 ASSESSMENT — PAIN DESCRIPTION - DESCRIPTORS: DESCRIPTORS: SORE

## 2024-11-14 NOTE — RT PROTOCOL NOTE
ADULT PROTOCOL: JET AEROSOL ASSESSMENT    Patient  Adenike Dasilva     84 y.o.   female     11/14/2024  3:44 PM    Breath Sounds Pre Procedure: Breath Sounds Pre-Tx JOSUE: Diminished                                  Breath Sounds Pre-Tx LLL: Diminished        Breath Sounds Pre-Tx RUL: Diminished        Breath Sounds Pre-Tx RML: Diminished        Breath Sounds Pre-Tx RLL: Diminished  Breath Sounds Post Procedure: Breath Sounds Post-Tx JOSUE: Diminished          Breath Sounds Post-Tx LLL: Diminished          Breath Sounds Post-Tx RUL: Diminished          Breath Sounds Post-Tx RML: Diminished          Breath Sounds Post-Tx RLL: Diminished                                       Heart Rate: Pre procedure Pre-Tx Pulse: 65           Post procedure Post-Tx Pulse: 65    Resp Rate: Pre procedure Pre-Tx Resps: 21           Post procedure Post-Tx Resps: 21      Oxygen: O2 Therapy: Oxygen humidified   nasal cannula     Changed: No    SpO2:  SpO2: 98 %   with Oxygen                Nebulizer Therapy: Current medications Medications: Arformoterol, Budesonide      Changed: Yes    Comments: Patient sounds diminished, no wheezing or labored breathing. Patient also stated dissatisfaction towards neb treatments and expressed interest in lessoning them to once a day.    Problem List:   Patient Active Problem List   Diagnosis    Hyperlipidemia    Coronary artery disease involving native coronary artery of native heart without angina pectoris    Type 2 diabetes mellitus with diabetic polyneuropathy, without long-term current use of insulin (HCC)    OAB (overactive bladder)    Nonrheumatic aortic valve stenosis    H/O aortic valve replacement    Chronic anticoagulation    Depression with anxiety    Simple chronic bronchitis (HCC)    Coagulopathy (HCC)    Mild dementia without behavioral disturbance, psychotic disturbance, mood disturbance, or anxiety (HCC)    Benign essential hypertension    Gastroesophageal reflux disease    Insomnia due to

## 2024-11-14 NOTE — CARE COORDINATION
Transition of Care Plan to SNF/Rehab    Communication to Patient/Family:  Met with patient and family and they are agreeable to the transition plan. The Plan for Transition of Care is related to the following treatment goals: rehab    The Patient and/or patient representative was provided with a choice of provider and agrees  with the discharge plan.      Yes [x] No []    A Freedom of choice list was provided with basic dialogue that supports the patient's individualized plan of care/goals and shares the quality data associated with the providers.       Yes [x] No []    SNF/Rehab Transition:  Patient has been accepted to [facility] SNF/Rehab and meets criteria for admission. Room #606A.  Date of Inpatient Status Order: 11/14/24  Patient will transported by [facility]  and expected to leave at 1700    Communication to SNF/Rehab:  Bedside RN, Lulu, has been notified to update the transition plan to the facility and call report [phone].  Discharge information has been updated on the AVS     Discharge instructions to be sent    Nursing Please include all hard scripts for controlled substances, med rec and dc summary, and AVS in packet.     Reviewed and confirmed with facility, [facility] can manage the patient care needs for the following:     Nicholas with (X) only those applicable:  Medication:  [x]Medications are available at the facility  []IV Antibiotics    []Controlled Substance - hard copies available sent.  []Weekly Labs    Equipment:  []CPAP/BiPAP  []Wound Vacuum  []Brar or Urinary Device  []PICC/Central Line  []Nebulizer  []Ventilator    Treatment:  []Isolation (for MRSA, VRE, etc.)  []Surgical Drain Management  []Tracheostomy Care  []Dressing Changes  []Dialysis with transportation  []PEG Care  [x]Oxygen  []Daily Weights for Heart Failure    Dietary:  [x]Any diet limitations  []Tube Feedings   []Total Parenteral Management (TPN)    Financial Resources:  []Medicaid Application Completed    []UAI Completed and

## 2024-11-14 NOTE — DISCHARGE SUMMARY
Discharge Summary    Name: Adenike Dasilva  [id number]  YOB: 1940 (Age: 84 y.o.)   Date of Admission: 11/10/2024  Date of Discharge: 11/14/2024  Attending Physician: Yulia Parker *    Discharge Diagnosis:     Consultations:  IP CONSULT TO PULMONOLOGY  IP CONSULT TO PHARMACY      Brief Admission History/Reason for Admission Per Vaishali Gusman MD:     Admitting H&P:    Adenike Dasilva is a 84 y.o.  female with PMHx significant for COPD, pulmonary fibrosis/interstitial disease,  anxiety, aortic stenosis, CAD, depression, diabetes, prior hx of DVT presented to ED with c/o fall.  Patient has had recent multiple falls. As per son, patient slid in her commode twice, son replaced new commode lift seat. However, today she slid from the bed and was found on the ground by her son. Patient is on 4L of supplemental oxygen on baseline, was satting at 65%, so patient was placed on NRB in ED  Patient reports that she is being treated by pneumonia for her primary care doctor and today is last day of antibiotics.     We were asked to admit for work up and evaluation of the above problems.   Brief Hospital Course by Main Problems:     Interval summary:    Acute on chronic hypoxic respiratory failure  Acute exacerbation of COPD  Pulmonary fibrosis  Interstitial lung disease  Doing significantly better, switch to prednisone taper  Solu-Medrol and IV Levaquin have been discontinued   Continue oral Levaquin 750 mg give last dose on 11/15  Extensive pulmonary fibrosis on x-ray, diffuse crackles on exam, no wheezing, feeling better  Continue supplemental oxygen and wean as tolerated  Pulmonology on board.  Appreciate input  Continue d arformoterol/budesonide nebs, scheduled DuoNebs     Leukocytosis  Improving,WBC 13.3 today  Most likely steroid-induced  Recheck CBC in a.m.        Type II DM/steroid-induced hyperglycemia  Continue insulin sliding scale     Recurrent falls  PT and

## 2024-11-14 NOTE — PROGRESS NOTES
Pulmonary, Critical Care, and Sleep Medicine~Progress Note    Name: Adenike Dasilva MRN: [id number]   : 1940 Hospital: [facility]   Date: 2024 9:06 AM Admission: 11/10/2024     Impression Plan   COPD with acute exacerbation- improving  Leukocytosis- improving, likely reactive due to steroids   Acute on chronic hypoxemic respiratory failure- resolved  Hypothermia- resolved   Lactic acidosis- resolved   Thrombocytopenia- resolved   Recurrent falls  HTN  S/p TAVR  CAD s/p PCI  DM type II  GERD  H/o DVT  Mild dementia  Peripheral eosinophilia Supp O2 prn goal sats >88%--on home 4L. Exercise oximetry closer to discharge  Bcx- prelim no growth   Continue oral prednisone taper  Levofloxacin day 4  Continue arformoterol/budesonide nebs, scheduled [facility]  Speech therapy eval pending ? aspiration causing exacerbations   OOB, IS.  PT/OT recommending SNF  Dvt ppx: lovenox     Daily Progression:    Interval history:    : Patient seen this morning lying in bed. States she is not ready to get up. She states her breathing is good. Denies dyspnea, chest pain, cough. Afebrile. On 3L NC. WBC 13.3.    : Patient seen this morning sitting in chair. She states she just choked on her food. She states this happens regularly due to ill-fitting dentures. Otherwise has cough productive of clear sputum which is baseline. Denies shortness of breath, chest pain, fever, chills, edema.     Consult for acute on chronic hypoxic respiratory failure    HPI: COPD, chronic hypoxemic respiratory failure (4L O2), HTN, hypothyroidism, s/p TAVR not on AC due to noncompliance, CAD s/p PCI, DM, HLD, depression, anxiety, chronic pain, GERD, DVT (?possible protein C deficiency), recurrent UTI, mild dementia who presented to the ED after an unwitnessed fall out of bed. On arrival to the ED she was satting 65% on 4L O2 and was subsequently placed on NRB with improvement in sats to the 
                           Pulmonary, Critical Care, and Sleep Medicine~Progress Note    Name: Adenike Dasilva MRN: [id number]   : 1940 Hospital: [facility]   Date: 2024 10:45 AM Admission: 11/10/2024     Impression Plan   COPD with acute exacerbation- improving  Leukocytosis- improving, likely reactive due to steroids   Acute on chronic hypoxemic respiratory failure- resolved  Hypothermia- resolved   Lactic acidosis- resolved   Thrombocytopenia- resolved   Recurrent falls  HTN  S/p TAVR  CAD s/p PCI  DM type II  GERD  H/o DVT  Mild dementia  Peripheral eosinophilia- resolved  Supp O2 prn goal sats >88%--on home 4L. Exercise oximetry closer to discharge  Bcx- prelim no growth   Continue oral prednisone taper  Levofloxacin day 5  Continue arformoterol/budesonide nebs, scheduled [facility]  Speech therapy eval with no aspiration/dysphagia noted   OOB, IS.  PT/OT recommending SNF  Dvt ppx: lovenox  Will sign off. I will arrange outpatient follow up with Dr. Edgar or myself.      Daily Progression:    Interval history:    : Patient seen this morning lying in bed. Initially very upset she was just arguing with her son who had left. She could not find her hearing aids. Her oxygen was off and was satting 83%. Replaced oxygen with improvement in SpO2 to 93%. Pt states her breathing is back to baseline. Endorses occasional cough that is nonproductive. Denies chest pain. Afebrile. On 4L NC. WBC 11.7.    : Patient seen this morning lying in bed. States she is not ready to get up. She states her breathing is good. Denies dyspnea, chest pain, cough. Afebrile. On 3L NC. WBC 13.3.    : Patient seen this morning sitting in chair. She states she just choked on her food. She states this happens regularly due to ill-fitting dentures. Otherwise has cough productive of clear sputum which is baseline. Denies shortness of breath, chest pain, fever, chills, edema.     Consult for acute on 
                           Pulmonary, Critical Care, and Sleep Medicine~Progress Note    Name: Adenike Dasilva MRN: [id number]   : 1940 Hospital: [facility]   Date: 2024 9:20 AM Admission: 11/10/2024     Impression Plan   Acute on chronic hypoxemic respiratory failure- resolved  COPD with acute exacerbation  Leukocytosis- expected due to steroids   Hypothermia- resolved   Lactic acidosis- resolved   Thrombocytopenia- improving   Recurrent falls  HTN  S/p TAVR  CAD s/p PCI  DM type II  GERD  H/o DVT  Mild dementia  Peripheral eosinophilia Supp O2 prn goal sats >88%--on home 4L. Exercise oximetry closer to discharge  Bcx- prelim no growth   Switch IVCS to oral prednisone taper  Consult speech therapy ?aspiration causing exacerbations   Continue arformoterol/budesonide nebs, scheduled duone[facility]  Levofloxacin day 3   OOB, IS.  PT/OT evals  Dvt ppx: lovenox     Daily Progression:    Interval history:    Patient seen this morning sitting in chair. She states she just choked on her food. She states this happens regularly due to ill-fitting dentures. Otherwise has cough productive of clear sputum which is baseline. Denies shortness of breath, chest pain, fever, chills, edema.     Consult for acute on chronic hypoxic respiratory failure    HPI: COPD, chronic hypoxemic respiratory failure (4L O2), HTN, hypothyroidism, s/p TAVR not on AC due to noncompliance, CAD s/p PCI, DM, HLD, depression, anxiety, chronic pain, GERD, DVT (?possible protein C deficiency), recurrent UTI, mild dementia who presented to the ED after an unwitnessed fall out of bed. On arrival to the ED she was satting 65% on 4L O2 and was subsequently placed on NRB with improvement in sats to the 90s.  Hypothermic this morning with temp 94.8  Reportedly already on antibiotics for pneumonia per her PCP    Labs: procal low, lactic acid 4.44, bnp 283, wbc 13.7, plt 98, abs eos 700    CXR 11/10/24: Similar widespread scattered 
      Hospitalist Progress Note    NAME:   Adenike Dasilva   : 1940   MRN: [id number]     Date/Time: 2024 8:03 PM  Patient PCP: Dav Alex MD    Estimated discharge date:  Barriers:       Assessment / Plan:  Acute on chronic hypoxic respiratory failure  Acute exacerbation of COPD  Pulmonary fibrosis  Interstitial lung disease  Doing significantly better, switch to prednisone taper, discontinue Solu-Medrol, discontinue IV Levaquin switch to oral Levaquin  Extensive pulmonary fibrosis on x-ray, diffuse crackles on exam, no wheezing, feeling better  Continue supplemental oxygen and wean as tolerated  Will consult pulmonary  Start dual nebulization and steroid nebulization    Leukocytosis  Worsening most likely steroid-induced           Type II DM/steroid-induced hyperglycemia  Added 5 units of lispro scheduled before meals, continue sliding    Recurrent falls  - PT OT:     Possible history of seizure  - Continue Keppra 750 mg at night and in the morning    History of coronary artery disease  - Continue metoprolol  - Continue aspirin  -Continue atorvastatin    History of migraine headache  - Continue Fioricet       Hx of aortic valve replacement       Disposition: Pending placement patient is ready to be discharged        Medical Decision Making:   I personally reviewed labs:  I personally reviewed imaging:  I personally reviewed EKG:  Toxic drug monitoring:   Discussed case with:         Code Status:   DVT Prophylaxis:   GI Prophylaxis:    Subjective:     Chief Complaint / Reason for Physician Visit   Discussed with RN events overnight.       Objective:     VITALS:   Last 24hrs VS reviewed since prior progress note. Most recent are:  Patient Vitals for the past 24 hrs:   BP Temp Temp src Pulse Resp SpO2   24 -- -- -- -- -- 94 %   24 1948 -- -- -- -- -- 94 %   24 1453 -- -- -- -- -- 93 %   24 0800 -- -- -- 82 -- --   24 0720 (!) 150/84 97.9 °F (36.6 °C) Rectal 87 28 91 
      Hospitalist Progress Note    NAME:   Adenike Dasilva   : 1940   MRN: [id number]     Date/Time: 2024 8:36 PM  Patient PCP: Dav Alex MD    Estimated discharge date:  Barriers:       Assessment / Plan:  Acute on chronic hypoxic respiratory failure  Acute exacerbation of COPD  Pulmonary fibrosis  Interstitial lung disease  Extensive pulmonary fibrosis on x-ray, diffuse crackles on exam, no wheezing, feeling better  Continue solumedrol 40 mg iv q6h  Continue levofloxacin for now  Continue supplemental oxygen and wean as tolerated  Will consult pulmonary  Start dual nebulization and steroid nebulization           Type II DM/steroid-induced hyperglycemia  Added 5 units of lispro scheduled before meals, continue sliding    Recurrent falls  - PT OT:     Possible history of seizure  - Continue Keppra 750 mg at night and in the morning    History of coronary artery disease  - Continue metoprolol  - Continue aspirin  -Continue atorvastatin    History of migraine headache  - Continue Fioricet       Hx of aortic valve replacement               Medical Decision Making:   I personally reviewed labs:  I personally reviewed imaging:  I personally reviewed EKG:  Toxic drug monitoring:   Discussed case with:         Code Status:   DVT Prophylaxis:   GI Prophylaxis:    Subjective:     Chief Complaint / Reason for Physician Visit   Discussed with RN events overnight.       Objective:     VITALS:   Last 24hrs VS reviewed since prior progress note. Most recent are:  Patient Vitals for the past 24 hrs:   BP Temp Temp src Pulse Resp SpO2   24 -- -- -- -- 24 --   24 128/76 97.6 °F (36.4 °C) Oral 100 26 93 %   24 1945 -- -- -- -- -- 95 %   24 1934 -- -- -- -- -- 95 %   24 1525 -- -- -- 82 18 95 %   24 1500 -- -- -- -- -- 95 %   24 1145 -- 98.1 °F (36.7 °C) Rectal -- -- --   24 0747 (!) 135/57 (!) 95.8 °F (35.4 °C) Axillary 99 (!) 31 93 %   24 0710 -- -- -- 
0800 Temp 94.8 dr aiken notified. Warming blanket / rectal thermometer initiated  
Attempted to see pt for OT services.  Pt was reclined in chair asleep.  She reports she has been up to the bedside commode multiple times and is hungry and tired.  Assisted with repositioning in the chair for comfort.  Pt otherwise was declining activity today.  Will defer but continue to fo follow.   
CM Note: [facility] has accepted for SNF. Will start auth as soon as I get OT note from today.  English Tracey ROQUE CM  [id number]  
First attempt pt was requiring jordy hugger due to low temp.  Second attempt pt was off jordy hugger with stable temp, but declining activity.  She was eating lunch and didn't want to stop eat to get out of bed.  Pt reports she is a retired  and is familiar with OT.  She voiced that she feels OT isn't needed as they \"cannot teach me anything I don't already know.\"  She reports she feels like she does need PT.  Pt also endorses that she has had three falls recently and feels weak.  She voiced that she felt she wouldn't bet be able to get to the chair due to being weak.  She also reports that she has long COVID.  Discussed with pt that even though she knows how to perform ADLS, she currently is likely to weak to perform them on her own.  Informed pt that the goal for OT isn't always to teach her something new, but to regain her independence with things that she already knows how to do.  Pt voiced understanding.  Will follow up at a later time for assessment.    
Nursing contacted Nocturnist/cross cover provider via non-urgent messaging system [employer] and notified patient vss, not septic shock appearing, afebrile, on empiric antbx, lactic 4.0, up from prior but down from 4.44 earlier around 1400, lactic possible elev also 2/2 resp. . No other concerns reported. No acute distress reported. No other information provided by nurse. VSS.     Ordered 1l ns cmivf- defer day dr for further continuation, lactic acid repeat in am- continue plan ad oer day dr. please see my above notes. . Will defer further evaluation/management to the day shift primary attending care team. Patient denies any further complaints or concerns.     Nursing to notify Hospitalist for further/continued concerns. Will remain available overnight for further concerns if nursing/patient needs. Please note, there are RRT systems in this hospital in place that if nursing has acute or critical patient condition change or concern, this is to help facilitate and notify that patient needs immediate bedside evaluation by a provider.     Non-billable note.       
PT Note    Attempted to see pt for PT services.  Pt was reclined in chair asleep.  She reports she has been up to the bedside commode multiple times and is hungry and tired.  Assisted with repositioning in the chair for comfort.  Pt otherwise was declining activity today.  Will defer but continue to fo follow.   
Patient resting soundly on attempt. The patient just declined PT due to fatigue, will allow the patient to rest and attempt back later today as able.    Thank you  Yaneli Alford MS OTR/L   
Pharmacy Medication Reconciliation     The patient was NOT interviewed regarding current PTA medication list, use and drug allergies. Patient had an appointment with her PCP 11/6 and said his list is her accurate list. Looked at the AVS for that appointment to update PTA list.    Allergy Update: Bee venom, Iodinated contrast media, Penicillins, Sulfa antibiotics, Omeprazole, Ranitidine hcl, Iodine, Montelukast, Seasonal, Tree extract, Cefdinir, Codeine, Famotidine, Jardiance [empagliflozin], and Morphine    Recommendations/Findings:   The following amendments were made to the patient's active medication list on file at [facility]:   1) Additions:   None    2) Deletions:   Benzonatate  Buspirone  Colestipol  Oxybutynin  Prednisone  Topiramate    3) Changes:   Changed dicyclomine 10 mg to qd        Clarified PTA med list with RUBEN Tsang from PCP appt 11/6. PTA medication list was corrected to the following:     Prior to Admission Medications   Prescriptions Last Dose Informant   Biotin (BIOTIN 5000) 5 MG CAPS 11/9/2024    Sig: Take 1 capsule by mouth daily.   Esomeprazole Magnesium 20 MG TBEC 11/9/2024    Sig: Take 20 mg by mouth daily as needed (heartburn)   MYRBETRIQ 25 MG TB24 11/9/2024    Sig: Take 1 tablet by mouth daily   Menthol, Topical Analgesic, (BIOFREEZE) 4 % GEL Past Month    Sig: Apply 1 each topically in the morning and at bedtime   Polyethylene Glycol 400 (BLINK TEARS) 0.25 % SOLN Not Taking    Sig: Apply 1-2 drops to eye as needed (dry eyes)   Patient not taking: Reported on 11/10/2024   acetaminophen (TYLENOL) 325 MG tablet Past Week    Sig: Take 1-2 tablets by mouth every 6 hours as needed for Pain   albuterol sulfate HFA (PROVENTIL;VENTOLIN;PROAIR) 108 (90 Base) MCG/ACT inhaler 11/9/2024    Sig: Inhale 2 puffs into the lungs every 6 hours as needed for Shortness of Breath or Wheezing   amitriptyline (ELAVIL) 10 MG tablet 11/9/2024    Sig: TAKE 1 TABLET BY MOUTH EVERY NIGHT AT BEDTIME   aspirin 81 MG EC 
Physical therapy:    Attempted PT session. Pt politely declined therapy at this time reporting she didn't sleep well last night and had just gotten back into bed after being up in the chair for a few hours. Pt encouraged to mobilize with therapy in order to improve overall strength and tolerance. Will defer and continue to follow. Thank you.    Rosina Garrido, PT, DPT    
Pt temp 98.1F warming blanket turned off, will continue to monitor temperature rectally.  
Speech LAnguage Pathology TREATMENT/DISCHARGE    Patient: Adenike Dasilva (84 y.o. female)  Date: 11/13/2024  Primary Diagnosis: Acute on chronic hypoxic respiratory failure [J96.21]       Precautions:   (falls, home O2 (4L) baseline)                  ASSESSMENT :  Patient with 02 sats between 89-90% on 3L NC. She reports she is typically on 4L at home. Observed patient with ~7oz water via straw, 4oz applesauce cup, yonatan cracker and multiple pills whole with a drink. Patient with minimally prolonged mastication but full oral clearance achieved. No overt s/s aspiration observed with all trials.    Patient boosted and positioned upright; however, she immediately lowered herself back down in the bed and report she didn't want to sit fully upright despite education on importance. She report her neck hurts when sitting up.     Risk of aspiration increased given COPD; however, given no overt s/s aspiration with any consistency bedside and patient's refusal to sit fully upright, no further SLP needs indicated.     Patient will be discharged from skilled speech-language pathology services at this time.     PLAN :  Recommendations and Planned Interventions:  Diet: Regular and thin liquids  Encourage fully upright positioning- patient resistant to this   Slow rate  Meds 1 at a time       Acute SLP Services: No, patient will be discharged from acute skilled speech-language pathology at this time.  Discharge Recommendations: No, additional SLP treatment not indicated at discharge     SUBJECTIVE:   Patient stated, “my son is very techy.\"    OBJECTIVE:     Past Medical History:   Diagnosis Date    Anxiety     Aortic valve stenosis 07/2022    Arthritis     Asthma     Bronchitis     CAD (coronary artery disease)     stent    Chronic anticoagulation 11/23/2022    Chronic obstructive pulmonary disease (HCC)     Chronic pain     Depression with anxiety 11/23/2022    Diabetes (HCC)     GERD (gastroesophageal reflux disease)     H/O 
Speech pathology  Orders received, chart reviewed, spoke with RN. RN reporting patient was sound asleep and was up all night. Will allow more time to rest and see later this am.   Rosina Diana M.S. DIANA-SLP    
spine    Protein C deficiency (HCC)    Retention of urine    Pulmonary fibrosis (HCC)    Interstitial lung disease (HCC)    Chronic respiratory failure with hypoxia    History of DVT (deep vein thrombosis)    Frequent falls    Polypharmacy    Irritable bowel syndrome with diarrhea    Rash    Mixed stress and urge urinary incontinence    Generalized osteoarthritis    DDD (degenerative disc disease), lumbar    Seizure disorder (HCC)    Long COVID    Acute on chronic hypoxic respiratory failure       Respiratory Therapist: Mireya Kearney, RT    
without status migrainosus, not intractable    Advanced osteoarthritis of spine    Protein C deficiency (HCC)    Retention of urine    Pulmonary fibrosis (HCC)    Interstitial lung disease (HCC)    Chronic respiratory failure with hypoxia    History of DVT (deep vein thrombosis)    Frequent falls    Polypharmacy    Irritable bowel syndrome with diarrhea    Rash    Mixed stress and urge urinary incontinence    Generalized osteoarthritis    DDD (degenerative disc disease), lumbar    Seizure disorder (HCC)    Long COVID    Acute on chronic hypoxic respiratory failure       Respiratory Therapist: LISA LACEY RCP    
11/13/24  0455    139 138   K 3.8 4.0 3.4*   * 108 107   CO2 26 26 26   GLUCOSE 194* 241* 247*   BUN 16 23* 24*   CREATININE 0.54* 0.77 0.70   CALCIUM 8.8 8.5 8.8   MG 2.2 2.2 2.3       Signed: Yulia Parker MD

## 2024-11-14 NOTE — PLAN OF CARE
Problem: Occupational Therapy - Adult  Goal: By Discharge: Performs self-care activities at highest level of function for planned discharge setting.  See evaluation for individualized goals.  Description: FUNCTIONAL STATUS PRIOR TO ADMISSION:  Patient was ambulatory using rollator and was limited in her ability to complete ADLs. Prior to COVID the patient stated she was independent and now she does all her own ADLs but limits herself due to fatigue.    HOME SUPPORT: Patient lived alone with family to provide assistance.    Occupational Therapy Goals:  Initiated 11/12/2024  1.  Patient will perform grooming with Supervision within 7 day(s).  2.  Patient will perform bathing with Supervision within 7 day(s).  3.  Patient will perform lower body dressing with Supervision within 7 day(s).  4.  Patient will perform toilet transfers with Supervision  within 7 day(s).  5.  Patient will perform all aspects of toileting with Supervision within 7 day(s).  6.  Patient will participate in upper extremity therapeutic exercise/activities with Supervision for 15 minutes within 7 day(s).        Outcome: Progressing     OCCUPATIONAL THERAPY EVALUATION    Patient: Adenike Dasilva (84 y.o. female)  Date: 11/12/2024  Primary Diagnosis: Acute on chronic hypoxic respiratory failure [J96.21]         Precautions:  (falls, home O2 (4L) baseline)                  ASSESSMENT :  The patient is limited by decreased functional mobility, independence in ADLs, high-level IADLs, strength, activity tolerance, safety awareness, balance, increased SPO2 needs . AAOx3-4, the patient was participatory and able to tolerate sitting EOB, standing and taking some side steps. She was unable to reach to her feet to adjust socks and she needed BUE support in standing so she is limited in clothing management in standing. Overall ADLs completed or inferred at mod A to max A level. The patient is on 4L NC with some SOB noted but SPO2 stats noted 92% or higher. 
  Problem: Occupational Therapy - Adult  Goal: By Discharge: Performs self-care activities at highest level of function for planned discharge setting.  See evaluation for individualized goals.  Description: FUNCTIONAL STATUS PRIOR TO ADMISSION:  Patient was ambulatory using rollator and was limited in her ability to complete ADLs. Prior to COVID the patient stated she was independent and now she does all her own ADLs but limits herself due to fatigue.    HOME SUPPORT: Patient lived alone with family to provide assistance.    Occupational Therapy Goals:  Initiated 11/12/2024  1.  Patient will perform grooming with Supervision within 7 day(s).  2.  Patient will perform bathing with Supervision within 7 day(s).  3.  Patient will perform lower body dressing with Supervision within 7 day(s).  4.  Patient will perform toilet transfers with Supervision  within 7 day(s).  5.  Patient will perform all aspects of toileting with Supervision within 7 day(s).  6.  Patient will participate in upper extremity therapeutic exercise/activities with Supervision for 15 minutes within 7 day(s).        Outcome: Progressing  OCCUPATIONAL THERAPY TREATMENT  Patient: Adenike Dasilva (84 y.o. female)  Date: 11/13/2024  Primary Diagnosis: Acute on chronic hypoxic respiratory failure [J96.21]       Precautions:  (falls, home O2 (4L) baseline)                Chart, occupational therapy assessment, plan of care, and goals were reviewed.    ASSESSMENT  Patient continues to benefit from skilled OT services and is progressing towards goals. Pt has a fear of falling and is hesitant to mobilize.  She is quick to state that she isn't able to perform mobility, but is able to mobilize with assist.  Improved overall bed mobility, standing tolerance, balance and ability to mobilize with RW. Cues needed for walker safety.  Pt was on 4L NC and O2 sat after mobility around room and back was 86%.  Depenia noted with activity.  Once seated for less than one 
  Problem: Physical Therapy - Adult  Goal: By Discharge: Performs mobility at highest level of function for planned discharge setting.  See evaluation for individualized goals.  Description: FUNCTIONAL STATUS PRIOR TO ADMISSION: Pt is [place] with rollator and resides alone in a one level apt with level entry, elevator access. Pt's son checks in on her daily. PMHx and social hx significant for frequent falls including \"3 in the last week\", home O2 (4L). Pt was receiving HHPT prior to admission and reports (likely) goal to dc to a rehab setting to transition to LTC if able to financially afford.    HOME SUPPORT PRIOR TO ADMISSION:     Physical Therapy Goals  Initiated 11/11/2024  1.  Patient will move from supine to sit and sit to supine in bed with supervision/set-up within 7 day(s).    2.  Patient will perform sit to stand with supervision/set-up within 7 day(s).  3.  Patient will transfer from bed to chair and chair to bed with supervision/set-up using the least restrictive device within 7 day(s).  4.  Patient will ambulate with supervision/set-up for 200 feet with the least restrictive device within 7 day(s).   5.  Patient will ascend/descend 3-5 stairs with R/L handrail(s) with supervision/set-up within 7 day(s).    Outcome: Progressing     PHYSICAL THERAPY EVALUATION    Patient: Adenike Dasilva (84 y.o. female)  Date: 11/11/2024  Primary Diagnosis: Acute on chronic hypoxic respiratory failure [J96.21]       Precautions: Restrictions/Precautions:  (falls, home O2 (4L) baseline)                      ASSESSMENT :   DEFICITS/IMPAIRMENTS:     Pt tolerated PT services well. Pt received in bed with jordy hugger in place and cleared for therapy services by RN Team. Pt confirms Sydney with rollator and resides alone in a one level apt with level entry. Pt's son checks in on her daily. PMHx and social hx significant for frequent falls including \"3 in the last week\", home O2 (4L). Pt was receiving HHPT prior to admission and 
  Problem: Physical Therapy - Adult  Goal: By Discharge: Performs mobility at highest level of function for planned discharge setting.  See evaluation for individualized goals.  Description: FUNCTIONAL STATUS PRIOR TO ADMISSION: Pt is [place] with rollator and resides alone in a one level apt with level entry, elevator access. Pt's son checks in on her daily. PMHx and social hx significant for frequent falls including \"3 in the last week\", home O2 (4L). Pt was receiving HHPT prior to admission and reports (likely) goal to dc to a rehab setting to transition to LTC if able to financially afford.    HOME SUPPORT PRIOR TO ADMISSION:     Physical Therapy Goals  Initiated 11/11/2024  1.  Patient will move from supine to sit and sit to supine in bed with supervision/set-up within 7 day(s).    2.  Patient will perform sit to stand with supervision/set-up within 7 day(s).  3.  Patient will transfer from bed to chair and chair to bed with supervision/set-up using the least restrictive device within 7 day(s).  4.  Patient will ambulate with supervision/set-up for 200 feet with the least restrictive device within 7 day(s).   5.  Patient will ascend/descend 3-5 stairs with R/L handrail(s) with supervision/set-up within 7 day(s).    Outcome: Progressing   PHYSICAL THERAPY TREATMENT    Patient: Adenike Dasilva (84 y.o. female)  Date: 11/13/2024  Diagnosis: Acute on chronic hypoxic respiratory failure [J96.21] Acute on chronic hypoxic respiratory failure      Precautions:  (falls, home O2 (4L) baseline)                      ASSESSMENT:  Patient continues to benefit from skilled PT services and is progressing towards goals. Patient overall limited by fear of falling, impaired balance, SOB, gait instability, generalized weakness and decreased activity tolerance.  Patient's fear of falling significantly impacts her mobility and requires constant reassurance throughout session.  Overall supervision for bed mobility with increased time 
  Problem: Respiratory - Adult  Goal: Achieves optimal ventilation and oxygenation  11/12/2024 1103 by Darrell Whitley RN  Outcome: Progressing  11/12/2024 0419 by Gabriele FLANAGAN, RT  Outcome: Progressing     Problem: Chronic Conditions and Co-morbidities  Goal: Patient's chronic conditions and co-morbidity symptoms are monitored and maintained or improved  Outcome: Progressing     Problem: Discharge Planning  Goal: Discharge to home or other facility with appropriate resources  Outcome: Progressing     Problem: Pain  Goal: Verbalizes/displays adequate comfort level or baseline comfort level  Outcome: Progressing     Problem: Skin/Tissue Integrity  Goal: Absence of new skin breakdown  Description: 1.  Monitor for areas of redness and/or skin breakdown  2.  Assess vascular access sites hourly  3.  Every 4-6 hours minimum:  Change oxygen saturation probe site  4.  Every 4-6 hours:  If on nasal continuous positive airway pressure, respiratory therapy assess nares and determine need for appliance change or resting period.  Outcome: Progressing     Problem: Safety - Adult  Goal: Free from fall injury  Outcome: Progressing     Problem: ABCDS Injury Assessment  Goal: Absence of physical injury  Outcome: Progressing     
  Problem: Respiratory - Adult  Goal: Achieves optimal ventilation and oxygenation  11/13/2024 0817 by Darrell Whitley RN  Outcome: Progressing  11/13/2024 0020 by Gabriele FLANAGAN, RT  Outcome: Progressing     Problem: Chronic Conditions and Co-morbidities  Goal: Patient's chronic conditions and co-morbidity symptoms are monitored and maintained or improved  Outcome: Progressing     Problem: Discharge Planning  Goal: Discharge to home or other facility with appropriate resources  Outcome: Progressing     Problem: Pain  Goal: Verbalizes/displays adequate comfort level or baseline comfort level  Outcome: Progressing     Problem: Skin/Tissue Integrity  Goal: Absence of new skin breakdown  Description: 1.  Monitor for areas of redness and/or skin breakdown  2.  Assess vascular access sites hourly  3.  Every 4-6 hours minimum:  Change oxygen saturation probe site  4.  Every 4-6 hours:  If on nasal continuous positive airway pressure, respiratory therapy assess nares and determine need for appliance change or resting period.  Outcome: Progressing     Problem: Safety - Adult  Goal: Free from fall injury  Outcome: Progressing     Problem: ABCDS Injury Assessment  Goal: Absence of physical injury  Outcome: Progressing     
  Problem: Respiratory - Adult  Goal: Achieves optimal ventilation and oxygenation  Outcome: Progressing     Problem: Chronic Conditions and Co-morbidities  Goal: Patient's chronic conditions and co-morbidity symptoms are monitored and maintained or improved  Outcome: Progressing     Problem: Discharge Planning  Goal: Discharge to home or other facility with appropriate resources  Outcome: Progressing     Problem: Pain  Goal: Verbalizes/displays adequate comfort level or baseline comfort level  Outcome: Progressing     Problem: Skin/Tissue Integrity  Goal: Absence of new skin breakdown  Description: 1.  Monitor for areas of redness and/or skin breakdown  2.  Assess vascular access sites hourly  3.  Every 4-6 hours minimum:  Change oxygen saturation probe site  4.  Every 4-6 hours:  If on nasal continuous positive airway pressure, respiratory therapy assess nares and determine need for appliance change or resting period.  Outcome: Progressing     Problem: Safety - Adult  Goal: Free from fall injury  Outcome: Progressing     Problem: ABCDS Injury Assessment  Goal: Absence of physical injury  Outcome: Progressing     
including \"3 in the last week\", home O2 (4L). Pt was receiving HHPT prior to admission and reports (likely) goal to dc to a rehab setting to transition to LTC if able to financially afford.    HOME SUPPORT PRIOR TO ADMISSION:     Physical Therapy Goals  Initiated 11/11/2024  1.  Patient will move from supine to sit and sit to supine in bed with supervision/set-up within 7 day(s).    2.  Patient will perform sit to stand with supervision/set-up within 7 day(s).  3.  Patient will transfer from bed to chair and chair to bed with supervision/set-up using the least restrictive device within 7 day(s).  4.  Patient will ambulate with supervision/set-up for 200 feet with the least restrictive device within 7 day(s).   5.  Patient will ascend/descend 3-5 stairs with R/L handrail(s) with supervision/set-up within 7 day(s).    11/13/2024 1512 by Alysia Fowler PT  Outcome: Progressing     Problem: Occupational Therapy - Adult  Goal: By Discharge: Performs self-care activities at highest level of function for planned discharge setting.  See evaluation for individualized goals.  Description: FUNCTIONAL STATUS PRIOR TO ADMISSION:  Patient was ambulatory using rollator and was limited in her ability to complete ADLs. Prior to COVID the patient stated she was independent and now she does all her own ADLs but limits herself due to fatigue.    HOME SUPPORT: Patient lived alone with family to provide assistance.    Occupational Therapy Goals:  Initiated 11/12/2024  1.  Patient will perform grooming with Supervision within 7 day(s).  2.  Patient will perform bathing with Supervision within 7 day(s).  3.  Patient will perform lower body dressing with Supervision within 7 day(s).  4.  Patient will perform toilet transfers with Supervision  within 7 day(s).  5.  Patient will perform all aspects of toileting with Supervision within 7 day(s).  6.  Patient will participate in upper extremity therapeutic exercise/activities with

## 2024-11-18 ENCOUNTER — TELEPHONE (OUTPATIENT)
Facility: CLINIC | Age: 84
End: 2024-11-18

## 2024-11-18 NOTE — TELEPHONE ENCOUNTER
The patient called Bonnie regarding home health for her when she leaves rehab.  She said they instructed that Dr. Alex needs to call [facility] to request that someone be at the home when the [facility].   She states that [facility] is look for an assisted living facility for her, but she will be going home for a few days before going to assisted living.    I let the patient know that while she is at [facility] that the rehab facility  and clinical team will need to arrange for all of her needs before she goes home.  The patient repeats that [facility] told her to call her PCP.  I reminded the patient that while she is at [facility], their facility doctor and his/her team will need to make all of her discharge arrangements for her regarding home health, and assistance while she is home.  I asked the patient to make her needs known to the rehab staff regarding home health, provider visits and  assistance.  She acknowledged and said that she would let their staff know.  The patient's CB# if needed is [phone]

## 2024-11-19 ENCOUNTER — TELEPHONE (OUTPATIENT)
Facility: CLINIC | Age: 84
End: 2024-11-19

## 2024-11-19 NOTE — TELEPHONE ENCOUNTER
LCSW called and spoke with pt for update re: LTC d/c plan.     Pt stated that she has been working closely with a  from [facility] named Maggi. She did not have Maggi's contact information, as it is written on her calendar in her apartment, and pt is currently at [facility]. She said her son, Abhilash, was going to go to her apartment today, and she asked him to get Maggi's contact information. She said that she talks to Maggi almost every day, but has not heard from her for about 2 days, which is concerning to her.     She said that Maggi is “taking care of everything for me, to get me in to [place]”. [place] is the name of the [facility] [address] [facility]. LCSW asked if [facility] had apartment availability, and again she said she didn't know but that Maggi was “taking care of everything”. JAYW is unaware of a [facility]  named Maggi who would currently be involved in pt's care and discharge plan.     Since some of what pt shared today was conflicting with her current situation, LCSW called her son, Abhilash, for clarity and validation. He said he has also never heard of Maggi, but mom told him to find Maggi's contact information which is written on her calendar in her apartment. He is going over to her apartment later this evening, and said he would get back to this LCSW re: who Maggi is, and her contact information. He said that mom often gets people's names confused, so this person's name may not be Maggi. LCSW provided contact information, and encouraged him to reach out once he is able to obtain the contact information for the  who pt states is currently working to get pt in to [place].     Pt stated that she will be at [facility] until Weds 11/27, which is when her benefit through [employer] ends. She expressed frustration and disappointment with the care she has received at [facility], and she looks forward to going home, with

## 2024-11-19 NOTE — TELEPHONE ENCOUNTER
The patient called to update that she will be discharged from rehab on 11/27 and needs home health and an aide.  She states that she is not getting any help from the rehab facility.  She asks for a callback from Suleiman at [phone]

## 2024-11-20 ENCOUNTER — TELEPHONE (OUTPATIENT)
Facility: CLINIC | Age: 84
End: 2024-11-20

## 2024-11-20 NOTE — TELEPHONE ENCOUNTER
The patient requests a callback from Kristine Ware LCSW.  She states that Dr. Alex wants her to work with Kristine to find an assisted living facility for the patient.  The patient repeats that rehab is discharging her 11/27/24.

## 2024-11-21 ENCOUNTER — TELEPHONE (OUTPATIENT)
Facility: CLINIC | Age: 84
End: 2024-11-21

## 2024-11-21 NOTE — TELEPHONE ENCOUNTER
LCSW returned call to pt, who had called in to the office yesterday. LCSW had recently talked to pt just 2 days ago regarding her plan for d/c from [facility] on 11/27/24, and her wish to move to Independent Living \"some time after [place]\". LCSW rec'd message from [facility] office yesterday stating, \"The patient requests a callback from Kristine Ware LCSW. She states that Dr. Alex wants her to work with Kristine to find an assisted living facility for the patient. The patient repeats that rehab is discharging her 11/27/24.\"     Pt answered the phone, and she asked if LCSW had \"gotten me in to [facility]\". Confused, LCSW inquired what she meant by her inquiry if LCSW had secured housing for her at the Assisted Living facility. She stated that she was informed by [facility] physician that this LCSW would \"take care of everything for me\".     LCSW explained that signing a lease agreement with Independent or Assisted Living is not something that LCSW can legally do on behalf of pt. LCSW explained that it is a financial commitment, which LCSW cannot manage for pt, but she and her son, Abhilash, can certainly navigate that process. She said Abhilash is out of town until Monday, which is accurate.     She became a bit irritated with LCSW when it was explained that signing a lease agreement at [facility] would not be something LCSW could do for pt. She had expected that LCSW would make all arrangements for her.     Although pt has the ability to navigate this independently, it is understood that her health may be declining and she is currently in a [facility] facility for rehab, so LCSW offered to call [facility] again and inquire about their apartment availability for \"after [place]\". Pt expressed appreciation. LCSW will contact pt again tomorrow with update re: response from [facility]. LCSW reiterated that an inquiry about availability and pricing is as much as LCSW can do as far as assisting pt with finding housing. She expressed understanding.

## 2024-11-21 NOTE — TELEPHONE ENCOUNTER
The patient called stating that she has \"grave concerns\".  She states that her \"BP dropped really bad\", but she did not know what is was. She said that she is going home on 11/27/24 with compression hose.  She said that today she was doing PT and could hardly catch her breath.  The therapist took the patient's BP and said that they can't go on.  The patient asks for a callback from Dr. Alex at [phone]

## 2024-11-22 ENCOUNTER — TELEPHONE (OUTPATIENT)
Facility: CLINIC | Age: 84
End: 2024-11-22

## 2024-11-22 NOTE — TELEPHONE ENCOUNTER
LCSW called Evy Garcia,  with [facility] ([phone]- general number, [phone]- direct line to Evy), to inquire about apartment availability within the next 0-2 months. No answer. LCSW left voicemail message, provided contact information, and encouraged a call back at her convenience. LCSW will call pt once Evy is able to provide that information.     STEVEN Paul, hospitalsW     [facility]  Primary Care at [facility]   [address], [address]   [place] [address]  (C) [phone]  (O) [phone]  [email or web address]

## 2024-11-23 NOTE — TELEPHONE ENCOUNTER
10:44am: LCSW rec'd a return call from Evy Garcia,  at [facility]. She informed that they have immediate availability for Independent and Assisted Living. She said that Independent Living is the lease expensive option, starting at $4.800/month. LCSW expressed gratitude, and will share this information with pt. Evy Garcia encouraged pt to call her directly, [phone].     10:48am: LCSW called pt, no answer. LCSW left voicemail message, provided brief information, encouraged a call back at her convenience.     11:02am: LCSW missed return call from pt. She left voicemail, stated that she will have her son, Abhilash, make arrangements with [facility] when he returns from his trip out of town on Monday.     STVEEN Paul, [facility]     [facility]  Primary Care at Home  [facility]   [address], [address]   [place] [address]  (C) [phone]  (O) [phone]  [email or web address]

## 2024-11-25 ENCOUNTER — TELEPHONE (OUTPATIENT)
Facility: CLINIC | Age: 84
End: 2024-11-25

## 2024-11-25 NOTE — TELEPHONE ENCOUNTER
The patient called to update that she will be d/c from rehab tomorrow, Tuesday, 11/26/24.  CB# if needed is [phone].  She will contact [facility] when she arrives home.

## 2024-11-26 ENCOUNTER — TELEPHONE (OUTPATIENT)
Facility: CLINIC | Age: 84
End: 2024-11-26

## 2024-11-26 ENCOUNTER — OFFICE VISIT (OUTPATIENT)
Facility: CLINIC | Age: 84
End: 2024-11-26

## 2024-11-26 DIAGNOSIS — F03.A0 MILD DEMENTIA WITHOUT BEHAVIORAL DISTURBANCE, PSYCHOTIC DISTURBANCE, MOOD DISTURBANCE, OR ANXIETY, UNSPECIFIED DEMENTIA TYPE (HCC): ICD-10-CM

## 2024-11-26 DIAGNOSIS — M15.9 GENERALIZED OSTEOARTHRITIS: Chronic | ICD-10-CM

## 2024-11-26 DIAGNOSIS — I25.10 CORONARY ARTERY DISEASE INVOLVING NATIVE CORONARY ARTERY OF NATIVE HEART WITHOUT ANGINA PECTORIS: Primary | Chronic | ICD-10-CM

## 2024-11-26 DIAGNOSIS — J84.9 INTERSTITIAL LUNG DISEASE (HCC): Chronic | ICD-10-CM

## 2024-11-26 DIAGNOSIS — M51.360 DEGENERATION OF INTERVERTEBRAL DISC OF LUMBAR REGION WITH DISCOGENIC BACK PAIN: Chronic | ICD-10-CM

## 2024-11-26 DIAGNOSIS — F03.A0 MILD DEMENTIA WITHOUT BEHAVIORAL DISTURBANCE, PSYCHOTIC DISTURBANCE, MOOD DISTURBANCE, OR ANXIETY, UNSPECIFIED DEMENTIA TYPE (HCC): Chronic | ICD-10-CM

## 2024-11-26 DIAGNOSIS — G40.909 SEIZURE DISORDER (HCC): Chronic | ICD-10-CM

## 2024-11-26 DIAGNOSIS — J84.9 INTERSTITIAL LUNG DISEASE (HCC): Primary | ICD-10-CM

## 2024-11-26 DIAGNOSIS — R29.6 FREQUENT FALLS: Chronic | ICD-10-CM

## 2024-11-26 DIAGNOSIS — I35.0 NONRHEUMATIC AORTIC VALVE STENOSIS: Chronic | ICD-10-CM

## 2024-11-26 DIAGNOSIS — R29.6 FREQUENT FALLS: ICD-10-CM

## 2024-11-26 DIAGNOSIS — E11.42 TYPE 2 DIABETES MELLITUS WITH DIABETIC POLYNEUROPATHY, WITHOUT LONG-TERM CURRENT USE OF INSULIN (HCC): Chronic | ICD-10-CM

## 2024-11-26 DIAGNOSIS — J41.0 SIMPLE CHRONIC BRONCHITIS (HCC): Chronic | ICD-10-CM

## 2024-11-26 DIAGNOSIS — Z95.2 H/O AORTIC VALVE REPLACEMENT: Chronic | ICD-10-CM

## 2024-11-26 DIAGNOSIS — J96.11 CHRONIC RESPIRATORY FAILURE WITH HYPOXIA: ICD-10-CM

## 2024-11-26 DIAGNOSIS — J96.11 CHRONIC RESPIRATORY FAILURE WITH HYPOXIA: Chronic | ICD-10-CM

## 2024-11-26 DIAGNOSIS — F41.8 DEPRESSION WITH ANXIETY: ICD-10-CM

## 2024-11-26 DIAGNOSIS — U09.9 LONG COVID: Chronic | ICD-10-CM

## 2024-11-26 NOTE — TELEPHONE ENCOUNTER
The patient called and stated \"I have an emergency\".  She explained that she is going  home from rehab today, 11/26.  The patient states that she is going home with no help, she is unable to walk or stand.  The patient states that the  at [facility] would not help her.  She asks for a callback from Dr. Dav Alex, at [phone].

## 2024-11-26 NOTE — TELEPHONE ENCOUNTER
VM-11:02 am-The patient left a message stating that \"this is urgent\".  She states she is leaving Rehab in a few minutes and can neither stand or walk.  The patient eports that she is trying an agency near her but no results yet.  She said that the [facility] has not helped w/ HH.  The patient repeats she cannot walk or stand.  She states that \"she needs help now, not tomorrow or the next day, but now\".  The patient said that she \"needs a call from [facility] to set up home health and Kristine Ware needs to take care of it\".  #[phone].    VM-11:11-am The patient left another message stating that she needs home health.  The rest of the message was cut off.

## 2024-11-26 NOTE — TELEPHONE ENCOUNTER
Call received from patient - she started by stating that she has an \"emergency\". She was discharged home, her son just got her back home. Per patient, she is unable to walk and care for herself. I asked if patient had made arrangements with any agencies, as Betina, the  from the facility told Kristine Ware LCSW that patient informed that she had arrangements with \"[employer]\" for personal care.    Patient says that [employer] wouldn't cover the care that she needs. I explained that personal care is usually privately arranged and paid, unless the person has specific coverage. I explained that regular Home Health agencies would visit patient who had specific needs, such as wound care or a catheter, but not for personal care. Patient responded that she didn't need personal care.  I questioned that patient just described above the personal care that she needed, as she stated that she couldn't get up.   At that point, patient's son, Abhilash picked up the phone and asked if I could explain who I was and where I was calling from. I provided the explanation.   Abhilash confirmed that patient was not able to take care of herself. I verbalized that per [facility]'s , patient had told them she had made arrangements with [employer] for her personal care. Abhilash verbalized frustration that they \"believed\" an 84 year old patient. I also discussed that I spoke with Kristine Ware LCSW and she recommended calling APS. In the meanwhile I wasn't aware of any immediate solution, other than taking patient back to the ER.    The call above was discussed with Kristine Ware who stated that she will call patient's son.

## 2024-11-26 NOTE — TELEPHONE ENCOUNTER
Care Transitions Initial Follow Up Call    Outreach made within 2 business days of discharge: Yes    Patient: Adenike Daislva Patient : 1940   MRN: [id number]  Reason for Admission: Hypoxic Respiratory failure  Discharge Date: 24       Spoke with: Patient and Abhilash (son)    Discharge department/facility: Home    TCM Interactive Patient Contact:  Was patient able to fill all prescriptions: Yes  Was patient instructed to bring all medications to the follow-up visit: Yes  Is patient taking all medications as directed in the discharge summary? Yes  Does patient understand their discharge instructions: No: limited understanding of situation.   Does patient have questions or concerns that need addressed prior to 7-14 day follow up office visit: no    Additional needs identified to be addressed with provider  MD will make visit today             Scheduled appointment with PCP within 7-14 days    Follow Up  Future Appointments   Date Time Provider Department Center   2024  3:00 PM Dav Alex MD PC BS KAYLAH GREENBERG RN

## 2024-11-26 NOTE — TELEPHONE ENCOUNTER
Call discussed with JAY PaulW -  is planning to follow up with facility  about patient's discharge.

## 2024-11-26 NOTE — TELEPHONE ENCOUNTER
LCSW rec'd message from [facility] office that pt was d/c this afternoon back home, with no caregiving plan arranged by [facility].     LCSW called [facility] and spoke with Betina Jose, /discharge planner ([phone]). She stated that she had talked with pt directly yesterday, and pt had explained that she had personally arranged for in-home caregiving help through a company called [employer]. It was Betina's understanding that caregiving arrangements had been made by pt, which is why the facility deemed it a safe d/c.     [facility] RN had talked to pt, and pt informed that she had not made arrangements with in-home caregiving agency because her insurance would not cover the cost and she would have to pay out of pocket. She did not make arrangements with a company called [employer]. There was no caregiving plan confirmed when pt was d/c from SNF today.     LCSW called [facility] Adult Protective Services ([phone]) and put in an investigation request with Intake. LCSW provided specifics as to why pt is at risk for harm, due to inability to care for herself and limited support. Intake took down all information and will pass it to the investigative team to determine if the case will be opened. This process is not immediate and can take several days depending on their determination of urgency.     LCSW called pt's son, Abhilash, to let him know that APS has been contacted, and for update re: mom's current situation at home. He stated that he was at the apartment with his mother now, and he feels like he cannot leave her because she will end up back on the floor before he even leaves the building. He works full time and is feeling especially frustrated because his employer has not been accommodating or understanding of his current situation with his mother, and his job requires him to be \"on camera\" all day so he would not be able to work from her apartment. He cannot stay the night with her. He says that she cannot afford

## 2024-11-27 ENCOUNTER — TELEPHONE (OUTPATIENT)
Facility: CLINIC | Age: 84
End: 2024-11-27

## 2024-11-27 RX ORDER — GUAIFENESIN 600 MG/1
1200 TABLET, EXTENDED RELEASE ORAL AS NEEDED
COMMUNITY

## 2024-11-27 NOTE — TELEPHONE ENCOUNTER
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  It is somewhat painful, burns when wiping.  She is just really worried that since weird stuff has been happening to her this year, this could also be something bad.  No exposure to HSV that she's aware of.  Not currently having sex because of the molar pregnancy.        Last 3 Pap and HPV Results:   PAP / HPV Latest Ref Rng & Units 3/5/2021   PAP - NIL   HPV 16 DNA NEG:Negative Negative   HPV 18 DNA NEG:Negative Negative   OTHER HR HPV NEG:Negative Negative       PMH, PSH, Soc Hx, Fam Hx, Meds, and allergies reviewed in Epic.    OBJECTIVE:     /78 (BP Location: Left arm, Patient Position: Chair, Cuff Size: Adult Regular)   Ht 1.626 m (5' 4\")   Wt 59.9 kg (132 lb)   LMP 2021 (Exact Date)   Breastfeeding No   BMI 22.66 kg/m      Gen: Healthy appearing thin female, no acute distress, comfortable - tearful and anxious  Psych: mentation appears normal and affect bright  : Normal external female genitalia.  small 4mm lesion on the right inner labia minora, rises to a head and expresses whitish drainage when expressed, slightly painful.  Not excoriated, not an open ulcerated lesion, non erythematous.      ASSESSMENT/PLAN:                                                      1. Labial lesion  Looks like a folliculitis/plugged follicle or pimple, not c/w HSV2 or in the area of a bartholin's cyst.  Recommend sitz baths and ibuprofen, return if not improved in 2 weeks.     2. Molar pregnancy  Last three weekly HCG levels were negative, plan a follow-up HCG level in 1 month and then can return trying for subsequent pregnancy.     Va Loaiza MD, MPH  Obstetrics and Gynecology    " Telephone call from Lydia Orellana RN with [facility], she just visited pt for resumption of care as ordered by [facility] upon patient's discharge from facililty.  Lydia reports pt would not allow her to do resumption, she declined SN and HH aide services and will only allow Kathie Khan PT to do her resumption of care and Almaz's earliest availability will be Monday 12/2.  Pt will allow an OT eval next week.

## 2024-11-27 NOTE — PERIOP NOTE
[facility]  Ambulatory Surgery Unit  Pre-operative Instructions    Procedure Date  Tuesday 12/10            Tentative Arrival Time 1030 am       1. On the day of your procedure, please report to the Ambulatory Surgery Unit Registration Desk and sign in at your designated time. The Ambulatory Surgery Unit is located in [facility] on the [facility] side of the [facility] across from the [facility]. Please have all of your health insurance cards, copayment, and a photo ID.    **TWO adults may accompany you the day of the procedure.  We have limited seating available.  If our waiting room is at capacity, your ride may be asked to remain in their vehicle.  No one under 15 is allowed in the waiting room.   Masks, fully covering the mouth and nose, are required in the waiting room.    2. You must have someone with you to drive you home as directed by your surgeon.    3. You may have a light breakfast and take normal morning medications.    4. We recommend you do not drink any alcoholic beverages for 24 hours before and after your procedure.    5. Contact your surgeon’s office for instructions on the following medications: non-steroidal anti-inflammatory drugs (i.e. Advil, Aleve), vitamins, and supplements. (Some surgeon’s will want you to stop these medications prior to surgery and others may allow you to take them)   **If you are currently taking Plavix, Coumadin, Aspirin and/or other blood-thinning agents, contact your surgeon for instructions.** Your surgeon will partner with the physician prescribing these medications to determine if it is safe to stop or if you need to continue taking. Please do not stop taking these medications without instructions from your surgeon.    6. In an effort to help prevent surgical site infection, we ask that you shower with an anti-bacterial soap (i.e. Dial or Safeguard) on the morning of your procedure. Do not apply any lotions, powders, or deodorants

## 2024-11-29 ENCOUNTER — TELEPHONE (OUTPATIENT)
Facility: CLINIC | Age: 84
End: 2024-11-29

## 2024-11-29 NOTE — TELEPHONE ENCOUNTER
LCSW called and spoke with pt' son, Abhilash, for update re: mom's status. He stated that she had a fall this morning which alerted his phone selena (SOS, from her Apple Watch). EMS came to her apartment and assisted her back in bed. They checked her, and there were no injuries. He called her, and she said she had \"slipped on water\". He reports that she sounded \"groggy\" and was going back to bed. Abhilash said that mom has been \"okay\" at home the last few days. They are continuing to look in to long term solutions, which does not include pt staying in her current apartment due to serious safety concerns as she is unable to care for herself independently. He stated that he will reach out to LCSW if he has any questions, needs for assistance, or needs for resources.     STEVEN Paul, [facility]     [facility]  Primary Care at Home  [facility]   [address], [address]   [place] [address] (C) [phone]  (O) [phone]  [email or web address]

## 2024-11-30 NOTE — PROGRESS NOTES
[facility]      PRIMARY CARE AT HOME - TRANSITIONAL CARE MANAGEMENT HOME VISIT      NAME: Adenike Dasilva    ADDRESS: [address], [place]  [address]    :  1940  MRN:  [id number]        ASSESSMENT:     Diagnosis Orders   1. Coronary artery disease involving native coronary artery of native heart without angina pectoris        2. Nonrheumatic aortic valve stenosis        3. H/O aortic valve replacement        4. Type 2 diabetes mellitus with diabetic polyneuropathy, without long-term current use of insulin (HCC)        5. Interstitial lung disease (HCC)        6. Simple chronic bronchitis (HCC)        7. Chronic respiratory failure with hypoxia        8. Degeneration of intervertebral disc of lumbar region with discogenic back pain        9. Generalized osteoarthritis        10. Frequent falls        11. Seizure disorder (HCC)        12. Mild dementia without behavioral disturbance, psychotic disturbance, mood disturbance, or anxiety, unspecified dementia type (HCC)        13. Long COVID              PLAN:    CAD/ VHD: This problem is stable. Will continue close surveillance.    DIABETES MELLITUS: This problem is stable. Will continue current routine including diet, exercise and medication. Will continue close observation.    ILD/ COPD/ RESPIRATORY FAILURE: This problem is stable. Will continue close surveillance.    OA/ FREQUENT FALLS: This problem has deteriorated. She will need Assisted Living or SNF care.    ALZHEIMERS DEMENTIA: This problem has deteriorated. Will continue to provide supportive care in the absence of effective treatment.    LONG COVID: This problem is stable. Will continue close surveillance.      SUBJECTIVE:    Chief Complaint:  Chief Complaint   Patient presents with    Other     HOME VISIT: FREQUENT FALLS/ CAD/ VHD/ DEMENTIA       History of Present Illness:    Adenike Dasilva is a 84 y.o. female who is seen for an Transitional Care

## 2024-12-02 ENCOUNTER — TELEPHONE (OUTPATIENT)
Facility: CLINIC | Age: 84
End: 2024-12-02

## 2024-12-02 ENCOUNTER — OFFICE VISIT (OUTPATIENT)
Facility: CLINIC | Age: 84
End: 2024-12-02
Payer: MEDICARE

## 2024-12-02 DIAGNOSIS — J96.11 CHRONIC RESPIRATORY FAILURE WITH HYPOXIA: Chronic | ICD-10-CM

## 2024-12-02 DIAGNOSIS — I25.10 CORONARY ARTERY DISEASE INVOLVING NATIVE CORONARY ARTERY OF NATIVE HEART WITHOUT ANGINA PECTORIS: Primary | Chronic | ICD-10-CM

## 2024-12-02 DIAGNOSIS — J84.9 INTERSTITIAL LUNG DISEASE (HCC): Chronic | ICD-10-CM

## 2024-12-02 DIAGNOSIS — F03.A0 MILD DEMENTIA WITHOUT BEHAVIORAL DISTURBANCE, PSYCHOTIC DISTURBANCE, MOOD DISTURBANCE, OR ANXIETY, UNSPECIFIED DEMENTIA TYPE (HCC): Chronic | ICD-10-CM

## 2024-12-02 DIAGNOSIS — J41.0 SIMPLE CHRONIC BRONCHITIS (HCC): Chronic | ICD-10-CM

## 2024-12-02 DIAGNOSIS — I35.0 NONRHEUMATIC AORTIC VALVE STENOSIS: Chronic | ICD-10-CM

## 2024-12-02 DIAGNOSIS — Z95.2 H/O AORTIC VALVE REPLACEMENT: Chronic | ICD-10-CM

## 2024-12-02 PROCEDURE — 1090F PRES/ABSN URINE INCON ASSESS: CPT | Performed by: FAMILY MEDICINE

## 2024-12-02 PROCEDURE — G8484 FLU IMMUNIZE NO ADMIN: HCPCS | Performed by: FAMILY MEDICINE

## 2024-12-02 PROCEDURE — G8420 CALC BMI NORM PARAMETERS: HCPCS | Performed by: FAMILY MEDICINE

## 2024-12-02 PROCEDURE — 1036F TOBACCO NON-USER: CPT | Performed by: FAMILY MEDICINE

## 2024-12-02 PROCEDURE — 99349 HOME/RES VST EST MOD MDM 40: CPT | Performed by: FAMILY MEDICINE

## 2024-12-02 PROCEDURE — 1123F ACP DISCUSS/DSCN MKR DOCD: CPT | Performed by: FAMILY MEDICINE

## 2024-12-02 RX ORDER — CIPROFLOXACIN 250 MG/1
250 TABLET, FILM COATED ORAL 2 TIMES DAILY
Qty: 6 TABLET | Refills: 0 | Status: SHIPPED | OUTPATIENT
Start: 2024-12-02 | End: 2024-12-05

## 2024-12-02 NOTE — TELEPHONE ENCOUNTER
Per Suleiman, I called the patient to let her know that Dr. Alex will visit her today anytime between 12-4pm.  The patient acknowledged.  I also reminded the patient that she needs to update and return her annual paperwork via docusign.  She acknowledged.

## 2024-12-02 NOTE — TELEPHONE ENCOUNTER
Message sent to Dr. Alex with the information provided and my recommendation to seek care today (either with DH or ER).   MD stated that he would visit the patient today.  Visit added to his schedule.   PSR called the patient and informed of MD's visit.

## 2024-12-02 NOTE — TELEPHONE ENCOUNTER
Call received from Almaz Khan, PT with [facility] - she states that patient is requesting a visit from MD for clarification about her medications. Patient states that her medications were changed while she was at the rehab.   I informed that MD has already seen the patient for her RUSH, but I would request the visit. I asked if they did not go over the medication changes and PT responded that they did not.    Almaz also reports patient has UTI symptoms - burning, frequency, and hesitancy. BP 98/68, No fever.   I asked if pt still had SN coming, Kathie responded that patient declined nursing.  I [facility] ([facility]). I could hear the patient on the background telling PT that she would not call , they came to the home before and \"misdiagnosed\" her and she is not calling.   I recommended going to the ER as an alternative. Almaz stated that she doesn't think the patient is willing to go to the ER.  I responded that going to the ER would be the best recommendation at this point, as she doesn't have SN coming and is not willing to call .

## 2024-12-06 ENCOUNTER — TELEPHONE (OUTPATIENT)
Facility: CLINIC | Age: 84
End: 2024-12-06

## 2024-12-06 NOTE — TELEPHONE ENCOUNTER
[facility] Primary Care at Home (PC)   (formerly Home Based Primary Care & Supportive Services)   Phone:  [phone]      Fax:  [phone] [address], [address]  [place] [id number]    Name:  Adenike Dasilva  YOB: 1940    Called patient to schedule 1 week Primary Care at Home follow up appointment with Dr. Dav Alex.  Appointment scheduled for 12/9/24      Makayla Alex RN, Gerontological Nursing-BC, [facility]

## 2024-12-08 VITALS
OXYGEN SATURATION: 94 % | DIASTOLIC BLOOD PRESSURE: 67 MMHG | SYSTOLIC BLOOD PRESSURE: 119 MMHG | HEART RATE: 77 BPM | RESPIRATION RATE: 24 BRPM

## 2024-12-08 VITALS
HEART RATE: 74 BPM | DIASTOLIC BLOOD PRESSURE: 63 MMHG | RESPIRATION RATE: 24 BRPM | SYSTOLIC BLOOD PRESSURE: 118 MMHG | OXYGEN SATURATION: 94 %

## 2024-12-09 ENCOUNTER — TELEPHONE (OUTPATIENT)
Facility: CLINIC | Age: 84
End: 2024-12-09

## 2024-12-09 ENCOUNTER — OFFICE VISIT (OUTPATIENT)
Facility: CLINIC | Age: 84
End: 2024-12-09

## 2024-12-09 DIAGNOSIS — I35.0 NONRHEUMATIC AORTIC VALVE STENOSIS: Chronic | ICD-10-CM

## 2024-12-09 DIAGNOSIS — J84.9 INTERSTITIAL LUNG DISEASE (HCC): Chronic | ICD-10-CM

## 2024-12-09 DIAGNOSIS — E11.42 TYPE 2 DIABETES MELLITUS WITH DIABETIC POLYNEUROPATHY, WITHOUT LONG-TERM CURRENT USE OF INSULIN (HCC): Chronic | ICD-10-CM

## 2024-12-09 DIAGNOSIS — Z95.2 H/O AORTIC VALVE REPLACEMENT: Chronic | ICD-10-CM

## 2024-12-09 DIAGNOSIS — M51.362 DEGENERATION OF INTERVERTEBRAL DISC OF LUMBAR REGION WITH DISCOGENIC BACK PAIN AND LOWER EXTREMITY PAIN: Chronic | ICD-10-CM

## 2024-12-09 DIAGNOSIS — F03.A0 MILD DEMENTIA WITHOUT BEHAVIORAL DISTURBANCE, PSYCHOTIC DISTURBANCE, MOOD DISTURBANCE, OR ANXIETY, UNSPECIFIED DEMENTIA TYPE (HCC): Chronic | ICD-10-CM

## 2024-12-09 DIAGNOSIS — I25.10 CORONARY ARTERY DISEASE INVOLVING NATIVE CORONARY ARTERY OF NATIVE HEART WITHOUT ANGINA PECTORIS: Primary | Chronic | ICD-10-CM

## 2024-12-09 DIAGNOSIS — J96.11 CHRONIC RESPIRATORY FAILURE WITH HYPOXIA: Chronic | ICD-10-CM

## 2024-12-09 DIAGNOSIS — J41.0 SIMPLE CHRONIC BRONCHITIS (HCC): Chronic | ICD-10-CM

## 2024-12-09 DIAGNOSIS — K58.0 IRRITABLE BOWEL SYNDROME WITH DIARRHEA: Chronic | ICD-10-CM

## 2024-12-09 DIAGNOSIS — I10 BENIGN ESSENTIAL HYPERTENSION: Chronic | ICD-10-CM

## 2024-12-09 DIAGNOSIS — M15.9 GENERALIZED OSTEOARTHRITIS: Chronic | ICD-10-CM

## 2024-12-09 DIAGNOSIS — R29.6 FREQUENT FALLS: Chronic | ICD-10-CM

## 2024-12-09 RX ORDER — DICYCLOMINE HCL 20 MG
20 TABLET ORAL 3 TIMES DAILY PRN
Qty: 270 TABLET | Refills: 3 | Status: SHIPPED | OUTPATIENT
Start: 2024-12-09 | End: 2025-12-04

## 2024-12-09 RX ORDER — LEVETIRACETAM 750 MG/1
750 TABLET ORAL EVERY EVENING
Qty: 90 TABLET | Refills: 3 | Status: SHIPPED | OUTPATIENT
Start: 2024-12-09

## 2024-12-09 RX ORDER — METOPROLOL SUCCINATE 25 MG/1
25 TABLET, EXTENDED RELEASE ORAL DAILY
Qty: 90 TABLET | Refills: 3 | Status: SHIPPED | OUTPATIENT
Start: 2024-12-09 | End: 2025-12-09

## 2024-12-09 RX ORDER — PREGABALIN 50 MG/1
50 CAPSULE ORAL 2 TIMES DAILY
Qty: 60 CAPSULE | Refills: 0 | OUTPATIENT
Start: 2024-12-09 | End: 2025-01-08

## 2024-12-09 NOTE — TELEPHONE ENCOUNTER
8:29am: LCSW rec'd call from [facility] Adult Protective Services investigator, Tono Virk ([phone] and [phone]), who informed that he made a visit to see pt last week. He said that he is still gathering information, and plans on speaking with her son, Abhilash. He requested that LCSW send over copy of pt's Power of  documents. He stated that at this time, APS is unable to \"make her doing anything\" because she is able to make informed decisions independently, per his assessment. LCSW to have POA documents sent to his email ([email or web address]).     LCSW spoke with [facility] PSR, who informed that pt's annual paperwork is overdue and pt has not completed or returned it. LCSW called pt to offer an in-home appointment for tomorrow, Tuesday 12/10/24, to assist with completion of [facility] annual paperwork, if LCSW assesses that pt is cognitively able to make informed decisions and sign paperwork independently. No answer. LCSW left voicemail, provided contact information, and encouraged a call back at her earliest convenience.     STEVEN Paul, JAYW     [facility]  Primary Care at [facility]   [address], [address]   [place] [address]  (C) [phone]  (O) [phone]  [email or web address]

## 2024-12-10 ENCOUNTER — HOSPITAL ENCOUNTER (OUTPATIENT)
Facility: HOSPITAL | Age: 84
End: 2024-12-10
Payer: MEDICARE

## 2024-12-10 ENCOUNTER — TELEPHONE (OUTPATIENT)
Facility: CLINIC | Age: 84
End: 2024-12-10

## 2024-12-10 ENCOUNTER — HOSPITAL ENCOUNTER (OUTPATIENT)
Facility: HOSPITAL | Age: 84
Setting detail: OUTPATIENT SURGERY
Discharge: HOME | End: 2024-12-10
Attending: PHYSICAL MEDICINE & REHABILITATION | Admitting: PHYSICAL MEDICINE & REHABILITATION
Payer: MEDICARE

## 2024-12-10 VITALS
RESPIRATION RATE: 20 BRPM | SYSTOLIC BLOOD PRESSURE: 131 MMHG | DIASTOLIC BLOOD PRESSURE: 85 MMHG | TEMPERATURE: 97.3 F | WEIGHT: 123 LBS | OXYGEN SATURATION: 92 % | HEIGHT: 60 IN | BODY MASS INDEX: 24.15 KG/M2 | HEART RATE: 92 BPM

## 2024-12-10 VITALS
DIASTOLIC BLOOD PRESSURE: 71 MMHG | HEART RATE: 112 BPM | OXYGEN SATURATION: 92 % | SYSTOLIC BLOOD PRESSURE: 143 MMHG | RESPIRATION RATE: 24 BRPM

## 2024-12-10 LAB
GLUCOSE BLD STRIP.AUTO-MCNC: 328 MG/DL (ref 65–117)
GLUCOSE BLD STRIP.AUTO-MCNC: 335 MG/DL (ref 65–117)
SERVICE CMNT-IMP: ABNORMAL
SERVICE CMNT-IMP: ABNORMAL

## 2024-12-10 PROCEDURE — 82962 GLUCOSE BLOOD TEST: CPT

## 2024-12-10 RX ORDER — DEXAMETHASONE SODIUM PHOSPHATE 4 MG/ML
20 INJECTION, SOLUTION INTRA-ARTICULAR; INTRALESIONAL; INTRAMUSCULAR; INTRAVENOUS; SOFT TISSUE ONCE
Status: DISCONTINUED | OUTPATIENT
Start: 2024-12-10 | End: 2024-12-10 | Stop reason: HOSPADM

## 2024-12-10 RX ORDER — LIDOCAINE HYDROCHLORIDE 20 MG/ML
10 INJECTION, SOLUTION EPIDURAL; INFILTRATION; INTRACAUDAL; PERINEURAL ONCE
Status: DISCONTINUED | OUTPATIENT
Start: 2024-12-10 | End: 2024-12-10 | Stop reason: HOSPADM

## 2024-12-10 RX ORDER — BUPIVACAINE HYDROCHLORIDE 5 MG/ML
10 INJECTION, SOLUTION EPIDURAL; INTRACAUDAL ONCE
Status: DISCONTINUED | OUTPATIENT
Start: 2024-12-10 | End: 2024-12-10 | Stop reason: HOSPADM

## 2024-12-10 ASSESSMENT — PAIN - FUNCTIONAL ASSESSMENT: PAIN_FUNCTIONAL_ASSESSMENT: 0-10

## 2024-12-10 NOTE — H&P
Procedural Case Note    12/10/2024    (11:53 AM)    Adenike Dasilva    1940   (84 y.o.)    [id number]    CC:  pain    ROS:   Complete ROS obtained, no CP, no SOB, no N or V    PMH:     Past Medical History:   Diagnosis Date    Aortic valve stenosis 07/2022    Arthritis     Asthma     Bronchitis     CAD (coronary artery disease)     stent    Chronic anticoagulation 11/23/2022    Chronic obstructive pulmonary disease (HCC)     Chronic pain     COVID 2024    Dementia (HCC)     pt completed PAT phone call appropriately    Depression with anxiety 11/23/2022    Diabetes (HCC)     GERD (gastroesophageal reflux disease)     H/O aortic valve replacement 07/2022    Hepatitis B     1960's    History of blood transfusion     06/22    History of recurrent UTIs     Hx of seasonal allergies     Hypercholesterolemia     Insomnia     Irritable bowel syndrome (IBS)     Migraine     Noncompliance 01/18/2023    Oxygen dependent     2LPM NC    Pneumonia 2024    Pulmonary fibrosis (HCC)     Thromboembolus (HCC)     left leg    Thyroid disease     pt denies- not tx for it    Urinary urgency        ALLERGIES:     Allergies   Allergen Reactions    Bee Venom Shortness Of Breath           Iodinated Contrast Media Anaphylaxis and Other (See Comments)     Passes out    Penicillins Anaphylaxis and Shortness Of Breath     Other reaction(s): anaphylaxis/angioedema        Sulfa Antibiotics Anaphylaxis, Shortness Of Breath and Rash     Other reaction(s): anaphylaxis/angioedema        Omeprazole Dizziness or Vertigo and Other (See Comments)    Ranitidine Hcl Nausea Only    Iodine     Montelukast Hallucinations           Seasonal     Tree Extract Itching     Cat dander, grass        Cefdinir Rash     Other reaction(s): mild rash/itching        Codeine Itching     Other reaction(s): other/intolerance  Dizziness. Pt states they take Benadryl to offset the reaction.    Famotidine Nausea And Vomiting    Jardiance [Empagliflozin] Rash    Morphine

## 2024-12-10 NOTE — PERIOP NOTE
Procedure canceled per Dr. Robertson due to patient's blood sugar 335. Patient instructed to call Dr. Robertson's office to reschedule.

## 2024-12-10 NOTE — TELEPHONE ENCOUNTER
LCSW rec'd return call from pt, who stated that LCSW will not be able to visit tomorrow, 12/10/24, for assistance with completion of [facility] annual paperwork, as she is having a \"procedure\". LCSW will reschedule for another day in 1-2 weeks to visit with pt and go over annual enrollment packet.     Kristine Ware MSW, LCSW     [facility]  Primary Care at [facility]   [address], [address]   [place] [address]  (C) [phone]  (O) [phone]  [email or web address]

## 2024-12-10 NOTE — TELEPHONE ENCOUNTER
LCSW called and spoke with pt, rescheduled in-home appt for completion of annual paperwork packet for Thursday 12/11/24 @ 3pm.     Kristine Ware, STEVEN, JAYW     [facility]  Primary Care at [facility] Building   [address], [address]   [place] [address]  (C) [phone]  (O) [phone]  [email or web address]

## 2024-12-10 NOTE — PROGRESS NOTES
[facility] SERVICES      Primary Care At Home - Home Visit      Name: Adenike Dasilva    Address: [address]  [address]    :  1940  MRN:  [id number]      ASSESSMENT:     Diagnosis Orders   1. Coronary artery disease involving native coronary artery of native heart without angina pectoris        2. Nonrheumatic aortic valve stenosis        3. H/O aortic valve replacement        4. Benign essential hypertension        5. Interstitial lung disease (HCC)        6. Simple chronic bronchitis (HCC)        7. Chronic respiratory failure with hypoxia        8. Type 2 diabetes mellitus with diabetic polyneuropathy, without long-term current use of insulin (HCC)        9. Degeneration of intervertebral disc of lumbar region with discogenic back pain and lower extremity pain  pregabalin (LYRICA) 50 MG capsule      10. Generalized osteoarthritis        11. Frequent falls        12. Irritable bowel syndrome with diarrhea  dicyclomine (BENTYL) 20 MG tablet      13. Mild dementia without behavioral disturbance, psychotic disturbance, mood disturbance, or anxiety, unspecified dementia type ([facility])              PLAN:    CAD/ VHD/ HYPERTENSION: This problem is stable. Current treatment was continued as noted above. Will recheck in 1 month.    ILD/ COPD/ CHRONIC RESPIRATORY FAILURE: This problem has deteriorated. Will continue current rx including supplemental Oxygen.    DIABETES MELLITUS: This problem is stable. Will continue current routine including diet, exercise and medication. Will continue close observation.    DDD/ OA/ FREQUENT FALLS: This problem is stable. She will proceed with an ANDIE.    IBS: This problem has deteriorated. Will continue current rx.     ALZHEIMERS DEMENTIA: This problem has deteriorated. Will continue to provide supportive care in the absence of effective treatment.      SUBJECTIVE:    Chief Complaint:  Chief Complaint   Patient

## 2024-12-11 ENCOUNTER — TELEPHONE (OUTPATIENT)
Facility: CLINIC | Age: 84
End: 2024-12-11

## 2024-12-11 NOTE — TELEPHONE ENCOUNTER
The patient called to request a callback from Dr. Dav Alex.  She states that \"something has come up\"  She said that she had her blood sugar tested at the doctors office and it \"was kind of a disaster\".  She states that her reading was over 300 and asks if a medication needs to be prescribed.  #[phone]

## 2024-12-12 ENCOUNTER — OFFICE VISIT (OUTPATIENT)
Facility: CLINIC | Age: 84
End: 2024-12-12

## 2024-12-12 DIAGNOSIS — Z76.89 ENCOUNTER FOR SOCIAL WORK INTERVENTION: Primary | ICD-10-CM

## 2024-12-12 NOTE — PROGRESS NOTES
[facility] Primary [facility] at Home  Social Work Assessment    Patient name: Adenike Dasilva    Date of visit: 12/12/24    Session today completed with: Adenike Dasilva    Relationship to patient: Self    Purpose of visit: Completion of [facility] annual paperwork    Visit narrative: LCSW visited with pt in her apartment where she resides alone. She was alert, oriented, able to engage in conversation and sign her annual paperwork packet independently. She shared that her son is scheduling a time to tour [facility] (independent senior living [facility]), since she has become frustrated that her first choice, [facility], has been difficult to get in contact with. She put in an inquiry with [facility] as she has started to look for independent living options.     LCSW talked with pt about her choice for Independent living, as Assisted Living would be a safer option for her due to her history of frequent falls. She has remained steadfast that she does not need Assisted Living, and would not consider that as an option at this time. She said that [facility] provides meals and housekeeping, and she said she believes there is an Assisted Living side to the building. YIFAN is unaware that [facility] has Assisted Living, and there is no mention of [facility] on their website. JAYW called [facility], but no one was available in the Sales department to answer the question of whether or not they have an [facility] option. LCSW talked with pt about moving to Independent Living, and then if the time comes when she acknowledges that she needs more assistance, she may have to move all over again to an [facility]. She said that she would think about that if the time comes, but right now she is only considering Independent Living.     Plan for follow up: YIFAN to remain available for support and resources as needed.       STEVEN Paul, [facility]    Primary Care at Home  (O) [phone]  (C) [phone]

## 2024-12-18 ENCOUNTER — TELEPHONE (OUTPATIENT)
Facility: CLINIC | Age: 84
End: 2024-12-18

## 2024-12-18 DIAGNOSIS — M15.9 GENERALIZED OSTEOARTHRITIS: Primary | Chronic | ICD-10-CM

## 2024-12-18 NOTE — TELEPHONE ENCOUNTER
The patient called and stated that she missed Dr. Alex's call to her.  She asks if he could please call again.  # [phone].    [employer] message to Dr. Alex.  He responded that he will callback the patient.

## 2024-12-19 ENCOUNTER — TELEPHONE (OUTPATIENT)
Facility: CLINIC | Age: 84
End: 2024-12-19

## 2024-12-19 RX ORDER — METOPROLOL SUCCINATE 25 MG/1
25 TABLET, EXTENDED RELEASE ORAL DAILY
Qty: 90 TABLET | Refills: 3 | Status: SHIPPED | OUTPATIENT
Start: 2024-12-19 | End: 2025-12-19

## 2024-12-19 RX ORDER — TRAMADOL HYDROCHLORIDE 50 MG/1
50 TABLET ORAL EVERY 6 HOURS PRN
Qty: 12 TABLET | Refills: 0 | Status: SHIPPED | OUTPATIENT
Start: 2024-12-19 | End: 2025-01-18

## 2024-12-19 NOTE — TELEPHONE ENCOUNTER
The patient called to request that the BP machine recommended by her cardiologist be ordered for her.  She said that Dr. Alex is aware of the request and knows the name of the machine, she cannot remember and does not know where the order should go.  She states that Medicare will pay, she can also make a copay.  Her callback is [phone].

## 2024-12-20 ENCOUNTER — TELEPHONE (OUTPATIENT)
Facility: CLINIC | Age: 84
End: 2024-12-20

## 2024-12-20 NOTE — TELEPHONE ENCOUNTER
I called the pharmacy, as I could see the order in Epic - I was informed they do NOT have the prescription.   Request discussed with Dr. Alex - I called [facility] and provided verbal order (with same instructions as Dr. Alex's order). MD notified of this issue and provided his LALIT for calling in the order.

## 2024-12-20 NOTE — TELEPHONE ENCOUNTER
Call placed to pharmacy - I was informed that they do have the prescription ready for .     Call placed to patient to inform that Rx is ready for .

## 2024-12-20 NOTE — TELEPHONE ENCOUNTER
The patient called again to f/u on her tramadol refill.  She states that the pharmacy does not have the refill request and they are not open tomorrow, Saturday, 12/21/24.  The patient asks for a callback at [phone]

## 2024-12-20 NOTE — TELEPHONE ENCOUNTER
The patient called to find out if Dr. Alex called in a new script for her tramadol medication.  She states that he was going to send it electronically, but [facility] has not received the refill request.  The patient's callback if needed is [phone].

## 2024-12-27 ENCOUNTER — TELEPHONE (OUTPATIENT)
Facility: CLINIC | Age: 84
End: 2024-12-27

## 2024-12-27 DIAGNOSIS — M51.362 DEGENERATION OF INTERVERTEBRAL DISC OF LUMBAR REGION WITH DISCOGENIC BACK PAIN AND LOWER EXTREMITY PAIN: Chronic | ICD-10-CM

## 2024-12-27 NOTE — TELEPHONE ENCOUNTER
Telephone call from Almaz Khan PT who is with patient and reports that BS via [employer] is 342 now, pt states she has not eaten yet today and does not remember if she took her Metformin this AM.  Almaz states pt says her BS was 152 last evening and was 350 something when she woke up this AM.  Almaz is questioning if pt should take a dose of Metformin now.  Discussed that Metformin is often dosed BID and since pt is on low dose of 500 mg she should take a dose now in case she missed AM dose.  If BS continues to remain in 300s she should go to ED.

## 2024-12-30 ENCOUNTER — TELEPHONE (OUTPATIENT)
Facility: CLINIC | Age: 84
End: 2024-12-30

## 2024-12-30 DIAGNOSIS — I25.10 CORONARY ARTERY DISEASE INVOLVING NATIVE CORONARY ARTERY OF NATIVE HEART WITHOUT ANGINA PECTORIS: ICD-10-CM

## 2024-12-30 DIAGNOSIS — E11.42 TYPE 2 DIABETES MELLITUS WITH DIABETIC POLYNEUROPATHY, WITHOUT LONG-TERM CURRENT USE OF INSULIN (HCC): Primary | Chronic | ICD-10-CM

## 2024-12-30 RX ORDER — PIOGLITAZONE 15 MG/1
15 TABLET ORAL DAILY
Qty: 30 TABLET | Refills: 3 | Status: SHIPPED | OUTPATIENT
Start: 2024-12-30 | End: 2025-01-17

## 2024-12-30 RX ORDER — ATORVASTATIN CALCIUM 20 MG/1
20 TABLET, FILM COATED ORAL EVERY EVENING
Qty: 90 TABLET | Refills: 1 | Status: SHIPPED | OUTPATIENT
Start: 2024-12-30 | End: 2025-06-28

## 2024-12-30 RX ORDER — PREGABALIN 50 MG/1
CAPSULE ORAL
Qty: 60 CAPSULE | Refills: 0 | Status: SHIPPED | OUTPATIENT
Start: 2024-12-30 | End: 2025-01-29

## 2024-12-30 NOTE — TELEPHONE ENCOUNTER
Fax received from [facility] pharmacy requesting refill of Atorvastatin 20mg  - Take 1 tab PO every evening.    Rx pended to PCP.

## 2024-12-30 NOTE — TELEPHONE ENCOUNTER
The patient called to update that her blood sugar is \"out of control and has gone over 300\".  She states that her sciatica surgical procedure has been postponed and rescheduled to 1/14/2025 based on reducing her blood sugar levels.  The patient also reports that her IBS is \"really bad\", with stomach cramps and diarrhea.  She states that she \"feels awful and is in excruciating pain\".  The patient's callback is [phone]     .....    Called and discussed her problems. She has been drinking a lot of Gatorade on the advice of PT. I encouraged her to avoid sugary drinks and to use water or sugar free drinks instead.     Will add Actos and continue to monitor her blood sugar.     Dav Alex Jr., MD

## 2024-12-31 ENCOUNTER — TELEPHONE (OUTPATIENT)
Facility: CLINIC | Age: 84
End: 2024-12-31

## 2024-12-31 NOTE — TELEPHONE ENCOUNTER
[facility] Primary Care at Home ([facility])   Phone:  [phone]      Fax:  [phone] [address], [address]  [place] [address]    Name:  Adenike Dasilva  YOB: 1940      Called patient to schedule one month Primary Care at Home follow up appointment with Dr. Dav Alex.  Appointment scheduled for 1/13/25.      Makayla Alex RN, Gerontological Nursing-BC, [facility]

## 2025-01-02 ENCOUNTER — TELEPHONE (OUTPATIENT)
Facility: CLINIC | Age: 85
End: 2025-01-02

## 2025-01-02 DIAGNOSIS — N39.46 MIXED STRESS AND URGE URINARY INCONTINENCE: ICD-10-CM

## 2025-01-02 DIAGNOSIS — K58.0 IRRITABLE BOWEL SYNDROME WITH DIARRHEA: Primary | ICD-10-CM

## 2025-01-02 NOTE — TELEPHONE ENCOUNTER
The patient states [facility] wants $115.00 for refill for Bimetric. She is asking to have it called in to [facility] and she can have it filled through [facility]. [facility] phone number is [phone]. It can be delivered today or her son can pick it up.

## 2025-01-02 NOTE — TELEPHONE ENCOUNTER
[facility] asked for callback RE order they received on 12/30; she stated questions 4 and 6 were missing (diagnosis code and brand, respectively); stated she is refaxing the order.

## 2025-01-03 RX ORDER — MIRABEGRON 25 MG/1
25 TABLET, FILM COATED, EXTENDED RELEASE ORAL DAILY
Qty: 90 TABLET | Refills: 3 | Status: SHIPPED | OUTPATIENT
Start: 2025-01-03 | End: 2026-01-02

## 2025-01-06 ENCOUNTER — TELEPHONE (OUTPATIENT)
Facility: CLINIC | Age: 85
End: 2025-01-06

## 2025-01-06 NOTE — TELEPHONE ENCOUNTER
Gabrielle called earlier today to f/u on the patient CGM supplies.  She states that the forms were signed and dated but returned blank.  I checked through the faxes for 12/31/24 and located the documents in the providers folder for completion.  Call placed to [facility].  I spoke with Mayte.  She states that the provider can complete the signed and dated form plus initial his changes in sections 4 & 6 or he can used the unsigned form, complete sections 4 & 6, sign and use current days date, then return via fax to [facility].  Mayte's callback if needed is [phone].    I let Mayte know that the provider will take care of completing the form when he is in the office, potentially later in the week or next Tuesday, 1/14/25.  She acknowledged.

## 2025-01-07 ENCOUNTER — TELEPHONE (OUTPATIENT)
Facility: CLINIC | Age: 85
End: 2025-01-07

## 2025-01-07 NOTE — TELEPHONE ENCOUNTER
Call received from Almaz Khan, PT with [employer] to inform that patient reported to her that the new Rx for blood glucose control \"is not work\". Patient's fasting blood sugar this morning was 220. It is improved from 300's, but still too high for her \"procedure\".

## 2025-01-10 ENCOUNTER — TELEPHONE (OUTPATIENT)
Facility: CLINIC | Age: 85
End: 2025-01-10

## 2025-01-10 NOTE — TELEPHONE ENCOUNTER
Call returned to patient - She verbalizes that she placed her last sensor yesterday. She contacted [facility] ([facility]) and was told that they did not receive the order signed by Dr. Alex.   I placed a new order in [place] with [facility] and sent for Dr. Alex's electronic signature. I also sent him a message via [employer] providing the information above.

## 2025-01-10 NOTE — TELEPHONE ENCOUNTER
I called pt to remind of their in home visit w/ Dr. Alex on 01/13/2024, arrival between 10-4.  I reached pt's VM and left a message to please call the [facility] office to confirm the visit.

## 2025-01-10 NOTE — TELEPHONE ENCOUNTER
Pt requested Dr. Alex to send order for diabetic sensors to [facility], stated she is wearing her last one now and it was applied last night, stated she needs refill urgently; requested callback.

## 2025-01-10 NOTE — TELEPHONE ENCOUNTER
Pt requested Dr. Alex to send order for diabetic sensors to [facility], stated she is wearing her last one now and it was applied last night, stated she needs refill urgently; requested callback.

## 2025-01-13 NOTE — PERIOP NOTE
[facility]  Ambulatory Surgery Unit  Pre-operative Instructions    Procedure Date  Tuesday 1/14/2024            Tentative Arrival Time 0815      1. On the day of your procedure, please report to the Ambulatory Surgery Unit Registration Desk and sign in at your designated time. The Ambulatory Surgery Unit is located in [facility] on the [facility] side of the [facility] across from the [facility]. Please have all of your health insurance cards, copayment, and a photo ID.    **TWO adults may accompany you the day of the procedure.  We have limited seating available.  If our waiting room is at capacity, your ride may be asked to remain in their vehicle.  No one under 15 is allowed in the waiting room.   Masks, fully covering the mouth and nose, are required in the waiting room.    2. You must have someone with you to drive you home as directed by your surgeon.    3. You may have a light breakfast and take normal morning medications.    4. We recommend you do not drink any alcoholic beverages for 24 hours before and after your procedure.    5. Contact your surgeon’s office for instructions on the following medications: non-steroidal anti-inflammatory drugs (i.e. Advil, Aleve), vitamins, and supplements. (Some surgeon’s will want you to stop these medications prior to surgery and others may allow you to take them)   **If you are currently taking Plavix, Coumadin, Aspirin and/or other blood-thinning agents, contact your surgeon for instructions.** Your surgeon will partner with the physician prescribing these medications to determine if it is safe to stop or if you need to continue taking. Please do not stop taking these medications without instructions from your surgeon.    6. In an effort to help prevent surgical site infection, we ask that you shower with an anti-bacterial soap (i.e. Dial or Safeguard) on the morning of your procedure. Do not apply any lotions, powders, or deodorants

## 2025-01-14 ENCOUNTER — HOSPITAL ENCOUNTER (OUTPATIENT)
Facility: HOSPITAL | Age: 85
Setting detail: OUTPATIENT SURGERY
Discharge: HOME | End: 2025-01-14
Attending: PHYSICAL MEDICINE & REHABILITATION | Admitting: PHYSICAL MEDICINE & REHABILITATION
Payer: MEDICARE

## 2025-01-14 ENCOUNTER — HOSPITAL ENCOUNTER (OUTPATIENT)
Facility: HOSPITAL | Age: 85
Discharge: HOME | End: 2025-01-17

## 2025-01-14 VITALS
HEART RATE: 97 BPM | WEIGHT: 123 LBS | OXYGEN SATURATION: 93 % | BODY MASS INDEX: 23.22 KG/M2 | RESPIRATION RATE: 18 BRPM | TEMPERATURE: 98 F | HEIGHT: 61 IN | DIASTOLIC BLOOD PRESSURE: 74 MMHG | SYSTOLIC BLOOD PRESSURE: 90 MMHG

## 2025-01-14 DIAGNOSIS — Z41.9 SURGERY, ELECTIVE: ICD-10-CM

## 2025-01-14 LAB
GLUCOSE BLD STRIP.AUTO-MCNC: 202 MG/DL (ref 65–117)
SERVICE CMNT-IMP: ABNORMAL

## 2025-01-14 PROCEDURE — 7100000010 HC PHASE II RECOVERY - FIRST 15 MIN: Performed by: PHYSICAL MEDICINE & REHABILITATION

## 2025-01-14 PROCEDURE — 3600000012 HC SURGERY LEVEL 2 ADDTL 15MIN: Performed by: PHYSICAL MEDICINE & REHABILITATION

## 2025-01-14 PROCEDURE — 6360000002 HC RX W HCPCS: Performed by: PHYSICAL MEDICINE & REHABILITATION

## 2025-01-14 PROCEDURE — 7100000011 HC PHASE II RECOVERY - ADDTL 15 MIN: Performed by: PHYSICAL MEDICINE & REHABILITATION

## 2025-01-14 PROCEDURE — 3600000002 HC SURGERY LEVEL 2 BASE: Performed by: PHYSICAL MEDICINE & REHABILITATION

## 2025-01-14 PROCEDURE — 82962 GLUCOSE BLOOD TEST: CPT

## 2025-01-14 PROCEDURE — 76000 FLUOROSCOPY <1 HR PHYS/QHP: CPT

## 2025-01-14 PROCEDURE — 72100 X-RAY EXAM L-S SPINE 2/3 VWS: CPT

## 2025-01-14 PROCEDURE — 2709999900 HC NON-CHARGEABLE SUPPLY: Performed by: PHYSICAL MEDICINE & REHABILITATION

## 2025-01-14 RX ORDER — BUPIVACAINE HYDROCHLORIDE 5 MG/ML
10 INJECTION, SOLUTION EPIDURAL; INTRACAUDAL ONCE
Status: DISCONTINUED | OUTPATIENT
Start: 2025-01-14 | End: 2025-01-14 | Stop reason: HOSPADM

## 2025-01-14 RX ORDER — DEXAMETHASONE SODIUM PHOSPHATE 10 MG/ML
20 INJECTION INTRAMUSCULAR; INTRAVENOUS ONCE
Status: COMPLETED | OUTPATIENT
Start: 2025-01-14 | End: 2025-01-14

## 2025-01-14 RX ORDER — LIDOCAINE HYDROCHLORIDE 20 MG/ML
10 INJECTION, SOLUTION EPIDURAL; INFILTRATION; INTRACAUDAL; PERINEURAL ONCE
Status: COMPLETED | OUTPATIENT
Start: 2025-01-14 | End: 2025-01-14

## 2025-01-14 ASSESSMENT — PAIN - FUNCTIONAL ASSESSMENT
PAIN_FUNCTIONAL_ASSESSMENT: 0-10
PAIN_FUNCTIONAL_ASSESSMENT: 0-10

## 2025-01-14 NOTE — H&P
Procedural Case Note    1/14/2025    (9:22 AM)    Adenike Dasilva    1940   (84 y.o.)    [id number]    CC:  pain    ROS:   Complete ROS obtained, no CP, no SOB, no N or V    PMH:     Past Medical History:   Diagnosis Date    Aortic valve stenosis 07/2022    Arthritis     Asthma     Bronchitis     CAD (coronary artery disease)     stent    Chronic anticoagulation 11/23/2022    Chronic obstructive pulmonary disease (HCC)     Chronic pain     COVID 2024    Dementia (HCC)     pt completed PAT phone call appropriately    Depression with anxiety 11/23/2022    Diabetes (HCC)     GERD (gastroesophageal reflux disease)     H/O aortic valve replacement 07/2022    Hepatitis B     1960's    History of blood transfusion     06/22    History of recurrent UTIs     Hx of seasonal allergies     Hypercholesterolemia     Insomnia     Irritable bowel syndrome (IBS)     Migraine     Noncompliance 01/18/2023    Oxygen dependent     2LPM NC    Pneumonia 2024    Pulmonary fibrosis (HCC)     Thromboembolus (HCC)     left leg    Thyroid disease     pt denies- not tx for it    Urinary urgency        ALLERGIES:     Allergies   Allergen Reactions    Bee Venom Shortness Of Breath           Iodinated Contrast Media Anaphylaxis and Other (See Comments)     Passes out    Penicillins Anaphylaxis and Shortness Of Breath     Other reaction(s): anaphylaxis/angioedema        Sulfa Antibiotics Anaphylaxis, Shortness Of Breath and Rash     Other reaction(s): anaphylaxis/angioedema        Omeprazole Dizziness or Vertigo and Other (See Comments)    Ranitidine Hcl Nausea Only    Iodine     Montelukast Hallucinations           Seasonal     Tree Extract Itching     Cat dander, grass        Cefdinir Rash     Other reaction(s): mild rash/itching        Codeine Itching     Other reaction(s): other/intolerance  Dizziness. Pt states they take Benadryl to offset the reaction.    Famotidine Nausea And Vomiting    Jardiance [Empagliflozin] Rash    Morphine

## 2025-01-14 NOTE — PERIOP NOTE
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 Robertson    Assistant(s): Circulator: Sergei Haas RN  Scrub Person First: Bakari Cabrera  Circulator Assist: Nettie Matos RN; Nurys Mcmanus RN    Specimens: * No specimens in log *    Assessment:  Intra-procedure medications         Anesthesia gave intra-procedure sedation and medications, see anesthesia flow sheet     Intravenous fluids: LR@ KVO     Vital signs stable       Recommendation:    Permission to share finding with Abhilash

## 2025-01-14 NOTE — PERIOP NOTE
Patient received to PACU, VSS. Pt personal 02 machine retrieve from Abhilash. Patient awake and alert with no complaints of pain. Injection site intact.     Neuro:  Push/Pull assessment:     LUE Response: Strong and equal   LLE Response: Strong and equal     RUE Response: Strong and equal   RLE Response: Strong and equal    Discharge instructions given. Patient verbalized understanding of instructions and follow up.     Patient states ready for discharge - patient discharged at this time by wheelchair with all belongings. Abhilash to provide transportation home.

## 2025-01-14 NOTE — DISCHARGE INSTRUCTIONS
Dr. Robertson Discharge Instructions  Transforaminal Epidural Steroid Injection/ Selective Nerve Block    You had a transforaminal epidural steroid injection/ selective nerve block today. You will probably have some numbness, and possibly weakness, in your leg for the next 6 to 8 hours. The steroids will slowly become effective, reducing your pain, over the next 2 weeks. You should begin feeling better after a few days, but it may take up to 2 weeks to notice the difference. The benefit you get from your injection will last a variable amount of time, depending on the severity of your lumbar spine problem.  Pain: Most people do not have any increase in pain after this injection. However, you might experience some soreness in your low back. If this happens, putting an ice pack over the sore area will help.  Bandage:  You will have a small bandage covering the site of the injection. You may remove it once you get home.  Restrictions: Someone should drive you home after the injection.  After that, you have no restrictions. You need to be careful while walking, as you may still have some numbness or weakness in your leg. You may resume your normal level of activity. You may take a shower or bath, and you may eat normally. You should continue your current exercises and/or therapy routine.   Medications: Continue your current medications as prescribed. If your pain decreases, you may reduce the amount of your pain medicines.  If you stopped taking anticoagulants or blood-thinners before the injection, start them tomorrow. If you have diabetes, your blood sugar may be elevated for a few days. Call your primary doctor with any questions.  Call Dr. Robertson at [phone] if you experience:  Fever (101 degrees Fahrenheit or greater)  Nausea or vomiting  Headache unrelieved by your normal pain medicine  Redness or swelling at the injection site that lasts more than 1 day  New numbness, tingling, weakness, or pain that you

## 2025-01-14 NOTE — OP NOTE
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 female who presents with low back pain and radiculopathy. She was positive for LS DDD with radiculopathy.  The patient was admitted for spinal intervention as conservative measures have failed.      Preoperative Diagnosis: LS DDD with Radiculopathy    Postoperative Diagnosis: LS DDD with Radiculopathy    Surgeons and Role:     * Giovanny Robertson MD - Primary     Procedure:  Procedure(s):  BILATERAL L4 TRANSFORAMINAL EPIDURAL STEROID INJECTION    Procedure in Detail:  After appropriate informed consent was obtained, the patient was taken to the operating suite and placed in the prone position on the operating table on appropriate padding.  The LS region was prepped and draped in the usual sterile fashion. Intraoperative fluoroscopy was used to localize the LS spine. The skin was infiltrated with 2% lidocaine. A 25-g needle was advanced into the Perez L4 neuroforamen under fluoroscopic guidance. A small amount of contrast was injected into the epidural space, confirming appropriate needle placement on fluoroscopy. Permanent fluoroscopic images were saved in the patient's record.    Next, 2 ml of 2% lidocaine and 20 mg of Dexamethasone were injected. The needle was removed from the patient. The patient was then turned back into the supine position on the stretcher and was taken to the Recovery Room in stable condition.    Estimated Blood Loss:  none     Specimens: None       Drains: None          Complications:  None    Signed By: Giovanny Robertson MD                        January 14, 2025

## 2025-01-14 NOTE — PERIOP NOTE
Neuro:  Push/Pull assessment:     LUE Response: mod   LLE Response: mod   RUE Response: strong    RLE Response: strong

## 2025-01-16 ENCOUNTER — TELEPHONE (OUTPATIENT)
Facility: CLINIC | Age: 85
End: 2025-01-16

## 2025-01-16 DIAGNOSIS — M15.9 GENERALIZED OSTEOARTHRITIS: Chronic | ICD-10-CM

## 2025-01-16 NOTE — TELEPHONE ENCOUNTER
Pt left vm with questions about which meds she is supposed to be taking currently, requested callback.

## 2025-01-17 ENCOUNTER — TELEPHONE (OUTPATIENT)
Facility: CLINIC | Age: 85
End: 2025-01-17

## 2025-01-17 RX ORDER — PIOGLITAZONE 30 MG/1
30 TABLET ORAL DAILY
Qty: 90 TABLET | Refills: 3 | Status: SHIPPED | OUTPATIENT
Start: 2025-01-17 | End: 2026-01-17

## 2025-01-17 NOTE — TELEPHONE ENCOUNTER
The patient called to update Dr. Alex, that she had a procedure done on Tuesday at an ambulatory unit for her sciatic nerve.  The patient asks what Dr. Alex wants to do about her blood sugar med.  He prescribed something that doesn't work (pioglitazone, 15 mgs) She reports that her blood sugar is up in the 300's.  She states that she has metformin on hand plus a refill.  The patient asks for a callback at [phone]

## 2025-01-18 RX ORDER — TRAMADOL HYDROCHLORIDE 50 MG/1
50 TABLET ORAL EVERY 6 HOURS PRN
Qty: 60 TABLET | Refills: 0 | Status: SHIPPED | OUTPATIENT
Start: 2025-01-18 | End: 2025-02-17

## 2025-01-21 ENCOUNTER — TELEPHONE (OUTPATIENT)
Facility: CLINIC | Age: 85
End: 2025-01-21

## 2025-01-21 NOTE — TELEPHONE ENCOUNTER
[facility] Primary Care at Home ([facility])   Phone:  [phone]      Fax:  [phone] [address], [address]  [place] [address]    Name:  Adenike Dasilva  YOB: 1940    Called patient to schedule Primary Care at Home follow up appointment with Dr. Dav Alex.  Appointment scheduled for 1/27/25.      Makayla Alex RN, Gerontological Nursing-BC, [facility]

## 2025-01-21 NOTE — TELEPHONE ENCOUNTER
VM-10:20am-the patient called to update that she is out of the blood sugar medication that Dr. Alex prescribed.  She wants to know if she should take metformin, which didn't work very well, or the other medication.  Her callback # [phone]    The patient called back and states that [employer] is transferring the blood sugar med to [facility].  She asks that no script be called into the [facility] except myrbetriq.  She is not sure what the medication is for.  [facility] is moved to second on the patient's pharmacy list.

## 2025-01-27 ENCOUNTER — OFFICE VISIT (OUTPATIENT)
Facility: CLINIC | Age: 85
End: 2025-01-27

## 2025-01-27 ENCOUNTER — TELEPHONE (OUTPATIENT)
Facility: CLINIC | Age: 85
End: 2025-01-27

## 2025-01-27 DIAGNOSIS — J41.0 SIMPLE CHRONIC BRONCHITIS (HCC): ICD-10-CM

## 2025-01-27 DIAGNOSIS — I25.10 CORONARY ARTERY DISEASE INVOLVING NATIVE CORONARY ARTERY OF NATIVE HEART WITHOUT ANGINA PECTORIS: Primary | Chronic | ICD-10-CM

## 2025-01-27 DIAGNOSIS — F03.B0 MODERATE DEMENTIA WITHOUT BEHAVIORAL DISTURBANCE, PSYCHOTIC DISTURBANCE, MOOD DISTURBANCE, OR ANXIETY, UNSPECIFIED DEMENTIA TYPE (HCC): ICD-10-CM

## 2025-01-27 DIAGNOSIS — J84.9 INTERSTITIAL LUNG DISEASE (HCC): Chronic | ICD-10-CM

## 2025-01-27 DIAGNOSIS — G40.909 SEIZURE DISORDER (HCC): ICD-10-CM

## 2025-01-27 DIAGNOSIS — Z95.2 H/O AORTIC VALVE REPLACEMENT: Chronic | ICD-10-CM

## 2025-01-27 DIAGNOSIS — F41.8 DEPRESSION WITH ANXIETY: Chronic | ICD-10-CM

## 2025-01-27 DIAGNOSIS — I35.0 NONRHEUMATIC AORTIC VALVE STENOSIS: Chronic | ICD-10-CM

## 2025-01-27 DIAGNOSIS — I10 BENIGN ESSENTIAL HYPERTENSION: Chronic | ICD-10-CM

## 2025-01-27 DIAGNOSIS — M51.360 DEGENERATION OF INTERVERTEBRAL DISC OF LUMBAR REGION WITH DISCOGENIC BACK PAIN: Chronic | ICD-10-CM

## 2025-01-27 DIAGNOSIS — J96.11 CHRONIC RESPIRATORY FAILURE WITH HYPOXIA: ICD-10-CM

## 2025-01-27 DIAGNOSIS — E11.42 TYPE 2 DIABETES MELLITUS WITH DIABETIC POLYNEUROPATHY, WITHOUT LONG-TERM CURRENT USE OF INSULIN (HCC): ICD-10-CM

## 2025-01-27 DIAGNOSIS — M15.9 GENERALIZED OSTEOARTHRITIS: Chronic | ICD-10-CM

## 2025-01-27 NOTE — TELEPHONE ENCOUNTER
VM - 9:58 am - The patient left a message stating that Bonnie had removed Dr. Alex as her pcp and assigned her to [facility].  She reported that [facility] was coming to visit her today.  She also said that she called her congressman.     The patient called back again to report that she has been on the phone for hours with Bonnei.  Bonnie told her that the PCP change was a mistake.  The patient was alarmed by the situation and called to verify that Dr. Alex was still her PCP and that she was still having a home visit today.  I let her know that she is still on the providers visit schedule for today and her insurance is verified.  She acknowledged and also repeated that she called her congressman.  The patient's callback is [phone]

## 2025-01-31 VITALS
TEMPERATURE: 97.5 F | DIASTOLIC BLOOD PRESSURE: 71 MMHG | HEART RATE: 72 BPM | SYSTOLIC BLOOD PRESSURE: 132 MMHG | RESPIRATION RATE: 20 BRPM | OXYGEN SATURATION: 92 %

## 2025-01-31 PROBLEM — F03.B0 MODERATE DEMENTIA WITHOUT BEHAVIORAL DISTURBANCE, PSYCHOTIC DISTURBANCE, MOOD DISTURBANCE, OR ANXIETY, UNSPECIFIED DEMENTIA TYPE (HCC): Chronic | Status: ACTIVE | Noted: 2023-10-03

## 2025-01-31 PROBLEM — R21 RASH: Status: RESOLVED | Noted: 2024-03-28 | Resolved: 2025-01-31

## 2025-01-31 PROBLEM — F03.B0 MODERATE DEMENTIA WITHOUT BEHAVIORAL DISTURBANCE, PSYCHOTIC DISTURBANCE, MOOD DISTURBANCE, OR ANXIETY, UNSPECIFIED DEMENTIA TYPE (HCC): Status: ACTIVE | Noted: 2023-10-03

## 2025-01-31 NOTE — PROGRESS NOTES
[facility] SERVICES      Primary Care At Home - Home Visit      Name: Adenike Dasilva    Address: [address] [address]  :  1940  MRN:  [id number]      ASSESSMENT:     Diagnosis Orders   1. Coronary artery disease involving native coronary artery of native heart without angina pectoris        2. Chronic respiratory failure with hypoxia        3. Simple chronic bronchitis (HCC)        4. Seizure disorder (HCC)        5. Type 2 diabetes mellitus with diabetic polyneuropathy, without long-term current use of insulin (HCC)        6. Moderate dementia without behavioral disturbance, psychotic disturbance, mood disturbance, or anxiety, unspecified dementia type (HCC)        7. Nonrheumatic aortic valve stenosis        8. H/O aortic valve replacement        9. Depression with anxiety        10. Benign essential hypertension        11. Interstitial lung disease (HCC)        12. Generalized osteoarthritis        13. Degeneration of intervertebral disc of lumbar region with discogenic back pain              PLAN:    CAD/ VHD/ CHF/ HTN: This problem is stable. Current treatment was continued as noted above. Will recheck in 1 month.    COPD/ ILD/ RF: This problem has deteriorated. Will continue current treatment including supplemental Oxygen.    DIABETES MELLITUS: This problem is stable. Will continue current routine including diet, exercise and medication. Will continue close observation.    SEIZURE DISORDER: This problem is stable. Will continue close surveillance.    DDD/ OA: This problem has deteriorated. She did not receive much improvement with recent ANDIE. She will follow up with Ortho as scheduled.     ANXIETY/ DEPRESSION/ DEMENTIA: This problem is stable. Will continue close surveillance.      SUBJECTIVE:    Chief Complaint:  Chief Complaint   Patient presents with    Other     HOME VISIT: CAD/ VHD/ COPD       History of Present

## 2025-02-07 ENCOUNTER — TELEPHONE (OUTPATIENT)
Facility: CLINIC | Age: 85
End: 2025-02-07

## 2025-02-07 NOTE — TELEPHONE ENCOUNTER
Call returned to patient - Pt verbalizes several times that she is ok now, but wanted Dr. Alex to be aware that she has been feeling dizzy, her Glucose is still elevated and her Blood Pressure was low yesterday (pt verbalized that Almaz Khan, PT just left her home and BP was \"normal\" during PT).    I asked how her Oxygen level has been - pt responded that it is fine, above 90%. She added a humidifier to her oxygen tubing and it helps her to reduce coughing.   Patient also mentioned that the orthopaedic procedure that she had recently \"didn't work\" and she continues to have pain (sciatic pain).   I informed Ms. Dasilva that I would discuss her concerns with Dr. Alex next week (and explained that he is out of the office this week).   I also advised patient that if she experiences continued dizziness, O2 levels drops below 90%, has a fever or increased weakness, she should go to the ER or at least call [facility] to be evaluated. Patient verbalized understanding.

## 2025-02-07 NOTE — TELEPHONE ENCOUNTER
Call received from Almaz Khan PT with [facility], seeking verbal order for PT visits 2x week for 2 weeks.     Verbal order provided.

## 2025-02-07 NOTE — TELEPHONE ENCOUNTER
The patient called to report that she has not felt well for the last 3 days.  She states she had a fever of 100 but it has gone down some.  She said that she feels dizzy a lot.  The patient also said that she needs to schedule her next appointment and accepted an offer for Thursday, 2/27/25, arrival between 10am-4pm.  Her callback is [phone]

## 2025-02-16 DIAGNOSIS — M51.362 DEGENERATION OF INTERVERTEBRAL DISC OF LUMBAR REGION WITH DISCOGENIC BACK PAIN AND LOWER EXTREMITY PAIN: Chronic | ICD-10-CM

## 2025-02-17 RX ORDER — PREGABALIN 50 MG/1
CAPSULE ORAL
Qty: 60 CAPSULE | Refills: 0 | Status: SHIPPED | OUTPATIENT
Start: 2025-02-17 | End: 2025-03-19

## 2025-02-21 ENCOUNTER — TELEPHONE (OUTPATIENT)
Facility: CLINIC | Age: 85
End: 2025-02-21

## 2025-02-21 NOTE — TELEPHONE ENCOUNTER
Called patient to confirm their in home visit with Dr. Alex on 02/27/2025, arrival between 10-4.  Spoke with Adenike Dasilva, they confirmed the appointment and answered the covid screen questions. Does anyone in the household have covid no  Have there been any changes to insurance no or location no

## 2025-02-26 ENCOUNTER — OFFICE VISIT (OUTPATIENT)
Facility: CLINIC | Age: 85
End: 2025-02-26
Payer: MEDICARE

## 2025-02-26 DIAGNOSIS — I25.10 CORONARY ARTERY DISEASE INVOLVING NATIVE CORONARY ARTERY OF NATIVE HEART WITHOUT ANGINA PECTORIS: Primary | Chronic | ICD-10-CM

## 2025-02-26 DIAGNOSIS — I35.0 NONRHEUMATIC AORTIC VALVE STENOSIS: Chronic | ICD-10-CM

## 2025-02-26 DIAGNOSIS — G40.909 SEIZURE DISORDER (HCC): Chronic | ICD-10-CM

## 2025-02-26 DIAGNOSIS — J41.0 SIMPLE CHRONIC BRONCHITIS (HCC): Chronic | ICD-10-CM

## 2025-02-26 DIAGNOSIS — Z95.2 H/O AORTIC VALVE REPLACEMENT: Chronic | ICD-10-CM

## 2025-02-26 DIAGNOSIS — E11.42 TYPE 2 DIABETES MELLITUS WITH DIABETIC POLYNEUROPATHY, WITHOUT LONG-TERM CURRENT USE OF INSULIN (HCC): Chronic | ICD-10-CM

## 2025-02-26 DIAGNOSIS — F03.B0 MODERATE DEMENTIA WITHOUT BEHAVIORAL DISTURBANCE, PSYCHOTIC DISTURBANCE, MOOD DISTURBANCE, OR ANXIETY, UNSPECIFIED DEMENTIA TYPE (HCC): Chronic | ICD-10-CM

## 2025-02-26 DIAGNOSIS — R29.6 FREQUENT FALLS: Chronic | ICD-10-CM

## 2025-02-26 DIAGNOSIS — E78.2 MIXED HYPERLIPIDEMIA: Chronic | ICD-10-CM

## 2025-02-26 DIAGNOSIS — N32.81 OAB (OVERACTIVE BLADDER): Chronic | ICD-10-CM

## 2025-02-26 DIAGNOSIS — I10 BENIGN ESSENTIAL HYPERTENSION: Chronic | ICD-10-CM

## 2025-02-26 PROCEDURE — 1036F TOBACCO NON-USER: CPT | Performed by: FAMILY MEDICINE

## 2025-02-26 PROCEDURE — 1123F ACP DISCUSS/DSCN MKR DOCD: CPT | Performed by: FAMILY MEDICINE

## 2025-02-26 PROCEDURE — G8420 CALC BMI NORM PARAMETERS: HCPCS | Performed by: FAMILY MEDICINE

## 2025-02-26 PROCEDURE — 1090F PRES/ABSN URINE INCON ASSESS: CPT | Performed by: FAMILY MEDICINE

## 2025-02-26 PROCEDURE — 99349 HOME/RES VST EST MOD MDM 40: CPT | Performed by: FAMILY MEDICINE

## 2025-03-02 VITALS
DIASTOLIC BLOOD PRESSURE: 65 MMHG | SYSTOLIC BLOOD PRESSURE: 105 MMHG | TEMPERATURE: 97.6 F | HEART RATE: 84 BPM | OXYGEN SATURATION: 91 % | RESPIRATION RATE: 24 BRPM

## 2025-03-04 ENCOUNTER — TELEPHONE (OUTPATIENT)
Facility: CLINIC | Age: 85
End: 2025-03-04

## 2025-03-04 NOTE — TELEPHONE ENCOUNTER
Call placed to patient - I spoke with Ms. Dasilva and she accepted the visit with MD tomorrow, 3/5/25.

## 2025-03-04 NOTE — TELEPHONE ENCOUNTER
The patient called to update that she had a fall.  She was going to open her front door for a grocery delivery, went to quickly with the rollator and ran into the door.  The  helped her get up.  EMS was not called.  The patient complains of pain in her shoulder and ribcage.  She states that the pain is intense and she cannot sleep.  She asks for a home visit this week with Dr. Alex.  The patient also reports that she is going to independent living at [facility] in 4/2025.  Her callback is [phone].

## 2025-03-04 NOTE — TELEPHONE ENCOUNTER
Information provided to Dr. Abi Javier MD offered a visit tomorrow, 3/5/25 between 10AM and 4PM.     Call placed to patient - voice message left providing the information above. Call back request in order to confirm the appointment.

## 2025-03-05 ENCOUNTER — OFFICE VISIT (OUTPATIENT)
Facility: CLINIC | Age: 85
End: 2025-03-05
Payer: MEDICARE

## 2025-03-05 DIAGNOSIS — R29.6 FREQUENT FALLS: ICD-10-CM

## 2025-03-05 DIAGNOSIS — M15.9 GENERALIZED OSTEOARTHRITIS: ICD-10-CM

## 2025-03-05 DIAGNOSIS — I25.10 CORONARY ARTERY DISEASE INVOLVING NATIVE CORONARY ARTERY OF NATIVE HEART WITHOUT ANGINA PECTORIS: Chronic | ICD-10-CM

## 2025-03-05 DIAGNOSIS — E78.2 MIXED HYPERLIPIDEMIA: Chronic | ICD-10-CM

## 2025-03-05 DIAGNOSIS — E11.42 TYPE 2 DIABETES MELLITUS WITH DIABETIC POLYNEUROPATHY, WITHOUT LONG-TERM CURRENT USE OF INSULIN (HCC): Chronic | ICD-10-CM

## 2025-03-05 DIAGNOSIS — J41.0 SIMPLE CHRONIC BRONCHITIS (HCC): Chronic | ICD-10-CM

## 2025-03-05 DIAGNOSIS — S22.31XD CLOSED FRACTURE OF ONE RIB OF RIGHT SIDE WITH ROUTINE HEALING: Primary | Chronic | ICD-10-CM

## 2025-03-05 DIAGNOSIS — I10 BENIGN ESSENTIAL HYPERTENSION: Chronic | ICD-10-CM

## 2025-03-05 PROCEDURE — 1036F TOBACCO NON-USER: CPT | Performed by: FAMILY MEDICINE

## 2025-03-05 PROCEDURE — 1090F PRES/ABSN URINE INCON ASSESS: CPT | Performed by: FAMILY MEDICINE

## 2025-03-05 PROCEDURE — 99349 HOME/RES VST EST MOD MDM 40: CPT | Performed by: FAMILY MEDICINE

## 2025-03-05 PROCEDURE — 1123F ACP DISCUSS/DSCN MKR DOCD: CPT | Performed by: FAMILY MEDICINE

## 2025-03-05 PROCEDURE — G8420 CALC BMI NORM PARAMETERS: HCPCS | Performed by: FAMILY MEDICINE

## 2025-03-06 ENCOUNTER — TELEPHONE (OUTPATIENT)
Facility: CLINIC | Age: 85
End: 2025-03-06

## 2025-03-06 VITALS — DIASTOLIC BLOOD PRESSURE: 70 MMHG | HEART RATE: 84 BPM | SYSTOLIC BLOOD PRESSURE: 143 MMHG | RESPIRATION RATE: 24 BRPM

## 2025-03-06 DIAGNOSIS — L30.9 DERMATITIS: Primary | ICD-10-CM

## 2025-03-06 NOTE — PROGRESS NOTES
every 8 hours as needed for Nausea 30 tablet 5    amitriptyline (ELAVIL) 10 MG tablet TAKE 1 TABLET BY MOUTH EVERY NIGHT AT BEDTIME 90 tablet 1    Menthol, Topical Analgesic, (BIOFREEZE) 4 % GEL Apply 1 each topically in the morning and at bedtime      levETIRAcetam (KEPPRA) 500 MG tablet Take 1 tablet by mouth every morning (before breakfast) 500mg in morning 90 tablet 1    albuterol sulfate HFA (PROVENTIL;VENTOLIN;PROAIR) 108 (90 Base) MCG/ACT inhaler Inhale 2 puffs into the lungs every 6 hours as needed for Shortness of Breath or Wheezing 1 each 2    fluticasone (FLONASE) 50 MCG/ACT nasal spray 2 sprays by Nasal route daily as needed for Rhinitis 16 g 2    acetaminophen (TYLENOL) 325 MG tablet Take 1-2 tablets by mouth every 6 hours as needed for Pain       No current facility-administered medications on file prior to visit.       The patient's problem list, medication list, allergies, past medical history, family history and social history have been reviewed and updated during today's visit.      OBJECTIVE:    Vital Signs: BP (!) 143/70 (Site: Right Wrist)   Pulse 84   Resp 24       General: Well developed, well nourished female in no acute distress  Skin:  No rashes or suspicious skin lesion noted  HEENT: Normocephalic, PERRL, ears are normal, tongue is normal, oropharynx shows no inflammation  Neck:  Supple, no lymphadenopathy, no thyromegaly, no tenderness  Cardiac: Regular rate and rhythm, no murmurs, rubs or gallops  Chest:  There is tenderness over the R chest wall with no crepitus. AP compression refers to the site of injury. The lungs are clear to auscultation, no rales, wheezes or rhonchi, breathing is effortless at rest  Abdomen: Soft, non-tender, non-distended, no masses or organomegaly, normal bowel sounds  Extremities: No clubbing, cyanosis or edema  MS:  There is slight tenderness over the R hip (previous THR)  Neurologic: No facial weakness or slurred speech, moves all

## 2025-03-06 NOTE — TELEPHONE ENCOUNTER
The patient called to request a new script for the rash on her ankles and feet.  There is redness and itching.  She states that Cortizone 10 and Benadryl cream do not work.  The patient's callback if needed is [phone]     _______    Addendum:    Rx for Triamcinolone sent to [facility].    Dav Alex Jr., MD

## 2025-03-07 RX ORDER — TRIAMCINOLONE ACETONIDE 1 MG/G
CREAM TOPICAL
Qty: 45 G | Refills: 1 | Status: SHIPPED | OUTPATIENT
Start: 2025-03-07

## 2025-03-11 ENCOUNTER — OFFICE VISIT (OUTPATIENT)
Facility: CLINIC | Age: 85
End: 2025-03-11
Payer: MEDICARE

## 2025-03-11 DIAGNOSIS — I10 BENIGN ESSENTIAL HYPERTENSION: Chronic | ICD-10-CM

## 2025-03-11 DIAGNOSIS — E11.42 TYPE 2 DIABETES MELLITUS WITH DIABETIC POLYNEUROPATHY, WITHOUT LONG-TERM CURRENT USE OF INSULIN (HCC): Chronic | ICD-10-CM

## 2025-03-11 DIAGNOSIS — S20.211D RIB CONTUSION, RIGHT, SUBSEQUENT ENCOUNTER: ICD-10-CM

## 2025-03-11 DIAGNOSIS — F03.B0 MODERATE DEMENTIA WITHOUT BEHAVIORAL DISTURBANCE, PSYCHOTIC DISTURBANCE, MOOD DISTURBANCE, OR ANXIETY, UNSPECIFIED DEMENTIA TYPE (HCC): Chronic | ICD-10-CM

## 2025-03-11 DIAGNOSIS — I35.0 NONRHEUMATIC AORTIC VALVE STENOSIS: Chronic | ICD-10-CM

## 2025-03-11 DIAGNOSIS — R29.6 FREQUENT FALLS: Chronic | ICD-10-CM

## 2025-03-11 DIAGNOSIS — I25.10 CORONARY ARTERY DISEASE INVOLVING NATIVE CORONARY ARTERY OF NATIVE HEART WITHOUT ANGINA PECTORIS: Primary | Chronic | ICD-10-CM

## 2025-03-11 DIAGNOSIS — J96.11 CHRONIC RESPIRATORY FAILURE WITH HYPOXIA (HCC): Chronic | ICD-10-CM

## 2025-03-11 DIAGNOSIS — Z95.2 H/O AORTIC VALVE REPLACEMENT: Chronic | ICD-10-CM

## 2025-03-11 DIAGNOSIS — J41.0 SIMPLE CHRONIC BRONCHITIS (HCC): Chronic | ICD-10-CM

## 2025-03-11 PROCEDURE — G8420 CALC BMI NORM PARAMETERS: HCPCS | Performed by: FAMILY MEDICINE

## 2025-03-11 PROCEDURE — 1123F ACP DISCUSS/DSCN MKR DOCD: CPT | Performed by: FAMILY MEDICINE

## 2025-03-11 PROCEDURE — 99349 HOME/RES VST EST MOD MDM 40: CPT | Performed by: FAMILY MEDICINE

## 2025-03-11 PROCEDURE — 1090F PRES/ABSN URINE INCON ASSESS: CPT | Performed by: FAMILY MEDICINE

## 2025-03-11 PROCEDURE — 1036F TOBACCO NON-USER: CPT | Performed by: FAMILY MEDICINE

## 2025-03-16 VITALS
HEART RATE: 84 BPM | OXYGEN SATURATION: 91 % | RESPIRATION RATE: 20 BRPM | SYSTOLIC BLOOD PRESSURE: 136 MMHG | DIASTOLIC BLOOD PRESSURE: 83 MMHG | TEMPERATURE: 97.8 F

## 2025-03-25 ENCOUNTER — TELEPHONE (OUTPATIENT)
Facility: CLINIC | Age: 85
End: 2025-03-25

## 2025-03-25 NOTE — TELEPHONE ENCOUNTER
-3:54 pm-the patient called to speak w/Suleiman or Dr. Alex.  She said that it is regarding the following:    Thyroid  Infection    No other info given.  # [phone]

## 2025-03-26 NOTE — TELEPHONE ENCOUNTER
Attempted to return call - voice message left informing that I need additional information about \"thyroid and infection\" in order to assist. Call back to [facility] encouraged.

## 2025-03-27 NOTE — TELEPHONE ENCOUNTER
Call received from Almaz Khan PT with [facility]. She is with the patient - Patient verbalizes she is having \"hot flashes\", she thinks it is related to her thyreoid. She states that she used to take levothyroxine in the past, but was stopped by her previous PCP. Her last TSH in the chart is from 11/2/23 was 4.36.    Patient also reports vaginal infection - she is using a vaginal cream, but she thinks she might have an UTI now. I [facility]

## 2025-03-31 DIAGNOSIS — G43.109 MIGRAINE WITH AURA AND WITHOUT STATUS MIGRAINOSUS, NOT INTRACTABLE: Primary | ICD-10-CM

## 2025-03-31 DIAGNOSIS — R19.7 DIARRHEA, UNSPECIFIED TYPE: ICD-10-CM

## 2025-03-31 RX ORDER — LOPERAMIDE HYDROCHLORIDE 2 MG/1
2 CAPSULE ORAL 4 TIMES DAILY PRN
Qty: 60 CAPSULE | Refills: 1 | Status: SHIPPED | OUTPATIENT
Start: 2025-03-31

## 2025-03-31 RX ORDER — AMITRIPTYLINE HYDROCHLORIDE 10 MG/1
10 TABLET ORAL NIGHTLY
Qty: 90 TABLET | Refills: 1 | Status: SHIPPED | OUTPATIENT
Start: 2025-03-31

## 2025-03-31 NOTE — TELEPHONE ENCOUNTER
Fax received from [facility] pharmacy requesting refill of Amitriptyline 10mg; Loperamide 2g.    Rx pended to PCP.

## 2025-04-08 DIAGNOSIS — F41.8 DEPRESSION WITH ANXIETY: ICD-10-CM

## 2025-04-08 RX ORDER — TRAZODONE HYDROCHLORIDE 100 MG/1
100 TABLET ORAL NIGHTLY
Qty: 90 TABLET | Refills: 3 | Status: SHIPPED | OUTPATIENT
Start: 2025-04-08

## 2025-04-09 ENCOUNTER — TELEPHONE (OUTPATIENT)
Facility: CLINIC | Age: 85
End: 2025-04-09

## 2025-04-09 DIAGNOSIS — M51.362 DEGENERATION OF INTERVERTEBRAL DISC OF LUMBAR REGION WITH DISCOGENIC BACK PAIN AND LOWER EXTREMITY PAIN: Chronic | ICD-10-CM

## 2025-04-09 DIAGNOSIS — I25.10 CORONARY ARTERY DISEASE INVOLVING NATIVE CORONARY ARTERY OF NATIVE HEART WITHOUT ANGINA PECTORIS: ICD-10-CM

## 2025-04-09 RX ORDER — PREGABALIN 50 MG/1
50 CAPSULE ORAL 2 TIMES DAILY
Qty: 60 CAPSULE | Refills: 0 | Status: CANCELLED | OUTPATIENT
Start: 2025-04-09 | End: 2025-05-09

## 2025-04-09 RX ORDER — METFORMIN HYDROCHLORIDE 500 MG/1
500 TABLET, EXTENDED RELEASE ORAL
Qty: 30 TABLET | Refills: 5 | Status: CANCELLED | OUTPATIENT
Start: 2025-04-09

## 2025-04-09 NOTE — TELEPHONE ENCOUNTER
Call returned to patient - she verbalizes that she was very concerned about having a stroke yesterday, but she checked the symptoms and she did not have anything other than the tingling on her R arm. Pt states she checked her BP and it was within normal limits (she is not sure what it was). I asked about facial asymmetry (uneven), changes in balance, changes in speech, changes in vision patient denied all of these symptoms. I advised that if she experiences ANY of these symptoms, she should call 911 immediately.   Patient states that she is experiencing a lot of stress related to the moving to a new place. I explained that the stress could contribute to tingling sensation, but also her diabetes could be causing some of this.    I offered a visit with Dr. Alex for tomorrow, 4/10/25 - patient accepted.

## 2025-04-09 NOTE — TELEPHONE ENCOUNTER
The patient requests a call back from Suleiman regarding her symptoms.  She states that last night she thought she was having a stroke.  She had tingling all down her right arm and felt dizzy.  She said that her BP was normal, she could not remember the actual numbers.  Her callback is [phone]     - 1:05 pm- patient requests callback regarding her symptoms and need for home visit before she moves next week on Wednesday

## 2025-04-09 NOTE — TELEPHONE ENCOUNTER
Fax received from [facility]'s requesting refills of Atorvastatin, Lyrica and Metformin.  Refills pended to Dr. Alex.

## 2025-04-10 ENCOUNTER — OFFICE VISIT (OUTPATIENT)
Facility: CLINIC | Age: 85
End: 2025-04-10
Payer: MEDICARE

## 2025-04-10 DIAGNOSIS — F41.8 DEPRESSION WITH ANXIETY: Chronic | ICD-10-CM

## 2025-04-10 DIAGNOSIS — J96.11 CHRONIC RESPIRATORY FAILURE WITH HYPOXIA: Chronic | ICD-10-CM

## 2025-04-10 DIAGNOSIS — G40.909 SEIZURE DISORDER (HCC): Chronic | ICD-10-CM

## 2025-04-10 DIAGNOSIS — N32.81 OAB (OVERACTIVE BLADDER): Chronic | ICD-10-CM

## 2025-04-10 DIAGNOSIS — E11.42 TYPE 2 DIABETES MELLITUS WITH DIABETIC POLYNEUROPATHY, WITHOUT LONG-TERM CURRENT USE OF INSULIN (HCC): ICD-10-CM

## 2025-04-10 DIAGNOSIS — M51.362 DEGENERATION OF INTERVERTEBRAL DISC OF LUMBAR REGION WITH DISCOGENIC BACK PAIN AND LOWER EXTREMITY PAIN: Chronic | ICD-10-CM

## 2025-04-10 DIAGNOSIS — I25.10 CORONARY ARTERY DISEASE INVOLVING NATIVE CORONARY ARTERY OF NATIVE HEART WITHOUT ANGINA PECTORIS: Primary | Chronic | ICD-10-CM

## 2025-04-10 DIAGNOSIS — J84.9 INTERSTITIAL LUNG DISEASE (HCC): Chronic | ICD-10-CM

## 2025-04-10 DIAGNOSIS — I35.0 NONRHEUMATIC AORTIC VALVE STENOSIS: Chronic | ICD-10-CM

## 2025-04-10 DIAGNOSIS — K21.9 GASTROESOPHAGEAL REFLUX DISEASE WITHOUT ESOPHAGITIS: Chronic | ICD-10-CM

## 2025-04-10 DIAGNOSIS — Z95.2 H/O AORTIC VALVE REPLACEMENT: Chronic | ICD-10-CM

## 2025-04-10 DIAGNOSIS — I10 BENIGN ESSENTIAL HYPERTENSION: Chronic | ICD-10-CM

## 2025-04-10 DIAGNOSIS — E11.42 TYPE 2 DIABETES MELLITUS WITH DIABETIC POLYNEUROPATHY, WITHOUT LONG-TERM CURRENT USE OF INSULIN (HCC): Chronic | ICD-10-CM

## 2025-04-10 DIAGNOSIS — M15.9 GENERALIZED OSTEOARTHRITIS: Chronic | ICD-10-CM

## 2025-04-10 DIAGNOSIS — G40.909 SEIZURE DISORDER (HCC): Primary | Chronic | ICD-10-CM

## 2025-04-10 DIAGNOSIS — F03.B0 MODERATE DEMENTIA WITHOUT BEHAVIORAL DISTURBANCE, PSYCHOTIC DISTURBANCE, MOOD DISTURBANCE, OR ANXIETY, UNSPECIFIED DEMENTIA TYPE (HCC): Chronic | ICD-10-CM

## 2025-04-10 DIAGNOSIS — M51.360 DEGENERATION OF INTERVERTEBRAL DISC OF LUMBAR REGION WITH DISCOGENIC BACK PAIN: Chronic | ICD-10-CM

## 2025-04-10 PROCEDURE — G8420 CALC BMI NORM PARAMETERS: HCPCS | Performed by: FAMILY MEDICINE

## 2025-04-10 PROCEDURE — 1123F ACP DISCUSS/DSCN MKR DOCD: CPT | Performed by: FAMILY MEDICINE

## 2025-04-10 PROCEDURE — 99349 HOME/RES VST EST MOD MDM 40: CPT | Performed by: FAMILY MEDICINE

## 2025-04-10 PROCEDURE — 1036F TOBACCO NON-USER: CPT | Performed by: FAMILY MEDICINE

## 2025-04-10 PROCEDURE — 1090F PRES/ABSN URINE INCON ASSESS: CPT | Performed by: FAMILY MEDICINE

## 2025-04-10 NOTE — TELEPHONE ENCOUNTER
The patient called to request refill on the following scripts before she moves to her new home:    Pregabalin - 1 wk supply in pill box  Metformin - enough to get thru the weekend  Keppra - 2 x per day, original script is in pill form but she cannot swallow.  Dr. Alex recommended liquid.    She asks that the above refills go to [facility].  She is moving to a new home on Tuesday, 4/15/25.  Her callback if needed is [phone]

## 2025-04-11 VITALS
OXYGEN SATURATION: 95 % | TEMPERATURE: 97.7 F | SYSTOLIC BLOOD PRESSURE: 151 MMHG | DIASTOLIC BLOOD PRESSURE: 72 MMHG | RESPIRATION RATE: 20 BRPM | HEART RATE: 76 BPM

## 2025-04-11 RX ORDER — PREGABALIN 50 MG/1
50 CAPSULE ORAL 2 TIMES DAILY
Qty: 60 CAPSULE | Refills: 0 | Status: SHIPPED | OUTPATIENT
Start: 2025-04-11 | End: 2025-05-11

## 2025-04-11 RX ORDER — METFORMIN HYDROCHLORIDE 500 MG/1
500 TABLET, EXTENDED RELEASE ORAL
Qty: 30 TABLET | Refills: 5 | Status: SHIPPED | OUTPATIENT
Start: 2025-04-11

## 2025-04-11 RX ORDER — LEVETIRACETAM 100 MG/ML
750 SOLUTION ORAL 2 TIMES DAILY
Qty: 473 ML | Refills: 3 | Status: SHIPPED | OUTPATIENT
Start: 2025-04-11 | End: 2025-08-15

## 2025-04-11 RX ORDER — ATORVASTATIN CALCIUM 20 MG/1
20 TABLET, FILM COATED ORAL EVERY EVENING
Qty: 90 TABLET | Refills: 1 | Status: SHIPPED | OUTPATIENT
Start: 2025-04-11 | End: 2025-10-08

## 2025-04-11 NOTE — PROGRESS NOTES
[facility]      Primary Care At Home - Home Visit      Adenike Dasilva      Address: [address] [place]  [address]    :  1940  MRN:  [id number]      ASSESSMENT:     Diagnosis Orders   1. Coronary artery disease involving native coronary artery of native heart without angina pectoris        2. Nonrheumatic aortic valve stenosis        3. H/O aortic valve replacement        4. Benign essential hypertension        5. Interstitial lung disease (HCC)        6. Chronic respiratory failure with hypoxia        7. Type 2 diabetes mellitus with diabetic polyneuropathy, without long-term current use of insulin (HCC)        8. OAB (overactive bladder)        9. Gastroesophageal reflux disease without esophagitis        10. Degeneration of intervertebral disc of lumbar region with discogenic back pain        11. Generalized osteoarthritis        12. Seizure disorder (HCC)        13. Moderate dementia without behavioral disturbance, psychotic disturbance, mood disturbance, or anxiety, unspecified dementia type ([facility])        14. Depression with anxiety              PLAN:    CAD/ HD/ HTN: This problem is stable. Will continue close surveillance. Will recheck in 2 weeks as scheduled.    ILD/ COPD/ RF: This problem has deteriorated. Will continue supplemental Oxygen.    OAB: This problem is stable. Will continue close surveillance.    GERD: This problem is stable. Will continue current treatment including antiacids and acid blocker medication.     GERD: This problem is stable. Will continue current treatment including antiacids and acid blocker medication.     DDD/ OSTEOARTHRITIS: This problem is stable. Will continue heat, rubs, patches and medication.    SEIZURE DISORDER: This problem is stable. Will continue close surveillance.    ALZHEIMERS DEMENTIA: This problem has deteriorated. Will continue to provide supportive care in the absence of effective

## (undated) DEVICE — PREP SKN CHLRAPRP APL 26ML STR --

## (undated) DEVICE — Device: Brand: JELCO

## (undated) DEVICE — DRESSING HEMSTAT W4XL4IN 4 PLY WHT IMPREG KAOLIN HYDRPHLC

## (undated) DEVICE — GLOVE ORANGE PI 8   MSG9080

## (undated) DEVICE — SYR 10ML LUER LOK 1/5ML GRAD --

## (undated) DEVICE — SET EXTN PRIMING 0.59ML 8.5IN 1.55LB PRSS RATE MINIBOR PUR

## (undated) DEVICE — TOWEL OR BL STR 4/PK -- MEDICHOICE - MEDLINE

## (undated) DEVICE — TUBING PRSS MON L6IN PVC M FEM CONN

## (undated) DEVICE — MARKER,SKIN,WI/RULER AND LABELS: Brand: MEDLINE

## (undated) DEVICE — SYR 5ML 1/5 GRAD LL NSAF LF --

## (undated) DEVICE — SYRINGE MED 5ML STD CLR PLAS LUERLOCK TIP N CTRL DISP

## (undated) DEVICE — TOWEL,OR,DSP,ST,BLUE,STD,4/PK,20PK/CS: Brand: MEDLINE

## (undated) DEVICE — 3M™ TEGADERM™ TRANSPARENT FILM DRESSING FRAME STYLE, 1626W, 4 IN X 4-3/4 IN (10 CM X 12 CM), 50/CT 4CT/CASE: Brand: 3M™ TEGADERM™

## (undated) DEVICE — TOWEL SURG W17XL27IN STD BLU COT NONFENESTRATED PREWASHED

## (undated) DEVICE — NEEDLE HYPO 18GA L1.5IN PNK S STL HUB POLYPR SHLD REG BVL

## (undated) DEVICE — GDWIRE COR ROTAWIRE 0.09INX325 --

## (undated) DEVICE — STERILE POLYISOPRENE POWDER-FREE SURGICAL GLOVES: Brand: PROTEXIS

## (undated) DEVICE — APPLICATOR MEDICATED 26 CC SOLUTION HI LT ORNG CHLORAPREP

## (undated) DEVICE — NEEDLE SPNL L4.75IN OD25GA QNCKE TYP SPINOCAN

## (undated) DEVICE — SYRINGE MED 10ML LUERLOCK TIP W/O SFTY DISP

## (undated) DEVICE — POLYLINED TOWEL: Brand: CONVERTORS

## (undated) DEVICE — DEVICE COMPR REG 24 CM VASC BND

## (undated) DEVICE — HEART CATH-MRMC: Brand: MEDLINE INDUSTRIES, INC.

## (undated) DEVICE — CATHETER ETER ANGIO L110CM OD5FR ID046IN L75CM 038IN 145DEG CARD

## (undated) DEVICE — SPLINT WR POS F/ARTERIAL ACC -- BX/10

## (undated) DEVICE — PACK,UNIVERSAL,NO GOWNS: Brand: MEDLINE

## (undated) DEVICE — HYPODERMIC SAFETY NEEDLE: Brand: MONOJECT

## (undated) DEVICE — CATH 5F 100CM JL35 -- DXTERITY

## (undated) DEVICE — STRAP,POSITIONING,KNEE/BODY,FOAM,4X60": Brand: MEDLINE

## (undated) DEVICE — DISPOSABLE ADAPTER
Type: IMPLANTABLE DEVICE | Status: NON-FUNCTIONAL
Removed: 2022-07-20

## (undated) DEVICE — RADIFOCUS OPTITORQUE ANGIOGRAPHIC CATHETER: Brand: OPTITORQUE

## (undated) DEVICE — GLIDESHEATH SLENDER ACCESS KIT: Brand: GLIDESHEATH SLENDER

## (undated) DEVICE — CATHETER ANGIO JR4 AD 5 FRX100 CM 25 CM PERFORMA

## (undated) DEVICE — CATH GUID COR EB35 6FR 100CM -- LAUNCHER

## (undated) DEVICE — PINNACLE INTRODUCER SHEATH: Brand: PINNACLE

## (undated) DEVICE — GUIDEWIRE VASC L145CM 0.035IN J TIP L3MM PTFE FIX COR NAMIC

## (undated) DEVICE — SYRINGE ANGIO 10 CC BRL STD PRNT POLYCARB LT BLU MEDALLION

## (undated) DEVICE — HI-TORQUE VERSACORE FLOPPY GUIDE WIRE SYSTEM 145 CM: Brand: HI-TORQUE VERSACORE

## (undated) DEVICE — PACK PROCEDURE SURG HRT CATH

## (undated) DEVICE — TREK CORONARY DILATATION CATHETER 2.50 MM X 8 MM / RAPID-EXCHANGE: Brand: TREK

## (undated) DEVICE — PAD GROUNDING ADULT UNCORDED  5-PACK

## (undated) DEVICE — Device

## (undated) DEVICE — DRESSING HEMOSTATIC SFT INTVENT W/O SLT DBL WRP QUIKCLOT LF

## (undated) DEVICE — PROVE COVER: Brand: UNBRANDED

## (undated) DEVICE — ADULT SPO2 SENSOR: Brand: NELLCOR

## (undated) DEVICE — CVD CANNULA

## (undated) DEVICE — AMPLATZ EXTRA STIFF WIRE GUIDE: Brand: AMPLATZ

## (undated) DEVICE — GUIDEWIRE VASC L260CM 0.035IN J TIP L3MM PTFE FIX COR NAMIC

## (undated) DEVICE — PRE-CONNECTED EXCHANGEABLE BURR CATHETER AND BURR ADVANCING DEVICE: Brand: ROTAPRO™

## (undated) DEVICE — Device: Brand: PROWATER

## (undated) DEVICE — NEEDLE HYPO 25GA L1.5IN BVL ORIENTED ECLIPSE

## (undated) DEVICE — Device: Brand: CORSAIR PRO

## (undated) DEVICE — CUSTOM KT PTCA INFL DEV K05 00053H

## (undated) DEVICE — KIT ACCS INTRO 4FR L10CM NDL 21GA L7CM GWIRE L40CM

## (undated) DEVICE — MEDI-TRACE CADENCE ADULT, DEFIBRILLATION ELECTRODE -RTS  (10 PR/PK) - PHILIPS: Brand: MEDI-TRACE CADENCE

## (undated) DEVICE — ANGIO-SEAL VIP VASCULAR CLOSURE DEVICE: Brand: ANGIO-SEAL